# Patient Record
Sex: FEMALE | Race: BLACK OR AFRICAN AMERICAN | HISPANIC OR LATINO | Employment: OTHER | URBAN - METROPOLITAN AREA
[De-identification: names, ages, dates, MRNs, and addresses within clinical notes are randomized per-mention and may not be internally consistent; named-entity substitution may affect disease eponyms.]

---

## 2017-01-05 ENCOUNTER — HOSPITAL ENCOUNTER (EMERGENCY)
Facility: HOSPITAL | Age: 57
Discharge: HOME | End: 2017-01-05
Attending: EMERGENCY MEDICINE | Admitting: EMERGENCY MEDICINE
Payer: COMMERCIAL

## 2017-01-05 ENCOUNTER — APPOINTMENT (EMERGENCY)
Dept: RADIOLOGY | Facility: HOSPITAL | Age: 57
End: 2017-01-05
Payer: COMMERCIAL

## 2017-01-05 VITALS
TEMPERATURE: 99.5 F | HEART RATE: 78 BPM | RESPIRATION RATE: 16 BRPM | BODY MASS INDEX: 26.31 KG/M2 | WEIGHT: 163 LBS | SYSTOLIC BLOOD PRESSURE: 94 MMHG | DIASTOLIC BLOOD PRESSURE: 51 MMHG | OXYGEN SATURATION: 98 %

## 2017-01-05 DIAGNOSIS — B34.9 VIRAL SYNDROME: Primary | ICD-10-CM

## 2017-01-05 LAB
ALBUMIN SERPL BCP-MCNC: 3.9 G/DL (ref 3.5–5)
ALP SERPL-CCNC: 94 U/L (ref 46–116)
ALT SERPL W P-5'-P-CCNC: 23 U/L (ref 12–78)
ANION GAP SERPL CALCULATED.3IONS-SCNC: 7 MMOL/L (ref 4–13)
AST SERPL W P-5'-P-CCNC: 16 U/L (ref 5–45)
BACTERIA UR QL AUTO: ABNORMAL /HPF
BASOPHILS # BLD AUTO: 0.01 THOUSANDS/ΜL (ref 0–0.1)
BASOPHILS NFR BLD AUTO: 0 % (ref 0–1)
BILIRUB SERPL-MCNC: 0.87 MG/DL (ref 0.2–1)
BILIRUB UR QL STRIP: NEGATIVE
BUN SERPL-MCNC: 13 MG/DL (ref 5–25)
CALCIUM SERPL-MCNC: 9.1 MG/DL (ref 8.3–10.1)
CHLORIDE SERPL-SCNC: 100 MMOL/L (ref 100–108)
CLARITY UR: CLEAR
CO2 SERPL-SCNC: 33 MMOL/L (ref 21–32)
COLOR UR: YELLOW
CREAT SERPL-MCNC: 0.78 MG/DL (ref 0.6–1.3)
EOSINOPHIL # BLD AUTO: 0.02 THOUSAND/ΜL (ref 0–0.61)
EOSINOPHIL NFR BLD AUTO: 0 % (ref 0–6)
ERYTHROCYTE [DISTWIDTH] IN BLOOD BY AUTOMATED COUNT: 12.3 % (ref 11.6–15.1)
FLUAV AG SPEC QL IA: NEGATIVE
FLUAV AG SPEC QL: NORMAL
FLUBV AG SPEC QL IA: NEGATIVE
FLUBV AG SPEC QL: NORMAL
GFR SERPL CREATININE-BSD FRML MDRD: >60 ML/MIN/1.73SQ M
GLUCOSE SERPL-MCNC: 109 MG/DL (ref 65–140)
GLUCOSE UR STRIP-MCNC: NEGATIVE MG/DL
HCT VFR BLD AUTO: 39.8 % (ref 34.8–46.1)
HGB BLD-MCNC: 13.4 G/DL (ref 11.5–15.4)
HGB UR QL STRIP.AUTO: ABNORMAL
KETONES UR STRIP-MCNC: NEGATIVE MG/DL
LEUKOCYTE ESTERASE UR QL STRIP: NEGATIVE
LIPASE SERPL-CCNC: 63 U/L (ref 73–393)
LYMPHOCYTES # BLD AUTO: 1.15 THOUSANDS/ΜL (ref 0.6–4.47)
LYMPHOCYTES NFR BLD AUTO: 12 % (ref 14–44)
MCH RBC QN AUTO: 30.2 PG (ref 26.8–34.3)
MCHC RBC AUTO-ENTMCNC: 33.7 G/DL (ref 31.4–37.4)
MCV RBC AUTO: 90 FL (ref 82–98)
MONOCYTES # BLD AUTO: 0.32 THOUSAND/ΜL (ref 0.17–1.22)
MONOCYTES NFR BLD AUTO: 4 % (ref 4–12)
NEUTROPHILS # BLD AUTO: 7.77 THOUSANDS/ΜL (ref 1.85–7.62)
NEUTS SEG NFR BLD AUTO: 84 % (ref 43–75)
NITRITE UR QL STRIP: NEGATIVE
NON-SQ EPI CELLS URNS QL MICRO: ABNORMAL /HPF
NRBC BLD AUTO-RTO: 0 /100 WBCS
PH UR STRIP.AUTO: 7 [PH] (ref 4.5–8)
PLATELET # BLD AUTO: 180 THOUSANDS/UL (ref 149–390)
PMV BLD AUTO: 11.8 FL (ref 8.9–12.7)
POTASSIUM SERPL-SCNC: 3.8 MMOL/L (ref 3.5–5.3)
PROT SERPL-MCNC: 7.4 G/DL (ref 6.4–8.2)
PROT UR STRIP-MCNC: NEGATIVE MG/DL
RBC # BLD AUTO: 4.44 MILLION/UL (ref 3.81–5.12)
RBC #/AREA URNS AUTO: ABNORMAL /HPF
RSV B RNA SPEC QL NAA+PROBE: NORMAL
SODIUM SERPL-SCNC: 140 MMOL/L (ref 136–145)
SP GR UR STRIP.AUTO: 1.02 (ref 1–1.03)
UROBILINOGEN UR QL STRIP.AUTO: 1 E.U./DL
WBC # BLD AUTO: 9.27 THOUSAND/UL (ref 4.31–10.16)
WBC #/AREA URNS AUTO: ABNORMAL /HPF

## 2017-01-05 PROCEDURE — 36415 COLL VENOUS BLD VENIPUNCTURE: CPT | Performed by: EMERGENCY MEDICINE

## 2017-01-05 PROCEDURE — 87400 INFLUENZA A/B EACH AG IA: CPT | Performed by: EMERGENCY MEDICINE

## 2017-01-05 PROCEDURE — 96374 THER/PROPH/DIAG INJ IV PUSH: CPT

## 2017-01-05 PROCEDURE — 87798 DETECT AGENT NOS DNA AMP: CPT | Performed by: EMERGENCY MEDICINE

## 2017-01-05 PROCEDURE — 85025 COMPLETE CBC W/AUTO DIFF WBC: CPT | Performed by: EMERGENCY MEDICINE

## 2017-01-05 PROCEDURE — 71020 HB CHEST X-RAY 2VW FRONTAL&LATL: CPT

## 2017-01-05 PROCEDURE — 96361 HYDRATE IV INFUSION ADD-ON: CPT

## 2017-01-05 PROCEDURE — 87086 URINE CULTURE/COLONY COUNT: CPT

## 2017-01-05 PROCEDURE — 96375 TX/PRO/DX INJ NEW DRUG ADDON: CPT

## 2017-01-05 PROCEDURE — 81001 URINALYSIS AUTO W/SCOPE: CPT

## 2017-01-05 PROCEDURE — 83690 ASSAY OF LIPASE: CPT | Performed by: EMERGENCY MEDICINE

## 2017-01-05 PROCEDURE — 81002 URINALYSIS NONAUTO W/O SCOPE: CPT | Performed by: EMERGENCY MEDICINE

## 2017-01-05 PROCEDURE — 99284 EMERGENCY DEPT VISIT MOD MDM: CPT

## 2017-01-05 PROCEDURE — 80053 COMPREHEN METABOLIC PANEL: CPT | Performed by: EMERGENCY MEDICINE

## 2017-01-05 RX ORDER — DIPHENHYDRAMINE HYDROCHLORIDE 50 MG/ML
25 INJECTION INTRAMUSCULAR; INTRAVENOUS ONCE
Status: COMPLETED | OUTPATIENT
Start: 2017-01-05 | End: 2017-01-05

## 2017-01-05 RX ORDER — METOCLOPRAMIDE HYDROCHLORIDE 5 MG/ML
10 INJECTION INTRAMUSCULAR; INTRAVENOUS ONCE
Status: COMPLETED | OUTPATIENT
Start: 2017-01-05 | End: 2017-01-05

## 2017-01-05 RX ORDER — METOCLOPRAMIDE HYDROCHLORIDE 5 MG/ML
INJECTION INTRAMUSCULAR; INTRAVENOUS
Status: COMPLETED
Start: 2017-01-05 | End: 2017-01-05

## 2017-01-05 RX ORDER — METOCLOPRAMIDE 10 MG/1
10 TABLET ORAL EVERY 6 HOURS PRN
Qty: 12 TABLET | Refills: 0 | Status: SHIPPED | OUTPATIENT
Start: 2017-01-05 | End: 2019-02-25

## 2017-01-05 RX ORDER — KETOROLAC TROMETHAMINE 30 MG/ML
INJECTION, SOLUTION INTRAMUSCULAR; INTRAVENOUS
Status: COMPLETED
Start: 2017-01-05 | End: 2017-01-05

## 2017-01-05 RX ORDER — KETOROLAC TROMETHAMINE 30 MG/ML
15 INJECTION, SOLUTION INTRAMUSCULAR; INTRAVENOUS ONCE
Status: COMPLETED | OUTPATIENT
Start: 2017-01-05 | End: 2017-01-05

## 2017-01-05 RX ORDER — DIPHENHYDRAMINE HYDROCHLORIDE 50 MG/ML
INJECTION INTRAMUSCULAR; INTRAVENOUS
Status: COMPLETED
Start: 2017-01-05 | End: 2017-01-05

## 2017-01-05 RX ORDER — BUTALBITAL, ACETAMINOPHEN AND CAFFEINE 50; 325; 40 MG/1; MG/1; MG/1
1 TABLET ORAL EVERY 6 HOURS PRN
Qty: 12 TABLET | Refills: 0 | Status: ON HOLD | OUTPATIENT
Start: 2017-01-05 | End: 2019-03-22 | Stop reason: CLARIF

## 2017-01-05 RX ADMIN — DIPHENHYDRAMINE HYDROCHLORIDE 25 MG: 50 INJECTION INTRAMUSCULAR; INTRAVENOUS at 10:35

## 2017-01-05 RX ADMIN — SODIUM CHLORIDE 1000 ML: 0.9 INJECTION, SOLUTION INTRAVENOUS at 10:34

## 2017-01-05 RX ADMIN — KETOROLAC TROMETHAMINE 15 MG: 30 INJECTION, SOLUTION INTRAMUSCULAR; INTRAVENOUS at 10:35

## 2017-01-05 RX ADMIN — METOCLOPRAMIDE HYDROCHLORIDE 10 MG: 5 INJECTION INTRAMUSCULAR; INTRAVENOUS at 10:37

## 2017-01-05 RX ADMIN — DIPHENHYDRAMINE HYDROCHLORIDE 25 MG: 50 INJECTION, SOLUTION INTRAMUSCULAR; INTRAVENOUS at 10:35

## 2017-01-05 RX ADMIN — METOCLOPRAMIDE 10 MG: 5 INJECTION, SOLUTION INTRAMUSCULAR; INTRAVENOUS at 10:37

## 2017-01-05 RX ADMIN — KETOROLAC TROMETHAMINE 15 MG: 30 INJECTION, SOLUTION INTRAMUSCULAR at 10:35

## 2017-01-06 LAB — BACTERIA UR CULT: NORMAL

## 2017-01-09 ENCOUNTER — APPOINTMENT (EMERGENCY)
Dept: CT IMAGING | Facility: HOSPITAL | Age: 57
End: 2017-01-09
Payer: COMMERCIAL

## 2017-01-09 ENCOUNTER — HOSPITAL ENCOUNTER (EMERGENCY)
Facility: HOSPITAL | Age: 57
Discharge: HOME | End: 2017-01-09
Attending: EMERGENCY MEDICINE | Admitting: EMERGENCY MEDICINE
Payer: COMMERCIAL

## 2017-01-09 VITALS
HEART RATE: 70 BPM | RESPIRATION RATE: 16 BRPM | TEMPERATURE: 97.5 F | SYSTOLIC BLOOD PRESSURE: 107 MMHG | OXYGEN SATURATION: 97 % | DIASTOLIC BLOOD PRESSURE: 53 MMHG

## 2017-01-09 DIAGNOSIS — A08.4 VIRAL ENTERITIS: Primary | ICD-10-CM

## 2017-01-09 LAB
ALBUMIN SERPL BCP-MCNC: 3.4 G/DL (ref 3.5–5)
ALP SERPL-CCNC: 79 U/L (ref 46–116)
ALT SERPL W P-5'-P-CCNC: 20 U/L (ref 12–78)
ANION GAP SERPL CALCULATED.3IONS-SCNC: 6 MMOL/L (ref 4–13)
AST SERPL W P-5'-P-CCNC: 11 U/L (ref 5–45)
BACTERIA UR QL AUTO: ABNORMAL /HPF
BASOPHILS # BLD MANUAL: 0 THOUSAND/UL (ref 0–0.1)
BASOPHILS NFR MAR MANUAL: 0 % (ref 0–1)
BILIRUB SERPL-MCNC: 0.18 MG/DL (ref 0.2–1)
BILIRUB UR QL STRIP: ABNORMAL
BUN SERPL-MCNC: 11 MG/DL (ref 5–25)
CALCIUM SERPL-MCNC: 9.2 MG/DL (ref 8.3–10.1)
CAOX CRY URNS QL MICRO: ABNORMAL /HPF
CHLORIDE SERPL-SCNC: 105 MMOL/L (ref 100–108)
CLARITY UR: CLEAR
CO2 SERPL-SCNC: 31 MMOL/L (ref 21–32)
COLOR UR: YELLOW
CREAT SERPL-MCNC: 0.76 MG/DL (ref 0.6–1.3)
EOSINOPHIL # BLD MANUAL: 0.1 THOUSAND/UL (ref 0–0.4)
EOSINOPHIL NFR BLD MANUAL: 2 % (ref 0–6)
ERYTHROCYTE [DISTWIDTH] IN BLOOD BY AUTOMATED COUNT: 12.2 % (ref 11.6–15.1)
GFR SERPL CREATININE-BSD FRML MDRD: >60 ML/MIN/1.73SQ M
GLUCOSE SERPL-MCNC: 102 MG/DL (ref 65–140)
GLUCOSE UR STRIP-MCNC: NEGATIVE MG/DL
HCT VFR BLD AUTO: 37.2 % (ref 34.8–46.1)
HGB BLD-MCNC: 12.8 G/DL (ref 11.5–15.4)
HGB UR QL STRIP.AUTO: ABNORMAL
KETONES UR STRIP-MCNC: NEGATIVE MG/DL
LEUKOCYTE ESTERASE UR QL STRIP: NEGATIVE
LIPASE SERPL-CCNC: 55 U/L (ref 73–393)
LYMPHOCYTES # BLD AUTO: 1.75 THOUSAND/UL (ref 0.6–4.47)
LYMPHOCYTES # BLD AUTO: 35 % (ref 14–44)
MCH RBC QN AUTO: 30.3 PG (ref 26.8–34.3)
MCHC RBC AUTO-ENTMCNC: 34.4 G/DL (ref 31.4–37.4)
MCV RBC AUTO: 88 FL (ref 82–98)
MONOCYTES # BLD AUTO: 0.2 THOUSAND/UL (ref 0–1.22)
MONOCYTES NFR BLD: 4 % (ref 4–12)
MUCOUS THREADS UR QL AUTO: ABNORMAL
NEUTROPHILS # BLD MANUAL: 2.85 THOUSAND/UL (ref 1.85–7.62)
NEUTS BAND NFR BLD MANUAL: 7 % (ref 0–8)
NEUTS SEG NFR BLD AUTO: 50 % (ref 43–75)
NITRITE UR QL STRIP: NEGATIVE
NON-SQ EPI CELLS URNS QL MICRO: ABNORMAL /HPF
NRBC BLD AUTO-RTO: 0 /100 WBCS
PH UR STRIP.AUTO: 6 [PH] (ref 4.5–8)
PLATELET # BLD AUTO: 187 THOUSANDS/UL (ref 149–390)
PLATELET BLD QL SMEAR: ADEQUATE
PMV BLD AUTO: 11.6 FL (ref 8.9–12.7)
POTASSIUM SERPL-SCNC: 3.8 MMOL/L (ref 3.5–5.3)
PROT SERPL-MCNC: 7 G/DL (ref 6.4–8.2)
PROT UR STRIP-MCNC: NEGATIVE MG/DL
RBC # BLD AUTO: 4.22 MILLION/UL (ref 3.81–5.12)
RBC #/AREA URNS AUTO: ABNORMAL /HPF
RBC MORPH BLD: NORMAL
SODIUM SERPL-SCNC: 142 MMOL/L (ref 136–145)
SP GR UR STRIP.AUTO: >=1.03 (ref 1–1.03)
TOTAL CELLS COUNTED SPEC: 100
UROBILINOGEN UR QL STRIP.AUTO: 0.2 E.U./DL
VARIANT LYMPHS # BLD AUTO: 2 %
WBC # BLD AUTO: 5 THOUSAND/UL (ref 4.31–10.16)
WBC #/AREA URNS AUTO: ABNORMAL /HPF

## 2017-01-09 PROCEDURE — 81002 URINALYSIS NONAUTO W/O SCOPE: CPT | Performed by: EMERGENCY MEDICINE

## 2017-01-09 PROCEDURE — 96374 THER/PROPH/DIAG INJ IV PUSH: CPT

## 2017-01-09 PROCEDURE — 85007 BL SMEAR W/DIFF WBC COUNT: CPT | Performed by: EMERGENCY MEDICINE

## 2017-01-09 PROCEDURE — 80053 COMPREHEN METABOLIC PANEL: CPT | Performed by: EMERGENCY MEDICINE

## 2017-01-09 PROCEDURE — 74177 CT ABD & PELVIS W/CONTRAST: CPT

## 2017-01-09 PROCEDURE — 96361 HYDRATE IV INFUSION ADD-ON: CPT

## 2017-01-09 PROCEDURE — 81001 URINALYSIS AUTO W/SCOPE: CPT

## 2017-01-09 PROCEDURE — 99284 EMERGENCY DEPT VISIT MOD MDM: CPT

## 2017-01-09 PROCEDURE — 87086 URINE CULTURE/COLONY COUNT: CPT

## 2017-01-09 PROCEDURE — 85027 COMPLETE CBC AUTOMATED: CPT | Performed by: EMERGENCY MEDICINE

## 2017-01-09 PROCEDURE — 96375 TX/PRO/DX INJ NEW DRUG ADDON: CPT

## 2017-01-09 PROCEDURE — 36415 COLL VENOUS BLD VENIPUNCTURE: CPT | Performed by: EMERGENCY MEDICINE

## 2017-01-09 PROCEDURE — 83690 ASSAY OF LIPASE: CPT | Performed by: EMERGENCY MEDICINE

## 2017-01-09 RX ORDER — DIPHENHYDRAMINE HYDROCHLORIDE 50 MG/ML
12.5 INJECTION INTRAMUSCULAR; INTRAVENOUS ONCE
Status: COMPLETED | OUTPATIENT
Start: 2017-01-09 | End: 2017-01-09

## 2017-01-09 RX ORDER — DICYCLOMINE HCL 20 MG
20 TABLET ORAL EVERY 6 HOURS PRN
Qty: 15 TABLET | Refills: 0 | Status: SHIPPED | OUTPATIENT
Start: 2017-01-09 | End: 2019-02-25

## 2017-01-09 RX ORDER — KETOROLAC TROMETHAMINE 30 MG/ML
15 INJECTION, SOLUTION INTRAMUSCULAR; INTRAVENOUS ONCE
Status: COMPLETED | OUTPATIENT
Start: 2017-01-09 | End: 2017-01-09

## 2017-01-09 RX ORDER — DICYCLOMINE HCL 20 MG
20 TABLET ORAL ONCE
Status: COMPLETED | OUTPATIENT
Start: 2017-01-09 | End: 2017-01-09

## 2017-01-09 RX ORDER — METOCLOPRAMIDE HYDROCHLORIDE 5 MG/ML
10 INJECTION INTRAMUSCULAR; INTRAVENOUS ONCE
Status: COMPLETED | OUTPATIENT
Start: 2017-01-09 | End: 2017-01-09

## 2017-01-09 RX ADMIN — KETOROLAC TROMETHAMINE 15 MG: 30 INJECTION, SOLUTION INTRAMUSCULAR at 19:38

## 2017-01-09 RX ADMIN — DIPHENHYDRAMINE HYDROCHLORIDE 12.5 MG: 50 INJECTION, SOLUTION INTRAMUSCULAR; INTRAVENOUS at 19:39

## 2017-01-09 RX ADMIN — IOHEXOL 100 ML: 350 INJECTION, SOLUTION INTRAVENOUS at 20:24

## 2017-01-09 RX ADMIN — DICYCLOMINE HYDROCHLORIDE 20 MG: 20 TABLET ORAL at 22:36

## 2017-01-09 RX ADMIN — SODIUM CHLORIDE 1000 ML: 0.9 INJECTION, SOLUTION INTRAVENOUS at 19:38

## 2017-01-09 RX ADMIN — METOCLOPRAMIDE 10 MG: 5 INJECTION, SOLUTION INTRAMUSCULAR; INTRAVENOUS at 19:38

## 2017-01-10 LAB — BACTERIA UR CULT: NORMAL

## 2017-01-19 ENCOUNTER — ALLSCRIPTS OFFICE VISIT (OUTPATIENT)
Dept: OTHER | Facility: OTHER | Age: 57
End: 2017-01-19

## 2017-02-19 DIAGNOSIS — E55.9 VITAMIN D DEFICIENCY: ICD-10-CM

## 2017-02-19 DIAGNOSIS — N25.81 SECONDARY HYPERPARATHYROIDISM OF RENAL ORIGIN (HCC): ICD-10-CM

## 2017-02-19 DIAGNOSIS — K91.2 POSTSURGICAL MALABSORPTION, NOT ELSEWHERE CLASSIFIED: ICD-10-CM

## 2017-02-19 DIAGNOSIS — Z98.84 BARIATRIC SURGERY STATUS: ICD-10-CM

## 2017-02-19 DIAGNOSIS — Z00.00 ENCOUNTER FOR GENERAL ADULT MEDICAL EXAMINATION WITHOUT ABNORMAL FINDINGS: ICD-10-CM

## 2017-03-21 ENCOUNTER — GENERIC CONVERSION - ENCOUNTER (OUTPATIENT)
Dept: OTHER | Facility: OTHER | Age: 57
End: 2017-03-21

## 2017-07-17 ENCOUNTER — DOCTOR'S OFFICE (OUTPATIENT)
Dept: URBAN - METROPOLITAN AREA CLINIC 136 | Facility: CLINIC | Age: 57
Setting detail: OPHTHALMOLOGY
End: 2017-07-17
Payer: COMMERCIAL

## 2017-07-17 ENCOUNTER — OPTICAL OFFICE (OUTPATIENT)
Dept: URBAN - METROPOLITAN AREA CLINIC 143 | Facility: CLINIC | Age: 57
Setting detail: OPHTHALMOLOGY
End: 2017-07-17
Payer: COMMERCIAL

## 2017-07-17 DIAGNOSIS — H52.13: ICD-10-CM

## 2017-07-17 DIAGNOSIS — H04.123: ICD-10-CM

## 2017-07-17 DIAGNOSIS — H52.223: ICD-10-CM

## 2017-07-17 DIAGNOSIS — E11.9: ICD-10-CM

## 2017-07-17 DIAGNOSIS — H52.4: ICD-10-CM

## 2017-07-17 DIAGNOSIS — H25.13: ICD-10-CM

## 2017-07-17 PROCEDURE — 92004 COMPRE OPH EXAM NEW PT 1/>: CPT | Performed by: OPTOMETRIST

## 2017-07-17 PROCEDURE — V2020 VISION SVCS FRAMES PURCHASES: HCPCS | Performed by: OPTOMETRIST

## 2017-07-17 PROCEDURE — V2744 TINT PHOTOCHROMATIC LENS/ES: HCPCS | Performed by: OPTOMETRIST

## 2017-07-17 PROCEDURE — V2103 SPHEROCYLINDR 4.00D/12-2.00D: HCPCS | Performed by: OPTOMETRIST

## 2017-07-17 ASSESSMENT — KERATOMETRY
OS_K1POWER_DIOPTERS: 47.00
OS_AXISANGLE_DEGREES: 108
OD_K2POWER_DIOPTERS: 47.00
OD_K1POWER_DIOPTERS: 47.00
OS_K2POWER_DIOPTERS: 46.50
OD_AXISANGLE_DEGREES: 090

## 2017-07-17 ASSESSMENT — REFRACTION_CURRENTRX
OS_AXIS: 081
OD_OVR_VA: 20/
OS_OVR_VA: 20/
OD_CYLINDER: -1.00
OD_AXIS: 101
OS_SPHERE: -1.75
OD_OVR_VA: 20/
OS_OVR_VA: 20/
OD_VPRISM_DIRECTION: SV
OS_VPRISM_DIRECTION: SV
OD_SPHERE: -2.50
OS_CYLINDER: -0.50
OS_OVR_VA: 20/
OD_OVR_VA: 20/

## 2017-07-17 ASSESSMENT — REFRACTION_AUTOREFRACTION
OS_CYLINDER: +0.75
OD_CYLINDER: +1.50
OS_SPHERE: -3.50
OD_AXIS: 180
OD_SPHERE: -4.25
OS_AXIS: 171

## 2017-07-17 ASSESSMENT — REFRACTION_MANIFEST
OU_VA: 20/
OD_VA1: 20/
OS_VA2: 20/
OD_VA3: 20/
OS_VA1: 20/
OS_VA3: 20/
OS_VA1: 20/
OD_VA2: 20/
OD_VA2: 20/
OD_VA1: 20/
OU_VA: 20/
OS_VA3: 20/
OS_VA2: 20/
OD_VA3: 20/

## 2017-07-17 ASSESSMENT — REFRACTION_OUTSIDERX
OD_SPHERE: -2.50
OS_VA3: 20/
OS_AXIS: 080
OD_AXIS: 090
OD_CYLINDER: -1.00
OD_VA2: 20/20
OS_SPHERE: -2.25
OD_ADD: +2.25
OS_VA1: 20/20
OS_ADD: +2.25
OU_VA: 20/20
OD_VA1: 20/20
OS_CYLINDER: -0.50
OD_VA3: 20/
OS_VA2: 20/20

## 2017-07-17 ASSESSMENT — AXIALLENGTH_DERIVED
OS_AL: 23.6157
OD_AL: 23.6702

## 2017-07-17 ASSESSMENT — CONFRONTATIONAL VISUAL FIELD TEST (CVF)
OD_FINDINGS: FULL
OS_FINDINGS: FULL

## 2017-07-17 ASSESSMENT — VISUAL ACUITY
OD_BCVA: 20/80-1
OS_BCVA: 20/70-1

## 2017-07-17 ASSESSMENT — SPHEQUIV_DERIVED
OS_SPHEQUIV: -3.125
OD_SPHEQUIV: -3.5

## 2017-10-04 ENCOUNTER — OPTICAL OFFICE (OUTPATIENT)
Dept: URBAN - METROPOLITAN AREA CLINIC 143 | Facility: CLINIC | Age: 57
Setting detail: OPHTHALMOLOGY
End: 2017-10-04

## 2017-10-04 DIAGNOSIS — H52.13: ICD-10-CM

## 2017-10-04 PROCEDURE — V2020 VISION SVCS FRAMES PURCHASES: HCPCS | Performed by: OPTOMETRIST

## 2017-12-30 ENCOUNTER — HOSPITAL ENCOUNTER (EMERGENCY)
Facility: HOSPITAL | Age: 57
Discharge: HOME | End: 2017-12-30
Admitting: EMERGENCY MEDICINE
Payer: COMMERCIAL

## 2017-12-30 VITALS
HEART RATE: 70 BPM | RESPIRATION RATE: 18 BRPM | TEMPERATURE: 97.9 F | OXYGEN SATURATION: 100 % | DIASTOLIC BLOOD PRESSURE: 61 MMHG | SYSTOLIC BLOOD PRESSURE: 132 MMHG

## 2017-12-30 DIAGNOSIS — K08.89 DENTALGIA: Primary | ICD-10-CM

## 2017-12-30 PROCEDURE — 99282 EMERGENCY DEPT VISIT SF MDM: CPT

## 2017-12-30 RX ORDER — PENICILLIN V POTASSIUM 250 MG/1
500 TABLET ORAL ONCE
Status: COMPLETED | OUTPATIENT
Start: 2017-12-30 | End: 2017-12-30

## 2017-12-30 RX ORDER — CHLORHEXIDINE GLUCONATE 0.12 MG/ML
15 RINSE ORAL 2 TIMES DAILY PRN
Qty: 473 ML | Refills: 0 | Status: SHIPPED | OUTPATIENT
Start: 2017-12-30 | End: 2019-02-25

## 2017-12-30 RX ORDER — BUPIVACAINE HYDROCHLORIDE 5 MG/ML
10 INJECTION, SOLUTION EPIDURAL; INTRACAUDAL ONCE
Status: COMPLETED | OUTPATIENT
Start: 2017-12-30 | End: 2017-12-30

## 2017-12-30 RX ORDER — PENICILLIN V POTASSIUM 500 MG/1
500 TABLET ORAL 4 TIMES DAILY
Qty: 28 TABLET | Refills: 0 | Status: SHIPPED | OUTPATIENT
Start: 2017-12-30 | End: 2018-01-06

## 2017-12-30 RX ADMIN — BUPIVACAINE HYDROCHLORIDE 10 ML: 5 INJECTION, SOLUTION EPIDURAL; INTRACAUDAL at 18:27

## 2017-12-30 RX ADMIN — PENICILLIN V POTASSIUM 500 MG: 250 TABLET ORAL at 18:27

## 2017-12-30 NOTE — DISCHARGE INSTRUCTIONS
Return to the Emergency Department sooner if increased pain, fever, vomiting, difficulty breathing or swallowing, drooling  Toothache   WHAT YOU NEED TO KNOW:   A toothache is pain that is caused by irritation of the nerves in the center of your tooth  The irritation may be caused by several problems, such as a cavity, an infection, a cracked tooth, or gum disease  It is very important to follow up with your dentist so the cause of your toothache can be diagnosed and treated  This can help prevent more serious problems  DISCHARGE INSTRUCTIONS:   Medicines: You may  need any of the following:  · NSAIDs  decrease swelling and pain  This medicine can be bought with or without a doctor's order  This medicine can cause stomach bleeding or kidney problems in certain people  If you take blood thinner medicine, always ask your healthcare provider if NSAIDs are safe for you  Always read the medicine label and follow the directions on it before using this medicine  · Acetaminophen  decreases pain  It is available without a doctor's order  Ask how much to take and how often to take it  Follow directions  Acetaminophen can cause liver damage if not taken correctly  · Pain medicine  may be given as a pill or as medicine that you put directly on your tooth or gums  Do not wait until the pain is severe before you take this medicine  · Antibiotics  help fight or prevent an infection caused by bacteria  Take them as directed  · Take your medicine as directed  Contact your healthcare provider if you think your medicine is not helping or if you have side effects  Tell him of her if you are allergic to any medicine  Keep a list of the medicines, vitamins, and herbs you take  Include the amounts, and when and why you take them  Bring the list or the pill bottles to follow-up visits  Carry your medicine list with you in case of an emergency  Follow up with your dentist as directed:   You may be referred to a dental surgeon  Write down your questions so you remember to ask them during your visits  Self-care:   · Rinse your mouth with warm salt water 4 times a day or as directed  · You may need to eat soft foods to help relieve pain caused by chewing  Contact your dentist if:   · You have questions or concerns about your condition or care  Return to the emergency department if:   · You have trouble breathing  · You have swelling in your face or neck  · You have a fever and chills  · You have trouble speaking or swallowing  · You have trouble opening or closing your mouth  © 2017 [facility] Information is for End User's use only and may not be sold, redistributed or otherwise used for commercial purposes  All illustrations and images included in CareNotes® are the copyrighted property of A D A M , Inc  or Everette Markham  The above information is an  only  It is not intended as medical advice for individual conditions or treatments  Talk to your doctor, nurse or pharmacist before following any medical regimen to see if it is safe and effective for you

## 2017-12-30 NOTE — ED PROVIDER NOTES
History  Chief Complaint   Patient presents with    Dental Pain     Patient reporting L sided dental pain  Patient reports taking Tylenol for pain  last dose at 1545      16-year-old female with left-sided dental pain  Left upper 2nd 3rd molars  Began 3 days ago  Gradual onset  No precipitating factors that she can recall  She states he is teeth have given her issues in the past as well  She has had no dentist follow-up for this  States that the pain radiates across her entire face and into her submandibular region  No facial swelling with this  No fevers, but has had subjective chills; no nausea or vomiting  No chest pain or shortness of breath  No trauma  No headache lightheadedness or dizziness  States she took a single dose of Tylenol and has had no relief of her pain  Chewing on the affected side worsens or pain  Otherwise no relieving or exacerbating factors  No trismus  States she cannot take NSAIDs secondary to her gastric bypass        History provided by:  Patient   used: No    Dental Pain   Location:  Upper  Upper teeth location:  15/NOE 2nd molar  Quality:  Throbbing  Severity:  Severe  Onset quality:  Gradual  Duration:  3 days  Timing:  Constant  Progression:  Worsening  Chronicity:  New  Context: dental caries and poor dentition    Context: not abscess, cap still on, not crown fracture, not dental fracture, not enamel fracture, filling intact, not intrusion, not malocclusion, not recent dental surgery and not trauma    Previous work-up:  Dental exam  Relieved by:  Nothing  Worsened by:  Nothing  Ineffective treatments: One single dose of Tylenol    Associated symptoms: no congestion, no difficulty swallowing, no drooling, no facial pain, no facial swelling, no fever, no gum swelling, no headaches, no neck pain, no neck swelling, no oral bleeding, no oral lesions and no trismus    Risk factors: lack of dental care and smoking (Former)    Risk factors: no alcohol problem, no cancer, no chewing tobacco use, no diabetes, no immunosuppression and no periodontal disease        Prior to Admission Medications   Prescriptions Last Dose Informant Patient Reported? Taking? Multiple Vitamin (MULTIVITAMIN) tablet   Yes No   Sig: Take 1 tablet by mouth daily  TRAZODONE HCL PO   Yes No   Sig: Take by mouth daily  butalbital-acetaminophen-caffeine (FIORICET) -40 mg per tablet   No No   Sig: Take 1 tablet by mouth every 6 (six) hours as needed for headaches for up to 12 doses   dicyclomine (BENTYL) 20 mg tablet   No No   Sig: Take 1 tablet by mouth every 6 (six) hours as needed (pain)   metoclopramide (REGLAN) 10 mg tablet   No No   Sig: Take 1 tablet by mouth every 6 (six) hours as needed (vomiting) for up to 12 doses   naproxen (NAPROSYN) 500 mg tablet   No No   Sig: Take 1 tablet by mouth 2 (two) times a day with meals  Facility-Administered Medications: None       Past Medical History:   Diagnosis Date    Depression     Diabetes mellitus ([facility] [address] )     Hypotension        Past Surgical History:   Procedure Laterality Date    CHOLECYSTECTOMY      GASTRIC BYPASS      HYSTERECTOMY      TONSILLECTOMY      TUBAL LIGATION         History reviewed  No pertinent family history  I have reviewed and agree with the history as documented  Social History   Substance Use Topics    Smoking status: Former Smoker    Smokeless tobacco: Never Used    Alcohol use No        Review of Systems   Constitutional: Negative for activity change, appetite change, chills, diaphoresis, fatigue, fever and unexpected weight change  HENT: Positive for dental problem  Negative for congestion, drooling, facial swelling, mouth sores, rhinorrhea, sinus pressure, sore throat and trouble swallowing  Eyes: Negative for photophobia and visual disturbance  Respiratory: Negative for apnea, cough, choking, chest tightness, shortness of breath, wheezing and stridor      Cardiovascular: Negative for chest pain, palpitations and leg swelling  Gastrointestinal: Negative for abdominal distention, abdominal pain, blood in stool, constipation, diarrhea, nausea and vomiting  Genitourinary: Negative for decreased urine volume, difficulty urinating, dysuria, enuresis, flank pain, frequency, hematuria and urgency  Musculoskeletal: Negative for arthralgias, myalgias, neck pain and neck stiffness  Skin: Negative for color change, pallor, rash and wound  Allergic/Immunologic: Negative  Neurological: Negative for dizziness, tremors, syncope, weakness, light-headedness, numbness and headaches  Hematological: Negative  Psychiatric/Behavioral: Negative  All other systems reviewed and are negative  Physical Exam  ED Triage Vitals [12/30/17 1807]   Temperature Pulse Respirations Blood Pressure SpO2   97 9 °F (36 6 °C) 70 18 132/61 100 %      Temp Source Heart Rate Source Patient Position - Orthostatic VS BP Location FiO2 (%)   Temporal Monitor Lying Right arm --      Pain Score       Worst Possible Pain           Orthostatic Vital Signs  Vitals:    12/30/17 1807   BP: 132/61   Pulse: 70   Patient Position - Orthostatic VS: Lying       Physical Exam   Constitutional: She is oriented to person, place, and time  She appears well-developed and well-nourished  Non-toxic appearance  She does not have a sickly appearance  She does not appear ill  No distress  HENT:   Head: Normocephalic and atraumatic  Mouth/Throat: Uvula is midline, oropharynx is clear and moist and mucous membranes are normal  She does not have dentures  No oral lesions  No trismus in the jaw  Abnormal dentition  Dental caries present  No dental abscesses, uvula swelling or lacerations  Eyes: EOM and lids are normal  Pupils are equal, round, and reactive to light  Neck: Normal range of motion  Neck supple     Cardiovascular: Normal rate, regular rhythm, S1 normal, S2 normal, normal heart sounds, intact distal pulses and normal pulses  Exam reveals no gallop, no distant heart sounds, no friction rub and no decreased pulses  No murmur heard  Pulses:       Radial pulses are 2+ on the right side, and 2+ on the left side  Pulmonary/Chest: Effort normal and breath sounds normal  No accessory muscle usage  No apnea, no tachypnea and no bradypnea  No respiratory distress  She has no decreased breath sounds  She has no wheezes  She has no rhonchi  She has no rales  Abdominal: Soft  Normal appearance and bowel sounds are normal  She exhibits no distension and no mass  There is no tenderness  There is no rigidity, no rebound and no guarding  No hernia  Musculoskeletal: Normal range of motion  She exhibits no edema, tenderness or deformity  Neurological: She is alert and oriented to person, place, and time  No cranial nerve deficit  GCS eye subscore is 4  GCS verbal subscore is 5  GCS motor subscore is 6  GCS 15  AAOx3  Ambulating in department without difficulty  CN II-XII grossly intact  No focal neuro deficits  Skin: Skin is warm, dry and intact  No rash noted  She is not diaphoretic  No erythema  No pallor  Psychiatric: Her speech is normal    Nursing note and vitals reviewed  ED Medications  Medications   bupivacaine (PF) (MARCAINE) 0 5 % injection 10 mL (10 mL Injection Given by Other 12/30/17 1827)   penicillin V potassium (VEETID) tablet 500 mg (500 mg Oral Given 12/30/17 1827)       Diagnostic Studies  Results Reviewed     None                 No orders to display              Procedures  Nerve Block  Date/Time: 12/30/2017 6:26 PM  Performed by: Karime Franco by: Heidy Barreto     Patient location:  ED  Other Assisting Provider: Yes (comment) (ER nurse)    Consent:     Consent obtained:  Verbal    Consent given by:  Patient    Risks discussed:   Allergic reaction, bleeding, intravenous injection, infection, pain, nerve damage, swelling and unsuccessful block    Alternatives discussed: tramadol  Universal protocol:     Procedure explained and questions answered to patient or proxy's satisfaction: yes      Patient identity confirmed:  Arm band, verbally with patient and hospital-assigned identification number  Indications:     Indications:  Pain relief  Location:     Nerve block body site: dental     Laterality:  Left  Procedure details (see MAR for exact dosages): Block needle gauge:  22 G    Anesthetic injected:  Bupivacaine 0 5% w/o epi    Steroid injected:  None    Additive injected:  None    Injection procedure:  Anatomic landmarks identified, anatomic landmarks palpated, introduced needle, incremental injection and negative aspiration for blood  Post-procedure details:     Dressing:  None    Outcome:  Pain unchanged    Patient tolerance of procedure: Tolerated well, no immediate complications             Phone Contacts  ED Phone Contact    ED Course  ED Course                                MDM  Number of Diagnoses or Management Options  Christine Darylranchod: new and requires workup  Diagnosis management comments: DDx including but not limited to: dental von, dental infection, dental abscess, dry socket, gingivitis; doubt lizzette's angina  Plan:  Offered dental block versus tramadol  Patient would prefer dental block  Will start treatment for possible early dental abscess  Follow up with dentist     Risk of Complications, Morbidity, and/or Mortality  Presenting problems: low  Management options: low  General comments: 80-year-old female here with dental pain  Exam consistent with dental caries and likely early dental abscess  She is given 1st dose of antibiotics  She is given a dental block  Pain is overall unchanged  She needs to follow up with dentist   She is essentially having acute on chronic dental pain  She has had issues with the affected teeth in the past   Her exam is benign  No submandibular swelling or abscess    Will give the patient contact information for outpatient dentistry  Return parameters provided  Patient understands agrees the plan  Patient Progress  Patient progress: stable    CritCare Time    Disposition  Final diagnoses:   Titus Posadas     Time reflects when diagnosis was documented in both MDM as applicable and the Disposition within this note     Time User Action Codes Description Comment    12/30/2017  6:34 PM Doris Velez Add [K08 89] Titus Posadas       ED Disposition     ED Disposition Condition Comment    Discharge  Mark Aguero discharge to home/self care  Condition at discharge: Good        Follow-up Information     Follow up With Specialties Details Why Contact Info    Chung Pal PA-C Physician Assistant   [phone] [address]  Call As early as possible to schedule follow-up appointment  [address] [place] [address]  [phone]        Patient's Medications   Discharge Prescriptions    CHLORHEXIDINE (PERIDEX) 0 12 % SOLUTION    Apply 15 mL to the mouth or throat 2 (two) times a day as needed for wound care       Start Date: 12/30/2017End Date: --       Order Dose: 15 mL       Quantity: 473 mL    Refills: 0    PENICILLIN V POTASSIUM (VEETID) 500 MG TABLET    Take 1 tablet by mouth 4 (four) times a day for 7 days       Start Date: 12/30/2017End Date: 1/6/2018       Order Dose: 500 mg       Quantity: 28 tablet    Refills: 0     No discharge procedures on file      ED Provider  Electronically Signed by           Nancy Singletary PA-C  12/30/17 5260

## 2018-01-10 NOTE — PROGRESS NOTES
Message  Received referral from ANEESH  Scheduled f/u with LCSW and RD for 2/19/2016 at 11 am       Active Problems    1  Asthma (493 90) (J45 909)   2  Atypical chest pain (786 59) (R07 89)   3  Backache (724 5) (M54 9)   4  Constipation (564 00) (K59 00)   5  Depression with anxiety (300 4) (F41 8)   6  Edema (782 3) (R60 9)   7  Hemorrhoids (455 6) (K64 9)   8  Incisional irritation (998 89) (T81 89XA)   9  Limb pain (729 5) (M79 609)   10  Low vitamin B12 level (266 2) (E53 8)   11  Nausea (787 02) (R11 0)   12  Obesity (278 00) (E66 9)   13  Obesity surgery status (V45 86) (Z98 84)   14  Postgastrectomy malabsorption (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) (K91 2)   15  Pre-operative cardiovascular examination (V72 81) (Z01 810)   16  Secondary hyperparathyroidism (588 81) (N25 81)   17  Shortness of breath (786 05) (R06 02)   18  Skin rash (782 1) (R21)   19  Swelling of calf (729 81) (M79 89)   20  Vitamin D deficiency (268 9) (E55 9)   21  Wound drainage (879 8) (T14 8)    Current Meds   1  Albuterol Sulfate 0 63 MG/3ML Inhalation Nebulization Solution; Therapy: 59JYO3009 to Recorded   2  Calcium Citrate TABS; Therapy: (Recorded:30Jan2014) to Recorded   3  Cephalexin 250 MG/5ML Oral Suspension Reconstituted; Therapy: 25JIL2367 to (61 23 68) Recorded   4  DULoxetine HCl - 60 MG Oral Capsule Delayed Release Particles; Therapy: 61SRY3489 to (Evaluate:48Lth0190) Recorded   5  FreeStyle Lancets Miscellaneous; Therapy: 00VWP7314 to (Evaluate:87Boz6132) Recorded   6  FreeStyle Lite Device; Therapy: 55XTY0303 to (Evaluate:28Uej1208) Recorded   7  FreeStyle Lite Test In [employer]; Therapy: 67UVU5270 to (Evaluate:74Sps7023) Recorded   8  Hydrocodone-Acetaminophen 5-325 MG Oral Tablet; Therapy: 16FYW6558 to (Evaluate:24Jan2015) Recorded   9  Mirtazapine 45 MG Oral Tablet; Therapy: 73EUT3471 to (Evaluate:08Feb2015) Recorded   10  Multiple Vitamins TABS; Fusion QID; Therapy: (Recorded:30Jan2014) to Recorded   11  Oxycodone-Acetaminophen 5-325 MG Oral Tablet; take 1 tablet every 4 to 6 hours as    needed for pain; Therapy: 05UKD2257 to (Last Rx:16Jan2015) Ordered   12  Pantoprazole Sodium 40 MG Oral Tablet Delayed Release; Therapy: (Recorded:19Jan2016) to Recorded   13  Pataday 0 2 % Ophthalmic Solution; Therapy: 64PNU0039 to (Evaluate:31Jan2015) Recorded   14  Refresh Plus 0 5 % Ophthalmic Solution; Therapy: 16Oml8213 to (Evaluate:08Feb2015) Recorded   15  Terbinafine HCl - 1 % External Cream;    Therapy: 87Prn4516 to (Evaluate:16Jan2015) Recorded   16  TraMADol HCl - 50 MG Oral Tablet; TAKE 1 TABLET 3 TIMES DAILY; Therapy: (Recorded:07Fkm2972) to Recorded   17  TraZODone HCl - 50 MG Oral Tablet; Therapy: 50DTL7341 to (Evaluate:08Feb2015) Recorded   18  Vistaril 25 MG Oral Capsule (HydrOXYzine Pamoate); TAKE 1 CAPSULE AT BEDTIME; Therapy: (Recorded:74Gij5431) to Recorded    Allergies    1   No Known Drug Allergies    Signatures   Electronically signed by : MANUEL Garcia; Jan 20 2016  2:04PM EST                       (Author)

## 2018-01-14 VITALS
HEIGHT: 66 IN | DIASTOLIC BLOOD PRESSURE: 64 MMHG | HEART RATE: 72 BPM | BODY MASS INDEX: 25.96 KG/M2 | RESPIRATION RATE: 22 BRPM | TEMPERATURE: 98 F | WEIGHT: 161.5 LBS | SYSTOLIC BLOOD PRESSURE: 118 MMHG

## 2018-01-14 NOTE — MISCELLANEOUS
Provider Comments  Provider Comments:   Dear David Velasquez had a scheduled appointment at our office 03/21/2017 today that you called to cancel but did not reschedule for another day  It is very important that you follow up with us so that we can assess your physical and nutritional safety after your surgery  Please call our office at [phone] to reschedule your appointment       Sincerely,     Alberto Osorio [facility]          Signatures   Electronically signed by : Elsie Rodriguez, [facility]; Mar 21 2017 12:40PM EST                       (Author)

## 2018-01-17 NOTE — RESULT NOTES
Dear Raúl No,   Your test results have returned and are listed below:      Plan  Health Maintenance, Obesity surgery status, Postgastrectomy  malabsorption, Secondary hyperparathyroidism    · (1) CALCIUM; Status:Active; Requested WVV:[id number];    · (1) PTH N-TERMINAL (INTACT); Status:Active; Requested  DAMON:[id number];    · (1) VITAMIN D 25-HYDROXY; Status:Active; Requested  AKB:[id number];     Discussion/Summary  Your results show some abnormalities  2/2/16 labs reviewed  Your PTH level is high again-now at 91 8  This may indicate you are losing calcium from your bones  Your total calcium citrate with D should be 500 mg -three times per day  Calcium needs to be spaced out for better absorption  IF you are already taking this much, I would recommend taking this 4 times per day instead of three  If your level remains high with repeat labs, you may benefit from having this further evaluated by an Endocrinologist     Your vitamin D is also low at 25- Vitamin D and calcium are both important for bone health  Your TOTAL vitamin D3 for the day (with what is in your multivitamins, and what is in your calcium with D) should add up to between 5151-3931 IU per day  EXTRA vitamin D3 can be found over-the-counter to add up to this total     If you have not had a DEXA bone scan, you should review this with your PCP or GYN  It may not be covered by insurance but your bone health may need to be evaluated  You have other minor abnormalities with your labs but nothing else to be concerned about  I am enclosing a lab slip to get your levels rechecked in 3 months  If you have questions, please call me  Please keep your regularly scheduled follow-up appointment  If you do not have a follow-up scheduled, please call the office to schedule a follow-up visit  If you have any questions, please don't hesitate to call the office     Sincerely,      Signatures   Electronically signed by : Oh Sparks CLEO Olvera; Mar  2 2016 11:49AM EST                       (Author)    Electronically signed by : CHICO Nance ; Mar  3 2016 10:40AM EST                       (Co-author)

## 2018-01-22 ENCOUNTER — GENERIC CONVERSION - ENCOUNTER (OUTPATIENT)
Dept: OTHER | Facility: OTHER | Age: 58
End: 2018-01-22

## 2018-01-24 NOTE — MISCELLANEOUS
Provider Comments  Provider Comments:   Dear Rubi Alcaraz had a scheduled appointment at our office today but did not show  We attempted to call you back but were unable to reach you  It is very important that you follow up with us so that we can assess your physical and nutritional safety after your surgery  Please call our office at [phone] to reschedule your appointment       Sincerely,     [facility]to Osor[facility]          Signatures   Electronically signed by : Avelina Villa, ; Jan 22 2018 11:05AM EST                       (Author)

## 2018-05-29 ENCOUNTER — HOSPITAL ENCOUNTER (EMERGENCY)
Facility: HOSPITAL | Age: 58
Discharge: HOME | End: 2018-05-29
Attending: EMERGENCY MEDICINE | Admitting: EMERGENCY MEDICINE
Payer: COMMERCIAL

## 2018-05-29 ENCOUNTER — APPOINTMENT (EMERGENCY)
Dept: CT IMAGING | Facility: HOSPITAL | Age: 58
End: 2018-05-29
Payer: COMMERCIAL

## 2018-05-29 VITALS
BODY MASS INDEX: 27.86 KG/M2 | WEIGHT: 170 LBS | TEMPERATURE: 98.3 F | SYSTOLIC BLOOD PRESSURE: 107 MMHG | RESPIRATION RATE: 18 BRPM | OXYGEN SATURATION: 99 % | HEART RATE: 68 BPM | DIASTOLIC BLOOD PRESSURE: 53 MMHG

## 2018-05-29 DIAGNOSIS — R19.7 DIARRHEA: ICD-10-CM

## 2018-05-29 DIAGNOSIS — R11.0 NAUSEA: Primary | ICD-10-CM

## 2018-05-29 DIAGNOSIS — K52.9 ENTERITIS: ICD-10-CM

## 2018-05-29 DIAGNOSIS — B34.9 ACUTE VIRAL SYNDROME: ICD-10-CM

## 2018-05-29 LAB
ALBUMIN SERPL BCP-MCNC: 3.7 G/DL (ref 3.5–5)
ALP SERPL-CCNC: 98 U/L (ref 46–116)
ALT SERPL W P-5'-P-CCNC: 31 U/L (ref 12–78)
ANION GAP SERPL CALCULATED.3IONS-SCNC: 8 MMOL/L (ref 4–13)
AST SERPL W P-5'-P-CCNC: 31 U/L (ref 5–45)
BACTERIA UR QL AUTO: ABNORMAL /HPF
BASOPHILS # BLD AUTO: 0.02 THOUSANDS/ΜL (ref 0–0.1)
BASOPHILS NFR BLD AUTO: 0 % (ref 0–1)
BILIRUB SERPL-MCNC: 0.29 MG/DL (ref 0.2–1)
BILIRUB UR QL STRIP: NEGATIVE
BUN SERPL-MCNC: 23 MG/DL (ref 5–25)
CALCIUM SERPL-MCNC: 9.3 MG/DL (ref 8.3–10.1)
CHLORIDE SERPL-SCNC: 103 MMOL/L (ref 100–108)
CLARITY UR: CLEAR
CO2 SERPL-SCNC: 27 MMOL/L (ref 21–32)
COLOR UR: YELLOW
CREAT SERPL-MCNC: 0.8 MG/DL (ref 0.6–1.3)
EOSINOPHIL # BLD AUTO: 0.1 THOUSAND/ΜL (ref 0–0.61)
EOSINOPHIL NFR BLD AUTO: 1 % (ref 0–6)
ERYTHROCYTE [DISTWIDTH] IN BLOOD BY AUTOMATED COUNT: 12.8 % (ref 11.6–15.1)
GFR SERPL CREATININE-BSD FRML MDRD: 83 ML/MIN/1.73SQ M
GLUCOSE SERPL-MCNC: 116 MG/DL (ref 65–140)
GLUCOSE UR STRIP-MCNC: NEGATIVE MG/DL
HCT VFR BLD AUTO: 40 % (ref 34.8–46.1)
HGB BLD-MCNC: 13.3 G/DL (ref 11.5–15.4)
HGB UR QL STRIP.AUTO: ABNORMAL
KETONES UR STRIP-MCNC: NEGATIVE MG/DL
LEUKOCYTE ESTERASE UR QL STRIP: NEGATIVE
LIPASE SERPL-CCNC: 71 U/L (ref 73–393)
LYMPHOCYTES # BLD AUTO: 1.34 THOUSANDS/ΜL (ref 0.6–4.47)
LYMPHOCYTES NFR BLD AUTO: 13 % (ref 14–44)
MCH RBC QN AUTO: 30 PG (ref 26.8–34.3)
MCHC RBC AUTO-ENTMCNC: 33.3 G/DL (ref 31.4–37.4)
MCV RBC AUTO: 90 FL (ref 82–98)
MONOCYTES # BLD AUTO: 0.57 THOUSAND/ΜL (ref 0.17–1.22)
MONOCYTES NFR BLD AUTO: 5 % (ref 4–12)
NEUTROPHILS # BLD AUTO: 8.56 THOUSANDS/ΜL (ref 1.85–7.62)
NEUTS SEG NFR BLD AUTO: 81 % (ref 43–75)
NITRITE UR QL STRIP: NEGATIVE
NON-SQ EPI CELLS URNS QL MICRO: ABNORMAL /HPF
NRBC BLD AUTO-RTO: 0 /100 WBCS
PH UR STRIP.AUTO: 5 [PH] (ref 4.5–8)
PLATELET # BLD AUTO: 173 THOUSANDS/UL (ref 149–390)
PMV BLD AUTO: 12.4 FL (ref 8.9–12.7)
POTASSIUM SERPL-SCNC: 4.1 MMOL/L (ref 3.5–5.3)
PROT SERPL-MCNC: 7.3 G/DL (ref 6.4–8.2)
PROT UR STRIP-MCNC: NEGATIVE MG/DL
RBC # BLD AUTO: 4.44 MILLION/UL (ref 3.81–5.12)
RBC #/AREA URNS AUTO: ABNORMAL /HPF
SODIUM SERPL-SCNC: 138 MMOL/L (ref 136–145)
SP GR UR STRIP.AUTO: 1.02 (ref 1–1.03)
UROBILINOGEN UR QL STRIP.AUTO: 0.2 E.U./DL
WBC # BLD AUTO: 10.59 THOUSAND/UL (ref 4.31–10.16)
WBC #/AREA URNS AUTO: ABNORMAL /HPF

## 2018-05-29 PROCEDURE — 96361 HYDRATE IV INFUSION ADD-ON: CPT

## 2018-05-29 PROCEDURE — 96374 THER/PROPH/DIAG INJ IV PUSH: CPT

## 2018-05-29 PROCEDURE — 83690 ASSAY OF LIPASE: CPT | Performed by: EMERGENCY MEDICINE

## 2018-05-29 PROCEDURE — 96375 TX/PRO/DX INJ NEW DRUG ADDON: CPT

## 2018-05-29 PROCEDURE — 81001 URINALYSIS AUTO W/SCOPE: CPT

## 2018-05-29 PROCEDURE — 99284 EMERGENCY DEPT VISIT MOD MDM: CPT

## 2018-05-29 PROCEDURE — 36415 COLL VENOUS BLD VENIPUNCTURE: CPT | Performed by: EMERGENCY MEDICINE

## 2018-05-29 PROCEDURE — 80053 COMPREHEN METABOLIC PANEL: CPT | Performed by: EMERGENCY MEDICINE

## 2018-05-29 PROCEDURE — 85025 COMPLETE CBC W/AUTO DIFF WBC: CPT | Performed by: EMERGENCY MEDICINE

## 2018-05-29 PROCEDURE — 81002 URINALYSIS NONAUTO W/O SCOPE: CPT | Performed by: EMERGENCY MEDICINE

## 2018-05-29 PROCEDURE — 74177 CT ABD & PELVIS W/CONTRAST: CPT

## 2018-05-29 RX ORDER — DICYCLOMINE HCL 20 MG
20 TABLET ORAL ONCE
Status: COMPLETED | OUTPATIENT
Start: 2018-05-29 | End: 2018-05-29

## 2018-05-29 RX ORDER — ONDANSETRON 2 MG/ML
4 INJECTION INTRAMUSCULAR; INTRAVENOUS ONCE
Status: COMPLETED | OUTPATIENT
Start: 2018-05-29 | End: 2018-05-29

## 2018-05-29 RX ORDER — DICYCLOMINE HCL 20 MG
20 TABLET ORAL EVERY 6 HOURS PRN
Qty: 20 TABLET | Refills: 0 | Status: SHIPPED | OUTPATIENT
Start: 2018-05-29 | End: 2019-02-25

## 2018-05-29 RX ORDER — ONDANSETRON 4 MG/1
4 TABLET, ORALLY DISINTEGRATING ORAL EVERY 8 HOURS PRN
Qty: 20 TABLET | Refills: 0 | Status: SHIPPED | OUTPATIENT
Start: 2018-05-29 | End: 2019-02-25

## 2018-05-29 RX ORDER — KETOROLAC TROMETHAMINE 30 MG/ML
15 INJECTION, SOLUTION INTRAMUSCULAR; INTRAVENOUS ONCE
Status: COMPLETED | OUTPATIENT
Start: 2018-05-29 | End: 2018-05-29

## 2018-05-29 RX ADMIN — ONDANSETRON 4 MG: 2 INJECTION INTRAMUSCULAR; INTRAVENOUS at 01:15

## 2018-05-29 RX ADMIN — FAMOTIDINE 20 MG: 10 INJECTION, SOLUTION INTRAVENOUS at 01:16

## 2018-05-29 RX ADMIN — IOHEXOL 25 ML: 240 INJECTION, SOLUTION INTRATHECAL; INTRAVASCULAR; INTRAVENOUS; ORAL at 02:09

## 2018-05-29 RX ADMIN — IOHEXOL 100 ML: 350 INJECTION, SOLUTION INTRAVENOUS at 02:10

## 2018-05-29 RX ADMIN — DICYCLOMINE HYDROCHLORIDE 20 MG: 20 TABLET ORAL at 01:17

## 2018-05-29 RX ADMIN — KETOROLAC TROMETHAMINE 15 MG: 30 INJECTION, SOLUTION INTRAMUSCULAR at 01:15

## 2018-05-29 RX ADMIN — SODIUM CHLORIDE 1000 ML: 0.9 INJECTION, SOLUTION INTRAVENOUS at 01:17

## 2018-05-29 NOTE — DISCHARGE INSTRUCTIONS
Acute Nausea and Vomiting   WHAT YOU NEED TO KNOW:   Acute nausea and vomiting start suddenly, worsen quickly, and last a short time  DISCHARGE INSTRUCTIONS:   Seek care immediately if:   · You see blood in your vomit or your bowel movements  · You have sudden, severe pain in your chest and upper abdomen after hard vomiting or retching  · You have swelling in your neck and chest      · You are dizzy, cold, and thirsty and your eyes and mouth are dry  · You are urinating very little or not at all  · You have muscle weakness, leg cramps, and trouble breathing  · Your heart is beating much faster than normal      · You continue to vomit for more than 48 hours  Contact your healthcare provider if:   · You have frequent dry heaves (vomiting but nothing comes out)  · Your nausea and vomiting does not get better or go away after you use medicine  · You have questions or concerns about your condition or treatment  Medicines: You may need any of the following:  · Medicines  may be given to calm your stomach and stop your vomiting  You may also need medicines to help you feel more relaxed or to stop nausea and vomiting caused by motion sickness  · Gastrointestinal stimulants  are used to help empty your stomach and bowels  This may help decrease nausea and vomiting  · Take your medicine as directed  Contact your healthcare provider if you think your medicine is not helping or if you have side effects  Tell him or her if you are allergic to any medicine  Keep a list of the medicines, vitamins, and herbs you take  Include the amounts, and when and why you take them  Bring the list or the pill bottles to follow-up visits  Carry your medicine list with you in case of an emergency  Prevent or manage acute nausea and vomiting:   · Do not drink alcohol  Alcohol may upset or irritate your stomach  Too much alcohol can also cause acute nausea and vomiting  · Control stress    Headaches due to stress may cause nausea and vomiting  Find ways to relax and manage your stress  Get more rest and sleep  · Drink more liquids as directed  Vomiting can lead to dehydration  It is important to drink more liquids to help replace lost body fluids  Ask your healthcare provider how much liquid to drink each day and which liquids are best for you  Your provider may recommend that you drink an oral rehydration solution (ORS)  ORS contains water, salts, and sugar that are needed to replace the lost body fluids  Ask what kind of ORS to use, how much to drink, and where to get it  · Eat smaller meals, more often  Eat small amounts of food every 2 to 3 hours, even if you are not hungry  Food in your stomach may decrease your nausea  · Talk to your healthcare provider before you take over-the-counter (OTC) medicines  These medicines can cause serious problems if you use certain other medicines, or you have a medical condition  You may have problems if you use too much or use them for longer than the label says  Follow directions on the label carefully  Follow up with your healthcare provider as directed:  Write down your questions so you remember to ask them during your visits  © 2017 [facility] Information is for End User's use only and may not be sold, redistributed or otherwise used for commercial purposes  All illustrations and images included in CareNotes® are the copyrighted property of [employer]  or Eve[employer]ham  The above information is an  only  It is not intended as medical advice for individual conditions or treatments  Talk to your doctor, nurse or pharmacist before following any medical regimen to see if it is safe and effective for you  Enteritis   WHAT YOU NEED TO KNOW:   Enteritis is inflammation of the small intestine  It may be caused by eating foods or drinking liquids contaminated with a virus, bacteria, or parasites   It may also be caused by certain medicines, damage from radiation, and medical conditions such as Crohn disease  DISCHARGE INSTRUCTIONS:   Seek care immediately if:   · You cannot stop vomiting  · You have not urinated for 12 hours  Contact your healthcare provider if:   · You have a fever over 101 5  · You have blood or mucus in your bowel movements  · You continue to vomit or have diarrhea for more than 3 days, even after treatment  · You have a dry mouth and eyes, you are urinating less than usual, and you feel dizzy when you stand up  · Your mouth or eyes are dry  You are not urinating as much or as often  · You are losing weight without trying  · You have questions or concerns about your condition or care  Medicines:   · Medicines  may be given to fight an infection caused by bacteria or a parasite  You may also need medicines to slow or stop your diarrhea or vomiting  Do not take these medicines unless your healthcare provider say it is okay  Other medicines may be needed to treat medical conditions that are causing enteritis  · Take your medicine as directed  Contact your healthcare provider if you think your medicine is not helping or if you have side effects  Tell him of her if you are allergic to any medicine  Keep a list of the medicines, vitamins, and herbs you take  Include the amounts, and when and why you take them  Bring the list or the pill bottles to follow-up visits  Carry your medicine list with you in case of an emergency  Manage enteritis:   · Eat foods that help to decrease symptoms  Limit or avoid foods and liquids that are high in sugar, fat, and fiber to help relieve diarrhea  It may be helpful to avoid lactose  Lactose is a type of sugar that is found in milk products  You may be able to tolerate soups, broths, well-cooked vegetables, canned fruit, and baked or broiled meats  Ask your dietitian or healthcare provider if you should follow a special diet   You may need to avoid other foods if you have certain medical conditions such as celiac disease  · Drink liquids as directed  Ask how much liquid to drink each day and which liquids are best for you  It is important to prevent or treat dehydration  Even if you have been vomiting, suck on ice chips or take small sips of clear liquids often  Slowly increase the amount of clear liquids you drink  If you become dehydrated, you may need IV liquids  · Drink an oral rehydration solution (ORS) as directed  An ORS contains water, salts, and sugar that are needed to replace lost body fluids  Ask what kind of ORS to use, how much to drink, and where to get it  Prevent enteritis:  Enteritis that is caused by bacteria, parasites, or viruses can be prevented  The following may help to prevent this type of enteritis:  · Wash your hands often  Use soap and water  Wash your hands after you use the bathroom, change a child's diapers, or sneeze  Wash your hands before you prepare or eat food  · Clean surfaces and do laundry often  Wash your clothes and towels separately from the rest of the laundry  Clean surfaces in your home with antibacterial  or bleach  · Clean food thoroughly and cook safely  Wash raw vegetables before you cook  Cook meat, fish, and eggs fully  Do not use the same dishes for raw meat as you do for other foods  Refrigerate any leftover food immediately  · Be aware when you camp or travel  Drink only clean water  Do not drink from rivers or lakes unless you purify or boil the water first  When you travel, drink bottled water and do not add ice  Do not eat fruit that has not been peeled  Do not eat raw fish or meat that is not fully cooked  Follow up with your healthcare provider as directed:  Write down your questions so you remember to ask them during your visits     © 2017 [employer] Information is for End User's use only and may not be sold, redistributed or otherwise used for commercial purposes  All illustrations and images included in CareNotes® are the copyrighted property of [employer]  or Everette Markham  The above information is an  only  It is not intended as medical advice for individual conditions or treatments  Talk to your doctor, nurse or pharmacist before following any medical regimen to see if it is safe and effective for you

## 2018-05-29 NOTE — ED PROVIDER NOTES
History  Chief Complaint   Patient presents with    Abdominal Pain     RLQ pains started earlier today but increasing tonight  diarrhea reported  nausea reported without vomiting  57 yo female who began yesterday around 1600 with nausea, diarrhea, abd cramps and chills  Symptoms have persisted and she c/o generalized fatigue  History provided by:  Patient   used: No    Abdominal Pain   Pain location:  Generalized  Pain quality: aching and cramping    Pain radiates to:  RLQ  Pain severity:  Moderate  Onset quality:  Gradual  Duration:  9 hours  Timing:  Constant  Progression:  Waxing and waning  Chronicity:  New  Relieved by:  Nothing  Worsened by:  Nothing  Ineffective treatments:  None tried  Associated symptoms: anorexia, chills, diarrhea, fatigue and nausea    Associated symptoms: no chest pain, no dysuria, no fever, no hematuria, no shortness of breath, no sore throat, no vaginal bleeding, no vaginal discharge and no vomiting        Prior to Admission Medications   Prescriptions Last Dose Informant Patient Reported? Taking? Multiple Vitamin (MULTIVITAMIN) tablet   Yes No   Sig: Take 1 tablet by mouth daily  TRAZODONE HCL PO   Yes No   Sig: Take by mouth daily  butalbital-acetaminophen-caffeine (FIORICET) -40 mg per tablet   No No   Sig: Take 1 tablet by mouth every 6 (six) hours as needed for headaches for up to 12 doses   chlorhexidine (PERIDEX) 0 12 % solution   No No   Sig: Apply 15 mL to the mouth or throat 2 (two) times a day as needed for wound care   dicyclomine (BENTYL) 20 mg tablet   No No   Sig: Take 1 tablet by mouth every 6 (six) hours as needed (pain)   metoclopramide (REGLAN) 10 mg tablet   No No   Sig: Take 1 tablet by mouth every 6 (six) hours as needed (vomiting) for up to 12 doses   naproxen (NAPROSYN) 500 mg tablet   No No   Sig: Take 1 tablet by mouth 2 (two) times a day with meals        Facility-Administered Medications: None       Past Medical History:   Diagnosis Date    Depression     Diabetes mellitus ([facility] [address] )     Hypotension        Past Surgical History:   Procedure Laterality Date    CHOLECYSTECTOMY      GASTRIC BYPASS      HYSTERECTOMY      TONSILLECTOMY      TUBAL LIGATION         History reviewed  No pertinent family history  I have reviewed and agree with the history as documented  Social History   Substance Use Topics    Smoking status: Former Smoker    Smokeless tobacco: Never Used    Alcohol use No        Review of Systems   Constitutional: Positive for appetite change, chills and fatigue  Negative for fever  HENT: Negative for congestion and sore throat  Eyes: Negative for visual disturbance  Respiratory: Negative for shortness of breath  Cardiovascular: Negative for chest pain and palpitations  Gastrointestinal: Positive for abdominal pain, anorexia, diarrhea and nausea  Negative for vomiting  Genitourinary: Negative for dysuria, frequency, hematuria, vaginal bleeding and vaginal discharge  Musculoskeletal: Negative for back pain, neck pain and neck stiffness  Skin: Negative for pallor and rash  Allergic/Immunologic: Negative for immunocompromised state  Neurological: Negative for light-headedness and headaches  Psychiatric/Behavioral: Negative for confusion  All other systems reviewed and are negative  Physical Exam  Physical Exam   Constitutional: She is oriented to person, place, and time  She appears well-developed and well-nourished  No distress  HENT:   Head: Normocephalic and atraumatic  Mouth/Throat: Oropharynx is clear and moist    Eyes: EOM are normal  Pupils are equal, round, and reactive to light  Neck: Normal range of motion  Neck supple  Cardiovascular: Normal rate and regular rhythm  No murmur heard  Pulmonary/Chest: Effort normal and breath sounds normal  No respiratory distress  Abdominal: Soft  Bowel sounds are normal  She exhibits no distension and no mass   There is tenderness (mild diffuse lower abd discomfort - pt reports R>L)  There is no rebound and no guarding  No hernia  Musculoskeletal: Normal range of motion  Neurological: She is alert and oriented to person, place, and time  Skin: Skin is warm  Capillary refill takes less than 2 seconds  No rash noted  No pallor  Psychiatric: She has a normal mood and affect  Her behavior is normal    Nursing note and vitals reviewed        Vital Signs  ED Triage Vitals   Temperature Pulse Respirations Blood Pressure SpO2   05/29/18 0046 05/29/18 0046 05/29/18 0046 05/29/18 0046 05/29/18 0046   98 3 °F (36 8 °C) 71 20 119/64 100 %      Temp src Heart Rate Source Patient Position - Orthostatic VS BP Location FiO2 (%)   -- 05/29/18 0046 05/29/18 0247 05/29/18 0046 --    Monitor Lying Right arm       Pain Score       05/29/18 0046       Worst Possible Pain           Vitals:    05/29/18 0046 05/29/18 0247   BP: 119/64 107/53   Pulse: 71 68   Patient Position - Orthostatic VS:  Lying       Visual Acuity      ED Medications  Medications   sodium chloride 0 9 % bolus 1,000 mL (0 mL Intravenous Stopped 5/29/18 0248)   ondansetron (ZOFRAN) injection 4 mg (4 mg Intravenous Given 5/29/18 0115)   ketorolac (TORADOL) injection 15 mg (15 mg Intravenous Given 5/29/18 0115)   dicyclomine (BENTYL) tablet 20 mg (20 mg Oral Given 5/29/18 0117)   famotidine (PEPCID) injection 20 mg (20 mg Intravenous Given 5/29/18 0116)   iohexol (OMNIPAQUE) 350 MG/ML injection (SINGLE-DOSE) 100 mL (100 mL Intravenous Given 5/29/18 0210)   iohexol (OMNIPAQUE) 240 MG/ML solution 25 mL (25 mL Oral Given 5/29/18 0209)       Diagnostic Studies  Results Reviewed     Procedure Component Value Units Date/Time    Comprehensive metabolic panel [[id number]] Collected:  05/29/18 0115    Lab Status:  Final result Specimen:  Blood from Arm, Right Updated:  05/29/18 0137     Sodium 138 mmol/L      Potassium 4 1 mmol/L      Chloride 103 mmol/L      CO2 27 mmol/L      Anion Gap 8 mmol/L      BUN 23 mg/dL      Creatinine 0 80 mg/dL      Glucose 116 mg/dL      Calcium 9 3 mg/dL      AST 31 U/L      ALT 31 U/L      Alkaline Phosphatase 98 U/L      Total Protein 7 3 g/dL      Albumin 3 7 g/dL      Total Bilirubin 0 29 mg/dL      eGFR 83 ml/min/1 73sq m     Narrative:         National Kidney Disease Education Program recommendations are as follows:  GFR calculation is accurate only with a steady state creatinine  Chronic Kidney disease less than 60 ml/min/1 73 sq  meters  Kidney failure less than 15 ml/min/1 73 sq  meters      Lipase [[id number]]  (Abnormal) Collected:  05/29/18 0115    Lab Status:  Final result Specimen:  Blood from Arm, Right Updated:  05/29/18 0137     Lipase 71 (L) u/L     Urine Microscopic [[id number]]  (Abnormal) Collected:  05/29/18 0124    Lab Status:  Final result Specimen:  Urine from Urine, Clean Catch Updated:  05/29/18 0135     RBC, UA 0-1 (A) /hpf      WBC, UA 1-2 (A) /hpf      Epithelial Cells Occasional /hpf      Bacteria, UA Occasional /hpf     CBC and differential [[id number]]  (Abnormal) Collected:  05/29/18 0115    Lab Status:  Final result Specimen:  Blood from Arm, Right Updated:  05/29/18 0125     WBC 10 59 (H) Thousand/uL      RBC 4 44 Million/uL      Hemoglobin 13 3 g/dL      Hematocrit 40 0 %      MCV 90 fL      MCH 30 0 pg      MCHC 33 3 g/dL      RDW 12 8 %      MPV 12 4 fL      Platelets 916 Thousands/uL      nRBC 0 /100 WBCs      Neutrophils Relative 81 (H) %      Lymphocytes Relative 13 (L) %      Monocytes Relative 5 %      Eosinophils Relative 1 %      Basophils Relative 0 %      Neutrophils Absolute 8 56 (H) Thousands/µL      Lymphocytes Absolute 1 34 Thousands/µL      Monocytes Absolute 0 57 Thousand/µL      Eosinophils Absolute 0 10 Thousand/µL      Basophils Absolute 0 02 Thousands/µL     POCT urinalysis dipstick [[id number]]  (Abnormal) Resulted:  05/29/18 0122    Lab Status:  Final result Specimen:  Urine Updated:  05/29/18 0122    ED Urine Macroscopic [[id number]]  (Abnormal) Collected:  05/29/18 0124    Lab Status:  Final result Specimen:  Urine Updated:  05/29/18 0121     Color, UA Yellow     Clarity, UA Clear     pH, UA 5 0     Leukocytes, UA Negative     Nitrite, UA Negative     Protein, UA Negative mg/dl      Glucose, UA Negative mg/dl      Ketones, UA Negative mg/dl      Urobilinogen, UA 0 2 E U /dl      Bilirubin, UA Negative     Blood, UA Trace (A)     Specific [place], UA 1 025    Narrative:       CLINITEK RESULT                 CT abdomen pelvis with contrast   Final Result by Mani Scott MD (05/29 0232)      Mild enteritis  Postsurgical changes as above  Workstation performed: [id number]                    Procedures  Procedures       Phone Contacts  ED Phone Contact    ED Course  ED Course as of May 29 0358   Tue May 29, 2018   0056 Pt seen and examined  57 yo female who began yesterday around 1600 with nausea, diarrhea, abd cramps and chills  Symptoms have persisted and she c/o generalized fatigue  Will give IVF, zofran, pepcid, toradol and bentyl and check labs, CT a/p      0129 CBC shows WBC 10 59, urine trace blood only  0139 CMP/lipase WNL  5441 CT shows - Mild enteritis  Pt feels much better  Will d/c home on zofran and bentyl prn  MDM  CritCare Time    Disposition  Final diagnoses:   Nausea   Diarrhea   Enteritis   Acute viral syndrome     Time reflects when diagnosis was documented in both MDM as applicable and the Disposition within this note     Time User Action Codes Description Comment    5/29/2018  2:35 AM Fish Peon M Add [R11 0] Nausea     5/29/2018  2:35 AM Fish Peon M Add [R19 7] Diarrhea     5/29/2018  2:35 AM Fish Peon M Add [K52 9] Enteritis     5/29/2018  2:35 AM Fish Peon M Add [B34 9] Acute viral syndrome       ED Disposition     ED Disposition Condition Comment    Discharge  Krystian Poor discharge to home/self care      Condition at discharge: Good        Follow-up Information     Follow up With Specialties Details Why Contact Sarmad Monsalve PA-C Physician Assistant  As needed [address] [place]   [phone]            Discharge Medication List as of 5/29/2018  2:36 AM      START taking these medications    Details   !! dicyclomine (BENTYL) 20 mg tablet Take 1 tablet (20 mg total) by mouth every 6 (six) hours as needed (abd cramping) for up to 5 days, Starting Tue 5/29/2018, Until Sun 6/3/2018, Print      ondansetron (ZOFRAN-ODT) 4 mg disintegrating tablet Take 1 tablet (4 mg total) by mouth every 8 (eight) hours as needed for nausea or vomiting for up to 7 days, Starting Tue 5/29/2018, Until Tue 6/5/2018, Print       !! - Potential duplicate medications found  Please discuss with provider  CONTINUE these medications which have NOT CHANGED    Details   butalbital-acetaminophen-caffeine (FIORICET) -40 mg per tablet Take 1 tablet by mouth every 6 (six) hours as needed for headaches for up to 12 doses, Starting 1/5/2017, Until Discontinued, Print      chlorhexidine (PERIDEX) 0 12 % solution Apply 15 mL to the mouth or throat 2 (two) times a day as needed for wound care, Starting Sat 12/30/2017, Print      !! dicyclomine (BENTYL) 20 mg tablet Take 1 tablet by mouth every 6 (six) hours as needed (pain), Starting 1/9/2017, Until Discontinued, Print      metoclopramide (REGLAN) 10 mg tablet Take 1 tablet by mouth every 6 (six) hours as needed (vomiting) for up to 12 doses, Starting 1/5/2017, Until Discontinued, Print      Multiple Vitamin (MULTIVITAMIN) tablet Take 1 tablet by mouth daily  , Until Discontinued, Historical Med      naproxen (NAPROSYN) 500 mg tablet Take 1 tablet by mouth 2 (two) times a day with meals  , Starting 9/12/2016, Until Discontinued, Print      TRAZODONE HCL PO Take by mouth daily  , Until Discontinued, Historical Med       !! - Potential duplicate medications found   Please discuss with provider  No discharge procedures on file      ED Provider  Electronically Signed by           Bro Duncan,   05/29/18 5124

## 2018-07-17 ENCOUNTER — DOCTOR'S OFFICE (OUTPATIENT)
Dept: URBAN - METROPOLITAN AREA CLINIC 136 | Facility: CLINIC | Age: 58
Setting detail: OPHTHALMOLOGY
End: 2018-07-17
Payer: COMMERCIAL

## 2018-07-17 ENCOUNTER — OPTICAL OFFICE (OUTPATIENT)
Dept: URBAN - METROPOLITAN AREA CLINIC 143 | Facility: CLINIC | Age: 58
Setting detail: OPHTHALMOLOGY
End: 2018-07-17
Payer: COMMERCIAL

## 2018-07-17 DIAGNOSIS — E11.9: ICD-10-CM

## 2018-07-17 DIAGNOSIS — H52.223: ICD-10-CM

## 2018-07-17 DIAGNOSIS — H52.4: ICD-10-CM

## 2018-07-17 DIAGNOSIS — H52.13: ICD-10-CM

## 2018-07-17 DIAGNOSIS — H25.13: ICD-10-CM

## 2018-07-17 DIAGNOSIS — H04.123: ICD-10-CM

## 2018-07-17 PROCEDURE — 92310 CONTACT LENS FITTING OU: CPT | Performed by: OPTOMETRIST

## 2018-07-17 PROCEDURE — V2103 SPHEROCYLINDR 4.00D/12-2.00D: HCPCS | Performed by: OPTOMETRIST

## 2018-07-17 PROCEDURE — V2020 VISION SVCS FRAMES PURCHASES: HCPCS | Performed by: OPTOMETRIST

## 2018-07-17 PROCEDURE — V2744 TINT PHOTOCHROMATIC LENS/ES: HCPCS | Performed by: OPTOMETRIST

## 2018-07-17 PROCEDURE — 92014 COMPRE OPH EXAM EST PT 1/>: CPT | Performed by: OPTOMETRIST

## 2018-07-17 ASSESSMENT — REFRACTION_MANIFEST
OU_VA: 20/
OS_VA2: 20/
OD_VA1: 20/
OD_VA1: 20/
OS_VA1: 20/
OS_VA3: 20/
OD_VA3: 20/
OS_VA1: 20/
OD_VA2: 20/
OD_VA2: 20/
OS_VA2: 20/
OD_VA3: 20/
OU_VA: 20/
OS_VA3: 20/

## 2018-07-17 ASSESSMENT — REFRACTION_OUTSIDERX
OD_AXIS: 090
OD_VA1: 20/20
OS_ADD: +2.25
OS_SPHERE: -2.50
OS_AXIS: 080
OD_SPHERE: -2.75
OS_VA2: 20/20
OD_VA3: 20/
OU_VA: 20/20
OD_ADD: +2.25
OS_VA3: 20/
OS_CYLINDER: -0.50
OS_VA1: 20/20
OD_VA2: 20/20
OD_CYLINDER: -1.00

## 2018-07-17 ASSESSMENT — AXIALLENGTH_DERIVED
OD_AL: 23.5726
OS_AL: 23.567

## 2018-07-17 ASSESSMENT — REFRACTION_CURRENTRX
OS_OVR_VA: 20/
OS_SPHERE: -1.75
OD_AXIS: 101
OD_OVR_VA: 20/
OS_OVR_VA: 20/
OD_OVR_VA: 20/
OS_OVR_VA: 20/
OD_VPRISM_DIRECTION: SV
OS_CYLINDER: -0.50
OS_AXIS: 081
OD_CYLINDER: -1.00
OS_VPRISM_DIRECTION: SV
OD_SPHERE: -2.50
OD_OVR_VA: 20/

## 2018-07-17 ASSESSMENT — REFRACTION_AUTOREFRACTION
OS_AXIS: 073
OS_SPHERE: -2.75
OD_AXIS: 092
OD_CYLINDER: -1.00
OD_SPHERE: -2.75
OS_CYLINDER: -0.50

## 2018-07-17 ASSESSMENT — CONFRONTATIONAL VISUAL FIELD TEST (CVF)
OS_FINDINGS: FULL
OD_FINDINGS: FULL

## 2018-07-17 ASSESSMENT — KERATOMETRY
OD_K2POWER_DIOPTERS: 47.00
OS_K2POWER_DIOPTERS: 46.50
OD_AXISANGLE_DEGREES: 090
OD_K1POWER_DIOPTERS: 47.00
OS_AXISANGLE_DEGREES: 108
OS_K1POWER_DIOPTERS: 47.00

## 2018-07-17 ASSESSMENT — SPHEQUIV_DERIVED
OS_SPHEQUIV: -3
OD_SPHEQUIV: -3.25

## 2018-07-17 ASSESSMENT — VISUAL ACUITY
OD_BCVA: 20/150
OS_BCVA: 20/100

## 2018-12-26 ENCOUNTER — HOSPITAL ENCOUNTER (EMERGENCY)
Facility: HOSPITAL | Age: 58
Discharge: HOME | End: 2018-12-26
Attending: EMERGENCY MEDICINE | Admitting: EMERGENCY MEDICINE
Payer: COMMERCIAL

## 2018-12-26 VITALS
SYSTOLIC BLOOD PRESSURE: 129 MMHG | RESPIRATION RATE: 16 BRPM | OXYGEN SATURATION: 99 % | DIASTOLIC BLOOD PRESSURE: 58 MMHG | WEIGHT: 182.6 LBS | TEMPERATURE: 98.3 F | HEART RATE: 61 BPM | BODY MASS INDEX: 29.92 KG/M2

## 2018-12-26 DIAGNOSIS — M54.50 LOW BACK PAIN RADIATING TO RIGHT LEG: Primary | ICD-10-CM

## 2018-12-26 DIAGNOSIS — K08.89 PAIN, DENTAL: ICD-10-CM

## 2018-12-26 DIAGNOSIS — M79.604 LOW BACK PAIN RADIATING TO RIGHT LEG: Primary | ICD-10-CM

## 2018-12-26 PROCEDURE — 96372 THER/PROPH/DIAG INJ SC/IM: CPT

## 2018-12-26 PROCEDURE — 99282 EMERGENCY DEPT VISIT SF MDM: CPT

## 2018-12-26 RX ORDER — METHOCARBAMOL 500 MG/1
500 TABLET, FILM COATED ORAL 2 TIMES DAILY
Qty: 20 TABLET | Refills: 0 | Status: SHIPPED | OUTPATIENT
Start: 2018-12-26 | End: 2019-06-02 | Stop reason: SDUPTHER

## 2018-12-26 RX ORDER — ACETAMINOPHEN 325 MG/1
650 TABLET ORAL ONCE
Status: COMPLETED | OUTPATIENT
Start: 2018-12-26 | End: 2018-12-26

## 2018-12-26 RX ORDER — ACETAMINOPHEN 500 MG
500 TABLET ORAL EVERY 6 HOURS PRN
Qty: 30 TABLET | Refills: 0 | Status: SHIPPED | OUTPATIENT
Start: 2018-12-26 | End: 2022-01-21

## 2018-12-26 RX ORDER — KETOROLAC TROMETHAMINE 30 MG/ML
15 INJECTION, SOLUTION INTRAMUSCULAR; INTRAVENOUS ONCE
Status: COMPLETED | OUTPATIENT
Start: 2018-12-26 | End: 2018-12-26

## 2018-12-26 RX ORDER — METHOCARBAMOL 500 MG/1
500 TABLET, FILM COATED ORAL ONCE
Status: COMPLETED | OUTPATIENT
Start: 2018-12-26 | End: 2018-12-26

## 2018-12-26 RX ADMIN — KETOROLAC TROMETHAMINE 15 MG: 30 INJECTION, SOLUTION INTRAMUSCULAR at 20:32

## 2018-12-26 RX ADMIN — ACETAMINOPHEN 650 MG: 325 TABLET ORAL at 20:32

## 2018-12-26 RX ADMIN — METHOCARBAMOL 500 MG: 500 TABLET, FILM COATED ORAL at 19:34

## 2018-12-27 NOTE — DISCHARGE INSTRUCTIONS
Acute Low Back Pain, Ambulatory Care   GENERAL INFORMATION:   Acute low back pain  is discomfort in your lower back area that lasts for less than 12 weeks  The word acute is used to describe pain that starts suddenly, worsens quickly, and lasts for a short time  Common symptoms include the following:   · Back stiffness or spasms    · Pain down the back or side of one leg    · Holding yourself in an unusual position or posture to decrease your back pain    · Not being able to find a sitting position that is comfortable    · Slow increase in your pain for 24 to 48 hours after you stress your back    · Tenderness on your lower back or severe pain when you move your back  Seek immediate care for the following symptoms:   · Severe pain    · Sudden stiffness and heaviness in both buttocks down to both legs    · Numbness or weakness in one leg, or pain in both legs    · Numbness in your genital area or across your lower back    · Unable to control your urine or bowel movements  Treatment for acute low back pain  may include any of the following:  · Medicines:      ¨ NSAIDs  help decrease swelling and pain or fever  This medicine is available with or without a doctor's order  NSAIDs can cause stomach bleeding or kidney problems in certain people  If you take blood thinner medicine, always ask your healthcare provider if NSAIDs are safe for you  Always read the medicine label and follow directions  ¨ Muscle relaxers  help decrease muscle spasms pain  ¨ Prescription pain medicine  may be given  Ask how to take this medicine safely  · Surgery  may be needed if your pain is severe and other treatments do not work  Surgery may be needed for conditions of the lumbar spine, such as herniated disc or spinal stenosis  Manage your symptoms:   · Sleep on a firm mattress  If you do not have a firm mattress, have someone move your mattress to the floor for a few days   A piece of plywood under your mattress can also help make it firmer  · Apply ice  on your lower back for 15 to 20 minutes every hour or as directed  Use an ice pack, or put crushed ice in a plastic bag  Cover it with a towel  Ice helps prevent tissue damage and decreases swelling and pain  You can alternate ice and heat  · Apply heat  on your lower back for 20 to 30 minutes every 2 hours for as many days as directed  Heat helps decrease pain and muscle spasms  · Go to physical therapy  A physical therapist teaches you exercises to help improve movement and strength, and to decrease pain  Prevent acute low back pain:   · Use proper body mechanics  ¨ Bend at the hips and knees when you  objects  Do not bend from the waist  Use your leg muscles as you lift the load  Do not use your back  Keep the object close to your chest as you lift it  Try not to twist or lift anything above your waist     ¨ Change your position often when you stand for long periods of time  Rest one foot on a small box or footrest, and then switch to the other foot often  ¨ Try not to sit for long periods of time  When you do, sit in a straight-backed chair with your feet flat on the floor  Never reach, pull, or push while you are sitting  · Exercise regularly  Warm up before you exercise  Do exercises that strengthen your back muscles  Ask about the best exercise plan for you  · Maintain a healthy weight  Ask your healthcare provider how much you should weigh  Ask him to help you create a weight loss plan if you are overweight  Follow up with your healthcare provider as directed:  Return for a follow-up visit if you still have pain after 1 to 3 weeks of treatment  You may need to visit an orthopedist if your back pain lasts more than 6 to 12 weeks  Write down your questions so you remember to ask them during your visits  CARE AGREEMENT:   You have the right to help plan your care  Learn about your health condition and how it may be treated   Discuss treatment options with your caregivers to decide what care you want to receive  You always have the right to refuse treatment  The above information is an  only  It is not intended as medical advice for individual conditions or treatments  Talk to your doctor, nurse or pharmacist before following any medical regimen to see if it is safe and effective for you  © 2014 [address] is for End User's use only and may not be sold, redistributed or otherwise used for commercial purposes  All illustrations and images included in CareNotes® are the copyrighted property of A D A M [employer]  or Everette Markham

## 2018-12-27 NOTE — ED PROVIDER NOTES
History  Chief Complaint   Patient presents with    Sore Throat     pt c/o sore throat for the past x1 day  pt also c/o lower back pain; pt has a Hx of back pain  pt denies any n/v/d  Patient is a 19-year-old female who presents today with multiple chief complaints however was able to now her complaints to back pain and dental pain which radiates to the back of her throat  Patient reports her back pain began a long time ago however within the past few days her pain has increased and is radiating down her right leg  Patient reports she does have intermittent numbness and tingling which is associated with sitting however the numbness and tingling is relieved by standing up or walking  Patient reports her back pain is right of midline and is lower back pain described as a pulling patient denies any inciting events and states she has not had trauma to that area has not had any falls or recent motor vehicle accident  Patient denies any saddle anesthesia, bowel or bladder incontinence, fevers or chills, paralysis, weakness  Patient reports she has only tried Tylenol with no relief for pain  Patient also reports she has a cracked tooth due date dental caries which she has been unable to see a dentist about as she has switched providers  Patient reports she had multiple teeth pulled in the past however has been unable to attend to this tooth  Patient reports pain when chewing and pain with cold liquids patient reports no relief of pain with acetaminophen  History provided by:  Patient   used: No    Back Pain   Location:  Sacro-iliac joint  Quality:  Aching  Radiates to:  R posterior upper leg  Pain severity:  Moderate  Pain is:  Same all the time  Onset quality:  Gradual  Duration: Pain has been present for a long time however has been worsening over the past few days    Progression:  Waxing and waning  Chronicity:  Recurrent  Context: not emotional stress, not falling, not jumping from heights, not lifting heavy objects, not MCA, not MVA, not occupational injury, not pedestrian accident, not physical stress, not recent illness, not recent injury and not twisting    Relieved by:  Nothing  Worsened by: Movement, sitting, standing, twisting and ambulation  Ineffective treatments:  OTC medications  Associated symptoms: leg pain, numbness and paresthesias    Associated symptoms: no abdominal pain, no abdominal swelling, no bladder incontinence, no bowel incontinence, no chest pain, no dysuria, no fever, no headaches, no pelvic pain, no perianal numbness, no tingling, no weakness and no weight loss        Prior to Admission Medications   Prescriptions Last Dose Informant Patient Reported? Taking? Multiple Vitamin (MULTIVITAMIN) tablet   Yes No   Sig: Take 1 tablet by mouth daily  TRAZODONE HCL PO   Yes No   Sig: Take by mouth daily  butalbital-acetaminophen-caffeine (FIORICET) -40 mg per tablet   No No   Sig: Take 1 tablet by mouth every 6 (six) hours as needed for headaches for up to 12 doses   chlorhexidine (PERIDEX) 0 12 % solution   No No   Sig: Apply 15 mL to the mouth or throat 2 (two) times a day as needed for wound care   dicyclomine (BENTYL) 20 mg tablet   No No   Sig: Take 1 tablet by mouth every 6 (six) hours as needed (pain)   dicyclomine (BENTYL) 20 mg tablet   No No   Sig: Take 1 tablet (20 mg total) by mouth every 6 (six) hours as needed (abd cramping) for up to 5 days   metoclopramide (REGLAN) 10 mg tablet   No No   Sig: Take 1 tablet by mouth every 6 (six) hours as needed (vomiting) for up to 12 doses   naproxen (NAPROSYN) 500 mg tablet   No No   Sig: Take 1 tablet by mouth 2 (two) times a day with meals     ondansetron (ZOFRAN-ODT) 4 mg disintegrating tablet   No No   Sig: Take 1 tablet (4 mg total) by mouth every 8 (eight) hours as needed for nausea or vomiting for up to 7 days      Facility-Administered Medications: None       Past Medical History: Diagnosis Date    Anxiety     Asthma     Depression     Diabetes mellitus ([facility] [address] )     History of morbid obesity     History of sleep apnea     History of type 2 diabetes mellitus     Hypotension     Low vitamin B12 level     Postgastrectomy malabsorption     Vitamin D deficiency        Past Surgical History:   Procedure Laterality Date    CHOLECYSTECTOMY      GASTRIC BYPASS      HYSTERECTOMY      KNEE ARTHROSCOPY      TONSILLECTOMY      TUBAL LIGATION         Family History   Problem Relation Age of Onset    Hypertension Mother     Heart disease Father     Stroke Father      I have reviewed and agree with the history as documented  Social History   Substance Use Topics    Smoking status: Former Smoker    Smokeless tobacco: Never Used    Alcohol use No        Review of Systems   Constitutional: Negative for chills, fatigue, fever and weight loss  HENT: Positive for dental problem  Negative for congestion, ear pain, rhinorrhea and sore throat  Eyes: Negative for redness  Respiratory: Negative for chest tightness and shortness of breath  Cardiovascular: Negative for chest pain and palpitations  Gastrointestinal: Negative for abdominal pain, bowel incontinence, nausea and vomiting  Genitourinary: Negative for bladder incontinence, difficulty urinating, dysuria, frequency, hematuria and pelvic pain  Musculoskeletal: Positive for back pain  Skin: Negative for rash  Neurological: Positive for numbness and paresthesias  Negative for dizziness, tingling, syncope, weakness, light-headedness and headaches  Physical Exam  Physical Exam   Musculoskeletal:        Lumbar back: She exhibits tenderness  She exhibits no bony tenderness, no swelling, no edema, no deformity and no laceration          Back:        Vital Signs  ED Triage Vitals [12/26/18 1838]   Temperature Pulse Respirations Blood Pressure SpO2   98 3 °F (36 8 °C) 61 16 129/58 99 %      Temp Source Heart Rate Source Patient Position - Orthostatic VS BP Location FiO2 (%)   Oral Monitor Sitting Right arm --      Pain Score       9           Vitals:    12/26/18 1838   BP: 129/58   Pulse: 61   Patient Position - Orthostatic VS: Sitting       Visual Acuity      ED Medications  Medications   acetaminophen (TYLENOL) tablet 650 mg (not administered)   ketorolac (TORADOL) injection 15 mg (not administered)   methocarbamol (ROBAXIN) tablet 500 mg (500 mg Oral Given 12/26/18 1934)       Diagnostic Studies  Results Reviewed     None                 No orders to display              Procedures  Procedures       Phone Contacts  ED Phone Contact    ED Course                               MDM  CritCare Time    Disposition  Final diagnoses:   Low back pain radiating to right leg   Pain, dental     Time reflects when diagnosis was documented in both MDM as applicable and the Disposition within this note     Time User Action Codes Description Comment    12/26/2018  8:28 PM Luciano Cordova [M54 5] Low back pain radiating to right leg     12/26/2018  8:28 PM Luciano Cordova [K08 89] Pain, dental       ED Disposition     ED Disposition Condition Comment    Discharge  Madelin Brandon discharge to home/self care      Condition at discharge: Good        Follow-up Information     Follow up With Specialties Details Why [address], MD [facility]   [address] [place] [address]  49  [phone]            Patient's Medications   Discharge Prescriptions    ACETAMINOPHEN (TYLENOL) 500 MG TABLET    Take 1 tablet (500 mg total) by mouth every 6 (six) hours as needed for mild pain       Start Date: 12/26/2018End Date: --       Order Dose: 500 mg       Quantity: 30 tablet    Refills: 0    METHOCARBAMOL (ROBAXIN) 500 MG TABLET    Take 1 tablet (500 mg total) by mouth 2 (two) times a day       Start Date: 12/26/2018End Date: --       Order Dose: 500 mg       Quantity: 20 tablet    Refills: 0     No discharge procedures on file      ED Provider  Electronically Signed by           Teresa Deleon PA-C  12/26/18 2029

## 2019-02-25 ENCOUNTER — APPOINTMENT (EMERGENCY)
Dept: RADIOLOGY | Facility: HOSPITAL | Age: 59
End: 2019-02-25
Payer: COMMERCIAL

## 2019-02-25 ENCOUNTER — HOSPITAL ENCOUNTER (EMERGENCY)
Facility: HOSPITAL | Age: 59
Discharge: HOME | End: 2019-02-25
Attending: EMERGENCY MEDICINE | Admitting: EMERGENCY MEDICINE
Payer: COMMERCIAL

## 2019-02-25 VITALS
TEMPERATURE: 97.5 F | SYSTOLIC BLOOD PRESSURE: 127 MMHG | HEART RATE: 77 BPM | RESPIRATION RATE: 18 BRPM | BODY MASS INDEX: 31.07 KG/M2 | DIASTOLIC BLOOD PRESSURE: 66 MMHG | OXYGEN SATURATION: 100 % | WEIGHT: 189.6 LBS

## 2019-02-25 DIAGNOSIS — S60.221A CONTUSION OF RIGHT HAND, INITIAL ENCOUNTER: Primary | ICD-10-CM

## 2019-02-25 PROCEDURE — 73130 X-RAY EXAM OF HAND: CPT

## 2019-02-25 PROCEDURE — 99283 EMERGENCY DEPT VISIT LOW MDM: CPT

## 2019-02-25 RX ORDER — ACETAMINOPHEN AND CODEINE PHOSPHATE 300; 30 MG/1; MG/1
1 TABLET ORAL EVERY 6 HOURS PRN
Qty: 8 TABLET | Refills: 0 | Status: SHIPPED | OUTPATIENT
Start: 2019-02-25 | End: 2019-02-27

## 2019-02-25 NOTE — ED PROVIDER NOTES
History  Chief Complaint   Patient presents with    Hand Injury     R hand pain, states hit cement wall w/hand last evening  pain worse below 3rd digit     61-year-old female presents the ER with right hand pain that started last night after she slipped and hit her hand against a cement wall  Patient states that she started having pain and swelling to the middle finger at the MCP joint of the right hand  Patient states that symptoms worsened today  Patient has used ice on the area has not taken any over-the-counter medication for pain relief  Patient is right-handed  Prior to Admission Medications   Prescriptions Last Dose Informant Patient Reported? Taking? Multiple Vitamin (MULTIVITAMIN) tablet   Yes Yes   Sig: Take 1 tablet by mouth daily  TRAZODONE HCL PO   Yes Yes   Sig: Take by mouth daily     acetaminophen (TYLENOL) 500 mg tablet   No Yes   Sig: Take 1 tablet (500 mg total) by mouth every 6 (six) hours as needed for mild pain   butalbital-acetaminophen-caffeine (FIORICET) -40 mg per tablet   No Yes   Sig: Take 1 tablet by mouth every 6 (six) hours as needed for headaches for up to 12 doses   methocarbamol (ROBAXIN) 500 mg tablet   No Yes   Sig: Take 1 tablet (500 mg total) by mouth 2 (two) times a day      Facility-Administered Medications: None       Past Medical History:   Diagnosis Date    Anxiety     Asthma     Depression     Diabetes mellitus ([facility] [address] )     History of morbid obesity     History of sleep apnea     History of type 2 diabetes mellitus     Hypotension     Low vitamin B12 level     Postgastrectomy malabsorption     Vitamin D deficiency        Past Surgical History:   Procedure Laterality Date    CHOLECYSTECTOMY      GASTRIC BYPASS      HYSTERECTOMY      KNEE ARTHROSCOPY      TONSILLECTOMY      TUBAL LIGATION         Family History   Problem Relation Age of Onset    Hypertension Mother     Heart disease Father     Stroke Father      I have reviewed and agree with the history as documented  Social History     Tobacco Use    Smoking status: Former Smoker    Smokeless tobacco: Never Used   Substance Use Topics    Alcohol use: No    Drug use: No        Review of Systems   Constitutional: Negative for chills and fever  Respiratory: Negative for chest tightness, shortness of breath and wheezing  Cardiovascular: Negative for chest pain and palpitations  Gastrointestinal: Negative for abdominal pain, nausea and vomiting  Musculoskeletal: Positive for arthralgias, joint swelling and myalgias  Skin: Positive for color change  Negative for rash  Neurological: Negative for dizziness, weakness, light-headedness, numbness and headaches  All other systems reviewed and are negative  Physical Exam  Physical Exam   Constitutional: She appears well-developed and well-nourished  No distress  HENT:   Head: Normocephalic and atraumatic  Eyes: Conjunctivae are normal    Neck: Normal range of motion  Cardiovascular: Normal rate and intact distal pulses  Pulmonary/Chest: Effort normal    Musculoskeletal: Normal range of motion  Right hand: She exhibits tenderness and swelling  She exhibits normal range of motion, normal capillary refill and no laceration  Normal sensation noted  Normal strength noted  Hands:  Neurovascularly intact, cap refill < 2 seconds, no decreased sensation noted   Neurological: She is alert  Skin: Skin is warm and dry  Capillary refill takes less than 2 seconds  She is not diaphoretic  Nursing note and vitals reviewed        Vital Signs  ED Triage Vitals   Temperature Pulse Respirations Blood Pressure SpO2   02/25/19 1350 02/25/19 1350 02/25/19 1350 02/25/19 1351 02/25/19 1350   97 5 °F (36 4 °C) 77 18 127/66 100 %      Temp src Heart Rate Source Patient Position - Orthostatic VS BP Location FiO2 (%)   -- -- -- -- --             Pain Score       02/25/19 1350       Worst Possible Pain           Vitals:    02/25/19 1350 02/25/19 1351   BP:  127/66   Pulse: 77        Visual Acuity      ED Medications  Medications - No data to display    Diagnostic Studies  Results Reviewed     None                 XR hand 3+ views RIGHT   ED Interpretation by Terrence Jose PA-C (02/25 1449)   No osseous abnormality, no fractures or dislocations      Final Result by Kavya Thompson DO (02/25 1551)      No acute osseous abnormality  Workstation performed: [id number]                    Procedures  Procedures       Phone Contacts  ED Phone Contact    ED Course  ED Course as of Mar 04 1115 Mon Feb 25, 2019   1518 PDMP checked  Pt has 1 previous prescription for Tylenol 3 from beginning of January  None from 2018                                  MDM  Number of Diagnoses or Management Options  Contusion of right hand, initial encounter: new and requires workup     Amount and/or Complexity of Data Reviewed  Tests in the radiology section of CPT®: ordered and reviewed  Independent visualization of images, tracings, or specimens: yes    Patient Progress  Patient progress: stable      Disposition  Final diagnoses:   Contusion of right hand, initial encounter     Time reflects when diagnosis was documented in both MDM as applicable and the Disposition within this note     Time User Action Codes Description Comment    2/25/2019  3:08 PM Emmie Has Add [F94 543F] Contusion of right hand, initial encounter       ED Disposition     ED Disposition Condition Date/Time Comment    Discharge Stable Mon Feb 25, 2019  3:08 PM Zbigniew Wong discharge to home/self care              Follow-up Information     Follow up With Specialties Details Why Khushboo Bautista MD Family Medicine Call  For Recheck, If symptoms worsen [address]  [phone]            Discharge Medication List as of 2/25/2019  3:22 PM      START taking these medications    Details   acetaminophen-codeine (TYLENOL #3) 300-30 mg per tablet Take 1 tablet by mouth every 6 (six) hours as needed for moderate pain for up to 2 days, Starting Mon 2/25/2019, Until Wed 2/27/2019, Print         CONTINUE these medications which have NOT CHANGED    Details   acetaminophen (TYLENOL) 500 mg tablet Take 1 tablet (500 mg total) by mouth every 6 (six) hours as needed for mild pain, Starting Wed 12/26/2018, Print      butalbital-acetaminophen-caffeine (FIORICET) -40 mg per tablet Take 1 tablet by mouth every 6 (six) hours as needed for headaches for up to 12 doses, Starting 1/5/2017, Until Discontinued, Print      methocarbamol (ROBAXIN) 500 mg tablet Take 1 tablet (500 mg total) by mouth 2 (two) times a day, Starting Wed 12/26/2018, Print      Multiple Vitamin (MULTIVITAMIN) tablet Take 1 tablet by mouth daily  , Until Discontinued, Historical Med      TRAZODONE HCL PO Take by mouth daily  , Until Discontinued, Historical Med           No discharge procedures on file      ED Provider  Electronically Signed by           Lisa Tobias PA-C  03/04/19 2264

## 2019-02-25 NOTE — DISCHARGE INSTRUCTIONS
Mayela Gazella narcoticas son sedantes  No maneje o use machinaria cuando estas tomando medicina narcotica  Puedes keshawn Tylenol para dolor e inflammacion  Usa hielo para la inflamacion

## 2019-03-18 ENCOUNTER — HOSPITAL ENCOUNTER (INPATIENT)
Facility: HOSPITAL | Age: 59
LOS: 1 days | DRG: 885 | End: 2019-03-19
Attending: EMERGENCY MEDICINE | Admitting: PSYCHIATRY & NEUROLOGY
Payer: COMMERCIAL

## 2019-03-18 ENCOUNTER — APPOINTMENT (INPATIENT)
Dept: CT IMAGING | Facility: HOSPITAL | Age: 59
DRG: 885 | End: 2019-03-18
Payer: COMMERCIAL

## 2019-03-18 ENCOUNTER — APPOINTMENT (INPATIENT)
Dept: RADIOLOGY | Facility: HOSPITAL | Age: 59
DRG: 885 | End: 2019-03-18
Payer: COMMERCIAL

## 2019-03-18 DIAGNOSIS — T50.904A DRUG OVERDOSE, UNDETERMINED INTENT, INITIAL ENCOUNTER: Primary | ICD-10-CM

## 2019-03-18 DIAGNOSIS — T50.901A OVERDOSE: ICD-10-CM

## 2019-03-18 DIAGNOSIS — F41.1 GENERALIZED ANXIETY DISORDER: Chronic | ICD-10-CM

## 2019-03-18 DIAGNOSIS — F33.2 MAJOR DEPRESSIVE DISORDER, RECURRENT SEVERE WITHOUT PSYCHOTIC FEATURES (HCC): Chronic | ICD-10-CM

## 2019-03-18 PROBLEM — F32.A DEPRESSION: Status: ACTIVE | Noted: 2019-03-18

## 2019-03-18 PROBLEM — Z98.84 BARIATRIC SURGERY STATUS: Status: ACTIVE | Noted: 2019-03-18

## 2019-03-18 PROBLEM — J45.909 ASTHMA: Status: ACTIVE | Noted: 2019-03-18

## 2019-03-18 LAB
ALBUMIN SERPL BCP-MCNC: 3.5 G/DL (ref 3–5.2)
ALP SERPL-CCNC: 85 U/L (ref 43–122)
ALT SERPL W P-5'-P-CCNC: 18 U/L (ref 9–52)
AMMONIA PLAS-SCNC: 11 UMOL/L (ref 9–33)
AMPHETAMINES SERPL QL SCN: NEGATIVE
ANION GAP SERPL CALCULATED.3IONS-SCNC: 5 MMOL/L (ref 5–14)
APAP SERPL-MCNC: <10 UG/ML (ref 10–30)
APTT PPP: 23 SECONDS (ref 23–34)
AST SERPL W P-5'-P-CCNC: 15 U/L (ref 14–36)
BACTERIA UR QL AUTO: ABNORMAL /HPF
BARBITURATES UR QL: NEGATIVE
BASOPHILS # BLD AUTO: 0 THOUSANDS/ΜL (ref 0–0.1)
BASOPHILS NFR BLD AUTO: 1 % (ref 0–1)
BENZODIAZ UR QL: NEGATIVE
BILIRUB SERPL-MCNC: 0.5 MG/DL
BILIRUB UR QL STRIP: NEGATIVE
BUN SERPL-MCNC: 18 MG/DL (ref 5–25)
CALCIUM SERPL-MCNC: 9.2 MG/DL (ref 8.4–10.2)
CHLORIDE SERPL-SCNC: 106 MMOL/L (ref 97–108)
CLARITY UR: CLEAR
CO2 SERPL-SCNC: 29 MMOL/L (ref 22–30)
COCAINE UR QL: NEGATIVE
COLOR UR: ABNORMAL
CREAT SERPL-MCNC: 0.68 MG/DL (ref 0.6–1.2)
EOSINOPHIL # BLD AUTO: 0.1 THOUSAND/ΜL (ref 0–0.4)
EOSINOPHIL NFR BLD AUTO: 1 % (ref 0–6)
ERYTHROCYTE [DISTWIDTH] IN BLOOD BY AUTOMATED COUNT: 13.8 %
ETHANOL SERPL-MCNC: <10 MG/DL (ref 0–10)
EXT PREG TEST URINE: NORMAL
GFR SERPL CREATININE-BSD FRML MDRD: 98 ML/MIN/1.73SQ M
GLUCOSE SERPL-MCNC: 82 MG/DL (ref 65–140)
GLUCOSE SERPL-MCNC: 89 MG/DL (ref 70–99)
GLUCOSE UR STRIP-MCNC: NEGATIVE MG/DL
HCT VFR BLD AUTO: 40.4 % (ref 36–46)
HGB BLD-MCNC: 12.9 G/DL (ref 12–16)
HGB UR QL STRIP.AUTO: 25
INR PPP: 1.01 (ref 0.89–1.1)
KETONES UR STRIP-MCNC: NEGATIVE MG/DL
LEUKOCYTE ESTERASE UR QL STRIP: 25
LYMPHOCYTES # BLD AUTO: 1.6 THOUSANDS/ΜL (ref 0.5–4)
LYMPHOCYTES NFR BLD AUTO: 31 % (ref 25–45)
MAGNESIUM SERPL-MCNC: 2 MG/DL (ref 1.6–2.3)
MCH RBC QN AUTO: 28.9 PG (ref 26–34)
MCHC RBC AUTO-ENTMCNC: 32 G/DL (ref 31–36)
MCV RBC AUTO: 90 FL (ref 80–100)
METHADONE UR QL: NEGATIVE
MONOCYTES # BLD AUTO: 0.3 THOUSAND/ΜL (ref 0.2–0.9)
MONOCYTES NFR BLD AUTO: 6 % (ref 1–10)
NEUTROPHILS # BLD AUTO: 3.1 THOUSANDS/ΜL (ref 1.8–7.8)
NEUTS SEG NFR BLD AUTO: 61 % (ref 45–65)
NITRITE UR QL STRIP: NEGATIVE
NON-SQ EPI CELLS URNS QL MICRO: ABNORMAL /HPF
OPIATES UR QL SCN: NEGATIVE
PCP UR QL: NEGATIVE
PH UR STRIP.AUTO: 6 [PH]
PHOSPHATE SERPL-MCNC: 4.1 MG/DL (ref 2.5–4.8)
PLATELET # BLD AUTO: 174 THOUSANDS/UL (ref 150–450)
PMV BLD AUTO: 10.3 FL (ref 8.9–12.7)
POTASSIUM SERPL-SCNC: 4 MMOL/L (ref 3.6–5)
PROT SERPL-MCNC: 5.9 G/DL (ref 5.9–8.4)
PROT UR STRIP-MCNC: NEGATIVE MG/DL
PROTHROMBIN TIME: 10.7 SECONDS (ref 9.5–11.6)
RBC # BLD AUTO: 4.48 MILLION/UL (ref 4–5.2)
RBC #/AREA URNS AUTO: ABNORMAL /HPF
SALICYLATES SERPL-MCNC: <1 MG/DL (ref 10–30)
SODIUM SERPL-SCNC: 140 MMOL/L (ref 137–147)
SP GR UR STRIP.AUTO: 1.01 (ref 1–1.04)
THC UR QL: NEGATIVE
TROPONIN I SERPL-MCNC: <0.01 NG/ML (ref 0–0.03)
TSH SERPL DL<=0.05 MIU/L-ACNC: 0.97 UIU/ML (ref 0.47–4.68)
UROBILINOGEN UA: NEGATIVE MG/DL
WBC # BLD AUTO: 5.1 THOUSAND/UL (ref 4.5–11)
WBC #/AREA URNS AUTO: ABNORMAL /HPF

## 2019-03-18 PROCEDURE — 83735 ASSAY OF MAGNESIUM: CPT | Performed by: EMERGENCY MEDICINE

## 2019-03-18 PROCEDURE — 99222 1ST HOSP IP/OBS MODERATE 55: CPT | Performed by: PHYSICIAN ASSISTANT

## 2019-03-18 PROCEDURE — 82140 ASSAY OF AMMONIA: CPT | Performed by: EMERGENCY MEDICINE

## 2019-03-18 PROCEDURE — 93005 ELECTROCARDIOGRAM TRACING: CPT

## 2019-03-18 PROCEDURE — 96360 HYDRATION IV INFUSION INIT: CPT

## 2019-03-18 PROCEDURE — 84484 ASSAY OF TROPONIN QUANT: CPT | Performed by: EMERGENCY MEDICINE

## 2019-03-18 PROCEDURE — 81003 URINALYSIS AUTO W/O SCOPE: CPT | Performed by: EMERGENCY MEDICINE

## 2019-03-18 PROCEDURE — 71045 X-RAY EXAM CHEST 1 VIEW: CPT

## 2019-03-18 PROCEDURE — 70450 CT HEAD/BRAIN W/O DYE: CPT

## 2019-03-18 PROCEDURE — 84443 ASSAY THYROID STIM HORMONE: CPT | Performed by: EMERGENCY MEDICINE

## 2019-03-18 PROCEDURE — 82948 REAGENT STRIP/BLOOD GLUCOSE: CPT

## 2019-03-18 PROCEDURE — 81025 URINE PREGNANCY TEST: CPT | Performed by: EMERGENCY MEDICINE

## 2019-03-18 PROCEDURE — 80307 DRUG TEST PRSMV CHEM ANLYZR: CPT | Performed by: EMERGENCY MEDICINE

## 2019-03-18 PROCEDURE — 96361 HYDRATE IV INFUSION ADD-ON: CPT

## 2019-03-18 PROCEDURE — 84100 ASSAY OF PHOSPHORUS: CPT | Performed by: EMERGENCY MEDICINE

## 2019-03-18 PROCEDURE — 80329 ANALGESICS NON-OPIOID 1 OR 2: CPT | Performed by: EMERGENCY MEDICINE

## 2019-03-18 PROCEDURE — 36415 COLL VENOUS BLD VENIPUNCTURE: CPT | Performed by: EMERGENCY MEDICINE

## 2019-03-18 PROCEDURE — 81001 URINALYSIS AUTO W/SCOPE: CPT | Performed by: EMERGENCY MEDICINE

## 2019-03-18 PROCEDURE — 80320 DRUG SCREEN QUANTALCOHOLS: CPT | Performed by: EMERGENCY MEDICINE

## 2019-03-18 PROCEDURE — 99285 EMERGENCY DEPT VISIT HI MDM: CPT

## 2019-03-18 PROCEDURE — 85730 THROMBOPLASTIN TIME PARTIAL: CPT | Performed by: EMERGENCY MEDICINE

## 2019-03-18 PROCEDURE — 85025 COMPLETE CBC W/AUTO DIFF WBC: CPT | Performed by: EMERGENCY MEDICINE

## 2019-03-18 PROCEDURE — 85610 PROTHROMBIN TIME: CPT | Performed by: EMERGENCY MEDICINE

## 2019-03-18 PROCEDURE — 83036 HEMOGLOBIN GLYCOSYLATED A1C: CPT | Performed by: PHYSICIAN ASSISTANT

## 2019-03-18 PROCEDURE — 80053 COMPREHEN METABOLIC PANEL: CPT | Performed by: EMERGENCY MEDICINE

## 2019-03-18 RX ORDER — NALOXONE HYDROCHLORIDE 1 MG/ML
1 INJECTION PARENTERAL ONCE
Status: COMPLETED | OUTPATIENT
Start: 2019-03-18 | End: 2019-03-18

## 2019-03-18 RX ORDER — NALOXONE HYDROCHLORIDE 1 MG/ML
INJECTION INTRAMUSCULAR; INTRAVENOUS; SUBCUTANEOUS
Status: COMPLETED
Start: 2019-03-18 | End: 2019-03-18

## 2019-03-18 RX ADMIN — SODIUM CHLORIDE 1000 ML: 0.9 INJECTION, SOLUTION INTRAVENOUS at 16:43

## 2019-03-18 NOTE — ED PROVIDER NOTES
Pt Name: Jessica Abad  MRN: [id number]  [place] 1960  Age/Sex: 66 y o  female  Date of evaluation: 3/18/2019  PCP: Zee Jiménez MD    [address] [place] [address]    Chief Complaint   Patient presents with    Overdose - Intentional     per EMS pt took unknown pills as a suicide attempt  unable to get hx from pt at this time  HPI    Daisy Pichardo presents to the Emergency Department via EMS for altered mental status/ possible overdose  She was not herself yesterday and today was more and more sleepy  HPI      Past Medical and Surgical History    Past Medical History:   Diagnosis Date    Anxiety     Asthma     Depression     Diabetes mellitus ([address] )     History of morbid obesity     History of sleep apnea     History of type 2 diabetes mellitus     Hypotension     Low vitamin B12 level     Postgastrectomy malabsorption     Vitamin D deficiency        Past Surgical History:   Procedure Laterality Date    CHOLECYSTECTOMY      GASTRIC BYPASS      HYSTERECTOMY      KNEE ARTHROSCOPY      TONSILLECTOMY      TUBAL LIGATION         Family History   Problem Relation Age of Onset    Hypertension Mother     Heart disease Father     Stroke Father        Social History     Tobacco Use    Smoking status: Former Smoker    Smokeless tobacco: Never Used   Substance Use Topics    Alcohol use: No    Drug use: No              Allergies    Allergies   Allergen Reactions    Motrin [Ibuprofen]        Home Medications    Prior to Admission medications    Medication Sig Start Date End Date Taking?  Authorizing Provider   acetaminophen (TYLENOL) 500 mg tablet Take 1 tablet (500 mg total) by mouth every 6 (six) hours as needed for mild pain 12/26/18   Martha Gomez PA-C   butalbital-acetaminophen-caffeine (FIORICET) -40 mg per tablet Take 1 tablet by mouth every 6 (six) hours as needed for headaches for up to 12 doses 1/5/17   Lincoln Jerome MD   methocarbamol (ROBAXIN) 500 mg tablet Take 1 tablet (500 mg total) by mouth 2 (two) times a day 12/26/18   Khalida Mendoza PA-C   Multiple Vitamin (MULTIVITAMIN) tablet Take 1 tablet by mouth daily  Historical Provider, MD   TRAZODONE HCL PO Take by mouth daily  Historical Provider, MD           Review of Systems    Review of Systems   Unable to perform ROS: Mental status change           All other systems reviewed and negative  Physical Exam      ED Triage Vitals   Temperature Pulse Respirations Blood Pressure SpO2   03/18/19 1617 03/18/19 1617 03/18/19 1617 03/18/19 1617 03/18/19 1617   97 6 °F (36 4 °C) 63 14 117/59 98 %      Temp Source Heart Rate Source Patient Position - Orthostatic VS BP Location FiO2 (%)   03/18/19 1617 -- -- -- --   Tympanic          Pain Score       03/18/19 2100       No Pain               Physical Exam   Constitutional: She is oriented to person, place, and time  She appears well-developed and well-nourished  No distress  HENT:   Head: Normocephalic and atraumatic  Nose: Nose normal    Mouth/Throat: Oropharynx is clear and moist    Eyes: Pupils are equal, round, and reactive to light  Conjunctivae, EOM and lids are normal    Neck: Normal range of motion  Neck supple  Cardiovascular: Normal rate, regular rhythm and normal heart sounds  Exam reveals no gallop and no friction rub  No murmur heard  Pulmonary/Chest: Effort normal and breath sounds normal  No accessory muscle usage  No respiratory distress  She has no wheezes  She has no rales  Abdominal: Soft  She exhibits no distension  There is no tenderness  There is no rebound and no guarding  Neurological: She is alert and oriented to person, place, and time  No cranial nerve deficit or sensory deficit  Skin: Skin is warm and dry  No rash noted  She is not diaphoretic  No erythema  Psychiatric: She has a normal mood and affect   Her speech is normal and behavior is normal  Judgment and thought content normal    Nursing note and vitals reviewed  Assessment and Plan    Kuldeep Huff is a 66 y o  female who presents with overdose of unknown substance/ medication  Physical examination remarkable for lethargy/ sedation  Plan will be to perform diagnostic testing and treat symptomatically  MDM    Diagnostic Results    EKG:  normal EKG, normal sinus rhythm    EKG INTERPRETATION  EKG Interpretation    Rate: 57 BPM  Rhythm: sinus bradycardia  Axis: normal  Intervals: Normal, no blocks, QTc 412ms  T waves: normal  ST segments: normal    Impression: normal EKG  EKG for comparison: not immediately available  EKG interpreted by me  Interpretation by Leesa Pichardo DO  EKG reviewed and interpreted independently      Labs:    Results for orders placed or performed during the hospital encounter of 03/18/19   CBC and differential   Result Value Ref Range    WBC 5 10 4 50 - 11 00 Thousand/uL    RBC 4 48 4 00 - 5 20 Million/uL    Hemoglobin 12 9 12 0 - 16 0 g/dL    Hematocrit 40 4 36 0 - 46 0 %    MCV 90 80 - 100 fL    MCH 28 9 26 0 - 34 0 pg    MCHC 32 0 31 0 - 36 0 g/dL    RDW 13 8 <15 3 %    MPV 10 3 8 9 - 12 7 fL    Platelets 707 545 - 750 Thousands/uL    Neutrophils Relative 61 45 - 65 %    Lymphocytes Relative 31 25 - 45 %    Monocytes Relative 6 1 - 10 %    Eosinophils Relative 1 0 - 6 %    Basophils Relative 1 0 - 1 %    Neutrophils Absolute 3 10 1 80 - 7 80 Thousands/µL    Lymphocytes Absolute 1 60 0 50 - 4 00 Thousands/µL    Monocytes Absolute 0 30 0 20 - 0 90 Thousand/µL    Eosinophils Absolute 0 10 0 00 - 0 40 Thousand/µL    Basophils Absolute 0 00 0 00 - 0 10 Thousands/µL   Comprehensive metabolic panel   Result Value Ref Range    Sodium 140 137 - 147 mmol/L    Potassium 4 0 3 6 - 5 0 mmol/L    Chloride 106 97 - 108 mmol/L    CO2 29 22 - 30 mmol/L    ANION GAP 5 5 - 14 mmol/L    BUN 18 5 - 25 mg/dL    Creatinine 0 68 0 60 - 1 20 mg/dL    Glucose 89 70 - 99 mg/dL    Calcium 9 2 8 4 - 10 2 mg/dL    AST 15 14 - 36 U/L ALT 18 9 - 52 U/L    Alkaline Phosphatase 85 43 - 122 U/L    Total Protein 5 9 5 9 - 8 4 g/dL    Albumin 3 5 3 0 - 5 2 g/dL    Total Bilirubin 0 50 <1 30 mg/dL    eGFR 98 >60 ml/min/1 73sq m   Protime-INR   Result Value Ref Range    Protime 10 7 9 5 - 11 6 seconds    INR 1 01 0 89 - 1 10   APTT   Result Value Ref Range    PTT 23 23 - 34 seconds   TSH   Result Value Ref Range    TSH 3RD GENERATON 0 971 0 465 - 4 680 uIU/mL   Ammonia   Result Value Ref Range    Ammonia 11 9 - 33 umol/L   Phosphorus   Result Value Ref Range    Phosphorus 4 1 2 5 - 4 8 mg/dL   Magnesium   Result Value Ref Range    Magnesium 2 0 1 6 - 2 3 mg/dL   Troponin I   Result Value Ref Range    Troponin I <0 01 0 00 - 0 03 ng/mL   UA w Reflex to Microscopic w Reflex to Culture   Result Value Ref Range    Color, UA Straw Straw, Yellow, Pale Yellow    Clarity, UA Clear Clear, Other    Specific Gravity, UA 1 015 1 003 - 1 040    pH, UA 6 0 4 5, 5 0, 5 5, 6 0, 6 5, 7 0, 7 5, 8 0    Leukocytes, UA 25 0 (A) Negative    Nitrite, UA Negative Negative    Protein, UA Negative Negative mg/dl    Glucose, UA Negative Negative mg/dl    Ketones, UA Negative Negative mg/dl    Bilirubin, UA Negative Negative    Blood, UA 25 0 (A) Negative    UROBILINOGEN UA Negative 1 0, Negative mg/dL   Rapid drug screen, urine   Result Value Ref Range    Amph/Meth UR Negative Negative    Barbiturate Ur Negative Negative    Benzodiazepine Urine Negative Negative    Cocaine Urine Negative Negative    Methadone Urine Negative Negative    Opiate Urine Negative Negative    PCP Ur Negative Negative    THC Urine Negative Negative   Acetaminophen level   Result Value Ref Range    Acetaminophen Level <10 (L) 10 - 30 ug/mL   Salicylate level   Result Value Ref Range    Salicylate Lvl <0 9 (L) 10 - 30 mg/dL   Ethanol   Result Value Ref Range    Ethanol Lvl <10 0 - 10 mg/dL   Urine Microscopic   Result Value Ref Range    RBC, UA 0-1 (A) None Seen, 0-5 /hpf    WBC, UA 2-4 (A) None Seen, 0-5, 5-55, 5-65 /hpf    Epithelial Cells Moderate (A) None Seen, Occasional /hpf    Bacteria, UA Moderate (A) None Seen, Occasional /hpf   POCT pregnancy, urine   Result Value Ref Range    EXT PREG TEST UR (Ref: Negative) Negative (-)    Fingerstick Glucose (POCT)   Result Value Ref Range    POC Glucose 82 65 - 140 mg/dl       All labs reviewed and utilized in the medical decision making process    Radiology:    CT head without contrast   Final Result   No acute intracranial abnormality  Workstation performed: [id number]         XR chest portable    (Results Pending)       All radiology studies independently viewed by me and interpreted by the radiologist     Procedure    Procedures    [facility]      ED Course of Care and Re-Assessments    Patient was observed in ER for 6 hours and mental status did not improve  I spoke with her daughter who could not tell me what she took or had access to  She was not responding when the daughter called 911  She is on something for depression but name is unknown  Her mother is on tramadol but these did not seem to be missing  Patient's daughter does believe that she may have been trying to kill herself because she was holding a photo of a family member who has passed away  She did have one prior suicide attempt but it was many years ago       Medications   sodium chloride 0 9 % bolus 1,000 mL (0 mL Intravenous Stopped 3/18/19 2030)   naloxone (NARCAN) 2 MG/2ML injection 2 mg (0 each Does not apply Given to EMS 3/18/19 1630)           FINAL IMPRESSION    Final diagnoses:   Drug overdose, undetermined intent, initial encounter         DISPOSITION/PLAN      Time reflects when diagnosis was documented in both MDM as applicable and the Disposition within this note     Time User Action Codes Description Comment    3/18/2019 10:46 PM Suzanne Senior Add [T56 328A] Drug overdose, undetermined intent, initial encounter     3/18/2019 10:46 PM Suzanne Senior Modify [K29 191R] Drug overdose, undetermined intent, initial encounter       ED Disposition     ED Disposition Condition Date/Time Comment    Admit Stable Mon Mar 18, 2019 10:29 PM Case was discussed with RUFINO and the patient's admission status was agreed to be Admission Status: inpatient status to the service of Dr Isabel Sage   Follow-up Information    None           PATIENT REFERRED TO:    No follow-up provider specified  DISCHARGE MEDICATIONS:    Patient's Medications   Discharge Prescriptions    No medications on file       No discharge procedures on file           Hussain Solorio, [address] [place] [address],   03/18/19 8131

## 2019-03-19 ENCOUNTER — HOSPITAL ENCOUNTER (INPATIENT)
Facility: HOSPITAL | Age: 59
LOS: 3 days | Discharge: HOME | DRG: 885 | End: 2019-03-22
Attending: PSYCHIATRY & NEUROLOGY | Admitting: PSYCHIATRY & NEUROLOGY
Payer: COMMERCIAL

## 2019-03-19 PROBLEM — G93.41 ACUTE METABOLIC ENCEPHALOPATHY: Status: ACTIVE | Noted: 2019-03-19

## 2019-03-19 PROBLEM — T50.902A INTENTIONAL DRUG OVERDOSE (HCC): Status: ACTIVE | Noted: 2019-03-18

## 2019-03-19 LAB
ATRIAL RATE: 57 BPM
P AXIS: 57 DEGREES
PR INTERVAL: 150 MS
QRS AXIS: 43 DEGREES
QRSD INTERVAL: 86 MS
QT INTERVAL: 424 MS
QTC INTERVAL: 412 MS
T WAVE AXIS: 6 DEGREES
VENTRICULAR RATE: 57 BPM

## 2019-03-19 PROCEDURE — 93010 ELECTROCARDIOGRAM REPORT: CPT | Performed by: INTERNAL MEDICINE

## 2019-03-19 PROCEDURE — 99239 HOSP IP/OBS DSCHRG MGMT >30: CPT | Performed by: FAMILY MEDICINE

## 2019-03-19 RX ORDER — TRAZODONE HYDROCHLORIDE 50 MG/1
25 TABLET ORAL
Status: CANCELLED | OUTPATIENT
Start: 2019-03-19

## 2019-03-19 RX ORDER — ACETAMINOPHEN 325 MG/1
650 TABLET ORAL EVERY 4 HOURS PRN
Status: CANCELLED | OUTPATIENT
Start: 2019-03-19

## 2019-03-19 RX ORDER — OLANZAPINE 10 MG/1
5 INJECTION, POWDER, LYOPHILIZED, FOR SOLUTION INTRAMUSCULAR EVERY 8 HOURS PRN
Status: DISCONTINUED | OUTPATIENT
Start: 2019-03-19 | End: 2019-03-20

## 2019-03-19 RX ORDER — DOXEPIN HYDROCHLORIDE 25 MG/1
10 CAPSULE ORAL
Status: ON HOLD | COMMUNITY
End: 2019-03-22 | Stop reason: SDDI

## 2019-03-19 RX ORDER — OLANZAPINE 10 MG/1
2.5 INJECTION, POWDER, LYOPHILIZED, FOR SOLUTION INTRAMUSCULAR EVERY 8 HOURS PRN
Status: CANCELLED | OUTPATIENT
Start: 2019-03-19

## 2019-03-19 RX ORDER — MAGNESIUM HYDROXIDE/ALUMINUM HYDROXICE/SIMETHICONE 120; 1200; 1200 MG/30ML; MG/30ML; MG/30ML
30 SUSPENSION ORAL EVERY 4 HOURS PRN
Status: CANCELLED | OUTPATIENT
Start: 2019-03-19

## 2019-03-19 RX ORDER — HYDROXYZINE HYDROCHLORIDE 25 MG/1
25 TABLET, FILM COATED ORAL EVERY 4 HOURS PRN
Status: CANCELLED | OUTPATIENT
Start: 2019-03-19

## 2019-03-19 RX ORDER — LORAZEPAM 0.5 MG/1
0.5 TABLET ORAL EVERY 4 HOURS PRN
Status: CANCELLED | OUTPATIENT
Start: 2019-03-19

## 2019-03-19 RX ORDER — ONDANSETRON 2 MG/ML
4 INJECTION INTRAMUSCULAR; INTRAVENOUS EVERY 6 HOURS PRN
Status: CANCELLED | OUTPATIENT
Start: 2019-03-19

## 2019-03-19 RX ORDER — ALBUTEROL SULFATE 90 UG/1
2 AEROSOL, METERED RESPIRATORY (INHALATION) EVERY 4 HOURS PRN
Status: CANCELLED | OUTPATIENT
Start: 2019-03-19

## 2019-03-19 RX ORDER — HALOPERIDOL 5 MG
5 TABLET ORAL EVERY 8 HOURS PRN
Status: CANCELLED | OUTPATIENT
Start: 2019-03-19

## 2019-03-19 RX ORDER — DULOXETIN HYDROCHLORIDE 60 MG/1
60 CAPSULE, DELAYED RELEASE ORAL DAILY
Status: ON HOLD | COMMUNITY
End: 2019-03-22 | Stop reason: ALTCHOICE

## 2019-03-19 RX ORDER — ALBUTEROL SULFATE 90 UG/1
2 AEROSOL, METERED RESPIRATORY (INHALATION) EVERY 4 HOURS PRN
Status: DISCONTINUED | OUTPATIENT
Start: 2019-03-19 | End: 2019-03-22 | Stop reason: HOSPADM

## 2019-03-19 RX ORDER — ACETAMINOPHEN 325 MG/1
488 TABLET ORAL EVERY 6 HOURS PRN
Status: DISCONTINUED | OUTPATIENT
Start: 2019-03-19 | End: 2019-03-19

## 2019-03-19 RX ORDER — RISPERIDONE 0.5 MG/1
0.5 TABLET, ORALLY DISINTEGRATING ORAL EVERY 8 HOURS PRN
Status: CANCELLED | OUTPATIENT
Start: 2019-03-19

## 2019-03-19 RX ORDER — LORAZEPAM 2 MG/ML
1 INJECTION INTRAMUSCULAR EVERY 4 HOURS PRN
Status: DISCONTINUED | OUTPATIENT
Start: 2019-03-19 | End: 2019-03-20

## 2019-03-19 RX ORDER — HYDROXYZINE HYDROCHLORIDE 25 MG/1
25 TABLET, FILM COATED ORAL
Status: ON HOLD | COMMUNITY
End: 2019-03-22 | Stop reason: ALTCHOICE

## 2019-03-19 RX ORDER — BENZTROPINE MESYLATE 1 MG/ML
1 INJECTION INTRAMUSCULAR; INTRAVENOUS EVERY 8 HOURS PRN
Status: CANCELLED | OUTPATIENT
Start: 2019-03-19

## 2019-03-19 RX ORDER — CITALOPRAM 40 MG/1
40 TABLET ORAL DAILY
COMMUNITY
End: 2019-03-22 | Stop reason: HOSPADM

## 2019-03-19 RX ORDER — BENZTROPINE MESYLATE 0.5 MG/1
0.5 TABLET ORAL EVERY 6 HOURS PRN
Status: CANCELLED | OUTPATIENT
Start: 2019-03-19

## 2019-03-19 RX ORDER — CLONAZEPAM 0.5 MG/1
0.5 TABLET ORAL 2 TIMES DAILY PRN
COMMUNITY
End: 2019-03-22 | Stop reason: HOSPADM

## 2019-03-19 RX ORDER — ACETAMINOPHEN 325 MG/1
650 TABLET ORAL EVERY 4 HOURS PRN
Status: DISCONTINUED | OUTPATIENT
Start: 2019-03-19 | End: 2019-03-22 | Stop reason: HOSPADM

## 2019-03-19 RX ORDER — CITALOPRAM 20 MG/1
20 TABLET ORAL DAILY
Status: ON HOLD | COMMUNITY
End: 2019-03-22 | Stop reason: ALTCHOICE

## 2019-03-19 RX ORDER — HALOPERIDOL 5 MG
5 TABLET ORAL EVERY 8 HOURS PRN
Status: DISCONTINUED | OUTPATIENT
Start: 2019-03-19 | End: 2019-03-20

## 2019-03-19 RX ORDER — ACETAMINOPHEN 325 MG/1
650 TABLET ORAL EVERY 6 HOURS PRN
Status: DISCONTINUED | OUTPATIENT
Start: 2019-03-19 | End: 2019-03-22 | Stop reason: HOSPADM

## 2019-03-19 RX ORDER — LORAZEPAM 0.5 MG/1
0.5 TABLET ORAL EVERY 8 HOURS PRN
Status: DISCONTINUED | OUTPATIENT
Start: 2019-03-19 | End: 2019-03-19

## 2019-03-19 RX ORDER — MAGNESIUM HYDROXIDE/ALUMINUM HYDROXICE/SIMETHICONE 120; 1200; 1200 MG/30ML; MG/30ML; MG/30ML
30 SUSPENSION ORAL EVERY 6 HOURS PRN
Status: CANCELLED | OUTPATIENT
Start: 2019-03-19

## 2019-03-19 RX ORDER — TRAZODONE HYDROCHLORIDE 50 MG/1
50 TABLET ORAL
Status: DISCONTINUED | OUTPATIENT
Start: 2019-03-19 | End: 2019-03-20

## 2019-03-19 RX ORDER — ACETAMINOPHEN 325 MG/1
975 TABLET ORAL EVERY 6 HOURS PRN
Status: CANCELLED | OUTPATIENT
Start: 2019-03-19

## 2019-03-19 RX ORDER — ONDANSETRON 2 MG/ML
4 INJECTION INTRAMUSCULAR; INTRAVENOUS EVERY 6 HOURS PRN
Status: DISCONTINUED | OUTPATIENT
Start: 2019-03-19 | End: 2019-03-19

## 2019-03-19 RX ORDER — ACETAMINOPHEN 325 MG/1
975 TABLET ORAL EVERY 6 HOURS PRN
Status: DISCONTINUED | OUTPATIENT
Start: 2019-03-19 | End: 2019-03-22 | Stop reason: HOSPADM

## 2019-03-19 RX ORDER — CLONAZEPAM 0.5 MG/1
0.5 TABLET ORAL 2 TIMES DAILY PRN
Status: DISCONTINUED | OUTPATIENT
Start: 2019-03-19 | End: 2019-03-20

## 2019-03-19 RX ORDER — MAGNESIUM HYDROXIDE/ALUMINUM HYDROXICE/SIMETHICONE 120; 1200; 1200 MG/30ML; MG/30ML; MG/30ML
30 SUSPENSION ORAL EVERY 6 HOURS PRN
Status: DISCONTINUED | OUTPATIENT
Start: 2019-03-19 | End: 2019-03-22 | Stop reason: HOSPADM

## 2019-03-19 RX ADMIN — ENOXAPARIN SODIUM 40 MG: 40 INJECTION SUBCUTANEOUS at 09:02

## 2019-03-19 NOTE — UTILIZATION REVIEW
Initial Clinical Review    Admission: Date/Time/Statement: 3/18/19 @ 2207 INPATIENT  Orders Placed This Encounter   Procedures    Inpatient Admission (expected length of stay for this patient Order details is greater than two midnights)     Standing Status:   Standing     Number of Occurrences:   1     Order Specific Question:   Admitting Physician     Answer:   Newton Miguel [[id number]]     Order Specific Question:   Level of Care     Answer:   Med Surg [16]     Order Specific Question:   Estimated length of stay     Answer:   More than 2 Midnights     Order Specific Question:   Certification     Answer:   I certify that inpatient services are medically necessary for this patient for a duration of greater than two midnights  See H&P and MD Progress Notes for additional information about the patient's course of treatment  ED: Date/Time/Mode of Arrival:   ED Arrival Information     Expected Arrival Acuity Means of Arrival Escorted By Service Admission Type    - 3/18/2019 16:12 Emergent [facility] General Medicine Emergency    Arrival Complaint    overdose        Chief Complaint:   Chief Complaint   Patient presents with    Overdose - Intentional     per EMS pt took unknown pills as a suicide attempt  unable to get hx from pt at this time  Assessment/Plan: 67 y/o female with hx asthma, depression presents to ED from home by EMS with acute metabolic encephalopathy secondary to acute overdose  Pt was found by her daughter who was unsure what pt took or what she had access to, but reports she found an empty klonopin bottle at home  Exam remarkable for lethargy and sedation  Pt is also bradycardic  Pt was observed in ED for 6 hours without improvement in mental status, so decision was made to admit  Admitted as inpatient due to intentional overdose, acute metabolic encephalopathy  Tele  Psych consult  Pt is arousable to voice but remained extremely lethargic  Sinus alice on tele    Pt now admits she took medication because she wanted to die  Consult internal medicine for clearance to go to behavioral health  ED Vital Signs:   ED Triage Vitals   Temperature Pulse Respirations Blood Pressure SpO2   03/18/19 1617 03/18/19 1617 03/18/19 1617 03/18/19 1617 03/18/19 1617   97 6 °F (36 4 °C) 63 14 117/59 98 %      Temp Source Heart Rate Source Patient Position - Orthostatic VS BP Location FiO2 (%)   03/18/19 1617 03/19/19 1026 03/19/19 1026 03/19/19 1026 --   Tympanic Monitor Lying Left arm       Pain Score       03/18/19 2100       No Pain        Wt Readings from Last 1 Encounters:   02/25/19 86 kg (189 lb 9 5 oz)     Vital Signs (abnormal):   03/18/19 2300   52  14  103/63  98 %  None (Room air)   03/18/19 2100   57  16  127/68  99 %  None (Room air)   03/18/19 1819   54  16  113/64  100 %  None (Room air)   03/18/19 1729   54  15  91/54  97 %  None (Room air)       Pertinent Labs/Diagnostic Test Results:   Trop <0 01  Acetaminophen <55  Salicylate <5 4  ETOH <29  UA 25 0 blood, 25 0 leuks, 0-1 RBC, 2-4 WBC, moderate bacteria  UDS negative  EKG:  Sinus bradycardia  CXR:  No radiographic evidence of acute intrathoracic process  CT head:  No acute intracranial abnormality      ED Treatment:   Medication Administration from 03/18/2019 1612 to 03/19/2019 1248       Date/Time Order Dose Route Action     03/18/2019 1643 sodium chloride 0 9 % bolus 1,000 mL 1,000 mL Intravenous New Bag     03/19/2019 0902 enoxaparin (LOVENOX) subcutaneous injection 40 mg 40 mg Subcutaneous Given        Past Medical/Surgical History:   Past Medical History:   Diagnosis Date    Anxiety     Asthma     Depression     Diabetes mellitus ([address] )     History of morbid obesity     History of sleep apnea     History of type 2 diabetes mellitus     Hypotension     Low vitamin B12 level     Postgastrectomy malabsorption     Vitamin D deficiency      Admitting Diagnosis: Overdose [T50 901A]  Age/Sex: 66 y o  female     Admission Orders:  [address]  Psychiatry consult    Scheduled Meds:   Current Facility-Administered Medications:  acetaminophen 488 mg Oral Q6H PRN   albuterol 2 puff Inhalation Q4H PRN   aluminum-magnesium hydroxide-simethicone 30 mL Oral Q6H PRN   enoxaparin 40 mg Subcutaneous Q24H [facility]   ondansetron 4 mg Intravenous Q6H PRN     Network Utilization Review Department  Phone: [phone]; Fax [phone]  [email or web address]  ATTENTION: Please call with any questions or concerns to [phone]  and carefully listen to the prompts so that you are directed to the right person  Send all requests for admission clinical reviews, approved or denied determinations and any other requests to fax [phone]   All voicemails are confidential

## 2019-03-19 NOTE — ED NOTES
Pt arousable to verbal stimuli  Stating she is cold  Warm blankets provided to pt  Asked pt if she remembers what she did today or what pills she took  Pt states, "umm" and then went back to sleep  Still unable to obtain history from pt        Christopher Gaffney RN  03/18/19 3162

## 2019-03-19 NOTE — ASSESSMENT & PLAN NOTE
Likely secondary to acute overdose  Patient is arousable to voice, but remained extremely lethargic  Monitor on telemetry-sinus alice  EKG showing normal sinus rhythm  UDS negative

## 2019-03-19 NOTE — PLAN OF CARE
Problem: Depression  Goal: Treatment Goal: Demonstrate behavioral control of depressive symptoms, verbalize feelings of improved mood/affect, and adopt new coping skills prior to discharge  Outcome: Progressing  Goal: Verbalize thoughts and feelings  Description  Interventions:  - Assess and re-assess patient's level of risk   - Engage patient in 1:1 interactions, daily, for a minimum of 15 minutes   - Encourage patient to express feelings, fears, frustrations, hopes   Outcome: Progressing  Goal: Refrain from harming self  Description  Interventions:  - Monitor patient closely, per order   - Supervise medication ingestion, monitor effects and side effects   Outcome: Progressing  Goal: Refrain from isolation  Description  Interventions:  - Develop a trusting relationship   - Encourage socialization   Outcome: Progressing  Goal: Refrain from self-neglect  Outcome: Progressing  Goal: Attend and participate in unit activities, including therapeutic, recreational, and educational groups  Description  Interventions:  - Provide therapeutic and educational activities daily, encourage attendance and participation, and document same in the medical record   Outcome: Progressing  Goal: Complete daily ADLs, including personal hygiene independently, as able  Description  Interventions:  - Observe, teach, and assist patient with ADLS  -  Monitor and promote a balance of rest/activity, with adequate nutrition and elimination   Outcome: Progressing

## 2019-03-19 NOTE — ASSESSMENT & PLAN NOTE
Patient's daughter reports patient has history of depression   She believes she is taking one medication for her depression but is unsure what that medication is   Records from [facility] showing Celexa 20 mg daily- last script written 3/2016

## 2019-03-19 NOTE — ASSESSMENT & PLAN NOTE
Patient was reportedly found on the floor today with picture of loved one who recently passed away in her hand  Family reports they believe patient has been taking pills for the past two days but are unsure of what she took   Family member reports patient does not have access to many medications, but reports grandmother has tramadol in the house   Patient somnolent during exam but does wake up and answer questions appropriately  She states that she took a couple extra pills and does not exactly remember what she took but possibly took some Neurontin pills  She states she only took 4-5 extra pills  She admits that she took the medications because she wanted to die  She complains of being more depressed than usual   She did agree to inpatient behavioral health treatment and signed a 201  EKGs and labs reviewed  Urine drug screen is negative  EKG shows sinus bradycardia  Heart rates in the 50s while sleeping    Patient is medically cleared to go to [facility]

## 2019-03-19 NOTE — ED NOTES
Received med list from [facility]    Patient to be admitted to [address] [place] [address]

## 2019-03-19 NOTE — ASSESSMENT & PLAN NOTE
Likely secondary to acute polysubstance overdose    Continue to hold all psychiatric medications at this time until patient and is seen by Psychiatry  CT chest negative for any bleed  Chest x-ray within normal limits  Metabolic panel reviewed and within normal limits

## 2019-03-19 NOTE — ED NOTES
Patient awake at this time, using toilet in room     [facility], [facility]  03/19/19 9167

## 2019-03-19 NOTE — ED NOTES
Breakfast tray is offered to patient and she's tolerating it well  Patient offered morning kit to wash       Netta Godwin  03/19/19 2765

## 2019-03-19 NOTE — DISCHARGE SUMMARY
Discharge- France Chapa 1960, 66 y o  female MRN: [id number]    Unit/Bed#: ED 07 Encounter: [id number]    Primary Care Provider: Kenney Moreno MD   Date and time admitted to hospital: 3/18/2019  4:12 PM        * Intentional drug overdose [facility])  Assessment & Plan  Patient was reportedly found on the floor today with picture of loved one who recently passed away in her hand  Family reports they believe patient has been taking pills for the past two days but are unsure of what she took   Family member reports patient does not have access to many medications, but reports grandmother has tramadol in the house   Patient somnolent during exam but does wake up and answer questions appropriately  She states that she took a couple extra pills and does not exactly remember what she took but possibly took some Neurontin pills  She states she only took 4-5 extra pills  She admits that she took the medications because she wanted to die  She complains of being more depressed than usual   She did agree to inpatient behavioral health treatment and signed a 201  EKGs and labs reviewed  Urine drug screen is negative  EKG shows sinus bradycardia  Heart rates in the 50s while sleeping  Patient is medically cleared to go to Behavioral Health    Acute metabolic encephalopathy  Assessment & Plan  Likely secondary to acute polysubstance overdose    Continue to hold all psychiatric medications at this time until patient and is seen by Psychiatry  CT chest negative for any bleed  Chest x-ray within normal limits  Metabolic panel reviewed and within normal limits      Depression  Assessment & Plan  Patient's daughter reports patient has history of depression   She believes she is taking one medication for her depression but is unsure what that medication is   Records from [facility] showing Celexa 20 mg daily  Will need inpatient behavioral health treatment for depression    Bariatric surgery status  Assessment & Plan  Per review of [facility] records, history of bariatric surgery   Stable at this time      Asthma  Assessment & Plan  Records indicate history of asthma   Will place order for PRN albuterol for now  Currently no asthma exacerbation noted          Discharging Physician / Practitioner: Carol Rasmussen MD  PCP: Sb Garrison MD  Admission Date:   Admission Orders (From admission, onward)    Ordered        19  Inpatient Admission (expected length of stay for this patient Order details is greater than two midnights)  Once     Order ID Start Status   [id number] 19 Completed              Discharge Date: 19    Resolved Problems  Date Reviewed: 3/19/2019    None          Consultations During Hospital Stay:  · None    Procedures Performed:   · None    Significant Findings / Test Results:   · None    Incidental Findings:   · None     Test Results Pending at Discharge (will require follow up): · None     Outpatient Tests Requested:  · None    Complications:  None    Reason for Admission:  Drug overdose    Hospital Course:     Saadia Rowe is a 66 y o  female patient who originally presented to the hospital on 3/18/2019 due to intentional drug overdose  Patient states that she is very depressed and she wants to die  She was holding the picture of her brother who is   She is not very forthcoming on what she to but she states that she took some of her medications  Her urine drug screen is negative  She was somnolent when she arrived in the emergency room  She did agree to inpatient behavioral health treatment and signed a 201 form  The patient, initially admitted to the hospital as inpatient, was discharged earlier than expected given the following: She is medically improving and cleared for admission to behavioral health  Please see above list of diagnoses and related plan for additional information       Condition at Discharge: good     Discharge Day Visit / Exam:     Subjective:  Patient states I am depressed and I just want to die  I missed my brother who  recently  Denies any chest pain or shortness of breath or nausea  Vitals: Blood Pressure: 103/63 (19)  Pulse: (!) 52 (19)  Temperature: 97 6 °F (36 4 °C) (19)  Temp Source: Tympanic (19)  Respirations: 14 (19)  SpO2: 98 % (19)  Exam:   Physical Exam   Constitutional: She is oriented to person, place, and time  She appears well-developed and well-nourished  HENT:   Head: Normocephalic and atraumatic  Right Ear: External ear normal    Left Ear: External ear normal    Mouth/Throat: Oropharynx is clear and moist    Eyes: Pupils are equal, round, and reactive to light  Conjunctivae and EOM are normal    Neck: Normal range of motion  Neck supple  Cardiovascular: Normal rate, regular rhythm, normal heart sounds and intact distal pulses  Pulmonary/Chest: Effort normal and breath sounds normal    Abdominal: Soft  Bowel sounds are normal  She exhibits no mass  There is no tenderness  There is no rebound and no guarding  Genitourinary:   Genitourinary Comments: deferred   Musculoskeletal: Normal range of motion  Neurological: She is oriented to person, place, and time  She has normal reflexes  Somnolent but arousable and answers most questions appropriately   Skin: Skin is warm and dry  No rash noted  Psychiatric: She has a normal mood and affect  Nursing note and vitals reviewed  Discussion with Family:  None    Discharge instructions/Information to patient and family:   See after visit summary for information provided to patient and family  Provisions for Follow-Up Care:  See after visit summary for information related to follow-up care and any pertinent home health orders        Disposition:     Inpatient Psychiatry at [facility] [address] to [facility]:   · Not Applicable to this Patient - Not Applicable to this Patient    Planned Readmission:  None     Discharge Statement:  I spent 35 minutes discharging the patient  This time was spent on the day of discharge  I had direct contact with the patient on the day of discharge  Greater than 50% of the total time was spent examining patient, answering all patient questions, arranging and discussing plan of care with patient as well as directly providing post-discharge instructions  Additional time then spent on discharge activities  Discharge Medications:  See after visit summary for reconciled discharge medications provided to patient and family        ** Please Note: This note has been constructed using a voice recognition system **

## 2019-03-19 NOTE — ED NOTES
Pt's daughter called for update on pt  Daughter informed that while pt's VSS, she is still very drowsy and it is unknown as to what pt took pta  Pts daughter states that she did find an empty pill bottle at home, which states it is klonopin and 60 tablets were given on 2/22/19 for a 30 day supply  Daughter states otherwise she doesn't know what pt would have taken but states she knew she took something and was acting out of it yesterday  Family arrived and found pt laying on floor and assisted her up and called for EMS  Daughter states EMS and police were dispatched to home and pt began yelling and stating people were hitting her, which daughter states was not true as they were just assisting pt out of house for transport to hospital   Made daughter aware that pt woke up briefly when she first arrived and described this was well and then fell asleep and has been sleeping over since  Pt now more arousable to verbal stimuli but then immediately returns to sleep and unable to provide history  Daughter also not able to provide any history as to what medications pt might be taking on a regular basis  Aware that pt will be admitted to hospital and closely monitored medically before she can have a psychiatric evaluation        Barb Lund RN  03/18/19 4011

## 2019-03-19 NOTE — ED NOTES
Patient is a 66 yr old female brought to the Ed by EMS after found at home after an overdose  She was holding a picture of her  sister  Patient was interviewed today when she was more awake  She signed a 201 for psychiatric admission with Dr Tali Diaz  Patient said that she cares for her [de-identified] yr old mother, and this has gotten increasingly  more difficult  She believes that the rest of the family is not helpful, and are not respectful to her  She is very angry  and admits that she took an intentional overdose because she wanted to die  She is not sure who called the ambulance, and is not sure what the combination of pills were  Pt presents as depressed, flat affect, fair eye contact, but no apparent psychosis  She is irritable, mostly directly toward her extended family who "have no respect"  Pt signed a 201 with Dr Tali Diaz  Today, patient is more awake, able to hold a conversation, and is cooperative with care in the ED     Patient is followed by Makenna owens dr, and therapist is Thad Wade  She reports that she has never been hospitalized despite a hx of depression  She also had barietric surgery about 4 yrs ago       Flaquito YOU

## 2019-03-19 NOTE — ED NOTES
Pt arousable to verbal stimuli but will not answer basic questions for staff regarding what happened this evening  Pt otherwise has no complaints just feeling sleepy       Jw Andino RN  03/18/19 5777

## 2019-03-19 NOTE — ED NOTES
[facility] called and aware of pt discharged/cleared medically and will be only psych admission       Josefina Chery RN  03/19/19 0950

## 2019-03-19 NOTE — ED NOTES
Insurance Authorization:   [facility] assured  FAX number: [id number]  Fax appropriate forms  To be determined -- days approved  Level of care:inpatient mental health   Review on **  To be determined  Authorization # to be determined  Itzel Bell (Eligibility Verification System) called - [phone]    Automated system indicates:Kike is active secondary insurance    COB completed with Adan Antonio at [address] [place] [address]

## 2019-03-19 NOTE — ASSESSMENT & PLAN NOTE
Patient was reportedly found on the floor today with picture of loved one who recently passed away in her hand  Family reports they believe patient has been taking pills for the past two days but are unsure of what she took   Family member reports patient does not have access to many medications, but reports grandmother has tramadol in the house   Patient lethargic at time of exam but does awaken to voice- mumbles that she took "5 mg of gabapentin today", reports she gets this from her psychiatrist   UDS negative   Continue to monitor on telemetry  Psychiatry consult

## 2019-03-19 NOTE — H&P
[facility]  H&P- Max Ford 1960, 66 y o  female MRN: [id number]    Unit/Bed#: ED 07 Encounter: [id number]    Primary Care Provider: Bonita Carlos MD   Date and time admitted to hospital: 3/18/2019  4:12 PM        * Overdose  Assessment & Plan  Patient was reportedly found on the floor today with picture of loved one who recently passed away in her hand  Family reports they believe patient has been taking pills for the past two days but are unsure of what she took   Family member reports patient does not have access to many medications, but reports grandmother has tramadol in the house   Patient lethargic at time of exam but does awaken to voice- mumbles that she took "5 mg of gabapentin today", reports she gets this from her psychiatrist   UDS negative   Continue to monitor on telemetry  Psychiatry consult    Acute metabolic encephalopathy  Assessment & Plan  Likely secondary to acute overdose  Patient is arousable to voice, but remained extremely lethargic  Monitor on telemetry-sinus alice  EKG showing normal sinus rhythm  UDS negative    Depression  Assessment & Plan  Patient's daughter reports patient has history of depression   She believes she is taking one medication for her depression but is unsure what that medication is   Records from [facility] showing Celexa 20 mg daily- last script written 3/2016     Bariatric surgery status  Assessment & Plan  Per review of [facility] records, history of bariatric surgery       Asthma  Assessment & Plan  Records indicate history of asthma   Will place order for PRN albuterol for now            VTE Prophylaxis: Enoxaparin (Lovenox)  / sequential compression device   Code Status: full code  POLST: POLST form is not discussed and not completed at this time  Discussion with family: none    Anticipated Length of Stay:  Patient will be admitted on an Inpatient basis with an anticipated length of stay of  More than 2 midnights     Justification for Hospital Stay: overdose    Total Time for Visit, including Counseling / Coordination of Care: 45 minutes  Greater than 50% of this total time spent on direct patient counseling and coordination of care  Chief Complaint:   Overdose    History of Present Illness:    Alexia Billings is a 66 y o  female with past medical history of depression and asthma who presents with acute metabolic encephalopathy secondary to acute overdose  Patient reports she took 5 mg of gabapentin today  Patient unable to explain why she took that  Patient reports she gets this medication from her psychiatrist   Patient extremely sleepy on exam, arousable to voice, but falling asleep in between questions  Patient denies any shortness of breath or chest pain  Patient unable to explain any further medical complaints or conditions  Review of Systems:    Review of Systems   Unable to perform ROS: Mental status change       Past Medical and Surgical History:     Past Medical History:   Diagnosis Date    Anxiety     Asthma     Depression     Diabetes mellitus ([facility] [address] )     History of morbid obesity     History of sleep apnea     History of type 2 diabetes mellitus     Hypotension     Low vitamin B12 level     Postgastrectomy malabsorption     Vitamin D deficiency        Past Surgical History:   Procedure Laterality Date    CHOLECYSTECTOMY      GASTRIC BYPASS      HYSTERECTOMY      KNEE ARTHROSCOPY      TONSILLECTOMY      TUBAL LIGATION         Meds/Allergies:    Prior to Admission medications    Medication Sig Start Date End Date Taking?  Authorizing Provider   acetaminophen (TYLENOL) 500 mg tablet Take 1 tablet (500 mg total) by mouth every 6 (six) hours as needed for mild pain 12/26/18   Kb Ruiz PA-C   butalbital-acetaminophen-caffeine (FIORICET) -40 mg per tablet Take 1 tablet by mouth every 6 (six) hours as needed for headaches for up to 12 doses 1/5/17   Ariella Morales MD   methocarbamol (ROBAXIN) 500 mg tablet Take 1 tablet (500 mg total) by mouth 2 (two) times a day 12/26/18   Layne North PA-C   Multiple Vitamin (MULTIVITAMIN) tablet Take 1 tablet by mouth daily  Historical Provider, MD   TRAZODONE HCL PO Take by mouth daily  Historical Provider, MD     I have been unable to obtain / verify an up to date medication list despite all reasonable attempts  Allergies: Allergies   Allergen Reactions    Motrin [Ibuprofen]        Social History:     Marital Status: Single   Occupation: unknown  Patient Pre-hospital Living Situation: home  Patient Pre-hospital Level of Mobility: unknown  Patient Pre-hospital Diet Restrictions: unknown  Substance Use History:   Social History     Substance and Sexual Activity   Alcohol Use No     Social History     Tobacco Use   Smoking Status Former Smoker   Smokeless Tobacco Never Used     Social History     Substance and Sexual Activity   Drug Use No       Family History:    Family History   Problem Relation Age of Onset    Hypertension Mother     Heart disease Father     Stroke Father        Physical Exam:     Vitals:   Blood Pressure: 103/63 (03/18/19 2300)  Pulse: (!) 52 (03/18/19 2300)  Temperature: 97 6 °F (36 4 °C) (03/18/19 1617)  Temp Source: Tympanic (03/18/19 1617)  Respirations: 14 (03/18/19 2300)  SpO2: 98 % (03/18/19 2300)    Physical Exam   Constitutional: She appears well-nourished  No distress  Sleeping but easily arousable to voice   HENT:   Head: Normocephalic and atraumatic  Mouth/Throat: Oropharynx is clear and moist    Eyes: Conjunctivae are normal  No scleral icterus  Neck: Normal range of motion  Neck supple  No JVD present  Cardiovascular: Regular rhythm and normal heart sounds  Bradycardia present  No murmur heard  Pulmonary/Chest: Effort normal and breath sounds normal  No respiratory distress  She has no wheezes  She has no rales  Abdominal: Soft  Bowel sounds are normal  She exhibits no distension   There is no tenderness  There is no guarding  Musculoskeletal: She exhibits no edema, tenderness or deformity  Neurological: She is alert  Patient is oriented to person and place, also oriented to situation reporting she took too much medication and is now in the hospital    Skin: Skin is warm and dry  Capillary refill takes less than 2 seconds  No rash noted  No erythema  Psychiatric: She has a normal mood and affect  Thought content normal  She is slowed  Lethargic           Additional Data:     Lab Results: I have personally reviewed pertinent reports  Results from last 7 days   Lab Units 03/18/19  1641   WBC Thousand/uL 5 10   HEMOGLOBIN g/dL 12 9   HEMATOCRIT % 40 4   PLATELETS Thousands/uL 174   NEUTROS PCT % 61   LYMPHS PCT % 31   MONOS PCT % 6   EOS PCT % 1     Results from last 7 days   Lab Units 03/18/19  1641   SODIUM mmol/L 140   POTASSIUM mmol/L 4 0   CHLORIDE mmol/L 106   CO2 mmol/L 29   BUN mg/dL 18   CREATININE mg/dL 0 68   ANION GAP mmol/L 5   CALCIUM mg/dL 9 2   ALBUMIN g/dL 3 5   TOTAL BILIRUBIN mg/dL 0 50   ALK PHOS U/L 85   ALT U/L 18   AST U/L 15   GLUCOSE RANDOM mg/dL 89     Results from last 7 days   Lab Units 03/18/19  1641   INR  1 01     Results from last 7 days   Lab Units 03/18/19  1622   POC GLUCOSE mg/dl 82               Imaging: I have personally reviewed pertinent reports  CT head without contrast   Final Result by Shanelle Hsu DO (03/18 1725)   No acute intracranial abnormality  Workstation performed: [id number]         XR chest portable    (Results Pending)       EKG, Pathology, and Other Studies Reviewed on Admission:   · EKG: NSR    Allscripts / Epic Records Reviewed: Yes     ** Please Note: This note has been constructed using a voice recognition system   **

## 2019-03-19 NOTE — ED NOTES
Per Internal Medicine MARTHA Carrillo, no 1:1 observation required at this time     Iain Officer, RN  03/19/19 5512

## 2019-03-19 NOTE — ASSESSMENT & PLAN NOTE
Records indicate history of asthma   Will place order for PRN albuterol for now  Currently no asthma exacerbation noted

## 2019-03-19 NOTE — PROGRESS NOTES
Pt is a 201 admitted from [facility] ED  Pt was brought by EMS from home due to an overdose attempt  Daughter found an empty Klonopin bottle  Pt reports depression and is lethargic with a flat affect  Pt states she wanted to be with her siblings because "they are in a better place " Pt stated she is stressed about "everything " Pt currently denies SI and HI  Pt is independent  Pt currently requesting no outside contact from friends or family  Pt receives OP services at [facility]  SLIM notified of admission  Will continue to monitor

## 2019-03-19 NOTE — ED NOTES
Patient awake  When asked if patient took pills with the intent of harming herself, patient replied "Yes, I'm under a lot of stress " Patient denies remembering what she took   When asked if she currently feels this way, patient states "No, no I don't " Will continue to monitor     Ata Garcia RN  03/19/19 8493

## 2019-03-19 NOTE — ASSESSMENT & PLAN NOTE
Patient's daughter reports patient has history of depression   She believes she is taking one medication for her depression but is unsure what that medication is   Records from [facility] showing Celexa 20 mg daily  Will need inpatient behavioral health treatment for depression

## 2019-03-20 PROBLEM — E16.2 HYPOGLYCEMIA: Status: ACTIVE | Noted: 2019-03-20

## 2019-03-20 LAB
CHOLEST SERPL-MCNC: 202 MG/DL
EST. AVERAGE GLUCOSE BLD GHB EST-MCNC: 114 MG/DL
GLUCOSE SERPL-MCNC: 112 MG/DL (ref 65–140)
GLUCOSE SERPL-MCNC: 80 MG/DL (ref 65–140)
GLUCOSE SERPL-MCNC: 81 MG/DL (ref 65–140)
GLUCOSE SERPL-MCNC: 84 MG/DL (ref 65–140)
HBA1C MFR BLD: 5.6 % (ref 4.2–6.3)
HDLC SERPL-MCNC: 59 MG/DL (ref 40–59)
LDLC SERPL CALC-MCNC: 121 MG/DL
NONHDLC SERPL-MCNC: 143 MG/DL
TRIGL SERPL-MCNC: 111 MG/DL

## 2019-03-20 PROCEDURE — 99223 1ST HOSP IP/OBS HIGH 75: CPT | Performed by: PSYCHIATRY & NEUROLOGY

## 2019-03-20 PROCEDURE — 82948 REAGENT STRIP/BLOOD GLUCOSE: CPT

## 2019-03-20 PROCEDURE — 80061 LIPID PANEL: CPT | Performed by: PHYSICIAN ASSISTANT

## 2019-03-20 PROCEDURE — 93005 ELECTROCARDIOGRAM TRACING: CPT

## 2019-03-20 PROCEDURE — 99222 1ST HOSP IP/OBS MODERATE 55: CPT | Performed by: PSYCHIATRY & NEUROLOGY

## 2019-03-20 PROCEDURE — 99218 PR INITIAL OBSERVATION CARE/DAY 30 MINUTES: CPT | Performed by: PHYSICIAN ASSISTANT

## 2019-03-20 RX ORDER — TRAZODONE HYDROCHLORIDE 50 MG/1
25 TABLET ORAL
Status: DISCONTINUED | OUTPATIENT
Start: 2019-03-20 | End: 2019-03-22 | Stop reason: HOSPADM

## 2019-03-20 RX ORDER — OLANZAPINE 10 MG/1
5 INJECTION, POWDER, LYOPHILIZED, FOR SOLUTION INTRAMUSCULAR EVERY 8 HOURS PRN
Status: DISCONTINUED | OUTPATIENT
Start: 2019-03-20 | End: 2019-03-22 | Stop reason: HOSPADM

## 2019-03-20 RX ORDER — VENLAFAXINE 25 MG/1
25 TABLET ORAL 2 TIMES DAILY
Status: DISCONTINUED | OUTPATIENT
Start: 2019-03-21 | End: 2019-03-21

## 2019-03-20 RX ORDER — VENLAFAXINE HYDROCHLORIDE 37.5 MG/1
37.5 CAPSULE, EXTENDED RELEASE ORAL DAILY
Status: DISCONTINUED | OUTPATIENT
Start: 2019-03-20 | End: 2019-03-20

## 2019-03-20 RX ORDER — CLONAZEPAM 0.5 MG/1
0.5 TABLET ORAL 2 TIMES DAILY PRN
Status: DISCONTINUED | OUTPATIENT
Start: 2019-03-20 | End: 2019-03-21

## 2019-03-20 RX ADMIN — ACETAMINOPHEN 975 MG: 325 TABLET ORAL at 17:33

## 2019-03-20 RX ADMIN — VENLAFAXINE HYDROCHLORIDE 37.5 MG: 37.5 CAPSULE, EXTENDED RELEASE ORAL at 17:33

## 2019-03-20 RX ADMIN — TRAZODONE HYDROCHLORIDE 25 MG: 50 TABLET ORAL at 20:54

## 2019-03-20 RX ADMIN — ENOXAPARIN SODIUM 40 MG: 40 INJECTION SUBCUTANEOUS at 09:07

## 2019-03-20 NOTE — PROGRESS NOTES
Pt was calm and cooperative with care  Pt was medication compliant  Pt reported depression and anxiety, and passive SI, "I don't want to be here anymore"  Pt contracts for safety on the unit  Pt denied all other symptoms  Pt was very scant in conversation  Pt out for meals only, and minimally interactive with peers  Will continue to monitor

## 2019-03-20 NOTE — PLAN OF CARE
Problem: Depression  Goal: Verbalize thoughts and feelings  Description  Interventions:  - Assess and re-assess patient's level of risk   - Engage patient in 1:1 interactions, daily, for a minimum of 15 minutes   - Encourage patient to express feelings, fears, frustrations, hopes   Outcome: Progressing  Goal: Refrain from harming self  Description  Interventions:  - Monitor patient closely, per order   - Supervise medication ingestion, monitor effects and side effects   Outcome: Progressing  Goal: Refrain from isolation  Description  Interventions:  - Develop a trusting relationship   - Encourage socialization   Outcome: Progressing  Goal: Refrain from self-neglect  Outcome: Progressing  Goal: Complete daily ADLs, including personal hygiene independently, as able  Description  Interventions:  - Observe, teach, and assist patient with ADLS  -  Monitor and promote a balance of rest/activity, with adequate nutrition and elimination   Outcome: Progressing     Problem: Depression  Goal: Treatment Goal: Demonstrate behavioral control of depressive symptoms, verbalize feelings of improved mood/affect, and adopt new coping skills prior to discharge  Outcome: Not Progressing  Goal: Attend and participate in unit activities, including therapeutic, recreational, and educational groups  Description  Interventions:  - Provide therapeutic and educational activities daily, encourage attendance and participation, and document same in the medical record   Outcome: Not Progressing

## 2019-03-20 NOTE — ASSESSMENT & PLAN NOTE
Patient reports history of depression, reporting her depression is worsening recently  Patient unsure what medication she was prescribed  Inpatient psychiatric treatment

## 2019-03-20 NOTE — PROGRESS NOTES
Patient spent evening in bed; lethargic but easy to arouse  Minimal response to conversation but denies SI and states she is just tired  Asked for water but refused any other fluids or snacks  Bed alarm on for patient safety

## 2019-03-20 NOTE — CASE MANAGEMENT
Met with pt to complete 1:1 psychosocial assessment  Pt was lying in her bed upon approach  Depressed affect, calm, cooperative, soft speech  No ambulatory devices  Pt currently resides at home with her 1 daughter and 4 grandchildren  She has been the primary caretaker for her [de-identified]year old mother for the past 3-4 years  She cares for her M-F and often brings her mother to her home on the weekends  Her mother resides in a [facility] in the area  Pt has 4 children total (2 daughters and 2 sons)  She has 2 sisters, 1  brother and 1  sister  Pt's  sister had hx of depression/anxiety and committed suicide 4 years ago  Pt reports that she didn't go to the  or wake and never coped with this loss  No legal issues, D/A, [place], Yazidi affiliation, access to firearms, POA, guardian, rep-payee, living will  Pt completed 10th grade and then dropped out of school  Currently on permanently disability -- receives $766/month  Pt drives self and has a valid license and working vehicle  Pt is currently single --  1x and   Pt is a former smoker -- quit 3-4 years ago  No hx of D/A, abuse or HI  Pt reports that she has had one other inpatient psych admission years back  Hx of depression  Recent suicide attempt by OD on meds  Family: pt's younger sister had D/A issues as well as depression, anxiety, and committed suicide  No family hx of dementia, HI, abuse  Pt receives OP [facility] through [facility]  Medical: bariatric surgery 4 years ago, asthma  Pt reports that she is mad at her daughter, Talia Smart, right now because the family was ganging up on the pt for not caring for her mother well enough and Talia Smart did not stand up for her or defend her to the family  Pt reports that she is the only one who does anything for her mother and she doesn't deserve to be treated badly or accused of not helping/supporting her mother as best as possible   Pt reports that she does all the household tasks and some personal care tasks for her mother  She also supports her financially and makes sure her mother has everything she needs  Stressors: Caring for her elderly mother, no time for self  Strengths: "Doing everything for everyone else"  Coping skills: "I never have time to do anything for myself"    Tx plan reviewed and signed   ROIs signed for PCP, andrea MCMAHON

## 2019-03-20 NOTE — PROGRESS NOTES
Low blood pressure and low pulse reported to SLIM  Informed to complete an EKG and SLIM will round on the floor later in the morning  EKG machine unavailable at this time  Will follow up

## 2019-03-20 NOTE — DISCHARGE INSTR - OTHER ORDERS
You are being discharged to: [address]  [place], [address] [facility]    Triggers you have identified during your hospitalization that led to your suicidal ideation and distressed mood include: caretaker stress and argument with family members  If you are unable to deal with your distressed mood alone, please contact your outpatient provider at [facility]  If that is not effective and you continue to have suicidal ideation and/or a distressed mood, please contact 911 and go to the nearest hospital     [facility] Crisis Intervention: [address] Suicide Prevention Lifeline:  7-416.878.4934  *Peer Support Talk Line (Seven days a week, 1:00 PM  9:00 PM) Call: 369.735.8250 or Text: [address] on [address] [facility] ([facility]) HELPLINE: 549.176.7428/Website: www lashon org  *Substance Abuse and [facility]) [employer], which is a confidential, free, 24-hour-a-day, 365-day-a-year, information service for individuals and family members facing mental health and/or substance use disorders  This service provides referrals to local treatment facilities, support groups, and community-based organizations  Callers can also order free publications and other information  Call 9-724.528.9892/Website: www [facility] gov  *[facility] 2-1-1: This is a toll free, confidential, 24-hour-a-day service which connects you to a community  in your area who can help you find services and resources that are available to you locally and provide critical services that can improve and save lives    Call: 211  /Website: https://[email or web address]/

## 2019-03-20 NOTE — TREATMENT PLAN
TREATMENT PLAN REVIEW - [facility] 66 y o  1960 female MRN: [id number]    [address] Room / Bed: [facility]/-16 Encounter: [id number]          Admit Date/Time:  3/18/2019  4:12 PM    Treatment Team: Attending Provider: Lulu Tran MD; : Tracy Diaz; Patient Care Technician: Saloni Allen; Respiratory Therapist: Cameron Bonilla; Patient Care Technician: Tram Paredes; Registered Nurse: Saud Velasco RN; Patient Care Assistant: Sue Julian    Diagnosis: Principal Problem:    Intentional drug overdose [facility])  Active Problems:    Asthma    Bariatric surgery status    Depression    Acute metabolic encephalopathy    Hypoglycemia      Patient Strengths/Assets: ability for insight, capable of independent living, cooperative, communication skills    Patient Barriers/Limitations: marital/family conflict, noncompliant with medication    Short Term Goals: decrease in depressive symptoms, decrease in suicidal thoughts    Long Term Goals: resolution of depressive symptoms, free of suicidal thoughts    Progress Towards Goals: starting psychiatric medications as prescribed    Recommended Treatment: medication management, patient medication education, group therapy, milieu therapy, continued Behavioral Health psychiatric evaluation/assessment process    Treatment Frequency: daily medication monitoring, group and milieu therapy daily, monitoring through interdisciplinary rounds, monitoring through weekly patient care conferences    Expected Discharge Date:   In 7 days    Discharge Plan: discharge to home    Treatment Plan Created/Updated By: Lulu Tran MD

## 2019-03-20 NOTE — CMS CERTIFICATION NOTE
Recertification: Based upon physical, mental and social evaluations, I certify that inpatient psychiatric services continue to be medically necessary for this patient for a duration of 7 midnights for the treatment of  Intentional drug overdose [facility])   Available alternative community resources still do not meet the patient's mental health care needs  I further attest that an established written individualized plan of care has been updated and is outlined in the patient's medical records

## 2019-03-20 NOTE — CONSULTS
[facility] Internal Medicine  Consult- Erma Hamman 1960, 66 y o  female MRN: [id number]    Unit/Bed#: [facility] 850-52 Encounter: [id number]    Primary Care Provider: Glory Abarca MD   Date and time admitted to hospital: 3/18/2019  4:12 PM      Inpatient consult for Medical Clearance for [facility] patient  Consult performed by: Pascual Yoo PA-C  Consult ordered by: NAT Dye          * Intentional drug overdose [facility])  Assessment & Plan  Patient presented to ED yesterday following an intentional drug overdose  Patient reports intentionally taking extra drugs as she is under a lot of stress"  Patient reports she took approximately 4-5 pills but does not know exactly what she took  Patient reports it could of been gabapentin  She admits that she took the medications because she wanted to die  She complains of being more depressed than usual     Sign 2014 inpatient behavioral health treatment  EKGs and labs reviewed  Urine drug screen is negative  EKG shows sinus bradycardia  Heart rates in the 50s while sleeping  Plan per Psychiatry    Hypoglycemia  Assessment & Plan  Patient reports history of chronic hypoglycemia   T i d  Accu-Cheks and continue to monitor    Acute metabolic encephalopathy  Assessment & Plan  Resolved  Was likely secondary to acute polysubstance abuse  Continue to monitor    Depression  Assessment & Plan  Patient reports history of depression, reporting her depression is worsening recently  Patient unsure what medication she was prescribed  Inpatient psychiatric treatment      Bariatric surgery status  Assessment & Plan  Per review of [facility] records, history of bariatric surgery   Stable at this time      Asthma  Assessment & Plan  Records indicate history of asthma   Will place order for PRN albuterol for now  Currently no asthma exacerbation noted          VTE Prophylaxis: Enoxaparin (Lovenox)  / reason for no mechanical VTE prophylaxis  unit     Recommendations for Discharge:  · PCP follow up     Counseling / Coordination of Care Time: 30 minutes  Greater than 50% of total time spent on patient counseling and coordination of care  Collaboration of Care: Were Recommendations Directly Discussed with Primary Treatment Team? - No     History of Present Illness:    Karlos Smalls is a 66 y o  female who is originally admitted to the psychiatry service due to intentional drug overdose with suicidal ideation  We are consulted for medical clearance  Patient presented to the ED yesterday following acute drug overdose  Patient unsure what medication she took but reports she took approximately 5 pills  Patient reports she did this intentionally as she is under lot of stress  Patient reports a history of depression with increasing depression recently  Patient reports a medical history of asthma and bariatric surgery  Patient reports history of hypoglycemia, reports she takes her blood glucose levels approximately twice daily  Patient denies further cardiac, respiratory or neurologic conditions  Patient denies history of seizures or stroke    Review of Systems:    Review of Systems   Constitutional: Negative for activity change, fatigue and fever  HENT: Negative for ear pain, nosebleeds, sinus pressure, sneezing, sore throat and trouble swallowing  Eyes: Negative for photophobia, pain and visual disturbance  Respiratory: Negative for cough, chest tightness, shortness of breath and wheezing  Cardiovascular: Negative for chest pain, palpitations and leg swelling  Gastrointestinal: Negative for abdominal pain, diarrhea, nausea and vomiting  Endocrine: Negative  Genitourinary: Negative for difficulty urinating, dysuria, flank pain and hematuria  Musculoskeletal: Negative for back pain, gait problem and neck stiffness  Skin: Negative for rash  Allergic/Immunologic: Negative      Neurological: Negative for syncope, speech difficulty, weakness and light-headedness  Hematological: Negative  Psychiatric/Behavioral: Positive for dysphoric mood, self-injury and suicidal ideas  Negative for confusion and sleep disturbance  All other systems reviewed and are negative  Past Medical and Surgical History:     Past Medical History:   Diagnosis Date    Anxiety     Asthma     Depression     Diabetes mellitus ([facility] )     History of morbid obesity     History of sleep apnea     History of type 2 diabetes mellitus     Hypotension     Low vitamin B12 level     Postgastrectomy malabsorption     Suicide attempt ([facility] )     Vitamin D deficiency        Past Surgical History:   Procedure Laterality Date    CHOLECYSTECTOMY      GASTRIC BYPASS      HYSTERECTOMY      KNEE ARTHROSCOPY      TONSILLECTOMY      TUBAL LIGATION         Meds/Allergies:    PTA meds:   Prior to Admission Medications   Prescriptions Last Dose Informant Patient Reported? Taking? DULoxetine (CYMBALTA) 60 mg delayed release capsule   Yes Yes   Sig: Take 60 mg by mouth daily   Multiple Vitamin (MULTIVITAMIN) tablet   Yes No   Sig: Take 1 tablet by mouth daily  TRAZODONE HCL PO   Yes No   Sig: Take by mouth daily     acetaminophen (TYLENOL) 500 mg tablet   No No   Sig: Take 1 tablet (500 mg total) by mouth every 6 (six) hours as needed for mild pain   butalbital-acetaminophen-caffeine (FIORICET) -40 mg per tablet   No No   Sig: Take 1 tablet by mouth every 6 (six) hours as needed for headaches for up to 12 doses   citalopram (CeleXA) 20 mg tablet   Yes Yes   Sig: Take 20 mg by mouth daily   citalopram (CeleXA) 40 mg tablet   Yes Yes   Sig: Take 40 mg by mouth daily   clonazePAM (KlonoPIN) 0 5 mg tablet   Yes Yes   Sig: Take 0 5 mg by mouth 2 (two) times a day   doxepin (SINEquan) 25 mg capsule   Yes Yes   Sig: Take 10 mg by mouth daily at bedtime   hydrOXYzine HCL (ATARAX) 25 mg tablet   Yes Yes   Sig: Take 25 mg by mouth daily at bedtime   methocarbamol (ROBAXIN) 500 mg tablet No No   Sig: Take 1 tablet (500 mg total) by mouth 2 (two) times a day      Facility-Administered Medications: None       Allergies: Allergies   Allergen Reactions    Motrin [Ibuprofen]        Social History:     Marital Status: Single    Substance Use History:   Social History     Substance and Sexual Activity   Alcohol Use No    Frequency: Never    Binge frequency: Never     Social History     Tobacco Use   Smoking Status Former Smoker   Smokeless Tobacco Never Used     Social History     Substance and Sexual Activity   Drug Use No       Family History:    Family History   Problem Relation Age of Onset    Hypertension Mother     Heart disease Father     Stroke Father        Physical Exam:     Vitals:   Blood Pressure: 98/58 (03/19/19 2209)  Pulse: 60 (03/19/19 2209)  Temperature: 98 4 °F (36 9 °C) (03/19/19 2209)  Temp Source: Oral (03/19/19 2209)  Respirations: 16 (03/19/19 2209)  Height: 5' 6" (167 6 cm) (03/19/19 1502)  Weight - Scale: 82 6 kg (182 lb) (03/19/19 1502)  SpO2: 97 % (03/19/19 2209)    Physical Exam   Constitutional: She is oriented to person, place, and time  She appears well-nourished  No distress  HENT:   Head: Normocephalic and atraumatic  Mouth/Throat: Oropharynx is clear and moist    Eyes: Conjunctivae and EOM are normal  No scleral icterus  Neck: Normal range of motion  Cardiovascular: Normal rate, regular rhythm and normal heart sounds  No murmur heard  Pulmonary/Chest: Effort normal and breath sounds normal  No respiratory distress  She has no wheezes  She has no rales  Abdominal: Soft  She exhibits no distension  Musculoskeletal: Normal range of motion  She exhibits no edema  Neurological: She is alert and oriented to person, place, and time  No cranial nerve deficit (CN II-XII grossly intact, EOMI, equal strength bilaterally)  She exhibits normal muscle tone  Skin: Skin is warm and dry  Psychiatric: She is withdrawn  She exhibits a depressed mood   She expresses suicidal ideation  Vitals reviewed  Additional Data:     Lab Results: I have personally reviewed pertinent reports  Results from last 7 days   Lab Units 03/18/19  1641   WBC Thousand/uL 5 10   HEMOGLOBIN g/dL 12 9   HEMATOCRIT % 40 4   PLATELETS Thousands/uL 174   NEUTROS PCT % 61   LYMPHS PCT % 31   MONOS PCT % 6   EOS PCT % 1     Results from last 7 days   Lab Units 03/18/19  1641   SODIUM mmol/L 140   POTASSIUM mmol/L 4 0   CHLORIDE mmol/L 106   CO2 mmol/L 29   BUN mg/dL 18   CREATININE mg/dL 0 68   ANION GAP mmol/L 5   CALCIUM mg/dL 9 2   ALBUMIN g/dL 3 5   TOTAL BILIRUBIN mg/dL 0 50   ALK PHOS U/L 85   ALT U/L 18   AST U/L 15   GLUCOSE RANDOM mg/dL 89     Results from last 7 days   Lab Units 03/18/19  1641   INR  1 01     Results from last 7 days   Lab Units 03/18/19  1641   TROPONIN I ng/mL <0 01     Lab Results   Component Value Date/Time    HGBA1C 5 4 01/30/2015 01:03 PM    HGBA1C 6 0 (H) 01/21/2014 09:41 PM     Results from last 7 days   Lab Units 03/18/19  1622   POC GLUCOSE mg/dl 82           Imaging: I have personally reviewed pertinent reports  XR chest portable   Final Result by Katerin Berry MD (03/19 0700)      No radiographic evidence of acute intrathoracic process  Workstation performed: [id number]         CT head without contrast   Final Result by Uri Carlos DO (03/18 1725)   No acute intracranial abnormality  Workstation performed: [id number]             EKG, Pathology, and Other Studies Reviewed on Admission:   · EKG: sinus bradycardia    ** Please Note: This note has been constructed using a voice recognition system   **

## 2019-03-20 NOTE — CMS CERTIFICATION NOTE
Recertification: Based upon physical, mental and social evaluations, I certify that inpatient psychiatric services continue to be medically necessary for this patient for a duration of 7 midnights for the treatment of  Intentional drug overdose [facility])   Available alternative community resources still do not meet the patient's mental health care needs  I further attest that an established written individualized plan of care has been updated and is outlined in the patient's medical records

## 2019-03-20 NOTE — ASSESSMENT & PLAN NOTE
Patient presented to ED yesterday following an intentional drug overdose  Patient reports intentionally taking extra drugs as she is under a lot of stress"  Patient reports she took approximately 4-5 pills but does not know exactly what she took  Patient reports it could of been gabapentin  She admits that she took the medications because she wanted to die  She complains of being more depressed than usual     Sign 2014 inpatient behavioral health treatment  EKGs and labs reviewed  Urine drug screen is negative  EKG shows sinus bradycardia  Heart rates in the 50s while sleeping    Plan per Psychiatry

## 2019-03-21 LAB
ATRIAL RATE: 47 BPM
GLUCOSE SERPL-MCNC: 117 MG/DL (ref 65–140)
GLUCOSE SERPL-MCNC: 73 MG/DL (ref 65–140)
GLUCOSE SERPL-MCNC: 77 MG/DL (ref 65–140)
P AXIS: 39 DEGREES
PR INTERVAL: 152 MS
QRS AXIS: 36 DEGREES
QRSD INTERVAL: 84 MS
QT INTERVAL: 460 MS
QTC INTERVAL: 407 MS
T WAVE AXIS: 8 DEGREES
VENTRICULAR RATE: 47 BPM

## 2019-03-21 PROCEDURE — 99232 SBSQ HOSP IP/OBS MODERATE 35: CPT | Performed by: PSYCHIATRY & NEUROLOGY

## 2019-03-21 PROCEDURE — 93010 ELECTROCARDIOGRAM REPORT: CPT | Performed by: INTERNAL MEDICINE

## 2019-03-21 PROCEDURE — 82948 REAGENT STRIP/BLOOD GLUCOSE: CPT

## 2019-03-21 RX ORDER — CLONAZEPAM 0.5 MG/1
0.5 TABLET ORAL
Status: DISCONTINUED | OUTPATIENT
Start: 2019-03-21 | End: 2019-03-22 | Stop reason: HOSPADM

## 2019-03-21 RX ORDER — VENLAFAXINE 25 MG/1
25 TABLET ORAL 3 TIMES DAILY
Status: DISCONTINUED | OUTPATIENT
Start: 2019-03-21 | End: 2019-03-22 | Stop reason: HOSPADM

## 2019-03-21 RX ORDER — ACETAMINOPHEN 325 MG/1
TABLET ORAL
Status: COMPLETED
Start: 2019-03-21 | End: 2019-03-21

## 2019-03-21 RX ORDER — CLONAZEPAM 0.5 MG/1
0.5 TABLET ORAL DAILY PRN
Status: DISCONTINUED | OUTPATIENT
Start: 2019-03-21 | End: 2019-03-22 | Stop reason: HOSPADM

## 2019-03-21 RX ADMIN — VENLAFAXINE 25 MG: 25 TABLET ORAL at 20:25

## 2019-03-21 RX ADMIN — ACETAMINOPHEN 975 MG: 325 TABLET ORAL at 02:31

## 2019-03-21 RX ADMIN — VENLAFAXINE 25 MG: 25 TABLET ORAL at 08:26

## 2019-03-21 RX ADMIN — VENLAFAXINE 25 MG: 25 TABLET ORAL at 17:32

## 2019-03-21 RX ADMIN — CLONAZEPAM 0.5 MG: 0.5 TABLET ORAL at 21:43

## 2019-03-21 RX ADMIN — ACETAMINOPHEN 975 MG: 325 TABLET ORAL at 23:02

## 2019-03-21 NOTE — PLAN OF CARE
Problem: Depression  Goal: Verbalize thoughts and feelings  Description  Interventions:  - Assess and re-assess patient's level of risk   - Engage patient in 1:1 interactions, daily, for a minimum of 15 minutes   - Encourage patient to express feelings, fears, frustrations, hopes   Outcome: Progressing  Goal: Refrain from harming self  Description  Interventions:  - Monitor patient closely, per order   - Supervise medication ingestion, monitor effects and side effects   Outcome: Progressing  Goal: Refrain from self-neglect  Outcome: Progressing  Goal: Complete daily ADLs, including personal hygiene independently, as able  Description  Interventions:  - Observe, teach, and assist patient with ADLS  -  Monitor and promote a balance of rest/activity, with adequate nutrition and elimination   Outcome: Progressing     Problem: Depression  Goal: Treatment Goal: Demonstrate behavioral control of depressive symptoms, verbalize feelings of improved mood/affect, and adopt new coping skills prior to discharge  Outcome: Not Progressing  Goal: Refrain from isolation  Description  Interventions:  - Develop a trusting relationship   - Encourage socialization   Outcome: Not Progressing  Goal: Attend and participate in unit activities, including therapeutic, recreational, and educational groups  Description  Interventions:  - Provide therapeutic and educational activities daily, encourage attendance and participation, and document same in the medical record   Outcome: Not Progressing

## 2019-03-21 NOTE — PROGRESS NOTES
Pt calm, cooperative with care, and pleasant  Pt med compliant  Pt reported to be, "A little bit depressed," but denied all other symptoms / SI HI  Pt seclusive to room for duration of shift but did come to dayroom for dinner only  Pt not social with peers  Pt did not attend group  Pt contracts for safety on unit  Pt reports lethargy but is easily arousable  PRN tylenol given at 1734 for 8/10 headache - pt stated it was effective one hour later with a pain level of 0/10  Pt assisted to shower per request successfully  PRN trazodone given at 2056 per pt request and pt was noted to be social with nurse at this time and readily was verbalizing needs and feelings

## 2019-03-21 NOTE — CASE MANAGEMENT
Spoke with pt briefly about having a family meeting to address issues that contributed to pt's distress and suicide attempt  Pt stated that it won't help and it won't make a difference  She is not agreeable to having a family session  CM asked if her niece, Holland Swan, visited her yesterday and left her phone number  Pt stated that she came to visit last evening but pt forget to get her phone number  CM asked if pt would give permission for her daughter or another family member to be contacted to obtain info and help with d/c planning  Pt is not agreeable -- she stated that she's still mad at her daughter and needs her to suffer by not speaking with her  CM requested that she get Yandel's phone number next time she visits since she's already signed an RENATA for her  Pt also stated that she does not have shoes with her at the hospital so Holland Swan will need to bring those prior to d/c
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
## 2019-03-21 NOTE — PROGRESS NOTES
Pt was calm and cooperative with care  Pt was medication compliant  Pt denied all symptoms but continues to appear flat and depressed  Pt reported occasional dizziness this morning, which has since subsided  Pt remained isolative to her room, refusing groups  Will continue to monitor

## 2019-03-22 VITALS
OXYGEN SATURATION: 97 % | BODY MASS INDEX: 29.25 KG/M2 | HEART RATE: 52 BPM | WEIGHT: 182 LBS | DIASTOLIC BLOOD PRESSURE: 52 MMHG | RESPIRATION RATE: 16 BRPM | TEMPERATURE: 97.2 F | HEIGHT: 66 IN | SYSTOLIC BLOOD PRESSURE: 101 MMHG

## 2019-03-22 PROBLEM — F33.2 MAJOR DEPRESSIVE DISORDER, RECURRENT SEVERE WITHOUT PSYCHOTIC FEATURES (HCC): Chronic | Status: ACTIVE | Noted: 2019-03-18

## 2019-03-22 PROBLEM — F41.1 GENERALIZED ANXIETY DISORDER: Chronic | Status: ACTIVE | Noted: 2019-03-22

## 2019-03-22 PROBLEM — G89.29 CHRONIC BACK PAIN: Status: ACTIVE | Noted: 2019-03-22

## 2019-03-22 PROBLEM — M54.9 CHRONIC BACK PAIN: Status: ACTIVE | Noted: 2019-03-22

## 2019-03-22 LAB
GLUCOSE SERPL-MCNC: 80 MG/DL (ref 65–140)
GLUCOSE SERPL-MCNC: 82 MG/DL (ref 65–140)

## 2019-03-22 PROCEDURE — 82948 REAGENT STRIP/BLOOD GLUCOSE: CPT

## 2019-03-22 PROCEDURE — 99238 HOSP IP/OBS DSCHRG MGMT 30/<: CPT | Performed by: PSYCHIATRY & NEUROLOGY

## 2019-03-22 RX ORDER — CLONAZEPAM 0.5 MG/1
0.5 TABLET ORAL 2 TIMES DAILY PRN
Qty: 7 TABLET | Refills: 0 | Status: SHIPPED | OUTPATIENT
Start: 2019-03-22 | End: 2019-12-09 | Stop reason: ALTCHOICE

## 2019-03-22 RX ORDER — VENLAFAXINE 25 MG/1
25 TABLET ORAL 3 TIMES DAILY
Qty: 21 TABLET | Refills: 0 | Status: SHIPPED | OUTPATIENT
Start: 2019-03-22 | End: 2019-12-26 | Stop reason: ALTCHOICE

## 2019-03-22 RX ADMIN — VENLAFAXINE 25 MG: 25 TABLET ORAL at 09:12

## 2019-03-22 NOTE — CASE MANAGEMENT
Spoke with pt's niece, Karen Andujar [phone] about d/c plan for today  She stated that she can  pt at 12:15pm today as she works at [employer]  She will also stop by [facility] to  some clothes and shoes for pt

## 2019-03-22 NOTE — PLAN OF CARE
Problem: Depression  Goal: Treatment Goal: Demonstrate behavioral control of depressive symptoms, verbalize feelings of improved mood/affect, and adopt new coping skills prior to discharge  Outcome: Adequate for Discharge  Goal: Verbalize thoughts and feelings  Description  Interventions:  - Assess and re-assess patient's level of risk   - Engage patient in 1:1 interactions, daily, for a minimum of 15 minutes   - Encourage patient to express feelings, fears, frustrations, hopes   Outcome: Adequate for Discharge  Goal: Refrain from harming self  Description  Interventions:  - Monitor patient closely, per order   - Supervise medication ingestion, monitor effects and side effects   Outcome: Adequate for Discharge  Goal: Refrain from isolation  Description  Interventions:  - Develop a trusting relationship   - Encourage socialization   Outcome: Adequate for Discharge  Goal: Refrain from self-neglect  Outcome: Adequate for Discharge  Goal: Attend and participate in unit activities, including therapeutic, recreational, and educational groups  Description  Interventions:  - Provide therapeutic and educational activities daily, encourage attendance and participation, and document same in the medical record   Outcome: Adequate for Discharge  Goal: Complete daily ADLs, including personal hygiene independently, as able  Description  Interventions:  - Observe, teach, and assist patient with ADLS  -  Monitor and promote a balance of rest/activity, with adequate nutrition and elimination   Outcome: Adequate for Discharge     Problem: Ineffective Coping  Goal: Participates in unit activities  Description  Interventions:  - Provide therapeutic environment   - Provide required programming   - Redirect inappropriate behaviors   Outcome: Adequate for Discharge

## 2019-03-22 NOTE — PLAN OF CARE
Discharge planning discussed with pt  Aftercare appt scheduled with OP psych and therapist  IMM letter signed  CM left VM for pt's niece regarding d/c plan  Pt will have transportation home from family or can walk home since she lives close by

## 2019-03-22 NOTE — PROGRESS NOTES
Patient spent evening sitting in day room  Social and interactive with peers  Smiling and engaging in conversation   States she is feeling much better and has no SI

## 2019-03-22 NOTE — PLAN OF CARE
Problem: Depression  Goal: Treatment Goal: Demonstrate behavioral control of depressive symptoms, verbalize feelings of improved mood/affect, and adopt new coping skills prior to discharge  Outcome: Progressing  Goal: Verbalize thoughts and feelings  Description  Interventions:  - Assess and re-assess patient's level of risk   - Engage patient in 1:1 interactions, daily, for a minimum of 15 minutes   - Encourage patient to express feelings, fears, frustrations, hopes   Outcome: Progressing  Goal: Refrain from harming self  Description  Interventions:  - Monitor patient closely, per order   - Supervise medication ingestion, monitor effects and side effects   Outcome: Progressing  Goal: Refrain from isolation  Description  Interventions:  - Develop a trusting relationship   - Encourage socialization   Outcome: Progressing  Goal: Refrain from self-neglect  Outcome: Progressing  Goal: Attend and participate in unit activities, including therapeutic, recreational, and educational groups  Description  Interventions:  - Provide therapeutic and educational activities daily, encourage attendance and participation, and document same in the medical record   Outcome: Progressing  Goal: Complete daily ADLs, including personal hygiene independently, as able  Description  Interventions:  - Observe, teach, and assist patient with ADLS  -  Monitor and promote a balance of rest/activity, with adequate nutrition and elimination   Outcome: Progressing     Problem: DISCHARGE PLANNING  Goal: Discharge to home or other facility with appropriate resources  Description  INTERVENTIONS:  - Identify barriers to discharge w/patient and caregiver  - Arrange for needed discharge resources and transportation as appropriate  - Identify discharge learning needs (meds, wound care, etc )  - Arrange for interpretive services to assist at discharge as needed  - Refer to Case Management Department for coordinating discharge planning if the patient needs post-hospital services based on physician/advanced practitioner order or complex needs related to functional status, cognitive ability, or social support system  Outcome: Progressing     Problem: Ineffective Coping  Goal: Participates in unit activities  Description  Interventions:  - Provide therapeutic environment   - Provide required programming   - Redirect inappropriate behaviors   Outcome: Progressing

## 2019-03-22 NOTE — NURSING NOTE
Pt was discharged accompanied by her niece  AVS and scripts were reviewed with the pt  Belongings returned  Will continue to monitor

## 2019-03-22 NOTE — PROGRESS NOTES
Pt was calm and cooperative with care  Pt was medication compliant  Pt denied all symptoms  Pt appeared bright and social  Pt was out in the milieu, interactive with her peers  Pt is looking forward to discharge  Will continue to monitor
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
## 2019-04-24 ENCOUNTER — OFFICE VISIT (OUTPATIENT)
Dept: URGENT CARE | Facility: MEDICAL CENTER | Age: 59
End: 2019-04-24
Payer: COMMERCIAL

## 2019-04-24 VITALS
OXYGEN SATURATION: 98 % | RESPIRATION RATE: 16 BRPM | SYSTOLIC BLOOD PRESSURE: 124 MMHG | HEART RATE: 58 BPM | DIASTOLIC BLOOD PRESSURE: 58 MMHG | WEIGHT: 189 LBS | TEMPERATURE: 98 F | HEIGHT: 66 IN | BODY MASS INDEX: 30.37 KG/M2

## 2019-04-24 DIAGNOSIS — L30.4 INTERTRIGO: ICD-10-CM

## 2019-04-24 DIAGNOSIS — H10.32 ACUTE CONJUNCTIVITIS OF LEFT EYE, UNSPECIFIED ACUTE CONJUNCTIVITIS TYPE: Primary | ICD-10-CM

## 2019-04-24 PROCEDURE — 99203 OFFICE O/P NEW LOW 30 MIN: CPT | Performed by: PHYSICIAN ASSISTANT

## 2019-04-24 RX ORDER — TOBRAMYCIN 3 MG/ML
1 SOLUTION/ DROPS OPHTHALMIC
Qty: 5 ML | Refills: 0 | Status: SHIPPED | OUTPATIENT
Start: 2019-04-24 | End: 2019-05-31

## 2019-05-26 ENCOUNTER — HOSPITAL ENCOUNTER (EMERGENCY)
Facility: HOSPITAL | Age: 59
Discharge: HOME | End: 2019-05-26
Attending: EMERGENCY MEDICINE | Admitting: EMERGENCY MEDICINE
Payer: COMMERCIAL

## 2019-05-26 VITALS
WEIGHT: 195.99 LBS | DIASTOLIC BLOOD PRESSURE: 73 MMHG | HEART RATE: 77 BPM | RESPIRATION RATE: 20 BRPM | SYSTOLIC BLOOD PRESSURE: 121 MMHG | TEMPERATURE: 97.9 F | BODY MASS INDEX: 31.63 KG/M2 | OXYGEN SATURATION: 100 %

## 2019-05-26 DIAGNOSIS — M54.50 ACUTE LOW BACK PAIN: Primary | ICD-10-CM

## 2019-05-26 PROCEDURE — 99283 EMERGENCY DEPT VISIT LOW MDM: CPT

## 2019-05-26 PROCEDURE — 99283 EMERGENCY DEPT VISIT LOW MDM: CPT | Performed by: PHYSICIAN ASSISTANT

## 2019-05-26 RX ORDER — PREDNISONE 20 MG/1
20 TABLET ORAL 2 TIMES DAILY WITH MEALS
Qty: 10 TABLET | Refills: 0 | Status: SHIPPED | OUTPATIENT
Start: 2019-05-26 | End: 2019-05-31

## 2019-05-26 RX ORDER — DIAZEPAM 5 MG/1
5 TABLET ORAL EVERY 8 HOURS PRN
Qty: 15 TABLET | Refills: 0 | Status: SHIPPED | OUTPATIENT
Start: 2019-05-26 | End: 2019-05-31

## 2019-05-26 RX ORDER — PREDNISONE 20 MG/1
60 TABLET ORAL ONCE
Status: COMPLETED | OUTPATIENT
Start: 2019-05-26 | End: 2019-05-26

## 2019-05-26 RX ADMIN — PREDNISONE 60 MG: 20 TABLET ORAL at 15:28

## 2019-05-31 ENCOUNTER — OFFICE VISIT (OUTPATIENT)
Dept: FAMILY MEDICINE CLINIC | Facility: CLINIC | Age: 59
End: 2019-05-31

## 2019-05-31 VITALS
HEART RATE: 64 BPM | RESPIRATION RATE: 18 BRPM | TEMPERATURE: 97.7 F | SYSTOLIC BLOOD PRESSURE: 118 MMHG | OXYGEN SATURATION: 98 % | WEIGHT: 191 LBS | HEIGHT: 65 IN | DIASTOLIC BLOOD PRESSURE: 70 MMHG | BODY MASS INDEX: 31.82 KG/M2

## 2019-05-31 DIAGNOSIS — M25.561 CHRONIC PAIN OF BOTH KNEES: ICD-10-CM

## 2019-05-31 DIAGNOSIS — Z12.4 CERVICAL CANCER SCREENING: ICD-10-CM

## 2019-05-31 DIAGNOSIS — F33.2 MAJOR DEPRESSIVE DISORDER, RECURRENT SEVERE WITHOUT PSYCHOTIC FEATURES (HCC): Chronic | ICD-10-CM

## 2019-05-31 DIAGNOSIS — G89.29 CHRONIC NONINTRACTABLE HEADACHE, UNSPECIFIED HEADACHE TYPE: ICD-10-CM

## 2019-05-31 DIAGNOSIS — R51.9 CHRONIC NONINTRACTABLE HEADACHE, UNSPECIFIED HEADACHE TYPE: ICD-10-CM

## 2019-05-31 DIAGNOSIS — M54.50 LOW BACK PAIN RADIATING TO RIGHT LEG: ICD-10-CM

## 2019-05-31 DIAGNOSIS — K91.2 POSTGASTRECTOMY MALABSORPTION: Primary | ICD-10-CM

## 2019-05-31 DIAGNOSIS — M25.551 RIGHT HIP PAIN: ICD-10-CM

## 2019-05-31 DIAGNOSIS — M54.42 CHRONIC BILATERAL LOW BACK PAIN WITH BILATERAL SCIATICA: ICD-10-CM

## 2019-05-31 DIAGNOSIS — G56.03 BILATERAL CARPAL TUNNEL SYNDROME: ICD-10-CM

## 2019-05-31 DIAGNOSIS — M79.604 LOW BACK PAIN RADIATING TO RIGHT LEG: ICD-10-CM

## 2019-05-31 DIAGNOSIS — Z11.59 ENCOUNTER FOR HEPATITIS C SCREENING TEST FOR LOW RISK PATIENT: ICD-10-CM

## 2019-05-31 DIAGNOSIS — G89.29 CHRONIC PAIN OF BOTH KNEES: ICD-10-CM

## 2019-05-31 DIAGNOSIS — M25.562 CHRONIC PAIN OF BOTH KNEES: ICD-10-CM

## 2019-05-31 DIAGNOSIS — M54.41 CHRONIC BILATERAL LOW BACK PAIN WITH BILATERAL SCIATICA: ICD-10-CM

## 2019-05-31 DIAGNOSIS — Z12.11 SCREENING FOR COLON CANCER: ICD-10-CM

## 2019-05-31 DIAGNOSIS — G89.29 CHRONIC BILATERAL LOW BACK PAIN WITH BILATERAL SCIATICA: ICD-10-CM

## 2019-05-31 DIAGNOSIS — E65 PANNUS, ABDOMINAL: ICD-10-CM

## 2019-05-31 DIAGNOSIS — Z90.3 POSTGASTRECTOMY MALABSORPTION: Primary | ICD-10-CM

## 2019-05-31 PROCEDURE — 99204 OFFICE O/P NEW MOD 45 MIN: CPT | Performed by: PHYSICIAN ASSISTANT

## 2019-05-31 RX ORDER — TOPIRAMATE 25 MG/1
25 CAPSULE, COATED PELLETS ORAL 2 TIMES DAILY
Qty: 60 CAPSULE | Refills: 1 | Status: SHIPPED | OUTPATIENT
Start: 2019-05-31

## 2019-06-02 PROBLEM — R51.9 CHRONIC NONINTRACTABLE HEADACHE: Status: ACTIVE | Noted: 2019-06-02

## 2019-06-02 PROBLEM — M15.9 GENERALIZED OSTEOARTHRITIS: Status: ACTIVE | Noted: 2019-06-02

## 2019-06-02 PROBLEM — E65 PANNUS, ABDOMINAL: Status: ACTIVE | Noted: 2019-06-02

## 2019-06-02 PROBLEM — H10.32 ACUTE CONJUNCTIVITIS OF LEFT EYE: Status: RESOLVED | Noted: 2019-04-24 | Resolved: 2019-06-02

## 2019-06-02 PROBLEM — E11.9 DIABETES MELLITUS (HCC): Status: RESOLVED | Noted: 2019-06-02 | Resolved: 2019-06-02

## 2019-06-02 PROBLEM — K91.2 POSTGASTRECTOMY MALABSORPTION: Status: ACTIVE | Noted: 2019-06-02

## 2019-06-02 PROBLEM — G56.03 BILATERAL CARPAL TUNNEL SYNDROME: Status: ACTIVE | Noted: 2019-06-02

## 2019-06-02 PROBLEM — G89.29 CHRONIC NONINTRACTABLE HEADACHE: Status: ACTIVE | Noted: 2019-06-02

## 2019-06-02 PROBLEM — G93.41 ACUTE METABOLIC ENCEPHALOPATHY: Status: RESOLVED | Noted: 2019-03-19 | Resolved: 2019-06-02

## 2019-06-02 PROBLEM — Z90.3 POSTGASTRECTOMY MALABSORPTION: Status: ACTIVE | Noted: 2019-06-02

## 2019-06-02 RX ORDER — METHOCARBAMOL 500 MG/1
500 TABLET, FILM COATED ORAL 3 TIMES DAILY PRN
Qty: 60 TABLET | Refills: 0 | Status: SHIPPED | OUTPATIENT
Start: 2019-06-02 | End: 2019-07-01

## 2019-06-03 ENCOUNTER — LAB (OUTPATIENT)
Dept: LAB | Facility: HOSPITAL | Age: 59
End: 2019-06-03
Payer: COMMERCIAL

## 2019-06-03 ENCOUNTER — HOSPITAL ENCOUNTER (OUTPATIENT)
Dept: RADIOLOGY | Facility: HOSPITAL | Age: 59
Discharge: HOME | End: 2019-06-03
Payer: COMMERCIAL

## 2019-06-03 ENCOUNTER — TRANSCRIBE ORDERS (OUTPATIENT)
Dept: ADMINISTRATIVE | Facility: HOSPITAL | Age: 59
End: 2019-06-03

## 2019-06-03 DIAGNOSIS — G89.29 CHRONIC BILATERAL LOW BACK PAIN WITH BILATERAL SCIATICA: ICD-10-CM

## 2019-06-03 DIAGNOSIS — M25.561 CHRONIC PAIN OF BOTH KNEES: ICD-10-CM

## 2019-06-03 DIAGNOSIS — M54.41 CHRONIC BILATERAL LOW BACK PAIN WITH BILATERAL SCIATICA: ICD-10-CM

## 2019-06-03 DIAGNOSIS — M54.42 CHRONIC BILATERAL LOW BACK PAIN WITH BILATERAL SCIATICA: ICD-10-CM

## 2019-06-03 DIAGNOSIS — Z90.3 POSTGASTRECTOMY MALABSORPTION: ICD-10-CM

## 2019-06-03 DIAGNOSIS — Z11.59 ENCOUNTER FOR HEPATITIS C SCREENING TEST FOR LOW RISK PATIENT: ICD-10-CM

## 2019-06-03 DIAGNOSIS — M25.551 RIGHT HIP PAIN: ICD-10-CM

## 2019-06-03 DIAGNOSIS — M25.562 CHRONIC PAIN OF BOTH KNEES: ICD-10-CM

## 2019-06-03 DIAGNOSIS — G89.29 CHRONIC PAIN OF BOTH KNEES: ICD-10-CM

## 2019-06-03 DIAGNOSIS — K91.2 POSTGASTRECTOMY MALABSORPTION: ICD-10-CM

## 2019-06-03 LAB
25(OH)D3 SERPL-MCNC: 11.7 NG/ML (ref 30–100)
ALBUMIN SERPL BCP-MCNC: 4 G/DL (ref 3–5.2)
ALP SERPL-CCNC: 97 U/L (ref 43–122)
ALT SERPL W P-5'-P-CCNC: 10 U/L (ref 9–52)
ANION GAP SERPL CALCULATED.3IONS-SCNC: 6 MMOL/L (ref 5–14)
AST SERPL W P-5'-P-CCNC: 17 U/L (ref 14–36)
BASOPHILS # BLD AUTO: 0 THOUSANDS/ΜL (ref 0–0.1)
BASOPHILS NFR BLD AUTO: 1 % (ref 0–1)
BILIRUB SERPL-MCNC: 0.5 MG/DL
BUN SERPL-MCNC: 16 MG/DL (ref 5–25)
CALCIUM SERPL-MCNC: 9.4 MG/DL (ref 8.4–10.2)
CHLORIDE SERPL-SCNC: 102 MMOL/L (ref 97–108)
CHOLEST SERPL-MCNC: 191 MG/DL
CO2 SERPL-SCNC: 32 MMOL/L (ref 22–30)
CREAT SERPL-MCNC: 0.73 MG/DL (ref 0.6–1.2)
EOSINOPHIL # BLD AUTO: 0.1 THOUSAND/ΜL (ref 0–0.4)
EOSINOPHIL NFR BLD AUTO: 1 % (ref 0–6)
ERYTHROCYTE [DISTWIDTH] IN BLOOD BY AUTOMATED COUNT: 13 %
EST. AVERAGE GLUCOSE BLD GHB EST-MCNC: 108 MG/DL
FERRITIN SERPL-MCNC: 22 NG/ML (ref 8–388)
FOLATE SERPL-MCNC: >20 NG/ML (ref 3.1–17.5)
GFR SERPL CREATININE-BSD FRML MDRD: 92 ML/MIN/1.73SQ M
GLUCOSE P FAST SERPL-MCNC: 100 MG/DL (ref 70–99)
HBA1C MFR BLD: 5.4 % (ref 4.2–6.3)
HCT VFR BLD AUTO: 39.4 % (ref 36–46)
HDLC SERPL-MCNC: 69 MG/DL (ref 40–59)
HGB BLD-MCNC: 12.8 G/DL (ref 12–16)
IRON SATN MFR SERPL: 26 %
IRON SERPL-MCNC: 89 UG/DL (ref 50–170)
LDLC SERPL CALC-MCNC: 100 MG/DL
LYMPHOCYTES # BLD AUTO: 1.7 THOUSANDS/ΜL (ref 0.5–4)
LYMPHOCYTES NFR BLD AUTO: 30 % (ref 25–45)
MAGNESIUM SERPL-MCNC: 2.1 MG/DL (ref 1.6–2.3)
MCH RBC QN AUTO: 29.1 PG (ref 26–34)
MCHC RBC AUTO-ENTMCNC: 32.4 G/DL (ref 31–36)
MCV RBC AUTO: 90 FL (ref 80–100)
MONOCYTES # BLD AUTO: 0.4 THOUSAND/ΜL (ref 0.2–0.9)
MONOCYTES NFR BLD AUTO: 7 % (ref 1–10)
NEUTROPHILS # BLD AUTO: 3.5 THOUSANDS/ΜL (ref 1.8–7.8)
NEUTS SEG NFR BLD AUTO: 61 % (ref 45–65)
NONHDLC SERPL-MCNC: 122 MG/DL
PLATELET # BLD AUTO: 194 THOUSANDS/UL (ref 150–450)
PMV BLD AUTO: 10.1 FL (ref 8.9–12.7)
POTASSIUM SERPL-SCNC: 4.1 MMOL/L (ref 3.6–5)
PROT SERPL-MCNC: 7.1 G/DL (ref 5.9–8.4)
RBC # BLD AUTO: 4.39 MILLION/UL (ref 4–5.2)
SODIUM SERPL-SCNC: 140 MMOL/L (ref 137–147)
TIBC SERPL-MCNC: 344 UG/DL (ref 250–450)
TRIGL SERPL-MCNC: 111 MG/DL
VIT B12 SERPL-MCNC: 200 PG/ML (ref 100–900)
WBC # BLD AUTO: 5.7 THOUSAND/UL (ref 4.5–11)

## 2019-06-03 PROCEDURE — 83036 HEMOGLOBIN GLYCOSYLATED A1C: CPT

## 2019-06-03 PROCEDURE — 72110 X-RAY EXAM L-2 SPINE 4/>VWS: CPT

## 2019-06-03 PROCEDURE — 82746 ASSAY OF FOLIC ACID SERUM: CPT

## 2019-06-03 PROCEDURE — 82728 ASSAY OF FERRITIN: CPT

## 2019-06-03 PROCEDURE — 83550 IRON BINDING TEST: CPT

## 2019-06-03 PROCEDURE — 82306 VITAMIN D 25 HYDROXY: CPT

## 2019-06-03 PROCEDURE — 83540 ASSAY OF IRON: CPT

## 2019-06-03 PROCEDURE — 86803 HEPATITIS C AB TEST: CPT

## 2019-06-03 PROCEDURE — 82607 VITAMIN B-12: CPT

## 2019-06-03 PROCEDURE — 85025 COMPLETE CBC W/AUTO DIFF WBC: CPT

## 2019-06-03 PROCEDURE — 80053 COMPREHEN METABOLIC PANEL: CPT

## 2019-06-03 PROCEDURE — 83735 ASSAY OF MAGNESIUM: CPT

## 2019-06-03 PROCEDURE — 73502 X-RAY EXAM HIP UNI 2-3 VIEWS: CPT

## 2019-06-03 PROCEDURE — 73562 X-RAY EXAM OF KNEE 3: CPT

## 2019-06-03 PROCEDURE — 80061 LIPID PANEL: CPT

## 2019-06-03 PROCEDURE — 36415 COLL VENOUS BLD VENIPUNCTURE: CPT

## 2019-06-04 LAB — HCV AB SER QL: NORMAL

## 2019-06-06 DIAGNOSIS — K91.2 POSTGASTRECTOMY MALABSORPTION: Primary | ICD-10-CM

## 2019-06-06 DIAGNOSIS — Z90.3 POSTGASTRECTOMY MALABSORPTION: Primary | ICD-10-CM

## 2019-06-06 RX ORDER — CHOLECALCIFEROL (VITAMIN D3) 125 MCG
500 CAPSULE ORAL DAILY
Qty: 30 TABLET | Refills: 5 | Status: SHIPPED | OUTPATIENT
Start: 2019-06-06 | End: 2019-07-02

## 2019-06-06 RX ORDER — MELATONIN
1000 DAILY
Qty: 30 TABLET | Refills: 5 | Status: SHIPPED | OUTPATIENT
Start: 2019-06-06 | End: 2019-07-02

## 2019-06-10 ENCOUNTER — TELEPHONE (OUTPATIENT)
Dept: BARIATRICS | Facility: CLINIC | Age: 59
End: 2019-06-10

## 2019-06-10 DIAGNOSIS — G89.29 CHRONIC BILATERAL LOW BACK PAIN WITHOUT SCIATICA: Primary | ICD-10-CM

## 2019-06-10 DIAGNOSIS — M15.9 GENERALIZED OSTEOARTHRITIS: ICD-10-CM

## 2019-06-10 DIAGNOSIS — M54.50 CHRONIC BILATERAL LOW BACK PAIN WITHOUT SCIATICA: Primary | ICD-10-CM

## 2019-06-11 ENCOUNTER — OFFICE VISIT (OUTPATIENT)
Dept: OBGYN CLINIC | Facility: CLINIC | Age: 59
End: 2019-06-11

## 2019-06-11 VITALS
WEIGHT: 193 LBS | SYSTOLIC BLOOD PRESSURE: 110 MMHG | DIASTOLIC BLOOD PRESSURE: 80 MMHG | HEIGHT: 65 IN | BODY MASS INDEX: 32.15 KG/M2

## 2019-06-11 DIAGNOSIS — R10.2 PELVIC PAIN: ICD-10-CM

## 2019-06-11 DIAGNOSIS — Z12.4 SCREENING FOR CERVICAL CANCER: ICD-10-CM

## 2019-06-11 DIAGNOSIS — Z01.411 ENCOUNTER FOR GYNECOLOGICAL EXAMINATION WITH ABNORMAL FINDING: Primary | ICD-10-CM

## 2019-06-11 DIAGNOSIS — Z12.39 SCREENING FOR BREAST CANCER: ICD-10-CM

## 2019-06-11 PROCEDURE — 87624 HPV HI-RISK TYP POOLED RSLT: CPT | Performed by: NURSE PRACTITIONER

## 2019-06-11 PROCEDURE — G0101 CA SCREEN;PELVIC/BREAST EXAM: HCPCS | Performed by: NURSE PRACTITIONER

## 2019-06-11 PROCEDURE — G0145 SCR C/V CYTO,THINLAYER,RESCR: HCPCS | Performed by: NURSE PRACTITIONER

## 2019-06-11 RX ORDER — MULTIVITAMIN
1 CAPSULE ORAL DAILY
COMMUNITY

## 2019-06-11 RX ORDER — ALBUTEROL SULFATE 90 UG/1
2 AEROSOL, METERED RESPIRATORY (INHALATION) EVERY 6 HOURS PRN
COMMUNITY
End: 2022-01-21 | Stop reason: SDUPTHER

## 2019-06-13 LAB
HPV HR 12 DNA CVX QL NAA+PROBE: NEGATIVE
HPV16 DNA CVX QL NAA+PROBE: NEGATIVE
HPV18 DNA CVX QL NAA+PROBE: NEGATIVE

## 2019-06-18 LAB
LAB AP GYN PRIMARY INTERPRETATION: NORMAL
Lab: NORMAL

## 2019-06-20 ENCOUNTER — HOSPITAL ENCOUNTER (OUTPATIENT)
Dept: ULTRASOUND IMAGING | Facility: HOSPITAL | Age: 59
Discharge: HOME | End: 2019-06-20
Attending: NURSE PRACTITIONER
Payer: COMMERCIAL

## 2019-06-20 ENCOUNTER — HOSPITAL ENCOUNTER (OUTPATIENT)
Dept: MAMMOGRAPHY | Facility: CLINIC | Age: 59
Discharge: HOME | End: 2019-06-20
Attending: NURSE PRACTITIONER
Payer: COMMERCIAL

## 2019-06-20 ENCOUNTER — EVALUATION (OUTPATIENT)
Dept: PHYSICAL THERAPY | Facility: CLINIC | Age: 59
End: 2019-06-20
Payer: COMMERCIAL

## 2019-06-20 VITALS — WEIGHT: 193 LBS | BODY MASS INDEX: 32.15 KG/M2 | HEIGHT: 65 IN

## 2019-06-20 DIAGNOSIS — Z12.39 SCREENING FOR BREAST CANCER: ICD-10-CM

## 2019-06-20 DIAGNOSIS — M15.9 GENERALIZED OSTEOARTHRITIS: ICD-10-CM

## 2019-06-20 DIAGNOSIS — R10.2 PELVIC PAIN: ICD-10-CM

## 2019-06-20 DIAGNOSIS — G89.29 CHRONIC BILATERAL LOW BACK PAIN WITHOUT SCIATICA: Primary | ICD-10-CM

## 2019-06-20 DIAGNOSIS — M54.50 CHRONIC BILATERAL LOW BACK PAIN WITHOUT SCIATICA: Primary | ICD-10-CM

## 2019-06-20 PROCEDURE — 76830 TRANSVAGINAL US NON-OB: CPT

## 2019-06-20 PROCEDURE — 77067 SCR MAMMO BI INCL CAD: CPT

## 2019-06-20 PROCEDURE — 76856 US EXAM PELVIC COMPLETE: CPT

## 2019-06-20 PROCEDURE — 97163 PT EVAL HIGH COMPLEX 45 MIN: CPT | Performed by: PHYSICAL THERAPIST

## 2019-06-25 ENCOUNTER — OFFICE VISIT (OUTPATIENT)
Dept: PHYSICAL THERAPY | Facility: CLINIC | Age: 59
End: 2019-06-25
Payer: COMMERCIAL

## 2019-06-25 DIAGNOSIS — G89.29 CHRONIC BILATERAL LOW BACK PAIN WITHOUT SCIATICA: Primary | ICD-10-CM

## 2019-06-25 DIAGNOSIS — M15.9 GENERALIZED OSTEOARTHRITIS: ICD-10-CM

## 2019-06-25 DIAGNOSIS — M54.50 CHRONIC BILATERAL LOW BACK PAIN WITHOUT SCIATICA: Primary | ICD-10-CM

## 2019-06-25 PROCEDURE — 97110 THERAPEUTIC EXERCISES: CPT | Performed by: PHYSICAL THERAPIST

## 2019-06-25 PROCEDURE — 97112 NEUROMUSCULAR REEDUCATION: CPT | Performed by: PHYSICAL THERAPIST

## 2019-06-27 ENCOUNTER — OFFICE VISIT (OUTPATIENT)
Dept: PHYSICAL THERAPY | Facility: CLINIC | Age: 59
End: 2019-06-27
Payer: COMMERCIAL

## 2019-06-27 DIAGNOSIS — M15.9 GENERALIZED OSTEOARTHRITIS: ICD-10-CM

## 2019-06-27 DIAGNOSIS — M54.50 CHRONIC BILATERAL LOW BACK PAIN WITHOUT SCIATICA: Primary | ICD-10-CM

## 2019-06-27 DIAGNOSIS — G89.29 CHRONIC BILATERAL LOW BACK PAIN WITHOUT SCIATICA: Primary | ICD-10-CM

## 2019-06-27 PROCEDURE — 97112 NEUROMUSCULAR REEDUCATION: CPT | Performed by: PHYSICAL THERAPIST

## 2019-06-27 PROCEDURE — 97110 THERAPEUTIC EXERCISES: CPT | Performed by: PHYSICAL THERAPIST

## 2019-07-01 ENCOUNTER — TELEPHONE (OUTPATIENT)
Dept: FAMILY MEDICINE CLINIC | Facility: CLINIC | Age: 59
End: 2019-07-01

## 2019-07-01 DIAGNOSIS — G89.29 CHRONIC BILATERAL LOW BACK PAIN WITHOUT SCIATICA: Primary | ICD-10-CM

## 2019-07-01 DIAGNOSIS — M54.50 CHRONIC BILATERAL LOW BACK PAIN WITHOUT SCIATICA: Primary | ICD-10-CM

## 2019-07-01 RX ORDER — CYCLOBENZAPRINE HCL 10 MG
10 TABLET ORAL 3 TIMES DAILY PRN
Qty: 30 TABLET | Refills: 0 | Status: SHIPPED | OUTPATIENT
Start: 2019-07-01 | End: 2019-07-26

## 2019-07-01 NOTE — TELEPHONE ENCOUNTER
Nurse line message: pt states medication is not helping, she couldn't sleep last night, had to cancel her appt today  Called pt back to clarify which medication she's talking about which is robaxin   She rescheduled to 7/26/19

## 2019-07-02 ENCOUNTER — OFFICE VISIT (OUTPATIENT)
Dept: PHYSICAL THERAPY | Facility: CLINIC | Age: 59
End: 2019-07-02
Payer: COMMERCIAL

## 2019-07-02 ENCOUNTER — OFFICE VISIT (OUTPATIENT)
Dept: BARIATRICS | Facility: CLINIC | Age: 59
End: 2019-07-02
Payer: COMMERCIAL

## 2019-07-02 VITALS
TEMPERATURE: 97.6 F | WEIGHT: 198.5 LBS | HEART RATE: 65 BPM | SYSTOLIC BLOOD PRESSURE: 120 MMHG | HEIGHT: 66 IN | RESPIRATION RATE: 18 BRPM | BODY MASS INDEX: 31.9 KG/M2 | DIASTOLIC BLOOD PRESSURE: 78 MMHG

## 2019-07-02 DIAGNOSIS — M15.9 GENERALIZED OSTEOARTHRITIS: ICD-10-CM

## 2019-07-02 DIAGNOSIS — Z98.84 BARIATRIC SURGERY STATUS: Primary | ICD-10-CM

## 2019-07-02 DIAGNOSIS — K91.2 POSTSURGICAL MALABSORPTION: ICD-10-CM

## 2019-07-02 DIAGNOSIS — R63.0 POOR APPETITE: ICD-10-CM

## 2019-07-02 DIAGNOSIS — R63.8 DECREASED ORAL INTAKE: ICD-10-CM

## 2019-07-02 DIAGNOSIS — E55.9 VITAMIN D DEFICIENCY: ICD-10-CM

## 2019-07-02 DIAGNOSIS — R10.31 GROIN PAIN, RIGHT: ICD-10-CM

## 2019-07-02 DIAGNOSIS — Z90.3 POSTGASTRECTOMY MALABSORPTION: ICD-10-CM

## 2019-07-02 DIAGNOSIS — R10.13 EPIGASTRIC ABDOMINAL PAIN: Primary | ICD-10-CM

## 2019-07-02 DIAGNOSIS — E53.8 VITAMIN B12 DEFICIENCY: ICD-10-CM

## 2019-07-02 DIAGNOSIS — G89.29 CHRONIC BILATERAL LOW BACK PAIN WITHOUT SCIATICA: Primary | ICD-10-CM

## 2019-07-02 DIAGNOSIS — M54.50 CHRONIC BILATERAL LOW BACK PAIN WITHOUT SCIATICA: Primary | ICD-10-CM

## 2019-07-02 DIAGNOSIS — Z98.84 BARIATRIC SURGERY STATUS: ICD-10-CM

## 2019-07-02 DIAGNOSIS — K91.2 POSTGASTRECTOMY MALABSORPTION: ICD-10-CM

## 2019-07-02 PROCEDURE — 97110 THERAPEUTIC EXERCISES: CPT | Performed by: PHYSICAL THERAPIST

## 2019-07-02 PROCEDURE — 97112 NEUROMUSCULAR REEDUCATION: CPT | Performed by: PHYSICAL THERAPIST

## 2019-07-02 PROCEDURE — 99214 OFFICE O/P EST MOD 30 MIN: CPT | Performed by: PHYSICIAN ASSISTANT

## 2019-07-02 RX ORDER — OMEPRAZOLE 20 MG/1
CAPSULE, DELAYED RELEASE ORAL
Qty: 90 CAPSULE | Refills: 1 | Status: SHIPPED | OUTPATIENT
Start: 2019-07-02 | End: 2021-08-24

## 2019-07-02 RX ORDER — LANOLIN ALCOHOL/MO/W.PET/CERES
1000 CREAM (GRAM) TOPICAL DAILY
Qty: 30 TABLET | Refills: 5 | Status: SHIPPED | OUTPATIENT
Start: 2019-07-02 | End: 2019-11-01

## 2019-07-02 RX ORDER — MELATONIN
1000 2 TIMES DAILY
Qty: 60 TABLET | Refills: 5 | Status: SHIPPED | OUTPATIENT
Start: 2019-07-02 | End: 2019-12-26 | Stop reason: DRUGHIGH

## 2019-07-02 RX ORDER — ERGOCALCIFEROL 1.25 MG/1
50000 CAPSULE ORAL 3 TIMES WEEKLY
Qty: 8 CAPSULE | Refills: 4 | Status: SHIPPED | OUTPATIENT
Start: 2019-07-03 | End: 2019-11-01

## 2019-07-02 RX ORDER — OMEPRAZOLE 20 MG/1
20 CAPSULE, DELAYED RELEASE ORAL DAILY
Qty: 30 CAPSULE | Refills: 1 | Status: SHIPPED | OUTPATIENT
Start: 2019-07-02 | End: 2019-07-02 | Stop reason: SDUPTHER

## 2019-07-02 NOTE — PATIENT INSTRUCTIONS
Get upper endoscopy done and then follow-up with us a couple weeks after that  Schedule CAT scan of abdomen/pelvis by calling central scheduling at [phone]  Start with some protein at each meal-eggs, low fat cheese/chicken/turkey/fish and add vegetables/fruit to diet next and then some starch and a little fat to diet  Follow 30/60 minute rule with liquids    Discuss shortness of breath and heart palpitation with your primary care provider-if it lasts several minutes or you feel very short-of-breath call 911/get to ER for evaluation  Take extra twice weekly vitamin D prescription  For the vitamin B12 and vitamin D ordered by your primary care provider take 2 of each per day until prescription is done -then  my prescription for vitamin D and vitamin B12 and take as directed  Add 500 mg calcium citrate with D three per day (can take this when you take your vitamin D)    Take omeprazole before breakfast daily    Continue to follow-up with your primary care provider and psychiatrist and your counselor  Follow-up in one year  We kindly ask that you arrive 15 minutes before your scheduled appointment time with your provider to allow you to be roomed, have your vital signs checked and your chart updated by our staff  We thank you for your patience at your visit  Follow diet as discussed  Follow  vitamin and mineral recommendations as reviewed with you  Bariatric vitamins are highly recommended  Vitamins are important for a life-time  Low levels can lead to deficiency which can lead to other medical problems  Exercise as tolerated    If you have gotten a lab slip at this visit, please note that most labs are FASTING-but you need to drink water the night before and the morning before your labs are done  It is HIGHLY RECOMMENDED that you check with your insurance to make sure all the labs ordered are covered by your individual insurance policy    This is especially important if you also get labs done by other providers outside of [address] [place] [address]  You want to avoid having duplicate labs done  Note you will be given a lab slip AFTER  your annual visit next year to check your vitamin and mineral levels  You will not need to make a second appointment after the labs are received/reviewed  You will receive a letter/and or phone call with your results  Most labs do NOT honor a lab slip dated one year in advance now  Call our office if he have any problems with abdominal pain especially if associated with fever, chills, nausea, vomiting or any other concerns  All  Post-bariatric surgery patients should be aware that very small quantities of any alcohol  can cause impairment and it is very possible not to feel the effect  The effect can be in the system for several hours  It is also a stomach irritant  It is advised to AVOID alcohol, Nonsteroidal antiinflammatory drugs (NSAIDS) and nicotine of all forms   Any of these can cause stomach irritation/pain

## 2019-07-02 NOTE — ASSESSMENT & PLAN NOTE
Her pcp ordered most of her labs  Ordered a 1000 IU vitamin d3 supplement for patient     I took the liberty to order patient high potency vitamin D2 50,000 IU twice weekly per our protocol to bring the level up more quickly-I am adding maintenance dose of 2000 IU vitamin D3 daily    Would like to recheck this level in 5 months

## 2019-07-02 NOTE — ASSESSMENT & PLAN NOTE
She is also complaining of intermittent right groin pain for last couple of months-"feels like a ball"-she feels it is sometimes associated with her back pain and pain "going down my leg"-which in part sounds like pain associated with her back-she is getting PT for this  I was unable to appreciate a hernia in her groin area    Will order CT of abdomen/pelvis for further evaluation-she is agreeable to same  Provided her with phone number to get done through Central scheduling

## 2019-07-02 NOTE — ASSESSMENT & PLAN NOTE
Ordered by her PCP-she is on 500 mcg vitamin B12 daily-I am increasing this to 1000 mcg daily for now      Would like to recheck this level in 5 months along with MMA

## 2019-07-02 NOTE — H&P (VIEW-ONLY)
Assessment/Plan:    Bariatric surgery status  -s/p Edgardo-En-Y Gastric Bypass with Dr Alice Wilson on 2014  She has been lost to follow-up since 2017  Overall doing Poor with weight loss  She is up a net 37 lb from her 2017 visit with us  She is here with multiple complaints-she is having intermittent epigastric pain, has chronic back pain and notes some pain radiates down her legs she also complains of right groin pain "feels like a ball" there  With ROS she notes some [place] and is aware at times of some palpitations-I encouraged her to review this further with PCP  Advised if this becomes severe she should seek immediate medical attention  I will also relay this to her PCP    Initial:265 5 lb   Current:198 5 lb  EWL:61%  Curly:154 lb- lb-2 years post-op  Current BMI is Body mass index is 32 53 kg/m²  Tolerating a regular diet-not consistent-reports lack of appetite but "picking" and complains of intermittent nausea and intermittent abdominal pain  No vomiting/no fever or chills  Notes intermittent constipation -she has referral from her pcp to see gi for routine colonoscopy    Eating at least 60 grams of protein per day-variable  Following 30/60 minute rule with liquids-yes  Drinking at least 64 ounces of fluid per day-yes  Drinking carbonated beverages-no  Sufficient exercise-limited because of back and knee pain  Using NSAIDs regularly-no  Using nicotine-no  Using alcohol-no    She reports her PCP has referred her to GI for colonoscopy and encouraged her to get this done as well    Epigastric abdominal pain  She is complaining of intermittent nausea and a burning or sharp pain intermittently to mid-epigastric region sometimes associated with nausea but no vomiting , no fever or chills  She does not smoke and does not have second-hand smoke exposure, no nsaids an no alcohol intakes  She has had a chronic depressed mood since her sister  around 4 years ago-of unknown causes per patient  Bharat mack notes she worries because her sister also had gastric surgery but done elsewhere and her death was not around the time of her surgery and not sure if related at all  Bharat mack notes her appetite has been down but she appears to be snacking frequently and actually up a net 37 lb since she was last seen inour office in January 2017  On exam she had some tenderness to palpation to mid-epigastric region and right inguinal region -no hernias appreciated  Groin pain, right  She is also complaining of intermittent right groin pain for last couple of months-"feels like a ball"-she feels it is sometimes associated with her back pain and pain "going down my leg"-which in part sounds like pain associated with her back-she is getting PT for this  I was unable to appreciate a hernia in her groin area    Will order CT of abdomen/pelvis for further evaluation-she is agreeable to same  Provided her with phone number to get done through [facility]  Postgastrectomy malabsorption  Malabsorption- patient is at risk for malabsorption of vitamins/minerals secondary to malabsorption from her procedure and restriction of intakes  Reviewed current supplements and advised on same    She has only consistently been taking a senior mvi  Her PCP ordered most of her routine labs-I will order the remainder  I took the liberty to adjust some of her supplements based on protocol for gastric surgery  Poor appetite  She is complaining of poor appetite with "picking at food" weight is actually up a net 37 lb from her last visit with us in January 2017-and up a net 7 1/2 lb from late May visit with her PCP  A diet recall reveals poor food choices and I did advise her on her diet -see under epigastric pain as well  I will check a prealbumin level as a nutrition marker when she gets her labs done  Vitamin D deficiency  Her pcp ordered most of her labs  Ordered a 1000 IU vitamin d3 supplement for patient     I took the liberty to order patient high potency vitamin D2 50,000 IU twice weekly per our protocol to bring the level up more quickly-I am adding maintenance dose of 2000 IU vitamin D3 daily    Would like to recheck this level in 5 months    Vitamin B12 deficiency  Ordered by her PCP-she is on 500 mcg vitamin B12 daily-I am increasing this to 1000 mcg daily for now  Would like to recheck this level in 5 months along with MMA       Diagnoses and all orders for this visit:    Epigastric abdominal pain    Bariatric surgery status  -     Copper Level; Future  -     PTH, intact; Future  -     Vitamin A; Future  -     Vitamin B1, whole blood; Future  -     Zinc; Future  -     omeprazole (PriLOSEC) 20 mg delayed release capsule; Take 1 capsule (20 mg total) by mouth daily  -     Prealbumin; Future  -     CT abdomen pelvis w contrast; Future    Vitamin D deficiency  -     ergocalciferol (VITAMIN D2) 50,000 units; Take 1 capsule (50,000 Units total) by mouth 3 (three) times a week  -     cholecalciferol (VITAMIN D3) 1,000 units tablet; Take 1 tablet (1,000 Units total) by mouth 2 (two) times a day    Vitamin B12 deficiency  -     cyanocobalamin (VITAMIN B-12) 1,000 mcg tablet; Take 1 tablet (1,000 mcg total) by mouth daily    Postsurgical malabsorption  -     Copper Level; Future  -     PTH, intact; Future  -     Vitamin A; Future  -     Vitamin B1, whole blood; Future  -     Zinc; Future    Groin pain, right  -     CT abdomen pelvis w contrast; Future    Decreased oral intake  -     Prealbumin; Future    Postgastrectomy malabsorption    Poor appetite          Subjective:      Patient ID: Noble Snider is a 66 y o  female  She has been lost to follow-up in our office since January 2017  She is here with multiple complaints  She notes for a few months she has had intermittent mid-epigastric burning/sharp pain with meals with intermittent nausea  No vomiting, no fever or chills   She complains of lack of appetite and poor intakes but has actually had weight gain since we saw her last   She had chronic back pain and some radiation of pain into her legs  She also complains of intermittent right groin pain that "feels like a ball" at times  Nothing makes this better or worse -at least at times related to her back pain She is eating a regular diet  She is not exercising because of chronic back pain and knee pain  She notes her PCP is referring her to plastic surgeon for possible excess abdominal skin removal    She relays that she continues to be depressed over sister's death almost 5 years ago  She had a hospitalization for overdose in March but indicates this was unintentional  She indicates she is depressed/anxious but denies suicidal ideation -notes "I couldn't do that because of my mom and other responsibilities  Indicates she follows regularly with a counselor and psychiatrist       The following portions of the patient's history were reviewed and updated as appropriate: allergies, current medications, past family history, past medical history, past social history, past surgical history and problem list     Review of Systems   Constitutional: Negative for chills and fever  Unexpected weight change: planned weight loss  Respiratory: Positive for shortness of breath and wheezing  Notes intermittent shortness of breath/some [place]   Cardiovascular: Positive for palpitations  Negative for chest pain  Notes intermittent palpitations-   Gastrointestinal: Positive for abdominal pain and nausea  Negative for constipation, diarrhea and vomiting         + mid-epigastric and right groin pains   Intermittent nausea   Psychiatric/Behavioral: Negative for suicidal ideas (+ anxious and depressed/seeing counselor and psychiatrist)           Objective:      /78 (BP Location: Right arm, Patient Position: Sitting, Cuff Size: Standard)   Pulse 65   Temp 97 6 °F (36 4 °C)   Resp 18   Ht 5' 5 5" (1 664 m)   Wt 90 kg (198 lb 8 oz) LMP  (LMP Unknown)   BMI 32 53 kg/m²          Physical Exam   Constitutional: She is oriented to person, place, and time  She appears well-developed and well-nourished  No distress  HENT:   Mouth/Throat: Oropharynx is clear and moist    Eyes: Conjunctivae are normal  No scleral icterus  Cardiovascular: Normal rate, regular rhythm and normal heart sounds  Pulmonary/Chest: Effort normal and breath sounds normal  No respiratory distress  Abdominal: Soft  Bowel sounds are normal  She exhibits no distension and no mass  There is tenderness  There is no rebound and no guarding  No hernia  No incisional hernias appreciated  + tenderness to palpation to mid-epigastric area and right groin-no hernia appreciated   Musculoskeletal:   Normal gait   Neurological: She is alert and oriented to person, place, and time  Skin: She is not diaphoretic  Psychiatric: She has a normal mood and affect  Her behavior is normal  Judgment and thought content normal    Mood: anxious and depressed; Affect: congruent   Nursing note and vitals reviewed  GOALS: Continued weight loss with good nutrition intakes    Normal vitamin and mineral levels  Exercise as tolerated  Resolve abdominal pain/groin pain     BARRIERS: none identified

## 2019-07-02 NOTE — ASSESSMENT & PLAN NOTE
Malabsorption- patient is at risk for malabsorption of vitamins/minerals secondary to malabsorption from her procedure and restriction of intakes  Reviewed current supplements and advised on same    She has only consistently been taking a senior mvi  Her PCP ordered most of her routine labs-I will order the remainder  I took the liberty to adjust some of her supplements based on protocol for gastric surgery

## 2019-07-02 NOTE — PROGRESS NOTES
Daily Note     Today's date: 2019  Patient name: Michelle Coon  : 1960  MRN: [id number]  Referring provider: Shavonne Wilson PA-C  Dx:   No diagnosis found  Subjective: Pt comes to therapy reporting continued pain in low back, noting symptoms are relatively unchanged since last session  Notes she had significant difficulty sleeping last night due to back pain  Objective: See treatment diary below    Assessment: Tolerated treatment fair  Pt tolerated supine/hooklying exercises with greater ease tonight with wedge support  Patient would benefit from continued PT  Pain reported with all exercises, however, cued to perform within tolerance  Improved tolerance for DB heel slides  Plan: Progress treatment as tolerated        Precautions: depression, DMII    Date          FOTO IE             Re-eval IE              Daily Treatment Diary     Manual           STM LS                              Exercise Diary           bike  5 min 5 min 5 min                      LTR  10"x5 ea 15"x5 ea 15"x5 ea         SKTC  10"x5 ea 15"x5 ea 15"x5 ea         Diaphragmatic breathing (DB)  2 min 2 min D/C         DB march  2 min 2 min 2 min         DB BKFO  2 min 2 min np         DB heel slides  nv 1 min 2 min         DB wand S' flex  2 min 2 min 2 min                      Seated H/S stretch  15"x3 ea 15"x3 ea 15"x5 ea         LS roll outs  nv 15"x5 15"x5                                                    Modalities

## 2019-07-02 NOTE — PROGRESS NOTES
Assessment/Plan:    Bariatric surgery status  -s/p Edgardo-En-Y Gastric Bypass with Dr Carlos Dwyer on 2014  She has been lost to follow-up since 2017  Overall doing Poor with weight loss  She is up a net 37 lb from her 2017 visit with us  She is here with multiple complaints-she is having intermittent epigastric pain, has chronic back pain and notes some pain radiates down her legs she also complains of right groin pain "feels like a ball" there  With ROS she notes some [place] and is aware at times of some palpitations-I encouraged her to review this further with PCP  Advised if this becomes severe she should seek immediate medical attention  I will also relay this to her PCP    Initial:265 5 lb   Current:198 5 lb  EWL:61%  Curly:154 lb- lb-2 years post-op  Current BMI is Body mass index is 32 53 kg/m²  Tolerating a regular diet-not consistent-reports lack of appetite but "picking" and complains of intermittent nausea and intermittent abdominal pain  No vomiting/no fever or chills  Notes intermittent constipation -she has referral from her pcp to see gi for routine colonoscopy    Eating at least 60 grams of protein per day-variable  Following 30/60 minute rule with liquids-yes  Drinking at least 64 ounces of fluid per day-yes  Drinking carbonated beverages-no  Sufficient exercise-limited because of back and knee pain  Using NSAIDs regularly-no  Using nicotine-no  Using alcohol-no    She reports her PCP has referred her to GI for colonoscopy and encouraged her to get this done as well    Epigastric abdominal pain  She is complaining of intermittent nausea and a burning or sharp pain intermittently to mid-epigastric region sometimes associated with nausea but no vomiting , no fever or chills  She does not smoke and does not have second-hand smoke exposure, no nsaids an no alcohol intakes  She has had a chronic depressed mood since her sister  around 4 years ago-of unknown causes per patient  Aquiles Griffin notes she worries because her sister also had gastric surgery but done elsewhere and her death was not around the time of her surgery and not sure if related at all  Aquiles Griffin notes her appetite has been down but she appears to be snacking frequently and actually up a net 37 lb since she was last seen inour office in January 2017  On exam she had some tenderness to palpation to mid-epigastric region and right inguinal region -no hernias appreciated  Groin pain, right  She is also complaining of intermittent right groin pain for last couple of months-"feels like a ball"-she feels it is sometimes associated with her back pain and pain "going down my leg"-which in part sounds like pain associated with her back-she is getting PT for this  I was unable to appreciate a hernia in her groin area    Will order CT of abdomen/pelvis for further evaluation-she is agreeable to same  Provided her with phone number to get done through [facility]  Postgastrectomy malabsorption  Malabsorption- patient is at risk for malabsorption of vitamins/minerals secondary to malabsorption from her procedure and restriction of intakes  Reviewed current supplements and advised on same    She has only consistently been taking a senior mvi  Her PCP ordered most of her routine labs-I will order the remainder  I took the liberty to adjust some of her supplements based on protocol for gastric surgery  Poor appetite  She is complaining of poor appetite with "picking at food" weight is actually up a net 37 lb from her last visit with us in January 2017-and up a net 7 1/2 lb from late May visit with her PCP  A diet recall reveals poor food choices and I did advise her on her diet -see under epigastric pain as well  I will check a prealbumin level as a nutrition marker when she gets her labs done  Vitamin D deficiency  Her pcp ordered most of her labs  Ordered a 1000 IU vitamin d3 supplement for patient     I took the liberty to order patient high potency vitamin D2 50,000 IU twice weekly per our protocol to bring the level up more quickly-I am adding maintenance dose of 2000 IU vitamin D3 daily    Would like to recheck this level in 5 months    Vitamin B12 deficiency  Ordered by her PCP-she is on 500 mcg vitamin B12 daily-I am increasing this to 1000 mcg daily for now  Would like to recheck this level in 5 months along with MMA       Diagnoses and all orders for this visit:    Epigastric abdominal pain    Bariatric surgery status  -     Copper Level; Future  -     PTH, intact; Future  -     Vitamin A; Future  -     Vitamin B1, whole blood; Future  -     Zinc; Future  -     omeprazole (PriLOSEC) 20 mg delayed release capsule; Take 1 capsule (20 mg total) by mouth daily  -     Prealbumin; Future  -     CT abdomen pelvis w contrast; Future    Vitamin D deficiency  -     ergocalciferol (VITAMIN D2) 50,000 units; Take 1 capsule (50,000 Units total) by mouth 3 (three) times a week  -     cholecalciferol (VITAMIN D3) 1,000 units tablet; Take 1 tablet (1,000 Units total) by mouth 2 (two) times a day    Vitamin B12 deficiency  -     cyanocobalamin (VITAMIN B-12) 1,000 mcg tablet; Take 1 tablet (1,000 mcg total) by mouth daily    Postsurgical malabsorption  -     Copper Level; Future  -     PTH, intact; Future  -     Vitamin A; Future  -     Vitamin B1, whole blood; Future  -     Zinc; Future    Groin pain, right  -     CT abdomen pelvis w contrast; Future    Decreased oral intake  -     Prealbumin; Future    Postgastrectomy malabsorption    Poor appetite          Subjective:      Patient ID: Alex Parsons is a 66 y o  female  She has been lost to follow-up in our office since January 2017  She is here with multiple complaints  She notes for a few months she has had intermittent mid-epigastric burning/sharp pain with meals with intermittent nausea  No vomiting, no fever or chills   She complains of lack of appetite and poor intakes but has actually had weight gain since we saw her last   She had chronic back pain and some radiation of pain into her legs  She also complains of intermittent right groin pain that "feels like a ball" at times  Nothing makes this better or worse -at least at times related to her back pain She is eating a regular diet  She is not exercising because of chronic back pain and knee pain  She notes her PCP is referring her to plastic surgeon for possible excess abdominal skin removal    She relays that she continues to be depressed over sister's death almost 5 years ago  She had a hospitalization for overdose in March but indicates this was unintentional  She indicates she is depressed/anxious but denies suicidal ideation -notes "I couldn't do that because of my mom and other responsibilities  Indicates she follows regularly with a counselor and psychiatrist       The following portions of the patient's history were reviewed and updated as appropriate: allergies, current medications, past family history, past medical history, past social history, past surgical history and problem list     Review of Systems   Constitutional: Negative for chills and fever  Unexpected weight change: planned weight loss  Respiratory: Positive for shortness of breath and wheezing  Notes intermittent shortness of breath/some [place]   Cardiovascular: Positive for palpitations  Negative for chest pain  Notes intermittent palpitations-   Gastrointestinal: Positive for abdominal pain and nausea  Negative for constipation, diarrhea and vomiting         + mid-epigastric and right groin pains   Intermittent nausea   Psychiatric/Behavioral: Negative for suicidal ideas (+ anxious and depressed/seeing counselor and psychiatrist)           Objective:      /78 (BP Location: Right arm, Patient Position: Sitting, Cuff Size: Standard)   Pulse 65   Temp 97 6 °F (36 4 °C)   Resp 18   Ht 5' 5 5" (1 664 m)   Wt 90 kg (198 lb 8 oz) LMP  (LMP Unknown)   BMI 32 53 kg/m²          Physical Exam   Constitutional: She is oriented to person, place, and time  She appears well-developed and well-nourished  No distress  HENT:   Mouth/Throat: Oropharynx is clear and moist    Eyes: Conjunctivae are normal  No scleral icterus  Cardiovascular: Normal rate, regular rhythm and normal heart sounds  Pulmonary/Chest: Effort normal and breath sounds normal  No respiratory distress  Abdominal: Soft  Bowel sounds are normal  She exhibits no distension and no mass  There is tenderness  There is no rebound and no guarding  No hernia  No incisional hernias appreciated  + tenderness to palpation to mid-epigastric area and right groin-no hernia appreciated   Musculoskeletal:   Normal gait   Neurological: She is alert and oriented to person, place, and time  Skin: She is not diaphoretic  Psychiatric: She has a normal mood and affect  Her behavior is normal  Judgment and thought content normal    Mood: anxious and depressed; Affect: congruent   Nursing note and vitals reviewed  GOALS: Continued weight loss with good nutrition intakes    Normal vitamin and mineral levels  Exercise as tolerated  Resolve abdominal pain/groin pain     BARRIERS: none identified

## 2019-07-02 NOTE — ASSESSMENT & PLAN NOTE
-s/p Edgardo-En-Y Gastric Bypass with Dr Linnea Hawkins on 1/21/2014  She has been lost to follow-up since January 2017  Overall doing Poor with weight loss  She is up a net 37 lb from her January 2017 visit with us  She is here with multiple complaints-she is having intermittent epigastric pain, has chronic back pain and notes some pain radiates down her legs she also complains of right groin pain "feels like a ball" there  With ROS she notes some [place] and is aware at times of some palpitations-I encouraged her to review this further with PCP  Advised if this becomes severe she should seek immediate medical attention  I will also relay this to her PCP    Initial:265 5 lb   Current:198 5 lb  EWL:61%  Curly:154 lb- lb-2 years post-op  Current BMI is Body mass index is 32 53 kg/m²  Tolerating a regular diet-not consistent-reports lack of appetite but "picking" and complains of intermittent nausea and intermittent abdominal pain  No vomiting/no fever or chills   Notes intermittent constipation -she has referral from her pcp to see gi for routine colonoscopy    Eating at least 60 grams of protein per day-variable  Following 30/60 minute rule with liquids-yes  Drinking at least 64 ounces of fluid per day-yes  Drinking carbonated beverages-no  Sufficient exercise-limited because of back and knee pain  Using NSAIDs regularly-no  Using nicotine-no  Using alcohol-no    She reports her PCP has referred her to GI for colonoscopy and encouraged her to get this done as well

## 2019-07-02 NOTE — ASSESSMENT & PLAN NOTE
She is complaining of intermittent nausea and a burning or sharp pain intermittently to mid-epigastric region sometimes associated with nausea but no vomiting , no fever or chills  She does not smoke and does not have second-hand smoke exposure, no nsaids an no alcohol intakes  She has had a chronic depressed mood since her sister  around 4 years ago-of unknown causes per patient  Thien Lee notes she worries because her sister also had gastric surgery but done elsewhere and her death was not around the time of her surgery and not sure if related at all  Thien Lee notes her appetite has been down but she appears to be snacking frequently and actually up a net 37 lb since she was last seen inour office in 2017  On exam she had some tenderness to palpation to mid-epigastric region and right inguinal region -no hernias appreciated

## 2019-07-02 NOTE — ASSESSMENT & PLAN NOTE
She is complaining of poor appetite with "picking at food" weight is actually up a net 37 lb from her last visit with us in January 2017-and up a net 7 1/2 lb from late May visit with her PCP  A diet recall reveals poor food choices and I did advise her on her diet -see under epigastric pain as well  I will check a prealbumin level as a nutrition marker when she gets her labs done

## 2019-07-03 ENCOUNTER — OFFICE VISIT (OUTPATIENT)
Dept: OBGYN CLINIC | Facility: CLINIC | Age: 59
End: 2019-07-03

## 2019-07-03 VITALS
DIASTOLIC BLOOD PRESSURE: 80 MMHG | WEIGHT: 198 LBS | BODY MASS INDEX: 31.82 KG/M2 | SYSTOLIC BLOOD PRESSURE: 120 MMHG | HEIGHT: 66 IN

## 2019-07-03 DIAGNOSIS — Z71.2 ENCOUNTER TO DISCUSS TEST RESULTS: Primary | ICD-10-CM

## 2019-07-03 PROCEDURE — 99212 OFFICE O/P EST SF 10 MIN: CPT | Performed by: NURSE PRACTITIONER

## 2019-07-03 NOTE — PROGRESS NOTES
Ok Esquivel presents today for results of pelvic ultrasound done 6/20/19  Pelvic ultrasound essentially normal   Advised her to follow up with PCP and/or GI for workup of pain  She states she has appointment already  Advised her pap smear and mammogram were WNL    Return in 1 year for annual GYN exam

## 2019-07-09 ENCOUNTER — APPOINTMENT (OUTPATIENT)
Dept: PHYSICAL THERAPY | Facility: CLINIC | Age: 59
End: 2019-07-09
Payer: COMMERCIAL

## 2019-07-11 ENCOUNTER — APPOINTMENT (OUTPATIENT)
Dept: PHYSICAL THERAPY | Facility: CLINIC | Age: 59
End: 2019-07-11
Payer: COMMERCIAL

## 2019-07-16 ENCOUNTER — APPOINTMENT (OUTPATIENT)
Dept: LAB | Facility: HOSPITAL | Age: 59
End: 2019-07-16
Payer: COMMERCIAL

## 2019-07-16 ENCOUNTER — OFFICE VISIT (OUTPATIENT)
Dept: PHYSICAL THERAPY | Facility: CLINIC | Age: 59
End: 2019-07-16
Payer: COMMERCIAL

## 2019-07-16 DIAGNOSIS — R63.8 DECREASED ORAL INTAKE: ICD-10-CM

## 2019-07-16 DIAGNOSIS — M15.9 GENERALIZED OSTEOARTHRITIS: ICD-10-CM

## 2019-07-16 DIAGNOSIS — K91.2 POSTSURGICAL MALABSORPTION: ICD-10-CM

## 2019-07-16 DIAGNOSIS — G89.29 CHRONIC BILATERAL LOW BACK PAIN WITHOUT SCIATICA: Primary | ICD-10-CM

## 2019-07-16 DIAGNOSIS — M54.50 CHRONIC BILATERAL LOW BACK PAIN WITHOUT SCIATICA: Primary | ICD-10-CM

## 2019-07-16 DIAGNOSIS — Z98.84 BARIATRIC SURGERY STATUS: ICD-10-CM

## 2019-07-16 LAB
25(OH)D3 SERPL-MCNC: 17.9 NG/ML (ref 30–100)
ALBUMIN SERPL BCP-MCNC: 3.3 G/DL (ref 3.5–5)
ALP SERPL-CCNC: 114 U/L (ref 46–116)
ALT SERPL W P-5'-P-CCNC: 16 U/L (ref 12–78)
ANION GAP SERPL CALCULATED.3IONS-SCNC: 7 MMOL/L (ref 4–13)
AST SERPL W P-5'-P-CCNC: 16 U/L (ref 5–45)
BILIRUB SERPL-MCNC: 0.41 MG/DL (ref 0.2–1)
BUN SERPL-MCNC: 16 MG/DL (ref 5–25)
CALCIUM SERPL-MCNC: 9.4 MG/DL (ref 8.3–10.1)
CHLORIDE SERPL-SCNC: 106 MMOL/L (ref 100–108)
CHOLEST SERPL-MCNC: 190 MG/DL (ref 50–200)
CO2 SERPL-SCNC: 28 MMOL/L (ref 21–32)
CREAT SERPL-MCNC: 0.75 MG/DL (ref 0.6–1.3)
ERYTHROCYTE [DISTWIDTH] IN BLOOD BY AUTOMATED COUNT: 12.4 % (ref 11.6–15.1)
FERRITIN SERPL-MCNC: 14 NG/ML (ref 8–388)
FOLATE SERPL-MCNC: >20 NG/ML (ref 3.1–17.5)
GFR SERPL CREATININE-BSD FRML MDRD: 89 ML/MIN/1.73SQ M
GLUCOSE P FAST SERPL-MCNC: 93 MG/DL (ref 65–99)
HCT VFR BLD AUTO: 39.4 % (ref 34.8–46.1)
HDLC SERPL-MCNC: 78 MG/DL (ref 40–60)
HGB BLD-MCNC: 12.2 G/DL (ref 11.5–15.4)
LDLC SERPL CALC-MCNC: 104 MG/DL (ref 0–100)
MCH RBC QN AUTO: 28.8 PG (ref 26.8–34.3)
MCHC RBC AUTO-ENTMCNC: 31 G/DL (ref 31.4–37.4)
MCV RBC AUTO: 93 FL (ref 82–98)
PLATELET # BLD AUTO: 191 THOUSANDS/UL (ref 149–390)
PMV BLD AUTO: 11.8 FL (ref 8.9–12.7)
POTASSIUM SERPL-SCNC: 4.1 MMOL/L (ref 3.5–5.3)
PREALB SERPL-MCNC: 22.9 MG/DL (ref 18–40)
PROT SERPL-MCNC: 6.7 G/DL (ref 6.4–8.2)
PTH-INTACT SERPL-MCNC: 155.5 PG/ML (ref 18.4–80.1)
RBC # BLD AUTO: 4.24 MILLION/UL (ref 3.81–5.12)
SODIUM SERPL-SCNC: 141 MMOL/L (ref 136–145)
TRIGL SERPL-MCNC: 42 MG/DL
VIT B12 SERPL-MCNC: 211 PG/ML (ref 100–900)
WBC # BLD AUTO: 6.6 THOUSAND/UL (ref 4.31–10.16)

## 2019-07-16 PROCEDURE — 84630 ASSAY OF ZINC: CPT

## 2019-07-16 PROCEDURE — 84425 ASSAY OF VITAMIN B-1: CPT

## 2019-07-16 PROCEDURE — 83970 ASSAY OF PARATHORMONE: CPT

## 2019-07-16 PROCEDURE — 84590 ASSAY OF VITAMIN A: CPT

## 2019-07-16 PROCEDURE — 85027 COMPLETE CBC AUTOMATED: CPT

## 2019-07-16 PROCEDURE — 82306 VITAMIN D 25 HYDROXY: CPT

## 2019-07-16 PROCEDURE — 36415 COLL VENOUS BLD VENIPUNCTURE: CPT

## 2019-07-16 PROCEDURE — 82746 ASSAY OF FOLIC ACID SERUM: CPT

## 2019-07-16 PROCEDURE — 82607 VITAMIN B-12: CPT

## 2019-07-16 PROCEDURE — 82525 ASSAY OF COPPER: CPT

## 2019-07-16 PROCEDURE — 97110 THERAPEUTIC EXERCISES: CPT | Performed by: PHYSICAL THERAPIST

## 2019-07-16 PROCEDURE — 80053 COMPREHEN METABOLIC PANEL: CPT

## 2019-07-16 PROCEDURE — 82728 ASSAY OF FERRITIN: CPT

## 2019-07-16 PROCEDURE — 84134 ASSAY OF PREALBUMIN: CPT

## 2019-07-16 PROCEDURE — 97112 NEUROMUSCULAR REEDUCATION: CPT | Performed by: PHYSICAL THERAPIST

## 2019-07-16 PROCEDURE — 80061 LIPID PANEL: CPT

## 2019-07-16 NOTE — PROGRESS NOTES
Daily Note     Today's date: 2019  Patient name: Emeterio Gaucher  : 1960  MRN: [id number]  Referring provider: Nica Francisco PA-C  Dx:   Encounter Diagnosis     ICD-10-CM    1  Chronic bilateral low back pain without sciatica M54 5     G89 29    2  Generalized osteoarthritis M15 9                   Subjective: Pt comes to therapy reporting continued pain in low back, reporting she feels weak and     Objective: See treatment diary below    Assessment: Tolerated treatment fair  Pt demonstrated facial grimacing and reports of intermittent pain throughout session with movements transfering on table, to/from supine  Encouraged patient through activities  Patient would benefit from continued PT  Pain reported with all exercises, however, cued to perform within tolerance  Plan: Progress treatment as tolerated        Precautions: depression, DMII    Date         FOTO IE             Re-eval IE              Daily Treatment Diary     Manual          STM LS                              Exercise Diary          bike  5 min 5 min 5 min 5 min                     LTR  10"x5 ea 15"x5 ea 15"x5 ea 15"x5 ea        SKTC  10"x5 ea 15"x5 ea 15"x5 ea 15"x5 ea        90/90 H/S stretch bilat     15"x5 ea        Piriformis stretch bilat     15"x5 ea        Diaphragmatic breathing (DB)  2 min 2 min D/C --        DB march  2 min 2 min 2 min 2 min        DB BKFO  2 min 2 min np         DB heel slides  nv 1 min 2 min 2 min        DB wand S' flex  2 min 2 min 2 min np                     Seated H/S stretch  15"x3 ea 15"x3 ea 15"x5 ea 15"x5 ea        LS roll outs  nv 15"x5 15"x5 15"x5 ea   3 way                                                   Modalities

## 2019-07-17 ENCOUNTER — HOSPITAL ENCOUNTER (OUTPATIENT)
Dept: GASTROENTEROLOGY | Facility: HOSPITAL | Age: 59
Setting detail: OUTPATIENT SURGERY
Discharge: HOME | End: 2019-07-17
Attending: SURGERY | Admitting: SURGERY
Payer: COMMERCIAL

## 2019-07-17 ENCOUNTER — ANESTHESIA EVENT (OUTPATIENT)
Dept: GASTROENTEROLOGY | Facility: HOSPITAL | Age: 59
End: 2019-07-17

## 2019-07-17 ENCOUNTER — ANESTHESIA (OUTPATIENT)
Dept: GASTROENTEROLOGY | Facility: HOSPITAL | Age: 59
End: 2019-07-17

## 2019-07-17 VITALS
RESPIRATION RATE: 18 BRPM | HEART RATE: 52 BPM | BODY MASS INDEX: 31.82 KG/M2 | HEIGHT: 66 IN | SYSTOLIC BLOOD PRESSURE: 113 MMHG | WEIGHT: 197.97 LBS | TEMPERATURE: 97.1 F | OXYGEN SATURATION: 99 % | DIASTOLIC BLOOD PRESSURE: 59 MMHG

## 2019-07-17 DIAGNOSIS — Z98.84 BARIATRIC SURGERY STATUS: ICD-10-CM

## 2019-07-17 PROCEDURE — 43239 EGD BIOPSY SINGLE/MULTIPLE: CPT | Performed by: SURGERY

## 2019-07-17 PROCEDURE — 88305 TISSUE EXAM BY PATHOLOGIST: CPT | Performed by: PATHOLOGY

## 2019-07-17 RX ORDER — PROPOFOL 10 MG/ML
INJECTION, EMULSION INTRAVENOUS AS NEEDED
Status: DISCONTINUED | OUTPATIENT
Start: 2019-07-17 | End: 2019-07-17 | Stop reason: SURG

## 2019-07-17 RX ORDER — SODIUM CHLORIDE 9 MG/ML
125 INJECTION, SOLUTION INTRAVENOUS CONTINUOUS
Status: DISCONTINUED | OUTPATIENT
Start: 2019-07-17 | End: 2019-07-21 | Stop reason: HOSPADM

## 2019-07-17 RX ADMIN — PROPOFOL 150 MG: 10 INJECTION, EMULSION INTRAVENOUS at 08:15

## 2019-07-17 RX ADMIN — PROPOFOL 10 MG: 10 INJECTION, EMULSION INTRAVENOUS at 08:16

## 2019-07-17 RX ADMIN — SODIUM CHLORIDE: 0.9 INJECTION, SOLUTION INTRAVENOUS at 08:07

## 2019-07-17 RX ADMIN — PROPOFOL 20 MG: 10 INJECTION, EMULSION INTRAVENOUS at 08:17

## 2019-07-17 NOTE — DISCHARGE INSTRUCTIONS
Upper Endoscopy   WHAT YOU NEED TO KNOW:   An upper endoscopy is also called an upper gastrointestinal (GI) endoscopy, or an esophagogastroduodenoscopy (EGD)  You may feel bloated, gassy, or have some abdominal discomfort after your procedure  Your throat may be sore for 24 to 36 hours  You may burp or pass gas from air that is still inside your body  DISCHARGE INSTRUCTIONS:   Call 911 if:   · You have sudden chest pain or trouble breathing  Seek care immediately if:   · You feel dizzy or faint  · You have trouble swallowing  · You have severe throat pain  · Your bowel movements are very dark or black  · Your abdomen is hard and firm and you have severe pain  · You vomit blood  Contact your healthcare provider if:   · You feel full or bloated and cannot burp or pass gas  · You have not had a bowel movement for 3 days after your procedure  · You have neck pain  · You have a fever or chills  · You have nausea or are vomiting  · You have a rash or hives  · You have questions or concerns about your endoscopy  Relieve a sore throat:  Suck on throat lozenges or crushed ice  Gargle with a small amount of warm salt water  Mix 1 teaspoon of salt and 1 cup of warm water to make salt water  Relieve gas and discomfort from bloating:  Lie on your right side with a heating pad on your abdomen  Take short walks to help pass gas  Eat small meals until bloating is relieved  Rest after your procedure:  Do not drive or make important decisions until the day after your procedure  Return to your normal activity as directed  You can usually return to work the day after your procedure  Follow up with your healthcare provider as directed:  Write down your questions so you remember to ask them during your visits  © 2017 [employer]  Information is for End User's use only and may not be sold, redistributed or otherwise used for commercial purposes   All illustrations and images included in [facility] are the copyrighted property of [employer] , [employer]  or Everette Markham  The above information is an  only  It is not intended as medical advice for individual conditions or treatments  Talk to your doctor, nurse or pharmacist before following any medical regimen to see if it is safe and effective for you

## 2019-07-18 LAB
COPPER SERPL-MCNC: 121 UG/DL (ref 72–166)
ZINC SERPL-MCNC: 77 UG/DL (ref 56–134)

## 2019-07-19 ENCOUNTER — HOSPITAL ENCOUNTER (OUTPATIENT)
Dept: CT IMAGING | Facility: HOSPITAL | Age: 59
Discharge: HOME | End: 2019-07-19
Payer: COMMERCIAL

## 2019-07-19 DIAGNOSIS — R10.31 GROIN PAIN, RIGHT: ICD-10-CM

## 2019-07-19 DIAGNOSIS — Z98.84 BARIATRIC SURGERY STATUS: ICD-10-CM

## 2019-07-19 LAB
VIT A SERPL-MCNC: 31.3 UG/DL (ref 20.1–62)
VIT B1 BLD-SCNC: 121.7 NMOL/L (ref 66.5–200)

## 2019-07-19 PROCEDURE — 74177 CT ABD & PELVIS W/CONTRAST: CPT

## 2019-07-19 RX ADMIN — IOHEXOL 100 ML: 350 INJECTION, SOLUTION INTRAVENOUS at 18:09

## 2019-07-23 ENCOUNTER — EVALUATION (OUTPATIENT)
Dept: PHYSICAL THERAPY | Facility: CLINIC | Age: 59
End: 2019-07-23
Payer: COMMERCIAL

## 2019-07-23 DIAGNOSIS — G89.29 CHRONIC BILATERAL LOW BACK PAIN WITHOUT SCIATICA: Primary | ICD-10-CM

## 2019-07-23 DIAGNOSIS — M15.9 GENERALIZED OSTEOARTHRITIS: ICD-10-CM

## 2019-07-23 DIAGNOSIS — M54.50 CHRONIC BILATERAL LOW BACK PAIN WITHOUT SCIATICA: Primary | ICD-10-CM

## 2019-07-23 PROCEDURE — 97110 THERAPEUTIC EXERCISES: CPT | Performed by: PHYSICAL THERAPIST

## 2019-07-23 NOTE — PROGRESS NOTES
PT Re-Evaluation     Today's date: 2019  Patient name: Omaira Walters  : 1960  MRN: [id number]  Referring provider: Marti Louise PA-C  Dx:   Encounter Diagnosis     ICD-10-CM    1  Chronic bilateral low back pain without sciatica M54 5     G89 29    2  Generalized osteoarthritis M15 9                   Assessment  Assessment details: Pt is a pleasant 66 y o  female presenting to outpatient physical therapy with Chronic bilateral low back pain without sciatica, Generalized osteoarthritis   Pt presents with pain, decreased range of motion, decreased strength, and decreased tolerance to activity  Due to chronic nature of symptoms, high levels of pain, and signs/symptoms inconsistent with chronic nature of symptoms (soft tissue sensitivity), patient displays signs/symptoms consistent with fear avoidant behavior  Also displays apparent high levels of stress, which are likely adding to patients symptoms  Patient has failed to demonstrate improvements in range of motion, strength, or tolerance to activities  Patient advised to contact pain management physician for consultation  Will be placed on hold at present time from skilled physical therapy  Please advise  Impairments: abnormal coordination, abnormal or restricted ROM, activity intolerance, impaired physical strength and pain with function  Understanding of Dx/Px/POC: fair   Prognosis: fair    Goals  ST  Patient will report 25% decrease in pain in 4 weeks  - NOT MET  2  Patient will demonstrate 25% improvement in ROM in 4 weeks - NOT MET  3  Patient will demonstrate 1/2 grade improvement in strength in 4 weeks - NOT MET    LT  Patient will be able to perform IADLS without restriction or pain by discharge  2  Patient will be independent in HEP by discharge  3  Patient will be able to return to recreational/work duties without restriction or pain by discharge  Plan  Plan details: Placing on hold until further notice     Patient would benefit from: PT eval and skilled PT  Planned modality interventions: cryotherapy and thermotherapy: hydrocollator packs  Planned therapy interventions: IADL retraining, body mechanics training, flexibility, functional ROM exercises, home exercise program, neuromuscular re-education, manual therapy, postural training, strengthening, stretching, therapeutic activities, therapeutic exercise and joint mobilization  Treatment plan discussed with: patient        Subjective Evaluation    History of Present Illness  Mechanism of injury: 6/20: Pt reports worsening low back pain over the several years, 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  Pt notes she has tried physical therapy in the past, which she states has made back pain worse  Reports she has not had any relief with medications or past medications (most recent performed 5 years)  Notes she was recommended to have surgery, however, states she does not wish to pursue this route  Notes she currently takes Robaxin for back pain, however, denies relief  Had radiographs performed earlier this month, which was negative for pathologic findings  Reports she is currently out of work  Lives with daughter and grandchildren  States she feels depressed because she cannot perform activities she used to do and cannot perform work duties  States symptom AGGs are: household chores, sitting, standing, or lying down for long periods of time, walking, bending, or any activity  Pt states pain is located in lower aspect of low back, R > L, and raidates down right LE > left LE  Describes pain as numbness, stabbing, and strong  States symptom EASES are: hot showers  7/23: PT reports no changes and improvements since starting therapy  Reports symptoms occur in any position and any movement, short or long distances, quick or slow movements  Reports she feels she cannot perform household chores due to pain   States she has been under high levels of stress lately, which she feels has made her depressed  Reports no changes with medications (Flexeril)  States only mild relief she feels is when her grand daughter massages her back  Reports she has been waiting on a call from pain management to schedule a consultation  Pain  Current pain rating: 10 ("100/10")  At best pain ratin  At worst pain rating: 10 ("100/10")    Social Support  Stairs in house: yes   Lives in: multiple-level home  Lives with: adult children and young children      Diagnostic Tests  X-ray: normal  Patient Goals  Patient goals for therapy: decreased pain, increased motion, independence with ADLs/IADLs and return to sport/leisure activities          Objective     Palpation   Left   Tenderness of the erector spinae, lumbar paraspinals and quadratus lumborum  Right   Tenderness of the erector spinae, lumbar paraspinals and quadratus lumborum       Neurological Testing     Sensation     Lumbar   Left   Intact: light touch    Right   Diminished: light touch    Reflexes   Left   Patellar (L4): absent (0)  Achilles (S1): absent (0)  Clonus sign: negative    Right   Patellar (L4): absent (0)  Achilles (S1): absent (0)  Clonus sign: negative    Additional Neurological Details  : LOWER EXTREMITY DERMATOMES: WNL, symmetrical, and intact L2-S2  LOWER EXTREMITY MYOTOMES: WNL, symmetrical, and intact L1-S2       Active Range of Motion     Lumbar   Flexion: 28 degrees  with pain  Extension: 10 degrees  with pain  Left lateral flexion: 8 degrees    with pain  Right lateral flexion: 8 degrees  with pain    Additional Active Range of Motion Details  LUMBAR AROM -  Flexion, extension, bilat lateral flexion measured with bubble inclinometer at L1; Rotation measured with goniometer and patient seated      Joint Play     Pain: T11, T12, L1, L2, L3, L4 and L5     Strength/Myotome Testing     Left Hip   Planes of Motion   Flexion: 3+    Right Hip   Planes of Motion   Flexion: 3+    Left Knee   Flexion: 3+  Extension: 4-    Right Knee   Flexion: 3+  Extension: 4-    Left Ankle/Foot   Dorsiflexion: 3+  Plantar flexion: 4  Great toe extension: 3+    Right Ankle/Foot   Dorsiflexion: 3+  Plantar flexion: 4  Great toe extension: 3+    Tests     Lumbar     Left   Positive passive SLR  Right   Positive passive SLR       Additional Tests Details  6/20: Unable to assess joint play/accessory mobility secondary to pain    7/23: Unable to assess joint play/accessory mobility secondary to pain    General Comments:      Hip Comments   6/20:  Pain reported in low back with passive R & L hip flex (to 45*), IR, ER, SLR    7/23: Remains             Precautions: depression, DMII    Date 6/20 6/25 6/27 7/2 7/16 7/23       FOTO IE             Re-eval IE              Daily Treatment Diary     Manual  6/20 6/25 6/27 7/2 7/16 7/23       STM LS                              Exercise Diary  6/20 6/25 6/27 7/2 7/16 7/23       bike  5 min 5 min 5 min 5 min                     LTR  10"x5 ea 15"x5 ea 15"x5 ea 15"x5 ea        SKTC  10"x5 ea 15"x5 ea 15"x5 ea 15"x5 ea        90/90 H/S stretch bilat     15"x5 ea        Piriformis stretch bilat     15"x5 ea        Diaphragmatic breathing (DB)  2 min 2 min D/C --        DB march  2 min 2 min 2 min 2 min        DB BKFO  2 min 2 min np         DB heel slides  nv 1 min 2 min 2 min        DB wand S' flex  2 min 2 min 2 min np                     Seated H/S stretch  15"x3 ea 15"x3 ea 15"x5 ea 15"x5 ea        LS roll outs  nv 15"x5 15"x5 15"x5 ea   3 way                                                   Modalities

## 2019-07-25 ENCOUNTER — APPOINTMENT (OUTPATIENT)
Dept: PHYSICAL THERAPY | Facility: CLINIC | Age: 59
End: 2019-07-25
Payer: COMMERCIAL

## 2019-07-25 ENCOUNTER — TELEPHONE (OUTPATIENT)
Dept: BARIATRICS | Facility: CLINIC | Age: 59
End: 2019-07-25

## 2019-07-26 ENCOUNTER — OFFICE VISIT (OUTPATIENT)
Dept: FAMILY MEDICINE CLINIC | Facility: CLINIC | Age: 59
End: 2019-07-26

## 2019-07-26 VITALS
HEIGHT: 66 IN | RESPIRATION RATE: 18 BRPM | OXYGEN SATURATION: 99 % | HEART RATE: 85 BPM | TEMPERATURE: 96.4 F | DIASTOLIC BLOOD PRESSURE: 70 MMHG | WEIGHT: 198 LBS | SYSTOLIC BLOOD PRESSURE: 126 MMHG | BODY MASS INDEX: 31.82 KG/M2

## 2019-07-26 DIAGNOSIS — G89.29 CHRONIC BILATERAL LOW BACK PAIN WITH BILATERAL SCIATICA: ICD-10-CM

## 2019-07-26 DIAGNOSIS — M54.41 CHRONIC BILATERAL LOW BACK PAIN WITH BILATERAL SCIATICA: ICD-10-CM

## 2019-07-26 DIAGNOSIS — R51.9 CHRONIC NONINTRACTABLE HEADACHE, UNSPECIFIED HEADACHE TYPE: ICD-10-CM

## 2019-07-26 DIAGNOSIS — G89.29 CHRONIC NONINTRACTABLE HEADACHE, UNSPECIFIED HEADACHE TYPE: ICD-10-CM

## 2019-07-26 DIAGNOSIS — Z12.11 SCREENING FOR COLON CANCER: Primary | ICD-10-CM

## 2019-07-26 DIAGNOSIS — M54.42 CHRONIC BILATERAL LOW BACK PAIN WITH BILATERAL SCIATICA: ICD-10-CM

## 2019-07-26 PROCEDURE — 99214 OFFICE O/P EST MOD 30 MIN: CPT | Performed by: PHYSICIAN ASSISTANT

## 2019-07-26 RX ORDER — BACLOFEN 10 MG/1
10 TABLET ORAL 3 TIMES DAILY
Qty: 60 TABLET | Refills: 0 | Status: SHIPPED | OUTPATIENT
Start: 2019-07-26 | End: 2019-12-10

## 2019-07-26 RX ORDER — TRAMADOL HYDROCHLORIDE 50 MG/1
50 TABLET ORAL EVERY 6 HOURS PRN
Qty: 10 TABLET | Refills: 0 | Status: SHIPPED | OUTPATIENT
Start: 2019-07-26 | End: 2019-10-22 | Stop reason: SDUPTHER

## 2019-07-26 NOTE — ASSESSMENT & PLAN NOTE
Headaches are improving continue with Topamax    Also the discussed making sure she is taking her vitamins regularly,  eating and drinking regularly throughout the day

## 2019-07-26 NOTE — PROGRESS NOTES
Assessment/Plan:    Chronic nonintractable headache  Headaches are improving continue with Topamax  Also the discussed making sure she is taking her vitamins regularly,  eating and drinking regularly throughout the day    Chronic back pain  Physical therapy has improved her pain  Recommend she start taking baclofen instead of the Flexeril  Also recommend getting an MRI of lumbar spine and pain management consult  Ten pills of tramadol given take 1 having severe pain  She used to be on this in the past however stated that would not be giving the medication chronically  Problem List Items Addressed This Visit        Other    Chronic back pain     Physical therapy has improved her pain  Recommend she start taking baclofen instead of the Flexeril  Also recommend getting an MRI of lumbar spine and pain management consult  Ten pills of tramadol given take 1 having severe pain  She used to be on this in the past however stated that would not be giving the medication chronically  Relevant Medications    baclofen 10 mg tablet    traMADol (ULTRAM) 50 mg tablet    Other Relevant Orders    MRI lumbar spine wo contrast    Chronic nonintractable headache     Headaches are improving continue with Topamax  Also the discussed making sure she is taking her vitamins regularly,  eating and drinking regularly throughout the day           Other Visit Diagnoses     Screening for colon cancer    -  Primary            Subjective:      Patient ID: Karlos Smalls is a 66 y o  female  HPI  29-year-old female here for follow-up from last office visit on May 31st   for chronic back pain and headaches  She has been doing physical therapy from June 20th to July 23rd without any change in her pain  She has bene doing stretches and exercises at home  Pain is in lower back and is shooting down both sides  She is getting numbness into mid legs still and get tingling at night in her feet    She has been taking the flexeril and it has not helped  She cannot take NSAIDs due to bariatric surgery  She was also started on Topamax well here for headaches  She was also started on vitamind by bariatrics  Since then the headaches have improved  She does notice that if she does not eat she will develop a headache  She also did have done an EGD and a CT scan  She will be seeing GI follow-up of her abdominal pain  Since taking omeprazole it has improved  The following portions of the patient's history were reviewed and updated as appropriate:   She  has a past medical history of Asthma, Bariatric surgery status, Depression (2009), Diabetes mellitus ([facility] ) (2005), Generalized osteoarthritis, Low vitamin B12 level, Migraine, Postgastrectomy malabsorption, Suicide attempt ([facility] ), and Vitamin D deficiency  She   Patient Active Problem List    Diagnosis Date Noted    Epigastric abdominal pain 07/02/2019    Groin pain, right 07/02/2019    Vitamin D deficiency 07/02/2019    Vitamin B12 deficiency 07/02/2019    Poor appetite 07/02/2019    Postgastrectomy malabsorption 06/02/2019    Bilateral carpal tunnel syndrome 06/02/2019    Pannus, abdominal 06/02/2019    Generalized osteoarthritis 06/02/2019    Chronic nonintractable headache 06/02/2019    Intertrigo 04/24/2019    Chronic back pain 03/22/2019    Generalized anxiety disorder 03/22/2019    Hypoglycemia 03/20/2019    Asthma 03/18/2019    Bariatric surgery status 03/18/2019    Int[facility] drug overdose ([facility] ) 03/18/2019    Major depressive disorder, recurrent severe without psychotic features ([facility] ) 03/18/2019     She  has a past surgical history that includes Gastric bypass (2015); Tubal ligation (1986); Knee arthroscopy; Tonsillectomy and adenoidectomy; Oophorectomy (Left, 2005); Cholecystectomy (2005); Laparoscopic supracervical hysterectomy (2005); and Hysterectomy    Her family history includes Breast cancer in her cousin and mother; Diabetes in her mother; HIV in her brother; Heart disease in her father; Hypertension in her mother and sister; Kidney cancer in her mother; Leukemia in her grandchild; No Known Problems in her daughter, daughter, maternal grandfather, paternal grandfather, paternal grandmother, sister, and sister; Prostate cancer in her maternal uncle; Stomach cancer in her maternal grandmother and maternal uncle; Stroke in her father  She  reports that she quit smoking about 6 years ago  Her smoking use included cigarettes  She quit after 32 00 years of use  She quit smokeless tobacco use about 6 years ago  She reports that she does not drink alcohol or use drugs    Current Outpatient Medications   Medication Sig Dispense Refill    acetaminophen (TYLENOL) 500 mg tablet Take 1 tablet (500 mg total) by mouth every 6 (six) hours as needed for mild pain 30 tablet 0    albuterol (PROVENTIL HFA,VENTOLIN HFA) 90 mcg/act inhaler Inhale 2 puffs every 6 (six) hours as needed for wheezing      cholecalciferol (VITAMIN D3) 1,000 units tablet Take 1 tablet (1,000 Units total) by mouth 2 (two) times a day 60 tablet 5    cyanocobalamin (VITAMIN B-12) 1,000 mcg tablet Take 1 tablet (1,000 mcg total) by mouth daily 30 tablet 5    ergocalciferol (VITAMIN D2) 50,000 units Take 1 capsule (50,000 Units total) by mouth 3 (three) times a week 8 capsule 4    hydrocortisone 2 5 % cream Apply topically 4 (four) times a day as needed for irritation 30 g 0    Multiple Vitamin (MULTIVITAMIN) capsule Take 1 capsule by mouth daily      omeprazole (PriLOSEC) 20 mg delayed release capsule TAKE ONE CAPSULE BY MOUTH DAILY 90 capsule 1    topiramate (TOPAMAX) 25 mg sprinkle capsule Take 1 capsule (25 mg total) by mouth 2 (two) times a day 60 capsule 1    baclofen 10 mg tablet Take 1 tablet (10 mg total) by mouth 3 (three) times a day 60 tablet 0    clonazePAM (KlonoPIN) 0 5 mg tablet Take 1 tablet (0 5 mg total) by mouth 2 (two) times a day as needed for anxiety for up to 10 days 7 tablet 0    traMADol (ULTRAM) 50 mg tablet Take 1 tablet (50 mg total) by mouth every 6 (six) hours as needed for moderate pain 10 tablet 0    venlafaxine (EFFEXOR) 25 mg tablet Take 1 tablet (25 mg total) by mouth 3 (three) times a day for 7 days 21 tablet 0     No current facility-administered medications for this visit  Current Outpatient Medications on File Prior to Visit   Medication Sig    acetaminophen (TYLENOL) 500 mg tablet Take 1 tablet (500 mg total) by mouth every 6 (six) hours as needed for mild pain    albuterol (PROVENTIL HFA,VENTOLIN HFA) 90 mcg/act inhaler Inhale 2 puffs every 6 (six) hours as needed for wheezing    cholecalciferol (VITAMIN D3) 1,000 units tablet Take 1 tablet (1,000 Units total) by mouth 2 (two) times a day    cyanocobalamin (VITAMIN B-12) 1,000 mcg tablet Take 1 tablet (1,000 mcg total) by mouth daily    ergocalciferol (VITAMIN D2) 50,000 units Take 1 capsule (50,000 Units total) by mouth 3 (three) times a week    hydrocortisone 2 5 % cream Apply topically 4 (four) times a day as needed for irritation    Multiple Vitamin (MULTIVITAMIN) capsule Take 1 capsule by mouth daily    omeprazole (PriLOSEC) 20 mg delayed release capsule TAKE ONE CAPSULE BY MOUTH DAILY    topiramate (TOPAMAX) 25 mg sprinkle capsule Take 1 capsule (25 mg total) by mouth 2 (two) times a day    [DISCONTINUED] cyclobenzaprine (FLEXERIL) 10 mg tablet Take 1 tablet (10 mg total) by mouth 3 (three) times a day as needed for muscle spasms    clonazePAM (KlonoPIN) 0 5 mg tablet Take 1 tablet (0 5 mg total) by mouth 2 (two) times a day as needed for anxiety for up to 10 days    venlafaxine (EFFEXOR) 25 mg tablet Take 1 tablet (25 mg total) by mouth 3 (three) times a day for 7 days     No current facility-administered medications on file prior to visit  She is allergic to motrin [ibuprofen]       Review of Systems   Constitutional: Negative for activity change, appetite change, chills, fatigue and unexpected weight change  HENT: Negative for ear pain, hearing loss and sore throat  Eyes: Negative for visual disturbance  Respiratory: Negative for cough and wheezing  Cardiovascular: Negative for chest pain  Gastrointestinal: Positive for abdominal pain  Negative for constipation, diarrhea and vomiting  Genitourinary: Negative for difficulty urinating and dysuria  Musculoskeletal: Positive for back pain  Negative for arthralgias and myalgias  Skin: Negative for rash  Neurological: Positive for weakness, numbness and headaches  Negative for dizziness  Psychiatric/Behavioral: Negative for behavioral problems  Objective:      /70 (BP Location: Left arm, Patient Position: Sitting, Cuff Size: Large)   Pulse 85   Temp (!) 96 4 °F (35 8 °C) (Temporal)   Resp 18   Ht 5' 5 5" (1 664 m)   Wt 89 8 kg (198 lb)   LMP  (LMP Unknown)   SpO2 99%   Breastfeeding? No   BMI 32 45 kg/m²          Physical Exam   Constitutional: She is oriented to person, place, and time  She appears well-developed and well-nourished  No distress  HENT:   Head: Normocephalic and atraumatic  Right Ear: External ear normal    Left Ear: External ear normal    Eyes: Conjunctivae are normal    Neck: Normal range of motion  Neck supple  No thyromegaly present  Cardiovascular: Normal rate, regular rhythm and normal heart sounds  No murmur heard  Pulmonary/Chest: Effort normal and breath sounds normal  No respiratory distress  She has no wheezes  Musculoskeletal: She exhibits tenderness  She exhibits no edema or deformity  Decreased rom lumbar spine, tender over Left L2-3,  +SLR on the left  Motor 4/5 LLE   Lymphadenopathy:     She has no cervical adenopathy  Neurological: She is alert and oriented to person, place, and time  She displays abnormal reflex (0patella, achilles bilaterally)  Psychiatric: She has a normal mood and affect   Her behavior is normal    Nursing note and vitals reviewed

## 2019-07-26 NOTE — ASSESSMENT & PLAN NOTE
Physical therapy has improved her pain  Recommend she start taking baclofen instead of the Flexeril  Also recommend getting an MRI of lumbar spine and pain management consult  Ten pills of tramadol given take 1 having severe pain  She used to be on this in the past however stated that would not be giving the medication chronically

## 2019-07-30 ENCOUNTER — TELEPHONE (OUTPATIENT)
Dept: FAMILY MEDICINE CLINIC | Facility: CLINIC | Age: 59
End: 2019-07-30

## 2019-07-30 ENCOUNTER — APPOINTMENT (OUTPATIENT)
Dept: PHYSICAL THERAPY | Facility: CLINIC | Age: 59
End: 2019-07-30
Payer: COMMERCIAL

## 2019-07-30 NOTE — TELEPHONE ENCOUNTER
Pt given all info     Plastic Surg [facility]([phone]) is on 9/12/2019 at 1:30 pm at [address] [facility] [place], [facility]

## 2019-07-31 ENCOUNTER — TELEPHONE (OUTPATIENT)
Dept: FAMILY MEDICINE CLINIC | Facility: CLINIC | Age: 59
End: 2019-07-31

## 2019-08-01 ENCOUNTER — OFFICE VISIT (OUTPATIENT)
Dept: BARIATRICS | Facility: CLINIC | Age: 59
End: 2019-08-01
Payer: COMMERCIAL

## 2019-08-01 VITALS
HEART RATE: 99 BPM | RESPIRATION RATE: 18 BRPM | SYSTOLIC BLOOD PRESSURE: 116 MMHG | BODY MASS INDEX: 32.22 KG/M2 | WEIGHT: 200.5 LBS | HEIGHT: 66 IN | TEMPERATURE: 97.8 F | DIASTOLIC BLOOD PRESSURE: 74 MMHG

## 2019-08-01 DIAGNOSIS — R10.31 GROIN PAIN, RIGHT: ICD-10-CM

## 2019-08-01 DIAGNOSIS — E55.9 VITAMIN D DEFICIENCY: ICD-10-CM

## 2019-08-01 DIAGNOSIS — E53.8 VITAMIN B12 DEFICIENCY: ICD-10-CM

## 2019-08-01 DIAGNOSIS — R10.13 EPIGASTRIC ABDOMINAL PAIN: ICD-10-CM

## 2019-08-01 DIAGNOSIS — Z90.3 POSTGASTRECTOMY MALABSORPTION: ICD-10-CM

## 2019-08-01 DIAGNOSIS — K91.2 POSTGASTRECTOMY MALABSORPTION: ICD-10-CM

## 2019-08-01 DIAGNOSIS — Z98.84 BARIATRIC SURGERY STATUS: Primary | ICD-10-CM

## 2019-08-01 DIAGNOSIS — R63.0 POOR APPETITE: ICD-10-CM

## 2019-08-01 PROCEDURE — 99214 OFFICE O/P EST MOD 30 MIN: CPT | Performed by: PHYSICIAN ASSISTANT

## 2019-08-01 NOTE — ASSESSMENT & PLAN NOTE
She has complained of ongoing right groin pain  CT of abdomen/pelvis with no findings in the area to correlate with this pain  She does report chronic back pain and notes radiation into her leg-so ? ? If groin pain is related to her back pain-encouraged her to follow-up with PCP for this

## 2019-08-01 NOTE — ASSESSMENT & PLAN NOTE
Discussed case with Dr Cristopher Zamarripa    She is here in follow-up to review her upper endoscopy done July 17, 2019  by Dr Gordon Grey and review her recent CT of abdomen/pelvis along with pathology from her biopsy  She is taking ppi daily now  She has had mid-epigastric abdominal pain intemitently -worse with meals  She notes she still experiences intermittent mid-epigastric stabbing pain and "feels like a ball'/tightness  when swallowing  No vomiting but feels as though she needs to do so  No fever or chills  She is prone to constipation-but having very small bowel movements regularly    On EGD the following results from report by Dr Gordon Grey:    FINDINGS:  · The stomach appeared normal  Small gastric pouch with no evidence of marginal ulceration, gastrogastric fistula or anastomotic stricture  Otherwise normal gastric bypass anatomy  · Small sliding hiatal hernia (type I hiatal hernia) without Johnella Spoon lesions present      pathology copied from chart:    Final Diagnosis   A  Stomach, pouch, endoscopic biopsy:  - Gastric antral mucosa with no significant pathologic alteration   - Negative for intestinal metaplasia, dysplasia or carcinoma  - No curvilinear Helicobacter pylori is identified on H&E stained slide  Electronically signed by Nivia Bell MD on 7/22/2019 at 10:01 AM     Reviewed results of all testing with her    She has been taking ppi daily now-notes no marked improvement but maybe slight  I did advise her to temporarily try increasing her ppi to twice daily      She had CT of abdomen/pelvis done which did not reveal acute findings as read by the radiologist- to account for her pain    I did review her case with Dr Cristopher Zamarripa who advised follow-up with Dr Gordon Grey as he feels she may benefit from an exploratory laparoscopy with consideration for ruling out  an internal hernia    PLAN: follow-up with Dr Gordon Grey to review recent CT of abdomen/pelvis and EGD and for further assessment /recommendations with possible consideration of exploratory laparoscopy    Patient is agreeable to the plan    She also notes she is prone to constipation- she is scheduled for consultation with GI for colonoscopy and encouraged to also get this done      Advised to trial either colace or miralax as needed until seen by them and then to follow their advice if different

## 2019-08-01 NOTE — PATIENT INSTRUCTIONS
Follow-up with Dr Juan Harris  Temporarily increase your omeprazole to twice a day  After you have work-up for your abdominal pain, then we can consider follow-up with our staff to help you with your weight loss  Take vitamin D as ordered-IMPORTANT-once you complete the refills take an EXTRA 2000 IU vitamin D3 daily    Review CAT scan report with family doctor at a routine visit for "incidental findings" -as we discussed there is nothing new on your CAT scan that would account for your pain, but Dr Juan Harris will likely also review this at your visit  Discuss back and right sided groin pain with your primary care provider  Call if you develop abdominal pain with fever, chills, nausea/vomiting  Continue with all vitamins as advised  For bowels -take either daily stool softener like colace OR miralax daily   Get colonoscopy with gastroenterologist and make sure to discuss a bowel regimen with them-follow their advice    Take prescription vitamin D as ordered-IMPORTANT-after you complete this then take an over-the-counter 2000 IU vitamin D3 daily    Take prescription vitamin b12 as ordered (also sent to your pharmacy)    Get repeat labs as ordered in [facility]

## 2019-08-01 NOTE — ASSESSMENT & PLAN NOTE
rx for vitamin D-50,000 Iu twice weekly had been sent to pharmacy-she did not pick this up yet-advised to do so and when complete she should add 2000 IU vitamin D3 daily    Will repeat labs in a few months

## 2019-08-01 NOTE — ASSESSMENT & PLAN NOTE
She had routine labs done-advised on results and vitamin recommendations-she had been advised of vitamin D prescription but did not start this yet  Advised to do so

## 2019-08-01 NOTE — ASSESSMENT & PLAN NOTE
She is status post laparoscopic elvira-en-y gastric bypass surgery by Dr Alice Wilson 1/21/2014    She is here in follow-up to review upper endoscopy results and pathology along with results of CT of abdomen pelvis in regards to her mid-epigastric and right grown pains    She is taking ppi daily   Notes no marked improvement in her pain

## 2019-08-01 NOTE — PROGRESS NOTES
Assessment/Plan:    Epigastric abdominal pain  Discussed case with Dr Ling Ibrahim    She is here in follow-up to review her upper endoscopy done July 17, 2019  by Dr Marnie Briggs and review her recent CT of abdomen/pelvis along with pathology from her biopsy  She is taking ppi daily now  She has had mid-epigastric abdominal pain intemitently -worse with meals  She notes she still experiences intermittent mid-epigastric stabbing pain and "feels like a ball'/tightness  when swallowing  No vomiting but feels as though she needs to do so  No fever or chills  She is prone to constipation-but having very small bowel movements regularly    On EGD the following results from report by Dr Marnie Briggs:    FINDINGS:  · The stomach appeared normal  Small gastric pouch with no evidence of marginal ulceration, gastrogastric fistula or anastomotic stricture  Otherwise normal gastric bypass anatomy  · Small sliding hiatal hernia (type I hiatal hernia) without Rene Tracy lesions present      pathology copied from chart:    Final Diagnosis   A  Stomach, pouch, endoscopic biopsy:  - Gastric antral mucosa with no significant pathologic alteration   - Negative for intestinal metaplasia, dysplasia or carcinoma  - No curvilinear Helicobacter pylori is identified on H&E stained slide  Electronically signed by Lorenzo Olmos MD on 7/22/2019 at 10:01 AM     Reviewed results of all testing with her    She has been taking ppi daily now-notes no marked improvement but maybe slight  I did advise her to temporarily try increasing her ppi to twice daily      She had CT of abdomen/pelvis done which did not reveal acute findings as read by the radiologist- to account for her pain    I did review her case with Dr Ling Ibrahim who advised follow-up with Dr Marnie Briggs as he feels she may benefit from an exploratory laparoscopy with consideration for ruling out  an internal hernia    PLAN: follow-up with Dr Marnie Briggs to review recent CT of abdomen/pelvis and EGD and for further assessment /recommendations with possible consideration of exploratory laparoscopy    Patient is agreeable to the plan    She also notes she is prone to constipation- she is scheduled for consultation with GI for colonoscopy and encouraged to also get this done  Advised to trial either colace or miralax as needed until seen by them and then to follow their advice if different    Bariatric surgery status      She is status post laparoscopic elvira-en-y gastric bypass surgery by Dr Floyd Lehman 1/21/2014    She is here in follow-up to review upper endoscopy results and pathology along with results of CT of abdomen pelvis in regards to her mid-epigastric and right grown pains    She is taking ppi daily  Notes no marked improvement in her pain    Poor appetite  She does complain of poor appetite with her abdominal pain  Prealbumin was done which indicates good protein status  She is actually gaining weight-trying to eat smaller, more often with the pain  Advised after further evaluation of her abdominal pain we can then refer her to RD/ and medical weight management to help with weight loss  -She is agreeable to this    Groin pain, right  She has complained of ongoing right groin pain  CT of abdomen/pelvis with no findings in the area to correlate with this pain  She does report chronic back pain and notes radiation into her leg-so ? ? If groin pain is related to her back pain-encouraged her to follow-up with PCP for this  Postgastrectomy malabsorption  She had routine labs done-advised on results and vitamin recommendations-she had been advised of vitamin D prescription but did not start this yet  Advised to do so      Vitamin B12 deficiency  Advised on adding 1000 mcg vitamin B12 daily-sent rx to her pharmacy as well    Will repeat labs in a few months-with MMA    Vitamin D deficiency  rx for vitamin D-50,000 Iu twice weekly had been sent to pharmacy-she did not pick this up yet-advised to do so and when complete she should add 2000 IU vitamin D3 daily    Will repeat labs in a few months       Diagnoses and all orders for this visit:    Bariatric surgery status    Epigastric abdominal pain    Poor appetite    Groin pain, right    Postgastrectomy malabsorption    Vitamin D deficiency    Vitamin B12 deficiency          Subjective:      Patient ID: Saadia Rowe is a 66 y o  female  She is here in follow-up to assess her abdominal and groin pain and to review results from EGD/pathology and CT of abdomen/pelvis  She reports she continues to experience intermittent mid-epigastric pain and right groin pain  She has chronic back pain as well  She has intermittent nausea/no vomiting  No fever or chills  The following portions of the patient's history were reviewed and updated as appropriate: allergies, current medications, past family history, past medical history, past social history, past surgical history and problem list     Review of Systems   Constitutional: Negative for chills and fever  Unexpected weight change: planned weight loss  Respiratory: Negative for shortness of breath and wheezing  Cardiovascular: Negative for chest pain and palpitations  Gastrointestinal: Positive for abdominal pain, constipation and nausea  Negative for diarrhea and vomiting  Musculoskeletal: Positive for back pain  Psychiatric/Behavioral: Suicidal ideas: no complait of anxiety or depression  Objective:      /74   Pulse 99   Temp 97 8 °F (36 6 °C)   Resp 18   Ht 5' 5 5" (1 664 m)   Wt 90 9 kg (200 lb 8 oz)   LMP  (LMP Unknown)   BMI 32 86 kg/m²          Physical Exam   Constitutional: She is oriented to person, place, and time  She appears well-developed and well-nourished  HENT:   Mouth/Throat: Oropharynx is clear and moist    Eyes: Conjunctivae are normal  No scleral icterus  Cardiovascular: Normal rate, regular rhythm and normal heart sounds     Pulmonary/Chest: Effort normal and breath sounds normal    Abdominal: Soft  Bowel sounds are normal  She exhibits no distension  There is tenderness  There is no rebound and no guarding  No incisional hernias appreciated  + mid-epigastric pain  + right groin pain-no hernia appreciated   Musculoskeletal:   Normal gait   Neurological: She is alert and oriented to person, place, and time  Skin: Skin is warm and dry  Psychiatric: She has a normal mood and affect  Her behavior is normal  Judgment and thought content normal    Nursing note and vitals reviewed  GOALS: Continued weight loss with good nutrition intakes    Normal vitamin and mineral levels  Exercise as tolerated    BARRIERS: none identified

## 2019-08-01 NOTE — ASSESSMENT & PLAN NOTE
She does complain of poor appetite with her abdominal pain  Prealbumin was done which indicates good protein status  She is actually gaining weight-trying to eat smaller, more often with the pain  Advised after further evaluation of her abdominal pain we can then refer her to RD/ and medical weight management to help with weight loss  -She is agreeable to this

## 2019-08-01 NOTE — ASSESSMENT & PLAN NOTE
Advised on adding 1000 mcg vitamin B12 daily-sent rx to her pharmacy as well    Will repeat labs in a few months-with MMA

## 2019-08-16 ENCOUNTER — HOSPITAL ENCOUNTER (OUTPATIENT)
Dept: MRI IMAGING | Facility: HOSPITAL | Age: 59
Discharge: HOME | End: 2019-08-16
Payer: COMMERCIAL

## 2019-08-16 ENCOUNTER — HOSPITAL ENCOUNTER (OUTPATIENT)
Dept: NEUROLOGY | Facility: HOSPITAL | Age: 59
Discharge: HOME | End: 2019-08-16
Payer: COMMERCIAL

## 2019-08-16 DIAGNOSIS — M54.41 CHRONIC BILATERAL LOW BACK PAIN WITH BILATERAL SCIATICA: ICD-10-CM

## 2019-08-16 DIAGNOSIS — G56.03 BILATERAL CARPAL TUNNEL SYNDROME: ICD-10-CM

## 2019-08-16 DIAGNOSIS — G89.29 CHRONIC BILATERAL LOW BACK PAIN WITH BILATERAL SCIATICA: ICD-10-CM

## 2019-08-16 DIAGNOSIS — M54.42 CHRONIC BILATERAL LOW BACK PAIN WITH BILATERAL SCIATICA: ICD-10-CM

## 2019-08-16 PROCEDURE — 72148 MRI LUMBAR SPINE W/O DYE: CPT

## 2019-08-22 DIAGNOSIS — M47.816 LUMBAR FACET ARTHROPATHY: Primary | ICD-10-CM

## 2019-08-22 NOTE — RESULT ENCOUNTER NOTE
She has some facet hypertrophy in the spine which is an enlargement of the joints in the spine and is usually associated with arthritis  She also has a schmorls nodule which is a small protrusion that is associated with excess wear and tear on the spine   I recommend consult to pain management and I have put in the referral

## 2019-08-23 ENCOUNTER — APPOINTMENT (EMERGENCY)
Dept: CT IMAGING | Facility: HOSPITAL | Age: 59
End: 2019-08-23
Payer: COMMERCIAL

## 2019-08-23 ENCOUNTER — APPOINTMENT (EMERGENCY)
Dept: RADIOLOGY | Facility: HOSPITAL | Age: 59
End: 2019-08-23
Payer: COMMERCIAL

## 2019-08-23 ENCOUNTER — HOSPITAL ENCOUNTER (EMERGENCY)
Facility: HOSPITAL | Age: 59
Discharge: HOME | End: 2019-08-23
Attending: EMERGENCY MEDICINE | Admitting: EMERGENCY MEDICINE
Payer: COMMERCIAL

## 2019-08-23 VITALS
SYSTOLIC BLOOD PRESSURE: 123 MMHG | TEMPERATURE: 97.1 F | OXYGEN SATURATION: 97 % | DIASTOLIC BLOOD PRESSURE: 56 MMHG | HEART RATE: 61 BPM | RESPIRATION RATE: 16 BRPM

## 2019-08-23 DIAGNOSIS — R55 NEAR SYNCOPE: ICD-10-CM

## 2019-08-23 DIAGNOSIS — R42 VERTIGO: Primary | ICD-10-CM

## 2019-08-23 DIAGNOSIS — R51.9 HEADACHE: ICD-10-CM

## 2019-08-23 DIAGNOSIS — R07.9 CHEST PAIN: ICD-10-CM

## 2019-08-23 LAB
ANION GAP SERPL CALCULATED.3IONS-SCNC: 6 MMOL/L (ref 4–13)
BASOPHILS # BLD AUTO: 0.02 THOUSANDS/ΜL (ref 0–0.1)
BASOPHILS NFR BLD AUTO: 0 % (ref 0–1)
BUN SERPL-MCNC: 18 MG/DL (ref 5–25)
CALCIUM SERPL-MCNC: 9.4 MG/DL (ref 8.3–10.1)
CHLORIDE SERPL-SCNC: 105 MMOL/L (ref 100–108)
CO2 SERPL-SCNC: 31 MMOL/L (ref 21–32)
CREAT SERPL-MCNC: 0.76 MG/DL (ref 0.6–1.3)
EOSINOPHIL # BLD AUTO: 0.1 THOUSAND/ΜL (ref 0–0.61)
EOSINOPHIL NFR BLD AUTO: 1 % (ref 0–6)
ERYTHROCYTE [DISTWIDTH] IN BLOOD BY AUTOMATED COUNT: 12.5 % (ref 11.6–15.1)
GFR SERPL CREATININE-BSD FRML MDRD: 88 ML/MIN/1.73SQ M
GLUCOSE SERPL-MCNC: 98 MG/DL (ref 65–140)
HCT VFR BLD AUTO: 40.1 % (ref 34.8–46.1)
HGB BLD-MCNC: 12.3 G/DL (ref 11.5–15.4)
IMM GRANULOCYTES # BLD AUTO: 0.03 THOUSAND/UL (ref 0–0.2)
IMM GRANULOCYTES NFR BLD AUTO: 0 % (ref 0–2)
LYMPHOCYTES # BLD AUTO: 2.05 THOUSANDS/ΜL (ref 0.6–4.47)
LYMPHOCYTES NFR BLD AUTO: 25 % (ref 14–44)
MCH RBC QN AUTO: 28.4 PG (ref 26.8–34.3)
MCHC RBC AUTO-ENTMCNC: 30.7 G/DL (ref 31.4–37.4)
MCV RBC AUTO: 93 FL (ref 82–98)
MONOCYTES # BLD AUTO: 0.55 THOUSAND/ΜL (ref 0.17–1.22)
MONOCYTES NFR BLD AUTO: 7 % (ref 4–12)
NEUTROPHILS # BLD AUTO: 5.51 THOUSANDS/ΜL (ref 1.85–7.62)
NEUTS SEG NFR BLD AUTO: 67 % (ref 43–75)
NRBC BLD AUTO-RTO: 0 /100 WBCS
PLATELET # BLD AUTO: 225 THOUSANDS/UL (ref 149–390)
PMV BLD AUTO: 11.9 FL (ref 8.9–12.7)
POTASSIUM SERPL-SCNC: 4.5 MMOL/L (ref 3.5–5.3)
RBC # BLD AUTO: 4.33 MILLION/UL (ref 3.81–5.12)
SODIUM SERPL-SCNC: 142 MMOL/L (ref 136–145)
TROPONIN I SERPL-MCNC: <0.02 NG/ML
WBC # BLD AUTO: 8.26 THOUSAND/UL (ref 4.31–10.16)

## 2019-08-23 PROCEDURE — 99284 EMERGENCY DEPT VISIT MOD MDM: CPT | Performed by: EMERGENCY MEDICINE

## 2019-08-23 PROCEDURE — 85025 COMPLETE CBC W/AUTO DIFF WBC: CPT | Performed by: EMERGENCY MEDICINE

## 2019-08-23 PROCEDURE — 36415 COLL VENOUS BLD VENIPUNCTURE: CPT | Performed by: EMERGENCY MEDICINE

## 2019-08-23 PROCEDURE — 99285 EMERGENCY DEPT VISIT HI MDM: CPT

## 2019-08-23 PROCEDURE — 71045 X-RAY EXAM CHEST 1 VIEW: CPT

## 2019-08-23 PROCEDURE — 70450 CT HEAD/BRAIN W/O DYE: CPT

## 2019-08-23 PROCEDURE — 80048 BASIC METABOLIC PNL TOTAL CA: CPT | Performed by: EMERGENCY MEDICINE

## 2019-08-23 PROCEDURE — 96361 HYDRATE IV INFUSION ADD-ON: CPT

## 2019-08-23 PROCEDURE — 84484 ASSAY OF TROPONIN QUANT: CPT | Performed by: EMERGENCY MEDICINE

## 2019-08-23 PROCEDURE — 96365 THER/PROPH/DIAG IV INF INIT: CPT

## 2019-08-23 PROCEDURE — 96375 TX/PRO/DX INJ NEW DRUG ADDON: CPT

## 2019-08-23 RX ORDER — MAGNESIUM SULFATE HEPTAHYDRATE 40 MG/ML
2 INJECTION, SOLUTION INTRAVENOUS ONCE
Status: COMPLETED | OUTPATIENT
Start: 2019-08-23 | End: 2019-08-23

## 2019-08-23 RX ORDER — BUTALBITAL, ACETAMINOPHEN AND CAFFEINE 50; 325; 40 MG/1; MG/1; MG/1
1 TABLET ORAL ONCE
Status: COMPLETED | OUTPATIENT
Start: 2019-08-23 | End: 2019-08-23

## 2019-08-23 RX ORDER — ACETAMINOPHEN 325 MG/1
650 TABLET ORAL ONCE
Status: COMPLETED | OUTPATIENT
Start: 2019-08-23 | End: 2019-08-23

## 2019-08-23 RX ORDER — BUTALBITAL, ACETAMINOPHEN AND CAFFEINE 50; 325; 40 MG/1; MG/1; MG/1
1 TABLET ORAL EVERY 4 HOURS PRN
Qty: 30 TABLET | Refills: 0 | Status: SHIPPED | OUTPATIENT
Start: 2019-08-23

## 2019-08-23 RX ORDER — MECLIZINE HCL 12.5 MG/1
25 TABLET ORAL 3 TIMES DAILY PRN
Qty: 30 TABLET | Refills: 0 | Status: SHIPPED | OUTPATIENT
Start: 2019-08-23

## 2019-08-23 RX ORDER — METOCLOPRAMIDE HYDROCHLORIDE 5 MG/ML
10 INJECTION INTRAMUSCULAR; INTRAVENOUS ONCE
Status: COMPLETED | OUTPATIENT
Start: 2019-08-23 | End: 2019-08-23

## 2019-08-23 RX ORDER — MECLIZINE HCL 12.5 MG/1
25 TABLET ORAL ONCE
Status: COMPLETED | OUTPATIENT
Start: 2019-08-23 | End: 2019-08-23

## 2019-08-23 RX ORDER — ONDANSETRON 4 MG/1
4 TABLET, FILM COATED ORAL EVERY 6 HOURS
Qty: 12 TABLET | Refills: 0 | Status: SHIPPED | OUTPATIENT
Start: 2019-08-23

## 2019-08-23 RX ADMIN — MECLIZINE 25 MG: 12.5 TABLET ORAL at 20:52

## 2019-08-23 RX ADMIN — METOCLOPRAMIDE 10 MG: 5 INJECTION, SOLUTION INTRAMUSCULAR; INTRAVENOUS at 20:53

## 2019-08-23 RX ADMIN — SODIUM CHLORIDE 1000 ML: 0.9 INJECTION, SOLUTION INTRAVENOUS at 20:52

## 2019-08-23 RX ADMIN — MAGNESIUM SULFATE HEPTAHYDRATE 2 G: 40 INJECTION, SOLUTION INTRAVENOUS at 21:16

## 2019-08-23 RX ADMIN — BUTALBITAL, ACETAMINOPHEN AND CAFFEINE 1 TABLET: 50; 325; 40 TABLET ORAL at 20:52

## 2019-08-23 RX ADMIN — ACETAMINOPHEN 650 MG: 325 TABLET ORAL at 20:52

## 2019-08-24 NOTE — ED PROVIDER NOTES
History  Chief Complaint   Patient presents with    Dizziness     headache and dizziness "the room is spinning" and chest pressure x3 days  Denies vomiting  Denies numbness or weakness  Pt is a 66year old female with a PMH of DM, gastric bypass, migraines presenting with multiple complaints  Pt states 3 days ago she began having sensation of dizziness "like the room is spinning"  States "I feel like I am drunk"  Denies changes in vision, facial droop, syncope, changes in speech, weakness  She is also complaining of global headache for 3 days as well that has been refractory to Tylenol  Right sided neck pain that begins in her occiput and radiates to her right posterior shoulder  Bilateral hand numbness  States she has been having intermittent left sided "chest squeezing"  States "my heart squeezes and stops, and then swooshes after"  Denies SOB, hemoptysis, leg swelling, history of blood clots  Pt is not on blood thinners, denies head injury, recent illness or history of vertigo  Father had CVA but no personal or family history of MI  Prior to Admission Medications   Prescriptions Last Dose Informant Patient Reported? Taking?    Multiple Vitamin (MULTIVITAMIN) capsule   Yes No   Sig: Take 1 capsule by mouth daily   acetaminophen (TYLENOL) 500 mg tablet   No No   Sig: Take 1 tablet (500 mg total) by mouth every 6 (six) hours as needed for mild pain   albuterol (PROVENTIL HFA,VENTOLIN HFA) 90 mcg/act inhaler   Yes No   Sig: Inhale 2 puffs every 6 (six) hours as needed for wheezing   baclofen 10 mg tablet   No No   Sig: Take 1 tablet (10 mg total) by mouth 3 (three) times a day   cholecalciferol (VITAMIN D3) 1,000 units tablet   No No   Sig: Take 1 tablet (1,000 Units total) by mouth 2 (two) times a day   clonazePAM (KlonoPIN) 0 5 mg tablet   No No   Sig: Take 1 tablet (0 5 mg total) by mouth 2 (two) times a day as needed for anxiety for up to 10 days   cyanocobalamin (VITAMIN B-12) 1,000 mcg tablet No No   Sig: Take 1 tablet (1,000 mcg total) by mouth daily   ergocalciferol (VITAMIN D2) 50,000 units   No No   Sig: Take 1 capsule (50,000 Units total) by mouth 3 (three) times a week   hydrocortisone 2 5 % cream   No No   Sig: Apply topically 4 (four) times a day as needed for irritation   omeprazole (PriLOSEC) 20 mg delayed release capsule   No No   Sig: TAKE ONE CAPSULE BY MOUTH DAILY   topiramate (TOPAMAX) 25 mg sprinkle capsule   No No   Sig: Take 1 capsule (25 mg total) by mouth 2 (two) times a day   traMADol (ULTRAM) 50 mg tablet   No No   Sig: Take 1 tablet (50 mg total) by mouth every 6 (six) hours as needed for moderate pain   venlafaxine (EFFEXOR) 25 mg tablet   No No   Sig: Take 1 tablet (25 mg total) by mouth 3 (three) times a day for 7 days      Facility-Administered Medications: None       Past Medical History:   Diagnosis Date    Asthma     Albuterol prn    Bariatric surgery status     Depression 2009    managed with Effexor     Diabetes mellitus ([facility] [address] ) 2005    resolved with gastric bypass 2015    Generalized osteoarthritis     Low vitamin B12 level     Migraine     Postgastrectomy malabsorption     Suicide attempt ([facility] [address] )     Vitamin D deficiency        Past Surgical History:   Procedure Laterality Date    CHOLECYSTECTOMY  2005    GASTRIC BYPASS  2015    elvira - en -y     HYSTERECTOMY      KNEE ARTHROSCOPY      with Lysis of adhesions    LAPAROSCOPIC SUPRACERVICAL HYSTERECTOMY  2005    due to endometriosis/AUB    OOPHORECTOMY Left 2005    TONSILLECTOMY AND ADENOIDECTOMY      TUBAL LIGATION  1986       Family History   Problem Relation Age of Onset    Hypertension Mother     Kidney cancer Mother     Diabetes Mother     Breast cancer Mother     Heart disease Father     Stroke Father     Hypertension Sister     HIV Brother     Breast cancer Cousin     No Known Problems Daughter     Stomach cancer Maternal Grandmother     No Known Problems Maternal Grandfather     No Known Problems Paternal Grandmother     No Known Problems Paternal Grandfather     No Known Problems Sister     No Known Problems Sister     No Known Problems Daughter     Prostate cancer Maternal Uncle     Stomach cancer Maternal Uncle     Leukemia Grandchild      I have reviewed and agree with the history as documented  Social History     Tobacco Use    Smoking status: Former Smoker     Years: 32 00     Types: Cigarettes     Last attempt to quit: 2013     Years since quittin 3    Smokeless tobacco: Former User     Quit date: 2013   Substance Use Topics    Alcohol use: Never     Frequency: Never     Binge frequency: Never    Drug use: Never        Review of Systems   Constitutional: Negative for chills, diaphoresis and fever  Eyes: Negative for photophobia and visual disturbance  Respiratory: Negative for chest tightness and shortness of breath  Cardiovascular: Positive for chest pain  Gastrointestinal: Negative for diarrhea, nausea and vomiting  Genitourinary: Negative  Musculoskeletal: Positive for neck pain  Negative for back pain and neck stiffness  Neurological: Positive for dizziness, numbness and headaches  Negative for syncope, facial asymmetry, speech difficulty, weakness and light-headedness  Physical Exam  Physical Exam   Constitutional: She is oriented to person, place, and time  She appears well-developed and well-nourished  No distress  HENT:   Head: Normocephalic and atraumatic  Right Ear: External ear normal    Left Ear: External ear normal    Nose: Nose normal    Mouth/Throat: Oropharynx is clear and moist  No oropharyngeal exudate  Eyes: Pupils are equal, round, and reactive to light  Conjunctivae and EOM are normal    Neck: Normal range of motion and full passive range of motion without pain  Neck supple  Cardiovascular: Normal rate, regular rhythm, normal heart sounds and intact distal pulses     Pulses:       Radial pulses are 2+ on the right side, and 2+ on the left side  Dorsalis pedis pulses are 2+ on the right side, and 2+ on the left side  Pulmonary/Chest: Effort normal and breath sounds normal    Abdominal: Soft  Bowel sounds are normal  She exhibits no distension  There is no tenderness  Musculoskeletal: Normal range of motion  Cervical back: She exhibits tenderness and pain  She exhibits normal range of motion, no bony tenderness, no swelling, no edema, no deformity, no laceration, no spasm and normal pulse  Back:    Neurological: She is alert and oriented to person, place, and time  She has normal strength  No cranial nerve deficit or sensory deficit  She exhibits normal muscle tone  Coordination and gait normal  GCS eye subscore is 4  GCS verbal subscore is 5  GCS motor subscore is 6  Non-focal neuro exam  No pronator drift or ataxia noted  Skin: Skin is warm and dry  Capillary refill takes less than 2 seconds  She is not diaphoretic         Vital Signs  ED Triage Vitals   Temperature Pulse Respirations Blood Pressure SpO2   08/23/19 1812 08/23/19 1812 08/23/19 1812 08/23/19 1813 08/23/19 1812   (!) 97 1 °F (36 2 °C) 74 18 138/67 99 %      Temp Source Heart Rate Source Patient Position - Orthostatic VS BP Location FiO2 (%)   08/23/19 1812 08/23/19 1812 -- 08/23/19 2000 --   Temporal Monitor  Left arm       Pain Score       08/23/19 1812       7           Vitals:    08/23/19 1812 08/23/19 1813 08/23/19 2000 08/23/19 2217   BP:  138/67 124/59 123/56   Pulse: 74  61 61         Visual Acuity  Visual Acuity      Most Recent Value   L Pupil Size (mm)  4   R Pupil Size (mm)  4          ED Medications  Medications   sodium chloride 0 9 % bolus 1,000 mL (0 mL Intravenous Stopped 8/23/19 2258)   meclizine (ANTIVERT) tablet 25 mg (25 mg Oral Given 8/23/19 2052)   metoclopramide (REGLAN) injection 10 mg (10 mg Intravenous Given 8/23/19 2053)   acetaminophen (TYLENOL) tablet 650 mg (650 mg Oral Given 8/23/19 2052) butalbital-acetaminophen-caffeine (FIORICET,ESGIC) -40 mg per tablet 1 tablet (1 tablet Oral Given 8/23/19 2052)   magnesium sulfate 2 g/50 mL IVPB (premix) 2 g (0 g Intravenous Stopped 8/23/19 2217)       Diagnostic Studies  Results Reviewed     Procedure Component Value Units Date/Time    Troponin I [[id number]]  (Normal) Collected:  08/23/19 2049    Lab Status:  Final result Specimen:  Blood from Arm, Right Updated:  08/23/19 2122     Troponin I <0 02 ng/mL     Basic metabolic panel [[id number]] Collected:  08/23/19 2049    Lab Status:  Final result Specimen:  Blood from Arm, Right Updated:  08/23/19 2114     Sodium 142 mmol/L      Potassium 4 5 mmol/L      Chloride 105 mmol/L      CO2 31 mmol/L      ANION GAP 6 mmol/L      BUN 18 mg/dL      Creatinine 0 76 mg/dL      Glucose 98 mg/dL      Calcium 9 4 mg/dL      eGFR 88 ml/min/1 73sq m     Narrative:       [facility] guidelines for Chronic Kidney Disease (CKD):     Stage 1 with normal or high GFR (GFR > 90 mL/min/1 73 square meters)    Stage 2 Mild CKD (GFR = 60-89 mL/min/1 73 square meters)    Stage 3A Moderate CKD (GFR = 45-59 mL/min/1 73 square meters)    Stage 3B Moderate CKD (GFR = 30-44 mL/min/1 73 square meters)    Stage 4 Severe CKD (GFR = 15-29 mL/min/1 73 square meters)    Stage 5 End Stage CKD (GFR <15 mL/min/1 73 square meters)  Note: GFR calculation is accurate only with a steady state creatinine    CBC and differential [[id number]]  (Abnormal) Collected:  08/23/19 2049    Lab Status:  Final result Specimen:  Blood from Arm, Right Updated:  08/23/19 2100     WBC 8 26 Thousand/uL      RBC 4 33 Million/uL      Hemoglobin 12 3 g/dL      Hematocrit 40 1 %      MCV 93 fL      MCH 28 4 pg      MCHC 30 7 g/dL      RDW 12 5 %      MPV 11 9 fL      Platelets 882 Thousands/uL      nRBC 0 /100 WBCs      Neutrophils Relative 67 %      Immat GRANS % 0 %      Lymphocytes Relative 25 %      Monocytes Relative 7 %      Eosinophils Relative 1 %      Basophils Relative 0 %      Neutrophils Absolute 5 51 Thousands/µL      Immature Grans Absolute 0 03 Thousand/uL      Lymphocytes Absolute 2 05 Thousands/µL      Monocytes Absolute 0 55 Thousand/µL      Eosinophils Absolute 0 10 Thousand/µL      Basophils Absolute 0 02 Thousands/µL                  CT head without contrast   Final Result by Elena Aaron DO (08/23 2122)      No acute intracranial abnormality  Workstation performed: [id number]         XR chest 1 view portable    (Results Pending)              Procedures  ECG 12 Lead Documentation Only  Date/Time: 8/23/2019 9:26 PM  Performed by: Jared Emerson PA-C  Authorized by: Jared Emerson PA-C     ECG reviewed by me, the ED Provider: yes    Patient location:  ED  Previous ECG:     Previous ECG:  Unavailable    Comparison to cardiac monitor: No    Interpretation:     Interpretation: normal    Rate:     ECG rate:  65    ECG rate assessment: normal    Rhythm:     Rhythm: sinus rhythm    Ectopy:     Ectopy: none    QRS:     QRS axis:  Normal    QRS intervals:  Normal  Conduction:     Conduction: normal    ST segments:     ST segments:  Normal  T waves:     T waves: normal             ED Course                               MDM  Number of Diagnoses or Management Options  Chest pain:   Headache:   Near syncope:   Vertigo:   Diagnosis management comments: CT of the head negative for acute findings  She is feeling much better after medications and fluids  Her cardiac workup is negative as well  She has low heart score of 3  I did offer her admission for her symptoms but she did not want to stay and would like to be discharged  Follow up with PCP  Educated on return precautions  Stable and ready for discharge        Disposition  Final diagnoses:   Vertigo   Chest pain   Near syncope   Headache     Time reflects when diagnosis was documented in both MDM as applicable and the Disposition within this note     Time User Action Codes Description Comment    8/23/2019 10:42 PM Childers Wilkin Add [R42] Vertigo     8/23/2019 10:42 PM Rosakian VANG Add [R07 9] Chest pain     8/23/2019 10:42 PM Alvarado Wilkin Add [R55] Near syncope     8/23/2019 10:43 PM Najma Sanaz Shree Palafox Headache       ED Disposition     ED Disposition Condition Date/Time Comment    Discharge Good Fri Aug 23, 2019 10:42 PM Carmell Common discharge to home/self care              Follow-up Information     Follow up With Specialties Details Why Contact Info    Shavonne Wilson PA-C Family Medicine, Physician Assistant Schedule an appointment as soon as possible for a visit today  [address] [place] [address]  [address] [facility] [place] [address]  [phone]            Discharge Medication List as of 8/23/2019 10:44 PM      START taking these medications    Details   butalbital-acetaminophen-caffeine (FIORICET,ESGIC) -40 mg per tablet Take 1 tablet by mouth every 4 (four) hours as needed for headaches, Starting Fri 8/23/2019, Print      meclizine (ANTIVERT) 12 5 MG tablet Take 2 tablets (25 mg total) by mouth 3 (three) times a day as needed for dizziness, Starting Fri 8/23/2019, Print      ondansetron (ZOFRAN) 4 mg tablet Take 1 tablet (4 mg total) by mouth every 6 (six) hours, Starting Fri 8/23/2019, Print         CONTINUE these medications which have NOT CHANGED    Details   acetaminophen (TYLENOL) 500 mg tablet Take 1 tablet (500 mg total) by mouth every 6 (six) hours as needed for mild pain, Starting Wed 12/26/2018, Print      albuterol (PROVENTIL HFA,VENTOLIN HFA) 90 mcg/act inhaler Inhale 2 puffs every 6 (six) hours as needed for wheezing, Historical Med      baclofen 10 mg tablet Take 1 tablet (10 mg total) by mouth 3 (three) times a day, Starting Fri 7/26/2019, Normal      cholecalciferol (VITAMIN D3) 1,000 units tablet Take 1 tablet (1,000 Units total) by mouth 2 (two) times a day, Starting Tue 7/2/2019, Normal      clonazePAM (KlonoPIN) 0 5 mg tablet Take 1 tablet (0 5 mg total) by mouth 2 (two) times a day as needed for anxiety for up to 10 days, Starting Fri 3/22/2019, Until Fri 5/31/2019, Print      cyanocobalamin (VITAMIN B-12) 1,000 mcg tablet Take 1 tablet (1,000 mcg total) by mouth daily, Starting Tue 7/2/2019, Normal      ergocalciferol (VITAMIN D2) 50,000 units Take 1 capsule (50,000 Units total) by mouth 3 (three) times a week, Starting Wed 7/3/2019, Normal      hydrocortisone 2 5 % cream Apply topically 4 (four) times a day as needed for irritation, Starting Wed 4/24/2019, Normal      Multiple Vitamin (MULTIVITAMIN) capsule Take 1 capsule by mouth daily, Historical Med      omeprazole (PriLOSEC) 20 mg delayed release capsule TAKE ONE CAPSULE BY MOUTH DAILY, Normal      topiramate (TOPAMAX) 25 mg sprinkle capsule Take 1 capsule (25 mg total) by mouth 2 (two) times a day, Starting Fri 5/31/2019, Normal      traMADol (ULTRAM) 50 mg tablet Take 1 tablet (50 mg total) by mouth every 6 (six) hours as needed for moderate pain, Starting Fri 7/26/2019, Normal      venlafaxine (EFFEXOR) 25 mg tablet Take 1 tablet (25 mg total) by mouth 3 (three) times a day for 7 days, Starting Fri 3/22/2019, Until Tue 7/2/2019, Normal           No discharge procedures on file      ED Provider  Electronically Signed by           Susana George PA-C  08/24/19 [id number]

## 2019-08-26 ENCOUNTER — TELEPHONE (OUTPATIENT)
Dept: FAMILY MEDICINE CLINIC | Facility: CLINIC | Age: 59
End: 2019-08-26

## 2019-08-26 DIAGNOSIS — Z20.1 EXPOSURE TO TB: Primary | ICD-10-CM

## 2019-08-26 NOTE — TELEPHONE ENCOUNTER
Pt was in room with her mother while at [facility] and pt who shared room with her was found to have active TB  Per protocol, Qgold ordered  Pt and family are already aware of exposure and testing recommended

## 2019-09-05 ENCOUNTER — CONSULT (OUTPATIENT)
Dept: PAIN MEDICINE | Facility: MEDICAL CENTER | Age: 59
End: 2019-09-05
Payer: COMMERCIAL

## 2019-09-05 VITALS
SYSTOLIC BLOOD PRESSURE: 127 MMHG | HEART RATE: 68 BPM | DIASTOLIC BLOOD PRESSURE: 77 MMHG | BODY MASS INDEX: 32.14 KG/M2 | WEIGHT: 200 LBS | HEIGHT: 66 IN

## 2019-09-05 DIAGNOSIS — M47.816 LUMBAR FACET ARTHROPATHY: ICD-10-CM

## 2019-09-05 DIAGNOSIS — M47.816 LUMBAR SPONDYLOSIS: Primary | ICD-10-CM

## 2019-09-05 PROCEDURE — 99204 OFFICE O/P NEW MOD 45 MIN: CPT | Performed by: PHYSICAL MEDICINE & REHABILITATION

## 2019-09-05 RX ORDER — METHYLPREDNISOLONE 4 MG/1
TABLET ORAL
Qty: 1 EACH | Refills: 0 | Status: SHIPPED | OUTPATIENT
Start: 2019-09-05 | End: 2019-12-10

## 2019-09-05 NOTE — PROGRESS NOTES
Assessment  1  Lumbar spondylosis    2  Lumbar facet arthropathy        Plan      Ms Driss Mishra is a pleasant 42-year-old female who presents with several weeks duration of acute onset bilateral low back pain worse with extension and demonstrated both clinical and diagnostic evidence of lumbar spondylosis without myelopathy  She has attempted conservative measures including several weeks of physical therapy as well as Lidoderm patches and home exercise regimen  At this time I will  1  We will schedule the patient for right left-sided L3, L4, L5 medial branch nerve blocks with intention of moving forward towards radiofrequency ablation if there is an appropriate diagnostic response  The initial blocks will be performed with 2% lidocaine and if an appropriate response is obtained upon review of the patient's pain diary, a confirmatory block will be scheduled with 0 5% bupivacaine  In the office today, we reviewed the nature of facet joint pathology in depth using a spine model  We discussed the approach we would use for the injections and provided literature for home review  The patient understands the risks associated with the procedure including bleeding, infection, tissue injury, and allergic reaction and provided verbal informed consent in the office today  2  Due to her exquisite pain that she is in I have also provided her with a Medrol Dosepak      My impressions and treatment recommendations were discussed in detail with the patient who verbalized understanding and had no further questions  Discharge instructions were provided  I personally saw and examined the patient and I agree with the above discussed plan of care        Orders Placed This Encounter   Procedures    FL spine and pain procedure     Standing Status:   Future     Standing Expiration Date:   9/5/2023     Order Specific Question:   Reason for Exam:     Answer:   lumbar spondylosis - bilateral L3, L4, L5 MBB Trial #1     Order Specific Question:   Is the patient pregnant? Answer:   No     Order Specific Question:   Anticoagulant hold needed? Answer:   No     New Medications Ordered This Visit   Medications    methylPREDNISolone 4 MG tablet therapy pack     Sig: Use as directed on package     Dispense:  1 each     Refill:  0       History of Present Illness    Zhen Rosenthal is a 66 y o  female who presents [place] [id number]  with complaints of acute onset low back pain that started after a fall several weeks ago  Today she reports the pain as severe rated 10/10 and interfering with her daily activities  She states the pain is constant 100% of the time worse throughout the day morning afternoon and night  She describes the pain as burning, cramping, shooting, numbness, sharp, pressure-like  She also complains of intermittent weakness in the bilateral lower extremities however she does not use a assist device  She has previously tried physical therapy as well as heat and ice and acupuncture none of which have provided her relief  She also has Lidoderm patches that did not provide significant relief her pain is unchanged with position as all positions provide pain  Of note she does have an MRI that was done on August 16, 2019 with results demonstrating multilevel scattered spondylotic changes most pronounced at L4-5  As well as a grade 1 anterolisthesis at L3 on L4  Presents now for evaluation of persistent low back pain unrelieved with conservative measures  I have personally reviewed and/or updated the patient's past medical history, past surgical history, family history, social history, current medications, allergies, and vital signs today  Review of Systems   Constitutional: Negative for activity change  HENT: Negative for congestion  Eyes: Negative for redness  Respiratory: Negative for chest tightness  Cardiovascular: Positive for leg swelling  Negative for chest pain  Gastrointestinal: Positive for abdominal pain  Negative for diarrhea  Endocrine: Negative for heat intolerance  Genitourinary: Negative for decreased urine volume and flank pain  Musculoskeletal: Positive for arthralgias, back pain (lower lumbar radiating down to gluteal area and legs), gait problem, joint swelling and myalgias  Skin: Negative for color change  Allergic/Immunologic: Negative for immunocompromised state  Neurological: Positive for numbness and headaches  Hematological: Negative for adenopathy  Psychiatric/Behavioral: Positive for dysphoric mood  The patient is nervous/anxious  The patient is not hyperactive          Patient Active Problem List   Diagnosis    Asthma    Bariatric surgery status    [facility]    Major depressive disorder, recurrent severe without psychotic features ([facility] )    Hypoglycemia    Chronic back pain    Generalized anxiety disorder    Intertrigo    Postgastrectomy malabsorption    Bilateral carpal tunnel syndrome    Pannus, abdominal    Generalized osteoarthritis    Chronic nonintractable headache    Epigastric abdominal pain    Groin pain, right    Vitamin D deficiency    Vitamin B12 deficiency    Poor appetite       Past Medical History:   Diagnosis Date    Asthma     Albuterol prn    Bariatric surgery status     Depression 2009    managed with Effexor     [facility] 2005    resolved with gastric bypass 2015    Generalized osteoarthritis     Low vitamin B12 level     Migraine     Postgastrectomy malabsorption     [facility]     Vitamin D deficiency        Past Surgical History:   Procedure Laterality Date    CHOLECYSTECTOMY  2005    GASTRIC BYPASS  2015    elvira - en -y     HYSTERECTOMY      KNEE ARTHROSCOPY      with Lysis of adhesions    LAPAROSCOPIC SUPRACERVICAL HYSTERECTOMY  2005    due to endometriosis/AUB    OOPHORECTOMY Left 2005    TONSILLECTOMY AND ADENOIDECTOMY      TUBAL LIGATION         Family History   Problem Relation Age of Onset    Hypertension Mother     Kidney cancer Mother     Diabetes Mother    Guillermo Calle Breast cancer Mother     Heart disease Father    Guillermo Calle Stroke Father     Hypertension Sister     HIV Brother     Breast cancer Cousin     No Known Problems Daughter     Stomach cancer Maternal Grandmother     No Known Problems Maternal Grandfather     No Known Problems Paternal Grandmother     No Known Problems Paternal Grandfather     No Known Problems Sister     No Known Problems Sister     No Known Problems Daughter     Prostate cancer Maternal Uncle     Stomach cancer Maternal Uncle     Leukemia Grandchild        Social History     Occupational History    Not on file   Tobacco Use    Smoking status: Former Smoker     Years: 32      Types: Cigarettes     Last attempt to quit: 2013     Years since quittin 3    Smokeless tobacco: Former User     Quit date: 2013   Substance and Sexual Activity    Alcohol use: Never     Frequency: Never     Binge frequency: Never    Drug use: Never    Sexual activity: Not Currently     Partners: Male     Birth control/protection: Female Sterilization, Post-menopausal       Current Outpatient Medications on File Prior to Visit   Medication Sig    acetaminophen (TYLENOL) 500 mg tablet Take 1 tablet (500 mg total) by mouth every 6 (six) hours as needed for mild pain    albuterol (PROVENTIL HFA,VENTOLIN HFA) 90 mcg/act inhaler Inhale 2 puffs every 6 (six) hours as needed for wheezing    baclofen 10 mg tablet Take 1 tablet (10 mg total) by mouth 3 (three) times a day    butalbital-acetaminophen-caffeine (FIORICET,ESGIC) -40 mg per tablet Take 1 tablet by mouth every 4 (four) hours as needed for headaches    cholecalciferol (VITAMIN D3) 1,000 units tablet Take 1 tablet (1,000 Units total) by mouth 2 (two) times a day    cyanocobalamin (VITAMIN B-12) 1,000 mcg tablet Take 1 tablet (1,000 mcg total) by mouth daily    ergocalciferol (VITAMIN D2) 50,000 units Take 1 capsule (50,000 Units total) by mouth 3 (three) times a week    hydrocortisone 2 5 % cream Apply topically 4 (four) times a day as needed for irritation    meclizine (ANTIVERT) 12 5 MG tablet Take 2 tablets (25 mg total) by mouth 3 (three) times a day as needed for dizziness    Multiple Vitamin (MULTIVITAMIN) capsule Take 1 capsule by mouth daily    omeprazole (PriLOSEC) 20 mg delayed release capsule TAKE ONE CAPSULE BY MOUTH DAILY    ondansetron (ZOFRAN) 4 mg tablet Take 1 tablet (4 mg total) by mouth every 6 (six) hours    topiramate (TOPAMAX) 25 mg sprinkle capsule Take 1 capsule (25 mg total) by mouth 2 (two) times a day    traMADol (ULTRAM) 50 mg tablet Take 1 tablet (50 mg total) by mouth every 6 (six) hours as needed for moderate pain    clonazePAM (KlonoPIN) 0 5 mg tablet Take 1 tablet (0 5 mg total) by mouth 2 (two) times a day as needed for anxiety for up to 10 days    venlafaxine (EFFEXOR) 25 mg tablet Take 1 tablet (25 mg total) by mouth 3 (three) times a day for 7 days     No current facility-administered medications on file prior to visit  Allergies   Allergen Reactions    Motrin [Ibuprofen]      Hx gastric bypass       Physical Exam    /77 (BP Location: Left arm, Patient Position: Sitting, Cuff Size: Adult)   Pulse 68   Ht 5' 5 5" (1 664 m)   Wt 90 7 kg (200 lb)   LMP  (LMP Unknown)   BMI 32 78 kg/m²     General: Well-developed, well-nourished individual in no acute distress  Mental: Appropriate mood and affect  Grossly oriented with coherent speech and thought processing  Neuro:  Cranial nerves: Cranial nerve function is grossly intact bilaterally  Strength: Bilateral lower extremity strength is limited secondary to significant pain in the low back  No atrophy or tone abnormalities noted  Reflexes: Bilateral lower extremity muscle stretch reflexes are physiologic and symmetric  No ankle clonus is noted  Sensation: No loss of sensation is noted  SLR/Foraminal Compression Maneuvers: Straight leg raising unable to perform secondary to pain  However negative slump test in the bilateral lower extremities  POSITIVE pain with axial loading of lumbar facets with rotational component to the right and the left  Gait:  Gait/gross motor: Gait is guarded  Station is forward flexed  Toe walking, heel walking  are unable to perform secondary to pain  Musculoskeletal:  Palpation: Inspection and palpation of the spine and extremities are exquisite tenderness to palpation of the bilateral lumbar paraspinals  Spine:  Significantly reduced active range of motion of the lumbar spine worse with extension over flexion  No gross axial skeletal deformities  Skin: Skin inspection grossly negative for erythema, breakdown, or concerning lesions in affected area  Lymph: No lymphadenopathy is appreciated in the involved extremity  Vessels: No lower extremity edema  Lungs: Breathing is comfortable and regular  No dyspnea noted during examination  Eyes: Visual field grossly intact to confrontation  No redness appreciated  ENT: No craniofacial deformities or asymmetry  No neck masses appreciated  Imaging    MRI LUMBAR SPINE WITHOUT CONTRAST     INDICATION: M54 42: Lumbago with sciatica, left side  M54 41: Lumbago with sciatica, right side  G89 29: Other chronic pain  Low back pain radiating down both legs, worse on the right      COMPARISON:  6/3/2019     TECHNIQUE:  Sagittal T1, sagittal T2, sagittal inversion recovery, axial T1 and axial T2, coronal T2     IMAGE QUALITY:  Diagnostic     FINDINGS:     VERTEBRAL BODIES:  Grade 1 anterolisthesis of L3 on L4 noted  This is similar to the previous radiograph  There is an acute Schmorl's node in the inferior endplate of L3 with surrounding endplate edema  No significant vertebral body height collapse  No bony retropulsion    Scattered mild chronic degenerative endplate changes elsewhere including a small chronic appearing Schmorl's noted in the inferior endplate of L4  Hemangioma in the L4 vertebra also noted incidentally      SACRUM:  Normal signal within the sacrum  No evidence of insufficiency or stress fracture      DISTAL CORD AND CONUS:  Normal size and signal within the distal cord and conus        PARASPINAL SOFT TISSUES:  Paraspinal soft tissues are unremarkable      LOWER THORACIC DISC SPACES:  Normal disc height and signal   No disc herniation, canal stenosis or foraminal narrowing      LUMBAR DISC SPACES:     L1-L2:  Normal      L2-L3:  Normal      L3-L4:  Loss of disc height and signal   There is bilateral facet hypertrophy  There is a left neural foraminal disc herniation, protrusion type  There is mild to moderate left neural foraminal narrowing  Central canal and right neural foramen patent      L4-L5:  There is bilateral facet hypertrophy  There is a left neural foraminal disc herniation, protrusion type  There is moderate to severe left neural foraminal narrowing  Central canal patent  Right neural foramen patent      L5-S1:  Small left neural foraminal disc herniation, protrusion type  Mild left neural foraminal narrowing  Central canal and right neural foramen patent      IMPRESSION:        1  Acute Schmorl's node in the inferior endplate of L3   2   Grade 1 anterolisthesis of L3 on L4   3   Scattered spondylotic changes, most pronounced on the left at L4-5        Workstation performed: [id number]      RIGHT HIP     INDICATION:   M54 42: Lumbago with sciatica, left side  M54 41: Lumbago with sciatica, right side  G89 29:  Other chronic pain      COMPARISON:  Pelvis radiographs 7/1/2016     VIEWS:  XR HIP/PELV 2-3 VWS RIGHT W PELVIS IF PERFORMED         FINDINGS:     There is no acute fracture or dislocation      No significant hip degenerative changes      No lytic or blastic osseous lesions      Soft tissues are unremarkable      The visualized lumbar spine is unremarkable      IMPRESSION:     No acute osseous abnormality            Workstation performed: [id number]      LUMBAR SPINE     INDICATION:   M54 42: Lumbago with sciatica, left side  M54 41: Lumbago with sciatica, right side  G89 29:  Other chronic pain  M25 551: Pain in right hip      COMPARISON:  Lumbar spine radiographs 9/12/2016     VIEWS:  XR SPINE LUMBAR MINIMUM 4 VIEWS NON INJURY        FINDINGS:     There is no evidence of acute fracture or destructive osseous lesion      Mild lumbar levocurvature is unchanged      Mild to moderate disc height loss at L3-4 is similar to prior      The pedicles appear intact      Soft tissues are unremarkable      IMPRESSION:  No acute findings      Workstation performed: [id number]

## 2019-09-05 NOTE — PATIENT INSTRUCTIONS
Arthritis   WHAT YOU NEED TO KNOW:   Arthritis is a disease that causes inflammation in one or more joints  There are many types of arthritis, such as osteoarthritis, rheumatoid arthritis, and septic arthritis  Some types cause inflammation in the joints  Other types wear away the cartilage between joints  This makes the bones of the joint rub together when you move the joint  Your symptoms may be constant, or symptoms may come and go  Arthritis often gets worse over time and can cause permanent joint damage  DISCHARGE INSTRUCTIONS:   Return to the emergency department if:   · You have a fever and severe joint pain or swelling  · You cannot move the affected joint  · You have severe joint pain you cannot tolerate  Contact your healthcare provider if:   · Your pain or swelling does not get better with treatment  · You have questions or concerns about your condition or care  Medicines:   · Acetaminophen  decreases pain and fever  It is available without a doctor's order  Ask how much to take and how often to take it  Follow directions  Acetaminophen can cause liver damage if not taken correctly  · NSAIDs , such as ibuprofen, help decrease swelling, pain, and fever  This medicine is available with or without a doctor's order  NSAIDs can cause stomach bleeding or kidney problems in certain people  If you take blood thinner medicine, always ask your healthcare provider if NSAIDs are safe for you  Always read the medicine label and follow directions  · Steroids  reduce swelling and pain  · Prescription pain medicine  may be given  Do not wait until the pain is severe before you take your medicine  Ask your healthcare provider how to take this medicine safely  · Take your medicine as directed  Contact your healthcare provider if you think your medicine is not helping or if you have side effects  Tell him of her if you are allergic to any medicine   Keep a list of the medicines, vitamins, and herbs you take  Include the amounts, and when and why you take them  Bring the list or the pill bottles to follow-up visits  Carry your medicine list with you in case of an emergency  Follow up with your healthcare provider or rheumatologist as directed:  Write down your questions so you remember to ask them during your visits  Manage arthritis:   · Rest your painful joint so it can heal   Your healthcare provider may recommend crutches or a walker if the affected joint is in a leg  · Apply ice or heat to the joint  Both can help decrease swelling and pain  Ice may also help prevent tissue damage  Use an ice pack, or put crushed ice in a plastic bag  Cover it with a towel and place it on your joint for 15 to 20 minutes every hour or as directed  You can apply heat for 20 minutes every 2 hours  Heat treatment includes hot packs or heat lamps  · Elevate your joint  Elevation helps reduce swelling and pain  Raise your joint above the level of your heart as often as you can  Prop your painful joint on pillows to keep it above your heart comfortably  · Go to therapy as directed  A physical therapist can teach you exercises to improve flexibility and range of motion  You may also be shown non-weight-bearing exercises that are safe for your joints, such as swimming  Exercise can help keep your joints flexible and reduce pain  An occupational therapist can help you learn to do your daily activities when your joints are stiff or sore  · Maintain a healthy weight  Extra weight puts increased pressure on your joints  Ask your healthcare provider what you should weigh  If you need to lose weight, he can help you create a weight loss program  Weight loss can help reduce pain and increase your ability to do your activities  The amount of exercise you do may vary each day, depending on your symptoms  · Wear flat or low-heeled shoes    This will help decrease pain and reduce pressure on your ankle, knee, and hip joints  · Use support devices  You may be given splints to wear on your hands to help your joints rest and to decrease inflammation  While you sleep, use a pillow that is firm enough to support your neck and head  Support equipment:  The following may help you move and prevent falls:  · Orthotic shoes or insoles  help support your feet when you walk  · Crutches, a cane, or a walker  may help decrease your risk for falling  They also decrease stress on affected joints  · Devices to prevent falls  include raised toilet seats and bathtub bars to help you get up from sitting  Handrails can be placed in areas where you need balance and support  © 2017 [facility] Information is for End User's use only and may not be sold, redistributed or otherwise used for commercial purposes  All illustrations and images included in CareNotes® are the copyrighted property of A D A M [employer] Inc  or [employer]  The above information is an  only  It is not intended as medical advice for individual conditions or treatments  Talk to your doctor, nurse or pharmacist before following any medical regimen to see if it is safe and effective for you

## 2019-09-06 ENCOUNTER — TELEPHONE (OUTPATIENT)
Dept: FAMILY MEDICINE CLINIC | Facility: CLINIC | Age: 59
End: 2019-09-06

## 2019-09-18 ENCOUNTER — TELEPHONE (OUTPATIENT)
Dept: PAIN MEDICINE | Facility: MEDICAL CENTER | Age: 59
End: 2019-09-18

## 2019-09-18 NOTE — TELEPHONE ENCOUNTER
--SHYLA--    S/W pt  Advised pt of the same  Pt verbalized understanding  Pt stated she can't go to the ER b/c she is staying in the hospital with her mom and does not want to leave her alone  Advised pt to try ice/heat  She stated she did and it did not work  Pt stated they are trying to get her mom in rehab and then she will C/B to schedule an appt

## 2019-09-18 NOTE — TELEPHONE ENCOUNTER
Thank you Edith,     I agree  Patient will need to go to ED if pain is uncontrollable  The plan was the steroid pack and intervention  If she cannot take NSAIDS I will need to reevaluate her in office ASAP or she may go to the ED   Cannot send pain meds electronically without further evaluation and considerations

## 2019-09-18 NOTE — TELEPHONE ENCOUNTER
Patient had a procedure appt schedule for 9/19/19, when calling to confirm appt pt said she will not be able to make due to her mom being in the hospital for a stroke  Pt would like pain medication until she is able to come in for a procedure  Pt will call back to reschedule

## 2019-09-18 NOTE — TELEPHONE ENCOUNTER
S/W pt  Pt has pain in b/l lower back  Pain is 10/10 sharp and "grabs "  Pt stated she cannot take NSAIDS and tylenol don't help  Pt stated she took the steroid pack and that did not help  She takes baclofen as needed and she does not have any tramadol left  Pharmacy on file  Pt is asking for pain medications  Advised pt to seek treatment at the ER if the pain is too bad and/or excruciating and will get this message to [facility]  Pt verbalized understanding  Please advise

## 2019-10-22 ENCOUNTER — OFFICE VISIT (OUTPATIENT)
Dept: FAMILY MEDICINE CLINIC | Facility: CLINIC | Age: 59
End: 2019-10-22

## 2019-10-22 VITALS
DIASTOLIC BLOOD PRESSURE: 80 MMHG | OXYGEN SATURATION: 98 % | RESPIRATION RATE: 18 BRPM | BODY MASS INDEX: 31.66 KG/M2 | TEMPERATURE: 96.9 F | HEIGHT: 66 IN | SYSTOLIC BLOOD PRESSURE: 122 MMHG | WEIGHT: 197 LBS | HEART RATE: 78 BPM

## 2019-10-22 DIAGNOSIS — M54.42 CHRONIC BILATERAL LOW BACK PAIN WITH BILATERAL SCIATICA: ICD-10-CM

## 2019-10-22 DIAGNOSIS — G89.29 CHRONIC BILATERAL LOW BACK PAIN WITH BILATERAL SCIATICA: ICD-10-CM

## 2019-10-22 DIAGNOSIS — Z00.00 MEDICARE ANNUAL WELLNESS VISIT, SUBSEQUENT: ICD-10-CM

## 2019-10-22 DIAGNOSIS — Z23 ENCOUNTER FOR IMMUNIZATION: Primary | ICD-10-CM

## 2019-10-22 DIAGNOSIS — M54.41 CHRONIC BILATERAL LOW BACK PAIN WITH BILATERAL SCIATICA: ICD-10-CM

## 2019-10-22 PROCEDURE — 1036F TOBACCO NON-USER: CPT | Performed by: PHYSICIAN ASSISTANT

## 2019-10-22 PROCEDURE — G0439 PPPS, SUBSEQ VISIT: HCPCS | Performed by: PHYSICIAN ASSISTANT

## 2019-10-22 PROCEDURE — 3725F SCREEN DEPRESSION PERFORMED: CPT | Performed by: PHYSICIAN ASSISTANT

## 2019-10-22 PROCEDURE — 90682 RIV4 VACC RECOMBINANT DNA IM: CPT | Performed by: PHYSICIAN ASSISTANT

## 2019-10-22 PROCEDURE — G0009 ADMIN PNEUMOCOCCAL VACCINE: HCPCS | Performed by: PHYSICIAN ASSISTANT

## 2019-10-22 PROCEDURE — 3008F BODY MASS INDEX DOCD: CPT | Performed by: PHYSICIAN ASSISTANT

## 2019-10-22 RX ORDER — TRAMADOL HYDROCHLORIDE 50 MG/1
50 TABLET ORAL EVERY 6 HOURS PRN
Qty: 15 TABLET | Refills: 0 | Status: SHIPPED | OUTPATIENT
Start: 2019-10-22 | End: 2019-12-10 | Stop reason: SDUPTHER

## 2019-10-22 NOTE — PROGRESS NOTES
Assessment and Plan:     Problem List Items Addressed This Visit        Other    Chronic back pain    Relevant Medications    traMADol (ULTRAM) 50 mg tablet      Other Visit Diagnoses     Encounter for immunization    -  Primary    Relevant Orders    influenza vaccine, [id number], quadrivalent, recombinant, PF, 0 5 mL, for patients 18 yr+ (FLUBLOK) (Completed)    Medicare annual wellness visit, subsequent            BMI Counseling: Body mass index is 32 28 kg/m²  The BMI is above normal  Nutrition recommendations include encouraging healthy choices of fruits and vegetables, moderation in carbohydrate intake and increasing intake of lean protein  Exercise recommendations include moderate physical activity 150 minutes/week  Preventive health issues were discussed with patient, and age appropriate screening tests were ordered as noted in patient's After Visit Summary  Personalized health advice and appropriate referrals for health education or preventive services given if needed, as noted in patient's After Visit Summary  History of Present Illness:     Patient presents for Welcome to Medicare visit  Patient Care Team:  [address] [place] [address] PAADRIANNE as PCP - General (Family Medicine)  MD Franc Valentino MD     Review of Systems:     Review of Systems   Constitutional: Negative for activity change, appetite change, chills, fatigue and unexpected weight change  HENT: Negative for dental problem, ear pain, hearing loss and sore throat  Eyes: Negative for visual disturbance  Respiratory: Negative for cough and wheezing  Cardiovascular: Negative for chest pain  Gastrointestinal: Negative for abdominal pain, constipation, diarrhea and vomiting  Genitourinary: Negative for difficulty urinating and dysuria  Musculoskeletal: Positive for back pain  Negative for arthralgias and myalgias  Skin: Negative for rash  Neurological: Negative for dizziness and headaches  Psychiatric/Behavioral: Negative for behavioral problems        Problem List:     Patient Active Problem List   Diagnosis    Asthma    Bariatric surgery status    [facility]    Major depressive disorder, recurrent severe without psychotic features ([facility]    Hypoglycemia    Chronic back pain    Generalized anxiety disorder    Intertrigo    Postgastrectomy malabsorption    Bilateral carpal tunnel syndrome    Pannus, abdominal    Generalized osteoarthritis    Chronic nonintractable headache    Epigastric abdominal pain    Groin pain, right    Vitamin D deficiency    Vitamin B12 deficiency    Poor appetite      Past Medical and Surgical History:     Past Medical History:   Diagnosis Date    Asthma     Albuterol prn    Bariatric surgery status     Depression 2009    managed with Effexor     Diabetes mellitus ([facility] ) 2005    resolved with gastric bypass 2015    Generalized osteoarthritis     Low vitamin B12 level     Migraine     Postgastrectomy malabsorption     [facility]icide [facility] ([facility] )     Vitamin D deficiency      Past Surgical History:   Procedure Laterality Date    CHOLECYSTECTOMY  2005    GASTRIC BYPASS  2015    elvira - en -y    [facility] ARTHROSCOPY      with Lysis of adhesions    LAPAROSCOPIC SUPRACERVICAL HYSTERECTOMY  2005    due to endometriosis/AUB    OOPHORECTOMY Left 2005    TONSILLECTOMY AND ADENOIDECTOMY      TUBAL LIGATION  1986      Family History:     Family History   Problem Relation Age of Onset    Hypertension Mother     Kidney cancer Mother     Diabetes Mother     Breast cancer Mother     Heart disease Father     Stroke Father     Hypertension Sister     HIV Brother     Breast cancer Cousin     No Known Problems Daughter     Stomach cancer Maternal Grandmother     No Known Problems Maternal Grandfather     No Known Problems Paternal Grandmother     No Known Problems Paternal Grandfather     No Known Problems Sister    Lemons No Known Problems Sister     No Known Problems Daughter     Prostate cancer Maternal Uncle     Stomach cancer Maternal Uncle     Leukemia Grandchild       Social History:     Social History     Socioeconomic History    Marital status: Single     Spouse name: None    Number of children: None    Years of education: None    Highest education level: None   Occupational History    None   Social Needs    Financial resource strain: None    Food insecurity:     Worry: None     Inability: None    Transportation needs:     Medical: None     Non-medical: None   Tobacco Use    Smoking status: Former Smoker     Years: 32 00     Types: Cigarettes     Last attempt to quit: 2013     Years since quittin 4    Smokeless tobacco: Former User     Quit date: 2013   Substance and Sexual Activity    Alcohol use: Never     Frequency: Never     Binge frequency: Never    Drug use: Never    Sexual activity: Not Currently     Partners: Male     Birth control/protection: Female Sterilization, Post-menopausal   Lifestyle    Physical activity:     Days per week: None     Minutes per session: None    Stress: None   Relationships    Social connections:     Talks on phone: None     Gets together: None     Attends Zoroastrianism service: None     Active member of club or organization: None     Attends meetings of clubs or organizations: None     Relationship status: None    Intimate partner violence:     Fear of current or ex partner: None     Emotionally abused: None     Physically abused: None     Forced sexual activity: None   Other Topics Concern    None   Social History Narrative    None      Medications and Allergies:     Current Outpatient Medications   Medication Sig Dispense Refill    acetaminophen (TYLENOL) 500 mg tablet Take 1 tablet (500 mg total) by mouth every 6 (six) hours as needed for mild pain 30 tablet 0    albuterol (PROVENTIL HFA,VENTOLIN HFA) 90 mcg/act inhaler Inhale 2 puffs every 6 (six) hours as needed for wheezing      baclofen 10 mg tablet Take 1 tablet (10 mg total) by mouth 3 (three) times a day 60 tablet 0    butalbital-acetaminophen-caffeine (FIORICET,ESGIC) -40 mg per tablet Take 1 tablet by mouth every 4 (four) hours as needed for headaches 30 tablet 0    cholecalciferol (VITAMIN D3) 1,000 units tablet Take 1 tablet (1,000 Units total) by mouth 2 (two) times a day 60 tablet 5    cyanocobalamin (VITAMIN B-12) 1,000 mcg tablet Take 1 tablet (1,000 mcg total) by mouth daily 30 tablet 5    ergocalciferol (VITAMIN D2) 50,000 units Take 1 capsule (50,000 Units total) by mouth 3 (three) times a week 8 capsule 4    hydrocortisone 2 5 % cream Apply topically 4 (four) times a day as needed for irritation 30 g 0    meclizine (ANTIVERT) 12 5 MG tablet Take 2 tablets (25 mg total) by mouth 3 (three) times a day as needed for dizziness 30 tablet 0    methylPREDNISolone 4 MG tablet therapy pack Use as directed on package 1 each 0    Multiple Vitamin (MULTIVITAMIN) capsule Take 1 capsule by mouth daily      omeprazole (PriLOSEC) 20 mg delayed release capsule TAKE ONE CAPSULE BY MOUTH DAILY 90 capsule 1    ondansetron (ZOFRAN) 4 mg tablet Take 1 tablet (4 mg total) by mouth every 6 (six) hours 12 tablet 0    topiramate (TOPAMAX) 25 mg sprinkle capsule Take 1 capsule (25 mg total) by mouth 2 (two) times a day 60 capsule 1    traMADol (ULTRAM) 50 mg tablet Take 1 tablet (50 mg total) by mouth every 6 (six) hours as needed for moderate pain 15 tablet 0    clonazePAM (KlonoPIN) 0 5 mg tablet Take 1 tablet (0 5 mg total) by mouth 2 (two) times a day as needed for anxiety for up to 10 days 7 tablet 0    venlafaxine (EFFEXOR) 25 mg tablet Take 1 tablet (25 mg total) by mouth 3 (three) times a day for 7 days 21 tablet 0     No current facility-administered medications for this visit        Allergies   Allergen Reactions    Motrin [Ibuprofen]      Hx gastric bypass      Immunizations:     Immunization History   Administered Date(s) Administered    INFLUENZA 12/09/2013, 12/18/2014, 02/08/2017    Influenza, recombinant, quadrivalent,injectable, preservative free 10/22/2019    Pneumococcal Polysaccharide PPV23 03/21/2012    Tdap 03/21/2012      Health Maintenance:         Topic Date Due    CRC Screening: Colonoscopy  1960    Cervical Cancer Screening  06/11/2022    Hepatitis C Screening  Completed     There are no preventive care reminders to display for this patient  Medicare Screening Tests and Risk Assessments:     Harmeet Kelley is here for her Subsequent Wellness visit  Health Risk Assessment:   Patient rates overall health as fair  Patient feels that their physical health rating is slightly worse  Eyesight was rated as slightly worse  Hearing was rated as slightly worse  Patient feels that their emotional and mental health rating is same  Pain experienced in the last 7 days has been a lot  Patient's pain rating has been 8/10  Patient states that she has experienced weight loss or gain in last 6 months  Gained weight without trying     Depression Screening:   PHQ-2 Score: 3  PHQ-9 Score: 10      Fall Risk Screening: In the past year, patient has experienced: no history of falling in past year      Urinary Incontinence Screening:   Patient has not leaked urine accidently in the last six months  Home Safety:  Patient does not have trouble with stairs inside or outside of their home  Patient has working smoke alarms and has working carbon monoxide detector  Home safety hazards include: none  Nutrition:   Current diet is Regular and Limited junk food  States she hardly eating anything she picks at food     Medications:   Patient is not currently taking any over-the-counter supplements  Patient is able to manage medications       Activities of Daily Living (ADLs)/Instrumental Activities of Daily Living (IADLs):   Walk and transfer into and out of bed and chair?: Yes  Dress and groom yourself?: Yes    Bathe or shower yourself?: Yes    Feed yourself? Yes  Do your laundry/housekeeping?: Yes  Manage your money, pay your bills and track your expenses?: Yes  Make your own meals?: Yes    Do your own shopping?: Yes    Previous Hospitalizations:   Any hospitalizations or ED visits within the last 12 months?: No      Advance Care Planning:   Living will: No    Durable POA for healthcare: No    Advanced directive: No    Advanced directive counseling given: Yes    Five wishes given: Yes    Patient declined ACP directive: No      PREVENTIVE SCREENINGS      Cardiovascular Screening:    General: Screening Current      Diabetes Screening:     General: Screening Current      Colorectal Cancer Screening:     General: Risks and Benefits Discussed    Due for: Colonoscopy - Low Risk      Breast Cancer Screening:     General: Screening Current      Cervical Cancer Screening:    General: Screening Current      Osteoporosis Screening:    General: Screening Not Indicated      Abdominal Aortic Aneurysm (AAA) Screening:        General: Screening Not Indicated      Lung Cancer Screening:     General: Screening Not Indicated      Hepatitis C Screening:    General: Screening Current      Preventive Screening Comments: She is scheduled for colonoscopy next month    No exam data present     Physical Exam:     /80 (BP Location: Left arm, Patient Position: Sitting, Cuff Size: Large)   Pulse 78   Temp (!) 96 9 °F (36 1 °C) (Temporal)   Resp 18   Ht 5' 5 5" (1 664 m)   Wt 89 4 kg (197 lb)   LMP  (LMP Unknown)   SpO2 98%   Breastfeeding? No   BMI 32 28 kg/m²     Physical Exam   Constitutional: She is oriented to person, place, and time  She appears well-developed and well-nourished  HENT:   Head: Normocephalic and atraumatic  Right Ear: External ear normal    Left Ear: External ear normal    Nose: Nose normal    Mouth/Throat: Oropharynx is clear and moist    Eyes: Conjunctivae are normal    Neck: Normal range of motion  Neck supple  No thyromegaly present  Cardiovascular: Normal rate, regular rhythm and normal heart sounds  No murmur heard  Pulmonary/Chest: Effort normal and breath sounds normal  No respiratory distress  Abdominal: Soft  Bowel sounds are normal  She exhibits no mass  There is no tenderness  There is no guarding  Musculoskeletal: Normal range of motion  Lymphadenopathy:     She has no cervical adenopathy  Neurological: She is alert and oriented to person, place, and time  Skin: Skin is warm  Psychiatric: She has a normal mood and affect  Her behavior is normal    Nursing note and vitals reviewed        Shavonne Wilson PA-C

## 2019-10-22 NOTE — PATIENT INSTRUCTIONS
Medicare Preventive Visit Patient Instructions  Thank you for completing your Welcome to Medicare Visit or Medicare Annual Wellness Visit today  Your next wellness visit will be due in one year (10/22/2020)  The screening/preventive services that you may require over the next 5-10 years are detailed below  Some tests may not apply to you based off risk factors and/or age  Screening tests ordered at today's visit but not completed yet may show as past due  Also, please note that scanned in results may not display below  Preventive Screenings:  Service Recommendations Previous Testing/Comments   Colorectal Cancer Screening  * Colonoscopy    * Fecal Occult Blood Test (FOBT)/Fecal Immunochemical Test (FIT)  * Fecal DNA/Cologuard Test  * Flexible Sigmoidoscopy Age: 54-65 years old   Colonoscopy: every 10 years (may be performed more frequently if at higher risk)  OR  FOBT/FIT: every 1 year  OR  Cologuard: every 3 years  OR  Sigmoidoscopy: every 5 years  Screening may be recommended earlier than age 48 if at higher risk for colorectal cancer  Also, an individualized decision between you and your healthcare provider will decide whether screening between the ages of 74-80 would be appropriate  Colonoscopy: Not on file  FOBT/FIT: Not on file  Cologuard: Not on file  Sigmoidoscopy: Not on file         Breast Cancer Screening Age: 36 years old  Frequency: every 1-2 years  Not required if history of left and right mastectomy Mammogram: 06/20/2019    Screening Current   Cervical Cancer Screening Between the ages of 21-29, pap smear recommended once every 3 years  Between the ages of 33-67, can perform pap smear with HPV co-testing every 5 years     Recommendations may differ for women with a history of total hysterectomy, cervical cancer, or abnormal pap smears in past  Pap Smear: 06/11/2019    Screening Current   Hepatitis C Screening Once for adults born between 1945 and 1965  More frequently in patients at high risk for Hepatitis C Hep C Antibody: 06/03/2019    Screening Current   Diabetes Screening 1-2 times per year if you're at risk for diabetes or have pre-diabetes Fasting glucose: 93 mg/dL   A1C: 5 4 %    Screening Current   Cholesterol Screening Once every 5 years if you don't have a lipid disorder  May order more often based on risk factors  Lipid panel: 07/16/2019    Screening Current     Other Preventive Screenings Covered by Medicare:  1  Abdominal Aortic Aneurysm (AAA) Screening: covered once if your at risk  You're considered to be at risk if you have a family history of AAA  2  Lung Cancer Screening: covers low dose CT scan once per year if you meet all of the following conditions: (1) Age 50-69; (2) No signs or symptoms of lung cancer; (3) Current smoker or have quit smoking within the last 15 years; (4) You have a tobacco smoking history of at least 30 pack years (packs per day multiplied by number of years you smoked); (5) You get a written order from a healthcare provider  3  Glaucoma Screening: covered annually if you're considered high risk: (1) You have diabetes OR (2) Family history of glaucoma OR (3)  aged 48 and older OR (3)  American aged 72 and older  3  Osteoporosis Screening: covered every 2 years if you meet one of the following conditions: (1) You're estrogen deficient and at risk for osteoporosis based off medical history and other findings; (2) Have a vertebral abnormality; (3) On glucocorticoid therapy for more than 3 months; (4) Have primary hyperparathyroidism; (5) On osteoporosis medications and need to assess response to drug therapy  · Last bone density test (DXA Scan): Not on file  5  HIV Screening: covered annually if you're between the age of 12-76  Also covered annually if you are younger than 13 and older than 72 with risk factors for HIV infection  For pregnant patients, it is covered up to 3 times per pregnancy      Immunizations:  Immunization Recommendations Influenza Vaccine Annual influenza vaccination during flu season is recommended for all persons aged >= 6 months who do not have contraindications   Pneumococcal Vaccine (Prevnar and Pneumovax)  * Prevnar = PCV13  * Pneumovax = PPSV23   Adults 25-60 years old: 1-3 doses may be recommended based on certain risk factors  Adults 72 years old: Prevnar (PCV13) vaccine recommended followed by Pneumovax (PPSV23) vaccine  If already received PPSV23 since turning 65, then PCV13 recommended at least one year after PPSV23 dose  Hepatitis B Vaccine 3 dose series if at intermediate or high risk (ex: diabetes, end stage renal disease, liver disease)   Tetanus (Td) Vaccine - COST NOT COVERED BY MEDICARE PART B Following completion of primary series, a booster dose should be given every 10 years to maintain immunity against tetanus  Td may also be given as tetanus wound prophylaxis  Tdap Vaccine - COST NOT COVERED BY MEDICARE PART B Recommended at least once for all adults  For pregnant patients, recommended with each pregnancy  Shingles Vaccine (Shingrix) - COST NOT COVERED BY MEDICARE PART B  2 shot series recommended in those aged 48 and above     Health Maintenance Due:      Topic Date Due    CRC Screening: Colonoscopy  1960    Cervical Cancer Screening  06/11/2022    Hepatitis C Screening  Completed     Immunizations Due:      Topic Date Due    INFLUENZA VACCINE  07/01/2019     Advance Directives   What are advance directives? Advance directives are legal documents that state your wishes and plans for medical care  These plans are made ahead of time in case you lose your ability to make decisions for yourself  Advance directives can apply to any medical decision, such as the treatments you want, and if you want to donate organs  What are the types of advance directives? There are many types of advance directives, and each state has rules about how to use them   You may choose a combination of any of the following:  · Living will: This is a written record of the treatment you want  You can also choose which treatments you do not want, which to limit, and which to stop at a certain time  This includes surgery, medicine, IV fluid, and tube feedings  · Durable power of  for healthcare [facility]): This is a written record that states who you want to make healthcare choices for you when you are unable to make them for yourself  This person, called a proxy, is usually a family member or a friend  You may choose more than 1 proxy  · Do not resuscitate (DNR) order:  A DNR order is used in case your heart stops beating or you stop breathing  It is a request not to have certain forms of treatment, such as CPR  A DNR order may be included in other types of advance directives  · Medical directive: This covers the care that you want if you are in a coma, near death, or unable to make decisions for yourself  You can list the treatments you want for each condition  Treatment may include pain medicine, surgery, blood transfusions, dialysis, IV or tube feedings, and a ventilator (breathing machine)  · Values history: This document has questions about your views, beliefs, and how you feel and think about life  This information can help others choose the care that you would choose  Why are advance directives important? An advance directive helps you control your care  Although spoken wishes may be used, it is better to have your wishes written down  Spoken wishes can be misunderstood, or not followed  Treatments may be given even if you do not want them  An advance directive may make it easier for your family to make difficult choices about your care  Weight Management   Why it is important to manage your weight:  Being overweight increases your risk of health conditions such as heart disease, high blood pressure, type 2 diabetes, and certain types of cancer   It can also increase your risk for osteoarthritis, sleep apnea, and other respiratory problems  Aim for a slow, steady weight loss  Even a small amount of weight loss can lower your risk of health problems  How to lose weight safely:  A safe and healthy way to lose weight is to eat fewer calories and get regular exercise  You can lose up about 1 pound a week by decreasing the number of calories you eat by 500 calories each day  Healthy meal plan for weight management:  A healthy meal plan includes a variety of foods, contains fewer calories, and helps you stay healthy  A healthy meal plan includes the following:  · Eat whole-grain foods more often  A healthy meal plan should contain fiber  Fiber is the part of grains, fruits, and vegetables that is not broken down by your body  Whole-grain foods are healthy and provide extra fiber in your diet  Some examples of whole-grain foods are whole-wheat breads and pastas, oatmeal, brown rice, and bulgur  · Eat a variety of vegetables every day  Include dark, leafy greens such as spinach, kale, duane greens, and mustard greens  Eat yellow and orange vegetables such as carrots, sweet potatoes, and winter squash  · Eat a variety of fruits every day  Choose fresh or canned fruit (canned in its own juice or light syrup) instead of juice  Fruit juice has very little or no fiber  · Eat low-fat dairy foods  Drink fat-free (skim) milk or 1% milk  Eat fat-free yogurt and low-fat cottage cheese  Try low-fat cheeses such as mozzarella and other reduced-fat cheeses  · Choose meat and other protein foods that are low in fat  Choose beans or other legumes such as split peas or lentils  Choose fish, skinless poultry (chicken or turkey), or lean cuts of red meat (beef or pork)  Before you cook meat or poultry, cut off any visible fat  · Use less fat and oil  Try baking foods instead of frying them  Add less fat, such as margarine, sour cream, regular salad dressing and mayonnaise to foods  Eat fewer high-fat foods   Some examples of high-fat foods include french fries, doughnuts, ice cream, and cakes  · Eat fewer sweets  Limit foods and drinks that are high in sugar  This includes candy, cookies, regular soda, and sweetened drinks  Exercise:  Exercise at least 30 minutes per day on most days of the week  Some examples of exercise include walking, biking, dancing, and swimming  You can also fit in more physical activity by taking the stairs instead of the elevator or parking farther away from stores  Ask your healthcare provider about the best exercise plan for you  © Copyright [employer] 2018 Information is for End User's use only and may not be sold, redistributed or otherwise used for commercial purposes   All illustrations and images included in CareNotes® are the copyrighted property of [employer]  or [address] [place], [place] [address]ruthie stacia

## 2019-10-24 ENCOUNTER — APPOINTMENT (OUTPATIENT)
Dept: LAB | Facility: HOSPITAL | Age: 59
End: 2019-10-24
Payer: COMMERCIAL

## 2019-10-24 DIAGNOSIS — K91.2 POSTSURGICAL MALABSORPTION: ICD-10-CM

## 2019-10-24 DIAGNOSIS — Z20.1 EXPOSURE TO TB: ICD-10-CM

## 2019-10-24 DIAGNOSIS — E53.8 VITAMIN B12 DEFICIENCY: ICD-10-CM

## 2019-10-24 DIAGNOSIS — Z98.84 BARIATRIC SURGERY STATUS: ICD-10-CM

## 2019-10-24 DIAGNOSIS — E55.9 VITAMIN D DEFICIENCY: ICD-10-CM

## 2019-10-24 LAB — VIT B12 SERPL-MCNC: 341 PG/ML (ref 100–900)

## 2019-10-24 PROCEDURE — 83918 ORGANIC ACIDS TOTAL QUANT: CPT

## 2019-10-24 PROCEDURE — 82306 VITAMIN D 25 HYDROXY: CPT

## 2019-10-24 PROCEDURE — 82607 VITAMIN B-12: CPT

## 2019-10-24 PROCEDURE — 36415 COLL VENOUS BLD VENIPUNCTURE: CPT

## 2019-10-24 PROCEDURE — 86480 TB TEST CELL IMMUN MEASURE: CPT

## 2019-10-25 LAB
25(OH)D3 SERPL-MCNC: 15.6 NG/ML (ref 30–100)
GAMMA INTERFERON BACKGROUND BLD IA-ACNC: 0.06 IU/ML
M TB IFN-G BLD-IMP: NEGATIVE
M TB IFN-G CD4+ BCKGRND COR BLD-ACNC: 0 IU/ML
M TB IFN-G CD4+ BCKGRND COR BLD-ACNC: 0 IU/ML
MITOGEN IGNF BCKGRD COR BLD-ACNC: >10 IU/ML

## 2019-10-26 LAB
METHYLMALONATE SERPL-SCNC: 157 NMOL/L (ref 0–378)
SL AMB DISCLAIMER: NORMAL

## 2019-11-01 DIAGNOSIS — E53.8 VITAMIN B12 DEFICIENCY: ICD-10-CM

## 2019-11-01 DIAGNOSIS — E55.9 VITAMIN D DEFICIENCY: Primary | ICD-10-CM

## 2019-11-01 RX ORDER — ERGOCALCIFEROL 1.25 MG/1
50000 CAPSULE ORAL 2 TIMES WEEKLY
Qty: 8 CAPSULE | Refills: 2 | Status: SHIPPED | OUTPATIENT
Start: 2019-11-04 | End: 2021-09-06

## 2019-11-01 RX ORDER — CYANOCOBALAMIN (VITAMIN B-12) 2000 MCG
2000 TABLET ORAL DAILY
Qty: 30 TABLET | Refills: 5 | Status: SHIPPED | OUTPATIENT
Start: 2019-11-01

## 2019-12-06 ENCOUNTER — TELEPHONE (OUTPATIENT)
Dept: PAIN MEDICINE | Facility: MEDICAL CENTER | Age: 59
End: 2019-12-06

## 2019-12-06 NOTE — TELEPHONE ENCOUNTER
Patient would like to reschedule the procedure she cancelled in September   Please call her on Monday [phone]

## 2019-12-09 ENCOUNTER — CONSULT (OUTPATIENT)
Dept: PLASTIC SURGERY | Facility: CLINIC | Age: 59
End: 2019-12-09
Payer: COMMERCIAL

## 2019-12-09 VITALS — WEIGHT: 199 LBS | BODY MASS INDEX: 31.98 KG/M2 | HEIGHT: 66 IN

## 2019-12-09 DIAGNOSIS — E65 PANNUS, ABDOMINAL: Primary | ICD-10-CM

## 2019-12-09 PROCEDURE — 99202 OFFICE O/P NEW SF 15 MIN: CPT | Performed by: PLASTIC SURGERY

## 2019-12-09 RX ORDER — CHOLECALCIFEROL (VITAMIN D3) 125 MCG
CAPSULE ORAL
Refills: 5 | COMMUNITY
Start: 2019-10-23 | End: 2019-12-09 | Stop reason: ALTCHOICE

## 2019-12-09 RX ORDER — CLONAZEPAM 0.5 MG/1
TABLET ORAL
Refills: 2 | COMMUNITY
Start: 2019-10-24

## 2019-12-09 RX ORDER — VENLAFAXINE 75 MG/1
TABLET ORAL
Refills: 2 | COMMUNITY
Start: 2019-10-23 | End: 2020-03-13

## 2019-12-09 NOTE — PROGRESS NOTES
Assessment/Plan   Diagnoses and all orders for this visit:    Pannus, abdominal    Other orders  -     clonazePAM (KlonoPIN) 0 5 mg tablet; TK 1 T PO 1-2 TIMES DAILY PRF ANXIETY/SLEEP  -     Discontinue: vitamin B-12 (VITAMIN B-12) 500 mcg tablet  -     venlafaxine (EFFEXOR) 75 mg tablet; TK 1 T PO BID WITH FOOD    Post bariatric excess of skin in the following regions: Trunk    The excess skin of the abdomen interferes with daily activities and causes this patient problems that are outlined in the above note  I believe this patient could benefit from excision of the abdominal pannus  I explained the procedure for panniculectomy to this patient  I explained that the purpose of this procedure is to reduce the overhanging skin on the lower abdomen  Further reduction of abdominal skin or excess fat would require a more extensive procedure such as an abdominoplasty  I also explained that this procedure does not correct  abdominal wall fascial laxity, which would also require an abdominoplasty procedure for correction  We reviewed the major and minor complications associated with panniculectomy, including possible loss of the umbilicus, wound dehiscence, skin necrosis, wound infection, excessive widening or scarring of the wounds, seroma, hematoma, need for further surgery, and unacceptable cosmetic result  Photos were taken today  We will plan to see this patient back for a preoperative visit once surgery is scheduled  Keysha Bundy MD   [address]   Office: [phone]        Subjective   Patient ID: Kate Marino is a 64 y o  female  Vitals:       Ms Kylee Becerra is a 64 y o  female who presents for evaluation of body contouring after massive weight loss  The patient underwent laparoscopic gastric bypass in 2015 by Dr Ileana Danielle  and has lost approximately 100 pounds   she has been at a stable weight for 24 months  The patient now has complaints of excess skin in the following areas: trunk  Pertaining to the excess skin:  The patient does not have an associated ventral hernia  The patient does have difficulty getting in and out of bed  The patient does have difficulty standing and walking straight   The patient does have difficulty tying her shoes  The patient does have difficulty crossing her legs  The patient does have difficulty finding clothes that fit appropriately  The patient does have recurrent skin rashes  The patient does have skin infections  The patient does have foul odors  The patient does not have non-healing skin ulcers   The patient does have back pain  The patient has difficulty maintaining proper hygiene due to moist area around umbilicus and lower abdomen  The following portions of the patient's history were reviewed and updated as appropriate: allergies, current medications, past family history, past medical history, past social history, past surgical history and problem list     Review of Systems   Constitutional: Negative  HENT: Negative  Eyes: Negative  Respiratory: Negative  Cardiovascular: Negative  Gastrointestinal: Negative  Endocrine: Negative  Genitourinary: Negative  Musculoskeletal: Positive for back pain and neck pain  Skin: Positive for rash  Allergic/Immunologic: Negative  Neurological: Negative  Hematological: Negative  Psychiatric/Behavioral: Negative  Objective   Physical Exam   Constitutional  She appears well-developed and well-nourished  Cardiovascular: Normal rate  Pulmonary/Chest  Effort normal      Psychiatric  She has a normal mood and affect  Her behavior is normal    Very emotional during encounter     Abdomen and Hips  Soft  Hernia confirmed negative in the ventral area  She has abdominal separation of less than 3 cm  Abdominal shape is protuberant and panniculus  A surgical scar is present   She has vertical, abdominal skin redundancy  Patient has excess abdominal fat   Excess fat located in the: upper abdomen, lower abdomen and esa-umbilical    overhanding pannus 5 cm over cephalic symphysis pubis and groin crease

## 2019-12-09 NOTE — TELEPHONE ENCOUNTER
Patient of Dr Valeria Walton, not Dr Beth Gastelum as was indicated in initial task       Bilateral L3-5 MBB #1

## 2019-12-09 NOTE — TELEPHONE ENCOUNTER
Called patient and rescheduled with Dr Prateek Blackman:     bilaterall L3-5 MBB #1 on 12/26    Reviewed instructions: , NPO 1 hour prior, loose-fitting/comfortable clothes, if ill/fever/infx/abx to call and reschedule  Also pain level at leat 5/10 and refrain from PRN, as-needed pain meds 6h prior  Patient stated verbal understanding

## 2019-12-10 ENCOUNTER — OFFICE VISIT (OUTPATIENT)
Dept: FAMILY MEDICINE CLINIC | Facility: CLINIC | Age: 59
End: 2019-12-10

## 2019-12-10 VITALS
RESPIRATION RATE: 16 BRPM | HEART RATE: 84 BPM | TEMPERATURE: 97.5 F | OXYGEN SATURATION: 99 % | HEIGHT: 65 IN | WEIGHT: 199.2 LBS | SYSTOLIC BLOOD PRESSURE: 124 MMHG | BODY MASS INDEX: 33.19 KG/M2 | DIASTOLIC BLOOD PRESSURE: 76 MMHG

## 2019-12-10 DIAGNOSIS — M54.41 CHRONIC BILATERAL LOW BACK PAIN WITH BILATERAL SCIATICA: ICD-10-CM

## 2019-12-10 DIAGNOSIS — E53.8 VITAMIN B12 DEFICIENCY: Primary | ICD-10-CM

## 2019-12-10 DIAGNOSIS — M54.42 CHRONIC BILATERAL LOW BACK PAIN WITH BILATERAL SCIATICA: ICD-10-CM

## 2019-12-10 DIAGNOSIS — M47.816 LUMBAR FACET ARTHROPATHY: ICD-10-CM

## 2019-12-10 DIAGNOSIS — F33.2 MAJOR DEPRESSIVE DISORDER, RECURRENT SEVERE WITHOUT PSYCHOTIC FEATURES (HCC): Chronic | ICD-10-CM

## 2019-12-10 DIAGNOSIS — Z12.11 COLON CANCER SCREENING: ICD-10-CM

## 2019-12-10 DIAGNOSIS — N25.81 SECONDARY HYPERPARATHYROIDISM OF RENAL ORIGIN (HCC): ICD-10-CM

## 2019-12-10 DIAGNOSIS — G89.29 CHRONIC BILATERAL LOW BACK PAIN WITH BILATERAL SCIATICA: ICD-10-CM

## 2019-12-10 PROCEDURE — 3008F BODY MASS INDEX DOCD: CPT | Performed by: PHYSICIAN ASSISTANT

## 2019-12-10 PROCEDURE — 99213 OFFICE O/P EST LOW 20 MIN: CPT | Performed by: PHYSICIAN ASSISTANT

## 2019-12-10 PROCEDURE — 1036F TOBACCO NON-USER: CPT | Performed by: PHYSICIAN ASSISTANT

## 2019-12-10 PROCEDURE — 96372 THER/PROPH/DIAG INJ SC/IM: CPT | Performed by: PHYSICIAN ASSISTANT

## 2019-12-10 PROCEDURE — 3725F SCREEN DEPRESSION PERFORMED: CPT | Performed by: PHYSICIAN ASSISTANT

## 2019-12-10 RX ORDER — CYANOCOBALAMIN 1000 UG/ML
1000 INJECTION INTRAMUSCULAR; SUBCUTANEOUS ONCE
Status: COMPLETED | OUTPATIENT
Start: 2019-12-10 | End: 2019-12-10

## 2019-12-10 RX ORDER — TIZANIDINE 2 MG/1
2 TABLET ORAL EVERY 8 HOURS PRN
Qty: 20 TABLET | Refills: 0 | Status: SHIPPED | OUTPATIENT
Start: 2019-12-10 | End: 2020-01-02 | Stop reason: SDUPTHER

## 2019-12-10 RX ORDER — TRAMADOL HYDROCHLORIDE 50 MG/1
50 TABLET ORAL EVERY 6 HOURS PRN
Qty: 10 TABLET | Refills: 0 | Status: SHIPPED | OUTPATIENT
Start: 2019-12-10 | End: 2020-07-08

## 2019-12-10 RX ADMIN — CYANOCOBALAMIN 1000 MCG: 1000 INJECTION INTRAMUSCULAR; SUBCUTANEOUS at 10:11

## 2019-12-10 NOTE — ASSESSMENT & PLAN NOTE
Depression Screening Follow-up Plan: Patient's depression screening was positive with a PHQ-2 score of 6  Their PHQ-9 score was 22  Continue regular follow-up with their psychologist/therapist/psychiatrist who is managing their mental health condition(s)  she states sometimes she thinks that people that are off without her however to she does not any active suicidal plan and she states that she loves her grandchildren and she would not hurt herself because of them    She has follow-up with psych regularly and sees a therapist as well

## 2019-12-10 NOTE — ASSESSMENT & PLAN NOTE
Patient is following with bariatrics who have been monitoring PTH, calcium and vitamin D    Last PTH was elevated and they did make recommendations to supplementation

## 2019-12-10 NOTE — PROGRESS NOTES
Assessment and Plan:     Problem List Items Addressed This Visit     None      Visit Diagnoses     Colon cancer screening    -  Primary           Preventive health issues were discussed with patient, and age appropriate screening tests were ordered as noted in patient's After Visit Summary  Personalized health advice and appropriate referrals for health education or preventive services given if needed, as noted in patient's After Visit Summary  History of Present Illness:     Patient presents for Welcome to Medicare visit       Patient Care Team:  Alyssa Kenney PA-C as PCP - General (Family Medicine)  MD Amy Levine MD     Review of Systems:     Review of Systems   Problem List:     Patient Active Problem List   Diagnosis    Asthma    Bariatric surgery status    Intentional drug overdose ([facility] )    Major depressive disorder, recurrent severe without psychotic features ([facility] )    Hypoglycemia    Chronic back pain    Generalized anxiety disorder    Intertrigo    Postgastrectomy malabsorption    Bilateral carpal tunnel syndrome    Pannus, abdominal    Generalized osteoarthritis    Chronic nonintractable headache    Epigastric abdominal pain    Groin pain, right    Vitamin D deficiency    Vitamin B12 deficiency    Poor appetite      Past Medical and Surgical History:     Past Medical History:   Diagnosis Date    Asthma     Albuterol prn    Bariatric surgery status     Depression 2009    managed with Effexor     Diabetes mellitus ([facility] ) 2005    resolved with gastric bypass 2015    Generalized osteoarthritis     Low vitamin B12 level     Migraine     Postgastrectomy malabsorption     Suicide attempt ([facility] )     Vitamin D deficiency      Past Surgical History:   Procedure Laterality Date    CHOLECYSTECTOMY  2005    GASTRIC BYPASS  2015    elvira - en -y     HYSTERECTOMY      KNEE ARTHROSCOPY      with Lysis of adhesions    LAPAROSCOPIC SUPRACERVICAL HYSTERECTOMY  2005    due to endometriosis/AUB    OOPHORECTOMY Left     TONSILLECTOMY AND ADENOIDECTOMY      TUBAL LIGATION  1986      Family History:     Family History   Problem Relation Age of Onset    Hypertension Mother     Kidney cancer Mother     Diabetes Mother     Breast cancer Mother     Heart disease Father     Stroke Father     Hypertension Sister     HIV Brother     Breast cancer Cousin     No Known Problems Daughter     Stomach cancer Maternal Grandmother     No Known Problems Maternal Grandfather     No Known Problems Paternal Grandmother     No Known Problems Paternal Grandfather     No Known Problems Sister     No Known Problems Sister     No Known Problems Daughter     Prostate cancer Maternal Uncle     Stomach cancer Maternal Uncle     Leukemia Grandchild       Social History:     Social History     Socioeconomic History    Marital status: Single     Spouse name: None    Number of children: None    Years of education: None    Highest education level: None   Occupational History    None   Social Needs    Financial resource strain: None    Food insecurity:     Worry: None     Inability: None    Transportation needs:     Medical: None     Non-medical: None   Tobacco Use    Smoking status: Former Smoker     Years: 32      Types: Cigarettes     Last attempt to quit: 2013     Years since quittin 6    Smokeless tobacco: Never Used   Substance and Sexual Activity    Alcohol use: Not Currently    Drug use: Never    Sexual activity: Not Currently     Partners: Male     Birth control/protection: Female Sterilization, Post-menopausal   Lifestyle    Physical activity:     Days per week: None     Minutes per session: None    Stress: None   Relationships    Social connections:     Talks on phone: None     Gets together: None     Attends Caodaism service: None     Active member of club or organization: None     Attends meetings of clubs or organizations: None     Relationship status: None    Intimate partner violence:     Fear of current or ex partner: None     Emotionally abused: None     Physically abused: None     Forced sexual activity: None   Other Topics Concern    None   Social History Narrative    None      Medications and Allergies:     Current Outpatient Medications   Medication Sig Dispense Refill    acetaminophen (TYLENOL) 500 mg tablet Take 1 tablet (500 mg total) by mouth every 6 (six) hours as needed for mild pain 30 tablet 0    albuterol (PROVENTIL HFA,VENTOLIN HFA) 90 mcg/act inhaler Inhale 2 puffs every 6 (six) hours as needed for wheezing      butalbital-acetaminophen-caffeine (FIORICET,ESGIC) -40 mg per tablet Take 1 tablet by mouth every 4 (four) hours as needed for headaches 30 tablet 0    clonazePAM (KlonoPIN) 0 5 mg tablet TK 1 T PO 1-2 TIMES DAILY PRF ANXIETY/SLEEP  2    ergocalciferol (VITAMIN D2) 50,000 units Take 1 capsule (50,000 Units total) by mouth 2 (two) times a week for 24 doses 8 capsule 2    hydrocortisone 2 5 % cream Apply topically 4 (four) times a day as needed for irritation 30 g 0    meclizine (ANTIVERT) 12 5 MG tablet Take 2 tablets (25 mg total) by mouth 3 (three) times a day as needed for dizziness 30 tablet 0    Multiple Vitamin (MULTIVITAMIN) capsule Take 1 capsule by mouth daily      omeprazole (PriLOSEC) 20 mg delayed release capsule TAKE ONE CAPSULE BY MOUTH DAILY 90 capsule 1    ondansetron (ZOFRAN) 4 mg tablet Take 1 tablet (4 mg total) by mouth every 6 (six) hours 12 tablet 0    traMADol (ULTRAM) 50 mg tablet Take 1 tablet (50 mg total) by mouth every 6 (six) hours as needed for moderate pain 15 tablet 0    venlafaxine (EFFEXOR) 25 mg tablet Take 1 tablet (25 mg total) by mouth 3 (three) times a day for 7 days 21 tablet 0    vitamin B-12 (CYANOCOBALAMIN) 2000 MCG TABS Take 1 tablet (2,000 mcg total) by mouth daily 30 tablet 5    baclofen 10 mg tablet Take 1 tablet (10 mg total) by mouth 3 (three) times a day (Patient not taking: Reported on 12/10/2019) 60 tablet 0    cholecalciferol (VITAMIN D3) 1,000 units tablet Take 1 tablet (1,000 Units total) by mouth 2 (two) times a day (Patient not taking: Reported on 12/10/2019) 60 tablet 5    methylPREDNISolone 4 MG tablet therapy pack Use as directed on package (Patient not taking: Reported on 12/10/2019) 1 each 0    topiramate (TOPAMAX) 25 mg sprinkle capsule Take 1 capsule (25 mg total) by mouth 2 (two) times a day (Patient not taking: Reported on 12/10/2019) 60 capsule 1    venlafaxine (EFFEXOR) 75 mg tablet TK 1 T PO BID WITH FOOD  2     No current facility-administered medications for this visit  Allergies   Allergen Reactions    Motrin [Ibuprofen]      Hx gastric bypass      Immunizations:     Immunization History   Administered Date(s) Administered    INFLUENZA 12/09/2013, 12/18/2014, 02/08/2017    Influenza, recombinant, quadrivalent,injectable, preservative free 10/22/2019    Pneumococcal Polysaccharide PPV23 03/21/2012    Tdap 03/21/2012      Health Maintenance:         Topic Date Due    CRC Screening: Colonoscopy  1960    Cervical Cancer Screening  06/11/2022    Hepatitis C Screening  Completed     There are no preventive care reminders to display for this patient  Medicare Screening Tests and Risk Assessments:     Rivka England is here for her Subsequent Wellness visit  Health Risk Assessment:   Patient rates overall health as fair  Patient feels that their physical health rating is much worse  Eyesight was rated as much worse  Hearing was rated as same  Patient feels that their emotional and mental health rating is same  Pain experienced in the last 7 days has been a lot  Patient's pain rating has been 10/10  Patient states that she has experienced weight loss or gain in last 6 months  Depression Screening:   PHQ-2 Score: 6  PHQ-9 Score: 22      Fall Risk Screening:    In the past year, patient has experienced: no history of falling in past year Urinary Incontinence Screening:   Patient has not leaked urine accidently in the last six months  Home Safety:  Patient has trouble with stairs inside or outside of their home  Patient has working smoke alarms and has working carbon monoxide detector  Home safety hazards include: none  Nutrition:   Current diet is Regular  Medications:   Patient is not currently taking any over-the-counter supplements  Patient is able to manage medications  Activities of Daily Living (ADLs)/Instrumental Activities of Daily Living (IADLs):   Walk and transfer into and out of bed and chair?: Yes  Dress and groom yourself?: Yes    Bathe or shower yourself?: Yes    Feed yourself? Yes  Do your laundry/housekeeping?: Yes  Manage your money, pay your bills and track your expenses?: Yes  Make your own meals?: Yes    Do your own shopping?: No    ADL comments: Pt has difficulty with ADL  Pt has daughter who helps her     Previous Hospitalizations:   Any hospitalizations or ED visits within the last 12 months?: Yes      Advance Care Planning:   Living will: No    Durable POA for healthcare: No    Advanced directive: No      PREVENTIVE SCREENINGS      Cardiovascular Screening:    General: Screening Current      Diabetes Screening:     General: Screening Current      Breast Cancer Screening:     General: Screening Current      Cervical Cancer Screening:    General: Screening Current      Hepatitis C Screening:    General: Screening Current    No exam data present     Physical Exam:     /76 (BP Location: Right arm, Patient Position: Sitting, Cuff Size: Large)   Pulse 84   Temp 97 5 °F (36 4 °C) (Tympanic)   Resp 16   Ht 5' 5" (1 651 m)   Wt 90 4 kg (199 lb 3 2 oz)   LMP  (LMP Unknown)   SpO2 99%   Breastfeeding?  No   BMI 33 15 kg/m²     Physical Exam    Shavonne Wilson PA-C

## 2019-12-10 NOTE — ASSESSMENT & PLAN NOTE
She was started on tizanidine for pain  Due to  loss surgery she cannot take NSAIDs  She was also given a prescription for 10 pills of tramadol until she can have the injection performed for her back    She said that when she returns back to see pain management they are going to discuss with her medications if the injections do not help

## 2019-12-10 NOTE — PROGRESS NOTES
Assessment/Plan:    Secondary hyperparathyroidism of renal origin [facility])  Patient is following with bariatrics who have been monitoring PTH, calcium and vitamin D  Last PTH was elevated and they did make recommendations to supplementation     Major depressive disorder, recurrent severe without psychotic features ([facility] )  Depression Screening Follow-up Plan: Patient's depression screening was positive with a PHQ-2 score of 6  Their PHQ-9 score was 22  Continue regular follow-up with their psychologist/therapist/psychiatrist who is managing their mental health condition(s)  she states sometimes she thinks that people that are off without her however to she does not any active suicidal plan and she states that she loves her grandchildren and she would not hurt herself because of them  She has follow-up with psych regularly and sees a therapist as well    Chronic back pain   She was started on tizanidine for pain  Due to  loss surgery she cannot take NSAIDs  She was also given a prescription for 10 pills of tramadol until she can have the injection performed for her back  She said that when she returns back to see pain management they are going to discuss with her medications if the injections do not help         Problem List Items Addressed This Visit        Endocrine    Secondary hyperparathyroidism of renal origin [facility])     Patient is following with bariatrics who have been monitoring PTH, calcium and vitamin D  Last PTH was elevated and they did make recommendations to supplementation             Other    Major depressive disorder, recurrent severe without psychotic features ([facility] [address] ) (Chronic)     Depression Screening Follow-up Plan: Patient's depression screening was positive with a PHQ-2 score of 6  Their PHQ-9 score was 22  Continue regular follow-up with their psychologist/therapist/psychiatrist who is managing their mental health condition(s)     she states sometimes she thinks that people that are off without her however to she does not any active suicidal plan and she states that she loves her grandchildren and she would not hurt herself because of them  She has follow-up with psych regularly and sees a therapist as well         Chronic back pain      She was started on tizanidine for pain  Due to  loss surgery she cannot take NSAIDs  She was also given a prescription for 10 pills of tramadol until she can have the injection performed for her back  She said that when she returns back to see pain management they are going to discuss with her medications if the injections do not help         Relevant Medications    traMADol (ULTRAM) 50 mg tablet    Vitamin B12 deficiency - Primary    Relevant Medications    cyanocobalamin injection 1,000 mcg (Completed)      Other Visit Diagnoses     Colon cancer screening        Lumbar facet arthropathy        Relevant Medications    tiZANidine (ZANAFLEX) 2 mg tablet            Subjective:      Patient ID: Wynonia Councilman is a 64 y o  female  HPI   81-year-old female here for follow up of She does have chronic back pain and is scheduled for injection on 12/26  Pain is mostly in lower back  She rates her pain currently is a 10 of 10  Pain mostly in lower back is not typically radiating  She is not having any abdominal pain  She has been continuing to have depression  She feels like it is worse right now because her mom has been sick and she has been carrying for her   She is   Seeing  both psychiatrist therapist     PHQ-9 Depression Screening    PHQ-9:    Frequency of the following problems over the past two weeks:       Little interest or pleasure in doing things:  3 - nearly every day  Feeling down, depressed, or hopeless:  3 - nearly every day  Trouble falling or staying asleep, or sleeping too much:  3 - nearly every day  Feeling tired or having little energy:  3 - nearly every day  Poor appetite or overeating:  3 - nearly every day  Feeling bad about yourself - or that you are a failure or have let yourself or your family down:  3 - nearly every day  Trouble concentrating on things, such as reading the newspaper or watching television:  2 - more than half the days  Moving or speaking so slowly that other people could have noticed  Or the opposite - being so fidgety or restless that you have been moving around a lot more than usual:  1 - several days  Thoughts that you would be better off dead, or of hurting yourself in some way:  1 - several days  PHQ-2 Score:  6  PHQ-9 Score:  22       The following portions of the patient's history were reviewed and updated as appropriate:   She  has a past medical history of Asthma, Bariatric surgery status, Depression (2009), Diabetes mellitus ([facility] [address] ) (2005), Generalized osteoarthritis, Low vitamin B12 level, Migraine, Postgastrectomy malabsorption, Suicide attempt ([facility] ), and Vitamin D deficiency  She   Patient Active Problem List    Diagnosis Date Noted    [facility] 12/10/2019    Epigastric abdominal pain 07/02/2019    Groin pain, right 07/02/2019    Vitamin D deficiency 07/02/2019    Vitamin B12 deficiency 07/02/2019    Poor appetite 07/02/2019    Postgastrectomy malabsorption 06/02/2019    Bilateral carpal tunnel syndrome 06/02/2019    Pannus, abdominal 06/02/2019    Generalized osteoarthritis 06/02/2019    Chronic nonintractable headache 06/02/2019    Intertrigo 04/24/2019    Chronic back pain 03/22/2019    Generalized anxiety disorder 03/22/2019    Hypoglycemia 03/20/2019    Asthma 03/18/2019    Bariatric surgery status 03/18/2019    Intentional drug overdose ([facility] ) 03/18/2019    Major depressive disorder, recurrent severe without psychotic features ([facility] ) 03/18/2019     She  has a past surgical history that includes Gastric bypass (2015); Tubal ligation (1986); Knee arthroscopy; Tonsillectomy and adenoidectomy; Oophorectomy (Left, 2005); Cholecystectomy (2005);  Laparoscopic supracervical hysterectomy (2005); and Hysterectomy  Her family history includes Breast cancer in her cousin and mother; Diabetes in her mother; HIV in her brother; Heart disease in her father; Hypertension in her mother and sister; Kidney cancer in her mother; Leukemia in her grandchild; No Known Problems in her daughter, daughter, maternal grandfather, paternal grandfather, paternal grandmother, sister, and sister; Prostate cancer in her maternal uncle; Stomach cancer in her maternal grandmother and maternal uncle; Stroke in her father  She  reports that she quit smoking about 6 years ago  Her smoking use included cigarettes  She quit after 32 00 years of use  She has never used smokeless tobacco  She reports that she drank alcohol  She reports that she does not use drugs    Current Outpatient Medications   Medication Sig Dispense Refill    acetaminophen (TYLENOL) 500 mg tablet Take 1 tablet (500 mg total) by mouth every 6 (six) hours as needed for mild pain 30 tablet 0    albuterol (PROVENTIL HFA,VENTOLIN HFA) 90 mcg/act inhaler Inhale 2 puffs every 6 (six) hours as needed for wheezing      butalbital-acetaminophen-caffeine (FIORICET,ESGIC) -40 mg per tablet Take 1 tablet by mouth every 4 (four) hours as needed for headaches 30 tablet 0    clonazePAM (KlonoPIN) 0 5 mg tablet TK 1 T PO 1-2 TIMES DAILY PRF ANXIETY/SLEEP  2    ergocalciferol (VITAMIN D2) 50,000 units Take 1 capsule (50,000 Units total) by mouth 2 (two) times a week for 24 doses 8 capsule 2    hydrocortisone 2 5 % cream Apply topically 4 (four) times a day as needed for irritation 30 g 0    meclizine (ANTIVERT) 12 5 MG tablet Take 2 tablets (25 mg total) by mouth 3 (three) times a day as needed for dizziness 30 tablet 0    Multiple Vitamin (MULTIVITAMIN) capsule Take 1 capsule by mouth daily      omeprazole (PriLOSEC) 20 mg delayed release capsule TAKE ONE CAPSULE BY MOUTH DAILY 90 capsule 1    ondansetron (ZOFRAN) 4 mg tablet Take 1 tablet (4 mg total) by mouth every 6 (six) hours 12 tablet 0    traMADol (ULTRAM) 50 mg tablet Take 1 tablet (50 mg total) by mouth every 6 (six) hours as needed for moderate pain 10 tablet 0    venlafaxine (EFFEXOR) 25 mg tablet Take 1 tablet (25 mg total) by mouth 3 (three) times a day for 7 days 21 tablet 0    vitamin B-12 (CYANOCOBALAMIN) 2000 MCG TABS Take 1 tablet (2,000 mcg total) by mouth daily 30 tablet 5    cholecalciferol (VITAMIN D3) 1,000 units tablet Take 1 tablet (1,000 Units total) by mouth 2 (two) times a day (Patient not taking: Reported on 12/10/2019) 60 tablet 5    tiZANidine (ZANAFLEX) 2 mg tablet Take 1 tablet (2 mg total) by mouth every 8 (eight) hours as needed for muscle spasms 20 tablet 0    topiramate (TOPAMAX) 25 mg sprinkle capsule Take 1 capsule (25 mg total) by mouth 2 (two) times a day (Patient not taking: Reported on 12/10/2019) 60 capsule 1    venlafaxine (EFFEXOR) 75 mg tablet TK 1 T PO BID WITH FOOD  2     No current facility-administered medications for this visit        Current Outpatient Medications on File Prior to Visit   Medication Sig    acetaminophen (TYLENOL) 500 mg tablet Take 1 tablet (500 mg total) by mouth every 6 (six) hours as needed for mild pain    albuterol (PROVENTIL HFA,VENTOLIN HFA) 90 mcg/act inhaler Inhale 2 puffs every 6 (six) hours as needed for wheezing    butalbital-acetaminophen-caffeine (FIORICET,ESGIC) -40 mg per tablet Take 1 tablet by mouth every 4 (four) hours as needed for headaches    clonazePAM (KlonoPIN) 0 5 mg tablet TK 1 T PO 1-2 TIMES DAILY PRF ANXIETY/SLEEP    ergocalciferol (VITAMIN D2) 50,000 units Take 1 capsule (50,000 Units total) by mouth 2 (two) times a week for 24 doses    hydrocortisone 2 5 % cream Apply topically 4 (four) times a day as needed for irritation    meclizine (ANTIVERT) 12 5 MG tablet Take 2 tablets (25 mg total) by mouth 3 (three) times a day as needed for dizziness    Multiple Vitamin (MULTIVITAMIN) capsule Take 1 capsule by mouth daily    omeprazole (PriLOSEC) 20 mg delayed release capsule TAKE ONE CAPSULE BY MOUTH DAILY    ondansetron (ZOFRAN) 4 mg tablet Take 1 tablet (4 mg total) by mouth every 6 (six) hours    venlafaxine (EFFEXOR) 25 mg tablet Take 1 tablet (25 mg total) by mouth 3 (three) times a day for 7 days    vitamin B-12 (CYANOCOBALAMIN) 2000 MCG TABS Take 1 tablet (2,000 mcg total) by mouth daily    [DISCONTINUED] traMADol (ULTRAM) 50 mg tablet Take 1 tablet (50 mg total) by mouth every 6 (six) hours as needed for moderate pain    cholecalciferol (VITAMIN D3) 1,000 units tablet Take 1 tablet (1,000 Units total) by mouth 2 (two) times a day (Patient not taking: Reported on 12/10/2019)    topiramate (TOPAMAX) 25 mg sprinkle capsule Take 1 capsule (25 mg total) by mouth 2 (two) times a day (Patient not taking: Reported on 12/10/2019)    venlafaxine (EFFEXOR) 75 mg tablet TK 1 T PO BID WITH FOOD    [DISCONTINUED] baclofen 10 mg tablet Take 1 tablet (10 mg total) by mouth 3 (three) times a day (Patient not taking: Reported on 12/10/2019)    [DISCONTINUED] methylPREDNISolone 4 MG tablet therapy pack Use as directed on package (Patient not taking: Reported on 12/10/2019)     No current facility-administered medications on file prior to visit  She is allergic to motrin [ibuprofen]       Review of Systems   Constitutional: Positive for fatigue  Respiratory: Negative for cough and wheezing  Cardiovascular: Negative for chest pain  Genitourinary: Negative for difficulty urinating  Musculoskeletal: Positive for back pain  Skin: Negative for wound  Neurological: Negative for numbness  Psychiatric/Behavioral: Positive for decreased concentration, dysphoric mood, self-injury and sleep disturbance  Negative for suicidal ideas           Objective:      /76 (BP Location: Right arm, Patient Position: Sitting, Cuff Size: Large)   Pulse 84   Temp 97 5 °F (36 4 °C) (Tympanic)   Resp 16   Ht 5' 5" (1 651 m)   Wt 90 4 kg (199 lb 3 2 oz)   LMP  (LMP Unknown)   SpO2 99%   Breastfeeding? No   BMI 33 15 kg/m²          Physical Exam   Constitutional: She is oriented to person, place, and time  She appears well-developed and well-nourished  No distress  HENT:   Head: Normocephalic and atraumatic  Right Ear: External ear normal    Left Ear: External ear normal    Eyes: Conjunctivae are normal    Neck: Normal range of motion  Neck supple  No thyromegaly present  Cardiovascular: Normal rate, regular rhythm and normal heart sounds  No murmur heard  Pulmonary/Chest: Effort normal and breath sounds normal  No respiratory distress  She has no wheezes  Musculoskeletal: She exhibits tenderness (l34, l4-5)  She exhibits no edema or deformity    -slr bilaterally     Lymphadenopathy:     She has no cervical adenopathy  Neurological: She is alert and oriented to person, place, and time  Psychiatric: She has a normal mood and affect  Her behavior is normal    Nursing note and vitals reviewed

## 2019-12-26 ENCOUNTER — HOSPITAL ENCOUNTER (OUTPATIENT)
Dept: RADIOLOGY | Facility: MEDICAL CENTER | Age: 59
Discharge: HOME | End: 2019-12-26
Attending: PHYSICAL MEDICINE & REHABILITATION | Admitting: PHYSICAL MEDICINE & REHABILITATION
Payer: COMMERCIAL

## 2019-12-26 VITALS
DIASTOLIC BLOOD PRESSURE: 79 MMHG | OXYGEN SATURATION: 99 % | RESPIRATION RATE: 18 BRPM | HEART RATE: 53 BPM | SYSTOLIC BLOOD PRESSURE: 131 MMHG | TEMPERATURE: 97.4 F

## 2019-12-26 DIAGNOSIS — M47.816 LUMBAR SPONDYLOSIS: ICD-10-CM

## 2019-12-26 PROCEDURE — 64494 INJ PARAVERT F JNT L/S 2 LEV: CPT | Performed by: PHYSICAL MEDICINE & REHABILITATION

## 2019-12-26 PROCEDURE — 64493 INJ PARAVERT F JNT L/S 1 LEV: CPT | Performed by: PHYSICAL MEDICINE & REHABILITATION

## 2019-12-26 RX ORDER — BUPIVACAINE HCL/PF 2.5 MG/ML
5 VIAL (ML) INJECTION ONCE
Status: COMPLETED | OUTPATIENT
Start: 2019-12-26 | End: 2019-12-26

## 2019-12-26 RX ADMIN — Medication 3 ML: at 10:46

## 2019-12-26 NOTE — H&P
History of Present Illness:  The patient is a 64 y o  female who presents with complaints of bilateral low back pain    Patient Active Problem List   Diagnosis    Asthma    Bariatric surgery status    Int[facility] drug overdose ([facility] [address] )    [facility], [facility]    Hypoglycemia    Chronic back pain    Generalized anxiety disorder    Intertrigo    Postgastrectomy malabsorption    Bilateral carpal tunnel syndrome    Pannus, abdominal    Generalized osteoarthritis    Chronic nonintractable headache    Epigastric abdominal pain    Groin pain, right    Vitamin D deficiency    Vitamin B12 deficiency    Poor appetite    Secondary hyperparathyroidism of renal origin ([facility] [address] )       Past Medical History:   Diagnosis Date    Asthma     Albuterol prn    Bariatric surgery status     Depression 2009    managed with Effexor     Diabetes mellitus ([facility] [address] ) 2005    resolved with gastric bypass 2015    Generalized osteoarthritis     Low vitamin B12 level     Migraine     Postgastrectomy malabsorption     [facility]     Vitamin D deficiency        Past Surgical History:   Procedure Laterality Date    CHOLECYSTECTOMY  2005    GASTRIC BYPASS  2015    elvira - en -y     HYSTERECTOMY      KNEE ARTHROSCOPY      with Lysis of adhesions    LAPAROSCOPIC SUPRACERVICAL HYSTERECTOMY  2005    due to endometriosis/AUB    OOPHORECTOMY Left 2005   [facility]      TUBAL LIGATION  1986         Current Outpatient Medications:     acetaminophen (TYLENOL) 500 mg tablet, Take 1 tablet (500 mg total) by mouth every 6 (six) hours as needed for mild pain, Disp: 30 tablet, Rfl: 0    albuterol (PROVENTIL HFA,VENTOLIN HFA) 90 mcg/act inhaler, Inhale 2 puffs every 6 (six) hours as needed for wheezing, Disp: , Rfl:     butalbital-acetaminophen-caffeine (FIORICET,ESGIC) -40 mg per tablet, Take 1 tablet by mouth every 4 (four) hours as needed for headaches, Disp: 30 tablet, Rfl: 0    clonazePAM (KlonoPIN) 0 5 mg tablet, TK 1 T PO 1-2 TIMES DAILY PRF ANXIETY/SLEEP, Disp: , Rfl: 2    ergocalciferol (VITAMIN D2) 50,000 units, Take 1 capsule (50,000 Units total) by mouth 2 (two) times a week for 24 doses, Disp: 8 capsule, Rfl: 2    hydrocortisone 2 5 % cream, Apply topically 4 (four) times a day as needed for irritation, Disp: 30 g, Rfl: 0    meclizine (ANTIVERT) 12 5 MG tablet, Take 2 tablets (25 mg total) by mouth 3 (three) times a day as needed for dizziness, Disp: 30 tablet, Rfl: 0    Multiple Vitamin (MULTIVITAMIN) capsule, Take 1 capsule by mouth daily, Disp: , Rfl:     omeprazole (PriLOSEC) 20 mg delayed release capsule, TAKE ONE CAPSULE BY MOUTH DAILY, Disp: 90 capsule, Rfl: 1    ondansetron (ZOFRAN) 4 mg tablet, Take 1 tablet (4 mg total) by mouth every 6 (six) hours (Patient taking differently: Take 4 mg by mouth every 6 (six) hours as needed ), Disp: 12 tablet, Rfl: 0    tiZANidine (ZANAFLEX) 2 mg tablet, Take 1 tablet (2 mg total) by mouth every 8 (eight) hours as needed for muscle spasms, Disp: 20 tablet, Rfl: 0    topiramate (TOPAMAX) 25 mg sprinkle capsule, Take 1 capsule (25 mg total) by mouth 2 (two) times a day, Disp: 60 capsule, Rfl: 1    traMADol (ULTRAM) 50 mg tablet, Take 1 tablet (50 mg total) by mouth every 6 (six) hours as needed for moderate pain, Disp: 10 tablet, Rfl: 0    venlafaxine (EFFEXOR) 75 mg tablet, TK 1 T PO BID WITH FOOD, Disp: , Rfl: 2    vitamin B-12 (CYANOCOBALAMIN) 2000 MCG TABS, Take 1 tablet (2,000 mcg total) by mouth daily, Disp: 30 tablet, Rfl: 5    Current Facility-Administered Medications:     bupivacaine (PF) (MARCAINE) 0 25 % injection 5 mL, 5 mL, Perineural, Once, Lata Halsted, DO    Allergies   Allergen Reactions    Motrin [Ibuprofen]      Hx gastric bypass       Physical Exam:   Vitals:    12/26/19 1024   BP: 137/83   Pulse: 55   Resp: 18   Temp: (!) 97 4 °F (36 3 °C)   SpO2: 100%     General: Awake, Alert, Oriented x 3, Mood and affect appropriate  Respiratory: Respirations even and unlabored  Cardiovascular: Peripheral pulses intact; no edema  Musculoskeletal Exam:  Tenderness to palpation bilateral lower lumbar paraspinals    ASA Score: 2    Patient/Chart Verification  Patient ID Verified: Verbal  Consents Confirmed: Procedural, To be obtained in the Pre-Procedure area  H&P( within 30 days) Verified: To be obtained in the Pre-Procedure area  Allergies Reviewed: Yes  Anticoag/NSAID held?: NA  Currently on antibiotics?: No  Pregnancy denied?: Yes    Assessment:   1   Lumbar spondylosis        Plan: lumbar spondylosis - bilateral L3, L4, L5 MBB Trial #1

## 2019-12-26 NOTE — DISCHARGE INSTRUCTIONS

## 2019-12-29 ENCOUNTER — HOSPITAL ENCOUNTER (EMERGENCY)
Facility: HOSPITAL | Age: 59
Discharge: HOME | End: 2019-12-29
Attending: EMERGENCY MEDICINE
Payer: COMMERCIAL

## 2019-12-29 VITALS
BODY MASS INDEX: 32.76 KG/M2 | DIASTOLIC BLOOD PRESSURE: 61 MMHG | SYSTOLIC BLOOD PRESSURE: 132 MMHG | TEMPERATURE: 98.3 F | RESPIRATION RATE: 18 BRPM | WEIGHT: 196.87 LBS | OXYGEN SATURATION: 99 % | HEART RATE: 62 BPM

## 2019-12-29 DIAGNOSIS — M54.12 CERVICAL RADICULOPATHY: Primary | ICD-10-CM

## 2019-12-29 PROCEDURE — 99283 EMERGENCY DEPT VISIT LOW MDM: CPT | Performed by: PHYSICIAN ASSISTANT

## 2019-12-29 PROCEDURE — 99283 EMERGENCY DEPT VISIT LOW MDM: CPT

## 2019-12-29 RX ORDER — PREDNISONE 20 MG/1
20 TABLET ORAL 3 TIMES DAILY
Qty: 15 TABLET | Refills: 0 | Status: SHIPPED | OUTPATIENT
Start: 2019-12-29 | End: 2020-01-03

## 2019-12-30 NOTE — ED PROVIDER NOTES
History  Chief Complaint   Patient presents with    Arm Pain     right arm pain, reports "unable to lift it" for a couple of days since Thursday night  points to pain at right shoulder that radiates to right side of neck/trapezius area  The patient is a 60yo with left arm pain  She notes some resent straining of the arm  Does see pain management for lower back pain  Denies falls, trauma, weakness, numbness, f/c, headache, n/v/d  Pain is from the right lateral neck and radiates into the right arm  Prior to Admission Medications   Prescriptions Last Dose Informant Patient Reported? Taking?    Multiple Vitamin (MULTIVITAMIN) capsule  Self Yes No   Sig: Take 1 capsule by mouth daily   acetaminophen (TYLENOL) 500 mg tablet  Self No No   Sig: Take 1 tablet (500 mg total) by mouth every 6 (six) hours as needed for mild pain   albuterol (PROVENTIL HFA,VENTOLIN HFA) 90 mcg/act inhaler  Self Yes No   Sig: Inhale 2 puffs every 6 (six) hours as needed for wheezing   butalbital-acetaminophen-caffeine (FIORICET,ESGIC) -40 mg per tablet  Self No No   Sig: Take 1 tablet by mouth every 4 (four) hours as needed for headaches   clonazePAM (KlonoPIN) 0 5 mg tablet  Self Yes No   Sig: TK 1 T PO 1-2 TIMES DAILY PRF ANXIETY/SLEEP   ergocalciferol (VITAMIN D2) 50,000 units  Self No No   Sig: Take 1 capsule (50,000 Units total) by mouth 2 (two) times a week for 24 doses   hydrocortisone 2 5 % cream  Self No No   Sig: Apply topically 4 (four) times a day as needed for irritation   meclizine (ANTIVERT) 12 5 MG tablet  Self No No   Sig: Take 2 tablets (25 mg total) by mouth 3 (three) times a day as needed for dizziness   omeprazole (PriLOSEC) 20 mg delayed release capsule  Self No No   Sig: TAKE ONE CAPSULE BY MOUTH DAILY   ondansetron (ZOFRAN) 4 mg tablet  Self No No   Sig: Take 1 tablet (4 mg total) by mouth every 6 (six) hours   Patient taking differently: Take 4 mg by mouth every 6 (six) hours as needed    tiZANidine (ZANAFLEX) 2 mg tablet   No No   Sig: Take 1 tablet (2 mg total) by mouth every 8 (eight) hours as needed for muscle spasms   topiramate (TOPAMAX) 25 mg sprinkle capsule  Self No No   Sig: Take 1 capsule (25 mg total) by mouth 2 (two) times a day   traMADol (ULTRAM) 50 mg tablet   No No   Sig: Take 1 tablet (50 mg total) by mouth every 6 (six) hours as needed for moderate pain   venlafaxine (EFFEXOR) 75 mg tablet  Self Yes No   Sig: TK 1 T PO BID WITH FOOD   vitamin B-12 (CYANOCOBALAMIN) 2000 MCG TABS  Self No No   Sig: Take 1 tablet (2,000 mcg total) by mouth daily      Facility-Administered Medications: None       Past Medical History:   Diagnosis Date    Asthma     Albuterol prn    Bariatric surgery status     Depression 2009    managed with Effexor     Diabetes mellitus ([facility] [address] ) 2005    resolved with gastric bypass 2015    Generalized osteoarthritis     Low vitamin B12 level     Migraine     Postgastrectomy malabsorption     [facility]     Vitamin D deficiency        Past Surgical History:   Procedure Laterality Date    CHOLECYSTECTOMY  2005    GASTRIC BYPASS  2015    elvira - en -y     HYSTERECTOMY      KNEE ARTHROSCOPY      with Lysis of adhesions    LAPAROSCOPIC SUPRACERVICAL HYSTERECTOMY  2005    due to endometriosis/AUB    OOPHORECTOMY Left 2005    TONSILLECTOMY AND ADENOIDECTOMY      TUBAL LIGATION  1986       Family History   Problem Relation Age of Onset    Hypertension Mother     Kidney cancer Mother     Diabetes Mother     Breast cancer Mother     Heart disease Father     Stroke Father     Hypertension Sister     HIV Brother     Breast cancer Cousin     No Known Problems Daughter     Stomach cancer Maternal Grandmother     No Known Problems Maternal Grandfather     No Known Problems Paternal Grandmother     No Known Problems Paternal Grandfather     No Known Problems Sister     No Known Problems Sister     No Known Problems Daughter     Prostate cancer Maternal Uncle     Stomach cancer Maternal Uncle     Leukemia Grandchild      I have reviewed and agree with the history as documented  Social History     Tobacco Use    Smoking status: Former Smoker     Years: 32 00     Types: Cigarettes     Last attempt to quit: 2013     Years since quittin 6    Smokeless tobacco: Never Used   Substance Use Topics    Alcohol use: Not Currently    Drug use: Never        Review of Systems   Constitutional: Negative for activity change, appetite change and fatigue  HENT: Negative for nosebleeds, sneezing, sore throat, trouble swallowing and voice change  Eyes: Negative for photophobia, pain and visual disturbance  Respiratory: Negative for apnea, choking and stridor  Cardiovascular: Negative for palpitations and leg swelling  Gastrointestinal: Negative for anal bleeding and constipation  Endocrine: Negative for cold intolerance, heat intolerance, polydipsia and polyphagia  Genitourinary: Negative for decreased urine volume, enuresis, frequency, genital sores and urgency  Musculoskeletal: Negative for joint swelling and myalgias  Allergic/Immunologic: Negative for environmental allergies and food allergies  Neurological: Negative for tremors, seizures, speech difficulty and weakness  Hematological: Negative for adenopathy  Psychiatric/Behavioral: Negative for behavioral problems, decreased concentration, dysphoric mood and hallucinations  All other systems reviewed and are negative  Physical Exam  Physical Exam   Constitutional: She is oriented to person, place, and time  She appears well-developed and well-nourished  No distress  HENT:   Head: Normocephalic and atraumatic  Right Ear: External ear normal    Left Ear: External ear normal    Nose: Nose normal    Mouth/Throat: Oropharynx is clear and moist    Eyes: Pupils are equal, round, and reactive to light  Conjunctivae and EOM are normal    Neck: Normal range of motion  Neck supple  Cardiovascular: Normal rate, regular rhythm and normal heart sounds  Exam reveals no gallop and no friction rub  No murmur heard  Pulmonary/Chest: Effort normal and breath sounds normal  No respiratory distress  She has no wheezes  Abdominal: Soft  Bowel sounds are normal    Musculoskeletal: She exhibits tenderness  Right lateral neck pain  Distal NV exam is grossly intact  No weakness or numbness  Neurological: She is alert and oriented to person, place, and time  Skin: Skin is warm and dry  She is not diaphoretic  Psychiatric: She has a normal mood and affect  Her behavior is normal    Vitals reviewed  Vital Signs  ED Triage Vitals   Temperature Pulse Respirations Blood Pressure SpO2   12/29/19 1955 12/29/19 1954 12/29/19 1954 12/29/19 1955 12/29/19 1954   98 3 °F (36 8 °C) 62 18 132/61 99 %      Temp Source Heart Rate Source Patient Position - Orthostatic VS BP Location FiO2 (%)   12/29/19 1955 12/29/19 1954 12/29/19 1954 12/29/19 1954 --   Temporal Monitor Sitting Right arm       Pain Score       12/29/19 1955       Worst Possible Pain           Vitals:    12/29/19 1954 12/29/19 1955   BP:  132/61   Pulse: 62    Patient Position - Orthostatic VS: Sitting          Visual Acuity      ED Medications  Medications - No data to display    Diagnostic Studies  Results Reviewed     None                 No orders to display              Procedures  Procedures         ED Course                               MDM      Disposition  Final diagnoses:   Cervical radiculopathy     Time reflects when diagnosis was documented in both MDM as applicable and the Disposition within this note     Time User Action Codes Description Comment    12/29/2019  8:22 PM Laurie Stroud Add [M54 12] Cervical radiculopathy       ED Disposition     ED Disposition Condition Date/Time Comment    Discharge Stable Sun Dec 29, 2019  8:22 PM Bre Heredia discharge to home/self care              Follow-up Information     Follow up With Specialties Details Why Contact Info    Shavonne Wilson PA-C Family Medicine, Physician Assistant Schedule an appointment as soon as possible for a visit   [address]  [address] [facility]  P[place]   [phone]  [phone]  [phone]            Patient's Medications   Discharge Prescriptions    PREDNISONE 20 MG TABLET    Take 1 tablet (20 mg total) by mouth 3 (three) times a day for 5 days       Start Date: 12/29/2019End Date: 1/3/2020       Order Dose: 20 mg       Quantity: 15 tablet    Refills: 0     No discharge procedures on file      ED Provider  Electronically Signed by           Abiodun Coulter PA-C  12/29/19 2023

## 2020-01-02 DIAGNOSIS — G89.29 CHRONIC BILATERAL LOW BACK PAIN WITH BILATERAL SCIATICA: ICD-10-CM

## 2020-01-02 DIAGNOSIS — M47.816 LUMBAR FACET ARTHROPATHY: ICD-10-CM

## 2020-01-02 DIAGNOSIS — M54.41 CHRONIC BILATERAL LOW BACK PAIN WITH BILATERAL SCIATICA: ICD-10-CM

## 2020-01-02 DIAGNOSIS — L30.4 INTERTRIGO: ICD-10-CM

## 2020-01-02 DIAGNOSIS — M54.42 CHRONIC BILATERAL LOW BACK PAIN WITH BILATERAL SCIATICA: ICD-10-CM

## 2020-01-02 DIAGNOSIS — E55.9 VITAMIN D DEFICIENCY: ICD-10-CM

## 2020-01-02 RX ORDER — ERGOCALCIFEROL 1.25 MG/1
50000 CAPSULE ORAL 2 TIMES WEEKLY
Qty: 8 CAPSULE | Refills: 0 | OUTPATIENT
Start: 2020-01-02 | End: 2020-03-24

## 2020-01-02 NOTE — TELEPHONE ENCOUNTER
If patient completed vitamin D rx-she should now add an EXTRA 2000 IU vitamin D3 daily in addition to what she is currently taking  She should have repeat vitamin D level done within 3-5 months-if she has an upcoming visit between that time frame she will get a lab slip for this-otherwise will mail one if she has not missed an appt here  CR

## 2020-01-06 RX ORDER — TIZANIDINE 2 MG/1
2 TABLET ORAL EVERY 8 HOURS PRN
Qty: 90 TABLET | Refills: 0 | Status: SHIPPED | OUTPATIENT
Start: 2020-01-06 | End: 2020-02-18 | Stop reason: SDUPTHER

## 2020-01-06 RX ORDER — TRAMADOL HYDROCHLORIDE 50 MG/1
50 TABLET ORAL EVERY 6 HOURS PRN
Qty: 10 TABLET | Refills: 0 | OUTPATIENT
Start: 2020-01-06

## 2020-01-06 NOTE — TELEPHONE ENCOUNTER
I refilled the tizanidine but I would not refill tramadol   She should continue f/u with pain specialist

## 2020-02-04 ENCOUNTER — OPTICAL OFFICE (OUTPATIENT)
Dept: URBAN - METROPOLITAN AREA CLINIC 143 | Facility: CLINIC | Age: 60
Setting detail: OPHTHALMOLOGY
End: 2020-02-04
Payer: COMMERCIAL

## 2020-02-04 ENCOUNTER — DOCTOR'S OFFICE (OUTPATIENT)
Dept: URBAN - METROPOLITAN AREA CLINIC 136 | Facility: CLINIC | Age: 60
Setting detail: OPHTHALMOLOGY
End: 2020-02-04
Payer: COMMERCIAL

## 2020-02-04 VITALS — HEIGHT: 55 IN

## 2020-02-04 DIAGNOSIS — H52.13: ICD-10-CM

## 2020-02-04 DIAGNOSIS — H52.223: ICD-10-CM

## 2020-02-04 DIAGNOSIS — H52.4: ICD-10-CM

## 2020-02-04 PROBLEM — E11.9 TYPE 2 DM WITHOUT RETINOPATHY ; BOTH EYES: Status: ACTIVE | Noted: 2017-07-17

## 2020-02-04 PROBLEM — H04.123 DRY EYE; BOTH EYES: Status: ACTIVE | Noted: 2017-07-17

## 2020-02-04 PROBLEM — H25.13 NUCLEAR CATARACT OU ; BOTH EYES: Status: ACTIVE | Noted: 2017-07-17

## 2020-02-04 PROCEDURE — V2750 ANTI-REFLECTIVE COATING: HCPCS | Performed by: OPTOMETRIST

## 2020-02-04 PROCEDURE — V2784 LENS POLYCARB OR EQUAL: HCPCS | Performed by: OPTOMETRIST

## 2020-02-04 PROCEDURE — V2020 VISION SVCS FRAMES PURCHASES: HCPCS | Performed by: OPTOMETRIST

## 2020-02-04 PROCEDURE — 92015 DETERMINE REFRACTIVE STATE: CPT | Performed by: OPTOMETRIST

## 2020-02-04 PROCEDURE — V2744 TINT PHOTOCHROMATIC LENS/ES: HCPCS | Performed by: OPTOMETRIST

## 2020-02-04 PROCEDURE — V2103 SPHEROCYLINDR 4.00D/12-2.00D: HCPCS | Performed by: OPTOMETRIST

## 2020-02-04 PROCEDURE — 92014 COMPRE OPH EXAM EST PT 1/>: CPT | Performed by: OPTOMETRIST

## 2020-02-04 ASSESSMENT — REFRACTION_MANIFEST
OS_VA2: 20/20
OS_VA1: 20/20
OD_VA2: 20/20
OD_VA3: 20/
OD_VA1: 20/20
OU_VA: 20/20
OS_CYLINDER: -0.50
OS_SPHERE: -2.50
OD_ADD: +2.25
OS_ADD: +2.25
OD_AXIS: 090
OS_VA3: 20/
OS_AXIS: 080
OD_SPHERE: -2.75
OD_CYLINDER: -1.00

## 2020-02-04 ASSESSMENT — KERATOMETRY
OD_K1POWER_DIOPTERS: 47.00
OS_K1POWER_DIOPTERS: 47.00
OS_AXISANGLE_DEGREES: 108
OD_AXISANGLE_DEGREES: 090
OS_K2POWER_DIOPTERS: 46.50
OD_K2POWER_DIOPTERS: 47.00

## 2020-02-04 ASSESSMENT — REFRACTION_CURRENTRX
OD_VPRISM_DIRECTION: SV
OD_OVR_VA: 20/
OD_CYLINDER: -1.00
OD_AXIS: 093
OS_SPHERE: -2.50
OS_AXIS: 078
OD_SPHERE: -2.75
OS_CYLINDER: -0.50
OS_OVR_VA: 20/
OS_VPRISM_DIRECTION: SV

## 2020-02-04 ASSESSMENT — AXIALLENGTH_DERIVED
OS_AL: 23.4702
OD_AL: 23.5726
OD_AL: 23.3319
OS_AL: 23.3742

## 2020-02-04 ASSESSMENT — REFRACTION_AUTOREFRACTION
OD_CYLINDER: -0.75
OD_AXIS: 093
OD_SPHERE: -2.25
OS_CYLINDER: -0.50
OS_SPHERE: -2.25
OS_AXIS: 016

## 2020-02-04 ASSESSMENT — SPHEQUIV_DERIVED
OD_SPHEQUIV: -3.25
OD_SPHEQUIV: -2.625
OS_SPHEQUIV: -2.75
OS_SPHEQUIV: -2.5

## 2020-02-04 ASSESSMENT — CONFRONTATIONAL VISUAL FIELD TEST (CVF)
OS_FINDINGS: FULL
OD_FINDINGS: FULL

## 2020-02-04 ASSESSMENT — VISUAL ACUITY
OS_BCVA: 20/20
OD_BCVA: 20/20

## 2020-02-10 PROBLEM — E65 LOCALIZED ADIPOSITY: Status: ACTIVE | Noted: 2020-02-10

## 2020-02-16 DIAGNOSIS — M54.42 CHRONIC BILATERAL LOW BACK PAIN WITH BILATERAL SCIATICA: ICD-10-CM

## 2020-02-16 DIAGNOSIS — M47.816 LUMBAR FACET ARTHROPATHY: ICD-10-CM

## 2020-02-16 DIAGNOSIS — G89.29 CHRONIC BILATERAL LOW BACK PAIN WITH BILATERAL SCIATICA: ICD-10-CM

## 2020-02-16 DIAGNOSIS — M54.41 CHRONIC BILATERAL LOW BACK PAIN WITH BILATERAL SCIATICA: ICD-10-CM

## 2020-02-17 DIAGNOSIS — M54.41 CHRONIC BILATERAL LOW BACK PAIN WITH BILATERAL SCIATICA: ICD-10-CM

## 2020-02-17 DIAGNOSIS — E53.8 VITAMIN B12 DEFICIENCY: ICD-10-CM

## 2020-02-17 DIAGNOSIS — G89.29 CHRONIC BILATERAL LOW BACK PAIN WITH BILATERAL SCIATICA: ICD-10-CM

## 2020-02-17 DIAGNOSIS — M54.42 CHRONIC BILATERAL LOW BACK PAIN WITH BILATERAL SCIATICA: ICD-10-CM

## 2020-02-17 DIAGNOSIS — M47.816 LUMBAR FACET ARTHROPATHY: ICD-10-CM

## 2020-02-17 DIAGNOSIS — E55.9 VITAMIN D DEFICIENCY: ICD-10-CM

## 2020-02-17 DIAGNOSIS — L30.4 INTERTRIGO: ICD-10-CM

## 2020-02-18 RX ORDER — TIZANIDINE 2 MG/1
2 TABLET ORAL EVERY 8 HOURS PRN
Qty: 90 TABLET | Refills: 0 | Status: SHIPPED | OUTPATIENT
Start: 2020-02-18 | End: 2020-07-08 | Stop reason: SDUPTHER

## 2020-02-18 RX ORDER — ERGOCALCIFEROL 1.25 MG/1
50000 CAPSULE ORAL 2 TIMES WEEKLY
Qty: 8 CAPSULE | Refills: 0 | OUTPATIENT
Start: 2020-02-20 | End: 2020-05-12

## 2020-02-18 RX ORDER — CYANOCOBALAMIN (VITAMIN B-12) 2000 MCG
2000 TABLET ORAL DAILY
Qty: 30 TABLET | Refills: 0 | OUTPATIENT
Start: 2020-02-18

## 2020-02-18 NOTE — TELEPHONE ENCOUNTER
She should be taking 2000 IU vitamin D3 daily now-she should be getting repeat vitamin D and vitamin B12 levels now-lab slips were provided

## 2020-02-19 RX ORDER — TIZANIDINE 2 MG/1
2 TABLET ORAL EVERY 8 HOURS PRN
Qty: 90 TABLET | Refills: 0 | OUTPATIENT
Start: 2020-02-19

## 2020-02-19 RX ORDER — TRAMADOL HYDROCHLORIDE 50 MG/1
50 TABLET ORAL EVERY 6 HOURS PRN
Qty: 10 TABLET | Refills: 0 | OUTPATIENT
Start: 2020-02-19

## 2020-02-24 ENCOUNTER — LAB (OUTPATIENT)
Dept: LAB | Facility: HOSPITAL | Age: 60
End: 2020-02-24
Attending: PLASTIC SURGERY
Payer: COMMERCIAL

## 2020-02-24 ENCOUNTER — HOSPITAL ENCOUNTER (OUTPATIENT)
Dept: NON INVASIVE DIAGNOSTICS | Facility: HOSPITAL | Age: 60
Discharge: HOME | End: 2020-02-24
Attending: PLASTIC SURGERY
Payer: COMMERCIAL

## 2020-02-24 DIAGNOSIS — E65 LOCALIZED ADIPOSITY: ICD-10-CM

## 2020-02-24 LAB
ANION GAP SERPL CALCULATED.3IONS-SCNC: 9 MMOL/L (ref 4–13)
ATRIAL RATE: 53 BPM
BASOPHILS # BLD AUTO: 0.02 THOUSANDS/ΜL (ref 0–0.1)
BASOPHILS NFR BLD AUTO: 0 % (ref 0–1)
BUN SERPL-MCNC: 15 MG/DL (ref 5–25)
CALCIUM SERPL-MCNC: 9 MG/DL (ref 8.3–10.1)
CHLORIDE SERPL-SCNC: 104 MMOL/L (ref 100–108)
CO2 SERPL-SCNC: 27 MMOL/L (ref 21–32)
CREAT SERPL-MCNC: 0.72 MG/DL (ref 0.6–1.3)
EOSINOPHIL # BLD AUTO: 0.07 THOUSAND/ΜL (ref 0–0.61)
EOSINOPHIL NFR BLD AUTO: 1 % (ref 0–6)
ERYTHROCYTE [DISTWIDTH] IN BLOOD BY AUTOMATED COUNT: 12.6 % (ref 11.6–15.1)
GFR SERPL CREATININE-BSD FRML MDRD: 93 ML/MIN/1.73SQ M
GLUCOSE P FAST SERPL-MCNC: 104 MG/DL (ref 65–99)
HCT VFR BLD AUTO: 40.8 % (ref 34.8–46.1)
HGB BLD-MCNC: 12.6 G/DL (ref 11.5–15.4)
IMM GRANULOCYTES # BLD AUTO: 0.01 THOUSAND/UL (ref 0–0.2)
IMM GRANULOCYTES NFR BLD AUTO: 0 % (ref 0–2)
LYMPHOCYTES # BLD AUTO: 1.85 THOUSANDS/ΜL (ref 0.6–4.47)
LYMPHOCYTES NFR BLD AUTO: 32 % (ref 14–44)
MCH RBC QN AUTO: 28.2 PG (ref 26.8–34.3)
MCHC RBC AUTO-ENTMCNC: 30.9 G/DL (ref 31.4–37.4)
MCV RBC AUTO: 91 FL (ref 82–98)
MONOCYTES # BLD AUTO: 0.37 THOUSAND/ΜL (ref 0.17–1.22)
MONOCYTES NFR BLD AUTO: 7 % (ref 4–12)
NEUTROPHILS # BLD AUTO: 3.41 THOUSANDS/ΜL (ref 1.85–7.62)
NEUTS SEG NFR BLD AUTO: 60 % (ref 43–75)
NRBC BLD AUTO-RTO: 0 /100 WBCS
P AXIS: 53 DEGREES
PLATELET # BLD AUTO: 203 THOUSANDS/UL (ref 149–390)
PMV BLD AUTO: 12.1 FL (ref 8.9–12.7)
POTASSIUM SERPL-SCNC: 3.9 MMOL/L (ref 3.5–5.3)
PR INTERVAL: 132 MS
QRS AXIS: 35 DEGREES
QRSD INTERVAL: 84 MS
QT INTERVAL: 446 MS
QTC INTERVAL: 418 MS
RBC # BLD AUTO: 4.47 MILLION/UL (ref 3.81–5.12)
SODIUM SERPL-SCNC: 140 MMOL/L (ref 136–145)
T WAVE AXIS: 9 DEGREES
VENTRICULAR RATE: 53 BPM
WBC # BLD AUTO: 5.73 THOUSAND/UL (ref 4.31–10.16)

## 2020-02-24 PROCEDURE — 36415 COLL VENOUS BLD VENIPUNCTURE: CPT

## 2020-02-24 PROCEDURE — 80048 BASIC METABOLIC PNL TOTAL CA: CPT

## 2020-02-24 PROCEDURE — 93005 ELECTROCARDIOGRAM TRACING: CPT

## 2020-02-24 PROCEDURE — 85025 COMPLETE CBC W/AUTO DIFF WBC: CPT

## 2020-02-24 PROCEDURE — 93010 ELECTROCARDIOGRAM REPORT: CPT | Performed by: INTERNAL MEDICINE

## 2020-03-04 ENCOUNTER — OFFICE VISIT (OUTPATIENT)
Dept: PLASTIC SURGERY | Facility: CLINIC | Age: 60
End: 2020-03-04

## 2020-03-04 VITALS
BODY MASS INDEX: 32.3 KG/M2 | WEIGHT: 201 LBS | TEMPERATURE: 98.1 F | HEART RATE: 74 BPM | SYSTOLIC BLOOD PRESSURE: 138 MMHG | DIASTOLIC BLOOD PRESSURE: 80 MMHG | HEIGHT: 66 IN

## 2020-03-04 DIAGNOSIS — E65 ABDOMINAL PANNUS: Primary | ICD-10-CM

## 2020-03-04 PROCEDURE — 3066F NEPHROPATHY DOC TX: CPT | Performed by: PLASTIC SURGERY

## 2020-03-04 PROCEDURE — PREOP: Performed by: PLASTIC SURGERY

## 2020-03-04 PROCEDURE — 3008F BODY MASS INDEX DOCD: CPT | Performed by: PLASTIC SURGERY

## 2020-03-04 RX ORDER — GABAPENTIN 300 MG/1
300 CAPSULE ORAL 3 TIMES DAILY PRN
Qty: 30 CAPSULE | Refills: 0 | Status: SHIPPED | OUTPATIENT
Start: 2020-03-04 | End: 2020-06-01

## 2020-03-04 RX ORDER — ACETAMINOPHEN 500 MG
1000 TABLET ORAL 3 TIMES DAILY PRN
Qty: 50 TABLET | Refills: 0 | Status: SHIPPED | OUTPATIENT
Start: 2020-03-04 | End: 2020-03-13

## 2020-03-04 RX ORDER — OXYCODONE HYDROCHLORIDE 5 MG/1
5 TABLET ORAL EVERY 6 HOURS PRN
Qty: 30 TABLET | Refills: 0 | Status: SHIPPED | OUTPATIENT
Start: 2020-03-04 | End: 2020-06-08 | Stop reason: SDUPTHER

## 2020-03-04 NOTE — PROGRESS NOTES
Assessment/Plan   Diagnoses and all orders for this visit:    Abdominal pannus      During today's  30 minute visit, all of which was dedicated to counseling, I reviewed the anticipated preoperative, intraoperative, and postoperative courses  I informed her that the nurses will contact the patient the day prior to surgery in order to discuss orientation and logistical information  I will then see her the day of surgery where I will answer any last minute questions and perform my preoperative markings  We will then proceed to the operating room for an anticipated 2 5 hour procedure under general anesthesia, specifically a panniculectomy with transposition of umbilicus  For each procedure, I reviewed the incisions/scars, duration, recovery time, postoperative restrictions, and follow-up  When applicable, I discussed hospitalization, dressing changes, drains, and donor site morbidity  All risks, benefits, and options, including but not limited to, pain, scarring, bleeding, infection, asymmetry, contour deformity, damage to adjacent nerves, vessels, and organs, hematoma, seroma, paresthesias, anesthesia (temporary versus permanent), skin necrosis, fat necrosis, flap necrosis, wound dehiscence with need for healing by secondary intention and postoperative dressing changes, hypertrophic and/or keloid scar formation, deep venous thrombosis, pulmonary embolism, recurrence, and need for revision and/or reoperation were fully explained to the patient  All questions were answered  Once full understanding of the anticipated procedure was verified, informed consent was obtained  The patient will have an expected six-week postoperative recovery period during which time she will have activity restrictions   Specifically, this includes, but is not limited to, avoidance of heavy lifting (nothing heavier than a gallon of milk), avoidance of strenuous activity, avoidance of any activity which makes her hurt or perspire, avoidance of anything that places tension across the incisions, avoidance of submerging the incisions or operative area in water (including bathing in a bathtub, swimming, etc ), and avoidance of dirty, carina, or contaminated environments  An abdominal binder would be recommended to be worn 24/7 for 6 weeks, except when bathing or when washing  It was emphasized to the patient that postoperatively, it is mandated to employ a high-protein, low salt diet, take deep breaths in order to avoid atelectasis, and ambulate at least 5 times a day in order to avoid deep venous thrombosis and pulmonary embolism  The patient was provided prescriptions for acetaminophen,  gabapentin, oxycodone, and Hibiclens  The purposes of each were explained and the patient will have these filled prior to the day of the operation  At the conclusion of our conversation, the patient wished to proceed with surgery  [address]   [place]    Office: [phone]      Subjective   Patient ID: Ashia Guerrero is a 64 y o  female  Ashia Guerrero is being seen today for preoperative evaluation  Since last visit, there has not been any changes in the patient's overall health status and/or surgical plan to report  Patient has a history of gastric bypass        They present preoperatively for planned: panniculectomy     Scheduled for: 3/18/2020    In conjunction with: n/a     The following history of N/V post operative: none    Nicotine status: none     Vitals:    03/04/20 1514   BP: 138/80   Pulse: 74   Temp: 98 1 °F (36 7 °C)     HPI    The following portions of the patient's history were reviewed and updated as appropriate: allergies, current medications, past family history, past medical history, past social history, past surgical history and problem list     Review of Systems    Objective   Physical Exam    Body mass index is 32 44 kg/m²

## 2020-03-11 DIAGNOSIS — Z98.84 BARIATRIC SURGERY STATUS: ICD-10-CM

## 2020-03-11 DIAGNOSIS — L30.4 INTERTRIGO: ICD-10-CM

## 2020-03-11 RX ORDER — OMEPRAZOLE 20 MG/1
20 CAPSULE, DELAYED RELEASE ORAL DAILY
Qty: 90 CAPSULE | Refills: 0 | Status: CANCELLED | OUTPATIENT
Start: 2020-03-11

## 2020-03-15 PROCEDURE — NC001 PR NO CHARGE: Performed by: PHYSICIAN ASSISTANT

## 2020-05-06 ENCOUNTER — APPOINTMENT (OUTPATIENT)
Dept: LAB | Facility: HOSPITAL | Age: 60
End: 2020-05-06
Attending: PLASTIC SURGERY
Payer: COMMERCIAL

## 2020-05-06 DIAGNOSIS — E65 ABDOMINAL PANNUS: ICD-10-CM

## 2020-05-06 LAB
ANION GAP SERPL CALCULATED.3IONS-SCNC: 4 MMOL/L (ref 4–13)
BASOPHILS # BLD AUTO: 0.02 THOUSANDS/ΜL (ref 0–0.1)
BASOPHILS NFR BLD AUTO: 0 % (ref 0–1)
BUN SERPL-MCNC: 23 MG/DL (ref 5–25)
CALCIUM SERPL-MCNC: 9.3 MG/DL (ref 8.3–10.1)
CHLORIDE SERPL-SCNC: 103 MMOL/L (ref 100–108)
CO2 SERPL-SCNC: 31 MMOL/L (ref 21–32)
CREAT SERPL-MCNC: 0.78 MG/DL (ref 0.6–1.3)
EOSINOPHIL # BLD AUTO: 0.08 THOUSAND/ΜL (ref 0–0.61)
EOSINOPHIL NFR BLD AUTO: 1 % (ref 0–6)
ERYTHROCYTE [DISTWIDTH] IN BLOOD BY AUTOMATED COUNT: 12.9 % (ref 11.6–15.1)
GFR SERPL CREATININE-BSD FRML MDRD: 85 ML/MIN/1.73SQ M
GLUCOSE P FAST SERPL-MCNC: 103 MG/DL (ref 65–99)
HCT VFR BLD AUTO: 39.1 % (ref 34.8–46.1)
HGB BLD-MCNC: 12.1 G/DL (ref 11.5–15.4)
IMM GRANULOCYTES # BLD AUTO: 0.01 THOUSAND/UL (ref 0–0.2)
IMM GRANULOCYTES NFR BLD AUTO: 0 % (ref 0–2)
LYMPHOCYTES # BLD AUTO: 2.23 THOUSANDS/ΜL (ref 0.6–4.47)
LYMPHOCYTES NFR BLD AUTO: 35 % (ref 14–44)
MCH RBC QN AUTO: 28.4 PG (ref 26.8–34.3)
MCHC RBC AUTO-ENTMCNC: 30.9 G/DL (ref 31.4–37.4)
MCV RBC AUTO: 92 FL (ref 82–98)
MONOCYTES # BLD AUTO: 0.49 THOUSAND/ΜL (ref 0.17–1.22)
MONOCYTES NFR BLD AUTO: 8 % (ref 4–12)
NEUTROPHILS # BLD AUTO: 3.62 THOUSANDS/ΜL (ref 1.85–7.62)
NEUTS SEG NFR BLD AUTO: 56 % (ref 43–75)
NRBC BLD AUTO-RTO: 0 /100 WBCS
PLATELET # BLD AUTO: 215 THOUSANDS/UL (ref 149–390)
PMV BLD AUTO: 11.7 FL (ref 8.9–12.7)
POTASSIUM SERPL-SCNC: 4.3 MMOL/L (ref 3.5–5.3)
RBC # BLD AUTO: 4.26 MILLION/UL (ref 3.81–5.12)
SODIUM SERPL-SCNC: 138 MMOL/L (ref 136–145)
WBC # BLD AUTO: 6.45 THOUSAND/UL (ref 4.31–10.16)

## 2020-05-06 PROCEDURE — 80048 BASIC METABOLIC PNL TOTAL CA: CPT

## 2020-05-06 PROCEDURE — 36415 COLL VENOUS BLD VENIPUNCTURE: CPT

## 2020-05-06 PROCEDURE — 85025 COMPLETE CBC W/AUTO DIFF WBC: CPT

## 2020-05-21 DIAGNOSIS — E65 ABDOMINAL PANNUS: ICD-10-CM

## 2020-05-21 PROCEDURE — U0003 INFECTIOUS AGENT DETECTION BY NUCLEIC ACID (DNA OR RNA); SEVERE ACUTE RESPIRATORY SYNDROME CORONAVIRUS 2 (SARS-COV-2) (CORONAVIRUS DISEASE [COVID-19]), AMPLIFIED PROBE TECHNIQUE, MAKING USE OF HIGH THROUGHPUT TECHNOLOGIES AS DESCRIBED BY CMS-2020-01-R: HCPCS

## 2020-05-22 DIAGNOSIS — Z98.84 BARIATRIC SURGERY STATUS: ICD-10-CM

## 2020-05-22 DIAGNOSIS — E65 ABDOMINAL PANNUS: ICD-10-CM

## 2020-05-22 RX ORDER — OXYCODONE HYDROCHLORIDE 5 MG/1
5 TABLET ORAL EVERY 6 HOURS PRN
Qty: 30 TABLET | Refills: 0 | Status: CANCELLED | OUTPATIENT
Start: 2020-05-22

## 2020-05-22 RX ORDER — OMEPRAZOLE 20 MG/1
20 CAPSULE, DELAYED RELEASE ORAL DAILY
Qty: 90 CAPSULE | Refills: 0 | OUTPATIENT
Start: 2020-05-22

## 2020-05-23 LAB — SARS-COV-2 RNA SPEC QL NAA+PROBE: NOT DETECTED

## 2020-05-25 ENCOUNTER — OFFICE VISIT (OUTPATIENT)
Dept: URGENT CARE | Age: 60
End: 2020-05-25
Payer: COMMERCIAL

## 2020-05-25 VITALS
TEMPERATURE: 97.6 F | WEIGHT: 204.4 LBS | BODY MASS INDEX: 32.85 KG/M2 | RESPIRATION RATE: 20 BRPM | DIASTOLIC BLOOD PRESSURE: 77 MMHG | HEART RATE: 62 BPM | SYSTOLIC BLOOD PRESSURE: 127 MMHG | OXYGEN SATURATION: 100 % | HEIGHT: 66 IN

## 2020-05-25 DIAGNOSIS — L25.9 CONTACT DERMATITIS, UNSPECIFIED CONTACT DERMATITIS TYPE, UNSPECIFIED TRIGGER: Primary | ICD-10-CM

## 2020-05-25 PROCEDURE — 99213 OFFICE O/P EST LOW 20 MIN: CPT | Performed by: PHYSICIAN ASSISTANT

## 2020-05-26 ENCOUNTER — TELEPHONE (OUTPATIENT)
Dept: PLASTIC SURGERY | Facility: CLINIC | Age: 60
End: 2020-05-26

## 2020-05-26 ENCOUNTER — ANESTHESIA EVENT (OUTPATIENT)
Dept: PERIOP | Facility: HOSPITAL | Age: 60
End: 2020-05-26
Payer: COMMERCIAL

## 2020-05-27 ENCOUNTER — ANESTHESIA (OUTPATIENT)
Dept: PERIOP | Facility: HOSPITAL | Age: 60
End: 2020-05-27
Payer: COMMERCIAL

## 2020-05-27 ENCOUNTER — HOSPITAL ENCOUNTER (OUTPATIENT)
Facility: HOSPITAL | Age: 60
Setting detail: OUTPATIENT SURGERY
Discharge: HOME | End: 2020-05-27
Attending: PLASTIC SURGERY | Admitting: PLASTIC SURGERY
Payer: COMMERCIAL

## 2020-05-27 VITALS
HEART RATE: 56 BPM | TEMPERATURE: 98 F | BODY MASS INDEX: 32.78 KG/M2 | SYSTOLIC BLOOD PRESSURE: 100 MMHG | OXYGEN SATURATION: 98 % | WEIGHT: 204 LBS | RESPIRATION RATE: 19 BRPM | HEIGHT: 66 IN | DIASTOLIC BLOOD PRESSURE: 50 MMHG

## 2020-05-27 LAB
GLUCOSE SERPL-MCNC: 114 MG/DL (ref 65–140)
GLUCOSE SERPL-MCNC: 99 MG/DL (ref 65–140)

## 2020-05-27 PROCEDURE — 15830 EXC EXCESSIVE SKIN ABDOMEN: CPT | Performed by: PLASTIC SURGERY

## 2020-05-27 PROCEDURE — 99024 POSTOP FOLLOW-UP VISIT: CPT | Performed by: PLASTIC SURGERY

## 2020-05-27 PROCEDURE — 82948 REAGENT STRIP/BLOOD GLUCOSE: CPT

## 2020-05-27 RX ORDER — OXYCODONE HYDROCHLORIDE 5 MG/1
5 TABLET ORAL EVERY 6 HOURS PRN
Qty: 30 TABLET | Refills: 0 | Status: CANCELLED | OUTPATIENT
Start: 2020-05-27

## 2020-05-27 RX ORDER — ALBUMIN, HUMAN INJ 5% 5 %
SOLUTION INTRAVENOUS CONTINUOUS PRN
Status: DISCONTINUED | OUTPATIENT
Start: 2020-05-27 | End: 2020-05-27 | Stop reason: SURG

## 2020-05-27 RX ORDER — SODIUM CHLORIDE 9 MG/ML
INJECTION, SOLUTION INTRAVENOUS CONTINUOUS PRN
Status: DISCONTINUED | OUTPATIENT
Start: 2020-05-27 | End: 2020-05-27 | Stop reason: SURG

## 2020-05-27 RX ORDER — ONDANSETRON 2 MG/ML
4 INJECTION INTRAMUSCULAR; INTRAVENOUS ONCE AS NEEDED
Status: DISCONTINUED | OUTPATIENT
Start: 2020-05-27 | End: 2020-05-27 | Stop reason: HOSPADM

## 2020-05-27 RX ORDER — SODIUM CHLORIDE, SODIUM LACTATE, POTASSIUM CHLORIDE, CALCIUM CHLORIDE 600; 310; 30; 20 MG/100ML; MG/100ML; MG/100ML; MG/100ML
INJECTION, SOLUTION INTRAVENOUS CONTINUOUS PRN
Status: DISCONTINUED | OUTPATIENT
Start: 2020-05-27 | End: 2020-05-27 | Stop reason: SURG

## 2020-05-27 RX ORDER — LIDOCAINE HYDROCHLORIDE 10 MG/ML
INJECTION, SOLUTION EPIDURAL; INFILTRATION; INTRACAUDAL; PERINEURAL AS NEEDED
Status: DISCONTINUED | OUTPATIENT
Start: 2020-05-27 | End: 2020-05-27 | Stop reason: SURG

## 2020-05-27 RX ORDER — LIDOCAINE HYDROCHLORIDE 10 MG/ML
0.5 INJECTION, SOLUTION EPIDURAL; INFILTRATION; INTRACAUDAL; PERINEURAL ONCE AS NEEDED
Status: COMPLETED | OUTPATIENT
Start: 2020-05-27 | End: 2020-05-27

## 2020-05-27 RX ORDER — PROPOFOL 10 MG/ML
INJECTION, EMULSION INTRAVENOUS AS NEEDED
Status: DISCONTINUED | OUTPATIENT
Start: 2020-05-27 | End: 2020-05-27 | Stop reason: SURG

## 2020-05-27 RX ORDER — MIDAZOLAM HYDROCHLORIDE 2 MG/2ML
INJECTION, SOLUTION INTRAMUSCULAR; INTRAVENOUS AS NEEDED
Status: DISCONTINUED | OUTPATIENT
Start: 2020-05-27 | End: 2020-05-27 | Stop reason: SURG

## 2020-05-27 RX ORDER — OXYCODONE HYDROCHLORIDE 5 MG/1
5 TABLET ORAL EVERY 4 HOURS PRN
Status: DISCONTINUED | OUTPATIENT
Start: 2020-05-27 | End: 2020-05-27 | Stop reason: HOSPADM

## 2020-05-27 RX ORDER — FENTANYL CITRATE 50 UG/ML
INJECTION, SOLUTION INTRAMUSCULAR; INTRAVENOUS AS NEEDED
Status: DISCONTINUED | OUTPATIENT
Start: 2020-05-27 | End: 2020-05-27 | Stop reason: SURG

## 2020-05-27 RX ORDER — HYDROMORPHONE HCL/PF 1 MG/ML
0.4 SYRINGE (ML) INJECTION
Status: DISCONTINUED | OUTPATIENT
Start: 2020-05-27 | End: 2020-05-27 | Stop reason: HOSPADM

## 2020-05-27 RX ORDER — SODIUM CHLORIDE, SODIUM LACTATE, POTASSIUM CHLORIDE, CALCIUM CHLORIDE 600; 310; 30; 20 MG/100ML; MG/100ML; MG/100ML; MG/100ML
50 INJECTION, SOLUTION INTRAVENOUS CONTINUOUS
Status: DISCONTINUED | OUTPATIENT
Start: 2020-05-27 | End: 2020-05-27

## 2020-05-27 RX ORDER — SODIUM CHLORIDE, SODIUM LACTATE, POTASSIUM CHLORIDE, CALCIUM CHLORIDE 600; 310; 30; 20 MG/100ML; MG/100ML; MG/100ML; MG/100ML
50 INJECTION, SOLUTION INTRAVENOUS CONTINUOUS
Status: DISCONTINUED | OUTPATIENT
Start: 2020-05-27 | End: 2020-05-27 | Stop reason: HOSPADM

## 2020-05-27 RX ORDER — GLYCOPYRROLATE 0.2 MG/ML
INJECTION INTRAMUSCULAR; INTRAVENOUS AS NEEDED
Status: DISCONTINUED | OUTPATIENT
Start: 2020-05-27 | End: 2020-05-27 | Stop reason: SURG

## 2020-05-27 RX ORDER — GABAPENTIN 300 MG/1
300 CAPSULE ORAL ONCE
Status: COMPLETED | OUTPATIENT
Start: 2020-05-27 | End: 2020-05-27

## 2020-05-27 RX ORDER — ACETAMINOPHEN 325 MG/1
975 TABLET ORAL ONCE
Status: COMPLETED | OUTPATIENT
Start: 2020-05-27 | End: 2020-05-27

## 2020-05-27 RX ORDER — SODIUM CHLORIDE, SODIUM LACTATE, POTASSIUM CHLORIDE, CALCIUM CHLORIDE 600; 310; 30; 20 MG/100ML; MG/100ML; MG/100ML; MG/100ML
50 INJECTION, SOLUTION INTRAVENOUS CONTINUOUS
Status: DISCONTINUED | OUTPATIENT
Start: 2020-05-27 | End: 2020-05-27 | Stop reason: SDUPTHER

## 2020-05-27 RX ORDER — ONDANSETRON 2 MG/ML
4 INJECTION INTRAMUSCULAR; INTRAVENOUS EVERY 8 HOURS PRN
Status: DISCONTINUED | OUTPATIENT
Start: 2020-05-27 | End: 2020-05-27 | Stop reason: HOSPADM

## 2020-05-27 RX ORDER — ONDANSETRON 2 MG/ML
INJECTION INTRAMUSCULAR; INTRAVENOUS AS NEEDED
Status: DISCONTINUED | OUTPATIENT
Start: 2020-05-27 | End: 2020-05-27 | Stop reason: SURG

## 2020-05-27 RX ORDER — ROCURONIUM BROMIDE 10 MG/ML
INJECTION, SOLUTION INTRAVENOUS AS NEEDED
Status: DISCONTINUED | OUTPATIENT
Start: 2020-05-27 | End: 2020-05-27 | Stop reason: SURG

## 2020-05-27 RX ORDER — CEFAZOLIN SODIUM 2 G/50ML
2000 SOLUTION INTRAVENOUS ONCE
Status: COMPLETED | OUTPATIENT
Start: 2020-05-27 | End: 2020-05-27

## 2020-05-27 RX ORDER — KETAMINE HCL IN NACL, ISO-OSM 100MG/10ML
SYRINGE (ML) INJECTION AS NEEDED
Status: DISCONTINUED | OUTPATIENT
Start: 2020-05-27 | End: 2020-05-27 | Stop reason: SURG

## 2020-05-27 RX ADMIN — ACETAMINOPHEN 975 MG: 325 TABLET ORAL at 09:31

## 2020-05-27 RX ADMIN — DEXMEDETOMIDINE 0.4 MCG/KG/HR: 100 INJECTION, SOLUTION, CONCENTRATE INTRAVENOUS at 12:24

## 2020-05-27 RX ADMIN — HYDROMORPHONE HYDROCHLORIDE 0.4 MG: 1 INJECTION, SOLUTION INTRAMUSCULAR; INTRAVENOUS; SUBCUTANEOUS at 15:57

## 2020-05-27 RX ADMIN — ALBUMIN (HUMAN): 12.5 SOLUTION INTRAVENOUS at 13:50

## 2020-05-27 RX ADMIN — ROCURONIUM BROMIDE 20 MG: 10 INJECTION, SOLUTION INTRAVENOUS at 13:53

## 2020-05-27 RX ADMIN — HYDROMORPHONE HYDROCHLORIDE 0.4 MG: 1 INJECTION, SOLUTION INTRAMUSCULAR; INTRAVENOUS; SUBCUTANEOUS at 01:00

## 2020-05-27 RX ADMIN — GLYCOPYRROLATE 0.1 MG: 0.2 INJECTION, SOLUTION INTRAMUSCULAR; INTRAVENOUS at 12:08

## 2020-05-27 RX ADMIN — Medication 50 MG: at 12:21

## 2020-05-27 RX ADMIN — ONDANSETRON 4 MG: 2 INJECTION INTRAMUSCULAR; INTRAVENOUS at 12:08

## 2020-05-27 RX ADMIN — ROCURONIUM BROMIDE 50 MG: 10 INJECTION, SOLUTION INTRAVENOUS at 12:21

## 2020-05-27 RX ADMIN — SUGAMMADEX 200 MG: 100 INJECTION, SOLUTION INTRAVENOUS at 14:50

## 2020-05-27 RX ADMIN — PROPOFOL 150 MG: 10 INJECTION, EMULSION INTRAVENOUS at 12:21

## 2020-05-27 RX ADMIN — SODIUM CHLORIDE, SODIUM LACTATE, POTASSIUM CHLORIDE, AND CALCIUM CHLORIDE 50 ML/HR: .6; .31; .03; .02 INJECTION, SOLUTION INTRAVENOUS at 09:25

## 2020-05-27 RX ADMIN — LIDOCAINE HYDROCHLORIDE 50 MG: 10 INJECTION, SOLUTION EPIDURAL; INFILTRATION; INTRACAUDAL; PERINEURAL at 12:21

## 2020-05-27 RX ADMIN — SODIUM CHLORIDE: 0.9 INJECTION, SOLUTION INTRAVENOUS at 12:25

## 2020-05-27 RX ADMIN — HYDROMORPHONE HYDROCHLORIDE 0.4 MG: 1 INJECTION, SOLUTION INTRAMUSCULAR; INTRAVENOUS; SUBCUTANEOUS at 15:45

## 2020-05-27 RX ADMIN — OXYCODONE HYDROCHLORIDE 5 MG: 5 TABLET ORAL at 16:34

## 2020-05-27 RX ADMIN — SODIUM CHLORIDE 5 MCG/MIN: 0.9 INJECTION, SOLUTION INTRAVENOUS at 12:24

## 2020-05-27 RX ADMIN — ALBUMIN (HUMAN): 12.5 SOLUTION INTRAVENOUS at 13:03

## 2020-05-27 RX ADMIN — CEFAZOLIN SODIUM 2000 MG: 2 SOLUTION INTRAVENOUS at 12:22

## 2020-05-27 RX ADMIN — FENTANYL CITRATE 100 MCG: 50 INJECTION, SOLUTION INTRAMUSCULAR; INTRAVENOUS at 12:21

## 2020-05-27 RX ADMIN — LIDOCAINE HYDROCHLORIDE 0.2 ML: 10 INJECTION, SOLUTION EPIDURAL; INFILTRATION; INTRACAUDAL; PERINEURAL at 09:25

## 2020-05-27 RX ADMIN — ONDANSETRON 4 MG: 2 INJECTION INTRAMUSCULAR; INTRAVENOUS at 14:50

## 2020-05-27 RX ADMIN — GABAPENTIN 300 MG: 300 CAPSULE ORAL at 09:32

## 2020-05-27 RX ADMIN — SODIUM CHLORIDE, SODIUM LACTATE, POTASSIUM CHLORIDE, AND CALCIUM CHLORIDE: .6; .31; .03; .02 INJECTION, SOLUTION INTRAVENOUS at 14:32

## 2020-05-27 RX ADMIN — MIDAZOLAM 3 MG: 1 INJECTION INTRAMUSCULAR; INTRAVENOUS at 12:07

## 2020-05-27 RX ADMIN — ROCURONIUM BROMIDE 10 MG: 10 INJECTION, SOLUTION INTRAVENOUS at 13:37

## 2020-05-27 RX ADMIN — MIDAZOLAM 1 MG: 1 INJECTION INTRAMUSCULAR; INTRAVENOUS at 12:14

## 2020-05-27 RX ADMIN — SODIUM CHLORIDE, SODIUM LACTATE, POTASSIUM CHLORIDE, AND CALCIUM CHLORIDE: .6; .31; .03; .02 INJECTION, SOLUTION INTRAVENOUS at 11:44

## 2020-05-28 ENCOUNTER — TELEPHONE (OUTPATIENT)
Dept: OTHER | Facility: OTHER | Age: 60
End: 2020-05-28

## 2020-05-28 ENCOUNTER — OFFICE VISIT (OUTPATIENT)
Dept: PLASTIC SURGERY | Facility: CLINIC | Age: 60
End: 2020-05-28

## 2020-05-28 VITALS — TEMPERATURE: 99.4 F

## 2020-05-28 DIAGNOSIS — Z98.890 STATUS POST PANNICULECTOMY: ICD-10-CM

## 2020-05-28 DIAGNOSIS — E65 PANNUS, ABDOMINAL: Primary | ICD-10-CM

## 2020-05-28 PROCEDURE — 99024 POSTOP FOLLOW-UP VISIT: CPT | Performed by: PHYSICIAN ASSISTANT

## 2020-05-28 PROCEDURE — 3066F NEPHROPATHY DOC TX: CPT | Performed by: PHYSICIAN ASSISTANT

## 2020-05-29 PROBLEM — Z98.890 STATUS POST PANNICULECTOMY: Status: ACTIVE | Noted: 2020-05-29

## 2020-05-31 DIAGNOSIS — E65 ABDOMINAL PANNUS: ICD-10-CM

## 2020-05-31 DIAGNOSIS — E55.9 VITAMIN D DEFICIENCY: ICD-10-CM

## 2020-05-31 RX ORDER — OXYCODONE HYDROCHLORIDE 5 MG/1
5 TABLET ORAL EVERY 6 HOURS PRN
Qty: 30 TABLET | Refills: 0 | Status: CANCELLED | OUTPATIENT
Start: 2020-05-31

## 2020-06-01 ENCOUNTER — APPOINTMENT (EMERGENCY)
Dept: CT IMAGING | Facility: HOSPITAL | Age: 60
End: 2020-06-01
Payer: COMMERCIAL

## 2020-06-01 ENCOUNTER — HOSPITAL ENCOUNTER (EMERGENCY)
Facility: HOSPITAL | Age: 60
Discharge: HOME | End: 2020-06-01
Attending: EMERGENCY MEDICINE | Admitting: EMERGENCY MEDICINE
Payer: COMMERCIAL

## 2020-06-01 VITALS
BODY MASS INDEX: 32.77 KG/M2 | SYSTOLIC BLOOD PRESSURE: 116 MMHG | TEMPERATURE: 97.7 F | HEART RATE: 71 BPM | WEIGHT: 203.04 LBS | DIASTOLIC BLOOD PRESSURE: 58 MMHG | RESPIRATION RATE: 16 BRPM | OXYGEN SATURATION: 99 %

## 2020-06-01 DIAGNOSIS — T78.40XA ALLERGIC REACTION, INITIAL ENCOUNTER: Primary | ICD-10-CM

## 2020-06-01 DIAGNOSIS — G89.18 POST-OP PAIN: ICD-10-CM

## 2020-06-01 LAB
ALBUMIN SERPL BCP-MCNC: 3 G/DL (ref 3.5–5)
ALP SERPL-CCNC: 75 U/L (ref 46–116)
ALT SERPL W P-5'-P-CCNC: 16 U/L (ref 12–78)
ANION GAP SERPL CALCULATED.3IONS-SCNC: 5 MMOL/L (ref 4–13)
AST SERPL W P-5'-P-CCNC: 22 U/L (ref 5–45)
BACTERIA UR QL AUTO: ABNORMAL /HPF
BASOPHILS # BLD AUTO: 0.02 THOUSANDS/ΜL (ref 0–0.1)
BASOPHILS NFR BLD AUTO: 0 % (ref 0–1)
BILIRUB SERPL-MCNC: 0.53 MG/DL (ref 0.2–1)
BILIRUB UR QL STRIP: NEGATIVE
BUN SERPL-MCNC: 12 MG/DL (ref 5–25)
CALCIUM SERPL-MCNC: 9.1 MG/DL (ref 8.3–10.1)
CHLORIDE SERPL-SCNC: 102 MMOL/L (ref 100–108)
CLARITY UR: ABNORMAL
CO2 SERPL-SCNC: 31 MMOL/L (ref 21–32)
COLOR UR: YELLOW
CREAT SERPL-MCNC: 0.76 MG/DL (ref 0.6–1.3)
EOSINOPHIL # BLD AUTO: 0.37 THOUSAND/ΜL (ref 0–0.61)
EOSINOPHIL NFR BLD AUTO: 5 % (ref 0–6)
ERYTHROCYTE [DISTWIDTH] IN BLOOD BY AUTOMATED COUNT: 12.7 % (ref 11.6–15.1)
EXT PREG TEST URINE: NEGATIVE
EXT. CONTROL ED NAV: NORMAL
GFR SERPL CREATININE-BSD FRML MDRD: 87 ML/MIN/1.73SQ M
GLUCOSE SERPL-MCNC: 106 MG/DL (ref 65–140)
GLUCOSE UR STRIP-MCNC: NEGATIVE MG/DL
HCT VFR BLD AUTO: 34.3 % (ref 34.8–46.1)
HGB BLD-MCNC: 10.8 G/DL (ref 11.5–15.4)
HGB UR QL STRIP.AUTO: NEGATIVE
IMM GRANULOCYTES # BLD AUTO: 0.03 THOUSAND/UL (ref 0–0.2)
IMM GRANULOCYTES NFR BLD AUTO: 0 % (ref 0–2)
KETONES UR STRIP-MCNC: NEGATIVE MG/DL
LEUKOCYTE ESTERASE UR QL STRIP: ABNORMAL
LIPASE SERPL-CCNC: 45 U/L (ref 73–393)
LYMPHOCYTES # BLD AUTO: 1.24 THOUSANDS/ΜL (ref 0.6–4.47)
LYMPHOCYTES NFR BLD AUTO: 17 % (ref 14–44)
MCH RBC QN AUTO: 29 PG (ref 26.8–34.3)
MCHC RBC AUTO-ENTMCNC: 31.5 G/DL (ref 31.4–37.4)
MCV RBC AUTO: 92 FL (ref 82–98)
MONOCYTES # BLD AUTO: 0.51 THOUSAND/ΜL (ref 0.17–1.22)
MONOCYTES NFR BLD AUTO: 7 % (ref 4–12)
NEUTROPHILS # BLD AUTO: 5.21 THOUSANDS/ΜL (ref 1.85–7.62)
NEUTS SEG NFR BLD AUTO: 71 % (ref 43–75)
NITRITE UR QL STRIP: NEGATIVE
NON-SQ EPI CELLS URNS QL MICRO: ABNORMAL /HPF
NRBC BLD AUTO-RTO: 0 /100 WBCS
PH UR STRIP.AUTO: 7 [PH] (ref 4.5–8)
PLATELET # BLD AUTO: 229 THOUSANDS/UL (ref 149–390)
PMV BLD AUTO: 11.2 FL (ref 8.9–12.7)
POTASSIUM SERPL-SCNC: 4.6 MMOL/L (ref 3.5–5.3)
PROT SERPL-MCNC: 6.6 G/DL (ref 6.4–8.2)
PROT UR STRIP-MCNC: NEGATIVE MG/DL
RBC # BLD AUTO: 3.73 MILLION/UL (ref 3.81–5.12)
RBC #/AREA URNS AUTO: ABNORMAL /HPF
SODIUM SERPL-SCNC: 138 MMOL/L (ref 136–145)
SP GR UR STRIP.AUTO: 1.01 (ref 1–1.03)
UROBILINOGEN UR QL STRIP.AUTO: 1 E.U./DL
WBC # BLD AUTO: 7.38 THOUSAND/UL (ref 4.31–10.16)
WBC #/AREA URNS AUTO: ABNORMAL /HPF

## 2020-06-01 PROCEDURE — 83690 ASSAY OF LIPASE: CPT | Performed by: PHYSICIAN ASSISTANT

## 2020-06-01 PROCEDURE — 99284 EMERGENCY DEPT VISIT MOD MDM: CPT

## 2020-06-01 PROCEDURE — 81025 URINE PREGNANCY TEST: CPT | Performed by: PHYSICIAN ASSISTANT

## 2020-06-01 PROCEDURE — 74177 CT ABD & PELVIS W/CONTRAST: CPT

## 2020-06-01 PROCEDURE — 36415 COLL VENOUS BLD VENIPUNCTURE: CPT | Performed by: PHYSICIAN ASSISTANT

## 2020-06-01 PROCEDURE — 85025 COMPLETE CBC W/AUTO DIFF WBC: CPT | Performed by: PHYSICIAN ASSISTANT

## 2020-06-01 PROCEDURE — 81001 URINALYSIS AUTO W/SCOPE: CPT

## 2020-06-01 PROCEDURE — 99285 EMERGENCY DEPT VISIT HI MDM: CPT | Performed by: PHYSICIAN ASSISTANT

## 2020-06-01 PROCEDURE — 80053 COMPREHEN METABOLIC PANEL: CPT | Performed by: PHYSICIAN ASSISTANT

## 2020-06-01 RX ORDER — OXYCODONE HYDROCHLORIDE 5 MG/1
5 TABLET ORAL EVERY 4 HOURS PRN
Qty: 10 TABLET | Refills: 0 | Status: SHIPPED | OUTPATIENT
Start: 2020-06-01 | End: 2020-07-08 | Stop reason: SDUPTHER

## 2020-06-01 RX ORDER — OXYCODONE HYDROCHLORIDE AND ACETAMINOPHEN 5; 325 MG/1; MG/1
1 TABLET ORAL ONCE
Status: COMPLETED | OUTPATIENT
Start: 2020-06-01 | End: 2020-06-01

## 2020-06-01 RX ORDER — HYDROXYZINE HYDROCHLORIDE 25 MG/1
25 TABLET, FILM COATED ORAL ONCE
Status: COMPLETED | OUTPATIENT
Start: 2020-06-01 | End: 2020-06-01

## 2020-06-01 RX ORDER — DIPHENHYDRAMINE HCL 25 MG
25 TABLET ORAL EVERY 6 HOURS
Qty: 20 TABLET | Refills: 0 | Status: SHIPPED | OUTPATIENT
Start: 2020-06-01

## 2020-06-01 RX ADMIN — IOHEXOL 50 ML: 240 INJECTION, SOLUTION INTRATHECAL; INTRAVASCULAR; INTRAVENOUS; ORAL at 12:50

## 2020-06-01 RX ADMIN — HYDROXYZINE HYDROCHLORIDE 25 MG: 25 TABLET ORAL at 13:30

## 2020-06-01 RX ADMIN — OXYCODONE HYDROCHLORIDE AND ACETAMINOPHEN 1 TABLET: 5; 325 TABLET ORAL at 12:30

## 2020-06-01 RX ADMIN — IOHEXOL 100 ML: 350 INJECTION, SOLUTION INTRAVENOUS at 13:01

## 2020-06-03 ENCOUNTER — OFFICE VISIT (OUTPATIENT)
Dept: PLASTIC SURGERY | Facility: CLINIC | Age: 60
End: 2020-06-03

## 2020-06-03 DIAGNOSIS — R21 RASH: Primary | ICD-10-CM

## 2020-06-03 PROCEDURE — 99024 POSTOP FOLLOW-UP VISIT: CPT | Performed by: PHYSICIAN ASSISTANT

## 2020-06-03 PROCEDURE — 3066F NEPHROPATHY DOC TX: CPT | Performed by: PHYSICIAN ASSISTANT

## 2020-06-03 RX ORDER — TRIAMCINOLONE ACETONIDE 5 MG/G
CREAM TOPICAL 2 TIMES DAILY
Qty: 15 G | Refills: 1 | Status: SHIPPED | OUTPATIENT
Start: 2020-06-03

## 2020-06-03 RX ORDER — OXYCODONE HYDROCHLORIDE 5 MG/1
5 TABLET ORAL EVERY 6 HOURS PRN
Qty: 30 TABLET | Refills: 0 | OUTPATIENT
Start: 2020-06-03

## 2020-06-03 RX ORDER — ERGOCALCIFEROL 1.25 MG/1
50000 CAPSULE ORAL 2 TIMES WEEKLY
Qty: 8 CAPSULE | Refills: 0 | OUTPATIENT
Start: 2020-06-04 | End: 2020-08-25

## 2020-06-03 NOTE — TELEPHONE ENCOUNTER
She should be taking an extra 4000 IU d3 daily now and get repeat vitamin D level in a few months-if she is due to be seen during that time frame she will get labslip then

## 2020-06-04 ENCOUNTER — TELEPHONE (OUTPATIENT)
Dept: OTHER | Facility: OTHER | Age: 60
End: 2020-06-04

## 2020-06-05 DIAGNOSIS — E65 PANNUS, ABDOMINAL: Primary | ICD-10-CM

## 2020-06-05 RX ORDER — OXYCODONE HYDROCHLORIDE 5 MG/1
5 TABLET ORAL EVERY 6 HOURS PRN
Qty: 30 TABLET | Refills: 0 | Status: CANCELLED | OUTPATIENT
Start: 2020-06-05 | End: 2020-06-15

## 2020-06-07 DIAGNOSIS — E65 ABDOMINAL PANNUS: ICD-10-CM

## 2020-06-07 RX ORDER — OXYCODONE HYDROCHLORIDE 5 MG/1
5 TABLET ORAL EVERY 6 HOURS PRN
Qty: 30 TABLET | Refills: 0 | Status: CANCELLED | OUTPATIENT
Start: 2020-06-07

## 2020-06-08 DIAGNOSIS — E65 ABDOMINAL PANNUS: ICD-10-CM

## 2020-06-08 RX ORDER — OXYCODONE HYDROCHLORIDE 5 MG/1
5 TABLET ORAL EVERY 6 HOURS PRN
Qty: 30 TABLET | Refills: 0 | Status: SHIPPED | OUTPATIENT
Start: 2020-06-08 | End: 2020-07-08

## 2020-06-11 ENCOUNTER — OFFICE VISIT (OUTPATIENT)
Dept: PLASTIC SURGERY | Facility: CLINIC | Age: 60
End: 2020-06-11

## 2020-06-11 VITALS — TEMPERATURE: 99.8 F

## 2020-06-11 DIAGNOSIS — E65 ABDOMINAL PANNUS: Primary | ICD-10-CM

## 2020-06-11 PROCEDURE — 99024 POSTOP FOLLOW-UP VISIT: CPT | Performed by: PHYSICIAN ASSISTANT

## 2020-06-11 NOTE — LETTER
June 11, 2020     Patient: Heaven Thompson   YOB: 1960   Date of Visit: 6/11/2020       To Whom it May Concern:    Heaven Thompson is under my professional care  She was seen in my office on 6/11/2020  She may return to work on 06/27/2020  she has been under our care since 5/27/2020  If you have any questions or concerns, please don't hesitate to call           Sincerely,          Minal Tinajero PA-C        CC: No Recipients

## 2020-06-11 NOTE — LETTER
"Patient slept throughout shift, slightly combative with cares. Spit out oral medication this morning. Incontinent of urine. Just yells out \"I want to sleep, leave me alone. Did not want to eat. Attempted mouth cares, patient keeps lips clenched. Continue to monitor.  Patient had a blood glucose monitor sensor on her left upper arm. As a result of her continuously thrashing around in bed, this sensor has fallen off.     " June 11, 2020     Patient: Tessa Riddle   YOB: 1960   Date of Visit: 6/11/2020       To Whom it May Concern:    Tessa Riddle is under my professional care  She was seen in my office on 6/11/2020  She {Return to school/sport/work:[id number]}  If you have any questions or concerns, please don't hesitate to call           Sincerely,          Awilda Clark PA-C        CC: No Recipients

## 2020-06-30 ENCOUNTER — OFFICE VISIT (OUTPATIENT)
Dept: PLASTIC SURGERY | Facility: CLINIC | Age: 60
End: 2020-06-30

## 2020-06-30 DIAGNOSIS — E65 PANNUS, ABDOMINAL: ICD-10-CM

## 2020-06-30 DIAGNOSIS — Z98.890 POST-OPERATIVE STATE: Primary | ICD-10-CM

## 2020-06-30 PROCEDURE — 3066F NEPHROPATHY DOC TX: CPT | Performed by: PLASTIC SURGERY

## 2020-06-30 PROCEDURE — 99024 POSTOP FOLLOW-UP VISIT: CPT | Performed by: PLASTIC SURGERY

## 2020-07-01 DIAGNOSIS — Z98.890 STATUS POST PANNICULECTOMY: Primary | ICD-10-CM

## 2020-07-06 NOTE — PROGRESS NOTES
FOLLOW-UP PROGRESS NOTE  Reba Rivero [id number] 1960 7/6/2020    Subjective   Patient is a 64 y o  female who presents for routine post-operative follow up  She denies any present complaints  Activity level has been appropriate  Pain is well-controlled with current therapy     Past Medical History:   Diagnosis Date    Asthma     Albuterol prn    Bariatric surgery status     Depression 2009    managed with Effexor     Diabetes mellitus ([facility] [address] ) 2005    resolved with gastric bypass 2015    Generalized osteoarthritis     Low vitamin B12 level     Migraine     Postgastrectomy malabsorption     Suicide attempt ([facility] [address] )     Vitamin D deficiency      Past Surgical History:   Procedure Laterality Date    CHOLECYSTECTOMY  2005    COLONOSCOPY      GASTRIC BYPASS  2015    elvira - en -y    [address] [facility] ARTHROSCOPY      with Lysis of adhesions    LAPAROSCOPIC SUPRACERVICAL HYSTERECTOMY  2005    due to endometriosis/AUB    OOPHORECTOMY Left 2005    LA EXCISE EXCESS SKIN TISSUE,ABDOMEN N/A 5/27/2020    Procedure: PANNICULECTOMY;  Surgeon: Frantz Molina MD;  Location: [facility];  Service: Alisha Gan [facility] N/A 1/45/8024    Procedure: APPLICATION VAC DRESSING;  Surgeon: Frantz Molina MD;  Location: [facility];  Service: Plastics        Current Outpatient Medications:     acetaminophen (TYLENOL) 500 mg tablet, Take 1 tablet (500 mg total) by mouth every 6 (six) hours as needed for mild pain, Disp: 30 tablet, Rfl: 0    albuterol (PROVENTIL HFA,VENTOLIN HFA) 90 mcg/act inhaler, Inhale 2 puffs every 6 (six) hours as needed for wheezing, Disp: , Rfl:     butalbital-acetaminophen-caffeine (FIORICET,ESGIC) -40 mg per tablet, Take 1 tablet by mouth every 4 (four) hours as needed for headaches, Disp: 30 tablet, Rfl: 0    clonazePAM (KlonoPIN) 0 5 mg tablet, TK 1 T PO 1-2 TIMES DAILY PRF ANXIETY/SLEEP, Disp: , Rfl: 2    diphenhydrAMINE (BENADRYL) 25 mg tablet, Take 1 tablet (25 mg total) by mouth every 6 (six) hours, Disp: 20 tablet, Rfl: 0    diphenhydrAMINE (BENADRYL) 50 MG tablet, Take 50 mg by mouth every 6 (six) hours as needed for itching, Disp: , Rfl:     ergocalciferol (VITAMIN D2) 50,000 units, Take 1 capsule (50,000 Units total) by mouth 2 (two) times a week for 24 doses, Disp: 8 capsule, Rfl: 2    hydrocortisone 2 5 % cream, Apply topically 4 (four) times a day as needed for irritation, Disp: 30 g, Rfl: 0    hydrocortisone 2 5 % cream, Apply topically 2 (two) times a day for 10 days, Disp: 30 g, Rfl: 0    meclizine (ANTIVERT) 12 5 MG tablet, Take 2 tablets (25 mg total) by mouth 3 (three) times a day as needed for dizziness, Disp: 30 tablet, Rfl: 0    Multiple Vitamin (MULTIVITAMIN) capsule, Take 1 capsule by mouth daily, Disp: , Rfl:     omeprazole (PriLOSEC) 20 mg delayed release capsule, TAKE ONE CAPSULE BY MOUTH DAILY, Disp: 90 capsule, Rfl: 1    ondansetron (ZOFRAN) 4 mg tablet, Take 1 tablet (4 mg total) by mouth every 6 (six) hours (Patient taking differently: Take 4 mg by mouth every 6 (six) hours as needed ), Disp: 12 tablet, Rfl: 0    oxyCODONE (ROXICODONE) 5 mg immediate release tablet, Take 1 tablet (5 mg total) by mouth every 4 (four) hours as needed for moderate painMax Daily Amount: 30 mg, Disp: 10 tablet, Rfl: 0    oxyCODONE (ROXICODONE) 5 mg immediate release tablet, Take 1 tablet (5 mg total) by mouth every 6 (six) hours as needed for moderate pain for up to 12 dosesMax Daily Amount: 20 mg, Disp: 30 tablet, Rfl: 0    tiZANidine (ZANAFLEX) 2 mg tablet, Take 1 tablet (2 mg total) by mouth every 8 (eight) hours as needed for muscle spasms, Disp: 90 tablet, Rfl: 0    topiramate (TOPAMAX) 25 mg sprinkle capsule, Take 1 capsule (25 mg total) by mouth 2 (two) times a day (Patient not taking: Reported on 5/25/2020), Disp: 60 capsule, Rfl: 1    traMADol (ULTRAM) 50 mg tablet, Take 1 tablet (50 mg total) by mouth every 6 (six) hours as needed for moderate pain (Patient not taking: Reported on 5/25/2020), Disp: 10 tablet, Rfl: 0    triamcinolone (KENALOG) 0 5 % cream, Apply topically 2 (two) times a day, Disp: 15 g, Rfl: 1    vitamin B-12 (CYANOCOBALAMIN) 2000 MCG TABS, Take 1 tablet (2,000 mcg total) by mouth daily (Patient not taking: Reported on 5/25/2020), Disp: 30 tablet, Rfl: 5    Objective   EXAM:    Temp 99 8 °F (37 7 °C)   LMP  (LMP Unknown)   Incision: well-approximated, well healing, without erythema, induration, or drainage  Neurological exam reveals:   Mental Status: Alert, oriented, thought content appropriate  Language:  normal  Fund of Knowledge:  normal  Cranial Nerves:  normal  Sensation:  normal  Deep Tendon Reflexes in upper/lower extremities with no pathological reflexes  Coordination:  normal  Gait and Station:  normal  Musculoskeletal exam reveals:   Back is straight and non-tender, full ROM of upper and lower extremities  Assessment/Plan   There are no diagnoses linked to this encounter        Carl Duran PA-C

## 2020-07-07 ENCOUNTER — HOSPITAL ENCOUNTER (OUTPATIENT)
Dept: ULTRASOUND IMAGING | Facility: HOSPITAL | Age: 60
Discharge: HOME | End: 2020-07-07
Attending: PLASTIC SURGERY
Payer: COMMERCIAL

## 2020-07-07 DIAGNOSIS — Z98.890 STATUS POST PANNICULECTOMY: ICD-10-CM

## 2020-07-07 PROCEDURE — 76705 ECHO EXAM OF ABDOMEN: CPT

## 2020-07-08 DIAGNOSIS — M47.816 LUMBAR FACET ARTHROPATHY: ICD-10-CM

## 2020-07-08 DIAGNOSIS — G89.18 POST-OP PAIN: ICD-10-CM

## 2020-07-08 RX ORDER — TIZANIDINE 2 MG/1
2 TABLET ORAL EVERY 8 HOURS PRN
Qty: 90 TABLET | Refills: 0 | Status: CANCELLED | OUTPATIENT
Start: 2020-07-08

## 2020-07-08 RX ORDER — TIZANIDINE 2 MG/1
2 TABLET ORAL EVERY 8 HOURS PRN
Qty: 90 TABLET | Refills: 0 | Status: SHIPPED | OUTPATIENT
Start: 2020-07-08 | End: 2020-08-11

## 2020-07-08 RX ORDER — OXYCODONE HYDROCHLORIDE 5 MG/1
5 TABLET ORAL EVERY 6 HOURS PRN
Qty: 10 TABLET | Refills: 0 | Status: SHIPPED | OUTPATIENT
Start: 2020-07-08 | End: 2020-07-27 | Stop reason: SDUPTHER

## 2020-07-14 ENCOUNTER — HOSPITAL ENCOUNTER (OUTPATIENT)
Dept: INTERVENTIONAL RADIOLOGY/VASCULAR | Facility: HOSPITAL | Age: 60
Discharge: HOME | End: 2020-07-14
Admitting: RADIOLOGY
Payer: COMMERCIAL

## 2020-07-14 VITALS
TEMPERATURE: 97.5 F | WEIGHT: 190 LBS | HEIGHT: 66 IN | RESPIRATION RATE: 16 BRPM | DIASTOLIC BLOOD PRESSURE: 63 MMHG | SYSTOLIC BLOOD PRESSURE: 125 MMHG | OXYGEN SATURATION: 97 % | BODY MASS INDEX: 30.53 KG/M2 | HEART RATE: 56 BPM

## 2020-07-14 DIAGNOSIS — T81.49XA POST-OPERATIVE WOUND ABSCESS: Primary | ICD-10-CM

## 2020-07-14 DIAGNOSIS — E65 PANNUS, ABDOMINAL: ICD-10-CM

## 2020-07-14 PROCEDURE — 87186 SC STD MICRODIL/AGAR DIL: CPT | Performed by: PLASTIC SURGERY

## 2020-07-14 PROCEDURE — 87205 SMEAR GRAM STAIN: CPT | Performed by: PLASTIC SURGERY

## 2020-07-14 PROCEDURE — 99152 MOD SED SAME PHYS/QHP 5/>YRS: CPT | Performed by: RADIOLOGY

## 2020-07-14 PROCEDURE — 99152 MOD SED SAME PHYS/QHP 5/>YRS: CPT

## 2020-07-14 PROCEDURE — 49406 IMAGE CATH FLUID PERI/RETRO: CPT

## 2020-07-14 PROCEDURE — C1729 CATH, DRAINAGE: HCPCS

## 2020-07-14 PROCEDURE — 99153 MOD SED SAME PHYS/QHP EA: CPT

## 2020-07-14 PROCEDURE — 87070 CULTURE OTHR SPECIMN AEROBIC: CPT | Performed by: PLASTIC SURGERY

## 2020-07-14 PROCEDURE — C1769 GUIDE WIRE: HCPCS

## 2020-07-14 PROCEDURE — 87147 CULTURE TYPE IMMUNOLOGIC: CPT | Performed by: PLASTIC SURGERY

## 2020-07-14 PROCEDURE — 49406 IMAGE CATH FLUID PERI/RETRO: CPT | Performed by: RADIOLOGY

## 2020-07-14 RX ORDER — MIDAZOLAM HYDROCHLORIDE 2 MG/2ML
INJECTION, SOLUTION INTRAMUSCULAR; INTRAVENOUS CODE/TRAUMA/SEDATION MEDICATION
Status: COMPLETED | OUTPATIENT
Start: 2020-07-14 | End: 2020-07-14

## 2020-07-14 RX ORDER — OXYCODONE HYDROCHLORIDE 5 MG/1
5 TABLET ORAL EVERY 6 HOURS PRN
Status: COMPLETED | OUTPATIENT
Start: 2020-07-14 | End: 2020-07-14

## 2020-07-14 RX ORDER — CEFAZOLIN SODIUM 1 G/3ML
INJECTION, POWDER, FOR SOLUTION INTRAMUSCULAR; INTRAVENOUS CODE/TRAUMA/SEDATION MEDICATION
Status: COMPLETED | OUTPATIENT
Start: 2020-07-14 | End: 2020-07-14

## 2020-07-14 RX ORDER — CEPHALEXIN 250 MG/1
250 CAPSULE ORAL 4 TIMES DAILY
Qty: 28 CAPSULE | Refills: 0 | Status: SHIPPED | OUTPATIENT
Start: 2020-07-14 | End: 2020-07-21

## 2020-07-14 RX ORDER — FENTANYL CITRATE 50 UG/ML
INJECTION, SOLUTION INTRAMUSCULAR; INTRAVENOUS CODE/TRAUMA/SEDATION MEDICATION
Status: COMPLETED | OUTPATIENT
Start: 2020-07-14 | End: 2020-07-14

## 2020-07-14 RX ADMIN — MIDAZOLAM HYDROCHLORIDE 1 MG: 1 INJECTION, SOLUTION INTRAMUSCULAR; INTRAVENOUS at 16:41

## 2020-07-14 RX ADMIN — CEFAZOLIN SODIUM 1000 MG: 1 POWDER, FOR SOLUTION INTRAMUSCULAR; INTRAVENOUS at 16:44

## 2020-07-14 RX ADMIN — OXYCODONE HYDROCHLORIDE 5 MG: 5 TABLET ORAL at 14:56

## 2020-07-14 RX ADMIN — FENTANYL CITRATE 50 MCG: 50 INJECTION INTRAMUSCULAR; INTRAVENOUS at 16:41

## 2020-07-14 RX ADMIN — FENTANYL CITRATE 50 MCG: 50 INJECTION INTRAMUSCULAR; INTRAVENOUS at 16:44

## 2020-07-14 NOTE — H&P
Interventional Radiology  History and Physical 7/14/2020     Bailee Pennington   1960   [id number]    Assessment/Plan:  Postoperative small fluid collection in the periumbilical region with overlying cellulitis and leakage of fluid from her umbilicus  Plan ultrasound-guided drainage catheter placement  Problem List Items Addressed This Visit        Other    Pannus, abdominal    Relevant Orders    IR image guided aspiration / drainage w tube             Subjective:     Patient ID: Bailee Pennington is a 64 y o  female  History of Present Illness  Patient is status post panniculectomy on 05/27/2020, had her last LINNEA drain removed recently and now has developed Yusra umbilical pain with fluid leaking from her emboli kiss  Ultrasound demonstrates a small periumbilical fluid collection  Patient also complains of fullness and soreness in other regions adjacent to her panniculectomy incision, but no other fluid collections are demonstrated  Review of Systems   Constitutional: Positive for chills  Respiratory: Negative  Cardiovascular: Negative  Gastrointestinal: Positive for abdominal pain  Patient complains of fullness and pain around her panniculectomy scar  Genitourinary: Negative  Skin:        Leakage of fluid from the umbilicus  Psychiatric/Behavioral: Negative            Past Medical History:   Diagnosis Date    Asthma     Albuterol prn    Bariatric surgery status     Depression 2009    managed with Effexor     Diabetes mellitus ([facility] [address] ) 2005    resolved with gastric bypass 2015    Generalized osteoarthritis     Low vitamin B12 level     Migraine     Postgastrectomy malabsorption     Suicide attempt ([address] )     Vitamin D deficiency         Past Surgical History:   Procedure Laterality Date    CHOLECYSTECTOMY  2005    COLONOSCOPY      COSMETIC SURGERY  05/27/2020    Abdominoplasty    GASTRIC BYPASS  2015    elvira - en -y     HYSTERECTOMY      KNEE ARTHROSCOPY      with Lysis of adhesions    LAPAROSCOPIC SUPRACERVICAL HYSTERECTOMY  2005    due to endometriosis/AUB    OOPHORECTOMY Left     IN EXCISE EXCESS SKIN TISSUE,ABDOMEN N/A 2020    Procedure: PANNICULECTOMY;  Surgeon: Pippa Davalos MD;  [facility];  Service: Plastics    TONSILLECTOMY AND ADENOIDECTOMY      TUBAL LIGATION      VAC DRESSING APPLICATION N/A     Procedure: APPLICATION VAC DRESSING;  Surgeon: Pippa Davalos MD;  [facility];  Service: Plastics        Social History     Tobacco Use   Smoking Status Former Smoker    Years: 32 00    Types: Cigarettes    Last attempt to quit: 2013    Years since quittin 2   Smokeless Tobacco Never Used        Social History     Substance and Sexual Activity   Alcohol Use Not Currently        Social History     Substance and Sexual Activity   Drug Use Never        Allergies   Allergen Reactions    Motrin [Ibuprofen]      Hx gastric bypass    Cherry Rash    Gabapentin Rash       Current Outpatient Medications   Medication Sig Dispense Refill    acetaminophen (TYLENOL) 500 mg tablet Take 1 tablet (500 mg total) by mouth every 6 (six) hours as needed for mild pain 30 tablet 0    albuterol (PROVENTIL HFA,VENTOLIN HFA) 90 mcg/act inhaler Inhale 2 puffs every 6 (six) hours as needed for wheezing      butalbital-acetaminophen-caffeine (FIORICET,ESGIC) -40 mg per tablet Take 1 tablet by mouth every 4 (four) hours as needed for headaches 30 tablet 0    clonazePAM (KlonoPIN) 0 5 mg tablet TK 1 T PO 1-2 TIMES DAILY PRF ANXIETY/SLEEP  2    diphenhydrAMINE (BENADRYL) 25 mg tablet Take 1 tablet (25 mg total) by mouth every 6 (six) hours 20 tablet 0    ergocalciferol (VITAMIN D2) 50,000 units Take 1 capsule (50,000 Units total) by mouth 2 (two) times a week for 24 doses 8 capsule 2    hydrocortisone 2 5 % cream Apply topically 4 (four) times a day as needed for irritation 30 g 0    omeprazole (PriLOSEC) 20 mg delayed release capsule TAKE ONE CAPSULE BY MOUTH DAILY 90 capsule 1    oxyCODONE (ROXICODONE) 5 mg immediate release tablet Take 1 tablet (5 mg total) by mouth every 6 (six) hours as needed for moderate pain for up to 15 dosesMax Daily Amount: 20 mg 10 tablet 0    tiZANidine (ZANAFLEX) 2 mg tablet Take 1 tablet (2 mg total) by mouth every 8 (eight) hours as needed for muscle spasms 90 tablet 0    diphenhydrAMINE (BENADRYL) 50 MG tablet Take 50 mg by mouth every 6 (six) hours as needed for itching      hydrocortisone 2 5 % cream Apply topically 2 (two) times a day for 10 days 30 g 0    meclizine (ANTIVERT) 12 5 MG tablet Take 2 tablets (25 mg total) by mouth 3 (three) times a day as needed for dizziness 30 tablet 0    Multiple Vitamin (MULTIVITAMIN) capsule Take 1 capsule by mouth daily      ondansetron (ZOFRAN) 4 mg tablet Take 1 tablet (4 mg total) by mouth every 6 (six) hours (Patient taking differently: Take 4 mg by mouth every 6 (six) hours as needed ) 12 tablet 0    topiramate (TOPAMAX) 25 mg sprinkle capsule Take 1 capsule (25 mg total) by mouth 2 (two) times a day (Patient not taking: Reported on 5/25/2020) 60 capsule 1    triamcinolone (KENALOG) 0 5 % cream Apply topically 2 (two) times a day 15 g 1    vitamin B-12 (CYANOCOBALAMIN) 2000 MCG TABS Take 1 tablet (2,000 mcg total) by mouth daily (Patient not taking: Reported on 5/25/2020) 30 tablet 5     No current facility-administered medications for this encounter  Objective:    Vitals:    07/14/20 1233 07/14/20 1242   BP: 139/80    Pulse: 72    Resp: 20    Temp: (!) 97 °F (36 1 °C)    SpO2: 94%    Weight:  86 2 kg (190 lb)   Height:  5' 6" (1 676 m)        Physical Exam   Constitutional: She is oriented to person, place, and time  She appears well-developed and well-nourished  HENT:   Head: Normocephalic and atraumatic  Cardiovascular: Normal rate, regular rhythm and normal heart sounds     Pulmonary/Chest: Effort normal and breath sounds normal    Abdominal: Bowel sounds are normal  There is tenderness  Patient has panniculectomy scar which appears to be healing well  Small area of cellulitis is noted inferior and slightly to the right of the umbilicus  Patient has leakage of fluid from the umbilicus  Neurological: She is alert and oriented to person, place, and time  Skin: Skin is warm and dry  Rash noted  Psychiatric: She has a normal mood and affect  Her behavior is normal  Judgment and thought content normal          No results found for: BNP   Lab Results   Component Value Date    WBC 7 38 06/01/2020    HGB 10 8 (L) 06/01/2020    HCT 34 3 (L) 06/01/2020    MCV 92 06/01/2020     06/01/2020     Lab Results   Component Value Date    INR 1 01 03/18/2019    PROTIME 10 7 03/18/2019     Lab Results   Component Value Date    PTT 23 03/18/2019         I have personally reviewed pertinent imaging and laboratory results  This procedure has been fully reviewed with the patient and written informed consent has been obtained  Code Status: Prior  Advance Directive and Living Will:      Power of :    POLST:      This text is generated with voice recognition software  There may be translation, syntax,  or grammatical errors  If you have any questions, please contact the dictating provider

## 2020-07-14 NOTE — BRIEF OP NOTE (RAD/CATH)
INTERVENTIONAL RADIOLOGY PROCEDURE NOTE    Date: 7/14/2020    Procedure: IR IMAGE GUIDED ASPIRATION / DRAINAGE W TUBE     Preoperative diagnosis:   1  Post-operative wound abscess    2  Pannus, abdominal         Postoperative diagnosis: Same  Surgeon: Estee Harrington MD     Assistant: None  No qualified resident was available  Blood loss:  Trace    Specimens:  Sent for culture    Findings:  14 mL pus aspirated from periumbilical collection  [address] [place] drain left in place  Complications: None immediate      Anesthesia: concious sedation

## 2020-07-14 NOTE — DISCHARGE INSTRUCTIONS
TUBE CARE INSTRUCTIONS    Care after your procedure:    Resume your normal diet  Small sips of flat soda will help with nausea  1   The drainage bag/bulb may be emptied as necessary  Keep a record of the amount of fluid you drain from your tube  This should be done with clean technique as well  2  A fresh dressing should be applied daily over the tube insertion site  3  As the tube is secured to the skin with only a suture,try not to pull on your tube  Tub baths are not permitted  Showers are permitted if the patient's skin entry site is prevented from getting wet  Similarly, washcloth "baths" are acceptable  Contact Interventional Radiology at [phone] Demetrius PATIENTS: Contact Interventional Radiology at [phone]) Georgie Leavitt PATIENTS: Contact Interventional Radiology at [phone]) if:    1  Leakage or large amounts of liquid around the catheter  2  Fever of 101 degrees lasting several hours without other obvious cause (such as sore throat, flu, etc)  3  Persistent nausea or vomiting  4  Diminished drainage, which may be associated with pressure or pain  Or when the     drainage from your tube is less than 10mls for 48 hours  5  Catheter pulled back or falls out  The following pharmacies carry the flush syringes  [facility]                     [facility]  [address]  Phone [phone]            Phone [phone] [address]             [address] [facility]                                [phone]  [address] [facility] Irma THOMAS                      [address] [facility]  Phone [phone]            Phone [phone] Jose R Morales Anton                                                                                                          [address] [facility],Fourth [address]  [facility]                                                                               [facility]  Phone [phone] Sedation   WHAT YOU NEED TO KNOW:   Procedural sedation is medicine used during procedures to help you feel relaxed and calm  You will remember little to none of the procedure  After sedation you may feel tired, weak, or unsteady on your feet  You may also have trouble concentrating or short-term memory loss  These symptoms should go away in 24 hours or less  DISCHARGE INSTRUCTIONS:     Call 911 or have someone else call for any of the following:   · You have sudden trouble breathing      · You cannot be woken  Contact Interventional Radiology at [phone]   Demetrius PATIENTS: Contact Interventional Radiology at [phone]) Remedios Montague PATIENTS: Contact Interventional Radiology at [phone]) if any of the following occur:     · You have a severe headache or dizziness      · Your heart is beating faster than usual     · You have a fever or chills      · Your skin is itchy, swollen, or you have a rash      · You have nausea or are vomiting for more than 8 hours after the procedure       · You have questions or concerns about your condition or care  Self-care:   · Have someone stay with you for 24 hours  This person can drive you to errands and help you do things around the house  This person can also watch for problems       · Rest and do quiet activities for 24 hours  Do not exercise, ride a bike, or play sports  Stand up slowly to prevent dizziness and falls  Take short walks around the house with another person   Slowly return to your usual activities the next day       · Do not drive or use dangerous machines or tools for 24 hours  You may injure yourself or others  Examples include a lawnmower, saw, or drill  Do not return to work for 24 hours if you use dangerous machines or tools for work       · Do not make important decisions for 24 hours  For example, do not sign important papers or invest money       · Drink liquids as directed  Liquids help flush the sedation medicine out of your body  Ask how much liquid to drink each day and which liquids are best for you       · Eat small, frequent meals to prevent nausea and vomiting  Start with clear liquids such as juice or broth  If you do not vomit after clear liquids, you can eat your usual foods       · Do not drink alcohol or take medicines that make you drowsy  This includes medicines that help you sleep and anxiety medicines  Ask your healthcare provider if it is safe for you to take pain medicine  Follow up with your healthcare provider as directed: Write down your questions so you remember to ask them during your visits

## 2020-07-17 LAB
BACTERIA SPEC BFLD CULT: ABNORMAL
GRAM STN SPEC: ABNORMAL
GRAM STN SPEC: ABNORMAL

## 2020-07-22 ENCOUNTER — HOSPITAL ENCOUNTER (OUTPATIENT)
Dept: INTERVENTIONAL RADIOLOGY/VASCULAR | Facility: HOSPITAL | Age: 60
Discharge: HOME | End: 2020-07-22
Admitting: PLASTIC SURGERY
Payer: COMMERCIAL

## 2020-07-22 DIAGNOSIS — L02.91 ABSCESS: ICD-10-CM

## 2020-07-22 PROCEDURE — 75984 XRAY CONTROL CATHETER CHANGE: CPT

## 2020-07-22 PROCEDURE — 49423 EXCHANGE DRAINAGE CATHETER: CPT

## 2020-07-22 PROCEDURE — C1729 CATH, DRAINAGE: HCPCS

## 2020-07-22 PROCEDURE — 75984 XRAY CONTROL CATHETER CHANGE: CPT | Performed by: RADIOLOGY

## 2020-07-22 PROCEDURE — 49423 EXCHANGE DRAINAGE CATHETER: CPT | Performed by: RADIOLOGY

## 2020-07-22 PROCEDURE — C1769 GUIDE WIRE: HCPCS

## 2020-07-22 RX ADMIN — IOHEXOL 15 ML: 350 INJECTION, SOLUTION INTRAVENOUS at 09:22

## 2020-07-22 NOTE — DISCHARGE INSTRUCTIONS
TUBE CARE INSTRUCTIONS    Care after your procedure:    Resume your normal diet  Small sips of flat soda will help with nausea  1  The properly functioning catheter should be forward flushed once (1x) daily with 10ml of normal saline using clean technique  You will be given a prescription for flushes  To flush the tube, clean both connections with alcohol swab  Twist off the drainage bag/ bulb  tubing and twist the saline syringe into the drainage tube and flush  Remove the syringe and twist the drainage bag / bulb tubing tubing back on     2  The drainage bag/bulb may be emptied as necessary  Keep a record of the amount of fluid you drain from your tube  This should be done with clean technique as well  3  A fresh dressing should be applied daily over the tube insertion site  4  As the tube is secured to the skin with only a suture,try not to pull on your tube  Tub baths are not permitted  Showers are permitted if the patient's skin entry site is prevented from getting wet  Similarly, washcloth "baths" are acceptable  Contact Interventional Radiology at [phone] Demetrius PATIENTS: Contact Interventional Radiology at [phone]) Vj Kirk PATIENTS: Contact Interventional Radiology at [phone]) if:    1  Leakage or large amounts of liquid around the catheter  2  Fever of 101 degrees lasting several hours without other obvious cause (such as sore throat, flu, etc)  3  Persistent nausea or vomiting  4  Diminished drainage, which may be associated with pressure or pain  Or when the     drainage from your tube is less than 10mls for 48 hours  5  Catheter pulled back or falls out  The following pharmacies carry the flush syringes         [facility]                     [facility] [place]  [address] [facility]                         [address] [facility]  Phone [phone]            Phone [phone]   [place] [address]                                [phone]  [address] [facility] Irma THOMAS                      [address] [facility]  Phone [phone]            Phone [phone]                      Martha Winkler                                                                                                          [phone]  [facility]  [facility] [address]    [address]  Phone [phone]

## 2020-07-27 DIAGNOSIS — G89.18 POST-OP PAIN: ICD-10-CM

## 2020-07-28 ENCOUNTER — TELEPHONE (OUTPATIENT)
Dept: PLASTIC SURGERY | Facility: CLINIC | Age: 60
End: 2020-07-28

## 2020-07-28 RX ORDER — NALOXONE HYDROCHLORIDE 4 MG/.1ML
SPRAY NASAL
Qty: 1 EACH | Refills: 1 | Status: SHIPPED | OUTPATIENT
Start: 2020-07-28

## 2020-07-28 RX ORDER — OXYCODONE HYDROCHLORIDE 5 MG/1
5 TABLET ORAL EVERY 6 HOURS PRN
Qty: 10 TABLET | Refills: 0 | Status: SHIPPED | OUTPATIENT
Start: 2020-07-28 | End: 2021-03-17

## 2020-07-29 ENCOUNTER — HOSPITAL ENCOUNTER (OUTPATIENT)
Dept: INTERVENTIONAL RADIOLOGY/VASCULAR | Facility: HOSPITAL | Age: 60
Discharge: HOME | End: 2020-07-29
Payer: COMMERCIAL

## 2020-07-29 DIAGNOSIS — L02.91 ABSCESS: ICD-10-CM

## 2020-07-29 PROCEDURE — 76080 X-RAY EXAM OF FISTULA: CPT

## 2020-07-29 PROCEDURE — 76080 X-RAY EXAM OF FISTULA: CPT | Performed by: STUDENT IN AN ORGANIZED HEALTH CARE EDUCATION/TRAINING PROGRAM

## 2020-07-29 PROCEDURE — 49424 ASSESS CYST CONTRAST INJECT: CPT | Performed by: STUDENT IN AN ORGANIZED HEALTH CARE EDUCATION/TRAINING PROGRAM

## 2020-07-29 PROCEDURE — 49424 ASSESS CYST CONTRAST INJECT: CPT

## 2020-07-29 RX ADMIN — IOHEXOL 5 ML: 350 INJECTION, SOLUTION INTRAVENOUS at 14:42

## 2020-07-29 NOTE — SEDATION DOCUMENTATION
Patient reports approximately 10ml/day of serous drainage  She also reports some drainage from the Navel

## 2020-07-29 NOTE — PROGRESS NOTES
Interventional Radiology Preprocedure Note    History/Indication for procedure:   June Rank is a 64 y o  female with a PMH of panniculectomy c/b fluid collection s/p drain insertion  Ouptut has been < 5 ml/day  No f/c/pericatheter leakage  LMP  (LMP Unknown)     Relevant past medical history:    Past Medical History:   Diagnosis Date    Asthma     Albuterol prn    Bariatric surgery status     Depression 2009    managed with Effexor     Diabetes mellitus ([facility] [address] ) 2005    resolved with gastric bypass 2015    Generalized osteoarthritis     Low vitamin B12 level     Migraine     Postgastrectomy malabsorption     Suicide attempt ([facility] [address] )     Vitamin D deficiency      Patient Active Problem List   Diagnosis    Asthma    Bariatric surgery status    [facility]    Major depressive disorder, recurrent severe without psychotic features ([facility] [address] )    Hypoglycemia    Chronic back pain    Generalized anxiety disorder    Intertrigo    Postgastrectomy malabsorption    Bilateral carpal tunnel syndrome    Pannus, abdominal    Generalized osteoarthritis    Chronic nonintractable headache    Epigastric abdominal pain    Groin pain, right    Vitamin D deficiency    Vitamin B12 deficiency    Poor appetite    Secondary hyperparathyroidism of renal origin ([facility] [address] )    Lumbar spondylosis    Localized adiposity    Status post panniculectomy       Medications:    Inpatient Medications:     Scheduled Medications:      Infusions:    No current facility-administered medications for this encounter       PRN:      Outpatient Medications:  Current Outpatient Medications on File Prior to Encounter   Medication Sig Dispense Refill    acetaminophen (TYLENOL) 500 mg tablet Take 1 tablet (500 mg total) by mouth every 6 (six) hours as needed for mild pain 30 tablet 0    albuterol (PROVENTIL HFA,VENTOLIN HFA) 90 mcg/act inhaler Inhale 2 puffs every 6 (six) hours as needed for wheezing      butalbital-acetaminophen-caffeine (FIORICET,ESGIC) -40 mg per tablet Take 1 tablet by mouth every 4 (four) hours as needed for headaches 30 tablet 0    clonazePAM (KlonoPIN) 0 5 mg tablet TK 1 T PO 1-2 TIMES DAILY PRF ANXIETY/SLEEP  2    diphenhydrAMINE (BENADRYL) 25 mg tablet Take 1 tablet (25 mg total) by mouth every 6 (six) hours 20 tablet 0    diphenhydrAMINE (BENADRYL) 50 MG tablet Take 50 mg by mouth every 6 (six) hours as needed for itching      ergocalciferol (VITAMIN D2) 50,000 units Take 1 capsule (50,000 Units total) by mouth 2 (two) times a week for 24 doses 8 capsule 2    hydrocortisone 2 5 % cream Apply topically 4 (four) times a day as needed for irritation 30 g 0    hydrocortisone 2 5 % cream Apply topically 2 (two) times a day for 10 days 30 g 0    meclizine (ANTIVERT) 12 5 MG tablet Take 2 tablets (25 mg total) by mouth 3 (three) times a day as needed for dizziness 30 tablet 0    Multiple Vitamin (MULTIVITAMIN) capsule Take 1 capsule by mouth daily      naloxone (NARCAN) 4 mg/0 1 mL nasal spray Administer 1 spray into a nostril  If breathing does not return to normal or if breathing difficulty resumes after 2-3 minutes, give another dose in the other nostril using a new spray   1 each 1    omeprazole (PriLOSEC) 20 mg delayed release capsule TAKE ONE CAPSULE BY MOUTH DAILY 90 capsule 1    ondansetron (ZOFRAN) 4 mg tablet Take 1 tablet (4 mg total) by mouth every 6 (six) hours (Patient taking differently: Take 4 mg by mouth every 6 (six) hours as needed ) 12 tablet 0    oxyCODONE (ROXICODONE) 5 mg immediate release tablet Take 1 tablet (5 mg total) by mouth every 6 (six) hours as needed for moderate pain for up to 15 dosesMax Daily Amount: 20 mg 10 tablet 0    tiZANidine (ZANAFLEX) 2 mg tablet Take 1 tablet (2 mg total) by mouth every 8 (eight) hours as needed for muscle spasms 90 tablet 0    topiramate (TOPAMAX) 25 mg sprinkle capsule Take 1 capsule (25 mg total) by mouth 2 (two) times a day (Patient not taking: Reported on 5/25/2020) 60 capsule 1    triamcinolone (KENALOG) 0 5 % cream Apply topically 2 (two) times a day 15 g 1    vitamin B-12 (CYANOCOBALAMIN) 2000 MCG TABS Take 1 tablet (2,000 mcg total) by mouth daily (Patient not taking: Reported on 5/25/2020) 30 tablet 5     No current facility-administered medications on file prior to encounter  Allergies   Allergen Reactions    Motrin [Ibuprofen]      Hx gastric bypass    Cherry Rash    Gabapentin Rash       Anticoagulants: none    Labs:   CBC with diff: Lab Results   Component Value Date    WBC 7 38 06/01/2020    HGB 10 8 (L) 06/01/2020    HCT 34 3 (L) 06/01/2020    MCV 92 06/01/2020     06/01/2020    MCH 29 0 06/01/2020    MCHC 31 5 06/01/2020    RDW 12 7 06/01/2020    MPV 11 2 06/01/2020    NRBC 0 06/01/2020     BMP/CMP:  Lab Results   Component Value Date     05/24/2015    K 4 6 06/01/2020    K 4 4 05/24/2015     06/01/2020     05/24/2015    CO2 31 06/01/2020    CO2 35 (H) 05/24/2015    ANIONGAP 2 (L) 05/24/2015    BUN 12 06/01/2020    BUN 21 05/24/2015    CREATININE 0 76 06/01/2020    CREATININE 0 71 05/24/2015    GLUCOSE 96 05/24/2015    CALCIUM 9 1 06/01/2020    CALCIUM 9 6 05/24/2015    AST 22 06/01/2020    AST 17 05/24/2015    ALT 16 06/01/2020    ALT 35 05/24/2015    ALKPHOS 75 06/01/2020    ALKPHOS 90 05/24/2015    PROT 7 4 05/24/2015    BILITOT 0 3 05/24/2015    EGFR 87 06/01/2020   ,     Coags:   Lab Results   Component Value Date    PTT 23 03/18/2019    INR 1 01 03/18/2019   ,          Relevant imaging studies:   Reviewed  Directed physical examination:  I agree with the physical exam performed on 7/14/20 and there are no additional changes  Assessment/Plan: For tube check and/or change  Sedation/Anesthesia plan:  Local sedation will be used as needed for procedure      Consent with alternatives to the procedure, risks and benefits have been explained and discussed with the patient/patient's family: Continuation of care

## 2020-07-29 NOTE — BRIEF OP NOTE (RAD/CATH)
IR TUBE CHECK Procedure Note    PATIENT NAME: Brian Avery  : 1960  MRN: [id number]    Pre-op Diagnosis:   1  Abscess      Post-op Diagnosis:   1  Abscess        Surgeon:   Jacques Landeros MD  Assistants:     No qualified resident was available, Resident is only observing    Estimated Blood Loss: 0 ml  Findings:   Abscessogram showed small residual cavity  Catheter removed  Specimens: None  Complications:  None      Anesthesia: Local    Jacques Landeros MD     Date: 2020  Time: 2:36 PM

## 2020-08-10 DIAGNOSIS — M47.816 LUMBAR FACET ARTHROPATHY: ICD-10-CM

## 2020-08-11 RX ORDER — TIZANIDINE 2 MG/1
TABLET ORAL
Qty: 90 TABLET | Refills: 0 | Status: SHIPPED | OUTPATIENT
Start: 2020-08-11 | End: 2020-09-11

## 2020-09-10 DIAGNOSIS — M47.816 LUMBAR FACET ARTHROPATHY: ICD-10-CM

## 2020-09-11 RX ORDER — TIZANIDINE 2 MG/1
TABLET ORAL
Qty: 90 TABLET | Refills: 0 | Status: SHIPPED | OUTPATIENT
Start: 2020-09-11 | End: 2020-09-18 | Stop reason: SDUPTHER

## 2020-09-18 DIAGNOSIS — M47.816 LUMBAR FACET ARTHROPATHY: ICD-10-CM

## 2020-09-21 RX ORDER — TIZANIDINE 2 MG/1
2 TABLET ORAL EVERY 8 HOURS PRN
Qty: 90 TABLET | Refills: 0 | Status: SHIPPED | OUTPATIENT
Start: 2020-09-21 | End: 2020-10-12

## 2020-10-12 DIAGNOSIS — M47.816 LUMBAR FACET ARTHROPATHY: ICD-10-CM

## 2020-10-12 RX ORDER — TIZANIDINE 2 MG/1
TABLET ORAL
Qty: 90 TABLET | Refills: 0 | Status: SHIPPED | OUTPATIENT
Start: 2020-10-12 | End: 2020-12-16 | Stop reason: SDUPTHER

## 2020-12-16 DIAGNOSIS — M47.816 LUMBAR FACET ARTHROPATHY: ICD-10-CM

## 2020-12-16 RX ORDER — TIZANIDINE 2 MG/1
2 TABLET ORAL EVERY 8 HOURS PRN
Qty: 90 TABLET | Refills: 0 | Status: SHIPPED | OUTPATIENT
Start: 2020-12-16 | End: 2021-03-14 | Stop reason: SDUPTHER

## 2021-03-14 DIAGNOSIS — M47.816 LUMBAR FACET ARTHROPATHY: ICD-10-CM

## 2021-03-15 RX ORDER — TIZANIDINE 2 MG/1
2 TABLET ORAL EVERY 8 HOURS PRN
Qty: 90 TABLET | Refills: 0 | Status: SHIPPED | OUTPATIENT
Start: 2021-03-15 | End: 2021-05-18 | Stop reason: SDUPTHER

## 2021-03-17 ENCOUNTER — OFFICE VISIT (OUTPATIENT)
Dept: FAMILY MEDICINE CLINIC | Facility: CLINIC | Age: 61
End: 2021-03-17

## 2021-03-17 VITALS
OXYGEN SATURATION: 97 % | SYSTOLIC BLOOD PRESSURE: 116 MMHG | DIASTOLIC BLOOD PRESSURE: 78 MMHG | HEIGHT: 66 IN | HEART RATE: 73 BPM | RESPIRATION RATE: 16 BRPM | WEIGHT: 199 LBS | BODY MASS INDEX: 31.98 KG/M2 | TEMPERATURE: 96.9 F

## 2021-03-17 DIAGNOSIS — G89.29 CHRONIC BILATERAL LOW BACK PAIN WITHOUT SCIATICA: ICD-10-CM

## 2021-03-17 DIAGNOSIS — Z11.3 ROUTINE SCREENING FOR STI (SEXUALLY TRANSMITTED INFECTION): ICD-10-CM

## 2021-03-17 DIAGNOSIS — I45.9 HEART BLOCK: ICD-10-CM

## 2021-03-17 DIAGNOSIS — F33.2 MAJOR DEPRESSIVE DISORDER, RECURRENT SEVERE WITHOUT PSYCHOTIC FEATURES (HCC): ICD-10-CM

## 2021-03-17 DIAGNOSIS — Z12.11 ENCOUNTER FOR SCREENING COLONOSCOPY: ICD-10-CM

## 2021-03-17 DIAGNOSIS — Z90.3 POSTGASTRECTOMY MALABSORPTION: Primary | ICD-10-CM

## 2021-03-17 DIAGNOSIS — Z78.0 POSTMENOPAUSAL: ICD-10-CM

## 2021-03-17 DIAGNOSIS — Z12.31 BREAST CANCER SCREENING BY MAMMOGRAM: ICD-10-CM

## 2021-03-17 DIAGNOSIS — K91.2 POSTGASTRECTOMY MALABSORPTION: Primary | ICD-10-CM

## 2021-03-17 DIAGNOSIS — Z23 ENCOUNTER FOR ADMINISTRATION OF VACCINE: ICD-10-CM

## 2021-03-17 DIAGNOSIS — M54.50 CHRONIC BILATERAL LOW BACK PAIN WITHOUT SCIATICA: ICD-10-CM

## 2021-03-17 DIAGNOSIS — Z12.11 COLON CANCER SCREENING: ICD-10-CM

## 2021-03-17 DIAGNOSIS — Z00.00 ENCOUNTER FOR ANNUAL WELLNESS EXAM IN MEDICARE PATIENT: ICD-10-CM

## 2021-03-17 DIAGNOSIS — M47.816 LUMBAR SPONDYLOSIS: ICD-10-CM

## 2021-03-17 DIAGNOSIS — N25.81 SECONDARY HYPERPARATHYROIDISM OF RENAL ORIGIN (HCC): ICD-10-CM

## 2021-03-17 DIAGNOSIS — R00.2 PALPITATIONS: ICD-10-CM

## 2021-03-17 PROCEDURE — G0439 PPPS, SUBSEQ VISIT: HCPCS | Performed by: PHYSICIAN ASSISTANT

## 2021-03-17 PROCEDURE — 3008F BODY MASS INDEX DOCD: CPT | Performed by: PHYSICIAN ASSISTANT

## 2021-03-17 PROCEDURE — 93000 ELECTROCARDIOGRAM COMPLETE: CPT | Performed by: PHYSICIAN ASSISTANT

## 2021-03-17 PROCEDURE — 99214 OFFICE O/P EST MOD 30 MIN: CPT | Performed by: PHYSICIAN ASSISTANT

## 2021-03-17 PROCEDURE — 3725F SCREEN DEPRESSION PERFORMED: CPT | Performed by: PHYSICIAN ASSISTANT

## 2021-03-17 PROCEDURE — 1036F TOBACCO NON-USER: CPT | Performed by: PHYSICIAN ASSISTANT

## 2021-03-17 PROCEDURE — 90682 RIV4 VACC RECOMBINANT DNA IM: CPT | Performed by: PHYSICIAN ASSISTANT

## 2021-03-17 PROCEDURE — G0008 ADMIN INFLUENZA VIRUS VAC: HCPCS | Performed by: PHYSICIAN ASSISTANT

## 2021-03-17 RX ORDER — KETOROLAC TROMETHAMINE 30 MG/ML
60 INJECTION, SOLUTION INTRAMUSCULAR; INTRAVENOUS ONCE
Status: COMPLETED | OUTPATIENT
Start: 2021-03-17 | End: 2021-03-17

## 2021-03-17 RX ORDER — VENLAFAXINE 75 MG/1
75 TABLET ORAL 2 TIMES DAILY WITH MEALS
COMMUNITY
Start: 2020-12-30

## 2021-03-17 RX ORDER — AMITRIPTYLINE HYDROCHLORIDE 25 MG/1
25 TABLET, FILM COATED ORAL
Qty: 30 TABLET | Refills: 2 | Status: SHIPPED | OUTPATIENT
Start: 2021-03-17

## 2021-03-17 RX ADMIN — KETOROLAC TROMETHAMINE 60 MG: 30 INJECTION, SOLUTION INTRAMUSCULAR; INTRAVENOUS at 12:15

## 2021-03-17 NOTE — PROGRESS NOTES
Assessment and Plan:     Problem List Items Addressed This Visit        Digestive    Postgastrectomy malabsorption - Primary       Continue vitamin supplementation and labs ordered today  Relevant Orders    CBC and differential    Comprehensive metabolic panel    Folate    Iron Panel (Includes Ferritin, Iron Sat%, Iron, and TIBC)    Lipid panel    PTH, intact    Vitamin A    Vitamin B1, whole blood    Vitamin B12    Vitamin D 25 hydroxy    DXA bone density spine hip and pelvis       Endocrine    Secondary hyperparathyroidism of renal origin (HCC)     PTH and vitamin D ordered today              Musculoskeletal and Integument    Lumbar spondylosis     Continue tizanidine and start amitriptyline at night            Other    Major depressive disorder, recurrent severe without psychotic features (HCC) (Chronic)     Depression Screening Follow-up Plan: Patient's depression screening was positive with a PHQ-2 score of 6  Their PHQ-9 score was 20  Continue regular follow-up with their psychologist/therapist/psychiatrist who is managing their mental health condition(s)             Relevant Medications    venlafaxine (EFFEXOR) 75 mg tablet    amitriptyline (ELAVIL) 25 mg tablet    Chronic back pain    Relevant Medications    amitriptyline (ELAVIL) 25 mg tablet    ketorolac (TORADOL) 60 mg/2 mL IM injection 60 mg (Completed)      Other Visit Diagnoses     Breast cancer screening by mammogram        Relevant Orders    Mammo screening bilateral w cad    Postmenopausal        Relevant Orders    DXA bone density spine hip and pelvis    Routine screening for STI (sexually transmitted infection)        Relevant Orders    Human Immunodeficiency Virus 1/2 Antigen / Antibody ( Fourth Generation) with Reflex Testing    Hepatitis C antibody    Colon cancer screening        Relevant Orders    Ambulatory referral to Colorectal Surgery    Heart block        Relevant Orders    POCT ECG (Completed)    Encounter for administration of vaccine        Relevant Orders    influenza vaccine, quadrivalent, recombinant, PF, 0 5 mL, for patients 18 yr+ (FLUBLOK) (Completed)    Palpitations        Relevant Orders    Holter monitor - 24 hour    Encounter for annual wellness exam in Medicare patient               Preventive health issues were discussed with patient, and age appropriate screening tests were ordered as noted in patient's After Visit Summary  Personalized health advice and appropriate referrals for health education or preventive services given if needed, as noted in patient's After Visit Summary       History of Present Illness:     Patient presents for Medicare Annual Wellness visit    Patient Care Team:  Jareth Bermudez PA-C as PCP - General (Family Medicine)  MD Perez Tadeo MD     Problem List:     Patient Active Problem List   Diagnosis    Asthma    Bariatric surgery status    [facility] [address] [facility]    Major depressive disorder, recurrent severe without psychotic features ([facility] [address] [facility]    Hypoglycemia    Chronic back pain    Generalized anxiety disorder    Intertrigo    Postgastrectomy malabsorption    Bilateral carpal tunnel syndrome    Pannus, abdominal    Generalized osteoarthritis    Chronic nonintractable headache    Epigastric abdominal pain    Groin pain, right    Vitamin D deficiency    Vitamin B12 deficiency    Poor appetite    Secondary hyperparathyroidism of renal origin ([facility] [address] [facility]    Lumbar spondylosis    Localized adiposity    Status post panniculectomy      Past Medical and Surgical History:     Past Medical History:   Diagnosis Date    Asthma     Albuterol prn    Bariatric surgery status     Depression 2009    managed with Effexor     Diabetes mellitus ([facility] [address] ) 2005    resolved with gastric bypass 2015    Generalized osteoarthritis     Low vitamin B12 level     Migraine     Postgastrectomy malabsorption     Suicide attempt ([facility] [address] [facility]     Vitamin D deficiency      Past Surgical History: Procedure Laterality Date    CHOLECYSTECTOMY  2005    COLONOSCOPY      COSMETIC SURGERY  05/27/2020    Abdominoplasty    GASTRIC BYPASS  2015    elvira - en -y     HYSTERECTOMY      IR IMAGE GUIDED ASPIRATION / DRAINAGE W TUBE  7/14/2020    KNEE ARTHROSCOPY      with Lysis of adhesions    LAPAROSCOPIC SUPRACERVICAL HYSTERECTOMY  2005    due to endometriosis/AUB    OOPHORECTOMY Left 2005    MO EXCISE EXCESS SKIN TISSUE,ABDOMEN N/A 5/27/2020    Procedure: PANNICULECTOMY;  Surgeon: Og Mendoza MD;  Location: [facility];  Service: Plastics    TONSILLECTOMY AND ADENOIDECTOMY      TUBAL LIGATION  1986    VAC DRESSING APPLICATION N/A 3/98/4659    Procedure: APPLICATION VAC DRESSING;  Surgeon: Og Mendoza MD;  Location: [facility];  Service: Plastics      Family History:     Family History   Problem Relation Age of Onset    Hypertension Mother     Kidney cancer Mother     Diabetes Mother     Breast cancer Mother     Heart disease Father     Stroke Father     Hypertension Sister     HIV Brother     Breast cancer Cousin     No Known Problems Daughter     Stomach cancer Maternal Grandmother     No Known Problems Maternal Grandfather     No Known Problems Paternal Grandmother     No Known Problems Paternal Grandfather     No Known Problems Sister     No Known Problems Sister     No Known Problems Daughter     Prostate cancer Maternal Uncle     Stomach cancer Maternal Uncle     Leukemia Grandchild       Social History:     E-Cigarette/Vaping    E-Cigarette Use Never User      E-Cigarette/Vaping Substances    Nicotine No     THC No     CBD No     Flavoring No     Other No     Unknown No      Social History     Socioeconomic History    Marital status: Single     Spouse name: None    Number of children: None    Years of education: None    Highest education level: None   Occupational History    None   Social Needs    Financial resource strain: None   [place] insecurity     Worry: None     Inability: None    Transportation needs     Medical: None     Non-medical: None   Tobacco Use    Smoking status: Former Smoker     Years: 32 00     Types: Cigarettes     Quit date: 2013     Years since quittin 8    Smokeless tobacco: Never Used   Substance and Sexual Activity    Alcohol use: Not Currently    Drug use: Never    Sexual activity: Not Currently     Partners: Male     Birth control/protection: Female Sterilization, Post-menopausal   Lifestyle    Physical activity     Days per week: None     Minutes per session: None    Stress: None   Relationships    Social connections     Talks on phone: None     Gets together: None     Attends Jewish service: None     Active member of club or organization: None     Attends meetings of clubs or organizations: None     Relationship status: None    Intimate partner violence     Fear of current or ex partner: None     Emotionally abused: None     Physically abused: None     Forced sexual activity: None   Other Topics Concern    None   Social History Narrative    None      Medications and Allergies:     Current Outpatient Medications   Medication Sig Dispense Refill    acetaminophen (TYLENOL) 500 mg tablet Take 1 tablet (500 mg total) by mouth every 6 (six) hours as needed for mild pain 30 tablet 0    albuterol (PROVENTIL HFA,VENTOLIN HFA) 90 mcg/act inhaler Inhale 2 puffs every 6 (six) hours as needed for wheezing      butalbital-acetaminophen-caffeine (FIORICET,ESGIC) -40 mg per tablet Take 1 tablet by mouth every 4 (four) hours as needed for headaches 30 tablet 0    clonazePAM (KlonoPIN) 0 5 mg tablet TK 1 T PO 1-2 TIMES DAILY PRF ANXIETY/SLEEP  2    diphenhydrAMINE (BENADRYL) 25 mg tablet Take 1 tablet (25 mg total) by mouth every 6 (six) hours 20 tablet 0    hydrocortisone 2 5 % cream Apply topically 4 (four) times a day as needed for irritation 30 g 0    meclizine (ANTIVERT) 12 5 MG tablet Take 2 tablets (25 mg total) by mouth 3 (three) times a day as needed for dizziness 30 tablet 0    Multiple Vitamin (MULTIVITAMIN) capsule Take 1 capsule by mouth daily      naloxone (NARCAN) 4 mg/0 1 mL nasal spray Administer 1 spray into a nostril  If breathing does not return to normal or if breathing difficulty resumes after 2-3 minutes, give another dose in the other nostril using a new spray  1 each 1    omeprazole (PriLOSEC) 20 mg delayed release capsule TAKE ONE CAPSULE BY MOUTH DAILY 90 capsule 1    ondansetron (ZOFRAN) 4 mg tablet Take 1 tablet (4 mg total) by mouth every 6 (six) hours (Patient taking differently: Take 4 mg by mouth every 6 (six) hours as needed ) 12 tablet 0    tiZANidine (ZANAFLEX) 2 mg tablet Take 1 tablet (2 mg total) by mouth every 8 (eight) hours as needed for muscle spasms 90 tablet 0    triamcinolone (KENALOG) 0 5 % cream Apply topically 2 (two) times a day 15 g 1    venlafaxine (EFFEXOR) 75 mg tablet Take 75 mg by mouth 2 (two) times a day with meals      amitriptyline (ELAVIL) 25 mg tablet Take 1 tablet (25 mg total) by mouth daily at bedtime 30 tablet 2    diphenhydrAMINE (BENADRYL) 50 MG tablet Take 50 mg by mouth every 6 (six) hours as needed for itching      ergocalciferol (VITAMIN D2) 50,000 units Take 1 capsule (50,000 Units total) by mouth 2 (two) times a week for 24 doses 8 capsule 2    hydrocortisone 2 5 % cream Apply topically 2 (two) times a day for 10 days 30 g 0    nystatin (MYCOSTATIN) ointment Apply topically 2 (two) times a day for 21 days Apply to umbilicus 30 g 0    topiramate (TOPAMAX) 25 mg sprinkle capsule Take 1 capsule (25 mg total) by mouth 2 (two) times a day (Patient not taking: Reported on 5/25/2020) 60 capsule 1    vitamin B-12 (CYANOCOBALAMIN) 2000 MCG TABS Take 1 tablet (2,000 mcg total) by mouth daily (Patient not taking: Reported on 5/25/2020) 30 tablet 5     No current facility-administered medications for this visit        Allergies Allergen Reactions    Motrin [Ibuprofen]      Hx gastric bypass    Cherry Rash    Gabapentin Rash      Immunizations:     Immunization History   Administered Date(s) Administered    INFLUENZA 12/09/2013, 12/18/2014, 02/08/2017    Influenza, recombinant, quadrivalent,injectable, preservative free 10/22/2019, 03/17/2021    Pneumococcal Polysaccharide PPV23 03/21/2012    Tdap 03/21/2012      Health Maintenance:         Topic Date Due    HIV Screening  Never done    Colorectal Cancer Screening  Never done    Cervical Cancer Screening  06/11/2022    Hepatitis C Screening  Completed     There are no preventive care reminders to display for this patient  Medicare Health Risk Assessment:     /78 (BP Location: Left arm, Patient Position: Sitting, Cuff Size: Large)   Pulse 73   Temp (!) 96 9 °F (36 1 °C) (Temporal)   Resp 16   Ht 5' 6" (1 676 m)   Wt 90 3 kg (199 lb)   LMP  (LMP Unknown)   SpO2 97%   BMI 32 12 kg/m²      [place] is here for her Subsequent Wellness visit  Health Risk Assessment:   Patient rates overall health as poor  Patient feels that their physical health rating is same  Patient is dissatisfied with their life  Eyesight was rated as much worse  Hearing was rated as slightly worse  Patient feels that their emotional and mental health rating is slightly worse  Patients states they are often angry  Patient states they are always unusually tired/fatigued  Pain experienced in the last 7 days has been a lot  Patient's pain rating has been 10/10  Patient states that she has experienced weight loss or gain in last 6 months  Depression Screening:   PHQ-2 Score: 6  PHQ-9 Score: 20      Fall Risk Screening: In the past year, patient has experienced: no history of falling in past year      Urinary Incontinence Screening:   Patient has not leaked urine accidently in the last six months  Home Safety:  Patient has trouble with stairs inside or outside of their home   Patient has working smoke alarms and has working carbon monoxide detector  Home safety hazards include: none  Nutrition:   Current diet is Regular  Medications:   Patient is not currently taking any over-the-counter supplements  Patient is able to manage medications  Activities of Daily Living (ADLs)/Instrumental Activities of Daily Living (IADLs):   Walk and transfer into and out of bed and chair?: Yes  Dress and groom yourself?: Yes    Bathe or shower yourself?: Yes    Feed yourself? Yes  Do your laundry/housekeeping?: Yes  Manage your money, pay your bills and track your expenses?: Yes  Make your own meals?: Yes    Do your own shopping?: Yes    Previous Hospitalizations:   Any hospitalizations or ED visits within the last 12 months?: No      Advance Care Planning:   Living will: No    Durable POA for healthcare: No    Advanced directive: No    Advanced directive counseling given: No    Five wishes given: Yes    Patient declined ACP directive: No      Cognitive Screening:   Provider or family/friend/caregiver concerned regarding cognition?: No    PREVENTIVE SCREENINGS      Cardiovascular Screening:    General: Screening Current    Due for: Lipid Panel      Diabetes Screening:     General: Screening Current    Due for: Blood Glucose      Colorectal Cancer Screening:     General: Screening Current    Due for: Colonoscopy - Low Risk      Breast Cancer Screening:     General: Screening Current      Cervical Cancer Screening:    General: Screening Current      Osteoporosis Screening:    General: Risks and Benefits Discussed    Due for: DXA Appendicular      Lung Cancer Screening:     General: Screening Not Indicated      Hepatitis C Screening:    General: Screening Current    Screening, Brief Intervention, and Referral to Treatment (SBIRT)    Screening  Typical number of drinks in a day: 0  Typical number of drinks in a week: 0  Interpretation: Low risk drinking behavior      Single Item Drug Screening:  How often have you used an illegal drug (including marijuana) or a prescription medication for non-medical reasons in the past year? never    Single Item Drug Screen Score: 0  Interpretation: Negative screen for possible drug use disorder      Shavonne Wilson PA-C

## 2021-03-17 NOTE — PATIENT INSTRUCTIONS
Medicare Preventive Visit Patient Instructions  Thank you for completing your Welcome to Medicare Visit or Medicare Annual Wellness Visit today  Your next wellness visit will be due in one year (3/18/2022)  The screening/preventive services that you may require over the next 5-10 years are detailed below  Some tests may not apply to you based off risk factors and/or age  Screening tests ordered at today's visit but not completed yet may show as past due  Also, please note that scanned in results may not display below  Preventive Screenings:  Service Recommendations Previous Testing/Comments   Colorectal Cancer Screening  * Colonoscopy    * Fecal Occult Blood Test (FOBT)/Fecal Immunochemical Test (FIT)  * Fecal DNA/Cologuard Test  * Flexible Sigmoidoscopy Age: 54-65 years old   Colonoscopy: every 10 years (may be performed more frequently if at higher risk)  OR  FOBT/FIT: every 1 year  OR  Cologuard: every 3 years  OR  Sigmoidoscopy: every 5 years  Screening may be recommended earlier than age 48 if at higher risk for colorectal cancer  Also, an individualized decision between you and your healthcare provider will decide whether screening between the ages of 74-80 would be appropriate  Colonoscopy: Not on file  FOBT/FIT: Not on file  Cologuard: Not on file  Sigmoidoscopy: Not on file          Breast Cancer Screening Age: 36 years old  Frequency: every 1-2 years  Not required if history of left and right mastectomy Mammogram: 06/20/2019    Screening Current   Cervical Cancer Screening Between the ages of 21-29, pap smear recommended once every 3 years  Between the ages of 33-67, can perform pap smear with HPV co-testing every 5 years     Recommendations may differ for women with a history of total hysterectomy, cervical cancer, or abnormal pap smears in past  Pap Smear: 06/11/2019    Screening Current   Hepatitis C Screening Once for adults born between 1945 and 1965  More frequently in patients at high risk for Hepatitis C Hep C Antibody: 06/03/2019    Screening Current   Diabetes Screening 1-2 times per year if you're at risk for diabetes or have pre-diabetes Fasting glucose: 103 mg/dL   A1C: 5 4 %    Screening Current   Cholesterol Screening Once every 5 years if you don't have a lipid disorder  May order more often based on risk factors  Lipid panel: 07/16/2019    Screening Current     Other Preventive Screenings Covered by Medicare:  1  Abdominal Aortic Aneurysm (AAA) Screening: covered once if your at risk  You're considered to be at risk if you have a family history of AAA  2  Lung Cancer Screening: covers low dose CT scan once per year if you meet all of the following conditions: (1) Age 50-69; (2) No signs or symptoms of lung cancer; (3) Current smoker or have quit smoking within the last 15 years; (4) You have a tobacco smoking history of at least 30 pack years (packs per day multiplied by number of years you smoked); (5) You get a written order from a healthcare provider  3  Glaucoma Screening: covered annually if you're considered high risk: (1) You have diabetes OR (2) Family history of glaucoma OR (3)  aged 48 and older OR (3)  American aged 72 and older  3  Osteoporosis Screening: covered every 2 years if you meet one of the following conditions: (1) You're estrogen deficient and at risk for osteoporosis based off medical history and other findings; (2) Have a vertebral abnormality; (3) On glucocorticoid therapy for more than 3 months; (4) Have primary hyperparathyroidism; (5) On osteoporosis medications and need to assess response to drug therapy  · Last bone density test (DXA Scan): Not on file  5  HIV Screening: covered annually if you're between the age of 12-76  Also covered annually if you are younger than 13 and older than 72 with risk factors for HIV infection  For pregnant patients, it is covered up to 3 times per pregnancy      Immunizations:  Immunization Recommendations Influenza Vaccine Annual influenza vaccination during flu season is recommended for all persons aged >= 6 months who do not have contraindications   Pneumococcal Vaccine (Prevnar and Pneumovax)  * Prevnar = PCV13  * Pneumovax = PPSV23   Adults 25-60 years old: 1-3 doses may be recommended based on certain risk factors  Adults 72 years old: Prevnar (PCV13) vaccine recommended followed by Pneumovax (PPSV23) vaccine  If already received PPSV23 since turning 65, then PCV13 recommended at least one year after PPSV23 dose  Hepatitis B Vaccine 3 dose series if at intermediate or high risk (ex: diabetes, end stage renal disease, liver disease)   Tetanus (Td) Vaccine - COST NOT COVERED BY MEDICARE PART B Following completion of primary series, a booster dose should be given every 10 years to maintain immunity against tetanus  Td may also be given as tetanus wound prophylaxis  Tdap Vaccine - COST NOT COVERED BY MEDICARE PART B Recommended at least once for all adults  For pregnant patients, recommended with each pregnancy  Shingles Vaccine (Shingrix) - COST NOT COVERED BY MEDICARE PART B  2 shot series recommended in those aged 48 and above     Health Maintenance Due:      Topic Date Due    HIV Screening  Never done    Colorectal Cancer Screening  Never done    Cervical Cancer Screening  06/11/2022    Hepatitis C Screening  Completed     Immunizations Due:      Topic Date Due    Influenza Vaccine (1) 09/01/2020     Advance Directives   What are advance directives? Advance directives are legal documents that state your wishes and plans for medical care  These plans are made ahead of time in case you lose your ability to make decisions for yourself  Advance directives can apply to any medical decision, such as the treatments you want, and if you want to donate organs  What are the types of advance directives? There are many types of advance directives, and each state has rules about how to use them   You may choose a combination of any of the following:  · Living will: This is a written record of the treatment you want  You can also choose which treatments you do not want, which to limit, and which to stop at a certain time  This includes surgery, medicine, IV fluid, and tube feedings  · Durable power of  for healthcare [facility]): This is a written record that states who you want to make healthcare choices for you when you are unable to make them for yourself  This person, called a proxy, is usually a family member or a friend  You may choose more than 1 proxy  · Do not resuscitate (DNR) order:  A DNR order is used in case your heart stops beating or you stop breathing  It is a request not to have certain forms of treatment, such as CPR  A DNR order may be included in other types of advance directives  · Medical directive: This covers the care that you want if you are in a coma, near death, or unable to make decisions for yourself  You can list the treatments you want for each condition  Treatment may include pain medicine, surgery, blood transfusions, dialysis, IV or tube feedings, and a ventilator (breathing machine)  · Values history: This document has questions about your views, beliefs, and how you feel and think about life  This information can help others choose the care that you would choose  Why are advance directives important? An advance directive helps you control your care  Although spoken wishes may be used, it is better to have your wishes written down  Spoken wishes can be misunderstood, or not followed  Treatments may be given even if you do not want them  An advance directive may make it easier for your family to make difficult choices about your care  Weight Management   Why it is important to manage your weight:  Being overweight increases your risk of health conditions such as heart disease, high blood pressure, type 2 diabetes, and certain types of cancer   It can also increase your risk for osteoarthritis, sleep apnea, and other respiratory problems  Aim for a slow, steady weight loss  Even a small amount of weight loss can lower your risk of health problems  How to lose weight safely:  A safe and healthy way to lose weight is to eat fewer calories and get regular exercise  You can lose up about 1 pound a week by decreasing the number of calories you eat by 500 calories each day  Healthy meal plan for weight management:  A healthy meal plan includes a variety of foods, contains fewer calories, and helps you stay healthy  A healthy meal plan includes the following:  · Eat whole-grain foods more often  A healthy meal plan should contain fiber  Fiber is the part of grains, fruits, and vegetables that is not broken down by your body  Whole-grain foods are healthy and provide extra fiber in your diet  Some examples of whole-grain foods are whole-wheat breads and pastas, oatmeal, brown rice, and bulgur  · Eat a variety of vegetables every day  Include dark, leafy greens such as spinach, kale, duane greens, and mustard greens  Eat yellow and orange vegetables such as carrots, sweet potatoes, and winter squash  · Eat a variety of fruits every day  Choose fresh or canned fruit (canned in its own juice or light syrup) instead of juice  Fruit juice has very little or no fiber  · Eat low-fat dairy foods  Drink fat-free (skim) milk or 1% milk  Eat fat-free yogurt and low-fat cottage cheese  Try low-fat cheeses such as mozzarella and other reduced-fat cheeses  · Choose meat and other protein foods that are low in fat  Choose beans or other legumes such as split peas or lentils  Choose fish, skinless poultry (chicken or turkey), or lean cuts of red meat (beef or pork)  Before you cook meat or poultry, cut off any visible fat  · Use less fat and oil  Try baking foods instead of frying them  Add less fat, such as margarine, sour cream, regular salad dressing and mayonnaise to foods   Eat fewer high-fat foods  Some examples of high-fat foods include french fries, doughnuts, ice cream, and cakes  · Eat fewer sweets  Limit foods and drinks that are high in sugar  This includes candy, cookies, regular soda, and sweetened drinks  Exercise:  Exercise at least 30 minutes per day on most days of the week  Some examples of exercise include walking, biking, dancing, and swimming  You can also fit in more physical activity by taking the stairs instead of the elevator or parking farther away from stores  Ask your healthcare provider about the best exercise plan for you  © Copyright [employer] 2018 Information is for End User's use only and may not be sold, redistributed or otherwise used for commercial purposes   All illustrations and images included in CareNotes® are the copyrighted property of [employer]  or [address] [place] [address]

## 2021-03-17 NOTE — PROGRESS NOTES
Assessment/Plan:    Postgastrectomy malabsorption    Continue vitamin supplementation and labs ordered today  Secondary hyperparathyroidism of renal origin ([facility] [address] )  PTH and vitamin D ordered today      Major depressive disorder, recurrent severe without psychotic features ([facility] )  Depression Screening Follow-up Plan: Patient's depression screening was positive with a PHQ-2 score of 6  Their PHQ-9 score was 20  Continue regular follow-up with their psychologist/therapist/psychiatrist who is managing their mental health condition(s)  Lumbar spondylosis  Continue tizanidine and start amitriptyline at night    BMI 32 0-32 9,adult  BMI Counseling: Body mass index is 32 12 kg/m²  The BMI is above normal  Nutrition recommendations include 3-5 servings of fruits/vegetables daily and increasing intake of lean protein  recommend varied diet , try to stay with bariatric diet           Problem List Items Addressed This Visit        Digestive    Postgastrectomy malabsorption - Primary       Continue vitamin supplementation and labs ordered today  Relevant Orders    CBC and differential    Comprehensive metabolic panel    Folate    Iron Panel (Includes Ferritin, Iron Sat%, Iron, and TIBC)    Lipid panel    PTH, intact    Vitamin A    Vitamin B1, whole blood    Vitamin B12    Vitamin D 25 hydroxy    DXA bone density spine hip and pelvis       Endocrine    Secondary hyperparathyroidism of renal origin (HCC)     PTH and vitamin D ordered today              Musculoskeletal and Integument    Lumbar spondylosis     Continue tizanidine and start amitriptyline at night            Other    Major depressive disorder, recurrent severe without psychotic features (HCC) (Chronic)     Depression Screening Follow-up Plan: Patient's depression screening was positive with a PHQ-2 score of 6  Their PHQ-9 score was 20   Continue regular follow-up with their psychologist/therapist/psychiatrist who is managing their mental health condition(s)  Relevant Medications    venlafaxine (EFFEXOR) 75 mg tablet    amitriptyline (ELAVIL) 25 mg tablet    Chronic back pain    Relevant Medications    amitriptyline (ELAVIL) 25 mg tablet    BMI 32 0-32 9,adult     BMI Counseling: Body mass index is 32 12 kg/m²  The BMI is above normal  Nutrition recommendations include 3-5 servings of fruits/vegetables daily and increasing intake of lean protein  recommend varied diet , try to stay with bariatric diet             Other Visit Diagnoses     Breast cancer screening by mammogram        Relevant Orders    Mammo screening bilateral w cad    Postmenopausal        Relevant Orders    DXA bone density spine hip and pelvis    Routine screening for STI (sexually transmitted infection)        Relevant Orders    Human Immunodeficiency Virus 1/2 Antigen / Antibody ( Fourth Generation) with Reflex Testing    Hepatitis C antibody    Colon cancer screening        Relevant Orders    Ambulatory referral to Colorectal Surgery    Heart block        Relevant Orders    POCT ECG (Completed)    Encounter for administration of vaccine        Relevant Orders    influenza vaccine, quadrivalent, recombinant, PF, 0 5 mL, for patients 18 yr+ (FLUBLOK) (Completed)    Palpitations        Relevant Orders    Holter monitor - 24 hour    Encounter for annual wellness exam in Medicare patient                Subjective:      Patient ID: Perla Orozco is a 64 y o  female  HPI  64 year of F here for AWV and back pain  She has a hstory of chronic back pain and had panniculectomy on 5/27/20  She did not get any relief from the surgery  She thought would help with her back pain but did not  She has been having worse  Pain today because she walked here to the visit  Pain is typically across her lower back but then sometimes she gets pain in the middle of her thighs  No numbness or tingling  No problems with urination or bowel movements  She did get injections before    She did PT and the PT made it worse  She cant sit or stand too long  She does get pain into upper thigh  She had rash with gabapentin before  She did take cymbalta but now is on effexor  She takes tizandiine but makes her fall asleep  She is seeing psych  She goes every 2-3 months  She feels like she has been under more stress  Sometimes it feels like her heart pauses  She does not know if it is with her mood  She started to get pain with it a few days ago but it resolved  No radiation of the pain  No sob with it  Her father had heart problems and so did her uncle  The following portions of the patient's history were reviewed and updated as appropriate: She  has a past medical history of Asthma, Bariatric surgery status, Depression (2009), Diabetes mellitus ([facility] [address] ) (2005), Generalized osteoarthritis, Low vitamin B12 level, Migraine, Postgastrectomy malabsorption, Suicide attempt ([facility] [address] ), and Vitamin D deficiency    She   Patient Active Problem List    Diagnosis Date Noted    BMI 32 0-32 9,adult 03/24/2021    Status post panniculectomy 05/29/2020    Localized adiposity 02/10/2020    Lumbar spondylosis     Secondary hyperparathyroidism of renal origin ([address] ) 12/10/2019    Epigastric abdominal pain 07/02/2019    Groin pain, right 07/02/2019    Vitamin D deficiency 07/02/2019    Vitamin B12 deficiency 07/02/2019    Poor appetite 07/02/2019    Postgastrectomy malabsorption 06/02/2019    Bilateral carpal tunnel syndrome 06/02/2019    Pannus, abdominal 06/02/2019    Generalized osteoarthritis 06/02/2019    Chronic nonintractable headache 06/02/2019    Intertrigo 04/24/2019    Chronic back pain 03/22/2019    Generalized anxiety disorder 03/22/2019    Hypoglycemia 03/20/2019    Asthma 03/18/2019    Bariatric surgery status 03/18/2019    Intentional drug overdose ([facility] [address] ) 03/18/2019    Major depressive disorder, recurrent severe without psychotic features ([facility] [address] ) 03/18/2019     She  has a past surgical history that includes Gastric bypass (2015); Tubal ligation (1986); Knee arthroscopy; Tonsillectomy and adenoidectomy; Oophorectomy (Left, 2005); Cholecystectomy (2005); Laparoscopic supracervical hysterectomy (2005); Hysterectomy; Colonoscopy; pr excise excess skin tissue,abdomen (N/A, 5/27/2020); VAC DRESSING APPLICATION (N/A, 8/60/3696); Cosmetic surgery (05/27/2020); and IR image guided aspiration / drainage w tube (7/14/2020)  Her family history includes Breast cancer in her cousin and mother; Diabetes in her mother; HIV in her brother; Heart disease in her father; Hypertension in her mother and sister; Kidney cancer in her mother; Leukemia in her grandchild; No Known Problems in her daughter, daughter, maternal grandfather, paternal grandfather, paternal grandmother, sister, and sister; Prostate cancer in her maternal uncle; Stomach cancer in her maternal grandmother and maternal uncle; Stroke in her father  She  reports that she quit smoking about 7 years ago  Her smoking use included cigarettes  She quit after 32 00 years of use  She has never used smokeless tobacco  She reports previous alcohol use  She reports that she does not use drugs    Current Outpatient Medications   Medication Sig Dispense Refill    acetaminophen (TYLENOL) 500 mg tablet Take 1 tablet (500 mg total) by mouth every 6 (six) hours as needed for mild pain 30 tablet 0    albuterol (PROVENTIL HFA,VENTOLIN HFA) 90 mcg/act inhaler Inhale 2 puffs every 6 (six) hours as needed for wheezing      butalbital-acetaminophen-caffeine (FIORICET,ESGIC) -40 mg per tablet Take 1 tablet by mouth every 4 (four) hours as needed for headaches 30 tablet 0    clonazePAM (KlonoPIN) 0 5 mg tablet TK 1 T PO 1-2 TIMES DAILY PRF ANXIETY/SLEEP  2    diphenhydrAMINE (BENADRYL) 25 mg tablet Take 1 tablet (25 mg total) by mouth every 6 (six) hours 20 tablet 0    hydrocortisone 2 5 % cream Apply topically 4 (four) times a day as needed for irritation 30 g 0    meclizine (ANTIVERT) 12 5 MG tablet Take 2 tablets (25 mg total) by mouth 3 (three) times a day as needed for dizziness 30 tablet 0    Multiple Vitamin (MULTIVITAMIN) capsule Take 1 capsule by mouth daily      naloxone (NARCAN) 4 mg/0 1 mL nasal spray Administer 1 spray into a nostril  If breathing does not return to normal or if breathing difficulty resumes after 2-3 minutes, give another dose in the other nostril using a new spray   1 each 1    omeprazole (PriLOSEC) 20 mg delayed release capsule TAKE ONE CAPSULE BY MOUTH DAILY 90 capsule 1    ondansetron (ZOFRAN) 4 mg tablet Take 1 tablet (4 mg total) by mouth every 6 (six) hours (Patient taking differently: Take 4 mg by mouth every 6 (six) hours as needed ) 12 tablet 0    tiZANidine (ZANAFLEX) 2 mg tablet Take 1 tablet (2 mg total) by mouth every 8 (eight) hours as needed for muscle spasms 90 tablet 0    triamcinolone (KENALOG) 0 5 % cream Apply topically 2 (two) times a day 15 g 1    venlafaxine (EFFEXOR) 75 mg tablet Take 75 mg by mouth 2 (two) times a day with meals      amitriptyline (ELAVIL) 25 mg tablet Take 1 tablet (25 mg total) by mouth daily at bedtime 30 tablet 2    diphenhydrAMINE (BENADRYL) 50 MG tablet Take 50 mg by mouth every 6 (six) hours as needed for itching      ergocalciferol (VITAMIN D2) 50,000 units Take 1 capsule (50,000 Units total) by mouth 2 (two) times a week for 24 doses 8 capsule 2    hydrocortisone 2 5 % cream Apply topically 2 (two) times a day for 10 days 30 g 0    nystatin (MYCOSTATIN) ointment Apply topically 2 (two) times a day for 21 days Apply to umbilicus 30 g 0    topiramate (TOPAMAX) 25 mg sprinkle capsule Take 1 capsule (25 mg total) by mouth 2 (two) times a day (Patient not taking: Reported on 5/25/2020) 60 capsule 1    vitamin B-12 (CYANOCOBALAMIN) 2000 MCG TABS Take 1 tablet (2,000 mcg total) by mouth daily (Patient not taking: Reported on 5/25/2020) 30 tablet 5     No current facility-administered medications for this visit  Current Outpatient Medications on File Prior to Visit   Medication Sig    acetaminophen (TYLENOL) 500 mg tablet Take 1 tablet (500 mg total) by mouth every 6 (six) hours as needed for mild pain    albuterol (PROVENTIL HFA,VENTOLIN HFA) 90 mcg/act inhaler Inhale 2 puffs every 6 (six) hours as needed for wheezing    butalbital-acetaminophen-caffeine (FIORICET,ESGIC) -40 mg per tablet Take 1 tablet by mouth every 4 (four) hours as needed for headaches    clonazePAM (KlonoPIN) 0 5 mg tablet TK 1 T PO 1-2 TIMES DAILY PRF ANXIETY/SLEEP    diphenhydrAMINE (BENADRYL) 25 mg tablet Take 1 tablet (25 mg total) by mouth every 6 (six) hours    hydrocortisone 2 5 % cream Apply topically 4 (four) times a day as needed for irritation    meclizine (ANTIVERT) 12 5 MG tablet Take 2 tablets (25 mg total) by mouth 3 (three) times a day as needed for dizziness    Multiple Vitamin (MULTIVITAMIN) capsule Take 1 capsule by mouth daily    naloxone (NARCAN) 4 mg/0 1 mL nasal spray Administer 1 spray into a nostril  If breathing does not return to normal or if breathing difficulty resumes after 2-3 minutes, give another dose in the other nostril using a new spray      omeprazole (PriLOSEC) 20 mg delayed release capsule TAKE ONE CAPSULE BY MOUTH DAILY    ondansetron (ZOFRAN) 4 mg tablet Take 1 tablet (4 mg total) by mouth every 6 (six) hours (Patient taking differently: Take 4 mg by mouth every 6 (six) hours as needed )    tiZANidine (ZANAFLEX) 2 mg tablet Take 1 tablet (2 mg total) by mouth every 8 (eight) hours as needed for muscle spasms    triamcinolone (KENALOG) 0 5 % cream Apply topically 2 (two) times a day    venlafaxine (EFFEXOR) 75 mg tablet Take 75 mg by mouth 2 (two) times a day with meals    diphenhydrAMINE (BENADRYL) 50 MG tablet Take 50 mg by mouth every 6 (six) hours as needed for itching    ergocalciferol (VITAMIN D2) 50,000 units Take 1 capsule (50,000 Units total) by mouth 2 (two) times a week for 24 doses    hydrocortisone 2 5 % cream Apply topically 2 (two) times a day for 10 days    nystatin (MYCOSTATIN) ointment Apply topically 2 (two) times a day for 21 days Apply to umbilicus    topiramate (TOPAMAX) 25 mg sprinkle capsule Take 1 capsule (25 mg total) by mouth 2 (two) times a day (Patient not taking: Reported on 5/25/2020)    vitamin B-12 (CYANOCOBALAMIN) 2000 MCG TABS Take 1 tablet (2,000 mcg total) by mouth daily (Patient not taking: Reported on 5/25/2020)     No current facility-administered medications on file prior to visit  She is allergic to motrin [ibuprofen]; cherry; and gabapentin       Review of Systems   Constitutional: Negative for activity change, appetite change, chills, fatigue and unexpected weight change  HENT: Negative for ear pain, hearing loss and sore throat  Eyes: Negative for visual disturbance  Respiratory: Negative for cough and wheezing  Cardiovascular: Positive for palpitations  Negative for chest pain  Gastrointestinal: Negative for abdominal pain, constipation, diarrhea and vomiting  Genitourinary: Negative for difficulty urinating and dysuria  Musculoskeletal: Positive for arthralgias and back pain  Negative for myalgias  Skin: Negative for rash  Neurological: Negative for dizziness and headaches  Psychiatric/Behavioral: Positive for sleep disturbance  Negative for behavioral problems  The patient is nervous/anxious  Objective:      /78 (BP Location: Left arm, Patient Position: Sitting, Cuff Size: Large)   Pulse 73   Temp (!) 96 9 °F (36 1 °C) (Temporal)   Resp 16   Ht 5' 6" (1 676 m)   Wt 90 3 kg (199 lb)   LMP  (LMP Unknown)   SpO2 97%   BMI 32 12 kg/m²          Physical Exam  Vitals signs and nursing note reviewed  Constitutional:       General: She is not in acute distress  Appearance: She is well-developed  HENT:      Head: Normocephalic and atraumatic  Right Ear: External ear normal       Left Ear: External ear normal    Eyes:      Conjunctiva/sclera: Conjunctivae normal    Neck:      Musculoskeletal: Normal range of motion and neck supple  Thyroid: No thyromegaly  Cardiovascular:      Rate and Rhythm: Normal rate and regular rhythm  Heart sounds: Normal heart sounds  No murmur  Pulmonary:      Effort: Pulmonary effort is normal  No respiratory distress  Breath sounds: Normal breath sounds  No wheezing  Musculoskeletal:         General: Tenderness (lumbar spine) present  Lymphadenopathy:      Cervical: No cervical adenopathy  Neurological:      Mental Status: She is alert and oriented to person, place, and time     Psychiatric:         Mood and Affect: Mood normal          Behavior: Behavior normal        EKG: bradycardia

## 2021-03-18 NOTE — ASSESSMENT & PLAN NOTE
Depression Screening Follow-up Plan: Patient's depression screening was positive with a PHQ-2 score of 6  Their PHQ-9 score was 20  Continue regular follow-up with their psychologist/therapist/psychiatrist who is managing their mental health condition(s)

## 2021-03-24 NOTE — ASSESSMENT & PLAN NOTE
BMI Counseling: Body mass index is 32 12 kg/m²  The BMI is above normal  Nutrition recommendations include 3-5 servings of fruits/vegetables daily and increasing intake of lean protein  recommend varied diet , try to stay with bariatric diet

## 2021-04-21 ENCOUNTER — TELEPHONE (OUTPATIENT)
Dept: FAMILY MEDICINE CLINIC | Facility: CLINIC | Age: 61
End: 2021-04-21

## 2021-04-21 NOTE — TELEPHONE ENCOUNTER
SIGNATURES NEEDED FOR [employer] (back brace, mobility knee, suspension sleeve)   FORM RECEIVED VIA FAX  WILL BE PLACED IN YOUR BIN AT ASSIGNED DELIVERY TIMES

## 2021-04-23 NOTE — TELEPHONE ENCOUNTER
Lawrence Yost from [facility] [employer] called wanting to know if form was completed yet?

## 2021-04-23 NOTE — TELEPHONE ENCOUNTER
I dont use these companies  Unless she comes in and tells me she want it   So she should cancel the order unless we hear from the patient

## 2021-05-18 DIAGNOSIS — M47.816 LUMBAR FACET ARTHROPATHY: ICD-10-CM

## 2021-05-18 RX ORDER — TIZANIDINE 2 MG/1
TABLET ORAL
Qty: 90 TABLET | Refills: 0 | Status: SHIPPED | OUTPATIENT
Start: 2021-05-18 | End: 2021-06-15

## 2021-06-15 DIAGNOSIS — M47.816 LUMBAR FACET ARTHROPATHY: ICD-10-CM

## 2021-06-15 RX ORDER — TIZANIDINE 2 MG/1
TABLET ORAL
Qty: 90 TABLET | Refills: 0 | Status: SHIPPED | OUTPATIENT
Start: 2021-06-15 | End: 2021-07-12

## 2021-07-12 DIAGNOSIS — M47.816 LUMBAR FACET ARTHROPATHY: ICD-10-CM

## 2021-07-12 RX ORDER — TIZANIDINE 2 MG/1
TABLET ORAL
Qty: 90 TABLET | Refills: 0 | Status: SHIPPED | OUTPATIENT
Start: 2021-07-12 | End: 2021-08-09

## 2021-08-24 ENCOUNTER — OFFICE VISIT (OUTPATIENT)
Dept: BARIATRICS | Facility: CLINIC | Age: 61
End: 2021-08-24
Payer: COMMERCIAL

## 2021-08-24 VITALS
HEART RATE: 84 BPM | SYSTOLIC BLOOD PRESSURE: 120 MMHG | BODY MASS INDEX: 31.82 KG/M2 | HEIGHT: 66 IN | DIASTOLIC BLOOD PRESSURE: 78 MMHG | TEMPERATURE: 97.1 F | WEIGHT: 198 LBS

## 2021-08-24 DIAGNOSIS — E66.9 OBESITY, CLASS I, BMI 30-34.9: ICD-10-CM

## 2021-08-24 DIAGNOSIS — Z98.84 BARIATRIC SURGERY STATUS: ICD-10-CM

## 2021-08-24 DIAGNOSIS — Z98.84 BARIATRIC SURGERY STATUS: Primary | ICD-10-CM

## 2021-08-24 DIAGNOSIS — R10.13 EPIGASTRIC PAIN: ICD-10-CM

## 2021-08-24 DIAGNOSIS — Z12.11 ENCOUNTER FOR SCREENING COLONOSCOPY: ICD-10-CM

## 2021-08-24 PROCEDURE — 3008F BODY MASS INDEX DOCD: CPT | Performed by: PHYSICIAN ASSISTANT

## 2021-08-24 PROCEDURE — 99214 OFFICE O/P EST MOD 30 MIN: CPT | Performed by: PHYSICIAN ASSISTANT

## 2021-08-24 PROCEDURE — 1036F TOBACCO NON-USER: CPT | Performed by: PHYSICIAN ASSISTANT

## 2021-08-24 RX ORDER — OMEPRAZOLE 20 MG/1
20 CAPSULE, DELAYED RELEASE ORAL DAILY
Qty: 30 CAPSULE | Refills: 2 | Status: SHIPPED | OUTPATIENT
Start: 2021-08-24 | End: 2021-08-24

## 2021-08-24 RX ORDER — OMEPRAZOLE 20 MG/1
20 CAPSULE, DELAYED RELEASE ORAL DAILY
Qty: 90 CAPSULE | Refills: 0 | Status: SHIPPED | OUTPATIENT
Start: 2021-08-24 | End: 2021-11-02

## 2021-08-24 RX ORDER — SUCRALFATE 1 G/1
TABLET ORAL
Qty: 360 TABLET | Refills: 0 | Status: SHIPPED | OUTPATIENT
Start: 2021-08-24

## 2021-08-24 RX ORDER — SUCRALFATE 1 G/1
1 TABLET ORAL 4 TIMES DAILY
Qty: 120 TABLET | Refills: 1 | Status: SHIPPED | OUTPATIENT
Start: 2021-08-24 | End: 2021-08-24

## 2021-08-24 NOTE — PROGRESS NOTES
Assessment/Plan:     Patient ID: Agapito Duran is a 64 y o  female  Bariatric Surgery Status  She is status post laparoscopic elvira-en-y gastric bypass surgery by Dr Jack Ruano 1/21/2014  Here for OD annual with c/o abd pain x5 months  Pain located in epigastrium and feels like a "tightening sensation", makes it difficult to tolerate food and drinks  No vomiting and she is moving bowels  She does not smoke, drink alcohol or take NSAIDs regularly  Of note, she was last here in 2019 with similar abdominal pain  Underwent EGD and CT abd/pelvis which were largely unremarkable  Was advised to see Dr Jack Ruano to discuss explorative lap but patient dud not follow up  Will start on PPI/Carafate daily and schedule for EGD/follow up with Dr Jack Ruano  ER precautions given to patient and she verbalized understanding  · Continued/Maintain healthy weight loss with good nutrition intakes  · Adequate hydration with at least 64oz  fluid intake  · Follow diet as discussed  · Follow vitamin and mineral recommendations as reviewed with you  · Exercise as tolerated  · Colonoscopy referral made: was supposed to go but did not follow up, wants new  Referral     · Follow-up after EGD  We kindly ask that your arrive 15 minutes before your scheduled appointment time with your provider to allow our staff to room you, get your vital signs and update your chart  · Get lab work done  Please call the office if you need a script  It is recommended to check with your insurance BEFORE getting labs done to make sure they are covered by your policy  · Call our office if you have any problems with abdominal pain especially associated with fever, chills, nausea, vomiting or any other concerns  · All  Post-bariatric surgery patients should be aware that very small quantities of any alcohol can cause impairment and it is very possible not to feel the effect  The effect can be in the system for several hours    It is also a stomach irritant  · It is advised to AVOID alcohol, Nonsteroidal antiinflammatory drugs (NSAIDS) and nicotine of all forms   Any of these can cause stomach irritation/pain  · Discussed the effects of alcohol on a bariatric patient and the increased impairment risk  · Keep up the good work! Postsurgical Malabsorption   -At risk for malabsorption of vitamins/minerals secondary to malabsorption and restriction of intake from bariatric surgery  -Currently taking adequate postop bariatric surgery vitamin supplementation  - she has full panel of blood work ordered by her PCP , encouraged to get done   -Patient received education about the importance of adhering to a lifelong supplementation regimen to avoid vitamin/mineral deficiencies      Diagnoses and all orders for this visit:    Bariatric surgery status  -     omeprazole (PriLOSEC) 20 mg delayed release capsule; Take 1 capsule (20 mg total) by mouth daily  -     sucralfate (CARAFATE) 1 g tablet; Take 1 tablet (1 g total) by mouth 4 (four) times a day Crush and mix with water to take as suspension  -     Ambulatory referral to Gastroenterology; Future    Epigastric pain  -     omeprazole (PriLOSEC) 20 mg delayed release capsule; Take 1 capsule (20 mg total) by mouth daily  -     sucralfate (CARAFATE) 1 g tablet; Take 1 tablet (1 g total) by mouth 4 (four) times a day Crush and mix with water to take as suspension    Encounter for screening colonoscopy  -     Ambulatory referral to Gastroenterology; Future    Obesity, Class I, BMI 30-34 9  -     Ambulatory referral to Gastroenterology; Future         Subjective:      Patient ID: France Chapa is a 64 y o  female  She is status post laparoscopic elvira-en-y gastric bypass surgery by Dr Carlos Dwyer 1/21/2014  Here for OD annual with c/o abd pain  Doing poor       Initial: 265 5  Current: 198  EWL: (Weight loss is ahead of schedule at this post surgical period )  Curly: 154  Current BMI is Body mass index is 32 45 kg/m²  · Tolerating a regular diet-not currently   · Eating at least 60 grams of protein per day-tries to  · Following 30/60 minute rule with liquids-yes  · Drinking at least 64 ounces of fluid per day-yes  · Drinking carbonated beverages-no  · Sufficient exercise-no  · Using NSAIDs regularly-no  · Using nicotine-no  · Using alcohol-no  · Supplements: guy fusion mvi     · EWL is 61%, which places the patient ahead of schedule for expected post surgical weight loss at this time  The following portions of the patient's history were reviewed and updated as appropriate: allergies, current medications, past family history, past medical history, past social history, past surgical history and problem list     Review of Systems   Constitutional: Negative  Respiratory: Negative  Cardiovascular: Negative  Gastrointestinal: Positive for abdominal distention and abdominal pain  Negative for constipation and vomiting  Neurological: Negative  Psychiatric/Behavioral: Negative  Objective:    /78 (BP Location: Left arm, Patient Position: Sitting)   Pulse 84   Temp (!) 97 1 °F (36 2 °C)   Ht 5' 5 5" (1 664 m)   Wt 89 8 kg (198 lb)   LMP  (LMP Unknown)   BMI 32 45 kg/m²      Physical Exam  Vitals and nursing note reviewed  Constitutional:       Appearance: Normal appearance  She is obese  HENT:      Head: Normocephalic and atraumatic  Eyes:      Pupils: Pupils are equal, round, and reactive to light  Cardiovascular:      Rate and Rhythm: Normal rate and regular rhythm  Pulmonary:      Effort: Pulmonary effort is normal       Breath sounds: Normal breath sounds  Abdominal:      General: Bowel sounds are normal       Tenderness: There is abdominal tenderness (epigastric)  Musculoskeletal:         General: Normal range of motion  Cervical back: Normal range of motion  Skin:     General: Skin is warm and dry  Neurological:      General: No focal deficit present  Mental Status: She is alert     Psychiatric:         Mood and Affect: Mood normal

## 2021-08-24 NOTE — H&P (VIEW-ONLY)
Assessment/Plan:     Patient ID: Omaira Walters is a 64 y o  female  Bariatric Surgery Status  She is status post laparoscopic elvira-en-y gastric bypass surgery by Dr Floyd Lehman 1/21/2014  Here for OD annual with c/o abd pain x5 months  Pain located in epigastrium and feels like a "tightening sensation", makes it difficult to tolerate food and drinks  No vomiting and she is moving bowels  She does not smoke, drink alcohol or take NSAIDs regularly  Of note, she was last here in 2019 with similar abdominal pain  Underwent EGD and CT abd/pelvis which were largely unremarkable  Was advised to see Dr Floyd Lehman to discuss explorative lap but patient dud not follow up  Will start on PPI/Carafate daily and schedule for EGD/follow up with Dr Floyd Lehman  ER precautions given to patient and she verbalized understanding  · Continued/Maintain healthy weight loss with good nutrition intakes  · Adequate hydration with at least 64oz  fluid intake  · Follow diet as discussed  · Follow vitamin and mineral recommendations as reviewed with you  · Exercise as tolerated  · Colonoscopy referral made: was supposed to go but did not follow up, wants new  Referral     · Follow-up after EGD  We kindly ask that your arrive 15 minutes before your scheduled appointment time with your provider to allow our staff to room you, get your vital signs and update your chart  · Get lab work done  Please call the office if you need a script  It is recommended to check with your insurance BEFORE getting labs done to make sure they are covered by your policy  · Call our office if you have any problems with abdominal pain especially associated with fever, chills, nausea, vomiting or any other concerns  · All  Post-bariatric surgery patients should be aware that very small quantities of any alcohol can cause impairment and it is very possible not to feel the effect  The effect can be in the system for several hours    It is also a stomach irritant  · It is advised to AVOID alcohol, Nonsteroidal antiinflammatory drugs (NSAIDS) and nicotine of all forms   Any of these can cause stomach irritation/pain  · Discussed the effects of alcohol on a bariatric patient and the increased impairment risk  · Keep up the good work! Postsurgical Malabsorption   -At risk for malabsorption of vitamins/minerals secondary to malabsorption and restriction of intake from bariatric surgery  -Currently taking adequate postop bariatric surgery vitamin supplementation  - she has full panel of blood work ordered by her PCP , encouraged to get done   -Patient received education about the importance of adhering to a lifelong supplementation regimen to avoid vitamin/mineral deficiencies      Diagnoses and all orders for this visit:    Bariatric surgery status  -     omeprazole (PriLOSEC) 20 mg delayed release capsule; Take 1 capsule (20 mg total) by mouth daily  -     sucralfate (CARAFATE) 1 g tablet; Take 1 tablet (1 g total) by mouth 4 (four) times a day Crush and mix with water to take as suspension  -     Ambulatory referral to Gastroenterology; Future    Epigastric pain  -     omeprazole (PriLOSEC) 20 mg delayed release capsule; Take 1 capsule (20 mg total) by mouth daily  -     sucralfate (CARAFATE) 1 g tablet; Take 1 tablet (1 g total) by mouth 4 (four) times a day Crush and mix with water to take as suspension    Encounter for screening colonoscopy  -     Ambulatory referral to Gastroenterology; Future    Obesity, Class I, BMI 30-34 9  -     Ambulatory referral to Gastroenterology; Future         Subjective:      Patient ID: Addie Malloy is a 64 y o  female  She is status post laparoscopic elvira-en-y gastric bypass surgery by Dr Florin Dinh 1/21/2014  Here for OD annual with c/o abd pain  Doing poor       Initial: 265 5  Current: 198  EWL: (Weight loss is ahead of schedule at this post surgical period )  Curly: 154  Current BMI is Body mass index is 32 45 kg/m²  · Tolerating a regular diet-not currently   · Eating at least 60 grams of protein per day-tries to  · Following 30/60 minute rule with liquids-yes  · Drinking at least 64 ounces of fluid per day-yes  · Drinking carbonated beverages-no  · Sufficient exercise-no  · Using NSAIDs regularly-no  · Using nicotine-no  · Using alcohol-no  · Supplements: guy fusion mvi     · EWL is 61%, which places the patient ahead of schedule for expected post surgical weight loss at this time  The following portions of the patient's history were reviewed and updated as appropriate: allergies, current medications, past family history, past medical history, past social history, past surgical history and problem list     Review of Systems   Constitutional: Negative  Respiratory: Negative  Cardiovascular: Negative  Gastrointestinal: Positive for abdominal distention and abdominal pain  Negative for constipation and vomiting  Neurological: Negative  Psychiatric/Behavioral: Negative  Objective:    /78 (BP Location: Left arm, Patient Position: Sitting)   Pulse 84   Temp (!) 97 1 °F (36 2 °C)   Ht 5' 5 5" (1 664 m)   Wt 89 8 kg (198 lb)   LMP  (LMP Unknown)   BMI 32 45 kg/m²      Physical Exam  Vitals and nursing note reviewed  Constitutional:       Appearance: Normal appearance  She is obese  HENT:      Head: Normocephalic and atraumatic  Eyes:      Pupils: Pupils are equal, round, and reactive to light  Cardiovascular:      Rate and Rhythm: Normal rate and regular rhythm  Pulmonary:      Effort: Pulmonary effort is normal       Breath sounds: Normal breath sounds  Abdominal:      General: Bowel sounds are normal       Tenderness: There is abdominal tenderness (epigastric)  Musculoskeletal:         General: Normal range of motion  Cervical back: Normal range of motion  Skin:     General: Skin is warm and dry  Neurological:      General: No focal deficit present  Mental Status: She is alert     Psychiatric:         Mood and Affect: Mood normal

## 2021-08-24 NOTE — PATIENT INSTRUCTIONS
· Follow-up after EGD  We kindly ask that your arrive 15 minutes before your scheduled appointment time with your provider to allow our staff to room you, get your vital signs and update your chart  · Get lab work done  Please call the office if you need a script  It is recommended to check with your insurance BEFORE getting labs done to make sure they are covered by your policy  · Call our office if you have any problems with abdominal pain especially associated with fever, chills, nausea, vomiting or any other concerns  · All  Post-bariatric surgery patients should be aware that very small quantities of any alcohol can cause impairment and it is very possible not to feel the effect  The effect can be in the system for several hours  It is also a stomach irritant  · It is advised to AVOID alcohol, Nonsteroidal antiinflammatory drugs (NSAIDS) and nicotine of all forms   Any of these can cause stomach irritation/pain  · Discussed the effects of alcohol on a bariatric patient and the increased impairment risk  · Keep up the good work!

## 2021-08-25 ENCOUNTER — PREP FOR PROCEDURE (OUTPATIENT)
Dept: BARIATRICS | Facility: CLINIC | Age: 61
End: 2021-08-25

## 2021-08-25 DIAGNOSIS — Z98.84 BARIATRIC SURGERY STATUS: Primary | ICD-10-CM

## 2021-09-03 ENCOUNTER — APPOINTMENT (OUTPATIENT)
Dept: LAB | Facility: HOSPITAL | Age: 61
End: 2021-09-03
Payer: COMMERCIAL

## 2021-09-03 DIAGNOSIS — K91.2 POSTGASTRECTOMY MALABSORPTION: Primary | ICD-10-CM

## 2021-09-03 DIAGNOSIS — Z90.3 POSTGASTRECTOMY MALABSORPTION: Primary | ICD-10-CM

## 2021-09-03 DIAGNOSIS — Z11.3 ROUTINE SCREENING FOR STI (SEXUALLY TRANSMITTED INFECTION): ICD-10-CM

## 2021-09-03 LAB
25(OH)D3 SERPL-MCNC: 20.8 NG/ML (ref 30–100)
ALBUMIN SERPL BCP-MCNC: 3.8 G/DL (ref 3.5–5)
ALP SERPL-CCNC: 114 U/L (ref 46–116)
ALT SERPL W P-5'-P-CCNC: 14 U/L (ref 12–78)
ANION GAP SERPL CALCULATED.3IONS-SCNC: 8 MMOL/L (ref 4–13)
AST SERPL W P-5'-P-CCNC: 15 U/L (ref 5–45)
BASOPHILS # BLD AUTO: 0.03 THOUSANDS/ΜL (ref 0–0.1)
BASOPHILS NFR BLD AUTO: 1 % (ref 0–1)
BILIRUB SERPL-MCNC: 0.45 MG/DL (ref 0.2–1)
BUN SERPL-MCNC: 17 MG/DL (ref 5–25)
CALCIUM SERPL-MCNC: 9.4 MG/DL (ref 8.3–10.1)
CHLORIDE SERPL-SCNC: 104 MMOL/L (ref 100–108)
CHOLEST SERPL-MCNC: 207 MG/DL (ref 50–200)
CO2 SERPL-SCNC: 29 MMOL/L (ref 21–32)
CREAT SERPL-MCNC: 0.7 MG/DL (ref 0.6–1.3)
EOSINOPHIL # BLD AUTO: 0.07 THOUSAND/ΜL (ref 0–0.61)
EOSINOPHIL NFR BLD AUTO: 1 % (ref 0–6)
ERYTHROCYTE [DISTWIDTH] IN BLOOD BY AUTOMATED COUNT: 13.4 % (ref 11.6–15.1)
FOLATE SERPL-MCNC: >20 NG/ML (ref 3.1–17.5)
GFR SERPL CREATININE-BSD FRML MDRD: 110 ML/MIN/1.73SQ M
GLUCOSE P FAST SERPL-MCNC: 101 MG/DL (ref 65–99)
HCT VFR BLD AUTO: 40.2 % (ref 34.8–46.1)
HCV AB SER QL: NORMAL
HDLC SERPL-MCNC: 75 MG/DL
HGB BLD-MCNC: 12.4 G/DL (ref 11.5–15.4)
IMM GRANULOCYTES # BLD AUTO: 0.01 THOUSAND/UL (ref 0–0.2)
IMM GRANULOCYTES NFR BLD AUTO: 0 % (ref 0–2)
LDLC SERPL CALC-MCNC: 117 MG/DL (ref 0–100)
LYMPHOCYTES # BLD AUTO: 1.73 THOUSANDS/ΜL (ref 0.6–4.47)
LYMPHOCYTES NFR BLD AUTO: 30 % (ref 14–44)
MCH RBC QN AUTO: 27.4 PG (ref 26.8–34.3)
MCHC RBC AUTO-ENTMCNC: 30.8 G/DL (ref 31.4–37.4)
MCV RBC AUTO: 89 FL (ref 82–98)
MONOCYTES # BLD AUTO: 0.37 THOUSAND/ΜL (ref 0.17–1.22)
MONOCYTES NFR BLD AUTO: 6 % (ref 4–12)
NEUTROPHILS # BLD AUTO: 3.53 THOUSANDS/ΜL (ref 1.85–7.62)
NEUTS SEG NFR BLD AUTO: 62 % (ref 43–75)
NONHDLC SERPL-MCNC: 132 MG/DL
NRBC BLD AUTO-RTO: 0 /100 WBCS
PLATELET # BLD AUTO: 189 THOUSANDS/UL (ref 149–390)
PMV BLD AUTO: 12.6 FL (ref 8.9–12.7)
POTASSIUM SERPL-SCNC: 4.1 MMOL/L (ref 3.5–5.3)
PROT SERPL-MCNC: 7.5 G/DL (ref 6.4–8.2)
PTH-INTACT SERPL-MCNC: 112.4 PG/ML (ref 18.4–80.1)
RBC # BLD AUTO: 4.53 MILLION/UL (ref 3.81–5.12)
SODIUM SERPL-SCNC: 141 MMOL/L (ref 136–145)
TRIGL SERPL-MCNC: 75 MG/DL
VIT B12 SERPL-MCNC: 752 PG/ML (ref 100–900)
WBC # BLD AUTO: 5.74 THOUSAND/UL (ref 4.31–10.16)

## 2021-09-03 PROCEDURE — 85025 COMPLETE CBC W/AUTO DIFF WBC: CPT

## 2021-09-03 PROCEDURE — 80053 COMPREHEN METABOLIC PANEL: CPT

## 2021-09-03 PROCEDURE — 36415 COLL VENOUS BLD VENIPUNCTURE: CPT

## 2021-09-03 PROCEDURE — 82746 ASSAY OF FOLIC ACID SERUM: CPT

## 2021-09-03 PROCEDURE — 80061 LIPID PANEL: CPT

## 2021-09-03 PROCEDURE — 83970 ASSAY OF PARATHORMONE: CPT

## 2021-09-03 PROCEDURE — 82306 VITAMIN D 25 HYDROXY: CPT

## 2021-09-03 PROCEDURE — 84590 ASSAY OF VITAMIN A: CPT

## 2021-09-03 PROCEDURE — 87389 HIV-1 AG W/HIV-1&-2 AB AG IA: CPT

## 2021-09-03 PROCEDURE — 86803 HEPATITIS C AB TEST: CPT

## 2021-09-03 PROCEDURE — 82607 VITAMIN B-12: CPT

## 2021-09-03 PROCEDURE — 84425 ASSAY OF VITAMIN B-1: CPT

## 2021-09-05 LAB — HIV 1+2 AB+HIV1 P24 AG SERPL QL IA: NORMAL

## 2021-09-06 ENCOUNTER — APPOINTMENT (EMERGENCY)
Dept: RADIOLOGY | Facility: HOSPITAL | Age: 61
End: 2021-09-06
Payer: COMMERCIAL

## 2021-09-06 ENCOUNTER — APPOINTMENT (EMERGENCY)
Dept: CT IMAGING | Facility: HOSPITAL | Age: 61
End: 2021-09-06
Payer: COMMERCIAL

## 2021-09-06 ENCOUNTER — HOSPITAL ENCOUNTER (EMERGENCY)
Facility: HOSPITAL | Age: 61
Discharge: HOME | End: 2021-09-06
Attending: EMERGENCY MEDICINE | Admitting: EMERGENCY MEDICINE
Payer: COMMERCIAL

## 2021-09-06 VITALS
OXYGEN SATURATION: 99 % | DIASTOLIC BLOOD PRESSURE: 64 MMHG | SYSTOLIC BLOOD PRESSURE: 131 MMHG | RESPIRATION RATE: 18 BRPM | WEIGHT: 195.55 LBS | HEART RATE: 58 BPM | BODY MASS INDEX: 32.05 KG/M2 | TEMPERATURE: 98.2 F

## 2021-09-06 DIAGNOSIS — R10.13 EPIGASTRIC PAIN: Primary | ICD-10-CM

## 2021-09-06 DIAGNOSIS — Z98.84 HISTORY OF ROUX-EN-Y GASTRIC BYPASS: ICD-10-CM

## 2021-09-06 DIAGNOSIS — R11.0 NAUSEA: ICD-10-CM

## 2021-09-06 LAB
ALBUMIN SERPL BCP-MCNC: 3.7 G/DL (ref 3.5–5)
ALP SERPL-CCNC: 120 U/L (ref 46–116)
ALT SERPL W P-5'-P-CCNC: 18 U/L (ref 12–78)
ANION GAP SERPL CALCULATED.3IONS-SCNC: 9 MMOL/L (ref 4–13)
AST SERPL W P-5'-P-CCNC: 14 U/L (ref 5–45)
BACTERIA UR QL AUTO: ABNORMAL /HPF
BASOPHILS # BLD AUTO: 0.02 THOUSANDS/ΜL (ref 0–0.1)
BASOPHILS NFR BLD AUTO: 0 % (ref 0–1)
BILIRUB DIRECT SERPL-MCNC: 0.11 MG/DL (ref 0–0.2)
BILIRUB SERPL-MCNC: 0.48 MG/DL (ref 0.2–1)
BILIRUB UR QL STRIP: NEGATIVE
BUN SERPL-MCNC: 22 MG/DL (ref 5–25)
CALCIUM SERPL-MCNC: 9.3 MG/DL (ref 8.3–10.1)
CAOX CRY URNS QL MICRO: ABNORMAL /HPF
CHLORIDE SERPL-SCNC: 104 MMOL/L (ref 100–108)
CLARITY UR: CLEAR
CO2 SERPL-SCNC: 29 MMOL/L (ref 21–32)
COLOR UR: YELLOW
CREAT SERPL-MCNC: 0.82 MG/DL (ref 0.6–1.3)
D DIMER PPP FEU-MCNC: <0.27 UG/ML FEU
EOSINOPHIL # BLD AUTO: 0.1 THOUSAND/ΜL (ref 0–0.61)
EOSINOPHIL NFR BLD AUTO: 1 % (ref 0–6)
ERYTHROCYTE [DISTWIDTH] IN BLOOD BY AUTOMATED COUNT: 13.4 % (ref 11.6–15.1)
GFR SERPL CREATININE-BSD FRML MDRD: 91 ML/MIN/1.73SQ M
GLUCOSE SERPL-MCNC: 109 MG/DL (ref 65–140)
GLUCOSE UR STRIP-MCNC: NEGATIVE MG/DL
HCT VFR BLD AUTO: 39.1 % (ref 34.8–46.1)
HGB BLD-MCNC: 12.2 G/DL (ref 11.5–15.4)
HGB UR QL STRIP.AUTO: ABNORMAL
IMM GRANULOCYTES # BLD AUTO: 0.03 THOUSAND/UL (ref 0–0.2)
IMM GRANULOCYTES NFR BLD AUTO: 0 % (ref 0–2)
KETONES UR STRIP-MCNC: NEGATIVE MG/DL
LEUKOCYTE ESTERASE UR QL STRIP: NEGATIVE
LIPASE SERPL-CCNC: 54 U/L (ref 73–393)
LYMPHOCYTES # BLD AUTO: 1.96 THOUSANDS/ΜL (ref 0.6–4.47)
LYMPHOCYTES NFR BLD AUTO: 27 % (ref 14–44)
MCH RBC QN AUTO: 27.8 PG (ref 26.8–34.3)
MCHC RBC AUTO-ENTMCNC: 31.2 G/DL (ref 31.4–37.4)
MCV RBC AUTO: 89 FL (ref 82–98)
MONOCYTES # BLD AUTO: 0.4 THOUSAND/ΜL (ref 0.17–1.22)
MONOCYTES NFR BLD AUTO: 5 % (ref 4–12)
MUCOUS THREADS UR QL AUTO: ABNORMAL
NEUTROPHILS # BLD AUTO: 4.85 THOUSANDS/ΜL (ref 1.85–7.62)
NEUTS SEG NFR BLD AUTO: 67 % (ref 43–75)
NITRITE UR QL STRIP: NEGATIVE
NON-SQ EPI CELLS URNS QL MICRO: ABNORMAL /HPF
NRBC BLD AUTO-RTO: 0 /100 WBCS
PH UR STRIP.AUTO: 6 [PH] (ref 4.5–8)
PLATELET # BLD AUTO: 217 THOUSANDS/UL (ref 149–390)
PMV BLD AUTO: 12 FL (ref 8.9–12.7)
POTASSIUM SERPL-SCNC: 4.7 MMOL/L (ref 3.5–5.3)
PROT SERPL-MCNC: 7.4 G/DL (ref 6.4–8.2)
PROT UR STRIP-MCNC: NEGATIVE MG/DL
RBC # BLD AUTO: 4.39 MILLION/UL (ref 3.81–5.12)
RBC #/AREA URNS AUTO: ABNORMAL /HPF
SODIUM SERPL-SCNC: 142 MMOL/L (ref 136–145)
SP GR UR STRIP.AUTO: >=1.03 (ref 1–1.03)
TROPONIN I SERPL-MCNC: <0.02 NG/ML
UROBILINOGEN UR QL STRIP.AUTO: 0.2 E.U./DL
WBC # BLD AUTO: 7.36 THOUSAND/UL (ref 4.31–10.16)
WBC #/AREA URNS AUTO: ABNORMAL /HPF

## 2021-09-06 PROCEDURE — 99284 EMERGENCY DEPT VISIT MOD MDM: CPT

## 2021-09-06 PROCEDURE — 74177 CT ABD & PELVIS W/CONTRAST: CPT

## 2021-09-06 PROCEDURE — 96375 TX/PRO/DX INJ NEW DRUG ADDON: CPT

## 2021-09-06 PROCEDURE — 96361 HYDRATE IV INFUSION ADD-ON: CPT

## 2021-09-06 PROCEDURE — 96374 THER/PROPH/DIAG INJ IV PUSH: CPT

## 2021-09-06 PROCEDURE — 84484 ASSAY OF TROPONIN QUANT: CPT | Performed by: EMERGENCY MEDICINE

## 2021-09-06 PROCEDURE — C9113 INJ PANTOPRAZOLE SODIUM, VIA: HCPCS | Performed by: EMERGENCY MEDICINE

## 2021-09-06 PROCEDURE — 80048 BASIC METABOLIC PNL TOTAL CA: CPT | Performed by: EMERGENCY MEDICINE

## 2021-09-06 PROCEDURE — 71046 X-RAY EXAM CHEST 2 VIEWS: CPT

## 2021-09-06 PROCEDURE — 80076 HEPATIC FUNCTION PANEL: CPT | Performed by: EMERGENCY MEDICINE

## 2021-09-06 PROCEDURE — 85379 FIBRIN DEGRADATION QUANT: CPT | Performed by: EMERGENCY MEDICINE

## 2021-09-06 PROCEDURE — 85025 COMPLETE CBC W/AUTO DIFF WBC: CPT | Performed by: EMERGENCY MEDICINE

## 2021-09-06 PROCEDURE — 93005 ELECTROCARDIOGRAM TRACING: CPT

## 2021-09-06 PROCEDURE — 99285 EMERGENCY DEPT VISIT HI MDM: CPT | Performed by: EMERGENCY MEDICINE

## 2021-09-06 PROCEDURE — 36415 COLL VENOUS BLD VENIPUNCTURE: CPT | Performed by: EMERGENCY MEDICINE

## 2021-09-06 PROCEDURE — 83690 ASSAY OF LIPASE: CPT | Performed by: EMERGENCY MEDICINE

## 2021-09-06 PROCEDURE — 81001 URINALYSIS AUTO W/SCOPE: CPT

## 2021-09-06 RX ORDER — LIDOCAINE HYDROCHLORIDE 20 MG/ML
15 SOLUTION OROPHARYNGEAL ONCE
Status: COMPLETED | OUTPATIENT
Start: 2021-09-06 | End: 2021-09-06

## 2021-09-06 RX ORDER — MAGNESIUM HYDROXIDE/ALUMINUM HYDROXICE/SIMETHICONE 120; 1200; 1200 MG/30ML; MG/30ML; MG/30ML
30 SUSPENSION ORAL ONCE
Status: COMPLETED | OUTPATIENT
Start: 2021-09-06 | End: 2021-09-06

## 2021-09-06 RX ORDER — OMEPRAZOLE 20 MG/1
20 CAPSULE, DELAYED RELEASE ORAL DAILY
Qty: 14 CAPSULE | Refills: 0 | Status: SHIPPED | OUTPATIENT
Start: 2021-09-06 | End: 2022-01-17

## 2021-09-06 RX ORDER — ONDANSETRON 4 MG/1
4 TABLET, ORALLY DISINTEGRATING ORAL EVERY 8 HOURS PRN
Qty: 10 TABLET | Refills: 0 | Status: SHIPPED | OUTPATIENT
Start: 2021-09-06

## 2021-09-06 RX ORDER — PANTOPRAZOLE SODIUM 40 MG/1
40 INJECTION, POWDER, FOR SOLUTION INTRAVENOUS ONCE
Status: COMPLETED | OUTPATIENT
Start: 2021-09-06 | End: 2021-09-06

## 2021-09-06 RX ORDER — ONDANSETRON 2 MG/ML
4 INJECTION INTRAMUSCULAR; INTRAVENOUS ONCE
Status: COMPLETED | OUTPATIENT
Start: 2021-09-06 | End: 2021-09-06

## 2021-09-06 RX ADMIN — IOHEXOL 100 ML: 350 INJECTION, SOLUTION INTRAVENOUS at 20:23

## 2021-09-06 RX ADMIN — ALUMINUM HYDROXIDE, MAGNESIUM HYDROXIDE, AND SIMETHICONE 30 ML: 200; 200; 20 SUSPENSION ORAL at 18:26

## 2021-09-06 RX ADMIN — ONDANSETRON 4 MG: 2 INJECTION INTRAMUSCULAR; INTRAVENOUS at 18:31

## 2021-09-06 RX ADMIN — SODIUM CHLORIDE 1000 ML: 0.9 INJECTION, SOLUTION INTRAVENOUS at 18:27

## 2021-09-06 RX ADMIN — PANTOPRAZOLE SODIUM 40 MG: 40 INJECTION, POWDER, FOR SOLUTION INTRAVENOUS at 18:33

## 2021-09-06 RX ADMIN — LIDOCAINE HYDROCHLORIDE 15 ML: 20 SOLUTION ORAL; TOPICAL at 18:26

## 2021-09-06 RX ADMIN — IOHEXOL 15 ML: 240 INJECTION, SOLUTION INTRATHECAL; INTRAVASCULAR; INTRAVENOUS; ORAL at 20:22

## 2021-09-06 NOTE — ED PROVIDER NOTES
History  Chief Complaint   Patient presents with    Abdominal Pain     Pt reports for abd pain and pt reports cant eat and reports burning pain in abd and pt reports to feel weak     65 yo F h/o bariatric surgery presenting for evaluation of epigastric pain  Pt had Edgardo-en-y gastric bypass in 2014  States for the past few months she has been experiencing epigastric pain  Pain is described as squeezing/tightening in epigastric region, slight radiation to RUQ and lower chest  Constant, worse with eating/drinking  States unable to tolerate much po  Started with nausea last night, no vomiting  Denies fevers, chills, back pain, changes in stool, urinary complaints  No known CAD  No history of or risk factors for VTE      65 yo F with epigastric pain- symptomatic tx, abdominal labs, cardiac wokrup, ddimer, CT A/P, dispo accordingly         Per records-  8/24 Bariatrics follow up visit  - complaining of epigastric pain for 5 months and difficulty tolerating po  - Underwent EGD and CT abd/pelvis which were largely unremarkable  Was advised to see Dr Nisa Gaston to discuss explorative lap but patient dud not follow up  - Started on Omeprazole/Carafate daily and scheduled for EGD    Prior to Admission Medications   Prescriptions Last Dose Informant Patient Reported? Taking?    Multiple Vitamin (MULTIVITAMIN) capsule  Self Yes Yes   Sig: Take 1 capsule by mouth daily   acetaminophen (TYLENOL) 500 mg tablet  Self No Yes   Sig: Take 1 tablet (500 mg total) by mouth every 6 (six) hours as needed for mild pain   albuterol (PROVENTIL HFA,VENTOLIN HFA) 90 mcg/act inhaler  Self Yes Yes   Sig: Inhale 2 puffs every 6 (six) hours as needed for wheezing   amitriptyline (ELAVIL) 25 mg tablet   No Yes   Sig: Take 1 tablet (25 mg total) by mouth daily at bedtime   butalbital-acetaminophen-caffeine (FIORICET,ESGIC) -40 mg per tablet  Self No Yes   Sig: Take 1 tablet by mouth every 4 (four) hours as needed for headaches   clonazePAM (KlonoPIN) 0 5 mg tablet  Self Yes Yes   Sig: TK 1 T PO 1-2 TIMES DAILY PRF ANXIETY/SLEEP   diphenhydrAMINE (BENADRYL) 25 mg tablet   No Yes   Sig: Take 1 tablet (25 mg total) by mouth every 6 (six) hours   diphenhydrAMINE (BENADRYL) 50 MG tablet   Yes Yes   Sig: Take 50 mg by mouth every 6 (six) hours as needed for itching   hydrocortisone 2 5 % cream  Self No Yes   Sig: Apply topically 4 (four) times a day as needed for irritation   meclizine (ANTIVERT) 12 5 MG tablet  Self No Yes   Sig: Take 2 tablets (25 mg total) by mouth 3 (three) times a day as needed for dizziness   naloxone (NARCAN) 4 mg/0 1 mL nasal spray   No Yes   Sig: Administer 1 spray into a nostril  If breathing does not return to normal or if breathing difficulty resumes after 2-3 minutes, give another dose in the other nostril using a new spray     nystatin (MYCOSTATIN) ointment   No Yes   Sig: Apply topically 2 (two) times a day for 21 days Apply to umbilicus   omeprazole (PriLOSEC) 20 mg delayed release capsule   No Yes   Sig: Take 1 capsule (20 mg total) by mouth daily   ondansetron (ZOFRAN) 4 mg tablet  Self No Yes   Sig: Take 1 tablet (4 mg total) by mouth every 6 (six) hours   sucralfate (CARAFATE) 1 g tablet   No Yes   Sig: TAKE 1 TABLET BY MOUTH FOUR TIMES DAILY CRUSH AND MIX WITH WATER TO TAKE AS SUSPENSION   tiZANidine (ZANAFLEX) 2 mg tablet   No Yes   Sig: TAKE 1 TABLET(2 MG) BY MOUTH EVERY 8 HOURS AS NEEDED FOR MUSCLE SPASMS   topiramate (TOPAMAX) 25 mg sprinkle capsule  Self No Yes   Sig: Take 1 capsule (25 mg total) by mouth 2 (two) times a day   triamcinolone (KENALOG) 0 5 % cream   No Yes   Sig: Apply topically 2 (two) times a day   venlafaxine (EFFEXOR) 75 mg tablet   Yes Yes   Sig: Take 75 mg by mouth 2 (two) times a day with meals   vitamin B-12 (CYANOCOBALAMIN) 2000 MCG TABS  Self No Yes   Sig: Take 1 tablet (2,000 mcg total) by mouth daily      Facility-Administered Medications: None       Past Medical History:   Diagnosis Date  Asthma     Albuterol prn    Bariatric surgery status     Depression 2009    managed with Effexor     Diabetes mellitus ([facility] [address] ) 2005    resolved with gastric bypass 2015    Generalized osteoarthritis     Low vitamin B12 level     Migraine     Postgastrectomy malabsorption     Suicide attempt ([facility] [address] )     Vitamin D deficiency        Past Surgical History:   Procedure Laterality Date    CHOLECYSTECTOMY  2005    COLONOSCOPY      COSMETIC SURGERY  05/27/2020    Abdominoplasty    GASTRIC BYPASS  2015    elvira - en -y     HYSTERECTOMY      IR IMAGE GUIDED ASPIRATION / DRAINAGE W TUBE  7/14/2020    KNEE ARTHROSCOPY      with Lysis of adhesions    LAPAROSCOPIC SUPRACERVICAL HYSTERECTOMY  2005    due to endometriosis/AUB    OOPHORECTOMY Left 2005    CT EXCISE EXCESS SKIN TISSUE,ABDOMEN N/A 5/27/2020    Procedure: PANNICULECTOMY;  Surgeon: Jackie Conklin MD;  [facility]: [facility];  Service: Plastics    TONSILLECTOMY AND ADENOIDECTOMY      TUBAL LIGATION  1986    VAC DRESSING APPLICATION N/A 2/21/7356    Procedure: APPLICATION VAC DRESSING;  Surgeon: Jackie Conklin MD;  [facility];  Service: Plastics       Family History   Problem Relation Age of Onset    Hypertension Mother     Kidney cancer Mother     Diabetes Mother     Breast cancer Mother     Heart disease Father     Stroke Father     Hypertension Sister     HIV Brother     Breast cancer Cousin     No Known Problems Daughter     Stomach cancer Maternal Grandmother     No Known Problems Maternal Grandfather     No Known Problems Paternal Grandmother     No Known Problems Paternal Grandfather     No Known Problems Sister     No Known Problems Sister     No Known Problems Daughter     Prostate cancer Maternal Uncle     Stomach cancer Maternal Uncle     Leukemia Grandchild      I have reviewed and agree with the history as documented      E-Cigarette/Vaping    E-Cigarette Use Never User E-Cigarette/Vaping Substances    Nicotine No     THC No     CBD No     Flavoring No     Other No     Unknown No      Social History     Tobacco Use    Smoking status: Former Smoker     Years: 32 00     Types: Cigarettes     Quit date: 2013     Years since quittin 3    Smokeless tobacco: Never Used   Vaping Use    Vaping Use: Never used   Substance Use Topics    Alcohol use: Not Currently    Drug use: Never       Review of Systems   Constitutional: Negative for chills, fever and unexpected weight change  HENT: Negative for ear pain, rhinorrhea and sore throat  Eyes: Negative for pain and visual disturbance  Respiratory: Negative for cough and shortness of breath  Cardiovascular: Negative for chest pain and leg swelling  Gastrointestinal: Positive for abdominal pain and nausea  Negative for constipation, diarrhea and vomiting  Endocrine: Negative for polydipsia, polyphagia and polyuria  Genitourinary: Negative for dysuria, frequency, hematuria and urgency  Musculoskeletal: Negative for back pain, myalgias and neck pain  Skin: Negative for color change and rash  Allergic/Immunologic: Negative for environmental allergies and immunocompromised state  Neurological: Negative for dizziness, weakness, light-headedness, numbness and headaches  Hematological: Negative for adenopathy  Does not bruise/bleed easily  Psychiatric/Behavioral: Negative for agitation and confusion  All other systems reviewed and are negative  Physical Exam  Physical Exam  Vitals and nursing note reviewed  Constitutional:       Appearance: Normal appearance  She is well-developed  HENT:      Head: Normocephalic and atraumatic  Nose: Nose normal    Eyes:      Conjunctiva/sclera: Conjunctivae normal    Cardiovascular:      Rate and Rhythm: Normal rate and regular rhythm  Heart sounds: Normal heart sounds  Pulmonary:      Effort: Pulmonary effort is normal  No respiratory distress  Breath sounds: Normal breath sounds  No stridor  No wheezing or rales  Chest:      Chest wall: No tenderness  Abdominal:      General: There is no distension  Palpations: Abdomen is soft  Tenderness: There is abdominal tenderness in the epigastric area  There is no guarding or rebound  Comments: Epigastric ttp, no rebound/guarding   Musculoskeletal:         General: No swelling, tenderness or deformity  Cervical back: Normal range of motion and neck supple  Skin:     General: Skin is warm and dry  Findings: No rash  Neurological:      General: No focal deficit present  Mental Status: She is alert and oriented to person, place, and time  Motor: No abnormal muscle tone  Coordination: Coordination normal    Psychiatric:         Thought Content:  Thought content normal          Judgment: Judgment normal          Vital Signs  ED Triage Vitals   Temperature Pulse Respirations Blood Pressure SpO2   09/06/21 1656 09/06/21 1656 09/06/21 1656 09/06/21 1656 09/06/21 1656   98 2 °F (36 8 °C) 79 18 127/70 97 %      Temp Source Heart Rate Source Patient Position - Orthostatic VS BP Location FiO2 (%)   09/06/21 1656 09/06/21 1656 09/06/21 1913 09/06/21 1656 --   Oral Monitor Lying Right arm       Pain Score       --                  Vitals:    09/06/21 1913 09/06/21 2024 09/06/21 2115 09/06/21 2137   BP: 123/56 149/84 121/66 131/64   Pulse: 61 66 56 58   Patient Position - Orthostatic VS: Lying Lying Lying Lying         Visual Acuity      ED Medications  Medications   pantoprazole (PROTONIX) injection 40 mg (40 mg Intravenous Given 9/6/21 1833)   ondansetron (ZOFRAN) injection 4 mg (4 mg Intravenous Given 9/6/21 1831)   sodium chloride 0 9 % bolus 1,000 mL (0 mL Intravenous Stopped 9/6/21 1927)   Lidocaine Viscous HCl (XYLOCAINE) 2 % mucosal solution 15 mL (15 mL Swish & Swallow Given 9/6/21 1826)   aluminum-magnesium hydroxide-simethicone (MYLANTA) oral suspension 30 mL (30 mL Oral Given 9/6/21 1826)   iohexol (OMNIPAQUE) 350 MG/ML injection (SINGLE-DOSE) 100 mL (100 mL Intravenous Given 9/6/21 2023)   iohexol (OMNIPAQUE) 240 MG/ML solution 15 mL (15 mL Oral Given 9/6/21 2022)       Diagnostic Studies  Results Reviewed     Procedure Component Value Units Date/Time    D-dimer, quantitative [[id number]]  (Normal) Collected: 09/06/21 1826    Lab Status: Final result Specimen: Blood from Arm, Right Updated: 09/06/21 1928     D-Dimer, Quant <0 27 ug/ml FEU     Hepatic function panel [[id number]]  (Abnormal) Collected: 09/06/21 1826    Lab Status: Final result Specimen: Blood from Arm, Right Updated: 09/06/21 1900     Total Bilirubin 0 48 mg/dL      Bilirubin, Direct 0 11 mg/dL      Alkaline Phosphatase 120 U/L      AST 14 U/L      ALT 18 U/L      Total Protein 7 4 g/dL      Albumin 3 7 g/dL     Urine Microscopic [[id number]]  (Abnormal) Collected: 09/06/21 1834    Lab Status: Final result Specimen: Urine Updated: 09/06/21 1852     RBC, UA None Seen /hpf      WBC, UA 1-2 /hpf      Epithelial Cells Occasional /hpf      Bacteria, UA Innumerable /hpf      Ca Oxalate Sanjana, UA Occasional /hpf      MUCUS THREADS Occasional    Troponin I [[id number]]  (Normal) Collected: 09/06/21 1826    Lab Status: Final result Specimen: Blood from Arm, Right Updated: 09/06/21 1850     Troponin I <0 02 ng/mL     Basic metabolic panel [[id number]] Collected: 09/06/21 1826    Lab Status: Final result Specimen: Blood from Arm, Right Updated: 09/06/21 1849     Sodium 142 mmol/L      Potassium 4 7 mmol/L      Chloride 104 mmol/L      CO2 29 mmol/L      ANION GAP 9 mmol/L      BUN 22 mg/dL      Creatinine 0 82 mg/dL      Glucose 109 mg/dL      Calcium 9 3 mg/dL      eGFR 91 ml/min/1 73sq m     Narrative:      Milo guidelines for Chronic Kidney Disease (CKD):     Stage 1 with normal or high GFR (GFR > 90 mL/min/1 73 square meters)    Stage 2 Mild CKD (GFR = 60-89 mL/min/1 73 square meters)   Stage 3A Moderate CKD (GFR = 45-59 mL/min/1 73 square meters)    Stage 3B Moderate CKD (GFR = 30-44 mL/min/1 73 square meters)    Stage 4 Severe CKD (GFR = 15-29 mL/min/1 73 square meters)    Stage 5 End Stage CKD (GFR <15 mL/min/1 73 square meters)  Note: GFR calculation is accurate only with a steady state creatinine    Lipase [[id number]]  (Abnormal) Collected: 09/06/21 1826    Lab Status: Final result Specimen: Blood from Arm, Right Updated: 09/06/21 1849     Lipase 54 u/L     Urine Macroscopic, POC [[id number]]  (Abnormal) Collected: 09/06/21 1834    Lab Status: Final result Specimen: Urine Updated: 09/06/21 1835     Color, UA Yellow     Clarity, UA Clear     pH, UA 6 0     Leukocytes, UA Negative     Nitrite, UA Negative     Protein, UA Negative mg/dl      Glucose, UA Negative mg/dl      Ketones, UA Negative mg/dl      Urobilinogen, UA 0 2 E U /dl      Bilirubin, UA Negative     Blood, UA Trace     Specific Gravity, UA >=1 030    Narrative:      CLINITEK RESULT    CBC and differential [[id number]]  (Abnormal) Collected: 09/06/21 1826    Lab Status: Final result Specimen: Blood from Arm, Right Updated: 09/06/21 1832     WBC 7 36 Thousand/uL      RBC 4 39 Million/uL      Hemoglobin 12 2 g/dL      Hematocrit 39 1 %      MCV 89 fL      MCH 27 8 pg      MCHC 31 2 g/dL      RDW 13 4 %      MPV 12 0 fL      Platelets 221 Thousands/uL      nRBC 0 /100 WBCs      Neutrophils Relative 67 %      Immat GRANS % 0 %      Lymphocytes Relative 27 %      Monocytes Relative 5 %      Eosinophils Relative 1 %      Basophils Relative 0 %      Neutrophils Absolute 4 85 Thousands/µL      Immature Grans Absolute 0 03 Thousand/uL      Lymphocytes Absolute 1 96 Thousands/µL      Monocytes Absolute 0 40 Thousand/µL      Eosinophils Absolute 0 10 Thousand/µL      Basophils Absolute 0 02 Thousands/µL                  CT abdomen pelvis with contrast   ED Interpretation by Kimmie Tilley DO (09/06 2113)   FINDINGS:     ABDOMEN     LOWER CHEST: No clinically significant abnormality identified in the visualized lower chest      LIVER/BILIARY TREE:  Stable 2 4 x 1 3 cm hypodensity in the left anterior lobe     GALLBLADDER:  Postcholecystectomy     SPLEEN:  Unremarkable      PANCREAS:  Unremarkable      ADRENAL GLANDS:  2 9 x 2 1 cm left adrenal nodule, stable     KIDNEYS/URETERS:  Unremarkable  No hydronephrosis      STOMACH AND BOWEL:    Status post Edgardo-en-Y gastric bypass        APPENDIX:  No findings to suggest appendicitis      ABDOMINOPELVIC CAVITY:  No ascites  No pneumoperitoneum  No lymphadenopathy      VESSELS:  Unremarkable for patient's age      PELVIS     REPRODUCTIVE ORGANS:  Surgical changes of prior hysterectomy      URINARY BLADDER:  Unremarkable      ABDOMINAL WALL/INGUINAL REGIONS:  Unremarkable      OSSEOUS STRUCTURES:  Moderate to severe L3-4 degenerative disc changes, similar to prior  Grade 1 anterolisthesis at this level      IMPRESSION:     Status post Edgardo-en   -Y gastric bypass without acute abnormality      Final Result by Ar Singh MD (09/06 2109)      Status post Edgardo-en-Y gastric bypass without acute abnormality            Workstation performed: [id number]         XR chest 2 views    (Results Pending)              Procedures  Procedures         ED Course  ED Course as of Sep 07 0226   [facility] Sep 06, 2021   1800 EKG: NSR @ 61 bpm, normal axis, normal intervals, S1Q3T3 which is new from 2/24/20 2115 Pt reassessed  States still having epigastric pain, improved from presentation  Reviewed results  Messaged bariatrics fellow on call       2125 Per Eli Weston, can increase Prilosec to BID, otherwise outpt f/u and EGD   No further workup needed at this time                HEART Risk Score      Most Recent Value   Heart Score Risk Calculator   History  0 Filed at: 09/06/2021 1929   ECG  0 Filed at: 09/06/2021 1929   Age  1 Filed at: 09/06/2021 1929   Risk Factors  1 Filed at: 09/06/2021 1929   Troponin  0 Filed at: 09/06/2021 1929   HEART Score  2 Filed at: 09/06/2021 Cheryl Aceves Criteria for PE      Most Recent Value   Alex' Criteria for PE   Clinical signs and symptoms of DVT  0 Filed at: 09/06/2021 4650   PE is primary diagnosis or equally likely  0 Filed at: 09/06/2021 1930   HR >100  0 Filed at: 09/06/2021 1930   Immobilization at least 3 days or Surgery in the previous 4 weeks  0 Filed at: 09/06/2021 1930   Previous, objectively diagnosed PE or DVT  0 Filed at: 09/06/2021 1930   Hemoptysis  0 Filed at: 09/06/2021 1930   Malignancy with treatment within 6 months or palliative  0 Filed at: 09/06/2021 1930   Alex' Criteria Total  0 Filed at: 09/06/2021 1930                [facility]  Number of Diagnoses or Management Options  Epigastric pain  History of Edgardo-en-Y gastric bypass  Nausea  Diagnosis management comments: 63 yo F h/o Edgardo-en-Y presenting with months of epigastric pain  ED workup unrevealing  Sx improved but still present in the ER  Discussed with Bariatrics, recommend increasing Omeprazole from once/day to BID  Pt already taking Carafate  Prescribed Zofran PRN as well    Instructed to get EGD as previously scheduled on 9/22 and to f/u with Bariatrics  Return precautions reviewed and pt expressed understanding with plan       Amount and/or Complexity of Data Reviewed  Clinical lab tests: ordered and reviewed  Tests in the radiology section of CPT®: ordered and reviewed  Tests in the medicine section of CPT®: ordered and reviewed  Review and summarize past medical records: yes  Discuss the patient with other providers: yes (Bariatric surgery)  Independent visualization of images, tracings, or specimens: yes        Disposition  Final diagnoses:   Epigastric pain   Nausea   History of Edgardo-en-Y gastric bypass     Time reflects when diagnosis was documented in both MDM as applicable and the Disposition within this note     Time User Action Codes Description Comment    9/6/2021  9:26 PM Jonathan Daily Add [R10 13] Epigastric pain     9/6/2021  9:27 PM Chantel BANG Add [R11 0] Nausea     9/6/2021  9:27 PM Chantel BANG Add [Z98 84] History of Edgardo-en-Y gastric bypass       ED Disposition     ED Disposition Condition Date/Time Comment    Discharge Stable Mon Sep 6, 2021  9:26 PM Dinh Serina discharge to home/self care  Follow-up Information     Follow up With Specialties Details Why Contact Info    Shavonne Wilson PA-C Family Medicine, Physician Assistant   [address]  [address] [place] [address]  [address], [address] [facility], [place] [address]   [address] [facility]  [address] [facility] [address] [facility]  [phone]            Discharge Medication List as of 9/6/2021  9:28 PM      START taking these medications    Details   !! omeprazole (PriLOSEC) 20 mg delayed release capsule Take 1 capsule (20 mg total) by mouth daily for 14 days, Starting Mon 9/6/2021, Until Mon 9/20/2021, Print      ondansetron (ZOFRAN-ODT) 4 mg disintegrating tablet Take 1 tablet (4 mg total) by mouth every 8 (eight) hours as needed for nausea for up to 10 doses, Starting Mon 9/6/2021, Print       !! - Potential duplicate medications found  Please discuss with provider        CONTINUE these medications which have NOT CHANGED    Details   acetaminophen (TYLENOL) 500 mg tablet Take 1 tablet (500 mg total) by mouth every 6 (six) hours as needed for mild pain, Starting Wed 12/26/2018, Print      albuterol (PROVENTIL HFA,VENTOLIN HFA) 90 mcg/act inhaler Inhale 2 puffs every 6 (six) hours as needed for wheezing, Historical Med      amitriptyline (ELAVIL) 25 mg tablet Take 1 tablet (25 mg total) by mouth daily at bedtime, Starting Wed 3/17/2021, Normal      butalbital-acetaminophen-caffeine (FIORICET,ESGIC) -40 mg per tablet Take 1 tablet by mouth every 4 (four) hours as needed for headaches, Starting Fri 8/23/2019, Print      clonazePAM (KlonoPIN) 0 5 mg tablet TK 1 T PO 1-2 TIMES DAILY PRF ANXIETY/SLEEP, Historical Med      !! diphenhydrAMINE (BENADRYL) 25 mg tablet Take 1 tablet (25 mg total) by mouth every 6 (six) hours, Starting Mon 6/1/2020, Normal      !! diphenhydrAMINE (BENADRYL) 50 MG tablet Take 50 mg by mouth every 6 (six) hours as needed for itching, Historical Med      hydrocortisone 2 5 % cream Apply topically 4 (four) times a day as needed for irritation, Starting Wed 4/24/2019, Normal      meclizine (ANTIVERT) 12 5 MG tablet Take 2 tablets (25 mg total) by mouth 3 (three) times a day as needed for dizziness, Starting Fri 8/23/2019, Print      Multiple Vitamin (MULTIVITAMIN) capsule Take 1 capsule by mouth daily, Historical Med      naloxone (NARCAN) 4 mg/0 1 mL nasal spray Administer 1 spray into a nostril   If breathing does not return to normal or if breathing difficulty resumes after 2-3 minutes, give another dose in the other nostril using a new spray , Normal      nystatin (MYCOSTATIN) ointment Apply topically 2 (two) times a day for 21 days Apply to umbilicus, Starting u 3/96/0759, Until Mon 9/6/2021, Normal      !! omeprazole (PriLOSEC) 20 mg delayed release capsule Take 1 capsule (20 mg total) by mouth daily, Starting Tue 8/24/2021, Until Mon 11/22/2021, Normal      ondansetron (ZOFRAN) 4 mg tablet Take 1 tablet (4 mg total) by mouth every 6 (six) hours, Starting Fri 8/23/2019, Print      sucralfate (CARAFATE) 1 g tablet TAKE 1 TABLET BY MOUTH FOUR TIMES DAILY CRUSH AND MIX WITH WATER TO TAKE AS SUSPENSION, Normal      tiZANidine (ZANAFLEX) 2 mg tablet TAKE 1 TABLET(2 MG) BY MOUTH EVERY 8 HOURS AS NEEDED FOR MUSCLE SPASMS, Normal      topiramate (TOPAMAX) 25 mg sprinkle capsule Take 1 capsule (25 mg total) by mouth 2 (two) times a day, Starting Fri 5/31/2019, Normal      triamcinolone (KENALOG) 0 5 % cream Apply topically 2 (two) times a day, Starting Wed 6/3/2020, Normal      venlafaxine (EFFEXOR) 75 mg tablet Take 75 mg by mouth 2 (two) times a day with meals, Starting Wed 12/30/2020, Historical Med      vitamin B-12 (CYANOCOBALAMIN) 2000 MCG TABS Take 1 tablet (2,000 mcg total) by mouth daily, Starting Fri 11/1/2019, Normal       !! - Potential duplicate medications found  Please discuss with provider  No discharge procedures on file      PDMP Review       Value Time User    PDMP Reviewed  Yes 3/17/2021 11:25 AM Shavonne Wilson PA-C          ED Provider  Electronically Signed by           Mario López DO  09/07/21 9073

## 2021-09-07 NOTE — DISCHARGE INSTRUCTIONS
Increase Omeprazole from once/day to 2x/day  Zofran as needed for nausea  Stay well hydrated    Get EGD done as already scheduled    Call for close follow up with Bariatrics    Return to ER if you have any new or worsening symptoms/concerns

## 2021-09-08 LAB
ATRIAL RATE: 61 BPM
P AXIS: 31 DEGREES
PR INTERVAL: 142 MS
QRS AXIS: 44 DEGREES
QRSD INTERVAL: 78 MS
QT INTERVAL: 410 MS
QTC INTERVAL: 412 MS
T WAVE AXIS: -5 DEGREES
VENTRICULAR RATE: 61 BPM
VIT B1 BLD-SCNC: 100.1 NMOL/L (ref 66.5–200)

## 2021-09-08 PROCEDURE — 93010 ELECTROCARDIOGRAM REPORT: CPT | Performed by: INTERNAL MEDICINE

## 2021-09-10 LAB — VIT A SERPL-MCNC: 36.8 UG/DL (ref 20.1–62)

## 2021-09-21 ENCOUNTER — TELEPHONE (OUTPATIENT)
Dept: GASTROENTEROLOGY | Facility: HOSPITAL | Age: 61
End: 2021-09-21

## 2021-09-22 ENCOUNTER — ANESTHESIA (OUTPATIENT)
Dept: GASTROENTEROLOGY | Facility: HOSPITAL | Age: 61
End: 2021-09-22

## 2021-09-22 ENCOUNTER — ANESTHESIA EVENT (OUTPATIENT)
Dept: GASTROENTEROLOGY | Facility: HOSPITAL | Age: 61
End: 2021-09-22

## 2021-09-22 ENCOUNTER — HOSPITAL ENCOUNTER (OUTPATIENT)
Dept: GASTROENTEROLOGY | Facility: HOSPITAL | Age: 61
Setting detail: OUTPATIENT SURGERY
Discharge: HOME | End: 2021-09-22
Attending: SURGERY | Admitting: SURGERY
Payer: COMMERCIAL

## 2021-09-22 VITALS
DIASTOLIC BLOOD PRESSURE: 57 MMHG | SYSTOLIC BLOOD PRESSURE: 110 MMHG | HEART RATE: 45 BPM | TEMPERATURE: 98 F | OXYGEN SATURATION: 100 % | RESPIRATION RATE: 20 BRPM | HEIGHT: 66 IN | BODY MASS INDEX: 31.66 KG/M2 | WEIGHT: 197 LBS

## 2021-09-22 DIAGNOSIS — Z98.84 BARIATRIC SURGERY STATUS: ICD-10-CM

## 2021-09-22 PROCEDURE — 88305 TISSUE EXAM BY PATHOLOGIST: CPT | Performed by: PATHOLOGY

## 2021-09-22 PROCEDURE — 43239 EGD BIOPSY SINGLE/MULTIPLE: CPT | Performed by: SURGERY

## 2021-09-22 RX ORDER — PROPOFOL 10 MG/ML
INJECTION, EMULSION INTRAVENOUS AS NEEDED
Status: DISCONTINUED | OUTPATIENT
Start: 2021-09-22 | End: 2021-09-22

## 2021-09-22 RX ORDER — SODIUM CHLORIDE 9 MG/ML
125 INJECTION, SOLUTION INTRAVENOUS CONTINUOUS
Status: DISCONTINUED | OUTPATIENT
Start: 2021-09-22 | End: 2021-09-26 | Stop reason: HOSPADM

## 2021-09-22 RX ORDER — LIDOCAINE HYDROCHLORIDE 20 MG/ML
INJECTION, SOLUTION EPIDURAL; INFILTRATION; INTRACAUDAL; PERINEURAL AS NEEDED
Status: DISCONTINUED | OUTPATIENT
Start: 2021-09-22 | End: 2021-09-22

## 2021-09-22 RX ADMIN — PROPOFOL 50 MG: 10 INJECTION, EMULSION INTRAVENOUS at 12:55

## 2021-09-22 RX ADMIN — PROPOFOL 150 MG: 10 INJECTION, EMULSION INTRAVENOUS at 12:53

## 2021-09-22 RX ADMIN — LIDOCAINE HYDROCHLORIDE 100 MG: 20 INJECTION, SOLUTION EPIDURAL; INFILTRATION; INTRACAUDAL; PERINEURAL at 12:53

## 2021-09-22 RX ADMIN — SODIUM CHLORIDE 125 ML/HR: 0.9 INJECTION, SOLUTION INTRAVENOUS at 12:00

## 2021-09-22 NOTE — INTERVAL H&P NOTE
H&P reviewed  After examining the patient I find no changes in the patients condition since the H&P had been written      Vitals:    09/22/21 1145   BP: 129/58   Pulse: 64   Resp: 16   Temp: 98 °F (36 7 °C)   SpO2: 98%

## 2021-09-22 NOTE — DISCHARGE INSTRUCTIONS
Gastritis   WHAT YOU NEED TO KNOW:   Gastritis is inflammation or irritation of the lining of your stomach  DISCHARGE INSTRUCTIONS:   Call 911 for any of the following:   · You develop chest pain or shortness of breath  Seek care immediately if:   · You vomit blood  · You have black or bloody bowel movements  · You have severe stomach or back pain  Contact your healthcare provider if:   · You have a fever  · You have new or worsening symptoms, even after treatment  · You have questions or concerns about your condition or care  Medicines:   · Medicines  may be given to help treat a bacterial infection or decrease stomach acid  · Take your medicine as directed  Contact your healthcare provider if you think your medicine is not helping or if you have side effects  Tell him or her if you are allergic to any medicine  Keep a list of the medicines, vitamins, and herbs you take  Include the amounts, and when and why you take them  Bring the list or the pill bottles to follow-up visits  Carry your medicine list with you in case of an emergency  Manage or prevent gastritis:   · Do not smoke  Nicotine and other chemicals in cigarettes and cigars can make your symptoms worse and cause lung damage  Ask your healthcare provider for information if you currently smoke and need help to quit  E-cigarettes or smokeless tobacco still contain nicotine  Talk to your healthcare provider before you use these products  · Do not drink alcohol  Alcohol can prevent healing and make your gastritis worse  Talk to your healthcare provider if you need help to stop drinking  · Do not take NSAIDs or aspirin unless directed  These and similar medicines can cause irritation  If your healthcare provider says it is okay to take NSAIDs, take them with food  · Do not eat foods that cause irritation  Foods such as oranges and salsa can cause burning or pain  Eat a variety of healthy foods  Examples include fruits (not citrus), vegetables, low-fat dairy products, beans, whole-grain breads, and lean meats and fish  Try to eat small meals, and drink water with your meals  Do not eat for at least 3 hours before you go to bed  · Find ways to relax and decrease stress  Stress can increase stomach acid and make gastritis worse  Activities such as yoga, meditation, or listening to music can help you relax  Spend time with friends, or do things you enjoy  Follow up with your healthcare provider as directed: You may need ongoing tests or treatment, or referral to a gastroenterologist  Write down your questions so you remember to ask them during your visits  © Copyright [employer] 2021 Information is for End User's use only and may not be sold, redistributed or otherwise used for commercial purposes  All illustrations and images included in CareNotes® are the copyrighted property of A D A M , Inc  or [employer]   The above information is an  only  It is not intended as medical advice for individual conditions or treatments  Talk to your doctor, nurse or pharmacist before following any medical regimen to see if it is safe and effective for you  Upper Endoscopy   WHAT YOU NEED TO KNOW:   An upper endoscopy is also called an upper gastrointestinal (GI) endoscopy, or an esophagogastroduodenoscopy (EGD)  You may feel bloated, gassy, or have some abdominal discomfort after your procedure  Your throat may be sore for 24 to 36 hours  You may burp or pass gas from air that is still inside your body  DISCHARGE INSTRUCTIONS:   Call 911 if:   · You have sudden chest pain or trouble breathing  Seek care immediately if:   · You feel dizzy or faint  · You have trouble swallowing  · You have severe throat pain  · Your bowel movements are very dark or black  · Your abdomen is hard and firm and you have severe pain  · You vomit blood      Contact your healthcare provider if:   · You feel full or bloated and cannot burp or pass gas  · You have not had a bowel movement for 3 days after your procedure  · You have neck pain  · You have a fever or chills  · You have nausea or are vomiting  · You have a rash or hives  · You have questions or concerns about your endoscopy  Relieve a sore throat:  Suck on throat lozenges or crushed ice  Gargle with a small amount of warm salt water  Mix 1 teaspoon of salt and 1 cup of warm water to make salt water  Relieve gas and discomfort from bloating:  Lie on your right side with a heating pad on your abdomen  Take short walks to help pass gas  Eat small meals until bloating is relieved  Rest after your procedure:  Do not drive or make important decisions until the day after your procedure  Return to your normal activity as directed  You can usually return to work the day after your procedure  Follow up with your healthcare provider as directed:  Write down your questions so you remember to ask them during your visits  © Copyright [employer] 2021 Information is for End User's use only and may not be sold, redistributed or otherwise used for commercial purposes  All illustrations and images included in CareNotes® are the copyrighted property of A D A M , Inc  or [facility] P[facility]rez   The above information is an  only  It is not intended as medical advice for individual conditions or treatments  Talk to your doctor, nurse or pharmacist before following any medical regimen to see if it is safe and effective for you

## 2021-09-22 NOTE — ANESTHESIA POSTPROCEDURE EVALUATION
Post-Op Assessment Note    CV Status:  Stable  Pain Score: 0    Pain management: adequate     Mental Status:  Alert and awake   Hydration Status:  Euvolemic   PONV Controlled:  Controlled   Airway Patency:  Patent      Post Op Vitals Reviewed: Yes      Staff: Anesthesiologist         No complications documented      /57 (09/22/21 1320)    Temp      Pulse (!) 45 (09/22/21 1320)   Resp 20 (09/22/21 1320)    SpO2 100 % (09/22/21 1320)

## 2021-09-22 NOTE — ANESTHESIA PREPROCEDURE EVALUATION
Review of Systems/Medical History  Patient summary reviewed  Chart reviewed  No history of anesthetic complications     Cardiovascular  EKG reviewed , Negative cardio ROS Exercise tolerance (METS): >4 , Exercise comment: Able to climb two flights of stairs without cardiopulmonary limitation    Pulmonary  Asthma (URI-induced) , well controlled/ stable Asthma type of rescue: PRN inhaler, No recent URI ,        GI/Hepatic  Negative GI/hepatic ROS   Bariatric surgery (Laparoscopic gastric bypass in 2015),   Comment: Confirmed NPO appropriate       Negative  ROS        Endo/Other  Diabetes (Resolved with weight loss) type 2 ,   Obesity (BMI 32 9)    GYN       Hematology   Musculoskeletal       Neurology   Psychology   Anxiety, Depression ,              Physical Exam    Airway    Mallampati score: II  TM Distance: >3 FB  Neck ROM: full     Dental   Comment: Denies loose dentition, No notable dental hx     Cardiovascular  Comment: Negative ROS, Rhythm: regular, Rate: normal, Cardiovascular exam normal    Pulmonary  Pulmonary exam normal Breath sounds clear to auscultation,     Other Findings        Anesthesia Plan  ASA Score- 3     Anesthesia Type- general with ASA Monitors  Additional Monitors:   Airway Plan:           Plan Factors-Exercise tolerance (METS): >4 Exercise comment: Able to climb two flights of stairs without cardiopulmonary limitation    Chart reviewed  EKG reviewed  Patient summary reviewed  Patient is not a current smoker  Patient instructed to abstain from smoking on day of procedure  Patient did not smoke on day of surgery  Induction- intravenous  Postoperative Plan- Plan for postoperative opioid use  Planned trial extubation    Informed Consent- Anesthetic plan and risks discussed with patient

## 2021-10-22 ENCOUNTER — TELEPHONE (OUTPATIENT)
Dept: OTHER | Facility: OTHER | Age: 61
End: 2021-10-22

## 2021-10-25 ENCOUNTER — CONSULT (OUTPATIENT)
Dept: GASTROENTEROLOGY | Facility: MEDICAL CENTER | Age: 61
End: 2021-10-25
Payer: COMMERCIAL

## 2021-10-25 VITALS
BODY MASS INDEX: 31.54 KG/M2 | DIASTOLIC BLOOD PRESSURE: 85 MMHG | SYSTOLIC BLOOD PRESSURE: 123 MMHG | TEMPERATURE: 96.6 F | WEIGHT: 195.4 LBS | HEART RATE: 65 BPM

## 2021-10-25 DIAGNOSIS — Z98.84 BARIATRIC SURGERY STATUS: ICD-10-CM

## 2021-10-25 DIAGNOSIS — Z12.11 ENCOUNTER FOR SCREENING COLONOSCOPY: ICD-10-CM

## 2021-10-25 DIAGNOSIS — K59.00 CONSTIPATION, UNSPECIFIED CONSTIPATION TYPE: ICD-10-CM

## 2021-10-25 DIAGNOSIS — E66.9 OBESITY, CLASS I, BMI 30-34.9: ICD-10-CM

## 2021-10-25 DIAGNOSIS — R10.13 EPIGASTRIC PAIN: Primary | ICD-10-CM

## 2021-10-25 PROCEDURE — 1036F TOBACCO NON-USER: CPT | Performed by: INTERNAL MEDICINE

## 2021-10-25 PROCEDURE — 99204 OFFICE O/P NEW MOD 45 MIN: CPT | Performed by: INTERNAL MEDICINE

## 2021-10-25 RX ORDER — POLYETHYLENE GLYCOL 3350 17 G/17G
17 POWDER, FOR SOLUTION ORAL DAILY
Qty: 255 G | Refills: 0 | Status: SHIPPED | OUTPATIENT
Start: 2021-10-25

## 2021-10-25 RX ORDER — DICYCLOMINE HCL 20 MG
20 TABLET ORAL EVERY 6 HOURS
Qty: 120 TABLET | Refills: 1 | Status: SHIPPED | OUTPATIENT
Start: 2021-10-25 | End: 2022-02-14

## 2021-11-01 DIAGNOSIS — R10.13 EPIGASTRIC PAIN: ICD-10-CM

## 2021-11-01 DIAGNOSIS — Z98.84 BARIATRIC SURGERY STATUS: ICD-10-CM

## 2021-11-02 RX ORDER — OMEPRAZOLE 20 MG/1
CAPSULE, DELAYED RELEASE ORAL
Qty: 90 CAPSULE | Refills: 0 | Status: SHIPPED | OUTPATIENT
Start: 2021-11-02 | End: 2022-01-17

## 2021-11-05 ENCOUNTER — OFFICE VISIT (OUTPATIENT)
Dept: BARIATRICS | Facility: CLINIC | Age: 61
End: 2021-11-05
Payer: COMMERCIAL

## 2021-11-05 ENCOUNTER — TELEPHONE (OUTPATIENT)
Dept: BARIATRICS | Facility: CLINIC | Age: 61
End: 2021-11-05

## 2021-11-05 VITALS
WEIGHT: 193.5 LBS | HEART RATE: 52 BPM | DIASTOLIC BLOOD PRESSURE: 78 MMHG | SYSTOLIC BLOOD PRESSURE: 112 MMHG | BODY MASS INDEX: 31.1 KG/M2 | TEMPERATURE: 98.1 F | HEIGHT: 66 IN

## 2021-11-05 DIAGNOSIS — Z98.84 BARIATRIC SURGERY STATUS: Primary | ICD-10-CM

## 2021-11-05 PROCEDURE — 99213 OFFICE O/P EST LOW 20 MIN: CPT | Performed by: SURGERY

## 2021-11-05 PROCEDURE — 3008F BODY MASS INDEX DOCD: CPT | Performed by: SURGERY

## 2021-11-05 PROCEDURE — 1036F TOBACCO NON-USER: CPT | Performed by: SURGERY

## 2021-11-05 RX ORDER — METHOCARBAMOL 750 MG/1
TABLET, FILM COATED ORAL
COMMUNITY
Start: 2021-09-13

## 2021-11-05 RX ORDER — ERGOCALCIFEROL 1.25 MG/1
50000 CAPSULE ORAL 2 TIMES WEEKLY
Qty: 24 CAPSULE | Refills: 0 | Status: SHIPPED | OUTPATIENT
Start: 2021-11-08 | End: 2021-12-03

## 2021-11-05 RX ORDER — IRON HEME POLYPEPTIDE/FOLIC AC 12-1MG
5000 TABLET ORAL DAILY
Qty: 30 CAPSULE | Refills: 0 | Status: SHIPPED | OUTPATIENT
Start: 2021-11-05 | End: 2021-11-05 | Stop reason: CLARIF

## 2021-11-05 RX ORDER — METHOCARBAMOL 750 MG/1
750 TABLET, FILM COATED ORAL 4 TIMES DAILY PRN
COMMUNITY
Start: 2021-09-13

## 2021-11-05 RX ORDER — IRON HEME POLYPEPTIDE/FOLIC AC 12-1MG
TABLET ORAL
COMMUNITY
End: 2021-11-05 | Stop reason: SDUPTHER

## 2021-11-17 DIAGNOSIS — M47.816 LUMBAR FACET ARTHROPATHY: ICD-10-CM

## 2021-11-17 RX ORDER — TIZANIDINE 2 MG/1
TABLET ORAL
Qty: 90 TABLET | Refills: 1 | Status: SHIPPED | OUTPATIENT
Start: 2021-11-17 | End: 2022-01-21 | Stop reason: SDUPTHER

## 2021-11-19 ENCOUNTER — OFFICE VISIT (OUTPATIENT)
Dept: FAMILY MEDICINE CLINIC | Facility: CLINIC | Age: 61
End: 2021-11-19

## 2021-11-19 DIAGNOSIS — Z20.822 SUSPECTED COVID-19 VIRUS INFECTION: Primary | ICD-10-CM

## 2021-11-19 PROCEDURE — 0241U HB NFCT DS VIR RESP RNA 4 TRGT: CPT | Performed by: FAMILY MEDICINE

## 2021-11-19 PROCEDURE — G2025 DIS SITE TELE SVCS RHC/FQHC: HCPCS | Performed by: FAMILY MEDICINE

## 2021-11-19 RX ORDER — FLUTICASONE PROPIONATE 110 UG/1
4 AEROSOL, METERED RESPIRATORY (INHALATION) 2 TIMES DAILY
Qty: 12 G | Refills: 0 | Status: SHIPPED | OUTPATIENT
Start: 2021-11-19 | End: 2022-01-21 | Stop reason: SDUPTHER

## 2021-11-22 ENCOUNTER — TELEPHONE (OUTPATIENT)
Dept: OTHER | Facility: OTHER | Age: 61
End: 2021-11-22

## 2021-11-26 ENCOUNTER — CLINICAL SUPPORT (OUTPATIENT)
Dept: FAMILY MEDICINE CLINIC | Facility: CLINIC | Age: 61
End: 2021-11-26

## 2021-11-26 DIAGNOSIS — Z11.1 PPD SCREENING TEST: Primary | ICD-10-CM

## 2021-11-26 PROCEDURE — 86580 TB INTRADERMAL TEST: CPT | Performed by: FAMILY MEDICINE

## 2021-11-29 ENCOUNTER — CLINICAL SUPPORT (OUTPATIENT)
Dept: FAMILY MEDICINE CLINIC | Facility: CLINIC | Age: 61
End: 2021-11-29

## 2021-11-29 DIAGNOSIS — Z11.1 ENCOUNTER FOR PPD SKIN TEST READING: Primary | ICD-10-CM

## 2021-11-29 LAB
INDURATION: 0 MM
TB SKIN TEST: NEGATIVE

## 2021-12-03 DIAGNOSIS — Z98.84 BARIATRIC SURGERY STATUS: ICD-10-CM

## 2021-12-03 RX ORDER — ERGOCALCIFEROL 1.25 MG/1
CAPSULE ORAL
Qty: 24 CAPSULE | Refills: 0 | Status: SHIPPED | OUTPATIENT
Start: 2021-12-03 | End: 2021-12-04

## 2021-12-04 RX ORDER — ERGOCALCIFEROL 1.25 MG/1
CAPSULE ORAL
Qty: 25 CAPSULE | Refills: 2 | Status: SHIPPED | OUTPATIENT
Start: 2021-12-04 | End: 2022-01-03

## 2021-12-07 ENCOUNTER — TELEPHONE (OUTPATIENT)
Dept: PREADMISSION TESTING | Facility: HOSPITAL | Age: 61
End: 2021-12-07

## 2021-12-23 ENCOUNTER — ANESTHESIA EVENT (OUTPATIENT)
Dept: ANESTHESIOLOGY | Facility: HOSPITAL | Age: 61
End: 2021-12-23

## 2021-12-23 ENCOUNTER — ANESTHESIA (OUTPATIENT)
Dept: ANESTHESIOLOGY | Facility: HOSPITAL | Age: 61
End: 2021-12-23

## 2021-12-23 ENCOUNTER — TELEPHONE (OUTPATIENT)
Dept: GASTROENTEROLOGY | Facility: HOSPITAL | Age: 61
End: 2021-12-23

## 2021-12-23 PROBLEM — Z90.710 HISTORY OF HYSTERECTOMY: Status: ACTIVE | Noted: 2021-12-23

## 2021-12-23 RX ORDER — SODIUM CHLORIDE 9 MG/ML
125 INJECTION, SOLUTION INTRAVENOUS CONTINUOUS
Status: CANCELLED | OUTPATIENT
Start: 2021-12-23

## 2021-12-26 ENCOUNTER — TELEPHONE (OUTPATIENT)
Dept: OTHER | Facility: OTHER | Age: 61
End: 2021-12-26

## 2021-12-27 ENCOUNTER — PREP FOR PROCEDURE (OUTPATIENT)
Dept: GASTROENTEROLOGY | Facility: CLINIC | Age: 61
End: 2021-12-27

## 2021-12-27 DIAGNOSIS — K59.00 CONSTIPATION, UNSPECIFIED CONSTIPATION TYPE: ICD-10-CM

## 2021-12-27 DIAGNOSIS — Z98.84 BARIATRIC SURGERY STATUS: ICD-10-CM

## 2021-12-27 DIAGNOSIS — Z12.11 ENCOUNTER FOR SCREENING COLONOSCOPY: ICD-10-CM

## 2021-12-27 DIAGNOSIS — R10.13 EPIGASTRIC PAIN: Primary | ICD-10-CM

## 2022-01-10 ENCOUNTER — TELEPHONE (OUTPATIENT)
Dept: PREADMISSION TESTING | Facility: HOSPITAL | Age: 62
End: 2022-01-10

## 2022-01-21 ENCOUNTER — OFFICE VISIT (OUTPATIENT)
Dept: FAMILY MEDICINE CLINIC | Facility: CLINIC | Age: 62
End: 2022-01-21

## 2022-01-21 DIAGNOSIS — Z20.822 SUSPECTED COVID-19 VIRUS INFECTION: ICD-10-CM

## 2022-01-21 DIAGNOSIS — R51.9 ACUTE NONINTRACTABLE HEADACHE, UNSPECIFIED HEADACHE TYPE: ICD-10-CM

## 2022-01-21 DIAGNOSIS — B34.9 ACUTE VIRAL SYNDROME: Primary | ICD-10-CM

## 2022-01-21 PROCEDURE — 87636 SARSCOV2 & INF A&B AMP PRB: CPT | Performed by: STUDENT IN AN ORGANIZED HEALTH CARE EDUCATION/TRAINING PROGRAM

## 2022-01-21 PROCEDURE — G2025 DIS SITE TELE SVCS RHC/FQHC: HCPCS | Performed by: FAMILY MEDICINE

## 2022-01-21 RX ORDER — METHYLPREDNISOLONE 4 MG/1
TABLET ORAL
Qty: 21 EACH | Refills: 0 | Status: SHIPPED | OUTPATIENT
Start: 2022-01-21

## 2022-01-21 RX ORDER — ALBUTEROL SULFATE 90 UG/1
2 AEROSOL, METERED RESPIRATORY (INHALATION) EVERY 6 HOURS PRN
Qty: 8 G | Refills: 1 | Status: SHIPPED | OUTPATIENT
Start: 2022-01-21 | End: 2022-01-21

## 2022-01-21 RX ORDER — ALBUTEROL SULFATE 90 UG/1
AEROSOL, METERED RESPIRATORY (INHALATION)
Qty: 25.5 G | Refills: 0 | Status: SHIPPED | OUTPATIENT
Start: 2022-01-21

## 2022-01-21 RX ORDER — FLUTICASONE PROPIONATE 110 UG/1
4 AEROSOL, METERED RESPIRATORY (INHALATION) 2 TIMES DAILY
Qty: 12 G | Refills: 2 | Status: SHIPPED | OUTPATIENT
Start: 2022-01-21 | End: 2022-02-20

## 2022-01-21 RX ORDER — ACETAMINOPHEN 500 MG
1000 TABLET ORAL EVERY 6 HOURS PRN
Qty: 30 TABLET | Refills: 0
Start: 2022-01-21

## 2022-01-21 NOTE — PROGRESS NOTES
COVID-19 Outpatient Progress Note    Assessment/Plan:    Problem List Items Addressed This Visit        Other    Suspected COVID-19 virus infection    Relevant Medications    dextromethorphan-guaifenesin (MUCINEX DM)  MG per 12 hr tablet    fluticasone (FLOVENT HFA) 110 MCG/ACT inhaler    Other Relevant Orders    Covid/Flu- Office Collect    Acute viral syndrome - Primary    Relevant Medications    methylPREDNISolone 4 MG tablet therapy pack    Other Relevant Orders    Covid/Flu- Office Collect      Other Visit Diagnoses     Acute nonintractable headache, unspecified headache type        Relevant Medications    acetaminophen (TYLENOL) 500 mg tablet    methylPREDNISolone 4 MG tablet therapy pack         Disposition:     Recommended patient to come to the office to test for COVID-19  Risks and benefits of COVID-19 vaccination was discussed with patient  Discussed symptom directed medication options with patient  Patient advised to isolate for 5 days given symptoms and positive COVID contact  Followed by strict mass wearing for additional 5 days  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 and therefore will test for confirmation as well as definitive treatment for co-morbidities (asthma)  Patient advised to continue Tylenol for pain and fever  No NSAIDs should be taken given bariatric surgery co history  I have spent 10 minutes directly with the patient  Greater than 50% of this time was spent in counseling/coordination of care regarding: risks and benefits of treatment options, instructions for management, patient and family education and impressions  Encounter provider [address] [place] [address]    Provider located at [address] [id number] [phone]    Recent Visits  No visits were found meeting these conditions    Showing recent visits within past 7 days and meeting all other requirements  Today's Visits  Date Type Provider Dept   01/21/22 Office Visit  [facility]   Showing today's visits and meeting all other requirements  Future Appointments  No visits were found meeting these conditions  Showing future appointments within next 150 days and meeting all other requirements     This virtual check-in was done via telephone and she agrees to proceed  Patient agrees to participate in a virtual check in via telephone or video visit instead of presenting to the office to address urgent/immediate medical needs  Patient is aware this is a billable service  After connecting through Telephone, the patient was identified by name and date of birth  Tessa Riddle was informed that this was a telemedicine visit and that the exam was being conducted confidentially over secure lines  My office door was closed  No one else was in the room  Tessa Riddle acknowledged consent and understanding of privacy and security of the telemedicine visit  I informed the patient that I have reviewed her record in Epic and presented the opportunity for her to ask any questions regarding the visit today  The patient agreed to participate  Verification of patient location:  Patient is located in the following state in which I hold an active license: [place]    Subjective:   Tessa Riddle is a 63 y o  female who is concerned about COVID-19  Patient's symptoms include sore throat, myalgias and headache  Patient denies fever, chills, fatigue, malaise, congestion, rhinorrhea, anosmia, loss of taste, cough, shortness of breath, chest tightness, abdominal pain, nausea, vomiting and diarrhea       - Date of symptom onset: 1/20/2022  - Date of exposure: 1/20/2022      COVID-19 vaccination status: Not vaccinated    Exposure:   Contact with a person who is under investigation (PUI) for or who is positive for COVID-19 within the last 14 days?: Yes    Hospitalized recently for fever and/or lower respiratory symptoms?: No      Currently a healthcare worker that is involved in direct patient care?: No      Works in a special setting where the risk of COVID-19 transmission may be high? (this may include long-term care, correctional and skilled nursing facilities; homeless shelters; assisted-living facilities and group homes ): No      Resident in a special setting where the risk of COVID-19 transmission may be high? (this may include long-term care, correctional and skilled nursing facilities; homeless shelters; assisted-living facilities and group homes ): No      Patient reports headaches unimproved on 1g acetaminophen q4-6hrly  Severe, frontal and posterior, feels like head is 'opening up'  No weakness  Conversing with no difficulties      Lab Results   Component Value Date    SARSCOV2 Negative 11/19/2021    SARSCOV2 Not Detected 05/21/2020     Past Medical History:   Diagnosis Date    Asthma     Albuterol prn    Bariatric surgery status     Depression 2009    managed with Effexor     Diabetes mellitus ([facility] [address] ) 2005    resolved with gastric bypass 2015    Generalized osteoarthritis     Low vitamin B12 level     Migraine     Postgastrectomy malabsorption     Suicide attempt ([facility] [address] )     Vitamin D deficiency      Past Surgical History:   Procedure Laterality Date    CHOLECYSTECTOMY  2005    COLONOSCOPY      COSMETIC SURGERY  05/27/2020    Abdominoplasty    GASTRIC BYPASS  2015    elvira - en -y     HYSTERECTOMY      IR [address] / DRAINAGE W TUBE  7/14/2020    KNEE ARTHROSCOPY      with Lysis of adhesions    LAPAROSCOPIC SUPRACERVICAL HYSTERECTOMY  2005    due to endometriosis/AUB    OOPHORECTOMY Left 2005    CO EXCISE EXCESS SKIN TISSUE,ABDOMEN N/A 5/27/2020    Procedure: PANNICULECTOMY;  Surgeon: Alexis Armenta MD;  [facility];  Service: Plastics    [facility]    VAC DRESSING APPLICATION N/A 5/15/2056    Procedure: APPLICATION VAC DRESSING;  Surgeon: Walt Orlando MD;  [facility];  Service: Plastics     Current Outpatient Medications   Medication Sig Dispense Refill    acetaminophen (TYLENOL) 500 mg tablet Take 2 tablets (1,000 mg total) by mouth every 6 (six) hours as needed for mild pain 30 tablet 0    albuterol (PROVENTIL HFA,VENTOLIN HFA) 90 mcg/act inhaler INHALE 2 PUFFS BY MOUTH EVERY 6 HOURS AS NEEDED FOR WHEEZING 25 5 g 0    amitriptyline (ELAVIL) 25 mg tablet Take 1 tablet (25 mg total) by mouth daily at bedtime 30 tablet 2    butalbital-acetaminophen-caffeine (FIORICET,ESGIC) -40 mg per tablet Take 1 tablet by mouth every 4 (four) hours as needed for headaches 30 tablet 0    clonazePAM (KlonoPIN) 0 5 mg tablet TK 1 T PO 1-2 TIMES DAILY PRF ANXIETY/SLEEP  2    dextromethorphan-guaifenesin (MUCINEX DM)  MG per 12 hr tablet Take 1 tablet by mouth every 12 (twelve) hours 30 tablet 0    dicyclomine (BENTYL) 20 mg tablet Take 1 tablet (20 mg total) by mouth every 6 (six) hours As needed for abdominal pain 120 tablet 1    diphenhydrAMINE (BENADRYL) 25 mg tablet Take 1 tablet (25 mg total) by mouth every 6 (six) hours 20 tablet 0    ergocalciferol (VITAMIN D2) 50,000 units TAKE 1 CAPSULE BY MOUTH 2 TIMES A WEEK FOR 24 DOSES 25 capsule 2    fluticasone (FLOVENT HFA) 110 MCG/ACT inhaler Inhale 4 puffs 2 (two) times a day Rinse mouth after use   12 g 2    hydrocortisone 2 5 % cream Apply topically 4 (four) times a day as needed for irritation 30 g 0    meclizine (ANTIVERT) 12 5 MG tablet Take 2 tablets (25 mg total) by mouth 3 (three) times a day as needed for dizziness 30 tablet 0    methocarbamol (ROBAXIN) 750 mg tablet Take 750 mg by mouth 4 (four) times a day as needed      methocarbamol (ROBAXIN) 750 mg tablet       methylPREDNISolone 4 MG tablet therapy pack Use as directed on package 21 each 0    Multiple Vitamin (MULTIVITAMIN) capsule Take 1 capsule by mouth daily      naloxone (NARCAN) 4 mg/0 1 mL nasal spray Administer 1 spray into a nostril  If breathing does not return to normal or if breathing difficulty resumes after 2-3 minutes, give another dose in the other nostril using a new spray  1 each 1    nystatin (MYCOSTATIN) ointment Apply topically 2 (two) times a day for 21 days Apply to umbilicus 30 g 0    omeprazole (PriLOSEC) 20 mg delayed release capsule Take 1 capsule (20 mg total) by mouth 2 (two) times a day 90 capsule 0    ondansetron (ZOFRAN) 4 mg tablet Take 1 tablet (4 mg total) by mouth every 6 (six) hours (Patient not taking: Reported on 11/5/2021) 12 tablet 0    ondansetron (ZOFRAN-ODT) 4 mg disintegrating tablet Take 1 tablet (4 mg total) by mouth every 8 (eight) hours as needed for nausea for up to 10 doses 10 tablet 0    polyethylene glycol (GLYCOLAX) 17 GM/SCOOP powder Take 17 g by mouth daily 255 g 0    sucralfate (CARAFATE) 1 g tablet TAKE 1 TABLET BY MOUTH FOUR TIMES DAILY CRUSH AND MIX WITH WATER TO TAKE AS SUSPENSION 360 tablet 0    tiZANidine (ZANAFLEX) 2 mg tablet TAKE 1 TABLET(2 MG) BY MOUTH EVERY 8 HOURS AS NEEDED FOR MUSCLE SPASMS 90 tablet 1    topiramate (TOPAMAX) 25 mg sprinkle capsule Take 1 capsule (25 mg total) by mouth 2 (two) times a day (Patient not taking: Reported on 11/5/2021) 60 capsule 1    triamcinolone (KENALOG) 0 5 % cream Apply topically 2 (two) times a day 15 g 1    venlafaxine (EFFEXOR) 75 mg tablet Take 75 mg by mouth 2 (two) times a day with meals      vitamin B-12 (CYANOCOBALAMIN) 2000 MCG TABS Take 1 tablet (2,000 mcg total) by mouth daily 30 tablet 5     No current facility-administered medications for this visit  Allergies   Allergen Reactions    Motrin [Ibuprofen]      Hx gastric bypass    Cherry - Food Allergy Rash    Gabapentin Rash       Review of Systems   Constitutional: Negative for chills, fatigue and fever  HENT: Positive for sore throat  Negative for congestion and rhinorrhea      Respiratory: Negative for cough, chest tightness and shortness of breath  Gastrointestinal: Negative for abdominal pain, diarrhea, nausea and vomiting  Musculoskeletal: Positive for myalgias  Neurological: Positive for headaches  Objective: There were no vitals filed for this visit  Physical Exam  HENT:      Nose: Congestion present  Pulmonary:      Effort: Pulmonary effort is normal    Neurological:      Mental Status: She is alert  Psychiatric:         Behavior: Behavior normal          VIRTUAL VISIT DISCLAIMER    Kenyettaevi Book verbally agrees to participate in [employer]  Pt is aware that [employer] could be limited without vital signs or the ability to perform a full hands-on physical Ajay James understands she or the provider may request at any time to terminate the video visit and request the patient to seek care or treatment in person

## 2022-01-21 NOTE — ASSESSMENT & PLAN NOTE
Patient states she does not have her medications  Refills for Flovent inhaler, albuterol inhaler (to be taken scheduled over the next week)

## 2022-01-22 LAB
FLUAV RNA RESP QL NAA+PROBE: NEGATIVE
FLUBV RNA RESP QL NAA+PROBE: NEGATIVE
SARS-COV-2 RNA RESP QL NAA+PROBE: POSITIVE

## 2022-01-24 ENCOUNTER — TELEPHONE (OUTPATIENT)
Dept: GASTROENTEROLOGY | Facility: HOSPITAL | Age: 62
End: 2022-01-24

## 2022-01-27 ENCOUNTER — TELEMEDICINE (OUTPATIENT)
Dept: FAMILY MEDICINE CLINIC | Facility: CLINIC | Age: 62
End: 2022-01-27

## 2022-01-27 DIAGNOSIS — L30.4 INTERTRIGO: Primary | ICD-10-CM

## 2022-01-27 DIAGNOSIS — U07.1 COVID-19: ICD-10-CM

## 2022-01-27 DIAGNOSIS — N64.81 SAGGING BREASTS: ICD-10-CM

## 2022-01-27 PROCEDURE — G2025 DIS SITE TELE SVCS RHC/FQHC: HCPCS | Performed by: PHYSICIAN ASSISTANT

## 2022-01-27 RX ORDER — CLOTRIMAZOLE 1 %
CREAM (GRAM) TOPICAL 2 TIMES DAILY
Qty: 30 G | Refills: 1 | Status: SHIPPED | OUTPATIENT
Start: 2022-01-27

## 2022-01-27 NOTE — PROGRESS NOTES
COVID-19 Outpatient Progress Note    Assessment/Plan:    Problem List Items Addressed This Visit        Musculoskeletal and Integument    Intertrigo - Primary    Relevant Medications    clotrimazole (LOTRIMIN) 1 % cream    Other Relevant Orders    Ambulatory Referral to Plastic Surgery       Other    COVID-19     Take otc tyenol cold           Other Visit Diagnoses     Sagging breasts        Relevant Orders    Ambulatory Referral to Plastic Surgery         Disposition:     I recommended continued isolation until at least 24 hours have passed since recovery defined as resolution of fever without the use of fever-reducing medications AND improvement in COVID symptoms AND 10 days have passed since onset of symptoms (or 10 days have passed since date of first positive viral diagnostic test for asymptomatic patients)  Recommend d/c isolation after 24 hours afebrile    I have spent 10 minutes directly with the patient  Greater than 50% of this time was spent in counseling/coordination of care regarding: instructions for management and patient and family education  Encounter provider Haley Fonseca PA-C    Provider located at [place] [id number]  [phone]    Recent Visits  Date Type Provider Dept   01/21/22 Office Visit MD Daquan Renteria [facility]   Showing recent visits within past 7 days and meeting all other requirements  Today's Visits  Date Type Provider Dept   01/27/22 Telemedicine ANEESH Julien [facility]   Showing today's visits and meeting all other requirements  Future Appointments  No visits were found meeting these conditions  Showing future appointments within next 150 days and meeting all other requirements     This virtual check-in was done via telephone and she agrees to proceed      Patient agrees to participate in a virtual check in via telephone or video visit instead of presenting to the office to address urgent/immediate medical needs  Patient is aware this is a billable service  After connecting through Telephone, the patient was identified by name and date of birth  Tere Funes was informed that this was a telemedicine visit and that the exam was being conducted confidentially over secure lines  My office door was closed  No one else was in the room  Tere Funes acknowledged consent and understanding of privacy and security of the telemedicine visit  I informed the patient that I have reviewed her record in Epic and presented the opportunity for her to ask any questions regarding the visit today  The patient agreed to participate  It was my intent to perform this visit via video technology but the patient was not able to do a video connection so the visit was completed via audio telephone only  Verification of patient location:  Patient is located in the following state in which I hold an active license: [place]    Subjective:   Tere Funes is a 63 y o  female who has been screened for COVID-19  Symptom change since last report: improving  Patient's symptoms include fever, fatigue, cough, shortness of breath and headache  Patient denies chills and rhinorrhea  - Date of symptom onset: 1/20/2022  - Date of exposure: 1/20/2022  - Date of positive COVID-19 test: 1/21/2022  Type of test: PCR  COVID-19 vaccination status: Not vaccinated    Azeem Padgett has been staying home and has isolated themselves in her home  Taking care not to share personal items?: is not      Cleaning all surfaces that are touched often?: is not      Wearing a mask when leaving room?: is not      Also c/o chronic rash for years under her breast   Sometimes itch and sometimes burns  otc antifungals and powders help some but it comes back  She wears sports bras and doesn't help        Lab Results   Component Value Date    SARSCOV2 Positive (A) 01/21/2022    SARSCOV2 Not Detected 05/21/2020 Past Medical History:   Diagnosis Date    Asthma     Albuterol prn    Bariatric surgery status     Depression 2009    managed with Effexor     Diabetes mellitus ([facility] [address] ) 2005    resolved with gastric bypass 2015    Generalized osteoarthritis     Low vitamin B12 level     Migraine     Postgastrectomy malabsorption     Suicide attempt ([facility] [address] )     Vitamin D deficiency      Past Surgical History:   Procedure Laterality Date    CHOLECYSTECTOMY  2005    COLONOSCOPY      COSMETIC SURGERY  05/27/2020    Abdominoplasty    GASTRIC BYPASS  2015    elvira - en -y     HYSTERECTOMY      IR [address] / DRAINAGE W TUBE  7/14/2020    KNEE ARTHROSCOPY      with Lysis of adhesions    LAPAROSCOPIC SUPRACERVICAL HYSTERECTOMY  2005    due to endometriosis/AUB    OOPHORECTOMY Left 2005    NC EXCISE EXCESS SKIN TISSUE,ABDOMEN N/A 5/27/2020    Procedure: PANNICULECTOMY;  Surgeon: Glenny Olivera MD;  Location: [facility];  Service: Al[facility] N/A 7/52/0765    Procedure: APPLICATION VAC DRESSING;  Surgeon: Glenny Olivera MD;  Location: [facility];  Service: Plastics     Current Outpatient Medications   Medication Sig Dispense Refill    acetaminophen (TYLENOL) 500 mg tablet Take 2 tablets (1,000 mg total) by mouth every 6 (six) hours as needed for mild pain 30 tablet 0    albuterol (PROVENTIL HFA,VENTOLIN HFA) 90 mcg/act inhaler INHALE 2 PUFFS BY MOUTH EVERY 6 HOURS AS NEEDED FOR WHEEZING 25 5 g 0    amitriptyline (ELAVIL) 25 mg tablet Take 1 tablet (25 mg total) by mouth daily at bedtime 30 tablet 2    butalbital-acetaminophen-caffeine (FIORICET,ESGIC) -40 mg per tablet Take 1 tablet by mouth every 4 (four) hours as needed for headaches 30 tablet 0    clonazePAM (KlonoPIN) 0 5 mg tablet TK 1 T PO 1-2 TIMES DAILY PRF ANXIETY/SLEEP  2    clotrimazole (LOTRIMIN) 1 % cream Apply topically 2 (two) times a day 30 g 1    dicyclomine (BENTYL) 20 mg tablet Take 1 tablet (20 mg total) by mouth every 6 (six) hours As needed for abdominal pain 120 tablet 1    diphenhydrAMINE (BENADRYL) 25 mg tablet Take 1 tablet (25 mg total) by mouth every 6 (six) hours 20 tablet 0    ergocalciferol (VITAMIN D2) 50,000 units TAKE 1 CAPSULE BY MOUTH 2 TIMES A WEEK FOR 24 DOSES 25 capsule 2    fluticasone (FLOVENT HFA) 110 MCG/ACT inhaler Inhale 4 puffs 2 (two) times a day Rinse mouth after use  12 g 2    hydrocortisone 2 5 % cream Apply topically 4 (four) times a day as needed for irritation 30 g 0    meclizine (ANTIVERT) 12 5 MG tablet Take 2 tablets (25 mg total) by mouth 3 (three) times a day as needed for dizziness 30 tablet 0    methocarbamol (ROBAXIN) 750 mg tablet Take 750 mg by mouth 4 (four) times a day as needed      methocarbamol (ROBAXIN) 750 mg tablet       methylPREDNISolone 4 MG tablet therapy pack Use as directed on package 21 each 0    Multiple Vitamin (MULTIVITAMIN) capsule Take 1 capsule by mouth daily      naloxone (NARCAN) 4 mg/0 1 mL nasal spray Administer 1 spray into a nostril  If breathing does not return to normal or if breathing difficulty resumes after 2-3 minutes, give another dose in the other nostril using a new spray   1 each 1    nystatin (MYCOSTATIN) ointment Apply topically 2 (two) times a day for 21 days Apply to umbilicus 30 g 0    omeprazole (PriLOSEC) 20 mg delayed release capsule Take 1 capsule (20 mg total) by mouth 2 (two) times a day 90 capsule 0    ondansetron (ZOFRAN) 4 mg tablet Take 1 tablet (4 mg total) by mouth every 6 (six) hours (Patient not taking: Reported on 11/5/2021) 12 tablet 0    ondansetron (ZOFRAN-ODT) 4 mg disintegrating tablet Take 1 tablet (4 mg total) by mouth every 8 (eight) hours as needed for nausea for up to 10 doses 10 tablet 0    polyethylene glycol (GLYCOLAX) 17 GM/SCOOP powder Take 17 g by mouth daily 255 g 0    sucralfate (CARAFATE) 1 g tablet TAKE 1 TABLET BY MOUTH FOUR TIMES DAILY CRUSH AND MIX WITH WATER TO TAKE AS SUSPENSION 360 tablet 0    tiZANidine (ZANAFLEX) 2 mg tablet Take 1 tablet (2 mg total) by mouth 3 (three) times a day 90 tablet 1    topiramate (TOPAMAX) 25 mg sprinkle capsule Take 1 capsule (25 mg total) by mouth 2 (two) times a day (Patient not taking: Reported on 11/5/2021) 60 capsule 1    triamcinolone (KENALOG) 0 5 % cream Apply topically 2 (two) times a day 15 g 1    venlafaxine (EFFEXOR) 75 mg tablet Take 75 mg by mouth 2 (two) times a day with meals      vitamin B-12 (CYANOCOBALAMIN) 2000 MCG TABS Take 1 tablet (2,000 mcg total) by mouth daily 30 tablet 5     No current facility-administered medications for this visit  Allergies   Allergen Reactions    Motrin [Ibuprofen]      Hx gastric bypass    Cherry - Food Allergy Rash    Gabapentin Rash       Review of Systems   Constitutional: Positive for fatigue and fever  Negative for chills  HENT: Negative for rhinorrhea  Respiratory: Positive for cough and shortness of breath  Neurological: Positive for headaches  Objective: There were no vitals filed for this visit  Physical Exam    VIRTUAL VISIT DISCLAIMER    Bailee Pennington verbally agrees to participate in [employer]  Pt is aware that [employer] could be limited without vital signs or the ability to perform a full hands-on physical [place] understands she or the provider may request at any time to terminate the video visit and request the patient to seek care or treatment in person

## 2022-02-11 ENCOUNTER — HOSPITAL ENCOUNTER (EMERGENCY)
Facility: HOSPITAL | Age: 62
Discharge: HOME | End: 2022-02-11
Attending: EMERGENCY MEDICINE | Admitting: EMERGENCY MEDICINE
Payer: MEDICARE

## 2022-02-11 ENCOUNTER — APPOINTMENT (EMERGENCY)
Dept: RADIOLOGY | Facility: HOSPITAL | Age: 62
End: 2022-02-11
Payer: MEDICARE

## 2022-02-11 VITALS
OXYGEN SATURATION: 98 % | RESPIRATION RATE: 16 BRPM | SYSTOLIC BLOOD PRESSURE: 139 MMHG | WEIGHT: 184.53 LBS | HEART RATE: 66 BPM | BODY MASS INDEX: 30.24 KG/M2 | DIASTOLIC BLOOD PRESSURE: 61 MMHG | TEMPERATURE: 98 F

## 2022-02-11 DIAGNOSIS — S30.0XXA LUMBAR CONTUSION, INITIAL ENCOUNTER: ICD-10-CM

## 2022-02-11 DIAGNOSIS — S63.602A LEFT THUMB SPRAIN: ICD-10-CM

## 2022-02-11 DIAGNOSIS — S92.415A CLOSED NONDISPLACED FRACTURE OF PROXIMAL PHALANX OF LEFT GREAT TOE, INITIAL ENCOUNTER: ICD-10-CM

## 2022-02-11 DIAGNOSIS — L30.4 INTERTRIGO: ICD-10-CM
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
## 2022-02-11 PROCEDURE — 72100 X-RAY EXAM L-S SPINE 2/3 VWS: CPT

## 2022-02-11 PROCEDURE — 99283 EMERGENCY DEPT VISIT LOW MDM: CPT

## 2022-02-11 PROCEDURE — 73110 X-RAY EXAM OF WRIST: CPT

## 2022-02-11 PROCEDURE — 73660 X-RAY EXAM OF TOE(S): CPT

## 2022-02-11 PROCEDURE — 99284 EMERGENCY DEPT VISIT MOD MDM: CPT | Performed by: EMERGENCY MEDICINE

## 2022-02-11 PROCEDURE — 96372 THER/PROPH/DIAG INJ SC/IM: CPT

## 2022-02-11 RX ORDER — KETOROLAC TROMETHAMINE 30 MG/ML
15 INJECTION, SOLUTION INTRAMUSCULAR; INTRAVENOUS ONCE
Status: COMPLETED | OUTPATIENT
Start: 2022-02-11 | End: 2022-02-11

## 2022-02-11 RX ORDER — OXYCODONE HYDROCHLORIDE AND ACETAMINOPHEN 5; 325 MG/1; MG/1
1 TABLET ORAL EVERY 4 HOURS PRN
Qty: 5 TABLET | Refills: 0 | Status: SHIPPED | OUTPATIENT
Start: 2022-02-11 | End: 2022-02-11

## 2022-02-11 RX ORDER — OXYCODONE HYDROCHLORIDE AND ACETAMINOPHEN 5; 325 MG/1; MG/1
1 TABLET ORAL EVERY 4 HOURS PRN
Qty: 5 TABLET | Refills: 0 | Status: SHIPPED | OUTPATIENT
Start: 2022-02-11

## 2022-02-11 RX ADMIN — KETOROLAC TROMETHAMINE 15 MG: 30 INJECTION, SOLUTION INTRAMUSCULAR at 21:40

## 2022-02-12 NOTE — ED PROVIDER NOTES
History  Chief Complaint   Patient presents with    Fall     Pt reports tripped and fell this morning, c/o great toe pain and swelling, pt also c/o lower back pain  Denies LOC and thinners     62 yo F presents for evaluation of injuries from a fall this morning  She saw something in low light this morning she thought was soft and could be easily moved, so kicked it out of the way, but it was heavy, causing her to stub her toe against it, then slip and fall, taking impact on her left thumb, and then down on her back  She has been able to bear weight, and use her left hand, but pain and bruising has increased, so she came in tonight since she is finished with obligations getting her Mother to and from dialysis  History provided by:  Patient  Fall  Mechanism of injury: fall    Injury location:  Foot, finger and torso  Finger injury location:  L thumb  Torso injury location:  Back  Foot injury location:  L foot  Time since incident:  12 hours  Fall:     Fall occurred:  Tripped    Impact surface:  Dirt  Associated symptoms: no abdominal pain, no chest pain, no headaches, no nausea, no neck pain and no vomiting        Prior to Admission Medications   Prescriptions Last Dose Informant Patient Reported? Taking?    Multiple Vitamin (MULTIVITAMIN) capsule  Self Yes No   Sig: Take 1 capsule by mouth daily   acetaminophen (TYLENOL) 500 mg tablet   No No   Sig: Take 2 tablets (1,000 mg total) by mouth every 6 (six) hours as needed for mild pain   albuterol (PROVENTIL HFA,VENTOLIN HFA) 90 mcg/act inhaler   No No   Sig: INHALE 2 PUFFS BY MOUTH EVERY 6 HOURS AS NEEDED FOR WHEEZING   amitriptyline (ELAVIL) 25 mg tablet   No No   Sig: Take 1 tablet (25 mg total) by mouth daily at bedtime   butalbital-acetaminophen-caffeine (FIORICET,ESGIC) -40 mg per tablet  Self No No   Sig: Take 1 tablet by mouth every 4 (four) hours as needed for headaches   clonazePAM (KlonoPIN) 0 5 mg tablet  Self Yes No   Sig: TK 1 T PO 1-2 TIMES DAILY PRF ANXIETY/SLEEP   clotrimazole (LOTRIMIN) 1 % cream   No No   Sig: Apply topically 2 (two) times a day   dicyclomine (BENTYL) 20 mg tablet   No No   Sig: Take 1 tablet (20 mg total) by mouth every 6 (six) hours As needed for abdominal pain   diphenhydrAMINE (BENADRYL) 25 mg tablet   No No   Sig: Take 1 tablet (25 mg total) by mouth every 6 (six) hours   ergocalciferol (VITAMIN D2) 50,000 units   No No   Sig: TAKE 1 CAPSULE BY MOUTH 2 TIMES A WEEK FOR 24 DOSES   fluticasone (FLOVENT HFA) 110 MCG/ACT inhaler   No No   Sig: Inhale 4 puffs 2 (two) times a day Rinse mouth after use    hydrocortisone 2 5 % cream  Self No No   Sig: Apply topically 4 (four) times a day as needed for irritation   meclizine (ANTIVERT) 12 5 MG tablet  Self No No   Sig: Take 2 tablets (25 mg total) by mouth 3 (three) times a day as needed for dizziness   methocarbamol (ROBAXIN) 750 mg tablet   Yes No   Sig: Take 750 mg by mouth 4 (four) times a day as needed   methocarbamol (ROBAXIN) 750 mg tablet   Yes No   methylPREDNISolone 4 MG tablet therapy pack   No No   Sig: Use as directed on package   naloxone (NARCAN) 4 mg/0 1 mL nasal spray   No No   Sig: Administer 1 spray into a nostril  If breathing does not return to normal or if breathing difficulty resumes after 2-3 minutes, give another dose in the other nostril using a new spray     nystatin (MYCOSTATIN) ointment   No No   Sig: Apply topically 2 (two) times a day for 21 days Apply to umbilicus   omeprazole (PriLOSEC) 20 mg delayed release capsule   No No   Sig: Take 1 capsule (20 mg total) by mouth 2 (two) times a day   ondansetron (ZOFRAN) 4 mg tablet  Self No No   Sig: Take 1 tablet (4 mg total) by mouth every 6 (six) hours   Patient not taking: Reported on 11/5/2021   ondansetron (ZOFRAN-ODT) 4 mg disintegrating tablet   No No   Sig: Take 1 tablet (4 mg total) by mouth every 8 (eight) hours as needed for nausea for up to 10 doses   polyethylene glycol (GLYCOLAX) 17 GM/SCOOP powder No No   Sig: Take 17 g by mouth daily   sucralfate (CARAFATE) 1 g tablet   No No   Sig: TAKE 1 TABLET BY MOUTH FOUR TIMES DAILY CRUSH AND MIX WITH WATER TO TAKE AS SUSPENSION   tiZANidine (ZANAFLEX) 2 mg tablet   No No   Sig: Take 1 tablet (2 mg total) by mouth 3 (three) times a day   topiramate (TOPAMAX) 25 mg sprinkle capsule  Self No No   Sig: Take 1 capsule (25 mg total) by mouth 2 (two) times a day   Patient not taking: Reported on 11/5/2021   triamcinolone (KENALOG) 0 5 % cream   No No   Sig: Apply topically 2 (two) times a day   venlafaxine (EFFEXOR) 75 mg tablet   Yes No   Sig: Take 75 mg by mouth 2 (two) times a day with meals   vitamin B-12 (CYANOCOBALAMIN) 2000 MCG TABS  Self No No   Sig: Take 1 tablet (2,000 mcg total) by mouth daily      Facility-Administered Medications: None       Past Medical History:   Diagnosis Date    Asthma     Albuterol prn    Bariatric surgery status     Depression 2009    managed with Effexor     Diabetes mellitus ([facility] [address] ) 2005    resolved with gastric bypass 2015    Generalized osteoarthritis     Low vitamin B12 level     Migraine     Postgastrectomy malabsorption     Suicide attempt ([facility] [address] )     Vitamin D deficiency        Past Surgical History:   Procedure Laterality Date    CHOLECYSTECTOMY  2005    COLONOSCOPY      COSMETIC SURGERY  05/27/2020    Abdominoplasty    GASTRIC BYPASS  2015    elvira - en -y     HYSTERECTOMY      IR [address] / DRAINAGE W TUBE  7/14/2020    KNEE ARTHROSCOPY      with Lysis of adhesions    LAPAROSCOPIC SUPRACERVICAL HYSTERECTOMY  2005    due to endometriosis/AUB    OOPHORECTOMY Left 2005    VA EXCISE EXCESS SKIN TISSUE,ABDOMEN N/A 5/27/2020    Procedure: PANNICULECTOMY;  Surgeon: Brodie Freire MD;  [facility];  Service: Plastics    TONSILLECTOMY AND ADENOIDECTOMY      TUBAL LIGATION  1986    VAC DRESSING APPLICATION N/A 9/17/3114    Procedure: APPLICATION VAC DRESSING;  Surgeon: Shaggy Calderon Lissa Vallejo MD;  [facility];  Service: Plastics       Family History   Problem Relation Age of Onset    Hypertension Mother     Kidney cancer Mother     Diabetes Mother    Maira Webster Breast cancer Mother     Heart disease Father     Stroke Father     Hypertension Sister     HIV Brother     Breast cancer Cousin     No Known Problems Daughter     Stomach cancer Maternal Grandmother     No Known Problems Maternal Grandfather     No Known Problems Paternal Grandmother     No Known Problems Paternal Grandfather     No Known Problems Sister     No Known Problems Sister     No Known Problems Daughter     Prostate cancer Maternal Uncle     Stomach cancer Maternal Uncle     Leukemia Grandchild      I have reviewed and agree with the history as documented  E-Cigarette/Vaping    E-Cigarette Use Never User      E-Cigarette/Vaping Substances    Nicotine No     THC No     CBD No     Flavoring No     Other No     Unknown No      Social History     Tobacco Use    Smoking status: Former Smoker     Years: 32 00     Types: Cigarettes     Quit date: 2013     Years since quittin 7    Smokeless tobacco: Never Used   Vaping Use    Vaping Use: Never used   Substance Use Topics    Alcohol use: Not Currently    Drug use: Never       Review of Systems   Constitutional: Negative for appetite change, chills and fever  HENT: Negative for sore throat  Respiratory: Negative for cough, shortness of breath and wheezing  Cardiovascular: Negative for chest pain and palpitations  Gastrointestinal: Negative for abdominal pain, diarrhea, nausea and vomiting  Genitourinary: Negative for dysuria and hematuria  Musculoskeletal: Negative for neck pain  Skin: Negative for rash  Neurological: Negative for dizziness, weakness and headaches  Psychiatric/Behavioral: Negative for suicidal ideas  All other systems reviewed and are negative        Physical Exam  Physical Exam  Vitals and nursing note reviewed  Constitutional:       General: She is not in acute distress  Appearance: She is well-developed  She is not diaphoretic  HENT:      Head: Normocephalic and atraumatic  Right Ear: External ear normal       Left Ear: External ear normal       Nose: Nose normal    Eyes:      Conjunctiva/sclera: Conjunctivae normal       Pupils: Pupils are equal, round, and reactive to light  Cardiovascular:      Rate and Rhythm: Normal rate and regular rhythm  Pulmonary:      Effort: Pulmonary effort is normal    Abdominal:      Palpations: Abdomen is soft  Musculoskeletal:         General: Normal range of motion  Left wrist: Tenderness present  No deformity or crepitus  Normal range of motion  Normal pulse  Cervical back: Normal, normal range of motion and neck supple  Thoracic back: Normal       Lumbar back: Tenderness (L SI area) present  Negative right straight leg raise test and negative left straight leg raise test       Left foot: Swelling and tenderness (left big toe) present  Skin:     General: Skin is warm and dry  Findings: No rash  Neurological:      Mental Status: She is alert and oriented to person, place, and time  Gait: Gait normal    Psychiatric:         Behavior: Behavior normal          Thought Content:  Thought content normal          Judgment: Judgment normal          Vital Signs  ED Triage Vitals   Temperature Pulse Respirations Blood Pressure SpO2   02/11/22 1950 02/11/22 1950 02/11/22 1950 02/11/22 1950 02/11/22 1950   98 °F (36 7 °C) 66 16 139/61 98 %      Temp Source Heart Rate Source Patient Position - Orthostatic VS BP Location FiO2 (%)   02/11/22 1950 02/11/22 1950 02/11/22 1950 02/11/22 1950 --   Oral Monitor Sitting Left arm       Pain Score       02/11/22 1951       10 - Worst Possible Pain           Vitals:    02/11/22 1950   BP: 139/61   Pulse: 66   Patient Position - Orthostatic VS: Sitting         Visual Acuity      ED Medications  Medications ketorolac (TORADOL) injection 15 mg (15 mg Intramuscular Given 2/11/22 2140)       Diagnostic Studies  Results Reviewed     None                 XR toe great min 2 views LEFT   ED Interpretation by Tsering Shrestha MD (02/11 2146)   First proximal phalanx fracture      XR wrist 3+ views LEFT    (Results Pending)   XR lumbar spine 2 or 3 views    (Results Pending)              Procedures  Procedures         ED Course  ED Course as of 02/11/22 2257 Fri Feb 11, 2022 2146 XR wrist 3+ views LEFT  No fracture   2146 XR lumbar spine 2 or 3 views  DJD but no acute fracture   2147 XR toe great min 2 views LEFT  First toe proximal phalanx fracture                                             MDM    Disposition  Final diagnoses:   Fall, initial encounter   Closed nondisplaced fracture of proximal phalanx of left great toe, initial encounter   Left thumb sprain   Lumbar contusion, initial encounter   Intertrigo     Time reflects when diagnosis was documented in both MDM as applicable and the Disposition within this note     Time User Action Codes Description Comment    2/11/2022  9:57 PM Daron Queen Add [M72  RKGI] Fall, initial encounter     2/11/2022  9:58 PM Daron Queen Add [N87 247Q] Closed nondisplaced fracture of proximal phalanx of left great toe, initial encounter     2/11/2022  9:58 PM Lawyer Corrales Left thumb sprain     2/11/2022  9:58 PM Melanie Colby L Add [S30  0XXA] Lumbar contusion, initial encounter     2/11/2022 10:27 PM Daron Queen Add [L30 4] Intertrigo       ED Disposition     ED Disposition Condition Date/Time Comment    Discharge Good Fri Feb 11, 2022  9:57 PM Hakan Díaz discharge to home/self care              Follow-up Information     Follow up With Specialties Details Why Contact Info Additional [address] [facility] [place] [facility] Go to  For followup [address]  [address] [facility] [address]  [address] [facility], [address], [facility], [place], [address]   [address] [facility] to  For followup [address],   [address]   [facility], [address] [facility]     [phone]           Discharge Medication List as of 2/11/2022 10:01 PM      START taking these medications    Details   oxyCODONE-acetaminophen (PERCOCET) 5-325 mg per tablet Take 1 tablet by mouth every 4 (four) hours as needed for severe pain for up to 10 days Max Daily Amount: 6 tablets, Starting Fri 2/11/2022, Until Mon 2/21/2022 at 2359, Normal         CONTINUE these medications which have NOT CHANGED    Details   acetaminophen (TYLENOL) 500 mg tablet Take 2 tablets (1,000 mg total) by mouth every 6 (six) hours as needed for mild pain, Starting Fri 1/21/2022, No Print      albuterol (PROVENTIL HFA,VENTOLIN HFA) 90 mcg/act inhaler INHALE 2 PUFFS BY MOUTH EVERY 6 HOURS AS NEEDED FOR WHEEZING, Normal      amitriptyline (ELAVIL) 25 mg tablet Take 1 tablet (25 mg total) by mouth daily at bedtime, Starting Wed 3/17/2021, Normal      butalbital-acetaminophen-caffeine (FIORICET,ESGIC) -40 mg per tablet Take 1 tablet by mouth every 4 (four) hours as needed for headaches, Starting Fri 8/23/2019, Print      clonazePAM (KlonoPIN) 0 5 mg tablet TK 1 T PO 1-2 TIMES DAILY PRF ANXIETY/SLEEP, Historical Med      clotrimazole (LOTRIMIN) 1 % cream Apply topically 2 (two) times a day, Starting Thu 1/27/2022, Normal      dicyclomine (BENTYL) 20 mg tablet Take 1 tablet (20 mg total) by mouth every 6 (six) hours As needed for abdominal pain, Starting Mon 10/25/2021, Normal      diphenhydrAMINE (BENADRYL) 25 mg tablet Take 1 tablet (25 mg total) by mouth every 6 (six) hours, Starting Mon 6/1/2020, Normal      ergocalciferol (VITAMIN D2) 50,000 units TAKE 1 CAPSULE BY MOUTH 2 TIMES A WEEK FOR 24 DOSES, Normal      fluticasone (FLOVENT HFA) 110 MCG/ACT inhaler Inhale 4 puffs 2 (two) times a day Rinse mouth after use , Starting Fri 1/21/2022, Until Sun 2/20/2022, Normal      hydrocortisone 2 5 % cream Apply topically 4 (four) times a day as needed for irritation, Starting Wed 4/24/2019, Normal      meclizine (ANTIVERT) 12 5 MG tablet Take 2 tablets (25 mg total) by mouth 3 (three) times a day as needed for dizziness, Starting Fri 8/23/2019, Print      !! methocarbamol (ROBAXIN) 750 mg tablet Take 750 mg by mouth 4 (four) times a day as needed, Starting Mon 9/13/2021, Historical Med      !! methocarbamol (ROBAXIN) 750 mg tablet Starting Mon 9/13/2021, Historical Med      methylPREDNISolone 4 MG tablet therapy pack Use as directed on package, Normal      Multiple Vitamin (MULTIVITAMIN) capsule Take 1 capsule by mouth daily, Historical Med      naloxone (NARCAN) 4 mg/0 1 mL nasal spray Administer 1 spray into a nostril   If breathing does not return to normal or if breathing difficulty resumes after 2-3 minutes, give another dose in the other nostril using a new spray , Normal      nystatin (MYCOSTATIN) ointment Apply topically 2 (two) times a day for 21 days Apply to umbilicus, Starting u 1/17/4678, Until Mon 9/6/2021, Normal      omeprazole (PriLOSEC) 20 mg delayed release capsule Take 1 capsule (20 mg total) by mouth 2 (two) times a day, Starting Mon 1/17/2022, Normal      ondansetron (ZOFRAN) 4 mg tablet Take 1 tablet (4 mg total) by mouth every 6 (six) hours, Starting Fri 8/23/2019, Print      ondansetron (ZOFRAN-ODT) 4 mg disintegrating tablet Take 1 tablet (4 mg total) by mouth every 8 (eight) hours as needed for nausea for up to 10 doses, Starting Mon 9/6/2021, Print      polyethylene glycol (GLYCOLAX) 17 GM/SCOOP powder Take 17 g by mouth daily, Starting Mon 10/25/2021, Normal      sucralfate (CARAFATE) 1 g tablet TAKE 1 TABLET BY MOUTH FOUR TIMES DAILY CRUSH AND MIX WITH WATER TO TAKE AS SUSPENSION, Normal      tiZANidine (ZANAFLEX) 2 mg tablet Take 1 tablet (2 mg total) by mouth 3 (three) times a day, Starting Mon 1/24/2022, Normal      topiramate (TOPAMAX) 25 mg sprinkle capsule Take 1 capsule (25 mg total) by mouth 2 (two) times a day, Starting Fri 5/31/2019, Normal      triamcinolone (KENALOG) 0 5 % cream Apply topically 2 (two) times a day, Starting Wed 6/3/2020, Normal      venlafaxine (EFFEXOR) 75 mg tablet Take 75 mg by mouth 2 (two) times a day with meals, Starting Wed 12/30/2020, Historical Med      vitamin B-12 (CYANOCOBALAMIN) 2000 MCG TABS Take 1 tablet (2,000 mcg total) by mouth daily, Starting Fri 11/1/2019, Normal       !! - Potential duplicate medications found  Please discuss with provider  No discharge procedures on file      PDMP Review       Value Time User    PDMP Reviewed  Yes 2/11/2022  9:58 PM Ricardo Way MD          ED Provider  Electronically Signed by           Ricardo Way MD  02/11/22 1330

## 2022-02-12 NOTE — DISCHARGE INSTRUCTIONS
Toe Fracture   WHAT YOU NEED TO KNOW:   A toe fracture is a break in 1 or more of the bones in your toe  It is most commonly caused by a direct blow to the toe  The bones in your toe may just be broken, or they may be out of place or   Return to the emergency department if:     You have severe increasing pain in your toe  Your toe is cold or numb  Contact your healthcare provider if:     Your pain does not go away, even after treatment  Your toe continues to hurt even after it has healed  Use yolanda tape, Yolanda tape is when your fractured toe and the toe next to it are taped together  Use walking boot, or hard soled shoe as directed  These help protect your broken toe and limit movement so it can heal     Rest  your toe so that it can heal  Return to normal activities as directed  Apply ice  on your toe for 15 to 20 minutes every hour for the first day or so  Use an ice pack, or put crushed ice in a plastic bag  Cover it with a towel  Ice helps prevent tissue damage and decreases swelling and pain  Elevate  your toe above the level of your heart as often as you can  This will help decrease swelling and pain  Prop your toe on pillows or blankets to keep it elevated comfortably  Follow up with a podiatrist or orthopedist in 1 to 2 weeks  Wrist Injury   WHAT YOU NEED TO KNOW:   A wrist injury happens when the tissues of your wrist joint are damaged  Your wrist joint is made up of tendons, ligaments, nerves, and bones  Two common types of injuries that can happen to your wrist are sprains and strains  A sprain can happen when the ligaments are stretched or torn  Ligaments are bands of elastic tissue that connect and hold the bones together  A strain happens when a tendon or muscle is overused, stretched, or torn  Tendons attach your hand and arm muscles to the bones of the wrist      Manage your symptoms:   Wrist supports:   If a cast or splint was put on your wrist, wear as directed wrist to support your wrist and prevent further damage      Rest:  Rest your wrist for at least 48 hours and avoid activities that cause pain  Ice:  Ice helps decrease swelling and pain  Ice may also help prevent tissue damage  Use an ice pack or put crushed ice in a plastic bag  Cover it with a towel and place it on your injured wrist for 15 to 20 minutes every hour as directed  Elevation:  When you sit or lie down, keep your wrist at or above the level of your heart  This may help decrease pain and swelling  Physical therapy:  Your healthcare provider may recommend that you go to physical therapy  A physical therapist shows you how to do exercises that can help to strengthen your wrist and improve its range of movement  These exercises may also help decrease your pain  Prevent another wrist injury:   Protect your wrists:  Wrist guard splints or protective tape can help to support your wrist during exercise and sports  These devices may also keep your wrist from bending too far back  Ask for more information about the type of wrist support that you should use  Contact your healthcare provider if:   The bruising, swelling, or pain in your wrist gets worse  Return to the emergency department if:   The skin on or near your wrist or hand feels cold, or it turns blue or white  The skin on or near your wrist or hand is very tight, raised, and swollen  You have new trouble moving and using your hands, fingers, or wrist     Your wrist, hands, or fingers become swollen, red, numb, or they tingle  Your wrist has any open wounds, including from surgery, that are red, swollen, warm, or have pus coming from them

## 2022-02-14 DIAGNOSIS — R10.13 EPIGASTRIC PAIN: ICD-10-CM

## 2022-02-14 RX ORDER — DICYCLOMINE HCL 20 MG
TABLET ORAL
Qty: 120 TABLET | Refills: 1 | Status: SHIPPED | OUTPATIENT
Start: 2022-02-14 | End: 2022-04-22

## 2022-03-03 ENCOUNTER — TELEPHONE (OUTPATIENT)
Dept: PREADMISSION TESTING | Facility: HOSPITAL | Age: 62
End: 2022-03-03

## 2022-04-15 ENCOUNTER — VBI (OUTPATIENT)
Dept: ADMINISTRATIVE | Facility: OTHER | Age: 62
End: 2022-04-15

## 2022-04-22 DIAGNOSIS — R10.13 EPIGASTRIC PAIN: ICD-10-CM

## 2022-04-22 RX ORDER — DICYCLOMINE HCL 20 MG
TABLET ORAL
Qty: 360 TABLET | Refills: 0 | Status: SHIPPED | OUTPATIENT
Start: 2022-04-22

## 2022-04-22 RX ORDER — DICYCLOMINE HCL 20 MG
TABLET ORAL
Qty: 120 TABLET | Refills: 1 | Status: SHIPPED | OUTPATIENT
Start: 2022-04-22 | End: 2022-04-22

## 2022-04-22 RX ORDER — DICYCLOMINE HCL 20 MG
TABLET ORAL
Qty: 120 TABLET | Refills: 1 | Status: SHIPPED | OUTPATIENT
Start: 2022-04-22 | End: 2022-04-22 | Stop reason: SDUPTHER

## 2022-05-26 ENCOUNTER — APPOINTMENT (OUTPATIENT)
Dept: LAB | Facility: HOSPITAL | Age: 62
End: 2022-05-26
Payer: MEDICARE

## 2022-05-26 DIAGNOSIS — F33.1 MAJOR DEPRESSIVE DISORDER, RECURRENT EPISODE, MODERATE (HCC): ICD-10-CM

## 2022-05-26 LAB
ALBUMIN SERPL BCP-MCNC: 4.3 G/DL (ref 3–5.2)
ALP SERPL-CCNC: 92 U/L (ref 43–122)
ALT SERPL W P-5'-P-CCNC: 15 U/L
ANION GAP SERPL CALCULATED.3IONS-SCNC: 8 MMOL/L (ref 5–14)
AST SERPL W P-5'-P-CCNC: 23 U/L (ref 14–36)
BASOPHILS # BLD AUTO: 0.03 THOUSANDS/ΜL (ref 0–0.1)
BASOPHILS NFR BLD AUTO: 1 % (ref 0–1)
BILIRUB SERPL-MCNC: 0.57 MG/DL
BUN SERPL-MCNC: 17 MG/DL (ref 5–25)
CALCIUM SERPL-MCNC: 9.4 MG/DL (ref 8.4–10.2)
CHLORIDE SERPL-SCNC: 105 MMOL/L (ref 97–108)
CHOLEST SERPL-MCNC: 216 MG/DL
CO2 SERPL-SCNC: 28 MMOL/L (ref 22–30)
CREAT SERPL-MCNC: 0.7 MG/DL (ref 0.6–1.2)
EOSINOPHIL # BLD AUTO: 0.14 THOUSAND/ΜL (ref 0–0.61)
EOSINOPHIL NFR BLD AUTO: 2 % (ref 0–6)
ERYTHROCYTE [DISTWIDTH] IN BLOOD BY AUTOMATED COUNT: 12.4 % (ref 11.6–15.1)
GFR SERPL CREATININE-BSD FRML MDRD: 95 ML/MIN/1.73SQ M
GLUCOSE P FAST SERPL-MCNC: 109 MG/DL (ref 70–99)
HCT VFR BLD AUTO: 39.9 % (ref 34.8–46.1)
HDLC SERPL-MCNC: 79 MG/DL
HGB BLD-MCNC: 12.5 G/DL (ref 11.5–15.4)
IMM GRANULOCYTES # BLD AUTO: 0.03 THOUSAND/UL (ref 0–0.2)
IMM GRANULOCYTES NFR BLD AUTO: 1 % (ref 0–2)
LDLC SERPL CALC-MCNC: 117 MG/DL
LYMPHOCYTES # BLD AUTO: 2.54 THOUSANDS/ΜL (ref 0.6–4.47)
LYMPHOCYTES NFR BLD AUTO: 44 % (ref 14–44)
MCH RBC QN AUTO: 28.1 PG (ref 26.8–34.3)
MCHC RBC AUTO-ENTMCNC: 31.3 G/DL (ref 31.4–37.4)
MCV RBC AUTO: 90 FL (ref 82–98)
MONOCYTES # BLD AUTO: 0.45 THOUSAND/ΜL (ref 0.17–1.22)
MONOCYTES NFR BLD AUTO: 8 % (ref 4–12)
NEUTROPHILS # BLD AUTO: 2.54 THOUSANDS/ΜL (ref 1.85–7.62)
NEUTS SEG NFR BLD AUTO: 44 % (ref 43–75)
NONHDLC SERPL-MCNC: 137 MG/DL
NRBC BLD AUTO-RTO: 0 /100 WBCS
PLATELET # BLD AUTO: 239 THOUSANDS/UL (ref 149–390)
PMV BLD AUTO: 11.8 FL (ref 8.9–12.7)
POTASSIUM SERPL-SCNC: 4.2 MMOL/L (ref 3.6–5)
PROT SERPL-MCNC: 7.3 G/DL (ref 5.9–8.4)
RBC # BLD AUTO: 4.45 MILLION/UL (ref 3.81–5.12)
SODIUM SERPL-SCNC: 141 MMOL/L (ref 137–147)
TRIGL SERPL-MCNC: 100 MG/DL
TSH SERPL DL<=0.05 MIU/L-ACNC: 1.07 UIU/ML (ref 0.45–4.5)
WBC # BLD AUTO: 5.73 THOUSAND/UL (ref 4.31–10.16)

## 2022-05-26 PROCEDURE — 80053 COMPREHEN METABOLIC PANEL: CPT

## 2022-05-26 PROCEDURE — 80061 LIPID PANEL: CPT

## 2022-05-26 PROCEDURE — 84443 ASSAY THYROID STIM HORMONE: CPT

## 2022-05-26 PROCEDURE — 85025 COMPLETE CBC W/AUTO DIFF WBC: CPT

## 2022-05-26 PROCEDURE — 36415 COLL VENOUS BLD VENIPUNCTURE: CPT

## 2022-06-26 ENCOUNTER — HOSPITAL ENCOUNTER (EMERGENCY)
Facility: HOSPITAL | Age: 62
Discharge: HOME | End: 2022-06-26
Attending: EMERGENCY MEDICINE
Payer: MEDICARE

## 2022-06-26 ENCOUNTER — APPOINTMENT (EMERGENCY)
Dept: RADIOLOGY | Facility: HOSPITAL | Age: 62
End: 2022-06-26
Payer: MEDICARE

## 2022-06-26 VITALS
OXYGEN SATURATION: 98 % | SYSTOLIC BLOOD PRESSURE: 126 MMHG | HEART RATE: 50 BPM | RESPIRATION RATE: 16 BRPM | TEMPERATURE: 98.1 F | BODY MASS INDEX: 30.66 KG/M2 | WEIGHT: 184 LBS | HEIGHT: 65 IN | DIASTOLIC BLOOD PRESSURE: 63 MMHG

## 2022-06-26 DIAGNOSIS — R07.9 CHEST PAIN: Primary | ICD-10-CM

## 2022-06-26 LAB
2HR DELTA HS TROPONIN: >1 NG/L
ANION GAP SERPL CALCULATED.3IONS-SCNC: 1 MMOL/L (ref 4–13)
ATRIAL RATE: 44 BPM
ATRIAL RATE: 50 BPM
BASOPHILS # BLD AUTO: 0.04 THOUSANDS/ΜL (ref 0–0.1)
BASOPHILS NFR BLD AUTO: 1 % (ref 0–1)
BUN SERPL-MCNC: 15 MG/DL (ref 5–25)
CALCIUM SERPL-MCNC: 9.2 MG/DL (ref 8.3–10.1)
CARDIAC TROPONIN I PNL SERPL HS: 3 NG/L
CARDIAC TROPONIN I PNL SERPL HS: <2 NG/L
CHLORIDE SERPL-SCNC: 112 MMOL/L (ref 100–108)
CO2 SERPL-SCNC: 29 MMOL/L (ref 21–32)
CREAT SERPL-MCNC: 0.69 MG/DL (ref 0.6–1.3)
EOSINOPHIL # BLD AUTO: 0.06 THOUSAND/ΜL (ref 0–0.61)
EOSINOPHIL NFR BLD AUTO: 1 % (ref 0–6)
ERYTHROCYTE [DISTWIDTH] IN BLOOD BY AUTOMATED COUNT: 12.8 % (ref 11.6–15.1)
GFR SERPL CREATININE-BSD FRML MDRD: 95 ML/MIN/1.73SQ M
GLUCOSE SERPL-MCNC: 92 MG/DL (ref 65–140)
HCT VFR BLD AUTO: 38.6 % (ref 34.8–46.1)
HGB BLD-MCNC: 12.3 G/DL (ref 11.5–15.4)
IMM GRANULOCYTES # BLD AUTO: 0.02 THOUSAND/UL (ref 0–0.2)
IMM GRANULOCYTES NFR BLD AUTO: 0 % (ref 0–2)
LYMPHOCYTES # BLD AUTO: 1.63 THOUSANDS/ΜL (ref 0.6–4.47)
LYMPHOCYTES NFR BLD AUTO: 29 % (ref 14–44)
MCH RBC QN AUTO: 28 PG (ref 26.8–34.3)
MCHC RBC AUTO-ENTMCNC: 31.9 G/DL (ref 31.4–37.4)
MCV RBC AUTO: 88 FL (ref 82–98)
MONOCYTES # BLD AUTO: 0.48 THOUSAND/ΜL (ref 0.17–1.22)
MONOCYTES NFR BLD AUTO: 8 % (ref 4–12)
NEUTROPHILS # BLD AUTO: 3.46 THOUSANDS/ΜL (ref 1.85–7.62)
NEUTS SEG NFR BLD AUTO: 61 % (ref 43–75)
NRBC BLD AUTO-RTO: 0 /100 WBCS
P AXIS: 50 DEGREES
P AXIS: 61 DEGREES
PLATELET # BLD AUTO: 221 THOUSANDS/UL (ref 149–390)
PMV BLD AUTO: 11.7 FL (ref 8.9–12.7)
POTASSIUM SERPL-SCNC: 4.2 MMOL/L (ref 3.5–5.3)
PR INTERVAL: 138 MS
PR INTERVAL: 152 MS
QRS AXIS: 63 DEGREES
QRS AXIS: 66 DEGREES
QRSD INTERVAL: 78 MS
QRSD INTERVAL: 90 MS
QT INTERVAL: 460 MS
QT INTERVAL: 492 MS
QTC INTERVAL: 419 MS
QTC INTERVAL: 420 MS
RBC # BLD AUTO: 4.39 MILLION/UL (ref 3.81–5.12)
SODIUM SERPL-SCNC: 142 MMOL/L (ref 136–145)
T WAVE AXIS: 19 DEGREES
T WAVE AXIS: 53 DEGREES
VENTRICULAR RATE: 44 BPM
VENTRICULAR RATE: 50 BPM
WBC # BLD AUTO: 5.69 THOUSAND/UL (ref 4.31–10.16)

## 2022-06-26 PROCEDURE — 84484 ASSAY OF TROPONIN QUANT: CPT | Performed by: STUDENT IN AN ORGANIZED HEALTH CARE EDUCATION/TRAINING PROGRAM

## 2022-06-26 PROCEDURE — 93005 ELECTROCARDIOGRAM TRACING: CPT

## 2022-06-26 PROCEDURE — 71045 X-RAY EXAM CHEST 1 VIEW: CPT

## 2022-06-26 PROCEDURE — 99285 EMERGENCY DEPT VISIT HI MDM: CPT

## 2022-06-26 PROCEDURE — 93010 ELECTROCARDIOGRAM REPORT: CPT | Performed by: INTERNAL MEDICINE

## 2022-06-26 PROCEDURE — 36415 COLL VENOUS BLD VENIPUNCTURE: CPT | Performed by: STUDENT IN AN ORGANIZED HEALTH CARE EDUCATION/TRAINING PROGRAM

## 2022-06-26 PROCEDURE — 96374 THER/PROPH/DIAG INJ IV PUSH: CPT

## 2022-06-26 PROCEDURE — 85025 COMPLETE CBC W/AUTO DIFF WBC: CPT | Performed by: STUDENT IN AN ORGANIZED HEALTH CARE EDUCATION/TRAINING PROGRAM

## 2022-06-26 PROCEDURE — 99285 EMERGENCY DEPT VISIT HI MDM: CPT | Performed by: EMERGENCY MEDICINE

## 2022-06-26 PROCEDURE — 80048 BASIC METABOLIC PNL TOTAL CA: CPT | Performed by: STUDENT IN AN ORGANIZED HEALTH CARE EDUCATION/TRAINING PROGRAM

## 2022-06-26 RX ORDER — SODIUM CHLORIDE 9 MG/ML
3 INJECTION INTRAVENOUS
Status: DISCONTINUED | OUTPATIENT
Start: 2022-06-26 | End: 2022-06-26 | Stop reason: HOSPADM

## 2022-06-26 RX ORDER — ASPIRIN 81 MG/1
324 TABLET, CHEWABLE ORAL ONCE
Status: COMPLETED | OUTPATIENT
Start: 2022-06-26 | End: 2022-06-26

## 2022-06-26 RX ORDER — ACETAMINOPHEN 325 MG/1
650 TABLET ORAL ONCE
Status: COMPLETED | OUTPATIENT
Start: 2022-06-26 | End: 2022-06-26

## 2022-06-26 RX ORDER — DROPERIDOL 2.5 MG/ML
0.62 INJECTION, SOLUTION INTRAMUSCULAR; INTRAVENOUS ONCE
Status: COMPLETED | OUTPATIENT
Start: 2022-06-26 | End: 2022-06-26

## 2022-06-26 RX ADMIN — ACETAMINOPHEN 650 MG: 325 TABLET, FILM COATED ORAL at 15:36

## 2022-06-26 RX ADMIN — DROPERIDOL 0.62 MG: 2.5 INJECTION, SOLUTION INTRAMUSCULAR; INTRAVENOUS at 16:21

## 2022-06-26 RX ADMIN — ASPIRIN 81 MG CHEWABLE TABLET 324 MG: 81 TABLET CHEWABLE at 15:36

## 2022-06-26 NOTE — ED ATTENDING ATTESTATION
Final Diagnosis:  1  Chest pain           Ciara BERGMAN MD, saw and evaluated the patient  All available labs and X-rays were ordered by me or the resident and have been reviewed by myself  I discussed the patient with the resident / non-physician and agree with the resident's / non-physician practitioner's findings and plan as documented in the resident's / non-physician practicitioner's note, except where noted  At this point, I agree with the current assessment done in the ED  I was present during key portions of all procedures performed unless otherwise stated  Chief Complaint   Patient presents with    Chest Pain     Started 2 days ago, intense felt today  Discomfort breathing  Lightheaded, RUE pain and numbness  This is a 63 y o  female presenting for evaluation of CP  For 2 days she's had retrosternal CP going to the RIGHT shoulder and RIGHT arm  Waxing/waning  Intermittent  No f/ch/n/v  No diaphoresis  No recent travel  No travel  There are times that she is completely pain free  She mentions that she was just recently taken off of her xanax and opioids  PMH:   has a past medical history of Asthma, Bariatric surgery status, Depression (2009), Diabetes mellitus ([address] ) (2005), Generalized osteoarthritis, Low vitamin B12 level, Migraine, Postgastrectomy malabsorption, Suicide attempt ([facility] [address] ), and Vitamin D deficiency  PSH:   has a past surgical history that includes Gastric bypass (2015); Tubal ligation (1986); Tonsillectomy and adenoidectomy; Oophorectomy (Left, 2005); Cholecystectomy (2005); Laparoscopic supracervical hysterectomy (2005); Hysterectomy; Colonoscopy; pr excise excess skin tissue,abdomen (N/A, 5/27/2020); VAC DRESSING APPLICATION (N/A, 2/22/6410); Cosmetic surgery (05/27/2020); IR image guided aspiration / drainage w tube (7/14/2020); and Knee arthroscopy      Social:  Social History     Substance and Sexual Activity   Alcohol Use Not Currently     Social History Tobacco Use   Smoking Status Former Smoker    Years: 32 00    Types: Cigarettes    Quit date: 2013    Years since quittin 1   Smokeless Tobacco Never Used     Social History     Substance and Sexual Activity   Drug Use Never     PE:  Vitals:    22 1615 22 1630 22 1700 22 1800   BP: 134/60 131/63 131/59 126/63   Pulse: (!) 48 (!) 50 (!) 48 (!) 50   Resp: 20 20  16   Temp:       TempSrc:       SpO2: 97% 98% 98% 98%   Weight:       Height:       General: VSS, NAD, awake, alert  Well-nourished, well-developed  Appears stated age  Head: Normocephalic, atraumatic, nontender  Eyes: PERRL, EOM-I  No diplopia  No hyphema  No subconjunctival hemorrhages  Symmetrical lids  ENTAtraumatic external nose and ears  MMM  No stridor  Normal phonation  No drooling  Base of mouth is soft  No mastoid tenderness  Neck: Symmetric, trachea midline  No JVD  CV: Peripheral pulses +2 throughout  +RIGHT sternal border chest wall tenderness  Lungs:   Unlabored   No retractions  No crepitus  No tachypnea  No paradoxical motion  Abd: +BS, soft, NT/ND    MSK:   2+ radial pulses bilaterally equal   FROM   Back:   No C/T/L-spine tenderness  No CVAT  Skin: Dry, intact  Neuro: AAOx3, GCS 15, CN II-XII grossly intact  Motor grossly intact  Sensory grossly intact for LE  For upper, she says it feels slightly different on the finger tips on the RIGHT compared to hte LEFT  No pronator drift   5/5  FHL EHL HF HE 5/5  2+ radials iblaterally equal   2+ PTs bilaterally equal   No saddle anesthesia  Psychiatric/Behavioral: Anxious appearing   Exam: deferred  A:  - CP  P:  - Cardiac workup  - Single troponin given duration of symptoms  - Doubt life threatening disease (like dissection) process given completely pain free between episodes  - disposition per workup  - 13 point ROS was performed and all are normal unless stated in the history above     - Nursing note reviewed  Vitals reviewed  - Orders placed by myself and/or advanced practitioner / resident     - Previous chart was reviewed  - No language barrier    - History obtained from patient  - There are no limitations to the history obtained  - Critical care time: Not applicable for this patient  Code Status: Prior  Advance Directive and Living Will:      Power of :    POLST:      Medications   acetaminophen (TYLENOL) tablet 650 mg (650 mg Oral Given 6/26/22 1536)   aspirin chewable tablet 324 mg (324 mg Oral Given 6/26/22 1536)   droperidol (INAPSINE) injection 0 625 mg (0 625 mg Intravenous Given 6/26/22 1621)     X-ray chest 1 view portable   ED Interpretation   No acute cardiopulmonary disease      Final Result      No acute cardiopulmonary disease  Findings are stable            Workstation performed: [id number]           Orders Placed This Encounter   Procedures    ED ECG Documentation Only    ED ECG Documentation Only    X-ray chest 1 view portable    CBC and differential    Basic metabolic panel    HS Troponin 0hr (reflex protocol)    HS Troponin I 2hr    Ambulatory Referral to Cardiology    Continuous cardiac monitoring    Continuous pulse oximetry    ECG 12 lead    ECG 12 lead repeat in 2hrs    ECG 12 lead repeat in 4hrs    ECG 12 lead    ECG 12 lead     Labs Reviewed   BASIC METABOLIC PANEL - Abnormal       Result Value Ref Range Status    Sodium 142  136 - 145 mmol/L Final    Potassium 4 2  3 5 - 5 3 mmol/L Final    Chloride 112 (*) 100 - 108 mmol/L Final    CO2 29  21 - 32 mmol/L Final    ANION GAP 1 (*) 4 - 13 mmol/L Final    BUN 15  5 - 25 mg/dL Final    Creatinine 0 69  0 60 - 1 30 mg/dL Final    Comment: Standardized to IDMS reference method    Glucose 92  65 - 140 mg/dL Final    Comment: If the patient is fasting, the ADA then defines impaired fasting glucose as > 100 mg/dL and diabetes as > or equal to 123 mg/dL    Specimen collection should occur prior to Sulfasalazine administration due to the potential for falsely depressed results  Specimen collection should occur prior to Sulfapyridine administration due to the potential for falsely elevated results  Calcium 9 2  8 3 - 10 1 mg/dL Final    eGFR 95  ml/min/1 73sq m Final    Narrative:     [place] guidelines for Chronic Kidney Disease (CKD):     Stage 1 with normal or high GFR (GFR > 90 mL/min/1 73 square meters)    Stage 2 Mild CKD (GFR = 60-89 mL/min/1 73 square meters)    Stage 3A Moderate CKD (GFR = 45-59 mL/min/1 73 square meters)    Stage 3B Moderate CKD (GFR = 30-44 mL/min/1 73 square meters)    Stage 4 Severe CKD (GFR = 15-29 mL/min/1 73 square meters)    Stage 5 End Stage CKD (GFR <15 mL/min/1 73 square meters)  Note: GFR calculation is accurate only with a steady state creatinine   HS TROPONIN I 0HR - Normal    hs TnI 0hr <2  "Refer to ACS Flowchart"- see link ng/L Final    Comment:                                              Initial (time 0) result  If >=50 ng/L, Myocardial injury suggested ;  Type of myocardial injury and treatment strategy  to be determined  If 5-49 ng/L, a delta result at 2 hours and or 4 hours will be needed to further evaluate  If <4 ng/L, and chest pain has been >3 hours since onset, patient may qualify for discharge based on the HEART score in the ED  If <5 ng/L and <3hours since onset of chest pain, a delta result at 2 hours will be needed to further evaluate  HS Troponin 99th Percentile URL of a Health Population=12 ng/L with a 95% Confidence Interval of 8-18 ng/L  Second Troponin (time 2 hours)  If calculated delta >= 20 ng/L,  Myocardial injury suggested ; Type of myocardial injury and treatment strategy to be determined  If 5-49 ng/L and the calculated delta is 5-19 ng/L, consult medical service for evaluation  Continue evaluation for ischemia on ecg and other possible etiology and repeat hs troponin at 4 hours    If delta is <5 ng/L at 2 hours, consider discharge based on risk stratification via the HEART score (if in ED), or PEÑA risk score in IP/Observation  HS Troponin 99th Percentile URL of a Health Population=12 ng/L with a 95% Confidence Interval of 8-18 ng/L    HS TROPONIN I 2HR - Normal    hs TnI 2hr 3  "Refer to ACS Flowchart"- see link ng/L Final    Comment:                                              Initial (time 0) result  If >=50 ng/L, Myocardial injury suggested ;  Type of myocardial injury and treatment strategy  to be determined  If 5-49 ng/L, a delta result at 2 hours and or 4 hours will be needed to further evaluate  If <4 ng/L, and chest pain has been >3 hours since onset, patient may qualify for discharge based on the HEART score in the ED  If <5 ng/L and <3hours since onset of chest pain, a delta result at 2 hours will be needed to further evaluate  HS Troponin 99th Percentile URL of a Health Population=12 ng/L with a 95% Confidence Interval of 8-18 ng/L  Second Troponin (time 2 hours)  If calculated delta >= 20 ng/L,  Myocardial injury suggested ; Type of myocardial injury and treatment strategy to be determined  If 5-49 ng/L and the calculated delta is 5-19 ng/L, consult medical service for evaluation  Continue evaluation for ischemia on ecg and other possible etiology and repeat hs troponin at 4 hours  If delta is <5 ng/L at 2 hours, consider discharge based on risk stratification via the HEART score (if in ED), or PEÑA risk score in IP/Observation  HS Troponin 99th Percentile URL of a Health Population=12 ng/L with a 95% Confidence Interval of 8-18 ng/L      Delta 2hr hsTnI >1  <20 ng/L Final   CBC AND DIFFERENTIAL    WBC 5 69  4 31 - 10 16 Thousand/uL Final    RBC 4 39  3 81 - 5 12 Million/uL Final    Hemoglobin 12 3  11 5 - 15 4 g/dL Final    Hematocrit 38 6  34 8 - 46 1 % Final    MCV 88  82 - 98 fL Final    MCH 28 0  26 8 - 34 3 pg Final    MCHC 31 9  31 4 - 37 4 g/dL Final    RDW 12 8  11 6 - 15 1 % Final MPV 11 7  8 9 - 12 7 fL Final    Platelets 517  322 - 390 Thousands/uL Final    nRBC 0  /100 WBCs Final    Neutrophils Relative 61  43 - 75 % Final    Immat GRANS % 0  0 - 2 % Final    Lymphocytes Relative 29  14 - 44 % Final    Monocytes Relative 8  4 - 12 % Final    Eosinophils Relative 1  0 - 6 % Final    Basophils Relative 1  0 - 1 % Final    Neutrophils Absolute 3 46  1 85 - 7 62 Thousands/µL Final    Immature Grans Absolute 0 02  0 00 - 0 20 Thousand/uL Final    Lymphocytes Absolute 1 63  0 60 - 4 47 Thousands/µL Final    Monocytes Absolute 0 48  0 17 - 1 22 Thousand/µL Final    Eosinophils Absolute 0 06  0 00 - 0 61 Thousand/µL Final    Basophils Absolute 0 04  0 00 - 0 10 Thousands/µL Final     Time reflects when diagnosis was documented in both MDM as applicable and the Disposition within this note       Time User Action Codes Description Comment    6/26/2022  4:03 PM Veryl Casandra Add [R07 9] Chest pain           ED Disposition       ED Disposition   Discharge    Condition   Stable    Date/Time   Sun Jun 26, 2022  5:35 PM    Comment   [place] discharge to home/self care                     Follow-up Information       Follow up With Specialties Details Why Contact Info Additional Information    Adelita Goldberg, MD [facility] Medicine   [address]  [address] [facility]  [address]        [address] [place] [address] 34 [facility] Emergency Department Emergency Medicine  As needed [address] [phone]  958 [facility] 64 [facility] Emergency Department, [address], [place], [address]    1282 [facility] Cardiology   [address] [place] [address] [address] [phone]  [address] [place] [address] Cardiology [address], [address]          Discharge Medication List as of 6/26/2022  5:58 PM        CONTINUE these medications which have NOT CHANGED    Details   acetaminophen (TYLENOL) 500 mg tablet Take 2 tablets (1,000 mg total) by mouth every 6 (six) hours as needed for mild pain, Starting Fri 1/21/2022, No Print      albuterol (PROVENTIL HFA,VENTOLIN HFA) 90 mcg/act inhaler INHALE 2 PUFFS BY MOUTH EVERY 6 HOURS AS NEEDED FOR WHEEZING, Normal      amitriptyline (ELAVIL) 25 mg tablet Take 1 tablet (25 mg total) by mouth daily at bedtime, Starting Wed 3/17/2021, Normal      butalbital-acetaminophen-caffeine (FIORICET,ESGIC) -40 mg per tablet Take 1 tablet by mouth every 4 (four) hours as needed for headaches, Starting Fri 8/23/2019, Print      clonazePAM (KlonoPIN) 0 5 mg tablet TK 1 T PO 1-2 TIMES DAILY PRF ANXIETY/SLEEP, Historical Med      clotrimazole (LOTRIMIN) 1 % cream Apply topically 2 (two) times a day, Starting u 1/27/2022, Normal      dicyclomine (BENTYL) 20 mg tablet TAKE 1 TABLET(20 MG) BY MOUTH EVERY 6 HOURS AS NEEDED FOR ABDOMINAL PAIN, Normal      diphenhydrAMINE (BENADRYL) 25 mg tablet Take 1 tablet (25 mg total) by mouth every 6 (six) hours, Starting Mon 6/1/2020, Normal      ergocalciferol (VITAMIN D2) 50,000 units TAKE 1 CAPSULE BY MOUTH 2 TIMES A WEEK FOR 24 DOSES, Normal      fluticasone (FLOVENT HFA) 110 MCG/ACT inhaler Inhale 4 puffs 2 (two) times a day Rinse mouth after use , Starting Fri 1/21/2022, Until Sun 2/20/2022, Normal      hydrocortisone 2 5 % cream Apply topically 4 (four) times a day as needed for irritation, Starting Wed 4/24/2019, Normal      meclizine (ANTIVERT) 12 5 MG tablet Take 2 tablets (25 mg total) by mouth 3 (three) times a day as needed for dizziness, Starting Fri 8/23/2019, Print      !! methocarbamol (ROBAXIN) 750 mg tablet Take 750 mg by mouth 4 (four) times a day as needed, Starting Mon 9/13/2021, Historical Med      !! methocarbamol (ROBAXIN) 750 mg tablet Starting Mon 9/13/2021, Historical Med      methylPREDNISolone 4 MG tablet therapy pack Use as directed on package, Normal Multiple Vitamin (MULTIVITAMIN) capsule Take 1 capsule by mouth daily, Historical Med      naloxone (NARCAN) 4 mg/0 1 mL nasal spray Administer 1 spray into a nostril   If breathing does not return to normal or if breathing difficulty resumes after 2-3 minutes, give another dose in the other nostril using a new spray , Normal      nystatin (MYCOSTATIN) ointment Apply topically 2 (two) times a day for 21 days Apply to umbilicus, Starting Thu 5/79/2528, Until Mon 9/6/2021, Normal      omeprazole (PriLOSEC) 20 mg delayed release capsule Take 1 capsule (20 mg total) by mouth 2 (two) times a day, Starting Mon 1/17/2022, Normal      ondansetron (ZOFRAN) 4 mg tablet Take 1 tablet (4 mg total) by mouth every 6 (six) hours, Starting Fri 8/23/2019, Print      ondansetron (ZOFRAN-ODT) 4 mg disintegrating tablet Take 1 tablet (4 mg total) by mouth every 8 (eight) hours as needed for nausea for up to 10 doses, Starting Mon 9/6/2021, Print      oxyCODONE-acetaminophen (PERCOCET) 5-325 mg per tablet Take 1 tablet by mouth every 4 (four) hours as needed for severe pain Max Daily Amount: 6 tablets, Starting Fri 2/11/2022, Normal      polyethylene glycol (GLYCOLAX) 17 GM/SCOOP powder Take 17 g by mouth daily, Starting Mon 10/25/2021, Normal      sucralfate (CARAFATE) 1 g tablet TAKE 1 TABLET BY MOUTH FOUR TIMES DAILY CRUSH AND MIX WITH WATER TO TAKE AS SUSPENSION, Normal      tiZANidine (ZANAFLEX) 2 mg tablet Take 1 tablet (2 mg total) by mouth 3 (three) times a day, Starting Mon 1/24/2022, Normal      topiramate (TOPAMAX) 25 mg sprinkle capsule Take 1 capsule (25 mg total) by mouth 2 (two) times a day, Starting Fri 5/31/2019, Normal      triamcinolone (KENALOG) 0 5 % cream Apply topically 2 (two) times a day, Starting Wed 6/3/2020, Normal      venlafaxine (EFFEXOR) 75 mg tablet Take 75 mg by mouth 2 (two) times a day with meals, Starting Wed 12/30/2020, Historical Med      vitamin B-12 (CYANOCOBALAMIN) 2000 MCG TABS Take 1 tablet (2,000 mcg total) by mouth daily, Starting Fri 11/1/2019, Normal       !! - Potential duplicate medications found  Please discuss with provider  Prior to Admission Medications   Prescriptions Last Dose Informant Patient Reported? Taking?    Multiple Vitamin (MULTIVITAMIN) capsule  Self Yes No   Sig: Take 1 capsule by mouth daily   acetaminophen (TYLENOL) 500 mg tablet   No No   Sig: Take 2 tablets (1,000 mg total) by mouth every 6 (six) hours as needed for mild pain   albuterol (PROVENTIL HFA,VENTOLIN HFA) 90 mcg/act inhaler   No No   Sig: INHALE 2 PUFFS BY MOUTH EVERY 6 HOURS AS NEEDED FOR WHEEZING   amitriptyline (ELAVIL) 25 mg tablet   No No   Sig: Take 1 tablet (25 mg total) by mouth daily at bedtime   butalbital-acetaminophen-caffeine (FIORICET,ESGIC) -40 mg per tablet  Self No No   Sig: Take 1 tablet by mouth every 4 (four) hours as needed for headaches   clonazePAM (KlonoPIN) 0 5 mg tablet  Self Yes No   Sig: TK 1 T PO 1-2 TIMES DAILY PRF ANXIETY/SLEEP   clotrimazole (LOTRIMIN) 1 % cream   No No   Sig: Apply topically 2 (two) times a day   dicyclomine (BENTYL) 20 mg tablet   No No   Sig: TAKE 1 TABLET(20 MG) BY MOUTH EVERY 6 HOURS AS NEEDED FOR ABDOMINAL PAIN   diphenhydrAMINE (BENADRYL) 25 mg tablet   No No   Sig: Take 1 tablet (25 mg total) by mouth every 6 (six) hours   ergocalciferol (VITAMIN D2) 50,000 units   No No   Sig: TAKE 1 CAPSULE BY MOUTH 2 TIMES A WEEK FOR 24 DOSES   fluticasone (FLOVENT HFA) 110 MCG/ACT inhaler   No No   Sig: Inhale 4 puffs 2 (two) times a day Rinse mouth after use    hydrocortisone 2 5 % cream  Self No No   Sig: Apply topically 4 (four) times a day as needed for irritation   meclizine (ANTIVERT) 12 5 MG tablet  Self No No   Sig: Take 2 tablets (25 mg total) by mouth 3 (three) times a day as needed for dizziness   methocarbamol (ROBAXIN) 750 mg tablet   Yes No   Sig: Take 750 mg by mouth 4 (four) times a day as needed   methocarbamol (ROBAXIN) 750 mg tablet   Yes No   methylPREDNISolone 4 MG tablet therapy pack   No No   Sig: Use as directed on package   naloxone (NARCAN) 4 mg/0 1 mL nasal spray   No No   Sig: Administer 1 spray into a nostril  If breathing does not return to normal or if breathing difficulty resumes after 2-3 minutes, give another dose in the other nostril using a new spray     nystatin (MYCOSTATIN) ointment   No No   Sig: Apply topically 2 (two) times a day for 21 days Apply to umbilicus   omeprazole (PriLOSEC) 20 mg delayed release capsule   No No   Sig: Take 1 capsule (20 mg total) by mouth 2 (two) times a day   ondansetron (ZOFRAN) 4 mg tablet  Self No No   Sig: Take 1 tablet (4 mg total) by mouth every 6 (six) hours   Patient not taking: Reported on 11/5/2021   ondansetron (ZOFRAN-ODT) 4 mg disintegrating tablet   No No   Sig: Take 1 tablet (4 mg total) by mouth every 8 (eight) hours as needed for nausea for up to 10 doses   oxyCODONE-acetaminophen (PERCOCET) 5-325 mg per tablet   No No   Sig: Take 1 tablet by mouth every 4 (four) hours as needed for severe pain Max Daily Amount: 6 tablets   polyethylene glycol (GLYCOLAX) 17 GM/SCOOP powder   No No   Sig: Take 17 g by mouth daily   sucralfate (CARAFATE) 1 g tablet   No No   Sig: TAKE 1 TABLET BY MOUTH FOUR TIMES DAILY CRUSH AND MIX WITH WATER TO TAKE AS SUSPENSION   tiZANidine (ZANAFLEX) 2 mg tablet   No No   Sig: Take 1 tablet (2 mg total) by mouth 3 (three) times a day   topiramate (TOPAMAX) 25 mg sprinkle capsule  Self No No   Sig: Take 1 capsule (25 mg total) by mouth 2 (two) times a day   Patient not taking: Reported on 11/5/2021   triamcinolone (KENALOG) 0 5 % cream   No No   Sig: Apply topically 2 (two) times a day   venlafaxine (EFFEXOR) 75 mg tablet   Yes No   Sig: Take 75 mg by mouth 2 (two) times a day with meals   vitamin B-12 (CYANOCOBALAMIN) 2000 MCG TABS  Self No No   Sig: Take 1 tablet (2,000 mcg total) by mouth daily      Facility-Administered Medications: None       Portions of the record may have been created with voice recognition software  Occasional wrong word or "sound a like" substitutions may have occurred due to the inherent limitations of voice recognition software  Read the chart carefully and recognize, using context, where substitutions have occurred      Electronically signed by:  Kayli Ann

## 2022-06-26 NOTE — ED PROVIDER NOTES
History  Chief Complaint   Patient presents with    Chest Pain     Started 2 days ago, intense felt today  Discomfort breathing  Lightheaded, RUE pain and numbness  Patient is a 61-year-old female, past medical history including depression, diabetes, and anxiety, who presents to the emergency department for chest pain  Patient states the chest pain started 2 days ago  It is mainly substernal   She describes it as if somebody is punching her in the chest   It radiates down her right arm to her fingers  It is causing her right fingers and hand to go numb  There are no modifying factors  Associated symptoms include shortness of breath  She denies any prior history of chest pain like this in the past   She denies any known cardiac history  She denies any history of hypertension, hyperlipidemia  She does not smoke, drink, or do drugs  She denies any nausea, vomiting, diarrhea, abdominal pain, back pain, or any other new or concerning symptoms  She states she has a family history of CHF but no history of coronary artery disease as far she knows  History provided by:  Patient   used: No    Chest Pain  Associated symptoms: shortness of breath    Associated symptoms: no abdominal pain, no cough, no dysphagia, no fever, no nausea and not vomiting        Prior to Admission Medications   Prescriptions Last Dose Informant Patient Reported? Taking?    Multiple Vitamin (MULTIVITAMIN) capsule  Self Yes No   Sig: Take 1 capsule by mouth daily   acetaminophen (TYLENOL) 500 mg tablet   No No   Sig: Take 2 tablets (1,000 mg total) by mouth every 6 (six) hours as needed for mild pain   albuterol (PROVENTIL HFA,VENTOLIN HFA) 90 mcg/act inhaler   No No   Sig: INHALE 2 PUFFS BY MOUTH EVERY 6 HOURS AS NEEDED FOR WHEEZING   amitriptyline (ELAVIL) 25 mg tablet   No No   Sig: Take 1 tablet (25 mg total) by mouth daily at bedtime   butalbital-acetaminophen-caffeine (FIORICET,ESGIC) -40 mg per tablet Self No No   Sig: Take 1 tablet by mouth every 4 (four) hours as needed for headaches   clonazePAM (KlonoPIN) 0 5 mg tablet  Self Yes No   Sig: TK 1 T PO 1-2 TIMES DAILY PRF ANXIETY/SLEEP   clotrimazole (LOTRIMIN) 1 % cream   No No   Sig: Apply topically 2 (two) times a day   dicyclomine (BENTYL) 20 mg tablet   No No   Sig: TAKE 1 TABLET(20 MG) BY MOUTH EVERY 6 HOURS AS NEEDED FOR ABDOMINAL PAIN   diphenhydrAMINE (BENADRYL) 25 mg tablet   No No   Sig: Take 1 tablet (25 mg total) by mouth every 6 (six) hours   ergocalciferol (VITAMIN D2) 50,000 units   No No   Sig: TAKE 1 CAPSULE BY MOUTH 2 TIMES A WEEK FOR 24 DOSES   fluticasone (FLOVENT HFA) 110 MCG/ACT inhaler   No No   Sig: Inhale 4 puffs 2 (two) times a day Rinse mouth after use    hydrocortisone 2 5 % cream  Self No No   Sig: Apply topically 4 (four) times a day as needed for irritation   meclizine (ANTIVERT) 12 5 MG tablet  Self No No   Sig: Take 2 tablets (25 mg total) by mouth 3 (three) times a day as needed for dizziness   methocarbamol (ROBAXIN) 750 mg tablet   Yes No   Sig: Take 750 mg by mouth 4 (four) times a day as needed   methocarbamol (ROBAXIN) 750 mg tablet   Yes No   methylPREDNISolone 4 MG tablet therapy pack   No No   Sig: Use as directed on package   naloxone (NARCAN) 4 mg/0 1 mL nasal spray   No No   Sig: Administer 1 spray into a nostril  If breathing does not return to normal or if breathing difficulty resumes after 2-3 minutes, give another dose in the other nostril using a new spray     nystatin (MYCOSTATIN) ointment   No No   Sig: Apply topically 2 (two) times a day for 21 days Apply to umbilicus   omeprazole (PriLOSEC) 20 mg delayed release capsule   No No   Sig: Take 1 capsule (20 mg total) by mouth 2 (two) times a day   ondansetron (ZOFRAN) 4 mg tablet  Self No No   Sig: Take 1 tablet (4 mg total) by mouth every 6 (six) hours   Patient not taking: Reported on 11/5/2021   ondansetron (ZOFRAN-ODT) 4 mg disintegrating tablet   No No   Sig: Take 1 tablet (4 mg total) by mouth every 8 (eight) hours as needed for nausea for up to 10 doses   oxyCODONE-acetaminophen (PERCOCET) 5-325 mg per tablet   No No   Sig: Take 1 tablet by mouth every 4 (four) hours as needed for severe pain Max Daily Amount: 6 tablets   polyethylene glycol (GLYCOLAX) 17 GM/SCOOP powder   No No   Sig: Take 17 g by mouth daily   sucralfate (CARAFATE) 1 g tablet   No No   Sig: TAKE 1 TABLET BY MOUTH FOUR TIMES DAILY CRUSH AND MIX WITH WATER TO TAKE AS SUSPENSION   tiZANidine (ZANAFLEX) 2 mg tablet   No No   Sig: Take 1 tablet (2 mg total) by mouth 3 (three) times a day   topiramate (TOPAMAX) 25 mg sprinkle capsule  Self No No   Sig: Take 1 capsule (25 mg total) by mouth 2 (two) times a day   Patient not taking: Reported on 11/5/2021   triamcinolone (KENALOG) 0 5 % cream   No No   Sig: Apply topically 2 (two) times a day   venlafaxine (EFFEXOR) 75 mg tablet   Yes No   Sig: Take 75 mg by mouth 2 (two) times a day with meals   vitamin B-12 (CYANOCOBALAMIN) 2000 MCG TABS  Self No No   Sig: Take 1 tablet (2,000 mcg total) by mouth daily      Facility-Administered Medications: None       Past Medical History:   Diagnosis Date    Asthma     Albuterol prn    Bariatric surgery status     Depression 2009    managed with Effexor     Diabetes mellitus ([facility] [address] ) 2005    resolved with gastric bypass 2015    Generalized osteoarthritis     Low vitamin B12 level     Migraine     Postgastrectomy malabsorption     [facility]     Vitamin D deficiency        Past Surgical History:   Procedure Laterality Date    CHOLECYSTECTOMY  2005    COLONOSCOPY      COSMETIC SURGERY  05/27/2020    Abdominoplasty    GASTRIC BYPASS  2015    elvira - en -y     HYSTERECTOMY      IR [address] / DRAINAGE W TUBE  7/14/2020    KNEE ARTHROSCOPY      with Lysis of adhesions    LAPAROSCOPIC SUPRACERVICAL HYSTERECTOMY  2005    due to endometriosis/AUB    OOPHORECTOMY Left 2005    CO EXCISE EXCESS SKIN TISSUE,ABDOMEN N/A 2020    Procedure: PANNICULECTOMY;  Surgeon: Fatemeh Nava MD;  [facility];  Service: Plastics    TONSILLECTOMY AND ADENOIDECTOMY      TUBAL LIGATION      VAC DRESSING APPLICATION N/A     Procedure: APPLICATION VAC DRESSING;  Surgeon: Fatemeh Nava MD;  [facility];  Service: Plastics       Family History   Problem Relation Age of Onset    Hypertension Mother     Kidney cancer Mother     Diabetes Mother    Stephanie He Breast cancer Mother     Heart disease Father     Stroke Father     Hypertension Sister     HIV Brother     Breast cancer Cousin     No Known Problems Daughter     Stomach cancer Maternal Grandmother     No Known Problems Maternal Grandfather     No Known Problems Paternal Grandmother     No Known Problems Paternal Grandfather     No Known Problems Sister     No Known Problems Sister     No Known Problems Daughter     Prostate cancer Maternal Uncle     Stomach cancer Maternal Uncle     Leukemia Grandchild      I have reviewed and agree with the history as documented  E-Cigarette/Vaping    E-Cigarette Use Never User      E-Cigarette/Vaping Substances    Nicotine No     THC No     CBD No     Flavoring No     Other No     Unknown No      Social History     Tobacco Use    Smoking status: Former Smoker     Years: 32 00     Types: Cigarettes     Quit date: 2013     Years since quittin 1    Smokeless tobacco: Never Used   Vaping Use    Vaping Use: Never used   Substance Use Topics    Alcohol use: Not Currently    Drug use: Never        Review of Systems   Constitutional: Negative for chills and fever  HENT: Negative for sore throat and trouble swallowing  Eyes: Negative for redness and itching  Respiratory: Positive for shortness of breath  Negative for cough  Cardiovascular: Positive for chest pain  Negative for leg swelling     Gastrointestinal: Negative for abdominal pain, diarrhea, nausea and vomiting  Skin: Negative for rash and wound  All other systems reviewed and are negative  Physical Exam  ED Triage Vitals   Temperature Pulse Respirations Blood Pressure SpO2   06/26/22 1529 06/26/22 1510 06/26/22 1510 06/26/22 1510 06/26/22 1510   98 1 °F (36 7 °C) (!) 54 18 134/58 96 %      Temp Source Heart Rate Source Patient Position - Orthostatic VS BP Location FiO2 (%)   06/26/22 1529 06/26/22 1510 06/26/22 1510 -- --   Oral Monitor Lying        Pain Score       06/26/22 1510       7             Orthostatic Vital Signs  Vitals:    06/26/22 1615 06/26/22 1630 06/26/22 1700 06/26/22 1800   BP: 134/60 131/63 131/59 126/63   Pulse: (!) 48 (!) 50 (!) 48 (!) 50   Patient Position - Orthostatic VS:           Physical Exam  Vitals and nursing note reviewed  Constitutional:       General: She is not in acute distress  Appearance: She is well-developed  She is not diaphoretic  HENT:      Head: Normocephalic and atraumatic  Right Ear: External ear normal       Left Ear: External ear normal       Nose: Nose normal    Eyes:      General: Lids are normal  No scleral icterus  Cardiovascular:      Rate and Rhythm: Normal rate and regular rhythm  Heart sounds: Normal heart sounds  No murmur heard  No friction rub  No gallop  Pulmonary:      Effort: Pulmonary effort is normal  No respiratory distress  Breath sounds: Normal breath sounds  No wheezing or rales  Abdominal:      Palpations: Abdomen is soft  Tenderness: There is no abdominal tenderness  There is no guarding or rebound  Musculoskeletal:         General: No deformity  Normal range of motion  Cervical back: Normal range of motion and neck supple  Skin:     General: Skin is warm and dry  Neurological:      General: No focal deficit present  Mental Status: She is alert     Psychiatric:         Mood and Affect: Mood normal          Behavior: Behavior normal          ED Medications  Medications   sodium chloride (PF) 0 9 % injection 3 mL (has no administration in time range)   acetaminophen (TYLENOL) tablet 650 mg (650 mg Oral Given 6/26/22 1536)   aspirin chewable tablet 324 mg (324 mg Oral Given 6/26/22 1536)   droperidol (INAPSINE) injection 0 625 mg (0 625 mg Intravenous Given 6/26/22 1621)       Diagnostic Studies  Results Reviewed     Procedure Component Value Units Date/Time    HS Troponin I 2hr [[id number]]  (Normal) Collected: 06/26/22 1722    Lab Status: Final result Specimen: Blood from Arm, Right Updated: 06/26/22 1755     hs TnI 2hr 3 ng/L      Delta 2hr hsTnI >1 ng/L     HS Troponin I 4hr [[id number]]     Lab Status: No result Specimen: Blood     HS Troponin 0hr (reflex protocol) [[id number]]  (Normal) Collected: 06/26/22 1524    Lab Status: Final result Specimen: Blood from Arm, Right Updated: 06/26/22 1600     hs TnI 0hr <2 ng/L     Basic metabolic panel [[id number]]  (Abnormal) Collected: 06/26/22 1524    Lab Status: Final result Specimen: Blood from Arm, Right Updated: 06/26/22 1550     Sodium 142 mmol/L      Potassium 4 2 mmol/L      Chloride 112 mmol/L      CO2 29 mmol/L      ANION GAP 1 mmol/L      BUN 15 mg/dL      Creatinine 0 69 mg/dL      Glucose 92 mg/dL      Calcium 9 2 mg/dL      eGFR 95 ml/min/1 73sq m     Narrative:      Milo guidelines for Chronic Kidney Disease (CKD):     Stage 1 with normal or high GFR (GFR > 90 mL/min/1 73 square meters)    Stage 2 Mild CKD (GFR = 60-89 mL/min/1 73 square meters)    Stage 3A Moderate CKD (GFR = 45-59 mL/min/1 73 square meters)    Stage 3B Moderate CKD (GFR = 30-44 mL/min/1 73 square meters)    Stage 4 Severe CKD (GFR = 15-29 mL/min/1 73 square meters)    Stage 5 End Stage CKD (GFR <15 mL/min/1 73 square meters)  Note: GFR calculation is accurate only with a steady state creatinine    CBC and differential [[id number]] Collected: 06/26/22 1524    Lab Status: Final result Specimen: Blood from Arm, Right Updated: 06/26/22 1543     WBC 5 69 Thousand/uL      RBC 4 39 Million/uL      Hemoglobin 12 3 g/dL      Hematocrit 38 6 %      MCV 88 fL      MCH 28 0 pg      MCHC 31 9 g/dL      RDW 12 8 %      MPV 11 7 fL      Platelets 257 Thousands/uL      nRBC 0 /100 WBCs      Neutrophils Relative 61 %      Immat GRANS % 0 %      Lymphocytes Relative 29 %      Monocytes Relative 8 %      Eosinophils Relative 1 %      Basophils Relative 1 %      Neutrophils Absolute 3 46 Thousands/µL      Immature Grans Absolute 0 02 Thousand/uL      Lymphocytes Absolute 1 63 Thousands/µL      Monocytes Absolute 0 48 Thousand/µL      Eosinophils Absolute 0 06 Thousand/µL      Basophils Absolute 0 04 Thousands/µL                  X-ray chest 1 view portable   ED Interpretation by Abdulkadir Mulligan DO (06/26 1742)   No acute cardiopulmonary disease            Procedures  ECG 12 Lead Documentation Only    Date/Time: 6/26/2022 3:30 PM  Performed by: Abdulkadir Mulligan DO  Authorized by: Abdulkadir Mulligan DO     ECG reviewed by me, the ED Provider: yes    Patient location:  ED  Interpretation:     Interpretation: normal    Rate:     ECG rate:  50    ECG rate assessment: normal    Rhythm:     Rhythm: sinus rhythm    Ectopy:     Ectopy: none    QRS:     QRS axis:  Normal  Conduction:     Conduction: normal    ST segments:     ST segments:  Normal  T waves:     T waves: normal    ECG 12 Lead Documentation Only    Date/Time: 6/26/2022 5:36 PM  Performed by: Abdulkadir Mulligan DO  Authorized by: Abdulkadir Mulligan DO     ECG reviewed by me, the ED Provider: yes    Patient location:  ED  Interpretation:     Interpretation: non-specific    Rate:     ECG rate:  44    ECG rate assessment: bradycardic    Rhythm:     Rhythm: sinus bradycardia    Ectopy:     Ectopy: none    QRS:     QRS axis:  Normal  Conduction:     Conduction: normal    ST segments:     ST segments:  Normal  T waves:     T waves: normal            ED Course  ED Course as of 06/26/22 1040 Sun Jun 26, 2022   1544 CBC and differential  WNL   1787 Basic metabolic panel(!)  Stable   1601 hs TnI 0hr: <2  Will delta   1612 Patient re-evaluated  Resting comfortably  Pain still present  Will give droperidol  [phone] Patient re-evaluated  Pain improved  Pending delta   1755 Delta 2hr hsTnI: >1   1800 Patient re-evaluated  Resting comfortably  No complaints at this time  Will discharge  Recommended cardiology follow-up  Referral sent  Recommended she call to schedule an appointment  Return precautions discussed  Patient verbalized understanding and agreed to plan of care  HEART Risk Score    Flowsheet Row Most Recent Value   Heart Score Risk Calculator    History 0 Filed at: 06/26/2022 1755   ECG 0 Filed at: 06/26/2022 1755   Age 1 Filed at: 06/26/2022 1755   Risk Factors 1 Filed at: 06/26/2022 1755   Troponin 0 Filed at: 06/26/2022 1755   HEART Score 2 Filed at: 06/26/2022 1755                                MDM  Number of Diagnoses or Management Options  Chest pain  Diagnosis management comments: Patient is a 63 y o  female who presented to the ED for chest pain and shortness of breaht  Patient's history and physical are suggestive of noncardiac/non-ischemic chest pain  Exam without evidence of volume overload  EKG without signs of active ischemia  Presentation not consistent with acute PE (Deri George low risk), pneumothorax, thoracic arotic dissection, cardiac effusion or tamponade  Plan: Labs, troponin, EKG, CXR, ASA, pain control, serial reassessment             Portions of the record may have been created with voice recognition software  Occasional wrong word or "sound a like" substitutions may have occurred due to the inherent limitations of voice recognition software  Read the chart carefully and recognize, using context, where substitutions have occurred           Amount and/or Complexity of Data Reviewed  Clinical lab tests: ordered  Tests in the radiology section of CPT®: ordered  Tests in the medicine section of CPT®: ordered  Independent visualization of images, tracings, or specimens: yes    Risk of Complications, Morbidity, and/or Mortality  Presenting problems: moderate  Diagnostic procedures: moderate  Management options: low    Patient Progress  Patient progress: stable      Disposition  Final diagnoses:   Chest pain     Time reflects when diagnosis was documented in both MDM as applicable and the Disposition within this note     Time User Action Codes Description Comment    6/26/2022  4:03 PM Mehdi Wagoner Add [R07 9] Chest pain       ED Disposition     ED Disposition   Discharge    Condition   Stable    Date/Time   Sun Jun 26, 2022  5:35 PM    Comment   Markshira Aguero discharge to home/self care                 Follow-up Information     Follow up With Specialties Details Why Contact Info Additional Information    Don Donis MD [facility]   [address]  [address] [facility] [address]  [facility] [address]        [address] [place] [address] 34 [facility] Emergency Department Emergency Medicine  As needed [address] [phone]  [address] [facility] [address] Emergency Department, [address], [place], [address], [address] [facility]    [address] [facility] Cardiology   [address] [facility] [place] [address] [facility] [place], [address], [place], [address] [facility], [place] [address]          Discharge Medication List as of 6/26/2022  5:58 PM      CONTINUE these medications which have NOT CHANGED    Details   acetaminophen (TYLENOL) 500 mg tablet Take 2 tablets (1,000 mg total) by mouth every 6 (six) hours as needed for mild pain, Starting Fri 1/21/2022, No Print      albuterol (PROVENTIL HFA,VENTOLIN HFA) 90 mcg/act inhaler INHALE 2 PUFFS BY MOUTH EVERY 6 HOURS AS NEEDED FOR WHEEZING, Normal      amitriptyline (ELAVIL) 25 mg tablet Take 1 tablet (25 mg total) by mouth daily at bedtime, Starting Wed 3/17/2021, Normal      butalbital-acetaminophen-caffeine (FIORICET,ESGIC) -40 mg per tablet Take 1 tablet by mouth every 4 (four) hours as needed for headaches, Starting Fri 8/23/2019, Print      clonazePAM (KlonoPIN) 0 5 mg tablet TK 1 T PO 1-2 TIMES DAILY PRF ANXIETY/SLEEP, Historical Med      clotrimazole (LOTRIMIN) 1 % cream Apply topically 2 (two) times a day, Starting u 1/27/2022, Normal      dicyclomine (BENTYL) 20 mg tablet TAKE 1 TABLET(20 MG) BY MOUTH EVERY 6 HOURS AS NEEDED FOR ABDOMINAL PAIN, Normal      diphenhydrAMINE (BENADRYL) 25 mg tablet Take 1 tablet (25 mg total) by mouth every 6 (six) hours, Starting Mon 6/1/2020, Normal      ergocalciferol (VITAMIN D2) 50,000 units TAKE 1 CAPSULE BY MOUTH 2 TIMES A WEEK FOR 24 DOSES, Normal      fluticasone (FLOVENT HFA) 110 MCG/ACT inhaler Inhale 4 puffs 2 (two) times a day Rinse mouth after use , Starting Fri 1/21/2022, Until Sun 2/20/2022, Normal      hydrocortisone 2 5 % cream Apply topically 4 (four) times a day as needed for irritation, Starting Wed 4/24/2019, Normal      meclizine (ANTIVERT) 12 5 MG tablet Take 2 tablets (25 mg total) by mouth 3 (three) times a day as needed for dizziness, Starting Fri 8/23/2019, Print      !! methocarbamol (ROBAXIN) 750 mg tablet Take 750 mg by mouth 4 (four) times a day as needed, Starting Mon 9/13/2021, Historical Med      !! methocarbamol (ROBAXIN) 750 mg tablet Starting Mon 9/13/2021, Historical Med      methylPREDNISolone 4 MG tablet therapy pack Use as directed on package, Normal      Multiple Vitamin (MULTIVITAMIN) capsule Take 1 capsule by mouth daily, Historical Med      naloxone (NARCAN) 4 mg/0 1 mL nasal spray Administer 1 spray into a nostril   If breathing does not return to normal or if breathing difficulty resumes after 2-3 minutes, give another dose in the other nostril using a new spray , Normal      nystatin (MYCOSTATIN) ointment Apply topically 2 (two) times a day for 21 days Apply to umbilicus, Starting u 5/03/1083, Until Mon 9/6/2021, Normal      omeprazole (PriLOSEC) 20 mg delayed release capsule Take 1 capsule (20 mg total) by mouth 2 (two) times a day, Starting Mon 1/17/2022, Normal      ondansetron (ZOFRAN) 4 mg tablet Take 1 tablet (4 mg total) by mouth every 6 (six) hours, Starting Fri 8/23/2019, Print      ondansetron (ZOFRAN-ODT) 4 mg disintegrating tablet Take 1 tablet (4 mg total) by mouth every 8 (eight) hours as needed for nausea for up to 10 doses, Starting Mon 9/6/2021, Print      oxyCODONE-acetaminophen (PERCOCET) 5-325 mg per tablet Take 1 tablet by mouth every 4 (four) hours as needed for severe pain Max Daily Amount: 6 tablets, Starting Fri 2/11/2022, Normal      polyethylene glycol (GLYCOLAX) 17 GM/SCOOP powder Take 17 g by mouth daily, Starting Mon 10/25/2021, Normal      sucralfate (CARAFATE) 1 g tablet TAKE 1 TABLET BY MOUTH FOUR TIMES DAILY CRUSH AND MIX WITH WATER TO TAKE AS SUSPENSION, Normal      tiZANidine (ZANAFLEX) 2 mg tablet Take 1 tablet (2 mg total) by mouth 3 (three) times a day, Starting Mon 1/24/2022, Normal      topiramate (TOPAMAX) 25 mg sprinkle capsule Take 1 capsule (25 mg total) by mouth 2 (two) times a day, Starting Fri 5/31/2019, Normal      triamcinolone (KENALOG) 0 5 % cream Apply topically 2 (two) times a day, Starting Wed 6/3/2020, Normal      venlafaxine (EFFEXOR) 75 mg tablet Take 75 mg by mouth 2 (two) times a day with meals, Starting Wed 12/30/2020, Historical Med      vitamin B-12 (CYANOCOBALAMIN) 2000 MCG TABS Take 1 tablet (2,000 mcg total) by mouth daily, Starting Fri 11/1/2019, Normal       !! - Potential duplicate medications found  Please discuss with provider  PDMP Review       Value Time User    PDMP Reviewed  Yes 2/11/2022  9:58 PM Richa Flood MD           ED Provider  Attending physically available and evaluated Heaven Thompson I managed the patient along with the ED Attending      Electronically Signed by         Cody Marquis,   06/26/22 3489

## 2022-06-30 ENCOUNTER — TELEPHONE (OUTPATIENT)
Dept: OTHER | Facility: OTHER | Age: 62
End: 2022-06-30

## 2022-06-30 NOTE — TELEPHONE ENCOUNTER
Pt called to cancel upcoming appointment   Pt also requested a call back from office to reschedule canceled appointment

## 2022-10-01 ENCOUNTER — HOSPITAL ENCOUNTER (EMERGENCY)
Facility: HOSPITAL | Age: 62
Discharge: HOME | End: 2022-10-01
Attending: EMERGENCY MEDICINE
Payer: MEDICARE

## 2022-10-01 ENCOUNTER — APPOINTMENT (OUTPATIENT)
Dept: RADIOLOGY | Facility: HOSPITAL | Age: 62
End: 2022-10-01
Payer: MEDICARE

## 2022-10-01 VITALS
HEART RATE: 59 BPM | RESPIRATION RATE: 18 BRPM | SYSTOLIC BLOOD PRESSURE: 139 MMHG | BODY MASS INDEX: 30.6 KG/M2 | OXYGEN SATURATION: 100 % | DIASTOLIC BLOOD PRESSURE: 66 MMHG | TEMPERATURE: 98.5 F | WEIGHT: 183.86 LBS

## 2022-10-01 DIAGNOSIS — S39.012A STRAIN OF LUMBAR REGION, INITIAL ENCOUNTER: ICD-10-CM

## 2022-10-01 DIAGNOSIS — S63.502A SPRAIN OF LEFT WRIST, INITIAL ENCOUNTER: Primary | ICD-10-CM

## 2022-10-01 PROCEDURE — 73130 X-RAY EXAM OF HAND: CPT

## 2022-10-01 PROCEDURE — 99284 EMERGENCY DEPT VISIT MOD MDM: CPT | Performed by: EMERGENCY MEDICINE

## 2022-10-01 PROCEDURE — 73090 X-RAY EXAM OF FOREARM: CPT

## 2022-10-01 PROCEDURE — 99284 EMERGENCY DEPT VISIT MOD MDM: CPT

## 2022-10-01 PROCEDURE — 72100 X-RAY EXAM L-S SPINE 2/3 VWS: CPT

## 2022-10-01 PROCEDURE — 73110 X-RAY EXAM OF WRIST: CPT

## 2022-10-01 RX ORDER — ONDANSETRON 4 MG/1
4 TABLET, ORALLY DISINTEGRATING ORAL ONCE
Status: COMPLETED | OUTPATIENT
Start: 2022-10-01 | End: 2022-10-01

## 2022-10-01 RX ORDER — ACETAMINOPHEN 325 MG/1
650 TABLET ORAL EVERY 6 HOURS PRN
Qty: 20 TABLET | Refills: 0 | Status: SHIPPED | OUTPATIENT
Start: 2022-10-01

## 2022-10-01 RX ORDER — MORPHINE SULFATE 4 MG/ML
1 INJECTION, SOLUTION INTRAMUSCULAR; INTRAVENOUS ONCE
Status: COMPLETED | OUTPATIENT
Start: 2022-10-01 | End: 2022-10-01

## 2022-10-01 RX ORDER — CYCLOBENZAPRINE HCL 10 MG
10 TABLET ORAL 2 TIMES DAILY PRN
Qty: 20 TABLET | Refills: 0 | Status: SHIPPED | OUTPATIENT
Start: 2022-10-01

## 2022-10-01 RX ADMIN — ONDANSETRON 4 MG: 4 TABLET, ORALLY DISINTEGRATING ORAL at 15:51

## 2022-10-01 RX ADMIN — ONDANSETRON 4 MG: 4 TABLET, ORALLY DISINTEGRATING ORAL at 15:31

## 2022-10-01 NOTE — ED PROVIDER NOTES
History  Chief Complaint   Patient presents with    Arm Injury     [place]  Attempted to catch self on b/l arms  C/o left lower arm pain  Denies hitting head  4mg morphine given by ems     12year-old female fell backwards on a slippery surface and landed on her left forearm  She complains of left forearm, wrist and hand pain  No headache, no LOC, no head trauma  She also complains of some lower back pain  She is ambulating okay  No blood thinners          Prior to Admission Medications   Prescriptions Last Dose Informant Patient Reported? Taking?    Multiple Vitamin (MULTIVITAMIN) capsule  Self Yes No   Sig: Take 1 capsule by mouth daily   acetaminophen (TYLENOL) 500 mg tablet   No No   Sig: Take 2 tablets (1,000 mg total) by mouth every 6 (six) hours as needed for mild pain   albuterol (PROVENTIL HFA,VENTOLIN HFA) 90 mcg/act inhaler   No No   Sig: INHALE 2 PUFFS BY MOUTH EVERY 6 HOURS AS NEEDED FOR WHEEZING   amitriptyline (ELAVIL) 25 mg tablet   No No   Sig: Take 1 tablet (25 mg total) by mouth daily at bedtime   butalbital-acetaminophen-caffeine (FIORICET,ESGIC) -40 mg per tablet  Self No No   Sig: Take 1 tablet by mouth every 4 (four) hours as needed for headaches   clonazePAM (KlonoPIN) 0 5 mg tablet  Self Yes No   Sig: TK 1 T PO 1-2 TIMES DAILY PRF ANXIETY/SLEEP   clotrimazole (LOTRIMIN) 1 % cream   No No   Sig: Apply topically 2 (two) times a day   dicyclomine (BENTYL) 20 mg tablet   No No   Sig: TAKE 1 TABLET(20 MG) BY MOUTH EVERY 6 HOURS AS NEEDED FOR ABDOMINAL PAIN   diphenhydrAMINE (BENADRYL) 25 mg tablet   No No   Sig: Take 1 tablet (25 mg total) by mouth every 6 (six) hours   ergocalciferol (VITAMIN D2) 50,000 units   No No   Sig: TAKE 1 CAPSULE BY MOUTH 2 TIMES A WEEK FOR 24 DOSES   fluticasone (FLOVENT HFA) 110 MCG/ACT inhaler   No No   Sig: Inhale 4 puffs 2 (two) times a day Rinse mouth after use    hydrocortisone 2 5 % cream  Self No No   Sig: Apply topically 4 (four) times a day as needed for irritation   meclizine (ANTIVERT) 12 5 MG tablet  Self No No   Sig: Take 2 tablets (25 mg total) by mouth 3 (three) times a day as needed for dizziness   methocarbamol (ROBAXIN) 750 mg tablet   Yes No   Sig: Take 750 mg by mouth 4 (four) times a day as needed   methocarbamol (ROBAXIN) 750 mg tablet   Yes No   methylPREDNISolone 4 MG tablet therapy pack   No No   Sig: Use as directed on package   naloxone (NARCAN) 4 mg/0 1 mL nasal spray   No No   Sig: Administer 1 spray into a nostril  If breathing does not return to normal or if breathing difficulty resumes after 2-3 minutes, give another dose in the other nostril using a new spray     nystatin (MYCOSTATIN) ointment   No No   Sig: Apply topically 2 (two) times a day for 21 days Apply to umbilicus   omeprazole (PriLOSEC) 20 mg delayed release capsule   No No   Sig: Take 1 capsule (20 mg total) by mouth 2 (two) times a day   ondansetron (ZOFRAN) 4 mg tablet  Self No No   Sig: Take 1 tablet (4 mg total) by mouth every 6 (six) hours   Patient not taking: Reported on 11/5/2021   ondansetron (ZOFRAN-ODT) 4 mg disintegrating tablet   No No   Sig: Take 1 tablet (4 mg total) by mouth every 8 (eight) hours as needed for nausea for up to 10 doses   oxyCODONE-acetaminophen (PERCOCET) 5-325 mg per tablet   No No   Sig: Take 1 tablet by mouth every 4 (four) hours as needed for severe pain Max Daily Amount: 6 tablets   polyethylene glycol (GLYCOLAX) 17 GM/SCOOP powder   No No   Sig: Take 17 g by mouth daily   sucralfate (CARAFATE) 1 g tablet   No No   Sig: TAKE 1 TABLET BY MOUTH FOUR TIMES DAILY CRUSH AND MIX WITH WATER TO TAKE AS SUSPENSION   tiZANidine (ZANAFLEX) 2 mg tablet   No No   Sig: Take 1 tablet (2 mg total) by mouth 3 (three) times a day   topiramate (TOPAMAX) 25 mg sprinkle capsule  Self No No   Sig: Take 1 capsule (25 mg total) by mouth 2 (two) times a day   Patient not taking: Reported on 11/5/2021   triamcinolone (KENALOG) 0 5 % cream   No No   Sig: Apply topically 2 (two) times a day   venlafaxine (EFFEXOR) 75 mg tablet   Yes No   Sig: Take 75 mg by mouth 2 (two) times a day with meals   vitamin B-12 (CYANOCOBALAMIN) 2000 MCG TABS  Self No No   Sig: Take 1 tablet (2,000 mcg total) by mouth daily      Facility-Administered Medications: None       Past Medical History:   Diagnosis Date    Asthma     Albuterol prn    Bariatric surgery status     Depression 2009    managed with Effexor     [facility] 2005    resolved with gastric bypass 2015    Generalized osteoarthritis     Low vitamin B12 level     Migraine     Postgastrectomy malabsorption     [facility]     Vitamin D deficiency        Past Surgical History:   Procedure Laterality Date    CHOLECYSTECTOMY  2005    COLONOSCOPY      COSMETIC SURGERY  05/27/2020    Abdominoplasty    GASTRIC BYPASS  2015    elvira - en -y    [facility] IR [address] / DRAINAGE W TUBE  7/14/2020    KNEE ARTHROSCOPY      with Lysis of adhesions    LAPAROSCOPIC SUPRACERVICAL HYSTERECTOMY  2005    due to endometriosis/AUB    OOPHORECTOMY Left 2005    MN EXCISE EXCESS SKIN TISSUE,ABDOMEN N/A 5/27/2020    Procedure: PANNICULECTOMY;  Surgeon: Fatemeh Nava MD;  [facility];  Service: Plastics    TONSILLECTOMY AND ADENOIDECTOMY     [address] [place]    VAC DRESSING APPLICATION N/A 6/17/6750    Procedure: APPLICATION VAC DRESSING;  Surgeon: Fatemeh Nava MD;  [facility];  Service: Plastics       Family History   Problem Relation Age of Onset    Hypertension Mother     Kidney cancer Mother     Diabetes Mother     Breast cancer Mother     Heart disease Father     Stroke Father     Hypertension Sister     HIV Brother     Breast cancer Cousin     No Known Problems Daughter     Stomach cancer Maternal Grandmother     No Known Problems Maternal Grandfather     No Known Problems Paternal Grandmother    [employer] No Known Problems Paternal Grandfather     No Known Problems Sister     No Known Problems Sister     No Known Problems Daughter     Prostate cancer Maternal Uncle     Stomach cancer Maternal Uncle     Leukemia Grandchild      I have reviewed and agree with the history as documented  E-Cigarette/Vaping    E-Cigarette Use Never User      E-Cigarette/Vaping Substances    Nicotine No     THC No     CBD No     Flavoring No     Other No     Unknown No      Social History     Tobacco Use    Smoking status: Former Smoker     Years: 32 00     Types: Cigarettes     Quit date: 2013     Years since quittin 4    Smokeless tobacco: Never Used   Vaping Use    Vaping Use: Never used   Substance Use Topics    Alcohol use: Not Currently    Drug use: Never       Review of Systems   Constitutional: Negative for appetite change, fatigue and fever  HENT: Negative for rhinorrhea and sore throat  Eyes: Negative for pain  Respiratory: Negative for cough, shortness of breath and wheezing  Cardiovascular: Negative for chest pain and leg swelling  Gastrointestinal: Negative for abdominal pain, diarrhea and vomiting  Genitourinary: Negative for dysuria and flank pain  Musculoskeletal: Positive for back pain  Negative for neck pain  Skin: Negative for rash  Neurological: Negative for syncope and headaches  Psychiatric/Behavioral:        Mood normal       Physical Exam  Physical Exam  Vitals and nursing note reviewed  Constitutional:       Appearance: She is well-developed  HENT:      Head: Normocephalic and atraumatic  Right Ear: External ear normal       Left Ear: External ear normal    Eyes:      General: No scleral icterus  Extraocular Movements: Extraocular movements intact  Cardiovascular:      Rate and Rhythm: Normal rate and regular rhythm  Pulmonary:      Effort: Pulmonary effort is normal  No respiratory distress  Breath sounds: Normal breath sounds     Abdominal: Palpations: Abdomen is soft  Tenderness: There is no abdominal tenderness  Musculoskeletal:         General: No deformity  Cervical back: Normal range of motion and neck supple  Comments: Left hand, wrist and forearm tenderness, decreased range of motion secondary to pain, no deformity, neurovascularly intact    No open wounds, all other extremities full range of motion nontender    Left lower lumbar tenderness, lower L-spine tenderness, no step-offs   Skin:     General: Skin is warm and dry  Coloration: Skin is not jaundiced or pale  Neurological:      General: No focal deficit present  Mental Status: She is alert and oriented to person, place, and time  Psychiatric:         Mood and Affect: Mood normal          Behavior: Behavior normal          Vital Signs  ED Triage Vitals [10/01/22 1528]   Temperature Pulse Respirations Blood Pressure SpO2   98 5 °F (36 9 °C) 59 18 (!) 197/89 100 %      Temp Source Heart Rate Source Patient Position - Orthostatic VS BP Location FiO2 (%)   Oral Monitor Lying Right arm --      Pain Score       9           Vitals:    10/01/22 1528 10/01/22 1731   BP: (!) 197/89 139/66   Pulse: 59 59   Patient Position - Orthostatic VS: Lying Lying         Visual Acuity      ED Medications  Medications   ondansetron (ZOFRAN-ODT) dispersible tablet 4 mg (4 mg Oral Given 10/1/22 1531)   morphine (PF) (FOR EMS ONLY) 4 mg/mL injection 4 mg (0 mg Does not apply Given to EMS 10/1/22 1532)   ondansetron (ZOFRAN-ODT) dispersible tablet 4 mg (4 mg Oral Given 10/1/22 1551)       Diagnostic Studies  Results Reviewed     None                 XR forearm 2 views LEFT   Final Result by Sahra Herrera MD (10/01 1708)      No acute osseous abnormality  Workstation performed: [id number]         XR wrist 3+ views LEFT   Final Result by Sahra Herrera MD (10/01 1707)      No acute osseous abnormality        Workstation performed: [id number]         XR hand 3+ views LEFT Final Result by Landy Bansal MD (10/01 1707)      No acute osseous abnormality  Workstation performed: [id number]         XR lumbar spine 2 or 3 views   Final Result by Landy Bansal MD (10/01 1707)      No acute osseous abnormality  Degenerative changes as described  Workstation performed: [id number]                    Procedures  Procedures         ED Course                               SBIRT 22yo+    Flowsheet Row Most Recent Value   SBIRT (23 yo +)    In order to provide better care to our patients, we are screening all of our patients for alcohol and drug use  Would it be okay to ask you these screening questions? No Filed at: 10/01/2022 1553                    [facility]  Number of Diagnoses or Management Options     Amount and/or Complexity of Data Reviewed  Tests in the radiology section of CPT®: ordered and reviewed    Risk of Complications, Morbidity, and/or Mortality  Presenting problems: moderate  General comments: Patient was given a dynamic velcro splint of her left wrist, neurovascularly intact, cap refill okay        Disposition  Final diagnoses:   Sprain of left wrist, initial encounter   Strain of lumbar region, initial encounter     Time reflects when diagnosis was documented in both MDM as applicable and the Disposition within this note     Time User Action Codes Description Comment    10/1/2022  5:27 PM Christy Martinez Add [S63 502A] Sprain of left wrist, initial encounter     10/1/2022  5:27 PM Carlos Higginbotham Add [S39 012A] Strain of lumbar region, initial encounter       ED Disposition     ED Disposition   Discharge    Condition   Stable    Date/Time   Sat Oct 1, 2022  5:26 PM    Comment   William Fast discharge to home/self care                 Follow-up Information     Follow up With Specialties Details Why Contact Info Additional Information    Adelita Goldberg, MD [facility] Medicine   [address]  [address] [facility] Orthopedic Surgery   [address] [address] [phone] [address], [address], [address] [place], [place], [id number] [phone]          Discharge Medication List as of 10/1/2022  5:28 PM      START taking these medications    Details   !! acetaminophen (TYLENOL) 325 mg tablet Take 2 tablets (650 mg total) by mouth every 6 (six) hours as needed for mild pain, Starting Sat 10/1/2022, Normal      cyclobenzaprine (FLEXERIL) 10 mg tablet Take 1 tablet (10 mg total) by mouth 2 (two) times a day as needed for muscle spasms, Starting Sat 10/1/2022, Normal       !! - Potential duplicate medications found  Please discuss with provider        CONTINUE these medications which have NOT CHANGED    Details   !! acetaminophen (TYLENOL) 500 mg tablet Take 2 tablets (1,000 mg total) by mouth every 6 (six) hours as needed for mild pain, Starting Fri 1/21/2022, No Print      albuterol (PROVENTIL HFA,VENTOLIN HFA) 90 mcg/act inhaler INHALE 2 PUFFS BY MOUTH EVERY 6 HOURS AS NEEDED FOR WHEEZING, Normal      amitriptyline (ELAVIL) 25 mg tablet Take 1 tablet (25 mg total) by mouth daily at bedtime, Starting Wed 3/17/2021, Normal      butalbital-acetaminophen-caffeine (FIORICET,ESGIC) -40 mg per tablet Take 1 tablet by mouth every 4 (four) hours as needed for headaches, Starting Fri 8/23/2019, Print      clonazePAM (KlonoPIN) 0 5 mg tablet TK 1 T PO 1-2 TIMES DAILY PRF ANXIETY/SLEEP, Historical Med      clotrimazole (LOTRIMIN) 1 % cream Apply topically 2 (two) times a day, Starting Thu 1/27/2022, Normal      dicyclomine (BENTYL) 20 mg tablet TAKE 1 TABLET(20 MG) BY MOUTH EVERY 6 HOURS AS NEEDED FOR ABDOMINAL PAIN, Normal      diphenhydrAMINE (BENADRYL) 25 mg tablet Take 1 tablet (25 mg total) by mouth every 6 (six) hours, Starting Mon 6/1/2020, Normal      ergocalciferol (VITAMIN D2) 50,000 units TAKE 1 CAPSULE BY MOUTH 2 TIMES A WEEK FOR 24 DOSES, Normal      fluticasone (FLOVENT HFA) 110 MCG/ACT inhaler Inhale 4 puffs 2 (two) times a day Rinse mouth after use , Starting Fri 1/21/2022, Until Sun 2/20/2022, Normal      hydrocortisone 2 5 % cream Apply topically 4 (four) times a day as needed for irritation, Starting Wed 4/24/2019, Normal      meclizine (ANTIVERT) 12 5 MG tablet Take 2 tablets (25 mg total) by mouth 3 (three) times a day as needed for dizziness, Starting Fri 8/23/2019, Print      !! methocarbamol (ROBAXIN) 750 mg tablet Take 750 mg by mouth 4 (four) times a day as needed, Starting Mon 9/13/2021, Historical Med      !! methocarbamol (ROBAXIN) 750 mg tablet Starting Mon 9/13/2021, Historical Med      methylPREDNISolone 4 MG tablet therapy pack Use as directed on package, Normal      Multiple Vitamin (MULTIVITAMIN) capsule Take 1 capsule by mouth daily, Historical Med      naloxone (NARCAN) 4 mg/0 1 mL nasal spray Administer 1 spray into a nostril   If breathing does not return to normal or if breathing difficulty resumes after 2-3 minutes, give another dose in the other nostril using a new spray , Normal      nystatin (MYCOSTATIN) ointment Apply topically 2 (two) times a day for 21 days Apply to umbilicus, Starting Thu 5/67/4847, Until Mon 9/6/2021, Normal      omeprazole (PriLOSEC) 20 mg delayed release capsule Take 1 capsule (20 mg total) by mouth 2 (two) times a day, Starting Mon 1/17/2022, Normal      ondansetron (ZOFRAN) 4 mg tablet Take 1 tablet (4 mg total) by mouth every 6 (six) hours, Starting Fri 8/23/2019, Print      ondansetron (ZOFRAN-ODT) 4 mg disintegrating tablet Take 1 tablet (4 mg total) by mouth every 8 (eight) hours as needed for nausea for up to 10 doses, Starting Mon 9/6/2021, Print      oxyCODONE-acetaminophen (PERCOCET) 5-325 mg per tablet Take 1 tablet by mouth every 4 (four) hours as needed for severe pain Max Daily Amount: 6 tablets, Starting Fri 2/11/2022, Normal      polyethylene glycol (GLYCOLAX) 17 GM/SCOOP powder Take 17 g by mouth daily, Starting Mon 10/25/2021, Normal      sucralfate (CARAFATE) 1 g tablet TAKE 1 TABLET BY MOUTH FOUR TIMES DAILY CRUSH AND MIX WITH WATER TO TAKE AS SUSPENSION, Normal      tiZANidine (ZANAFLEX) 2 mg tablet Take 1 tablet (2 mg total) by mouth 3 (three) times a day, Starting Mon 1/24/2022, Normal      topiramate (TOPAMAX) 25 mg sprinkle capsule Take 1 capsule (25 mg total) by mouth 2 (two) times a day, Starting Fri 5/31/2019, Normal      triamcinolone (KENALOG) 0 5 % cream Apply topically 2 (two) times a day, Starting Wed 6/3/2020, Normal      venlafaxine (EFFEXOR) 75 mg tablet Take 75 mg by mouth 2 (two) times a day with meals, Starting Wed 12/30/2020, Historical Med      vitamin B-12 (CYANOCOBALAMIN) 2000 MCG TABS Take 1 tablet (2,000 mcg total) by mouth daily, Starting Fri 11/1/2019, Normal       !! - Potential duplicate medications found  Please discuss with provider  No discharge procedures on file      PDMP Review       Value Time User    PDMP Reviewed  Yes 2/11/2022  9:58 PM Yonny Gooden MD          ED Provider  Electronically Signed by           Jogre Polanco MD  10/01/22 [place] [address] Allyson Guzman MD  10/01/22 1648

## 2022-10-11 ENCOUNTER — OFFICE VISIT (OUTPATIENT)
Dept: OBGYN CLINIC | Facility: MEDICAL CENTER | Age: 62
End: 2022-10-11
Payer: MEDICARE

## 2022-10-11 VITALS
BODY MASS INDEX: 30.49 KG/M2 | DIASTOLIC BLOOD PRESSURE: 80 MMHG | HEIGHT: 65 IN | SYSTOLIC BLOOD PRESSURE: 114 MMHG | WEIGHT: 183 LBS | HEART RATE: 70 BPM

## 2022-10-11 DIAGNOSIS — M18.12 ARTHRITIS OF CARPOMETACARPAL (CMC) JOINT OF LEFT THUMB: ICD-10-CM

## 2022-10-11 DIAGNOSIS — S63.502A WRIST SPRAIN, LEFT, INITIAL ENCOUNTER: ICD-10-CM

## 2022-10-11 DIAGNOSIS — M25.532 CHRONIC PAIN OF LEFT WRIST: Primary | ICD-10-CM

## 2022-10-11 DIAGNOSIS — G89.29 CHRONIC PAIN OF LEFT WRIST: Primary | ICD-10-CM

## 2022-10-11 PROCEDURE — 99203 OFFICE O/P NEW LOW 30 MIN: CPT | Performed by: STUDENT IN AN ORGANIZED HEALTH CARE EDUCATION/TRAINING PROGRAM

## 2022-10-11 RX ORDER — ALPRAZOLAM 0.5 MG/1
0.5 TABLET ORAL 2 TIMES DAILY
COMMUNITY
Start: 2022-09-29

## 2022-10-14 ENCOUNTER — VBI (OUTPATIENT)
Dept: ADMINISTRATIVE | Facility: OTHER | Age: 62
End: 2022-10-14

## 2022-10-14 NOTE — PROGRESS NOTES
1  Chronic pain of left wrist  Diclofenac Sodium (VOLTAREN) 1 %   2  Arthritis of carpometacarpal (CMC) joint of left thumb  Diclofenac Sodium (VOLTAREN) 1 %   3  Wrist sprain, left, initial encounter  Diclofenac Sodium (VOLTAREN) 1 %     No orders of the defined types were placed in this encounter  Imaging Studies (I personally reviewed images in PACS and report):    • X-ray left hand/wrist 10/01/2022: Moderate 1st CMC osteoarthritic changes  No acute osseous abnormalities  • X-ray left forearm 10/01/2022:  No acute osseous abnormalities  IMPRESSION:  • Acute left wrist pain status post [address] injury  • Radiographic imaging is unremarkable other than 1st CMC arthritis  • Will treat her clinically as a wrist sprain  Date of Injury: 10/1/2022    Other factors:  • History of gastric bypass and cannot take oral NSAIDs    PLAN:    • Clinical exam and radiographic imaging reviewed with patient today, with impression as per above  I have discussed with the patient the pathophysiology of this diagnosis and reviewed how the examination correlates with this diagnosis  • Reviewed prior imaging as noted above  • I recommended conservative treatment at this time with p r n  Use of her wrist brace  I discussed that she should work on transitioning out of the wrist brace as tolerated though in order to prevent further stiffness  • Regards to pain control I prescribed topical Voltaren gel which she can apply up to 4 times daily  I advised against oral NSAIDs given her history of bariatric surgery  I counseled she could also concurrently take acetaminophen  I also recommended heat/ice therapy for her wrist as well  • I did offer formal physical therapy, but patient prefers to start a home exercise program which is provided today  Return in about 2 weeks (around 10/25/2022)  Portions of the record may have been created with voice recognition software   Occasional wrong word or "sound a like" substitutions may have occurred due to the inherent limitations of voice recognition software  Read the chart carefully and recognize, using context, where substitutions have occurred  CHIEF COMPLAINT:  Chief Complaint   Patient presents with   • Left Hand - Pain   • Left Wrist - Pain         HPI:  Reba Rivero is a 63 y o  female  who presents for       Visit 10/11/2022 :  Initial evaluation of left wrist pain:  Precipitating injury on 10/01/2022 and fell on her bilateral outstretched hands  She states she originally had bilateral wrist and hand pain but pain is mainly in her left wrist/hand  She went to the ER on 10/01/2022 had imaging done as noted above  She was placed in a wrist brace  She reports that she continues to have diffuse wrist and hand pain, mainly over the radial aspect  She reports that she has been adherent to the wrist brace but does try to come out of the brace to work on range of motion although flexion extension/pronation/supination all agitate her pain  She states there is initial swelling but this has receded  Denies discoloration  Reports tingling sensation but denies numbness  She states that she has had left wrist and hand pain prior to her fall in the past but feels that was exacerbated by this fall  Minimal to no relief with acetaminophen, icing, elevation of the affected extremity  Cannot take oral NSAIDs due to h/o gastrectomy        Medical, Surgical, Family, and Social History    Past Medical History:   Diagnosis Date   • Asthma     Albuterol prn   • Bariatric surgery status    • Depression 2009    managed with Effexor    • Diabetes mellitus ([facility] [address] ) 2005    resolved with gastric bypass 2015   • Generalized osteoarthritis    • Low vitamin B12 level    • Migraine    • Postgastrectomy malabsorption    • Suicide attempt [facility])    • Vitamin D deficiency      Past Surgical History:   Procedure Laterality Date   • CHOLECYSTECTOMY  2005   • COLONOSCOPY     • COSMETIC SURGERY  05/27/2020 Abdominoplasty   • GASTRIC BYPASS      elvira - en -y    • HYSTERECTOMY     • IR IMAGE GUIDED ASPIRATION / DRAINAGE W TUBE  2020   • KNEE ARTHROSCOPY      with Lysis of adhesions   • LAPAROSCOPIC SUPRACERVICAL HYSTERECTOMY      due to endometriosis/AUB   • OOPHORECTOMY Left    • DE EXCISE EXCESS SKIN TISSUE,ABDOMEN N/A 2020    Procedure: PANNICULECTOMY;  Surgeon: Ramone Hernadez MD;  [facility];  Service: Plastics   • TONSILLECTOMY AND ADENOIDECTOMY     • TUBAL LIGATION     • VAC DRESSING APPLICATION N/A     Procedure: APPLICATION VAC DRESSING;  Surgeon: Ramone Hernadez MD;  Location[facility];  Service: Plastics     Social History   Social History     Substance and Sexual Activity   Alcohol Use Not Currently     Social History     Substance and Sexual Activity   Drug Use Never     Social History     Tobacco Use   Smoking Status Former Smoker   • Years: 32 00   • Types: Cigarettes   • Quit date: 2013   • Years since quittin 4   Smokeless Tobacco Never Used     Family History   Problem Relation Age of Onset   • Hypertension Mother    • Kidney cancer Mother    • Diabetes Mother    • Breast cancer Mother    • Heart disease Father    • Stroke Father    • Hypertension Sister    • HIV Brother    • Breast cancer Cousin    • No Known Problems Daughter    • Stomach cancer Maternal Grandmother    • No Known Problems Maternal Grandfather    • No Known Problems Paternal Grandmother    • No Known Problems Paternal Grandfather    • No Known Problems Sister    • No Known Problems Sister    • No Known Problems Daughter    • Prostate cancer Maternal Uncle    • Stomach cancer Maternal Uncle    • Leukemia Grandchild      Allergies   Allergen Reactions   • Motrin [Ibuprofen]      Hx gastric bypass   • Cherry - Food Allergy Rash   • Gabapentin Rash          Physical Exam  /80   Pulse 70   Ht 5' 5" (1 651 m)   Wt 83 kg (183 lb)   LMP  (LMP Unknown)   BMI 30 45 kg/m²     Constitutional:  see vital signs  Gen: well-developed, normocephalic/atraumatic, well-groomed  Pulmonary/Chest: Effort normal  No respiratory distress  Right Hand Exam     Tenderness   The patient is experiencing tenderness in the radial area (+1st CMC)      Range of Motion   Wrist   Extension: 15   Flexion: 20   Pronation: 90   Supination: 70     Muscle Strength   Wrist extension: 4/5   Wrist flexion: 4/5   : 3/5     Comments:  Full digit MCP/PIP/DIP motion without malrotation or digital scissoring  Thumb MCP/DIP limited but intact with opposition (limited due to radial wrist/CMC pain)  No swelling, bruising, erythema  Sensation intact in radial/median/ulnar distribution    Patient wearing universal wrist brace (removed for examination)              Procedures

## 2022-10-28 ENCOUNTER — OFFICE VISIT (OUTPATIENT)
Dept: FAMILY MEDICINE CLINIC | Facility: CLINIC | Age: 62
End: 2022-10-28

## 2022-10-28 VITALS
TEMPERATURE: 98.7 F | DIASTOLIC BLOOD PRESSURE: 80 MMHG | BODY MASS INDEX: 31.32 KG/M2 | SYSTOLIC BLOOD PRESSURE: 128 MMHG | OXYGEN SATURATION: 98 % | HEIGHT: 65 IN | WEIGHT: 188 LBS | HEART RATE: 76 BPM | RESPIRATION RATE: 16 BRPM

## 2022-10-28 DIAGNOSIS — J06.9 UPPER RESPIRATORY TRACT INFECTION, UNSPECIFIED TYPE: Primary | ICD-10-CM

## 2022-10-28 LAB
SARS-COV-2 AG UPPER RESP QL IA: NEGATIVE
VALID CONTROL: NORMAL

## 2022-10-28 NOTE — PROGRESS NOTES
COVID-19 Outpatient Progress Note    Assessment/Plan:    Problem List Items Addressed This Visit        Respiratory    Upper respiratory tract infection - Primary     Supportive care  Covid test negative in the office today  If sx continue may return to the office in 3 days         Relevant Orders    POCT Rapid Covid Ag (Completed)         Disposition:     Rapid antigen testing was performed and the result is negative for COVID-19  Risks and benefits of COVID-19 vaccination was discussed with patient  Supportive care     I have spent 15 minutes directly with the patient  Encounter provider: Neyda Gould MD     Provider located at: [address] [address] [phone]     Recent Visits  No visits were found meeting these conditions  Showing recent visits within past 7 days and meeting all other requirements  Today's Visits  Date Type Provider Dept   10/28/22 Office Visit Neyda Gould MD [facility]   Showing today's visits and meeting all other requirements  Future Appointments  No visits were found meeting these conditions  Showing future appointments within next 150 days and meeting all other requirements     Subjective:   Magy Bragg is a 63 y o  female who is concerned about COVID-19  Patient's symptoms include chills, cough, diarrhea, myalgias and headache   Patient denies fever, congestion, rhinorrhea, sore throat, shortness of breath, abdominal pain, nausea and vomiting      - Date of symptom onset: 10/26/2022      COVID-19 vaccination status: Not vaccinated    Exposure:   Contact with a person who is under investigation (PUI) for or who is positive for COVID-19 within the last 14 days?: Yes    Hospitalized recently for fever and/or lower respiratory symptoms?: No      Currently a healthcare worker that is involved in direct patient care?: Yes      Works in a special setting where the risk of COVID-19 transmission may be high? (this may include long-term care, correctional and care home facilities; homeless shelters; assisted-living facilities and group homes ): No      Resident in a special setting where the risk of COVID-19 transmission may be high? (this may include long-term care, correctional and care home facilities; homeless shelters; assisted-living facilities and group homes ): No      Pt reports that she was informed by the [facility] today her mother is Covid positive  Lab Results   Component Value Date    SARSCOV2 Positive (A) 01/21/2022    SARSCOV2 Not Detected 05/21/2020    SARSCOVAG Negative 10/28/2022       Review of Systems   Constitutional: Positive for chills  Negative for fever  HENT: Negative for congestion, rhinorrhea and sore throat  Respiratory: Positive for cough  Negative for shortness of breath  Cardiovascular: Negative for chest pain  Gastrointestinal: Positive for diarrhea  Negative for abdominal pain, nausea and vomiting  Genitourinary: Negative for dysuria  Musculoskeletal: Positive for myalgias  Neurological: Positive for headaches  Negative for dizziness       Current Outpatient Medications on File Prior to Visit   Medication Sig   • acetaminophen (TYLENOL) 325 mg tablet Take 2 tablets (650 mg total) by mouth every 6 (six) hours as needed for mild pain   • acetaminophen (TYLENOL) 500 mg tablet Take 2 tablets (1,000 mg total) by mouth every 6 (six) hours as needed for mild pain   • albuterol (PROVENTIL HFA,VENTOLIN HFA) 90 mcg/act inhaler INHALE 2 PUFFS BY MOUTH EVERY 6 HOURS AS NEEDED FOR WHEEZING   • ALPRAZolam (XANAX) 0 5 mg tablet Take 0 5 mg by mouth 2 (two) times a day   • amitriptyline (ELAVIL) 25 mg tablet Take 1 tablet (25 mg total) by mouth daily at bedtime   • butalbital-acetaminophen-caffeine (FIORICET,ESGIC) -40 mg per tablet Take 1 tablet by mouth every 4 (four) hours as needed for headaches   • clonazePAM (KlonoPIN) 0 5 mg tablet TK 1 T PO 1-2 TIMES DAILY PRF ANXIETY/SLEEP   • clotrimazole (LOTRIMIN) 1 % cream Apply topically 2 (two) times a day   • cyclobenzaprine (FLEXERIL) 10 mg tablet Take 1 tablet (10 mg total) by mouth 2 (two) times a day as needed for muscle spasms   • Diclofenac Sodium (VOLTAREN) 1 % Apply 2 g topically 4 (four) times a day   • dicyclomine (BENTYL) 20 mg tablet TAKE 1 TABLET(20 MG) BY MOUTH EVERY 6 HOURS AS NEEDED FOR ABDOMINAL PAIN   • diphenhydrAMINE (BENADRYL) 25 mg tablet Take 1 tablet (25 mg total) by mouth every 6 (six) hours   • ergocalciferol (VITAMIN D2) 50,000 units TAKE 1 CAPSULE BY MOUTH 2 TIMES A WEEK FOR 24 DOSES   • fluticasone (FLOVENT HFA) 110 MCG/ACT inhaler Inhale 4 puffs 2 (two) times a day Rinse mouth after use  • hydrocortisone 2 5 % cream Apply topically 4 (four) times a day as needed for irritation   • meclizine (ANTIVERT) 12 5 MG tablet Take 2 tablets (25 mg total) by mouth 3 (three) times a day as needed for dizziness   • methocarbamol (ROBAXIN) 750 mg tablet Take 750 mg by mouth 4 (four) times a day as needed   • methocarbamol (ROBAXIN) 750 mg tablet    • methylPREDNISolone 4 MG tablet therapy pack Use as directed on package   • Multiple Vitamin (MULTIVITAMIN) capsule Take 1 capsule by mouth daily   • naloxone (NARCAN) 4 mg/0 1 mL nasal spray Administer 1 spray into a nostril  If breathing does not return to normal or if breathing difficulty resumes after 2-3 minutes, give another dose in the other nostril using a new spray     • nystatin (MYCOSTATIN) ointment Apply topically 2 (two) times a day for 21 days Apply to umbilicus   • omeprazole (PriLOSEC) 20 mg delayed release capsule Take 1 capsule (20 mg total) by mouth 2 (two) times a day   • ondansetron (ZOFRAN) 4 mg tablet Take 1 tablet (4 mg total) by mouth every 6 (six) hours (Patient not taking: No sig reported)   • ondansetron (ZOFRAN-ODT) 4 mg disintegrating tablet Take 1 tablet (4 mg total) by mouth every 8 (eight) hours as needed for nausea for up to 10 doses   • oxyCODONE-acetaminophen (PERCOCET) 5-325 mg per tablet Take 1 tablet by mouth every 4 (four) hours as needed for severe pain Max Daily Amount: 6 tablets   • polyethylene glycol (GLYCOLAX) 17 GM/SCOOP powder Take 17 g by mouth daily   • sucralfate (CARAFATE) 1 g tablet TAKE 1 TABLET BY MOUTH FOUR TIMES DAILY CRUSH AND MIX WITH WATER TO TAKE AS SUSPENSION   • tiZANidine (ZANAFLEX) 2 mg tablet Take 1 tablet (2 mg total) by mouth 3 (three) times a day   • topiramate (TOPAMAX) 25 mg sprinkle capsule Take 1 capsule (25 mg total) by mouth 2 (two) times a day (Patient not taking: No sig reported)   • triamcinolone (KENALOG) 0 5 % cream Apply topically 2 (two) times a day   • venlafaxine (EFFEXOR) 75 mg tablet Take 75 mg by mouth 2 (two) times a day with meals   • vitamin B-12 (CYANOCOBALAMIN) 2000 MCG TABS Take 1 tablet (2,000 mcg total) by mouth daily       Objective:    /80 (BP Location: Right arm, Patient Position: Sitting, Cuff Size: Standard)   Pulse 76   Temp 98 7 °F (37 1 °C) (Temporal)   Resp 16   Ht 5' 5" (1 651 m)   Wt 85 3 kg (188 lb)   LMP  (LMP Unknown)   SpO2 98%   Breastfeeding No   BMI 31 28 kg/m²      Physical Exam  Vitals and nursing note reviewed  Constitutional:       General: She is not in acute distress  Appearance: She is well-developed  HENT:      Head: Normocephalic and atraumatic  Nose: Nose normal       Mouth/Throat:      Mouth: Mucous membranes are moist    Eyes:      Conjunctiva/sclera: Conjunctivae normal    Cardiovascular:      Rate and Rhythm: Normal rate and regular rhythm  Heart sounds: Normal heart sounds  No murmur heard  Pulmonary:      Effort: Pulmonary effort is normal  No respiratory distress  Breath sounds: Normal breath sounds  Abdominal:      Palpations: Abdomen is soft  Tenderness: There is no abdominal tenderness  Musculoskeletal:      Cervical back: Neck supple     Skin:     General: Skin is warm and dry    Neurological:      Mental Status: She is alert  Psychiatric:         Mood and Affect: Affect is tearful         May Jara MD

## 2022-10-28 NOTE — ASSESSMENT & PLAN NOTE
Supportive care  Covid test negative in the office today  If sx continue may return to the office in 3 days

## 2022-11-11 LAB
LEFT EYE DIABETIC RETINOPATHY: NORMAL
RIGHT EYE DIABETIC RETINOPATHY: NORMAL

## 2022-11-28 ENCOUNTER — VBI (OUTPATIENT)
Dept: ADMINISTRATIVE | Facility: OTHER | Age: 62
End: 2022-11-28

## 2023-01-26 ENCOUNTER — TELEPHONE (OUTPATIENT)
Dept: BARIATRICS | Facility: CLINIC | Age: 63
End: 2023-01-26

## 2023-02-13 ENCOUNTER — TELEPHONE (OUTPATIENT)
Dept: FAMILY MEDICINE CLINIC | Facility: CLINIC | Age: 63
End: 2023-02-13

## 2023-02-13 ENCOUNTER — OFFICE VISIT (OUTPATIENT)
Dept: FAMILY MEDICINE CLINIC | Facility: CLINIC | Age: 63
End: 2023-02-13

## 2023-02-13 VITALS
SYSTOLIC BLOOD PRESSURE: 130 MMHG | DIASTOLIC BLOOD PRESSURE: 80 MMHG | RESPIRATION RATE: 16 BRPM | TEMPERATURE: 98.7 F | HEIGHT: 65 IN | OXYGEN SATURATION: 98 % | WEIGHT: 189 LBS | BODY MASS INDEX: 31.49 KG/M2 | HEART RATE: 97 BPM

## 2023-02-13 DIAGNOSIS — M54.50 ACUTE EXACERBATION OF CHRONIC LOW BACK PAIN: Primary | ICD-10-CM

## 2023-02-13 DIAGNOSIS — G89.29 ACUTE EXACERBATION OF CHRONIC LOW BACK PAIN: Primary | ICD-10-CM

## 2023-02-13 RX ORDER — KETOROLAC TROMETHAMINE 30 MG/ML
60 INJECTION, SOLUTION INTRAMUSCULAR; INTRAVENOUS ONCE
Status: COMPLETED | OUTPATIENT
Start: 2023-02-13 | End: 2023-02-13

## 2023-02-13 RX ORDER — TRIAMCINOLONE ACETONIDE 40 MG/ML
40 INJECTION, SUSPENSION INTRA-ARTICULAR; INTRAMUSCULAR ONCE
Status: SHIPPED | OUTPATIENT
Start: 2023-02-13

## 2023-02-13 RX ADMIN — KETOROLAC TROMETHAMINE 60 MG: 30 INJECTION, SOLUTION INTRAMUSCULAR; INTRAVENOUS at 14:03

## 2023-02-13 NOTE — PROGRESS NOTES
Name: Lidia Aldana      : 1960      MRN: [id number]  Encounter Provider: Jb Vences MD  Encounter Date: 2023   Encounter department: [facility]     1  Acute exacerbation of chronic low back pain  Assessment & Plan:  No red flag sx today, but pt is in moderate-severe pain  May use Tylenol prn pain  Ice, heat, massage, stretches as tolerated  If no improvement in one week contact the office  Will consider Lumbar Xray,  Physical Therapy and/or Orthopedic referral    Orders:  -     ketorolac (TORADOL) 60 mg/2 mL IM injection 60 mg  -     triamcinolone acetonide (KENALOG-40) 40 mg/mL injection 40 mg         Subjective      Lidia Aldana is a 63 y o  female who presented to the office today for lower back pain for the past three days  She actually has a h/o of chronic lower back pain for many years  She usually uses heat or Tylenol if it gets worse  She recalls tripping over at step on the stairs in her home 5 days ago, and she thinks that may have exacerbated her back pain  Sh has tried Tylenol in the past 3 days with only mild relief  The pain is sharp, constant, radiates to her right buttock and thigh  She reports pain with standing up and has trouble standing up because her body feels heavier  She denies numbness or tingling in her legs  +cramps between her thighs  No saddle anesthesia or bowel/bladder incontinence  Review of Systems   Constitutional: Negative for chills and fever  HENT: Negative for congestion, rhinorrhea and sore throat  Respiratory: Negative for cough and shortness of breath  Cardiovascular: Negative for chest pain  Gastrointestinal: Negative for diarrhea, nausea and vomiting  Genitourinary: Negative for dysuria  Musculoskeletal: Positive for back pain and gait problem  Skin: Negative for rash  Neurological: Negative for dizziness and headaches         Current Outpatient Medications on File Prior to Visit   Medication Sig   • acetaminophen (TYLENOL) 325 mg tablet Take 2 tablets (650 mg total) by mouth every 6 (six) hours as needed for mild pain   • acetaminophen (TYLENOL) 500 mg tablet Take 2 tablets (1,000 mg total) by mouth every 6 (six) hours as needed for mild pain   • albuterol (PROVENTIL HFA,VENTOLIN HFA) 90 mcg/act inhaler INHALE 2 PUFFS BY MOUTH EVERY 6 HOURS AS NEEDED FOR WHEEZING   • ALPRAZolam (XANAX) 0 5 mg tablet Take 0 5 mg by mouth 2 (two) times a day   • amitriptyline (ELAVIL) 25 mg tablet Take 1 tablet (25 mg total) by mouth daily at bedtime   • butalbital-acetaminophen-caffeine (FIORICET,ESGIC) -40 mg per tablet Take 1 tablet by mouth every 4 (four) hours as needed for headaches   • clonazePAM (KlonoPIN) 0 5 mg tablet TK 1 T PO 1-2 TIMES DAILY PRF ANXIETY/SLEEP   • clotrimazole (LOTRIMIN) 1 % cream Apply topically 2 (two) times a day   • cyclobenzaprine (FLEXERIL) 10 mg tablet Take 1 tablet (10 mg total) by mouth 2 (two) times a day as needed for muscle spasms   • Diclofenac Sodium (VOLTAREN) 1 % Apply 2 g topically 4 (four) times a day   • dicyclomine (BENTYL) 20 mg tablet TAKE 1 TABLET(20 MG) BY MOUTH EVERY 6 HOURS AS NEEDED FOR ABDOMINAL PAIN   • diphenhydrAMINE (BENADRYL) 25 mg tablet Take 1 tablet (25 mg total) by mouth every 6 (six) hours   • ergocalciferol (VITAMIN D2) 50,000 units TAKE 1 CAPSULE BY MOUTH 2 TIMES A WEEK FOR 24 DOSES   • fluticasone (FLOVENT HFA) 110 MCG/ACT inhaler Inhale 4 puffs 2 (two) times a day Rinse mouth after use     • hydrocortisone 2 5 % cream Apply topically 4 (four) times a day as needed for irritation   • meclizine (ANTIVERT) 12 5 MG tablet Take 2 tablets (25 mg total) by mouth 3 (three) times a day as needed for dizziness   • methocarbamol (ROBAXIN) 750 mg tablet Take 750 mg by mouth 4 (four) times a day as needed   • methocarbamol (ROBAXIN) 750 mg tablet    • Multiple Vitamin (MULTIVITAMIN) capsule Take 1 capsule by mouth daily   • naloxone (NARCAN) 4 mg/0 1 mL nasal spray Administer 1 spray into a nostril  If breathing does not return to normal or if breathing difficulty resumes after 2-3 minutes, give another dose in the other nostril using a new spray     • nystatin (MYCOSTATIN) ointment Apply topically 2 (two) times a day for 21 days Apply to umbilicus   • omeprazole (PriLOSEC) 20 mg delayed release capsule Take 1 capsule (20 mg total) by mouth 2 (two) times a day   • ondansetron (ZOFRAN) 4 mg tablet Take 1 tablet (4 mg total) by mouth every 6 (six) hours (Patient not taking: No sig reported)   • ondansetron (ZOFRAN-ODT) 4 mg disintegrating tablet Take 1 tablet (4 mg total) by mouth every 8 (eight) hours as needed for nausea for up to 10 doses   • oxyCODONE-acetaminophen (PERCOCET) 5-325 mg per tablet Take 1 tablet by mouth every 4 (four) hours as needed for severe pain Max Daily Amount: 6 tablets   • polyethylene glycol (GLYCOLAX) 17 GM/SCOOP powder Take 17 g by mouth daily   • sucralfate (CARAFATE) 1 g tablet TAKE 1 TABLET BY MOUTH FOUR TIMES DAILY CRUSH AND MIX WITH WATER TO TAKE AS SUSPENSION   • tiZANidine (ZANAFLEX) 2 mg tablet Take 1 tablet (2 mg total) by mouth 3 (three) times a day   • topiramate (TOPAMAX) 25 mg sprinkle capsule Take 1 capsule (25 mg total) by mouth 2 (two) times a day (Patient not taking: No sig reported)   • triamcinolone (KENALOG) 0 5 % cream Apply topically 2 (two) times a day   • venlafaxine (EFFEXOR) 75 mg tablet Take 75 mg by mouth 2 (two) times a day with meals   • vitamin B-12 (CYANOCOBALAMIN) 2000 MCG TABS Take 1 tablet (2,000 mcg total) by mouth daily   • [DISCONTINUED] methylPREDNISolone 4 MG tablet therapy pack Use as directed on package       Objective     /80 (BP Location: Right arm, Patient Position: Sitting, Cuff Size: Large)   Pulse 97   Temp 98 7 °F (37 1 °C) (Temporal)   Resp 16   Ht 5' 5" (1 651 m)   Wt 85 7 kg (189 lb)   LMP  (LMP Unknown)   SpO2 98%   BMI 31 45 kg/m² Physical Exam  Vitals and nursing note reviewed  Constitutional:       General: She is in acute distress  Appearance: She is well-developed  HENT:      Head: Normocephalic and atraumatic  Cardiovascular:      Rate and Rhythm: Normal rate  Pulmonary:      Effort: Pulmonary effort is normal  No respiratory distress  Musculoskeletal:      Lumbar back: Spasms and tenderness present  Decreased range of motion  Back:       Comments: + tenderness right lumbar region  Limited range of motion   Neurological:      Mental Status: She is alert and oriented to person, place, and time     Psychiatric:         Mood and Affect: Mood normal        Leticia Lilly MD

## 2023-02-13 NOTE — TELEPHONE ENCOUNTER
02/13/23    Pt stated that she forgot to ask at her visit today 02/13/23 for the following med / Script:  Vitamin D2 50,000 IU    Pt is also stating that pharmacy is requesting for script to state that med is being proscribed for life so insurance will cover (that's what pt stated)      Any questions, please contact Pt

## 2023-02-13 NOTE — ASSESSMENT & PLAN NOTE
No red flag sx today, but pt is in moderate-severe pain  May use Tylenol prn pain  Ice, heat, massage, stretches as tolerated  If no improvement in one week contact the office  Will consider Lumbar Xray,  Physical Therapy and/or Orthopedic referral

## 2023-02-20 DIAGNOSIS — E55.9 VITAMIN D DEFICIENCY: Primary | ICD-10-CM

## 2023-02-20 RX ORDER — CHOLECALCIFEROL (VITAMIN D3) 50 MCG
2000 TABLET ORAL DAILY
Qty: 90 TABLET | Refills: 1 | Status: SHIPPED | OUTPATIENT
Start: 2023-02-20

## 2023-02-20 NOTE — TELEPHONE ENCOUNTER
I have sent a prescription for Vitamin D 2,000 IU daily  I will have to recheck her lab work to see if she does require the high dose she is requesting of 50,000 IU  Please inform pt will discuss with her at her next office visit, thanks

## 2023-05-25 ENCOUNTER — OFFICE VISIT (OUTPATIENT)
Dept: FAMILY MEDICINE CLINIC | Facility: CLINIC | Age: 63
End: 2023-05-25

## 2023-05-25 VITALS
HEART RATE: 77 BPM | HEIGHT: 65 IN | SYSTOLIC BLOOD PRESSURE: 122 MMHG | WEIGHT: 189 LBS | RESPIRATION RATE: 16 BRPM | TEMPERATURE: 97.3 F | BODY MASS INDEX: 31.49 KG/M2 | OXYGEN SATURATION: 99 % | DIASTOLIC BLOOD PRESSURE: 80 MMHG

## 2023-05-25 DIAGNOSIS — M25.50 ARTHRALGIA, UNSPECIFIED JOINT: Primary | ICD-10-CM

## 2023-05-25 DIAGNOSIS — Z12.11 SCREEN FOR COLON CANCER: ICD-10-CM

## 2023-05-25 DIAGNOSIS — Z98.84 HISTORY OF BARIATRIC SURGERY: ICD-10-CM

## 2023-05-25 NOTE — ASSESSMENT & PLAN NOTE
Severe, diffuse pain in joints of hands, wrists, elbows, shoulders, knees, and ankles  Patient has had xrays of hands, wrists, and arms with no degenerative changes shown  Knee xrays in the past significant for mild degenerative changes  Will order Auto-immune panel as per orders below  CMP for r/o other causes of joint pain (eg electrolyte abnormalities)  Have advised continued use of Tylneol  Recommended application of Voltaren gel  Patient with hx bariatric surgery and NSAIDs contraindicated  Discussed other supportive measures including joint injections  Patient decling PT at this time, reports she has tried it in the past and it has made it worse  Will discuss possible diagnosis of fibromyalgia at follow up visit

## 2023-05-25 NOTE — PROGRESS NOTES
Name: Jeanne Hill      : 1960      MRN: [id number]  Encounter Provider: Jah Cantrell MD  Encounter Date: 2023   Encounter department: [address] [place] [address]     1  Arthralgia, unspecified joint  Assessment & Plan:  Severe, diffuse pain in joints of hands, wrists, elbows, shoulders, knees, and ankles  Patient has had xrays of hands, wrists, and arms with no degenerative changes shown  Knee xrays in the past significant for mild degenerative changes  Will order Auto-immune panel as per orders below  CMP for r/o other causes of joint pain (eg electrolyte abnormalities)  Have advised continued use of Tylneol  Recommended application of Voltaren gel  Patient with hx bariatric surgery and NSAIDs contraindicated  Discussed other supportive measures including joint injections  Patient decling PT at this time, reports she has tried it in the past and it has made it worse  Will discuss possible diagnosis of fibromyalgia at follow up visit  Orders:  -     Comprehensive metabolic panel; Future  -     JENNY Screen w/ Reflex to Titer/Pattern; Future  -     Sedimentation rate, automated; Future  -     Anti-DNA antibody, double-stranded; Future  -     Cyclic citrul peptide antibody, IgG; Future  -     Diclofenac Sodium (VOLTAREN) 1 %; Apply 2 g topically 4 (four) times a day    2  History of bariatric surgery  Assessment & Plan:  S/p elvira-en-y gastric bypass in   Patient is taking multivitamin and vit D supplement  Not currently following with bariatrics  Will order lab work up as per orders below  Orders:  -     Vitamin D 25 hydroxy; Future  -     Vitamin B12; Future  -     Folate; Future  -     Iron Panel (Includes Ferritin, Iron Sat%, Iron, and TIBC); Future    3  Screen for colon cancer  -     Ambulatory referral for colonoscopy;  Future         Subjective       This is a very pleasant 63 y o  female with a PMH of gastric bypass (7 years ago), MDD, substance abuse, lumbar back pain, carpel tunnel, knee arthritis, and chronic MCP pain who presents with 2 month history of joint pain diffusely throughout her body (ankles, knees, elbows, shoulders)  Pain is rated 10/10 in severity, described as stiff in nature  She reports pain is exacerbated with movement  Reports limited improvement in symptoms with rest, hot showers, and Tylenol  Reports family hx of fibormyalgia in younger sister who passed from unknown causes  Patient reports poor appetite although she reports gaining ~ 5lbs in the last 2 months  Patient reports associated cramping in her legs and feet  Currently not following with bariatrics  Review of Systems   Constitutional: Negative for chills and fever  HENT: Negative for congestion, ear pain, rhinorrhea and sinus pain  Eyes: Negative for visual disturbance  Respiratory: Negative for chest tightness, shortness of breath and wheezing  Cardiovascular: Negative for chest pain and palpitations  Gastrointestinal: Negative for abdominal pain, constipation, diarrhea and vomiting  Endocrine: Negative for polyuria  Genitourinary: Negative for dysuria  Musculoskeletal: Positive for arthralgias (generalized, as per hpi)  Negative for myalgias  Neurological: Negative for dizziness, syncope and light-headedness  Psychiatric/Behavioral: Negative for hallucinations, self-injury and suicidal ideas         Past Medical History:   Diagnosis Date   • Asthma     Albuterol prn   • Bariatric surgery status    • Depression 2009    managed with Effexor    • Diabetes mellitus ([facility] [address] ) 2005    resolved with gastric bypass 2015   • Generalized osteoarthritis    • Low vitamin B12 level    • Migraine    • Postgastrectomy malabsorption    • Suicide attempt [facility])    • Vitamin D deficiency      Past Surgical History:   Procedure Laterality Date   • CHOLECYSTECTOMY  2005   • COLONOSCOPY     • COSMETIC SURGERY  05/27/2020    Abdominoplasty   • GASTRIC BYPASS  2015    elvira - en -y    • HYSTERECTOMY     • IR IMAGE GUIDED ASPIRATION / DRAINAGE W TUBE  7/14/2020   • KNEE ARTHROSCOPY      with Lysis of adhesions   • LAPAROSCOPIC SUPRACERVICAL HYSTERECTOMY  2005    due to endometriosis/AUB   • OOPHORECTOMY Left 2005   • NM EXCISION SKIN ABD INFRAUMBILICAL PANNICULECTOMY N/A 5/27/2020    Procedure: PANNICULECTOMY;  Surgeon: Justus Katz MD;  Location: [facility];  Service: Plastics   • TONSILLECTOMY AND ADENOIDECTOMY     • TUBAL LIGATION  1986   • VAC DRESSING APPLICATION N/A 8/64/6430    Procedure: [address] ;  Surgeon: Justus Katz MD;  Location: [facility];  Service: Plastics     Family History   Problem Relation Age of Onset   • Hypertension Mother    • Kidney cancer Mother    • Diabetes Mother    • Breast cancer Mother    • Heart disease Father    • Stroke Father    • Hypertension Sister    • HIV Brother    • Breast cancer Cousin    • No Known Problems Daughter    • Stomach cancer Maternal Grandmother    • No Known Problems Maternal Grandfather    • No Known Problems Paternal Grandmother    • No Known Problems Paternal Grandfather    • No Known Problems Sister    • No Known Problems Sister    • No Known Problems Daughter    • Prostate cancer Maternal Uncle    • Stomach cancer Maternal Uncle    • Leukemia Grandchild      Social History     Socioeconomic History   • Marital status: Single     Spouse name: None   • Number of children: None   • Years of education: None   • Highest education level: None   Occupational History   • None   Tobacco Use   • Smoking status: Former     Years: 32 00     Types: Cigarettes     Quit date: 05/2013     Years since quitting: 10 0   • Smokeless tobacco: Never   Vaping Use   • Vaping Use: Never used   Substance and Sexual Activity   • Alcohol use: Not Currently   • Drug use: Never   • Sexual activity: Not Currently     Partners: Male     Birth control/protection: Female Sterilization, Post-menopausal   Other Topics Concern   • None   Social History Narrative   • None     Social Determinants of Health     Financial Resource Strain: Low Risk  (2/13/2023)    Overall Financial Resource Strain (CARDIA)    • Difficulty of Paying Living Expenses: Not hard at all   Food Insecurity: No Food Insecurity (2/13/2023)    Hunger Vital Sign    • Worried About Running Out of Food in the Last Year: Never true    • Ran Out of Food in the Last Year: Never true   Transportation Needs: No Transportation Needs (2/13/2023)    PRAPARE - Transportation    • Lack of Transportation (Medical): No    • Lack of Transportation (Non-Medical):  No   Physical Activity: Not on file   Stress: Not on file   Social Connections: Not on file   Intimate Partner Violence: Not on file   Housing Stability: Not on file     Current Outpatient Medications on File Prior to Visit   Medication Sig   • acetaminophen (TYLENOL) 325 mg tablet Take 2 tablets (650 mg total) by mouth every 6 (six) hours as needed for mild pain   • acetaminophen (TYLENOL) 500 mg tablet Take 2 tablets (1,000 mg total) by mouth every 6 (six) hours as needed for mild pain   • albuterol (PROVENTIL HFA,VENTOLIN HFA) 90 mcg/act inhaler INHALE 2 PUFFS BY MOUTH EVERY 6 HOURS AS NEEDED FOR WHEEZING   • ALPRAZolam (XANAX) 0 5 mg tablet Take 0 5 mg by mouth 2 (two) times a day   • amitriptyline (ELAVIL) 25 mg tablet Take 1 tablet (25 mg total) by mouth daily at bedtime   • butalbital-acetaminophen-caffeine (FIORICET,ESGIC) -40 mg per tablet Take 1 tablet by mouth every 4 (four) hours as needed for headaches   • Cholecalciferol (Vitamin D) 50 MCG (2000 UT) tablet Take 1 tablet (2,000 Units total) by mouth daily   • clonazePAM (KlonoPIN) 0 5 mg tablet TK 1 T PO 1-2 TIMES DAILY PRF ANXIETY/SLEEP   • clotrimazole (LOTRIMIN) 1 % cream Apply topically 2 (two) times a day   • cyclobenzaprine (FLEXERIL) 10 mg tablet Take 1 tablet (10 mg total) by mouth 2 (two) times a day as needed for muscle spasms   • dicyclomine (BENTYL) 20 mg tablet TAKE 1 TABLET(20 MG) BY MOUTH EVERY 6 HOURS AS NEEDED FOR ABDOMINAL PAIN   • diphenhydrAMINE (BENADRYL) 25 mg tablet Take 1 tablet (25 mg total) by mouth every 6 (six) hours   • fluticasone (FLOVENT HFA) 110 MCG/ACT inhaler Inhale 4 puffs 2 (two) times a day Rinse mouth after use  • hydrocortisone 2 5 % cream Apply topically 4 (four) times a day as needed for irritation   • meclizine (ANTIVERT) 12 5 MG tablet Take 2 tablets (25 mg total) by mouth 3 (three) times a day as needed for dizziness   • methocarbamol (ROBAXIN) 750 mg tablet Take 750 mg by mouth 4 (four) times a day as needed   • methocarbamol (ROBAXIN) 750 mg tablet    • Multiple Vitamin (MULTIVITAMIN) capsule Take 1 capsule by mouth daily   • naloxone (NARCAN) 4 mg/0 1 mL nasal spray Administer 1 spray into a nostril  If breathing does not return to normal or if breathing difficulty resumes after 2-3 minutes, give another dose in the other nostril using a new spray     • nystatin (MYCOSTATIN) ointment Apply topically 2 (two) times a day for 21 days Apply to umbilicus   • omeprazole (PriLOSEC) 20 mg delayed release capsule Take 1 capsule (20 mg total) by mouth 2 (two) times a day   • ondansetron (ZOFRAN) 4 mg tablet Take 1 tablet (4 mg total) by mouth every 6 (six) hours (Patient not taking: No sig reported)   • ondansetron (ZOFRAN-ODT) 4 mg disintegrating tablet Take 1 tablet (4 mg total) by mouth every 8 (eight) hours as needed for nausea for up to 10 doses   • oxyCODONE-acetaminophen (PERCOCET) 5-325 mg per tablet Take 1 tablet by mouth every 4 (four) hours as needed for severe pain Max Daily Amount: 6 tablets   • polyethylene glycol (GLYCOLAX) 17 GM/SCOOP powder Take 17 g by mouth daily   • sucralfate (CARAFATE) 1 g tablet TAKE 1 TABLET BY MOUTH FOUR TIMES DAILY CRUSH AND MIX WITH WATER TO TAKE AS SUSPENSION   • tiZANidine (ZANAFLEX) 2 mg tablet Take 1 tablet (2 mg total) by mouth 3 (three) times "a day   • topiramate (TOPAMAX) 25 mg sprinkle capsule Take 1 capsule (25 mg total) by mouth 2 (two) times a day (Patient not taking: No sig reported)   • triamcinolone (KENALOG) 0 5 % cream Apply topically 2 (two) times a day   • venlafaxine (EFFEXOR) 75 mg tablet Take 75 mg by mouth 2 (two) times a day with meals   • vitamin B-12 (CYANOCOBALAMIN) 2000 MCG TABS Take 1 tablet (2,000 mcg total) by mouth daily     Allergies   Allergen Reactions   • Motrin [Ibuprofen]      Hx gastric bypass   • Cherry - Food Allergy Rash   • Gabapentin Rash     Immunization History   Administered Date(s) Administered   • INFLUENZA 12/09/2013, 12/18/2014, 02/08/2017   • Influenza, recombinant, quadrivalent,injectable, preservative free 10/22/2019, 03/17/2021   • Pneumococcal Polysaccharide PPV23 03/21/2012   • Tdap 03/21/2012   • Tuberculin Skin Test-PPD Intradermal 11/26/2021       Objective     /80 (BP Location: Right arm, Patient Position: Sitting, Cuff Size: Large)   Pulse 77   Temp (!) 97 3 °F (36 3 °C) (Temporal)   Resp 16   Ht 5' 5\" (1 651 m)   Wt 85 7 kg (189 lb)   LMP  (LMP Unknown)   SpO2 99%   BMI 31 45 kg/m²     Physical Exam  Constitutional:       Appearance: Normal appearance  HENT:      Head: Normocephalic and atraumatic  Nose: Nose normal    Eyes:      Conjunctiva/sclera: Conjunctivae normal    Cardiovascular:      Rate and Rhythm: Normal rate and regular rhythm  Pulmonary:      Effort: Pulmonary effort is normal    Musculoskeletal:      Right shoulder: Tenderness (on palpation of anterior biceps tendon groove, bl) present  Decreased range of motion (pain with elevation above horizontal)  Left shoulder: Tenderness present  Decreased range of motion (pain with elevation above horizontal)  Right elbow: Tenderness present in radial head  Left elbow: Tenderness present in radial head  Right wrist: Bony tenderness present  Left wrist: Bony tenderness present        Right hand: " Bony tenderness present  Left hand: Bony tenderness present  Cervical back: Normal range of motion  Right knee: Crepitus (with passive range of motion, b/l) present  Left knee: Crepitus present  Skin:     General: Skin is warm and dry  Neurological:      Mental Status: She is alert and oriented to person, place, and time     Psychiatric:         Behavior: Behavior normal        Scar Mejia MD

## 2023-05-26 ENCOUNTER — APPOINTMENT (OUTPATIENT)
Dept: LAB | Facility: HOSPITAL | Age: 63
End: 2023-05-26
Payer: MEDICARE

## 2023-05-26 DIAGNOSIS — M25.50 ARTHRALGIA, UNSPECIFIED JOINT: ICD-10-CM

## 2023-05-26 DIAGNOSIS — Z98.84 HISTORY OF BARIATRIC SURGERY: ICD-10-CM

## 2023-05-26 LAB
25(OH)D3 SERPL-MCNC: 34.4 NG/ML (ref 30–100)
ALBUMIN SERPL BCP-MCNC: 4.1 G/DL (ref 3.5–5)
ALP SERPL-CCNC: 79 U/L (ref 34–104)
ALT SERPL W P-5'-P-CCNC: 14 U/L (ref 7–52)
ANA SER QL IA: NEGATIVE
ANION GAP SERPL CALCULATED.3IONS-SCNC: 8 MMOL/L (ref 4–13)
AST SERPL W P-5'-P-CCNC: 14 U/L (ref 13–39)
BILIRUB SERPL-MCNC: 0.41 MG/DL (ref 0.2–1)
BUN SERPL-MCNC: 16 MG/DL (ref 5–25)
CALCIUM SERPL-MCNC: 9.4 MG/DL (ref 8.4–10.2)
CHLORIDE SERPL-SCNC: 105 MMOL/L (ref 96–108)
CO2 SERPL-SCNC: 31 MMOL/L (ref 21–32)
CREAT SERPL-MCNC: 0.73 MG/DL (ref 0.6–1.3)
ERYTHROCYTE [SEDIMENTATION RATE] IN BLOOD: 9 MM/HOUR (ref 0–29)
FERRITIN SERPL-MCNC: 8 NG/ML (ref 11–307)
FOLATE SERPL-MCNC: 13.6 NG/ML
GFR SERPL CREATININE-BSD FRML MDRD: 89 ML/MIN/1.73SQ M
GLUCOSE P FAST SERPL-MCNC: 92 MG/DL (ref 65–99)
IRON SATN MFR SERPL: 10 % (ref 15–50)
IRON SERPL-MCNC: 40 UG/DL (ref 50–170)
POTASSIUM SERPL-SCNC: 3.9 MMOL/L (ref 3.5–5.3)
PROT SERPL-MCNC: 6.5 G/DL (ref 6.4–8.4)
SODIUM SERPL-SCNC: 144 MMOL/L (ref 135–147)
TIBC SERPL-MCNC: 417 UG/DL (ref 250–450)
VIT B12 SERPL-MCNC: 588 PG/ML (ref 180–914)

## 2023-05-26 PROCEDURE — 83540 ASSAY OF IRON: CPT

## 2023-05-26 PROCEDURE — 83550 IRON BINDING TEST: CPT

## 2023-05-26 PROCEDURE — 82607 VITAMIN B-12: CPT

## 2023-05-26 PROCEDURE — 85652 RBC SED RATE AUTOMATED: CPT

## 2023-05-26 PROCEDURE — 82728 ASSAY OF FERRITIN: CPT

## 2023-05-26 PROCEDURE — 86038 ANTINUCLEAR ANTIBODIES: CPT

## 2023-05-26 PROCEDURE — 82746 ASSAY OF FOLIC ACID SERUM: CPT

## 2023-05-26 PROCEDURE — 86225 DNA ANTIBODY NATIVE: CPT

## 2023-05-26 PROCEDURE — 86200 CCP ANTIBODY: CPT

## 2023-05-26 PROCEDURE — 36415 COLL VENOUS BLD VENIPUNCTURE: CPT

## 2023-05-26 PROCEDURE — 80053 COMPREHEN METABOLIC PANEL: CPT

## 2023-05-26 PROCEDURE — 82306 VITAMIN D 25 HYDROXY: CPT

## 2023-05-26 NOTE — ASSESSMENT & PLAN NOTE
S/p elvira-en-y gastric bypass in 2014  Patient is taking multivitamin and vit D supplement  Not currently following with bariatrics  Will order lab work up as per orders below

## 2023-05-27 LAB — DSDNA AB SER-ACNC: <1 IU/ML (ref 0–9)

## 2023-05-30 DIAGNOSIS — E61.1 IRON DEFICIENCY: Primary | ICD-10-CM

## 2023-05-30 LAB — CCP AB SER IA-ACNC: 4.6

## 2023-05-30 RX ORDER — ASCORBIC ACID 500 MG
500 TABLET ORAL EVERY OTHER DAY
Qty: 90 TABLET | Refills: 0 | Status: SHIPPED | OUTPATIENT
Start: 2023-05-30

## 2023-05-30 RX ORDER — FERROUS SULFATE TAB EC 324 MG (65 MG FE EQUIVALENT) 324 (65 FE) MG
324 TABLET DELAYED RESPONSE ORAL EVERY OTHER DAY
Qty: 90 TABLET | Refills: 0 | Status: SHIPPED | OUTPATIENT
Start: 2023-05-30

## 2023-05-31 ENCOUNTER — HOSPITAL ENCOUNTER (EMERGENCY)
Facility: HOSPITAL | Age: 63
Discharge: HOME | End: 2023-05-31
Attending: EMERGENCY MEDICINE

## 2023-05-31 ENCOUNTER — APPOINTMENT (EMERGENCY)
Dept: RADIOLOGY | Facility: HOSPITAL | Age: 63
End: 2023-05-31

## 2023-05-31 VITALS
RESPIRATION RATE: 14 BRPM | DIASTOLIC BLOOD PRESSURE: 80 MMHG | HEART RATE: 68 BPM | OXYGEN SATURATION: 98 % | WEIGHT: 189.82 LBS | SYSTOLIC BLOOD PRESSURE: 122 MMHG | TEMPERATURE: 98.3 F | BODY MASS INDEX: 31.59 KG/M2

## 2023-05-31 DIAGNOSIS — M25.562 LEFT KNEE PAIN: Primary | ICD-10-CM

## 2023-05-31 RX ORDER — OXYCODONE HYDROCHLORIDE 5 MG/1
5 TABLET ORAL EVERY 4 HOURS PRN
Qty: 10 TABLET | Refills: 0 | Status: SHIPPED | OUTPATIENT
Start: 2023-05-31 | End: 2023-06-10

## 2023-05-31 NOTE — ED PROVIDER NOTES
History  Chief Complaint   Patient presents with   • Knee Pain     L knee pain  Denies injury  This is a 71-year-old female who presents today with left knee pain for a few days  Denies any known injury to it  Worsens with walking on it or any movement  Has only been using Tylenol at home as she cannot take ibuprofen due to history of gastric bypass  Denies any numbness or tingling in her lower leg  Admits to a history of knee pain and issues  Prior to Admission Medications   Prescriptions Last Dose Informant Patient Reported? Taking?    ALPRAZolam (XANAX) 0 5 mg tablet   Yes Yes   Sig: Take 0 5 mg by mouth 2 (two) times a day   Cholecalciferol (Vitamin D) 50 MCG (2000 UT) tablet   No Yes   Sig: Take 1 tablet (2,000 Units total) by mouth daily   Diclofenac Sodium (VOLTAREN) 1 %   No Yes   Sig: Apply 2 g topically 4 (four) times a day   Multiple Vitamin (MULTIVITAMIN) capsule  Self Yes Yes   Sig: Take 1 capsule by mouth daily   acetaminophen (TYLENOL) 325 mg tablet   No Yes   Sig: Take 2 tablets (650 mg total) by mouth every 6 (six) hours as needed for mild pain   acetaminophen (TYLENOL) 500 mg tablet   No Yes   Sig: Take 2 tablets (1,000 mg total) by mouth every 6 (six) hours as needed for mild pain   albuterol (PROVENTIL HFA,VENTOLIN HFA) 90 mcg/act inhaler Not Taking  No No   Sig: INHALE 2 PUFFS BY MOUTH EVERY 6 HOURS AS NEEDED FOR WHEEZING   Patient not taking: Reported on 5/31/2023   amitriptyline (ELAVIL) 25 mg tablet   No Yes   Sig: Take 1 tablet (25 mg total) by mouth daily at bedtime   ascorbic acid (VITAMIN C) 500 MG tablet   No Yes   Sig: Take 1 tablet (500 mg total) by mouth every other day Take with iron tablet   butalbital-acetaminophen-caffeine (FIORICET,ESGIC) -40 mg per tablet  Self No Yes   Sig: Take 1 tablet by mouth every 4 (four) hours as needed for headaches   clonazePAM (KlonoPIN) 0 5 mg tablet Not Taking Self Yes No   Sig: TK 1 T PO 1-2 TIMES DAILY PRF ANXIETY/SLEEP Patient not taking: Reported on 5/31/2023   clotrimazole (LOTRIMIN) 1 % cream   No Yes   Sig: Apply topically 2 (two) times a day   cyclobenzaprine (FLEXERIL) 10 mg tablet Not Taking  No No   Sig: Take 1 tablet (10 mg total) by mouth 2 (two) times a day as needed for muscle spasms   Patient not taking: Reported on 5/31/2023   dicyclomine (BENTYL) 20 mg tablet   No Yes   Sig: TAKE 1 TABLET(20 MG) BY MOUTH EVERY 6 HOURS AS NEEDED FOR ABDOMINAL PAIN   diphenhydrAMINE (BENADRYL) 25 mg tablet Not Taking  No No   Sig: Take 1 tablet (25 mg total) by mouth every 6 (six) hours   Patient not taking: Reported on 5/31/2023   ferrous sulfate 324 (65 Fe) mg Not Taking  No No   Sig: Take 1 tablet (324 mg total) by mouth every other day   Patient not taking: Reported on 5/31/2023   fluticasone (FLOVENT HFA) 110 MCG/ACT inhaler   No No   Sig: Inhale 4 puffs 2 (two) times a day Rinse mouth after use    hydrocortisone 2 5 % cream  Self No Yes   Sig: Apply topically 4 (four) times a day as needed for irritation   meclizine (ANTIVERT) 12 5 MG tablet  Self No Yes   Sig: Take 2 tablets (25 mg total) by mouth 3 (three) times a day as needed for dizziness   methocarbamol (ROBAXIN) 750 mg tablet Not Taking  Yes No   Sig: Take 750 mg by mouth 4 (four) times a day as needed   Patient not taking: Reported on 5/31/2023   methocarbamol (ROBAXIN) 750 mg tablet Not Taking  Yes No   Patient not taking: Reported on 5/31/2023   naloxone (NARCAN) 4 mg/0 1 mL nasal spray   No Yes   Sig: Administer 1 spray into a nostril  If breathing does not return to normal or if breathing difficulty resumes after 2-3 minutes, give another dose in the other nostril using a new spray     nystatin (MYCOSTATIN) ointment   No Yes   Sig: Apply topically 2 (two) times a day for 21 days Apply to umbilicus   omeprazole (PriLOSEC) 20 mg delayed release capsule   No Yes   Sig: Take 1 capsule (20 mg total) by mouth 2 (two) times a day   ondansetron (ZOFRAN) 4 mg tablet Not Taking Self No No   Sig: Take 1 tablet (4 mg total) by mouth every 6 (six) hours   Patient not taking: Reported on 11/5/2021   ondansetron (ZOFRAN-ODT) 4 mg disintegrating tablet Not Taking  No No   Sig: Take 1 tablet (4 mg total) by mouth every 8 (eight) hours as needed for nausea for up to 10 doses   Patient not taking: Reported on 5/31/2023   oxyCODONE-acetaminophen (PERCOCET) 5-325 mg per tablet Not Taking  No No   Sig: Take 1 tablet by mouth every 4 (four) hours as needed for severe pain Max Daily Amount: 6 tablets   Patient not taking: Reported on 5/31/2023   polyethylene glycol (GLYCOLAX) 17 GM/SCOOP powder Not Taking  No No   Sig: Take 17 g by mouth daily   Patient not taking: Reported on 5/31/2023   sucralfate (CARAFATE) 1 g tablet   No Yes   Sig: TAKE 1 TABLET BY MOUTH FOUR TIMES DAILY CRUSH AND MIX WITH WATER TO TAKE AS SUSPENSION   tiZANidine (ZANAFLEX) 2 mg tablet Not Taking  No No   Sig: Take 1 tablet (2 mg total) by mouth 3 (three) times a day   Patient not taking: Reported on 5/31/2023   topiramate (TOPAMAX) 25 mg sprinkle capsule Not Taking Self No No   Sig: Take 1 capsule (25 mg total) by mouth 2 (two) times a day   Patient not taking: Reported on 11/5/2021   triamcinolone (KENALOG) 0 5 % cream   No Yes   Sig: Apply topically 2 (two) times a day   venlafaxine (EFFEXOR) 75 mg tablet   Yes Yes   Sig: Take 75 mg by mouth 2 (two) times a day with meals   vitamin B-12 (CYANOCOBALAMIN) 2000 MCG TABS  Self No Yes   Sig: Take 1 tablet (2,000 mcg total) by mouth daily      Facility-Administered Medications Last Administration Doses Remaining   triamcinolone acetonide (KENALOG-40) 40 mg/mL injection 40 mg None recorded 1          Past Medical History:   Diagnosis Date   • Asthma     Albuterol prn   • Bariatric surgery status    • Depression 2009    managed with Effexor    • Diabetes mellitus ([facility] [address] ) 2005    resolved with gastric bypass 2015   • Generalized osteoarthritis    • Low vitamin B12 level    • Migraine    • Postgastrectomy malabsorption    • Suicide attempt [facility])    • Vitamin D deficiency        Past Surgical History:   Procedure Laterality Date   • CHOLECYSTECTOMY  2005   • COLONOSCOPY     • COSMETIC SURGERY  05/27/2020    Abdominoplasty   • GASTRIC BYPASS  2015    elvira - en -y    • HYSTERECTOMY     • IR IMAGE GUIDED ASPIRATION / DRAINAGE W TUBE  7/14/2020   • KNEE ARTHROSCOPY      with Lysis of adhesions   • LAPAROSCOPIC SUPRACERVICAL HYSTERECTOMY  2005    due to endometriosis/AUB   • OOPHORECTOMY Left 2005   • VA EXCISION SKIN ABD INFRAUMBILICAL PANNICULECTOMY N/A 5/27/2020    Procedure: PANNICULECTOMY;  Surgeon: Yoly Chaves MD;  Location: [facility];  Service: Plastics   • TONSILLECTOMY AND ADENOIDECTOMY     • TUBAL LIGATION  1986   • VAC DRESSING APPLICATION N/A 6/58/8091    Procedure: APPLICATION VAC DRESSING;  Surgeon: Yoly Chaves MD;  Location: [facility];  Service: Plastics       Family History   Problem Relation Age of Onset   • Hypertension Mother    • Kidney cancer Mother    • Diabetes Mother    • Breast cancer Mother    • Heart disease Father    • Stroke Father    • Hypertension Sister    • HIV Brother    • Breast cancer Cousin    • No Known Problems Daughter    • Stomach cancer Maternal Grandmother    • No Known Problems Maternal Grandfather    • No Known Problems Paternal Grandmother    • No Known Problems Paternal Grandfather    • No Known Problems Sister    • No Known Problems Sister    • No Known Problems Daughter    • Prostate cancer Maternal Uncle    • Stomach cancer Maternal Uncle    • Leukemia Grandchild      I have reviewed and agree with the history as documented      E-Cigarette/Vaping   • E-Cigarette Use Never User      E-Cigarette/Vaping Substances   • Nicotine No    • THC No    • CBD No    • Flavoring No    • Other No    • Unknown No      Social History     Tobacco Use   • Smoking status: Former     Years: 32 00     Types: Cigarettes     Quit date: 05/2013     Years since quitting: 10 0   • Smokeless tobacco: Never   Vaping Use   • Vaping Use: Never used   Substance Use Topics   • Alcohol use: Not Currently   • Drug use: Never       Review of Systems   Musculoskeletal: Positive for arthralgias  All other systems reviewed and are negative  Physical Exam  Physical Exam  Vitals and nursing note reviewed  Constitutional:       General: She is not in acute distress  Appearance: Normal appearance  She is well-developed  She is not ill-appearing  HENT:      Head: Normocephalic and atraumatic  Eyes:      Conjunctiva/sclera: Conjunctivae normal    Cardiovascular:      Rate and Rhythm: Normal rate  Pulmonary:      Effort: Pulmonary effort is normal    Musculoskeletal:         General: Swelling and tenderness present  Cervical back: Normal range of motion and neck supple  Comments: Some generalized swelling to the left knee  Decreased range of motion due to pain  No laxity  Sensation and pulses intact in lower extremity  Skin:     General: Skin is warm and dry  Capillary Refill: Capillary refill takes less than 2 seconds  Neurological:      Mental Status: She is alert     Psychiatric:         Mood and Affect: Mood normal          Behavior: Behavior normal          Vital Signs  ED Triage Vitals   Temperature Pulse Respirations Blood Pressure SpO2   05/31/23 1617 05/31/23 1617 05/31/23 1617 05/31/23 1618 05/31/23 1617   98 3 °F (36 8 °C) 68 14 122/80 98 %      Temp Source Heart Rate Source Patient Position - Orthostatic VS BP Location FiO2 (%)   05/31/23 1617 05/31/23 1617 05/31/23 1617 05/31/23 1617 --   Oral Monitor Sitting Left arm       Pain Score       05/31/23 1617       9           Vitals:    05/31/23 1617 05/31/23 1618   BP:  122/80   Pulse: 68    Patient Position - Orthostatic VS: Sitting          Visual Acuity      ED Medications  Medications - No data to display    Diagnostic Studies  Results Reviewed     None XR knee 4+ vw left injury    (Results Pending)              Procedures  Procedures         ED Course                               SBIRT 22yo+    Flowsheet Row Most Recent Value   Initial Alcohol Screen: US AUDIT-C     1  How often do you have a drink containing alcohol? 0 Filed at: 05/31/2023 1618   2  How many drinks containing alcohol do you have on a typical day you are drinking? 0 Filed at: 05/31/2023 1618   3a  Male UNDER 65: How often do you have five or more drinks on one occasion? 0 Filed at: 05/31/2023 1618   3b  FEMALE Any Age, or MALE 65+: How often do you have 4 or more drinks on one occassion? 0 Filed at: 05/31/2023 1618   Audit-C Score 0 Filed at: 05/31/2023 1618   BC: How many times in the past year have you    Used an illegal drug or used a prescription medication for non-medical reasons? Never Filed at: 05/31/2023 1618                    Medical Decision Making  46-year-old female presents with left knee pain for a few days  On physical exam patient has decreased range of motion due to the pain  No overlying skin changes however does have generalized swelling to the knee  No effusion that is palpable  X-ray of the left knee was reviewed by me and it showed no acute fractures, arthritic changes  We will place patient in an Ace wrap to help with swelling and support  Offered crutches however patient declines  I did also offer a cane however patient states that she has 1 at home  I recommended Tylenol and Voltaren gel as she cannot take ibuprofen due to gastric bypass  Referral placed to Ortho  I have discussed the plan to discharge pt from ED   The patient was discharged in stable condition   Patient ambulated off the department   Extensive return to emergency department precautions were discussed   Follow up with appropriate providers including primary care physician was discussed   Patient and/or their  primary decision maker expressed understanding  Eliceo Segundo remained stable "during entire emergency department stay  Portions of the record may have been created with voice recognition software  Occasional wrong word or \"sound a like\" substitutions may have occurred due to the inherent limitations of voice recognition software  Read the chart carefully and recognize, using context, where substitutions have occurred  Left knee pain: acute illness or injury  Amount and/or Complexity of Data Reviewed  Radiology: ordered and independent interpretation performed  Risk  Prescription drug management  Disposition  Final diagnoses:   Left knee pain     Time reflects when diagnosis was documented in both MDM as applicable and the Disposition within this note     Time User Action Codes Description Comment    5/31/2023  5:08 PM Salvatore Foster Add [F60 082] Left knee pain       ED Disposition     ED Disposition   Discharge    Condition   Stable    Date/Time   Wed May 31, 2023  5:08 PM    Comment   Sharlene Ramirez discharge to home/self care  Follow-up Information     Follow up With Specialties Details Why Contact Info Additional [address] [facility] Shahbaz Mckeon Orthopedic Surgery Schedule an appointment as soon as possible for a visit   [address] [facility] [address] [facility] [address] [phone] [address] [facility] Shahbaz Mckeon, [address] [facility], [address] [place] [address] Shahbaz Mckeon, [place], [address] [phone]          Discharge Medication List as of 5/31/2023  5:09 PM      START taking these medications    Details   !! Diclofenac Sodium (VOLTAREN) 1 % Apply 2 g topically 4 (four) times a day, Starting Wed 5/31/2023, Normal       !! - Potential duplicate medications found  Please discuss with provider        CONTINUE these medications which have NOT CHANGED    Details   !! acetaminophen (TYLENOL) 325 mg tablet Take 2 tablets (650 mg total) by mouth every 6 (six) hours as needed for mild pain, Starting Sat 10/1/2022, " Normal      !! acetaminophen (TYLENOL) 500 mg tablet Take 2 tablets (1,000 mg total) by mouth every 6 (six) hours as needed for mild pain, Starting Fri 1/21/2022, No Print      ALPRAZolam (XANAX) 0 5 mg tablet Take 0 5 mg by mouth 2 (two) times a day, Starting Thu 9/29/2022, Historical Med      amitriptyline (ELAVIL) 25 mg tablet Take 1 tablet (25 mg total) by mouth daily at bedtime, Starting Wed 3/17/2021, Normal      ascorbic acid (VITAMIN C) 500 MG tablet Take 1 tablet (500 mg total) by mouth every other day Take with iron tablet, Starting Tue 5/30/2023, Normal      butalbital-acetaminophen-caffeine (FIORICET,ESGIC) -40 mg per tablet Take 1 tablet by mouth every 4 (four) hours as needed for headaches, Starting Fri 8/23/2019, Print      Cholecalciferol (Vitamin D) 50 MCG (2000 UT) tablet Take 1 tablet (2,000 Units total) by mouth daily, Starting Mon 2/20/2023, Normal      clotrimazole (LOTRIMIN) 1 % cream Apply topically 2 (two) times a day, Starting Thu 1/27/2022, Normal      !! Diclofenac Sodium (VOLTAREN) 1 % Apply 2 g topically 4 (four) times a day, Starting Thu 5/25/2023, Normal      dicyclomine (BENTYL) 20 mg tablet TAKE 1 TABLET(20 MG) BY MOUTH EVERY 6 HOURS AS NEEDED FOR ABDOMINAL PAIN, Normal      hydrocortisone 2 5 % cream Apply topically 4 (four) times a day as needed for irritation, Starting Wed 4/24/2019, Normal      meclizine (ANTIVERT) 12 5 MG tablet Take 2 tablets (25 mg total) by mouth 3 (three) times a day as needed for dizziness, Starting Fri 8/23/2019, Print      Multiple Vitamin (MULTIVITAMIN) capsule Take 1 capsule by mouth daily, Historical Med      naloxone (NARCAN) 4 mg/0 1 mL nasal spray Administer 1 spray into a nostril   If breathing does not return to normal or if breathing difficulty resumes after 2-3 minutes, give another dose in the other nostril using a new spray , Normal      nystatin (MYCOSTATIN) ointment Apply topically 2 (two) times a day for 21 days Apply to umbilicus, Starting Thu 8/13/2020, Until Wed 5/31/2023, Normal      omeprazole (PriLOSEC) 20 mg delayed release capsule Take 1 capsule (20 mg total) by mouth 2 (two) times a day, Starting Mon 1/17/2022, Normal      sucralfate (CARAFATE) 1 g tablet TAKE 1 TABLET BY MOUTH FOUR TIMES DAILY CRUSH AND MIX WITH WATER TO TAKE AS SUSPENSION, Normal      triamcinolone (KENALOG) 0 5 % cream Apply topically 2 (two) times a day, Starting Wed 6/3/2020, Normal      venlafaxine (EFFEXOR) 75 mg tablet Take 75 mg by mouth 2 (two) times a day with meals, Starting Wed 12/30/2020, Historical Med      vitamin B-12 (CYANOCOBALAMIN) 2000 MCG TABS Take 1 tablet (2,000 mcg total) by mouth daily, Starting Fri 11/1/2019, Normal      albuterol (PROVENTIL HFA,VENTOLIN HFA) 90 mcg/act inhaler INHALE 2 PUFFS BY MOUTH EVERY 6 HOURS AS NEEDED FOR WHEEZING, Normal      clonazePAM (KlonoPIN) 0 5 mg tablet TK 1 T PO 1-2 TIMES DAILY PRF ANXIETY/SLEEP, Historical Med      cyclobenzaprine (FLEXERIL) 10 mg tablet Take 1 tablet (10 mg total) by mouth 2 (two) times a day as needed for muscle spasms, Starting Sat 10/1/2022, Normal      diphenhydrAMINE (BENADRYL) 25 mg tablet Take 1 tablet (25 mg total) by mouth every 6 (six) hours, Starting Mon 6/1/2020, Normal      ferrous sulfate 324 (65 Fe) mg Take 1 tablet (324 mg total) by mouth every other day, Starting Tue 5/30/2023, Normal      fluticasone (FLOVENT HFA) 110 MCG/ACT inhaler Inhale 4 puffs 2 (two) times a day Rinse mouth after use , Starting Fri 1/21/2022, Until Sun 2/20/2022, Normal      !! methocarbamol (ROBAXIN) 750 mg tablet Take 750 mg by mouth 4 (four) times a day as needed, Starting Mon 9/13/2021, Historical Med      !! methocarbamol (ROBAXIN) 750 mg tablet Starting Mon 9/13/2021, Historical Med      ondansetron (ZOFRAN) 4 mg tablet Take 1 tablet (4 mg total) by mouth every 6 (six) hours, Starting Fri 8/23/2019, Print      ondansetron (ZOFRAN-ODT) 4 mg disintegrating tablet Take 1 tablet (4 mg total) by mouth every 8 (eight) hours as needed for nausea for up to 10 doses, Starting Mon 9/6/2021, Print      oxyCODONE-acetaminophen (PERCOCET) 5-325 mg per tablet Take 1 tablet by mouth every 4 (four) hours as needed for severe pain Max Daily Amount: 6 tablets, Starting Fri 2/11/2022, Normal      polyethylene glycol (GLYCOLAX) 17 GM/SCOOP powder Take 17 g by mouth daily, Starting Mon 10/25/2021, Normal      tiZANidine (ZANAFLEX) 2 mg tablet Take 1 tablet (2 mg total) by mouth 3 (three) times a day, Starting Mon 1/24/2022, Normal      topiramate (TOPAMAX) 25 mg sprinkle capsule Take 1 capsule (25 mg total) by mouth 2 (two) times a day, Starting Fri 5/31/2019, Normal       !! - Potential duplicate medications found  Please discuss with provider                PDMP Review       Value Time User    PDMP Reviewed  Yes 2/11/2022  9:58 PM Eboni Alex MD          ED Provider  Electronically Signed by           Jo Ann Garcaí PA-C  05/31/23 2010

## 2023-06-01 ENCOUNTER — OFFICE VISIT (OUTPATIENT)
Dept: OBGYN CLINIC | Facility: MEDICAL CENTER | Age: 63
End: 2023-06-01

## 2023-06-01 VITALS
WEIGHT: 190 LBS | HEART RATE: 64 BPM | SYSTOLIC BLOOD PRESSURE: 126 MMHG | HEIGHT: 65 IN | DIASTOLIC BLOOD PRESSURE: 84 MMHG | BODY MASS INDEX: 31.65 KG/M2

## 2023-06-01 DIAGNOSIS — M17.12 PRIMARY OSTEOARTHRITIS OF LEFT KNEE: Primary | ICD-10-CM

## 2023-06-01 DIAGNOSIS — M25.562 LEFT KNEE PAIN: ICD-10-CM

## 2023-06-01 RX ADMIN — BUPIVACAINE HYDROCHLORIDE 4 ML: 2.5 INJECTION, SOLUTION INFILTRATION; PERINEURAL at 16:00

## 2023-06-01 RX ADMIN — TRIAMCINOLONE ACETONIDE 40 MG: 40 INJECTION, SUSPENSION INTRA-ARTICULAR; INTRAMUSCULAR at 16:00

## 2023-06-01 NOTE — PROGRESS NOTES
Knee New Office Note    Assessment:     1  Primary osteoarthritis of left knee    2  Left knee pain        Plan:     Problem List Items Addressed This Visit    None  Visit Diagnoses     Primary osteoarthritis of left knee    -  Primary    Relevant Orders    Large joint arthrocentesis: L knee    Left knee pain              Findings today are consistent with left knee osteoarthritis  Imaging and prognosis was reviewed with the patient today  Aspiration and cortisone steroid injection was administered to left knee today  Patient should avoid strenuous activities for the next 1-2 days  Patient should avoid vaccines for the next 2 weeks if possible  Can apply cold compress for soreness  If patient feels relief with the cortisone injections, procedure can be repeated every 3 months  She was encouraged to stay active and keep the knee moving  Follow up as needed  Subjective:     Patient ID: Mica Serrano is a 63 y o  female  Chief Complaint:  HPI:  63 y o  female presents to the office for evaluation of left knee pain ongoing for several years with worsening over the past 1 week without known injury  She is experiencing medial and posterior left knee pain made worse with activities  She does note feelings of instability and giving way  She has been wearing ACE bandage for compression and using topicals with minimal relief  She was recently seen in the ED and provided with oxycodone  She has history of left knee arthroscopic surgery      Allergy:  Allergies   Allergen Reactions   • Motrin [Ibuprofen]      Hx gastric bypass   • Cherry - Food Allergy Rash   • Gabapentin Rash     Medications:  all current active meds have been reviewed  Past Medical History:  Past Medical History:   Diagnosis Date   • Asthma     Albuterol prn   • Bariatric surgery status    • Depression 2009    managed with Effexor    • Diabetes mellitus ([facility] [address] ) 2005    resolved with gastric bypass 2015   • Generalized osteoarthritis    • Low vitamin B12 level    • Migraine    • Postgastrectomy malabsorption    • Suicide attempt [facility])    • Vitamin D deficiency      Past Surgical History:  Past Surgical History:   Procedure Laterality Date   • CHOLECYSTECTOMY  2005   • COLONOSCOPY     • COSMETIC SURGERY  05/27/2020    Abdominoplasty   • GASTRIC BYPASS  2015    elvira - en -y    • HYSTERECTOMY     • IR IMAGE GUIDED ASPIRATION / DRAINAGE W TUBE  7/14/2020   • KNEE ARTHROSCOPY      with Lysis of adhesions   • LAPAROSCOPIC SUPRACERVICAL HYSTERECTOMY  2005    due to endometriosis/AUB   • OOPHORECTOMY Left 2005   • AL EXCISION SKIN ABD INFRAUMBILICAL PANNICULECTOMY N/A 5/27/2020    Procedure: PANNICULECTOMY;  Surgeon: Chloe Luke MD;  Location: [facility];  Service: Plastics   • TONSILLECTOMY AND ADENOIDECTOMY     • TUBAL LIGATION  1986   • VAC DRESSING APPLICATION N/A 4/93/9303    Procedure: APPLICATION VAC DRESSING;  Surgeon: Chloe Luke MD;  Location: [facility];  Service: Plastics     Family History:  Family History   Problem Relation Age of Onset   • Hypertension Mother    • Kidney cancer Mother    • Diabetes Mother    • Breast cancer Mother    • Heart disease Father    • Stroke Father    • Hypertension Sister    • HIV Brother    • Breast cancer Cousin    • No Known Problems Daughter    • Stomach cancer Maternal Grandmother    • No Known Problems Maternal Grandfather    • No Known Problems Paternal Grandmother    • No Known Problems Paternal Grandfather    • No Known Problems Sister    • No Known Problems Sister    • No Known Problems Daughter    • Prostate cancer Maternal Uncle    • Stomach cancer Maternal Uncle    • Leukemia Grandchild      Social History:  Social History     Substance and Sexual Activity   Alcohol Use Not Currently     Social History     Substance and Sexual Activity   Drug Use Never     Social History     Tobacco Use   Smoking Status Former   • Years: 32 00   • Types: Cigarettes   • Quit date: 05/2013   • Years since "quitting: 10 0   Smokeless Tobacco Never           ROS:  General: Per HPI  Skin: Negative, except if noted below  HEENT: Negative  Respiratory: Negative  Cardiovascular: Negative  Gastrointestinal: Negative  Urinary: Negative  Vascular: Negative  Musculoskeletal: Positive per HPI   Neurologic: Positive per HPI  Endocrine: Negative    Objective:  BP Readings from Last 1 Encounters:   06/01/23 126/84      Wt Readings from Last 1 Encounters:   06/01/23 86 2 kg (190 lb)        Respiratory:   non-labored respirations    Lymphatics:  no palpable lymph nodes    Gait:   antalgic    Neurologic:   Alert and oriented times 3  Patient with normal sensation except as noted below  Deep tendon reflexes 2+ except as noted in MSK exam    Bilateral Lower Extremity:  Left Knee: Inspection:  Well healed portal incisions    Overall limb alignment neutral    Effusion: mild    ROM 0-95 with pain    Extensor Lag: none    Palpation: medial and posterior Joint line tenderness to palpation    AP Stability at 90 deg stable     M/L stability in full extension stable    M/L stability in midflexion stable    Motor: 5/5 IP/Q/HS/TA/GS    Pulses: 2+ DP / 2+ PT    SILT DP/SP/S/S/TN    Imaging:  My interpretation XR AP scanogram/AP bilateral knee/lateral/diego/sunrise left knee: moderate to severe joint space narrowing, subchondral sclerosis, subchondral cysts, osteophyte formation  No fracture or dislocation  BMI:   Estimated body mass index is 31 62 kg/m² as calculated from the following:    Height as of this encounter: 5' 5\" (1 651 m)  Weight as of this encounter: 86 2 kg (190 lb)  BSA:   Estimated body surface area is 1 94 meters squared as calculated from the following:    Height as of this encounter: 5' 5\" (1 651 m)  Weight as of this encounter: 86 2 kg (190 lb)  Large joint arthrocentesis: L knee  Universal Protocol:  Consent: Verbal consent obtained    Risks and benefits: risks, benefits and alternatives were " "discussed  Consent given by: patient  Time out: Immediately prior to procedure a \"time out\" was called to verify the correct patient, procedure, equipment, support staff and site/side marked as required  Timeout called at: 6/1/2023 3:58 PM   Patient understanding: patient states understanding of the procedure being performed  Site marked: the operative site was marked  Patient identity confirmed: verbally with patient    Supporting Documentation  Indications: pain   Procedure Details  Location: knee - L knee  Preparation: Patient was prepped and draped in the usual sterile fashion  Needle size: 18 G  Ultrasound guidance: no  Approach: superior  Medications administered: 4 mL bupivacaine 0 25 %; 40 mg triamcinolone acetonide 40 mg/mL    Aspirate amount: 13 mL  Aspirate: yellow, serous and clear    Patient tolerance: patient tolerated the procedure well with no immediate complications  Dressing:  Sterile dressing applied          Scribe Attestation    I,:  Mounika Powers am acting as a scribe while in the presence of the attending physician :       I,:  Candi Patel, DO personally performed the services described in this documentation    as scribed in my presence  :             "

## 2023-06-02 RX ORDER — BUPIVACAINE HYDROCHLORIDE 2.5 MG/ML
4 INJECTION, SOLUTION INFILTRATION; PERINEURAL
Status: COMPLETED | OUTPATIENT
Start: 2023-06-01 | End: 2023-06-01

## 2023-06-02 RX ORDER — TRIAMCINOLONE ACETONIDE 40 MG/ML
40 INJECTION, SUSPENSION INTRA-ARTICULAR; INTRAMUSCULAR
Status: COMPLETED | OUTPATIENT
Start: 2023-06-01 | End: 2023-06-01

## 2023-07-27 ENCOUNTER — HOSPITAL ENCOUNTER (EMERGENCY)
Facility: HOSPITAL | Age: 63
Discharge: HOME | End: 2023-07-28
Attending: EMERGENCY MEDICINE
Payer: MEDICARE

## 2023-07-27 VITALS
SYSTOLIC BLOOD PRESSURE: 155 MMHG | OXYGEN SATURATION: 96 % | WEIGHT: 191 LBS | BODY MASS INDEX: 31.78 KG/M2 | DIASTOLIC BLOOD PRESSURE: 86 MMHG | TEMPERATURE: 98.3 F | RESPIRATION RATE: 20 BRPM | HEART RATE: 84 BPM

## 2023-07-27 DIAGNOSIS — F41.0 PANIC ATTACK: ICD-10-CM

## 2023-07-27 DIAGNOSIS — F41.9 ANXIETY: Primary | ICD-10-CM

## 2023-07-27 PROCEDURE — 93005 ELECTROCARDIOGRAM TRACING: CPT

## 2023-07-27 RX ORDER — ALPRAZOLAM 0.5 MG/1
0.5 TABLET ORAL ONCE
Status: COMPLETED | OUTPATIENT
Start: 2023-07-27 | End: 2023-07-27

## 2023-07-27 RX ADMIN — ALPRAZOLAM 0.5 MG: 0.5 TABLET ORAL at 23:08
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
## 2023-07-31 ENCOUNTER — HOSPITAL ENCOUNTER (OUTPATIENT)
Dept: BONE DENSITY | Facility: MEDICAL CENTER | Age: 63
Discharge: HOME | End: 2023-07-31
Payer: MEDICARE

## 2023-07-31 DIAGNOSIS — Z90.3 POSTGASTRECTOMY MALABSORPTION: ICD-10-CM

## 2023-07-31 DIAGNOSIS — K91.2 POSTGASTRECTOMY MALABSORPTION: ICD-10-CM

## 2023-07-31 DIAGNOSIS — Z78.0 POSTMENOPAUSAL: ICD-10-CM

## 2023-07-31 PROCEDURE — 77080 DXA BONE DENSITY AXIAL: CPT

## 2023-08-01 LAB
ATRIAL RATE: 76 BPM
P AXIS: 67 DEGREES
PR INTERVAL: 152 MS
QRS AXIS: 42 DEGREES
QRSD INTERVAL: 86 MS
QT INTERVAL: 372 MS
QTC INTERVAL: 418 MS
T WAVE AXIS: 6 DEGREES
VENTRICULAR RATE: 76 BPM

## 2023-08-01 PROCEDURE — 93010 ELECTROCARDIOGRAM REPORT: CPT | Performed by: INTERNAL MEDICINE

## 2023-08-09 ENCOUNTER — OFFICE VISIT (OUTPATIENT)
Dept: GASTROENTEROLOGY | Facility: MEDICAL CENTER | Age: 63
End: 2023-08-09
Payer: MEDICARE

## 2023-08-09 VITALS
DIASTOLIC BLOOD PRESSURE: 78 MMHG | TEMPERATURE: 98.5 F | HEART RATE: 71 BPM | HEIGHT: 65 IN | WEIGHT: 190.2 LBS | SYSTOLIC BLOOD PRESSURE: 124 MMHG | BODY MASS INDEX: 31.69 KG/M2

## 2023-08-09 DIAGNOSIS — K59.00 CONSTIPATION, UNSPECIFIED CONSTIPATION TYPE: ICD-10-CM

## 2023-08-09 DIAGNOSIS — K21.00 GASTROESOPHAGEAL REFLUX DISEASE WITH ESOPHAGITIS WITHOUT HEMORRHAGE: ICD-10-CM

## 2023-08-09 DIAGNOSIS — R14.0 BLOATING: ICD-10-CM

## 2023-08-09 DIAGNOSIS — Z12.11 SCREEN FOR COLON CANCER: Primary | ICD-10-CM

## 2023-08-09 PROCEDURE — 99214 OFFICE O/P EST MOD 30 MIN: CPT | Performed by: INTERNAL MEDICINE

## 2023-08-09 RX ORDER — OMEPRAZOLE 40 MG/1
CAPSULE, DELAYED RELEASE ORAL
Qty: 90 CAPSULE | Refills: 0 | Status: SHIPPED | OUTPATIENT
Start: 2023-08-09

## 2023-08-09 RX ORDER — LINACLOTIDE 72 UG/1
72 CAPSULE, GELATIN COATED ORAL
Qty: 30 CAPSULE | Refills: 3 | Status: SHIPPED | OUTPATIENT
Start: 2023-08-09

## 2023-08-09 RX ORDER — OMEPRAZOLE 40 MG/1
40 CAPSULE, DELAYED RELEASE ORAL
Qty: 30 CAPSULE | Refills: 3 | Status: SHIPPED | OUTPATIENT
Start: 2023-08-09 | End: 2023-08-09

## 2023-08-09 NOTE — PROGRESS NOTES
[facility] - Outpatient Follow-up Note  Jocelyn Pompa 63 y.o. female MRN: [id number]  Encounter: [id number]          ASSESSMENT AND PLAN:   62-year-old female with history of Edgardo-en-Y gastric bypass (2015), diabetes, presents for follow-up evaluation. 1. Constipation, unspecified constipation type  She reports a sense of incomplete evacuation with bowel movements and infrequent bowel movements. She is currently taking a daily fiber supplement I recommend she increase this to twice daily. She previously trialed MiraLAX with no improvement of her symptoms. I provided her samples of 72 mcg daily dosing of Linzess. If she has no relief with the medication she will call our office and can be prescribed a higher dose. Continue constipation management with high-fiber diet, fiber supplementation and adequate hydration  - linaCLOtide (Linzess) 72 MCG CAPS; Take 72 mcg by mouth daily before breakfast  Dispense: 30 capsule; Refill: 3    2. Gastroesophageal reflux disease with esophagitis without hemorrhage  3. Bloating  She reports chronic symptoms of GERD, abdominal bloating and fullness. I have ordered omeprazole 40 mg daily to be taken in the morning. Discussed dietary/lifestyle antireflux measures with her today. Given her abdominal fullness EGD will be performed to rule GJ anastomosis abnormality given her history of gastric bypass. - EGD; Future        4. Screen for colon cancer  Her last colonoscopy was 7 years ago and was reportedly normal.  Procedure reports not available for review. She was seen in the office in the past and scheduled for repeat colonoscopy however this was not performed. I discussed indication, risk and benefit of colonoscopy with her today and she is agreeable to proceed. - Colonoscopy; Future  - polyethylene glycol (GOLYTELY) 4000 mL solution;  Take as instructed by GI office  Dispense: 4000 mL; Refill: 0        ______________________________________________________________________    SUBJECTIVE: 73-year-old female with history of Edgardo-en-Y gastric bypass (2015), diabetes, presents for follow-up evaluation. She was last seen in the GI office in 2021 for abdominal pain. She had undergone an EGD in 2019 by her bariatric surgery group small hiatal hernia otherwise normal gastric bypass anatomy. Gastric biopsies were normal.  2021 EGD showed erythematous mucosa in the pouch, mild esophagitis otherwise unremarkable. Gastric biopsies showed chronic inactive gastritis negative for H. pylori. At her last office visit she was due for colonoscopy or colon cancer screening however did not have the procedure performed. Interval history: She reports symptoms of abdominal bloating, sour taste in her mouth. She has nausea at times. She states that she has intermittent fullness sensation after eating small amounts. She denies dysphagia or odynophagia. She was previously prescribed omeprazole 20 mg twice daily but has discontinued this medication for some time as she ran out of it a few months ago. She reports bowel movements are usually every other day with a sense of incomplete evacuation. She uses once daily fiber supplement and previously used MiraLAX with no improvement. She denies rectal bleeding      She reports history of colon cancer in a maternal uncle diagnosed in his 62s   She reports colonoscopy was performed 7 years ago was normal and she was recommended to repeat in 5 years    REVIEW OF SYSTEMS IS OTHERWISE NEGATIVE.   10 point review of systems negative other than stated as per HPI    Historical Information   Past Medical History:   Diagnosis Date   • Asthma     Albuterol prn   • Bariatric surgery status    • Depression 2009    managed with Effexor    • Diabetes mellitus ([address]) 2005    resolved with gastric bypass 2015   • Generalized osteoarthritis    • Low vitamin B12 level    • Migraine    • Postgastrectomy malabsorption    • Suicide attempt [facility])    • Vitamin D deficiency      Past Surgical History:   Procedure Laterality Date   • CHOLECYSTECTOMY  2005   • COLONOSCOPY     • COSMETIC SURGERY  05/27/2020    Abdominoplasty   • GASTRIC BYPASS  2015    elvira - en -y    • HYSTERECTOMY     • IR IMAGE GUIDED ASPIRATION / DRAINAGE W TUBE  7/14/2020   • KNEE ARTHROSCOPY      with Lysis of adhesions   • LAPAROSCOPIC SUPRACERVICAL HYSTERECTOMY  2005    due to endometriosis/AUB   • OOPHORECTOMY Left 2005   • CT EXCISION SKIN ABD INFRAUMBILICAL PANNICULECTOMY N/A 5/27/2020    Procedure: PANNICULECTOMY;  Surgeon: Harry Lu MD;  Location: [facility];  Service: Plastics   • TONSILLECTOMY AND ADENOIDECTOMY     • TUBAL LIGATION  1986   • VAC DRESSING APPLICATION N/A 0/08/4275    Procedure: APPLICATION VAC DRESSING;  Surgeon: Harry Lu MD;  Location: [facility];  Service: Plastics     Social History   Social History     Substance and Sexual Activity   Alcohol Use Not Currently     Social History     Substance and Sexual Activity   Drug Use Never     Social History     Tobacco Use   Smoking Status Former   • Years: 32.00   • Types: Cigarettes   • Quit date: 05/2013   • Years since quitting: 10.2   Smokeless Tobacco Never     Family History   Problem Relation Age of Onset   • Hypertension Mother    • Kidney cancer Mother    • Diabetes Mother    • Breast cancer Mother    • Heart disease Father    • Stroke Father    • Hypertension Sister    • HIV Brother    • Breast cancer Cousin    • No Known Problems Daughter    • Stomach cancer Maternal Grandmother    • No Known Problems Maternal Grandfather    • No Known Problems Paternal Grandmother    • No Known Problems Paternal Grandfather    • No Known Problems Sister    • No Known Problems Sister    • No Known Problems Daughter    • Prostate cancer Maternal Uncle    • Stomach cancer Maternal Uncle    • Leukemia Grandchild        Meds/Allergies Current Outpatient Medications:   •  acetaminophen (TYLENOL) 325 mg tablet  •  acetaminophen (TYLENOL) 500 mg tablet  •  albuterol (PROVENTIL HFA,VENTOLIN HFA) 90 mcg/act inhaler  •  ALPRAZolam (XANAX) 0.5 mg tablet  •  amitriptyline (ELAVIL) 25 mg tablet  •  ascorbic acid (VITAMIN C) 500 MG tablet  •  butalbital-acetaminophen-caffeine (FIORICET,ESGIC) -40 mg per tablet  •  Cholecalciferol (Vitamin D) 50 MCG (2000 UT) tablet  •  clonazePAM (KlonoPIN) 0.5 mg tablet  •  clotrimazole (LOTRIMIN) 1 % cream  •  cyclobenzaprine (FLEXERIL) 10 mg tablet  •  Diclofenac Sodium (VOLTAREN) 1 %  •  Diclofenac Sodium (VOLTAREN) 1 %  •  dicyclomine (BENTYL) 20 mg tablet  •  diphenhydrAMINE (BENADRYL) 25 mg tablet  •  ferrous sulfate 324 (65 Fe) mg  •  fluticasone (FLOVENT HFA) 110 MCG/ACT inhaler  •  hydrocortisone 2.5 % cream  •  meclizine (ANTIVERT) 12.5 MG tablet  •  methocarbamol (ROBAXIN) 750 mg tablet  •  methocarbamol (ROBAXIN) 750 mg tablet  •  Multiple Vitamin (MULTIVITAMIN) capsule  •  naloxone (NARCAN) 4 mg/0.1 mL nasal spray  •  nystatin (MYCOSTATIN) ointment  •  omeprazole (PriLOSEC) 20 mg delayed release capsule  •  ondansetron (ZOFRAN) 4 mg tablet  •  ondansetron (ZOFRAN-ODT) 4 mg disintegrating tablet  •  oxyCODONE-acetaminophen (PERCOCET) 5-325 mg per tablet  •  polyethylene glycol (GLYCOLAX) 17 GM/SCOOP powder  •  sucralfate (CARAFATE) 1 g tablet  •  tiZANidine (ZANAFLEX) 2 mg tablet  •  topiramate (TOPAMAX) 25 mg sprinkle capsule  •  triamcinolone (KENALOG) 0.5 % cream  •  venlafaxine (EFFEXOR) 75 mg tablet  •  vitamin B-12 (CYANOCOBALAMIN) 2000 MCG TABS    Current Facility-Administered Medications:   •  triamcinolone acetonide (KENALOG-40) 40 mg/mL injection 40 mg, 40 mg, Intramuscular, Once    Allergies   Allergen Reactions   • Motrin [Ibuprofen]      Hx gastric bypass   • Cherry - Food Allergy Rash   • Gabapentin Rash           Objective     Blood pressure 124/78, pulse 71, temperature 98.5 °F (36.9 °C), temperature source Tympanic, height 5' 5" (1.651 m), weight 86.3 kg (190 lb 3.2 oz), not currently breastfeeding. There is no height or weight on file to calculate BMI. PHYSICAL EXAM:      General Appearance:   Alert, cooperative, no distress   HEENT:   Normocephalic, atraumatic, anicteric. Neck:  Supple, symmetrical, trachea midline   Lungs:   Clear to auscultation bilaterally; no rales, rhonchi or wheezing; respirations unlabored    Heart[de-identified]   Regular rate and rhythm; no murmur, rub, or gallop. Abdomen:   Soft, non-tender, non-distended; normal bowel sounds; no masses, no organomegaly    Genitalia:   Deferred    Rectal:   Deferred    Extremities:  No cyanosis, clubbing or edema    Pulses:  2+ and symmetric    Skin:  No jaundice, rashes, or lesions    Lymph nodes:  No palpable cervical lymphadenopathy        Lab Results:   No visits with results within 1 Day(s) from this visit. Latest known visit with results is:   Admission on 07/27/2023, Discharged on 07/28/2023   Component Date Value   • Ventricular Rate 07/27/2023 76    • Atrial Rate 07/27/2023 76    • CT Interval 07/27/2023 152    • QRSD Interval 07/27/2023 86    • QT Interval 07/27/2023 372    • QTC Interval 07/27/2023 418    • P Axis 07/27/2023 67    • QRS Axis 07/27/2023 42    • T Wave Axis 07/27/2023 6          Radiology Results:   DXA bone density spine hip and pelvis    Result Date: 8/4/2023  Narrative: CENTRAL  DXA SCAN CLINICAL HISTORY:   63ear old post-menopausal  female risk factors include osteoporosis screening. Additional medical history: Gastric bypass in the past. TECHNIQUE: Bone densitometry was performed using a [employer]'s W bone densitometer. Regions of interest appear properly placed. There are   other confounding variables which could limit the study. Degenerative or osteoarthritic changes appear to be present on the spine image eliminating L4 from evaluation.  COMPARISON: Baseline RESULTS: LUMBAR SPINE: L1-L3: BMD 0.801 gm/cm2 T-score -2.0 Z-score -0.5 LEFT TOTAL HIP: BMD 0.807 gm/cm2 T-score -1.2 Z-score -0.2 LEFT FEMORAL NECK: BMD 0.684 gm/cm2 T-score -1.6 Z-score -0.3     Impression: 1. Based on the [facility] classification, this study identifies a diagnosis of low bone mass, notable at the spine, total hip, and femoral neck areas and the patient is considered at low risk for fracture. 2. A daily intake of calcium of at least 1200 mg and vitamin D, 800-1000 IU, as well as weight bearing and muscle strengthening exercise, fall prevention and avoidance of tobacco and excessive alcohol intake as basic preventive measures are recommended. 3. Repeat DXA scan on the same equipment in 18-24 months as clinically indicated. The 10 year risk of hip fracture is 0.3%, with the 10 year risk of major osteoporotic fracture being 4.2%, as calculated by the [facility] fracture risk assessment tool (FRAX). The current NOF guidelines recommend treating patients with FRAX 10 year risk score  of >3% for hip fracture and >20% for major osteoporotic fracture. WHO CLASSIFICATION: Normal (a T-score of -1.0 or higher) Low bone mineral density (a T-score of less than -1.0 but higher than -2.5) Osteoporosis (a T-score of -2.5 or less) Severe osteoporosis (a T-score of -2.5 or less with a fragility fracture) Thank you for allowing us the opportunity to participate in your patient care. The expanded DEXA report will no longer be arriving in your mail.  If you desire to view the full report please contact Phil murguia or access the PACS system Workstation performed: [id number]

## 2023-08-15 ENCOUNTER — TELEPHONE (OUTPATIENT)
Dept: BARIATRICS | Facility: CLINIC | Age: 63
End: 2023-08-15

## 2023-08-15 NOTE — TELEPHONE ENCOUNTER
Received call from Pt re: appt. Pt explained she was exposed to [facility] and wanted to reschedule her appt. Appt rescheduled for 9/11. Pt was agreeable to plan.

## 2023-08-18 ENCOUNTER — HOSPITAL ENCOUNTER (EMERGENCY)
Facility: HOSPITAL | Age: 63
Discharge: HOME | End: 2023-08-18
Attending: EMERGENCY MEDICINE
Payer: MEDICARE

## 2023-08-18 VITALS
SYSTOLIC BLOOD PRESSURE: 150 MMHG | HEART RATE: 85 BPM | OXYGEN SATURATION: 98 % | BODY MASS INDEX: 32.39 KG/M2 | WEIGHT: 194.67 LBS | DIASTOLIC BLOOD PRESSURE: 75 MMHG | TEMPERATURE: 98.3 F | RESPIRATION RATE: 18 BRPM

## 2023-08-18 DIAGNOSIS — M25.562 LEFT KNEE PAIN: Primary | ICD-10-CM

## 2023-08-18 PROCEDURE — 99284 EMERGENCY DEPT VISIT MOD MDM: CPT | Performed by: PHYSICIAN ASSISTANT

## 2023-08-18 PROCEDURE — 99283 EMERGENCY DEPT VISIT LOW MDM: CPT

## 2023-08-18 RX ORDER — OXYCODONE HYDROCHLORIDE AND ACETAMINOPHEN 5; 325 MG/1; MG/1
1 TABLET ORAL EVERY 6 HOURS PRN
Qty: 12 TABLET | Refills: 0 | Status: SHIPPED | OUTPATIENT
Start: 2023-08-18

## 2023-08-18 NOTE — ED PROVIDER NOTES
History  Chief Complaint   Patient presents with   • Knee Pain     Left knee for past couple of days     Soy Nguyen is a 62 yo F, known history of L knee osteoarthritis, presenting with left knee pain and swelling for the past few days. Reports the pain is constant but worsens with weight bearing and certain movements. She has been using tylenol as needed without significant improvement in pain. She previous tried topical voltaren without much improvement. Does note relief with oxycodone previously. Saw orthopedics in June and had cortisone injection with only a few days of improvement noted. No recent injury/trauma. No redness to knee noted. No fevers/chills. History provided by:  Patient   used: No        Prior to Admission Medications   Prescriptions Last Dose Informant Patient Reported? Taking?    ALPRAZolam (XANAX) 0.5 mg tablet   Yes No   Sig: Take 0.5 mg by mouth 2 (two) times a day   Cholecalciferol (Vitamin D) 50 MCG (2000 UT) tablet   No No   Sig: Take 1 tablet (2,000 Units total) by mouth daily   Diclofenac Sodium (VOLTAREN) 1 %   No No   Sig: Apply 2 g topically 4 (four) times a day   Diclofenac Sodium (VOLTAREN) 1 %   No No   Sig: Apply 2 g topically 4 (four) times a day   Multiple Vitamin (MULTIVITAMIN) capsule  Self Yes No   Sig: Take 1 capsule by mouth daily   acetaminophen (TYLENOL) 325 mg tablet   No No   Sig: Take 2 tablets (650 mg total) by mouth every 6 (six) hours as needed for mild pain   acetaminophen (TYLENOL) 500 mg tablet   No No   Sig: Take 2 tablets (1,000 mg total) by mouth every 6 (six) hours as needed for mild pain   Patient not taking: Reported on 8/9/2023   albuterol (PROVENTIL HFA,VENTOLIN HFA) 90 mcg/act inhaler   No No   Sig: INHALE 2 PUFFS BY MOUTH EVERY 6 HOURS AS NEEDED FOR WHEEZING   Patient not taking: Reported on 5/31/2023   amitriptyline (ELAVIL) 25 mg tablet   No No   Sig: Take 1 tablet (25 mg total) by mouth daily at bedtime   ascorbic acid (VITAMIN C) 500 MG tablet   No No   Sig: Take 1 tablet (500 mg total) by mouth every other day Take with iron tablet   butalbital-acetaminophen-caffeine (FIORICET,ESGIC) -40 mg per tablet  Self No No   Sig: Take 1 tablet by mouth every 4 (four) hours as needed for headaches   clonazePAM (KlonoPIN) 0.5 mg tablet  Self Yes No   Sig: TK 1 T PO 1-2 TIMES DAILY PRF ANXIETY/SLEEP   Patient not taking: Reported on 5/31/2023   clotrimazole (LOTRIMIN) 1 % cream   No No   Sig: Apply topically 2 (two) times a day   cyclobenzaprine (FLEXERIL) 10 mg tablet   No No   Sig: Take 1 tablet (10 mg total) by mouth 2 (two) times a day as needed for muscle spasms   Patient not taking: Reported on 5/31/2023   dicyclomine (BENTYL) 20 mg tablet   No No   Sig: TAKE 1 TABLET(20 MG) BY MOUTH EVERY 6 HOURS AS NEEDED FOR ABDOMINAL PAIN   diphenhydrAMINE (BENADRYL) 25 mg tablet   No No   Sig: Take 1 tablet (25 mg total) by mouth every 6 (six) hours   Patient not taking: Reported on 5/31/2023   ferrous sulfate 324 (65 Fe) mg   No No   Sig: Take 1 tablet (324 mg total) by mouth every other day   Patient not taking: Reported on 5/31/2023   fluticasone (FLOVENT HFA) 110 MCG/ACT inhaler   No No   Sig: Inhale 4 puffs 2 (two) times a day Rinse mouth after use.   hydrocortisone 2.5 % cream  Self No No   Sig: Apply topically 4 (four) times a day as needed for irritation   linaCLOtide (Linzess) 72 MCG CAPS   No No   Sig: Take 72 mcg by mouth daily before breakfast   meclizine (ANTIVERT) 12.5 MG tablet  Self No No   Sig: Take 2 tablets (25 mg total) by mouth 3 (three) times a day as needed for dizziness   methocarbamol (ROBAXIN) 750 mg tablet   Yes No   Sig: Take 750 mg by mouth 4 (four) times a day as needed   Patient not taking: Reported on 6/1/2023   methocarbamol (ROBAXIN) 750 mg tablet   Yes No   Patient not taking: Reported on 5/31/2023   naloxone (NARCAN) 4 mg/0.1 mL nasal spray   No No   Sig: Administer 1 spray into a nostril.  If breathing does not return to normal or if breathing difficulty resumes after 2-3 minutes, give another dose in the other nostril using a new spray.    nystatin (MYCOSTATIN) ointment   No No   Sig: Apply topically 2 (two) times a day for 21 days Apply to umbilicus   omeprazole (PriLOSEC) 40 MG capsule   No No   Sig: TAKE 1 CAPSULE(40 MG) BY MOUTH DAILY BEFORE BREAKFAST   ondansetron (ZOFRAN) 4 mg tablet  Self No No   Sig: Take 1 tablet (4 mg total) by mouth every 6 (six) hours   Patient not taking: Reported on 11/5/2021   ondansetron (ZOFRAN-ODT) 4 mg disintegrating tablet   No No   Sig: Take 1 tablet (4 mg total) by mouth every 8 (eight) hours as needed for nausea for up to 10 doses   Patient not taking: Reported on 5/31/2023   polyethylene glycol (GLYCOLAX) 17 GM/SCOOP powder   No No   Sig: Take 17 g by mouth daily   Patient not taking: Reported on 5/31/2023   polyethylene glycol (GOLYTELY) 4000 mL solution   No No   Sig: Take as instructed by GI office   sucralfate (CARAFATE) 1 g tablet   No No   Sig: TAKE 1 TABLET BY MOUTH FOUR TIMES DAILY CRUSH AND MIX WITH WATER TO TAKE AS SUSPENSION   tiZANidine (ZANAFLEX) 2 mg tablet   No No   Sig: Take 1 tablet (2 mg total) by mouth 3 (three) times a day   Patient not taking: Reported on 5/31/2023   topiramate (TOPAMAX) 25 mg sprinkle capsule  Self No No   Sig: Take 1 capsule (25 mg total) by mouth 2 (two) times a day   Patient not taking: Reported on 11/5/2021   triamcinolone (KENALOG) 0.5 % cream   No No   Sig: Apply topically 2 (two) times a day   venlafaxine (EFFEXOR) 75 mg tablet   Yes No   Sig: Take 75 mg by mouth 2 (two) times a day with meals   vitamin B-12 (CYANOCOBALAMIN) 2000 MCG TABS  Self No No   Sig: Take 1 tablet (2,000 mcg total) by mouth daily      Facility-Administered Medications Last Administration Doses Remaining   triamcinolone acetonide (KENALOG-40) 40 mg/mL injection 40 mg None recorded 1          Past Medical History:   Diagnosis Date   • Asthma     Albuterol prn   • Bariatric surgery status    • Depression 2009    managed with Effexor    • Diabetes mellitus ([address]) 2005    resolved with gastric bypass 2015   • Generalized osteoarthritis    • Low vitamin B12 level    • Migraine    • Postgastrectomy malabsorption    • Suicide attempt [facility])    • Vitamin D deficiency        Past Surgical History:   Procedure Laterality Date   • CHOLECYSTECTOMY  2005   • COLONOSCOPY     • COSMETIC SURGERY  05/27/2020    Abdominoplasty   • GASTRIC BYPASS  2015    elvira - en -y    • HYSTERECTOMY     • IR IMAGE [address] / DRAINAGE W TUBE  7/14/2020   • KNEE ARTHROSCOPY      with Lysis of adhesions   • LAPAROSCOPIC SUPRACERVICAL HYSTERECTOMY  2005    due to endometriosis/AUB   • OOPHORECTOMY Left 2005   • [place] EXCISION SKIN ABD INFRAUMBILICAL PANNICULECTOMY N/A 5/27/2020    Procedure: PANNICULECTOMY;  Surgeon: Sasha Denney MD;  Location: [facility];  Service: Plastics   • TONSILLECTOMY AND ADENOIDECTOMY     • TUBAL LIGATION  1986   • VAC DRESSING APPLICATION N/A 9/70/1198    Procedure: APPLICATION VAC DRESSING;  Surgeon: Sasha Denney MD;  Location: [facility];  Service: Plastics       Family History   Problem Relation Age of Onset   • Hypertension Mother    • Kidney cancer Mother    • Diabetes Mother    • Breast cancer Mother    • Heart disease Father    • Stroke Father    • Hypertension Sister    • HIV Brother    • Breast cancer Cousin    • No Known Problems Daughter    • Stomach cancer Maternal Grandmother    • No Known Problems Maternal Grandfather    • No Known Problems Paternal Grandmother    • No Known Problems Paternal Grandfather    • No Known Problems Sister    • No Known Problems Sister    • No Known Problems Daughter    • Prostate cancer Maternal Uncle    • Stomach cancer Maternal Uncle    • Leukemia Grandchild      I have reviewed and agree with the history as documented.     E-Cigarette/Vaping   • E-Cigarette Use Never User      E-Cigarette/Vaping Substances   • Nicotine No    • THC No    • CBD No    • Flavoring No    • Other No    • Unknown No      Social History     Tobacco Use   • Smoking status: Former     Years: 32.00     Types: Cigarettes     Quit date: 05/2013     Years since quitting: 10.3   • Smokeless tobacco: Never   Vaping Use   • Vaping Use: Never used   Substance Use Topics   • Alcohol use: Not Currently   • Drug use: Never       Review of Systems   Constitutional: Negative for chills and fever. HENT: Negative for congestion, rhinorrhea and sore throat. Eyes: Negative for pain and visual disturbance. Respiratory: Negative for cough, shortness of breath and wheezing. Cardiovascular: Negative for chest pain and palpitations. Gastrointestinal: Negative for abdominal pain, nausea and vomiting. Genitourinary: Negative for dysuria, frequency and urgency. Musculoskeletal: Positive for arthralgias and joint swelling. Negative for back pain, neck pain and neck stiffness. Skin: Negative for rash and wound. Neurological: Negative for dizziness, weakness, light-headedness and numbness. Physical Exam  Physical Exam  Constitutional:       General: She is not in acute distress. Appearance: She is well-developed. She is not diaphoretic. HENT:      Head: Normocephalic and atraumatic. Right Ear: External ear normal.      Left Ear: External ear normal.   Eyes:      Conjunctiva/sclera: Conjunctivae normal.      Pupils: Pupils are equal, round, and reactive to light. Cardiovascular:      Rate and Rhythm: Normal rate and regular rhythm. Heart sounds: Normal heart sounds. No murmur heard. No friction rub. No gallop. Pulmonary:      Effort: Pulmonary effort is normal. No respiratory distress. Breath sounds: Normal breath sounds. No wheezing. Abdominal:      General: There is no distension. Palpations: Abdomen is soft. Tenderness: There is no abdominal tenderness.    Musculoskeletal:         General: Tenderness present. Cervical back: Normal range of motion and neck supple. Comments: TTP to anteromedial and posterior L knee with appreciable joint effusion. Full ROM but notes pain with full flexion. No erythema/increased warmth to knee. 2+ PT and DP distally. Lymphadenopathy:      Cervical: No cervical adenopathy. Skin:     General: Skin is warm and dry. Capillary Refill: Capillary refill takes less than 2 seconds. Findings: No erythema or rash. Neurological:      Mental Status: She is alert and oriented to person, place, and time. Motor: No abnormal muscle tone. Coordination: Coordination normal.   Psychiatric:         Behavior: Behavior normal.         Thought Content: Thought content normal.         Judgment: Judgment normal.         Vital Signs  ED Triage Vitals   Temperature Pulse Respirations Blood Pressure SpO2   08/18/23 1615 08/18/23 1615 08/18/23 1615 08/18/23 1616 08/18/23 1615   98.3 °F (36.8 °C) 85 18 150/75 98 %      Temp Source Heart Rate Source Patient Position - Orthostatic VS BP Location FiO2 (%)   08/18/23 1615 08/18/23 1615 08/18/23 1615 08/18/23 1615 --   Oral Monitor Sitting Right arm       Pain Score       08/18/23 1615       10 - Worst Possible Pain           Vitals:    08/18/23 1615 08/18/23 1616   BP:  150/75   Pulse: 85    Patient Position - Orthostatic VS: Sitting          Visual Acuity      ED Medications  Medications - No data to display    Diagnostic Studies  Results Reviewed     None                 No orders to display              Procedures  Procedures         ED Course                                             Medical Decision Making  Pain, swelling to L knee over the past few days, known history of osteoarthritis in same. She has TTP to anteromedial/posterior knee with associated joint effusion. She has full ROM, pain noted with full flexion. No evidence of septic joint clinically.  Pt does have history of gastric bypass limiting NSAID therapy. Will provide short course of oxycodone PRN severe pain, continue tylenol and voltaren gel PRN mild-moderate pain. Advised to call orthopedics for follow up in office. Return to ED indications reviewed. Risk  Prescription drug management. Disposition  Final diagnoses:   Left knee pain     Time reflects when diagnosis was documented in both MDM as applicable and the Disposition within this note     Time User Action Codes Description Comment    8/18/2023  4:52 PM Howard Hilliard Add [X17.373] Left knee pain       ED Disposition     ED Disposition   Discharge    Condition   Stable    Date/Time   Fri Aug 18, 2023  4:52 PM    Comment   Sekou Stage discharge to home/self care. Follow-up Information     Follow up With Specialties Details Why Contact Info Additional [facility] Emergency Department Emergency Medicine  If symptoms worsen [address] [address]  1302 [facility] Emergency Department, [address] [place] [address], [address] [place] [address], DO Orthopedic Surgery Schedule an appointment as soon as possible for a visit   [address]  [phone]             Discharge Medication List as of 8/18/2023  4:55 PM      START taking these medications    Details   !! Diclofenac Sodium (VOLTAREN) 1 % Apply 2 g topically 4 (four) times a day as needed (knee pain), Starting Fri 8/18/2023, Normal      oxyCODONE-acetaminophen (Percocet) 5-325 mg per tablet Take 1 tablet by mouth every 6 (six) hours as needed for severe pain for up to 12 doses Max Daily Amount: 4 tablets, Starting Fri 8/18/2023, Normal       !! - Potential duplicate medications found. Please discuss with provider.       CONTINUE these medications which have NOT CHANGED    Details   !! acetaminophen (TYLENOL) 325 mg tablet Take 2 tablets (650 mg total) by mouth every 6 (six) hours as needed for mild pain, Starting Sat 10/1/2022, Normal      !! acetaminophen (TYLENOL) 500 mg tablet Take 2 tablets (1,000 mg total) by mouth every 6 (six) hours as needed for mild pain, Starting Fri 1/21/2022, No Print      albuterol (PROVENTIL HFA,VENTOLIN HFA) 90 mcg/act inhaler INHALE 2 PUFFS BY MOUTH EVERY 6 HOURS AS NEEDED FOR WHEEZING, Normal      ALPRAZolam (XANAX) 0.5 mg tablet Take 0.5 mg by mouth 2 (two) times a day, Starting Thu 9/29/2022, Historical Med      amitriptyline (ELAVIL) 25 mg tablet Take 1 tablet (25 mg total) by mouth daily at bedtime, Starting Wed 3/17/2021, Normal      ascorbic acid (VITAMIN C) 500 MG tablet Take 1 tablet (500 mg total) by mouth every other day Take with iron tablet, Starting Tue 5/30/2023, Normal      butalbital-acetaminophen-caffeine (FIORICET,ESGIC) -40 mg per tablet Take 1 tablet by mouth every 4 (four) hours as needed for headaches, Starting Fri 8/23/2019, Print      Cholecalciferol (Vitamin D) 50 MCG (2000 UT) tablet Take 1 tablet (2,000 Units total) by mouth daily, Starting Mon 2/20/2023, Normal      clonazePAM (KlonoPIN) 0.5 mg tablet TK 1 T PO 1-2 TIMES DAILY PRF ANXIETY/SLEEP, Historical Med      clotrimazole (LOTRIMIN) 1 % cream Apply topically 2 (two) times a day, Starting Thu 1/27/2022, Normal      cyclobenzaprine (FLEXERIL) 10 mg tablet Take 1 tablet (10 mg total) by mouth 2 (two) times a day as needed for muscle spasms, Starting Sat 10/1/2022, Normal      !! Diclofenac Sodium (VOLTAREN) 1 % Apply 2 g topically 4 (four) times a day, Starting Thu 5/25/2023, Normal      !!  Diclofenac Sodium (VOLTAREN) 1 % Apply 2 g topically 4 (four) times a day, Starting Wed 5/31/2023, Normal      dicyclomine (BENTYL) 20 mg tablet TAKE 1 TABLET(20 MG) BY MOUTH EVERY 6 HOURS AS NEEDED FOR ABDOMINAL PAIN, Normal      diphenhydrAMINE (BENADRYL) 25 mg tablet Take 1 tablet (25 mg total) by mouth every 6 (six) hours, Starting Mon 6/1/2020, Normal      ferrous sulfate 324 (65 Fe) mg Take 1 tablet (324 mg total) by mouth every other day, Starting Tue 5/30/2023, Normal      fluticasone (FLOVENT HFA) 110 MCG/ACT inhaler Inhale 4 puffs 2 (two) times a day Rinse mouth after use., Starting Fri 1/21/2022, Until Sun 2/20/2022, Normal      hydrocortisone 2.5 % cream Apply topically 4 (four) times a day as needed for irritation, Starting Wed 4/24/2019, Normal      linaCLOtide (Linzess) 72 MCG CAPS Take 72 mcg by mouth daily before breakfast, Starting Wed 8/9/2023, Normal      meclizine (ANTIVERT) 12.5 MG tablet Take 2 tablets (25 mg total) by mouth 3 (three) times a day as needed for dizziness, Starting Fri 8/23/2019, Print      !! methocarbamol (ROBAXIN) 750 mg tablet Take 750 mg by mouth 4 (four) times a day as needed, Starting Mon 9/13/2021, Historical Med      !! methocarbamol (ROBAXIN) 750 mg tablet Starting Mon 9/13/2021, Historical Med      Multiple Vitamin (MULTIVITAMIN) capsule Take 1 capsule by mouth daily, Historical Med      naloxone (NARCAN) 4 mg/0.1 mL nasal spray Administer 1 spray into a nostril.  If breathing does not return to normal or if breathing difficulty resumes after 2-3 minutes, give another dose in the other nostril using a new spray., Normal      nystatin (MYCOSTATIN) ointment Apply topically 2 (two) times a day for 21 days Apply to umbilicus, Starting Thu 9/34/7187, Until Wed 5/31/2023, Normal      omeprazole (PriLOSEC) 40 MG capsule TAKE 1 CAPSULE(40 MG) BY MOUTH DAILY BEFORE BREAKFAST, Normal      ondansetron (ZOFRAN) 4 mg tablet Take 1 tablet (4 mg total) by mouth every 6 (six) hours, Starting Fri 8/23/2019, Print      ondansetron (ZOFRAN-ODT) 4 mg disintegrating tablet Take 1 tablet (4 mg total) by mouth every 8 (eight) hours as needed for nausea for up to 10 doses, Starting Mon 9/6/2021, Print      polyethylene glycol (GLYCOLAX) 17 GM/SCOOP powder Take 17 g by mouth daily, Starting Mon 10/25/2021, Normal      polyethylene glycol (GOLYTELY) 4000 mL solution Take as instructed by GI office, Normal      sucralfate (CARAFATE) 1 g tablet TAKE 1 TABLET BY MOUTH FOUR TIMES DAILY CRUSH AND MIX WITH WATER TO TAKE AS SUSPENSION, Normal      tiZANidine (ZANAFLEX) 2 mg tablet Take 1 tablet (2 mg total) by mouth 3 (three) times a day, Starting Mon 1/24/2022, Normal      topiramate (TOPAMAX) 25 mg sprinkle capsule Take 1 capsule (25 mg total) by mouth 2 (two) times a day, Starting Fri 5/31/2019, Normal      triamcinolone (KENALOG) 0.5 % cream Apply topically 2 (two) times a day, Starting Wed 6/3/2020, Normal      venlafaxine (EFFEXOR) 75 mg tablet Take 75 mg by mouth 2 (two) times a day with meals, Starting Wed 12/30/2020, Historical Med      vitamin B-12 (CYANOCOBALAMIN) 2000 MCG TABS Take 1 tablet (2,000 mcg total) by mouth daily, Starting Fri 11/1/2019, Normal       !! - Potential duplicate medications found. Please discuss with provider. No discharge procedures on file.     PDMP Review       Value Time User    PDMP Reviewed  Yes 8/18/2023  4:53 PM Lito Linares PA-C          ED Provider  Electronically Signed by           Lito Linares PA-C  08/18/23 2709

## 2023-08-18 NOTE — DISCHARGE INSTRUCTIONS
Please refer to the attached information for strict return instructions. If symptoms worsen or new symptoms develop please return to the ER.l Please follow up with orthopedics for re-evaluation of knee pain.

## 2023-08-31 ENCOUNTER — OFFICE VISIT (OUTPATIENT)
Dept: OBGYN CLINIC | Facility: MEDICAL CENTER | Age: 63
End: 2023-08-31
Payer: MEDICARE

## 2023-08-31 VITALS
HEART RATE: 101 BPM | WEIGHT: 194 LBS | SYSTOLIC BLOOD PRESSURE: 135 MMHG | DIASTOLIC BLOOD PRESSURE: 96 MMHG | HEIGHT: 65 IN | BODY MASS INDEX: 32.32 KG/M2

## 2023-08-31 DIAGNOSIS — M25.562 CHRONIC PAIN OF LEFT KNEE: ICD-10-CM

## 2023-08-31 DIAGNOSIS — M25.462 EFFUSION OF LEFT KNEE: ICD-10-CM

## 2023-08-31 DIAGNOSIS — G89.29 CHRONIC PAIN OF LEFT KNEE: ICD-10-CM

## 2023-08-31 DIAGNOSIS — M17.12 PRIMARY OSTEOARTHRITIS OF LEFT KNEE: Primary | ICD-10-CM

## 2023-08-31 PROCEDURE — 99213 OFFICE O/P EST LOW 20 MIN: CPT | Performed by: STUDENT IN AN ORGANIZED HEALTH CARE EDUCATION/TRAINING PROGRAM

## 2023-08-31 PROCEDURE — 20610 DRAIN/INJ JOINT/BURSA W/O US: CPT | Performed by: STUDENT IN AN ORGANIZED HEALTH CARE EDUCATION/TRAINING PROGRAM

## 2023-08-31 RX ORDER — TRIAMCINOLONE ACETONIDE 40 MG/ML
40 INJECTION, SUSPENSION INTRA-ARTICULAR; INTRAMUSCULAR
Status: COMPLETED | OUTPATIENT
Start: 2023-08-31 | End: 2023-08-31

## 2023-08-31 RX ORDER — BUPIVACAINE HYDROCHLORIDE 2.5 MG/ML
4 INJECTION, SOLUTION INFILTRATION; PERINEURAL
Status: COMPLETED | OUTPATIENT
Start: 2023-08-31 | End: 2023-08-31

## 2023-08-31 RX ADMIN — TRIAMCINOLONE ACETONIDE 40 MG: 40 INJECTION, SUSPENSION INTRA-ARTICULAR; INTRAMUSCULAR at 16:45

## 2023-08-31 RX ADMIN — BUPIVACAINE HYDROCHLORIDE 4 ML: 2.5 INJECTION, SOLUTION INFILTRATION; PERINEURAL at 16:45

## 2023-08-31 NOTE — PROGRESS NOTES
Knee Follow up Office Note    Assessment:     1. Primary osteoarthritis of left knee    2. Chronic pain of left knee    3. Effusion of left knee        Plan:     Problem List Items Addressed This Visit    None  Visit Diagnoses     Primary osteoarthritis of left knee    -  Primary    Relevant Orders    Injection Procedure Prior Authorization    Chronic pain of left knee        Relevant Orders    Injection Procedure Prior Authorization    Effusion of left knee        Relevant Orders    Injection Procedure Prior Authorization          62 y/o female with left knee osteoarthritis. Xrays again reviewed today showing medial compartment arthritis. Aspiration and cortisone steroid injection was administered to left knee today. We will order visco of the left knee also today/  Patient should avoid strenuous activities for the next 1-2 days. Patient should avoid vaccines for the next 2 weeks if possible. Can apply cold compress for soreness. If patient feels relief with the cortisone injections, procedure can be repeated every 3 months. She was encouraged to stay active and keep the knee moving. Follow up to begin Visco injection. Subjective:     Patient ID: Eva Pierre is a 63 y.o. female. Chief Complaint:  HPI:  63 y.o. female presents to the office for follow up evaluation of left knee pain. She has known OA of the left knee which she has been treating for conservatively with intermittent injections. She had a cortisone injection about 3 months ago with minimal improvement in symptoms. She states that she twisted the knee, causing an increase in pain. She was seen in the ER due to her pain and increased swelling. She was giving narcotic pain medication. She is unable to take NSAIDs due to gastric bypass. Pain 8/10.      Allergy:  Allergies   Allergen Reactions   • Motrin [Ibuprofen]      Hx gastric bypass   • Cherry - Food Allergy Rash   • Gabapentin Rash     Medications:  all current active meds have been reviewed  Past Medical History:  Past Medical History:   Diagnosis Date   • Asthma     Albuterol prn   • Bariatric surgery status    • Depression 2009    managed with Effexor    • Diabetes mellitus ([address]) 2005    resolved with gastric bypass 2015   • Generalized osteoarthritis    • Low vitamin B12 level    • Migraine    • Postgastrectomy malabsorption    • Suicide attempt [facility])    • Vitamin D deficiency      Past Surgical History:  Past Surgical History:   Procedure Laterality Date   • CHOLECYSTECTOMY  2005   • COLONOSCOPY     • COSMETIC SURGERY  05/27/2020    Abdominoplasty   • GASTRIC BYPASS  2015    elvira - en -y    • HYSTERECTOMY     • IR IMAGE GUIDED ASPIRATION / DRAINAGE W TUBE  7/14/2020   • KNEE ARTHROSCOPY      with Lysis of adhesions   • LAPAROSCOPIC SUPRACERVICAL HYSTERECTOMY  2005    due to endometriosis/AUB   • OOPHORECTOMY Left 2005   • CO EXCISION SKIN ABD INFRAUMBILICAL PANNICULECTOMY N/A 5/27/2020    Procedure: PANNICULECTOMY;  Surgeon: Amada Rivera MD;  Location: [facility];  Service: Plastics   • TONSILLECTOMY AND ADENOIDECTOMY     • TUBAL LIGATION  1986   • VAC DRESSING APPLICATION N/A 4/00/5565    Procedure: Minal Real;  Surgeon: Amada Rivera MD;  Location: [facility];  Service: Plastics     Family History:  Family History   Problem Relation Age of Onset   • Hypertension Mother    • Kidney cancer Mother    • Diabetes Mother    • Breast cancer Mother    • Heart disease Father    • Stroke Father    • Hypertension Sister    • HIV Brother    • Breast cancer Cousin    • No Known Problems Daughter    • Stomach cancer Maternal Grandmother    • No Known Problems Maternal Grandfather    • No Known Problems Paternal Grandmother    • No Known Problems Paternal Grandfather    • No Known Problems Sister    • No Known Problems Sister    • No Known Problems Daughter    • Prostate cancer Maternal Uncle    • Stomach cancer Maternal Uncle    • Leukemia Grandchild      Social History:  Social History     Substance and Sexual Activity   Alcohol Use Not Currently     Social History     Substance and Sexual Activity   Drug Use Never     Social History     Tobacco Use   Smoking Status Former   • Years: 32.00   • Types: Cigarettes   • Quit date: 05/2013   • Years since quitting: 10.3   Smokeless Tobacco Never           ROS:  General: Per HPI  Skin: Negative, except if noted below  HEENT: Negative  Respiratory: Negative  Cardiovascular: Negative  Gastrointestinal: Negative  Urinary: Negative  Vascular: Negative  Musculoskeletal: Positive per HPI   Neurologic: Positive per HPI  Endocrine: Negative    Objective:  BP Readings from Last 1 Encounters:   08/31/23 135/96      Wt Readings from Last 1 Encounters:   08/31/23 88 kg (194 lb)        Respiratory:   non-labored respirations    Lymphatics:  no palpable lymph nodes    Gait:   antalgic    Neurologic:   Alert and oriented times 3  Patient with normal sensation except as noted below  Deep tendon reflexes 2+ except as noted in MSK exam    Bilateral Lower Extremity:  Left Knee: Inspection:  Well healed portal incisions    Overall limb alignment neutral    Effusion: moderate    ROM 0-95 with pain    Extensor Lag: none    Palpation: medial and posterior Joint line tenderness to palpation    AP Stability at 90 deg stable     M/L stability in full extension stable    M/L stability in midflexion stable    Motor: 5/5 IP/Q/HS/TA/GS    Pulses: 2+ DP / 2+ PT    SILT DP/SP/S/S/TN    Imaging:  No new imaging today    BMI:   Estimated body mass index is 32.28 kg/m² as calculated from the following:    Height as of this encounter: 5' 5" (1.651 m). Weight as of this encounter: 88 kg (194 lb). BSA:   Estimated body surface area is 1.95 meters squared as calculated from the following:    Height as of this encounter: 5' 5" (1.651 m). Weight as of this encounter: 88 kg (194 lb).          Large joint arthrocentesis: L knee  Universal Protocol:  Consent: Verbal consent obtained.   Risks and benefits: risks, benefits and alternatives were discussed  Consent given by: patient  Patient understanding: patient states understanding of the procedure being performed    Supporting Documentation  Indications: pain and joint swelling   Procedure Details  Location: knee - L knee  Preparation: Patient was prepped and draped in the usual sterile fashion  Needle size: 18 G  Approach: anterolateral  Medications administered: 4 mL bupivacaine 0.25 %; 40 mg triamcinolone acetonide 40 mg/mL    Aspirate amount: 20 mL  Aspirate: clear, serous and yellow    Patient tolerance: patient tolerated the procedure well with no immediate complications  Dressing:  Sterile dressing applied          Scribe Attestation    I,:  Qamar Crawley PA-C am acting as a scribe while in the presence of the attending physician.:       I,:  Shirley Chang DO personally performed the services described in this documentation    as scribed in my presence.:

## 2023-09-12 ENCOUNTER — OFFICE VISIT (OUTPATIENT)
Dept: BARIATRICS | Facility: CLINIC | Age: 63
End: 2023-09-12
Payer: MEDICARE

## 2023-09-12 VITALS
HEART RATE: 75 BPM | DIASTOLIC BLOOD PRESSURE: 70 MMHG | BODY MASS INDEX: 30.13 KG/M2 | TEMPERATURE: 97.8 F | HEIGHT: 66 IN | WEIGHT: 187.5 LBS | SYSTOLIC BLOOD PRESSURE: 112 MMHG

## 2023-09-12 DIAGNOSIS — E66.9 OBESITY, CLASS I, BMI 30-34.9: ICD-10-CM

## 2023-09-12 DIAGNOSIS — Z98.84 BARIATRIC SURGERY STATUS: ICD-10-CM

## 2023-09-12 DIAGNOSIS — R10.9 ABDOMINAL PAIN: ICD-10-CM

## 2023-09-12 DIAGNOSIS — F41.1 GENERALIZED ANXIETY DISORDER: Chronic | ICD-10-CM

## 2023-09-12 DIAGNOSIS — K91.2 POSTSURGICAL MALABSORPTION: ICD-10-CM

## 2023-09-12 DIAGNOSIS — Z48.815 ENCOUNTER FOR SURGICAL AFTERCARE FOLLOWING SURGERY OF DIGESTIVE SYSTEM: Primary | ICD-10-CM

## 2023-09-12 DIAGNOSIS — K21.9 GERD (GASTROESOPHAGEAL REFLUX DISEASE): ICD-10-CM

## 2023-09-12 PROCEDURE — 99213 OFFICE O/P EST LOW 20 MIN: CPT | Performed by: PHYSICIAN ASSISTANT

## 2023-09-12 RX ORDER — MULTIVIT-MIN/IRON/FOLIC ACID/K 18-600-40
1 CAPSULE ORAL DAILY
Qty: 90 CAPSULE | Refills: 2 | Status: SHIPPED | OUTPATIENT
Start: 2023-09-12

## 2023-09-12 RX ORDER — DOXEPIN HYDROCHLORIDE 100 MG/1
100 CAPSULE ORAL
COMMUNITY
Start: 2023-07-28

## 2023-09-12 RX ORDER — BUPROPION HYDROCHLORIDE 150 MG/1
150 TABLET, EXTENDED RELEASE ORAL EVERY MORNING
COMMUNITY
Start: 2023-07-28

## 2023-09-12 RX ORDER — SUCRALFATE 1 G/1
1 TABLET ORAL 4 TIMES DAILY
Qty: 120 TABLET | Refills: 0 | Status: SHIPPED | OUTPATIENT
Start: 2023-09-12 | End: 2023-10-12

## 2023-09-12 RX ORDER — ALPRAZOLAM 1 MG/1
1 TABLET ORAL EVERY MORNING
COMMUNITY
Start: 2023-07-28

## 2023-09-12 NOTE — PATIENT INSTRUCTIONS
Follow-up in 3 months. We kindly ask that your arrive 15 minutes before your scheduled appointment time with your provider to allow our staff to room you, get your vital signs and update your chart. Get lab work done. Please call the office if you need a script. It is recommended to check with your insurance BEFORE getting labs done to make sure they are covered by your policy. Call our office if you have any problems with abdominal pain especially associated with fever, chills, nausea, vomiting or any other concerns. All  Post-bariatric surgery patients should be aware that very small quantities of any alcohol can cause impairment and it is very possible not to feel the effect. The effect can be in the system for several hours. It is also a stomach irritant. It is advised to AVOID alcohol, Nonsteroidal antiinflammatory drugs (NSAIDS) and nicotine of all forms . Any of these can cause stomach irritation/pain. Discussed the effects of alcohol on a bariatric patient and the increased impairment risk. Keep up the good work!

## 2023-09-12 NOTE — PROGRESS NOTES
Assessment/Plan:     Patient ID: Jocelynn Beaulieu is a 63 y.o. female. Bariatric Surgery Status    She is status post laparoscopic elvira-en-y gastric bypass surgery by Dr Trenton Lee 1/21/2014. Here for OD annual. Overall fair    She reports chronic symptoms of GERD, abdominal bloating and fullness. This can occur with both solids and liquids and she feels food gets "stuck". She was seen by GI and is scheduled to undergo both EGD and colonoscopy. She was prescribed omeprazole 40 mg but states the pharmacy did not give it to her. She states she is going to the pharmacy today to inquire. She also notes some vitamin deficiencies and is asking for vitamin scripts to be sent to her pharmacy if they are covered. She had some vitamins checked in may and for the most part all were wnl - will recheck again to determine if any further vitamins are necessary    PLAN:  - will follow up with patient in 3 months after her procedures through GI   - will start her on carafate as well as she notes occasional "dark red" color when she wipes after BM. Take 4x daily with daily omeprazole   - get labs done    · Continued/Maintain healthy weight loss with good nutrition intakes. · Adequate hydration with at least 64oz. fluid intake. · Follow diet as discussed. · Follow vitamin and mineral recommendations as reviewed with you. · Exercise as tolerated. · Colonoscopy referral made: scheduled  · Mammo: seeing her pcp tomorrow     · Follow-up in 3 months. We kindly ask that your arrive 15 minutes before your scheduled appointment time with your provider to allow our staff to room you, get your vital signs and update your chart. · Get lab work done. Please call the office if you need a script. It is recommended to check with your insurance BEFORE getting labs done to make sure they are covered by your policy.       · Call our office if you have any problems with abdominal pain especially associated with fever, chills, nausea, vomiting or any other concerns. · All  Post-bariatric surgery patients should be aware that very small quantities of any alcohol can cause impairment and it is very possible not to feel the effect. The effect can be in the system for several hours. It is also a stomach irritant. · It is advised to AVOID alcohol, Nonsteroidal antiinflammatory drugs (NSAIDS) and nicotine of all forms . Any of these can cause stomach irritation/pain. · Discussed the effects of alcohol on a bariatric patient and the increased impairment risk. · Keep up the good work! Postsurgical Malabsorption   -At risk for malabsorption of vitamins/minerals secondary to malabsorption and restriction of intake from bariatric surgery  -Currently taking adequate postop bariatric surgery vitamin supplementation  -Next set of bariatric labs ordered for approximately 2 weeks  -Patient received education about the importance of adhering to a lifelong supplementation regimen to avoid vitamin/mineral deficiencies      Diagnoses and all orders for this visit:    Encounter for surgical aftercare following surgery of digestive system  -     Zinc; Future  -     Vitamin D 25 hydroxy; Future  -     Vitamin B12; Future  -     Vitamin B1, whole blood; Future  -     Vitamin A; Future  -     PTH, intact; Future  -     Comprehensive metabolic panel; Future  -     CBC and Platelet; Future  -     Ferritin; Future  -     Folate; Future  -     TIBC Panel (incl. Iron, TIBC, % Iron Saturation); Future    Bariatric surgery status  -     Zinc; Future  -     Vitamin D 25 hydroxy; Future  -     Vitamin B12; Future  -     Vitamin B1, whole blood; Future  -     Vitamin A; Future  -     PTH, intact; Future  -     Comprehensive metabolic panel; Future  -     CBC and Platelet; Future  -     Ferritin; Future  -     Folate; Future  -     TIBC Panel (incl. Iron, TIBC, % Iron Saturation); Future  -     Cholecalciferol (Vitamin D) 50 MCG (2000 UT) CAPS;  Take 1 capsule (2,000 Units total) by mouth in the morning    Postsurgical malabsorption  -     Zinc; Future  -     Vitamin D 25 hydroxy; Future  -     Vitamin B12; Future  -     Vitamin B1, whole blood; Future  -     Vitamin A; Future  -     PTH, intact; Future  -     Comprehensive metabolic panel; Future  -     CBC and Platelet; Future  -     Ferritin; Future  -     Folate; Future  -     TIBC Panel (incl. Iron, TIBC, % Iron Saturation); Future  -     Cholecalciferol (Vitamin D) 50 MCG (2000 UT) CAPS; Take 1 capsule (2,000 Units total) by mouth in the morning    GERD (gastroesophageal reflux disease)  -     Zinc; Future  -     Vitamin D 25 hydroxy; Future  -     Vitamin B12; Future  -     Vitamin B1, whole blood; Future  -     Vitamin A; Future  -     PTH, intact; Future  -     Comprehensive metabolic panel; Future  -     CBC and Platelet; Future  -     Ferritin; Future  -     Folate; Future  -     TIBC Panel (incl. Iron, TIBC, % Iron Saturation); Future    Generalized anxiety disorder  -     Zinc; Future  -     Vitamin D 25 hydroxy; Future  -     Vitamin B12; Future  -     Vitamin B1, whole blood; Future  -     Vitamin A; Future  -     PTH, intact; Future  -     Comprehensive metabolic panel; Future  -     CBC and Platelet; Future  -     Ferritin; Future  -     Folate; Future  -     TIBC Panel (incl. Iron, TIBC, % Iron Saturation); Future    Obesity, Class I, BMI 30-34.9  -     Zinc; Future  -     Vitamin D 25 hydroxy; Future  -     Vitamin B12; Future  -     Vitamin B1, whole blood; Future  -     Vitamin A; Future  -     PTH, intact; Future  -     Comprehensive metabolic panel; Future  -     CBC and Platelet; Future  -     Ferritin; Future  -     Folate; Future  -     TIBC Panel (incl. Iron, TIBC, % Iron Saturation); Future    Other orders  -     doxepin (SINEquan) 100 mg capsule; Take 100 mg by mouth daily at bedtime  -     buPROPion (WELLBUTRIN SR) 150 mg 12 hr tablet;  Take 150 mg by mouth every morning  -     ALPRAZolam Evelina Bills) 1 mg tablet; Take 1 mg by mouth every morning         Subjective:      Patient ID: Moncho Moore is a 63 y.o. female. She is status post laparoscopic elvira-en-y gastric bypass surgery by Dr Isaura Taylor 1/21/2014. Here for OD annual. Overall fair    Initial:265.5  Current:187  EWL: (Weight loss is ahead of schedule at this post surgical period.)  Curly: 154  Current BMI is Body mass index is 30.73 kg/m². · Tolerating a regular diet-not always  · Eating at least 60 grams of protein per day-yes  · Following 30/60 minute rule with liquids-yes  · Drinking at least 64 ounces of fluid per day-yes  · Drinking carbonated beverages-no  · Sufficient exercise-light, walking   · Using NSAIDs regularly-no  · Using nicotine-no  · Using alcohol-no  · Supplements: guy mvi + b12 + vit d + vit c + iron    · EWL is 71%, which places the patient ahead of schedule for expected post surgical weight loss at this time. The following portions of the patient's history were reviewed and updated as appropriate: allergies, current medications, past family history, past medical history, past social history, past surgical history and problem list.    Review of Systems   Constitutional: Negative. Respiratory: Negative. Cardiovascular: Negative. Gastrointestinal: Negative. Neurological: Negative. Psychiatric/Behavioral: Negative. Objective:    /70   Pulse 75   Temp 97.8 °F (36.6 °C) (Tympanic)   Ht 5' 5.5" (1.664 m)   Wt 85 kg (187 lb 8 oz)   LMP  (LMP Unknown)   BMI 30.73 kg/m²      Physical Exam  Vitals and nursing note reviewed. Constitutional:       Appearance: Normal appearance. She is obese. HENT:      Head: Normocephalic and atraumatic. Eyes:      Extraocular Movements: Extraocular movements intact. Pupils: Pupils are equal, round, and reactive to light. Cardiovascular:      Rate and Rhythm: Normal rate and regular rhythm.    Pulmonary:      Effort: Pulmonary effort is normal. Breath sounds: Normal breath sounds. Abdominal:      General: Bowel sounds are normal.      Tenderness: There is no abdominal tenderness. Musculoskeletal:         General: Normal range of motion. Cervical back: Normal range of motion. Skin:     General: Skin is warm and dry. Neurological:      General: No focal deficit present. Mental Status: She is alert and oriented to person, place, and time.    Psychiatric:         Mood and Affect: Mood normal.

## 2023-09-13 ENCOUNTER — OFFICE VISIT (OUTPATIENT)
Dept: FAMILY MEDICINE CLINIC | Facility: CLINIC | Age: 63
End: 2023-09-13

## 2023-09-13 VITALS
HEART RATE: 74 BPM | WEIGHT: 182 LBS | BODY MASS INDEX: 30.32 KG/M2 | RESPIRATION RATE: 16 BRPM | HEIGHT: 65 IN | SYSTOLIC BLOOD PRESSURE: 108 MMHG | DIASTOLIC BLOOD PRESSURE: 60 MMHG | TEMPERATURE: 97.1 F | OXYGEN SATURATION: 99 %

## 2023-09-13 DIAGNOSIS — F43.21 GRIEF: ICD-10-CM

## 2023-09-13 DIAGNOSIS — G89.29 CHRONIC BILATERAL LOW BACK PAIN WITHOUT SCIATICA: Primary | ICD-10-CM

## 2023-09-13 DIAGNOSIS — M54.50 CHRONIC BILATERAL LOW BACK PAIN WITHOUT SCIATICA: Primary | ICD-10-CM

## 2023-09-13 PROBLEM — J06.9 UPPER RESPIRATORY TRACT INFECTION: Status: RESOLVED | Noted: 2022-10-28 | Resolved: 2023-09-13

## 2023-09-13 PROBLEM — E65 PANNUS, ABDOMINAL: Status: RESOLVED | Noted: 2019-06-02 | Resolved: 2023-09-13

## 2023-09-13 PROCEDURE — 99213 OFFICE O/P EST LOW 20 MIN: CPT | Performed by: FAMILY MEDICINE

## 2023-09-13 NOTE — PROGRESS NOTES
Name: Tess Sullivan      : 1960      MRN: [id number]  Encounter Provider: Nery Chen MD  Encounter Date: 2023   Encounter department: [facility] [facility]     1. Chronic bilateral low back pain without sciatica  Assessment & Plan:  Chronic back pain and chronic knee pain making it difficult to perform activities of daily living   Ice, heat, massage, and stretches  May continue to use Tylenol for pain        2. Grief  Assessment & Plan:  Brief counseling given  Death of mother 6 months ago  Death of pet dog 4 days ago         Subjective      HPI   Tess Sullivan is a 63 y.o. female  who presented to the office today for with a history of chronic lower back pain and chronic bilateral knee pain. She is being followed with Orthopedics and has had two steroid injections in her left knee, without much improvement and is now getting approved by insurance for Viscous injections. Patient is also interested in obtaining a home caregiver to assist with household duties. She state she has the application at home and will bring it in to be completed. Mother passed away 6 motnhs ago, death of her pet dog 4 days ago. The following portions of the patient's history were reviewed and updated as appropriate: allergies, current medications, past family history, past medical history, past social history, past surgical history and problem list.    Review of Systems   Constitutional: Negative for chills and fever. HENT: Negative for congestion, rhinorrhea and sore throat. Respiratory: Negative for cough and shortness of breath. Cardiovascular: Negative for chest pain. Gastrointestinal: Negative for diarrhea, nausea and vomiting. Genitourinary: Negative for dysuria. Musculoskeletal: Positive for back pain and gait problem. Skin: Negative for rash. Neurological: Negative for dizziness and headaches.    Psychiatric/Behavioral: Positive for sleep disturbance. The patient is nervous/anxious.         Current Outpatient Medications on File Prior to Visit   Medication Sig   • acetaminophen (TYLENOL) 325 mg tablet Take 2 tablets (650 mg total) by mouth every 6 (six) hours as needed for mild pain   • acetaminophen (TYLENOL) 500 mg tablet Take 2 tablets (1,000 mg total) by mouth every 6 (six) hours as needed for mild pain   • albuterol (PROVENTIL HFA,VENTOLIN HFA) 90 mcg/act inhaler INHALE 2 PUFFS BY MOUTH EVERY 6 HOURS AS NEEDED FOR WHEEZING   • ALPRAZolam (XANAX) 0.5 mg tablet Take 0.5 mg by mouth 2 (two) times a day   • ALPRAZolam (XANAX) 1 mg tablet Take 1 mg by mouth every morning   • amitriptyline (ELAVIL) 25 mg tablet Take 1 tablet (25 mg total) by mouth daily at bedtime   • ascorbic acid (VITAMIN C) 500 MG tablet Take 1 tablet (500 mg total) by mouth every other day Take with iron tablet   • buPROPion (WELLBUTRIN SR) 150 mg 12 hr tablet Take 150 mg by mouth every morning   • butalbital-acetaminophen-caffeine (FIORICET,ESGIC) -40 mg per tablet Take 1 tablet by mouth every 4 (four) hours as needed for headaches   • Cholecalciferol (Vitamin D) 50 MCG (2000 UT) CAPS Take 1 capsule (2,000 Units total) by mouth in the morning   • Cholecalciferol (Vitamin D) 50 MCG (2000 UT) tablet Take 1 tablet (2,000 Units total) by mouth daily   • clonazePAM (KlonoPIN) 0.5 mg tablet    • clotrimazole (LOTRIMIN) 1 % cream Apply topically 2 (two) times a day   • cyclobenzaprine (FLEXERIL) 10 mg tablet Take 1 tablet (10 mg total) by mouth 2 (two) times a day as needed for muscle spasms   • Diclofenac Sodium (VOLTAREN) 1 % Apply 2 g topically 4 (four) times a day   • Diclofenac Sodium (VOLTAREN) 1 % Apply 2 g topically 4 (four) times a day (Patient not taking: Reported on 9/12/2023)   • Diclofenac Sodium (VOLTAREN) 1 % Apply 2 g topically 4 (four) times a day as needed (knee pain) (Patient not taking: Reported on 9/12/2023)   • dicyclomine (BENTYL) 20 mg tablet TAKE 1 TABLET(20 MG) BY MOUTH EVERY 6 HOURS AS NEEDED FOR ABDOMINAL PAIN   • diphenhydrAMINE (BENADRYL) 25 mg tablet Take 1 tablet (25 mg total) by mouth every 6 (six) hours   • doxepin (SINEquan) 100 mg capsule Take 100 mg by mouth daily at bedtime   • ferrous sulfate 324 (65 Fe) mg Take 1 tablet (324 mg total) by mouth every other day   • fluticasone (FLOVENT HFA) 110 MCG/ACT inhaler Inhale 4 puffs 2 (two) times a day Rinse mouth after use. • hydrocortisone 2.5 % cream Apply topically 4 (four) times a day as needed for irritation   • linaCLOtide (Linzess) 72 MCG CAPS Take 72 mcg by mouth daily before breakfast   • meclizine (ANTIVERT) 12.5 MG tablet Take 2 tablets (25 mg total) by mouth 3 (three) times a day as needed for dizziness   • methocarbamol (ROBAXIN) 750 mg tablet Take 750 mg by mouth 4 (four) times a day as needed (Patient not taking: Reported on 6/1/2023)   • methocarbamol (ROBAXIN) 750 mg tablet  (Patient not taking: Reported on 5/31/2023)   • Multiple Vitamin (MULTIVITAMIN) capsule Take 1 capsule by mouth daily   • naloxone (NARCAN) 4 mg/0.1 mL nasal spray Administer 1 spray into a nostril. If breathing does not return to normal or if breathing difficulty resumes after 2-3 minutes, give another dose in the other nostril using a new spray.    • nystatin (MYCOSTATIN) ointment Apply topically 2 (two) times a day for 21 days Apply to umbilicus   • omeprazole (PriLOSEC) 40 MG capsule TAKE 1 CAPSULE(40 MG) BY MOUTH DAILY BEFORE BREAKFAST   • ondansetron (ZOFRAN) 4 mg tablet Take 1 tablet (4 mg total) by mouth every 6 (six) hours (Patient not taking: Reported on 11/5/2021)   • ondansetron (ZOFRAN-ODT) 4 mg disintegrating tablet Take 1 tablet (4 mg total) by mouth every 8 (eight) hours as needed for nausea for up to 10 doses (Patient not taking: Reported on 5/31/2023)   • oxyCODONE-acetaminophen (Percocet) 5-325 mg per tablet Take 1 tablet by mouth every 6 (six) hours as needed for severe pain for up to 12 doses Max Daily Amount: 4 tablets (Patient not taking: Reported on 9/12/2023)   • polyethylene glycol (GLYCOLAX) 17 GM/SCOOP powder Take 17 g by mouth daily (Patient not taking: Reported on 5/31/2023)   • polyethylene glycol (GOLYTELY) 4000 mL solution Take as instructed by GI office (Patient not taking: Reported on 9/12/2023)   • sucralfate (CARAFATE) 1 g tablet Take 1 tablet (1 g total) by mouth 4 (four) times a day Crush and mix with water to take as suspension   • tiZANidine (ZANAFLEX) 2 mg tablet Take 1 tablet (2 mg total) by mouth 3 (three) times a day (Patient not taking: Reported on 5/31/2023)   • topiramate (TOPAMAX) 25 mg sprinkle capsule Take 1 capsule (25 mg total) by mouth 2 (two) times a day (Patient not taking: Reported on 11/5/2021)   • triamcinolone (KENALOG) 0.5 % cream Apply topically 2 (two) times a day   • venlafaxine (EFFEXOR) 75 mg tablet Take 75 mg by mouth 2 (two) times a day with meals   • vitamin B-12 (CYANOCOBALAMIN) 2000 MCG TABS Take 1 tablet (2,000 mcg total) by mouth daily       Objective     /60 (BP Location: Right arm, Patient Position: Sitting, Cuff Size: Large)   Pulse 74   Temp (!) 97.1 °F (36.2 °C) (Temporal)   Resp 16   Ht 5' 5" (1.651 m)   Wt 82.6 kg (182 lb)   LMP  (LMP Unknown)   SpO2 99%   BMI 30.29 kg/m²     Physical Exam  Vitals and nursing note reviewed. Constitutional:       Appearance: She is well-developed. HENT:      Head: Normocephalic and atraumatic. Cardiovascular:      Rate and Rhythm: Normal rate. Pulmonary:      Effort: Pulmonary effort is normal. No respiratory distress. Musculoskeletal:      Lumbar back: Spasms and tenderness present. Right lower leg: Edema present. Left lower leg: No edema. Comments: + paraspinal muscle tenderness left lower lumbar spine   Neurological:      Mental Status: She is alert and oriented to person, place, and time. Psychiatric:         Mood and Affect: Mood is depressed. Affect is tearful. Behavior: Behavior normal. Behavior is cooperative.        Connor Correa MD

## 2023-09-26 PROBLEM — F43.21 GRIEF: Status: ACTIVE | Noted: 2023-09-26

## 2023-09-26 NOTE — ASSESSMENT & PLAN NOTE
Chronic back pain and chronic knee pain making it difficult to perform activities of daily living   Ice, heat, massage, and stretches  May continue to use Tylenol for pain

## 2023-09-28 ENCOUNTER — HOSPITAL ENCOUNTER (OUTPATIENT)
Dept: GASTROENTEROLOGY | Facility: HOSPITAL | Age: 63
Setting detail: OUTPATIENT SURGERY
End: 2023-09-28
Attending: INTERNAL MEDICINE
Payer: MEDICARE

## 2023-09-28 ENCOUNTER — ANESTHESIA (OUTPATIENT)
Dept: GASTROENTEROLOGY | Facility: HOSPITAL | Age: 63
End: 2023-09-28

## 2023-09-28 ENCOUNTER — ANESTHESIA EVENT (OUTPATIENT)
Dept: GASTROENTEROLOGY | Facility: HOSPITAL | Age: 63
End: 2023-09-28

## 2023-09-28 VITALS
TEMPERATURE: 96.7 F | OXYGEN SATURATION: 96 % | RESPIRATION RATE: 16 BRPM | HEART RATE: 56 BPM | DIASTOLIC BLOOD PRESSURE: 64 MMHG | SYSTOLIC BLOOD PRESSURE: 120 MMHG

## 2023-09-28 DIAGNOSIS — Z12.11 SCREEN FOR COLON CANCER: ICD-10-CM

## 2023-09-28 DIAGNOSIS — K21.00 GASTROESOPHAGEAL REFLUX DISEASE WITH ESOPHAGITIS WITHOUT HEMORRHAGE: ICD-10-CM

## 2023-09-28 LAB — GLUCOSE SERPL-MCNC: 113 MG/DL (ref 65–140)

## 2023-09-28 PROCEDURE — 82948 REAGENT STRIP/BLOOD GLUCOSE: CPT

## 2023-09-28 PROCEDURE — 88305 TISSUE EXAM BY PATHOLOGIST: CPT | Performed by: PATHOLOGY

## 2023-09-28 PROCEDURE — 88342 IMHCHEM/IMCYTCHM 1ST ANTB: CPT | Performed by: PATHOLOGY

## 2023-09-28 RX ORDER — PROPOFOL 10 MG/ML
INJECTION, EMULSION INTRAVENOUS AS NEEDED
Status: DISCONTINUED | OUTPATIENT
Start: 2023-09-28 | End: 2023-09-28

## 2023-09-28 RX ORDER — SODIUM CHLORIDE, SODIUM LACTATE, POTASSIUM CHLORIDE, CALCIUM CHLORIDE 600; 310; 30; 20 MG/100ML; MG/100ML; MG/100ML; MG/100ML
INJECTION, SOLUTION INTRAVENOUS CONTINUOUS PRN
Status: DISCONTINUED | OUTPATIENT
Start: 2023-09-28 | End: 2023-09-28

## 2023-09-28 RX ORDER — PROPOFOL 10 MG/ML
INJECTION, EMULSION INTRAVENOUS CONTINUOUS PRN
Status: DISCONTINUED | OUTPATIENT
Start: 2023-09-28 | End: 2023-09-28

## 2023-09-28 RX ORDER — LIDOCAINE HYDROCHLORIDE 10 MG/ML
INJECTION, SOLUTION EPIDURAL; INFILTRATION; INTRACAUDAL; PERINEURAL AS NEEDED
Status: DISCONTINUED | OUTPATIENT
Start: 2023-09-28 | End: 2023-09-28

## 2023-09-28 RX ADMIN — PROPOFOL 40 MG: 10 INJECTION, EMULSION INTRAVENOUS at 12:49

## 2023-09-28 RX ADMIN — SODIUM CHLORIDE, SODIUM LACTATE, POTASSIUM CHLORIDE, AND CALCIUM CHLORIDE: .6; .31; .03; .02 INJECTION, SOLUTION INTRAVENOUS at 12:32

## 2023-09-28 RX ADMIN — PROPOFOL 100 MG: 10 INJECTION, EMULSION INTRAVENOUS at 12:39

## 2023-09-28 RX ADMIN — PROPOFOL 140 MCG/KG/MIN: 10 INJECTION, EMULSION INTRAVENOUS at 12:39

## 2023-09-28 RX ADMIN — LIDOCAINE HYDROCHLORIDE 100 MG: 10 INJECTION, SOLUTION EPIDURAL; INFILTRATION; INTRACAUDAL at 12:36

## 2023-09-28 NOTE — ANESTHESIA PREPROCEDURE EVALUATION
Procedure:  COLONOSCOPY  EGD    Relevant Problems   ENDO   (+) Secondary hyperparathyroidism of renal origin ([address])      GYN   (+) History of hysterectomy      MUSCULOSKELETAL   (+) Acute exacerbation of chronic low back pain   (+) Chronic back pain   (+) Generalized osteoarthritis   (+) Lumbar spondylosis      NEURO/PSYCH   (+) Acute exacerbation of chronic low back pain   (+) Chronic back pain   (+) Chronic nonintractable headache   (+) Generalized anxiety disorder   (+) Major depressive disorder, recurrent severe without psychotic features ([address])      PULMONARY   (+) Asthma        Physical Exam    Airway    Mallampati score: I  TM Distance: >3 FB  Neck ROM: full     Dental       Cardiovascular  Cardiovascular exam normal    Pulmonary  Pulmonary exam normal     Other Findings        Anesthesia Plan  ASA Score- 2     Anesthesia Type- IV sedation with anesthesia with ASA Monitors. Additional Monitors:   Airway Plan:           Plan Factors-Exercise tolerance (METS): >4 METS. Chart reviewed. EKG reviewed. Imaging results reviewed. Existing labs reviewed. Patient summary reviewed. Induction- intravenous. Postoperative Plan- Plan for postoperative opioid use. Planned trial extubation    Informed Consent- Anesthetic plan and risks discussed with patient. I personally reviewed this patient with the CRNA. Discussed and agreed on the Anesthesia Plan with the CRNA. Tima Serna

## 2023-09-28 NOTE — ANESTHESIA POSTPROCEDURE EVALUATION
Post-Op Assessment Note    CV Status:  Stable  Pain Score: 0    Pain management: adequate     Mental Status:  Alert   Hydration Status:  Stable   PONV Controlled:  None      Post Op Vitals Reviewed: Yes      Staff: CRNA         No notable events documented.     BP      Temp     Pulse    Resp      SpO2

## 2023-09-28 NOTE — H&P
H&P EXAM - Outpatient Endoscopy   Clarence Lopez 63 y.o. female MRN: [id number]    [facility]   Encounter: [id number]        History and Physical - [facility]  Clarence Lopez 63 y.o. female MRN: [id number]                  HPI: Clarence Lopez is a 63y.o. year old female who presents for colon cancer screening, bloating, gerd      REVIEW OF SYSTEMS: Per the HPI, and otherwise unremarkable.     Historical Information   Past Medical History:   Diagnosis Date   • Asthma     Albuterol prn   • Bariatric surgery status    • Depression 2009    managed with Effexor    • Diabetes mellitus ([address]) 2005    resolved with gastric bypass 2015   • Generalized osteoarthritis    • Low vitamin B12 level    • Migraine    • Postgastrectomy malabsorption    • Suicide attempt [facility])    • Vitamin D deficiency      Past Surgical History:   Procedure Laterality Date   • CHOLECYSTECTOMY  2005   • COLONOSCOPY     • COSMETIC SURGERY  05/27/2020    Abdominoplasty   • GASTRIC BYPASS  2015    elvira - en -y    • HYSTERECTOMY     • IR IMAGE [address] / DRAINAGE W TUBE  7/14/2020   • KNEE ARTHROSCOPY      with Lysis of adhesions   • LAPAROSCOPIC SUPRACERVICAL HYSTERECTOMY  2005    due to endometriosis/AUB   • OOPHORECTOMY Left 2005   • CO EXCISION SKIN ABD INFRAUMBILICAL PANNICULECTOMY N/A 5/27/2020    Procedure: PANNICULECTOMY;  Surgeon: Rosa Morales MD;  Location: [facility];  Service: Plastics   • TONSILLECTOMY AND ADENOIDECTOMY     • TUBAL LIGATION  1986   • VAC DRESSING APPLICATION N/A 6/31/7994    Procedure: APPLICATION VAC DRESSING;  Surgeon: Rosa Morales MD;  Location: [facility];  Service: Plastics     Social History   Social History     Substance and Sexual Activity   Alcohol Use Not Currently     Social History     Substance and Sexual Activity   Drug Use Never     Social History     Tobacco Use   Smoking Status Former   • Years: 32.00   • Types: Cigarettes   • Quit date: 05/2013   • Years since quitting: 10.4   Smokeless Tobacco Never     Family History   Problem Relation Age of Onset   • Hypertension Mother    • Kidney cancer Mother    • Diabetes Mother    • Breast cancer Mother    • Heart disease Father    • Stroke Father    • Hypertension Sister    • HIV Brother    • Breast cancer Cousin    • No Known Problems Daughter    • Stomach cancer Maternal Grandmother    • No Known Problems Maternal Grandfather    • No Known Problems Paternal Grandmother    • No Known Problems Paternal Grandfather    • No Known Problems Sister    • No Known Problems Sister    • No Known Problems Daughter    • Prostate cancer Maternal Uncle    • Stomach cancer Maternal Uncle    • Leukemia Grandchild        Meds/Allergies     (Not in a hospital admission)      Allergies   Allergen Reactions   • Motrin [Ibuprofen]      Hx gastric bypass   • Cherry - Food Allergy Rash   • Gabapentin Rash       Objective     /65 (BP Location: Left arm)   Pulse (!) 354   Temp (!) 96.7 °F (35.9 °C) (Temporal)   Resp 18   LMP  (LMP Unknown)   SpO2 96%       PHYSICAL EXAM    Gen: NAD  CV: RRR  CHEST: Clear  ABD: soft, NT/ND  EXT: no edema      ASSESSMENT/PLAN:  This is a 63y.o. year old female here for egd/colonoscopy, and she is stable and optimized for her procedure.

## 2023-10-04 PROCEDURE — 88305 TISSUE EXAM BY PATHOLOGIST: CPT | Performed by: PATHOLOGY

## 2023-10-04 PROCEDURE — 88342 IMHCHEM/IMCYTCHM 1ST ANTB: CPT | Performed by: PATHOLOGY

## 2023-10-04 NOTE — RESULT ENCOUNTER NOTE
Results relayed via Mychart  Unremarkable EGD biopsies  Multiple colonic adenomatous polyps including 1 tubulovillous adenoma greater than 10 mm.   Repeat colonoscopy in 3 years

## 2023-10-05 ENCOUNTER — PROCEDURE VISIT (OUTPATIENT)
Dept: OBGYN CLINIC | Facility: MEDICAL CENTER | Age: 63
End: 2023-10-05
Payer: MEDICARE

## 2023-10-05 VITALS
HEART RATE: 84 BPM | SYSTOLIC BLOOD PRESSURE: 107 MMHG | HEIGHT: 65 IN | BODY MASS INDEX: 30.32 KG/M2 | WEIGHT: 182 LBS | DIASTOLIC BLOOD PRESSURE: 72 MMHG

## 2023-10-05 DIAGNOSIS — M17.12 PRIMARY OSTEOARTHRITIS OF LEFT KNEE: Primary | ICD-10-CM

## 2023-10-05 PROCEDURE — 20610 DRAIN/INJ JOINT/BURSA W/O US: CPT | Performed by: STUDENT IN AN ORGANIZED HEALTH CARE EDUCATION/TRAINING PROGRAM

## 2023-10-05 NOTE — PROGRESS NOTES
Monovisc Left knee  Pain 8/10    Large joint arthrocentesis: L knee  Universal Protocol:  Consent: Verbal consent obtained.   Risks and benefits: risks, benefits and alternatives were discussed  Consent given by: patient  Patient understanding: patient states understanding of the procedure being performed    Supporting Documentation  Indications: pain   Procedure Details  Location: knee - L knee  Preparation: Patient was prepped and draped in the usual sterile fashion  Needle size: 18 G  Approach: lateral  Medications administered: 88 mg hyaluronan 88 MG/4ML    Aspirate amount: 15 mL  Aspirate: clear, serous and yellow    Patient tolerance: patient tolerated the procedure well with no immediate complications  Dressing:  Sterile dressing applied            Maria Dolores Bauer PA-C

## 2023-10-06 DIAGNOSIS — E61.1 IRON DEFICIENCY: ICD-10-CM

## 2023-10-08 RX ORDER — ASCORBIC ACID 500 MG
TABLET ORAL
Qty: 90 TABLET | Refills: 0 | Status: SHIPPED | OUTPATIENT
Start: 2023-10-08

## 2023-10-12 ENCOUNTER — OFFICE VISIT (OUTPATIENT)
Dept: FAMILY MEDICINE CLINIC | Facility: CLINIC | Age: 63
End: 2023-10-12

## 2023-10-12 VITALS
OXYGEN SATURATION: 98 % | HEIGHT: 65 IN | SYSTOLIC BLOOD PRESSURE: 120 MMHG | RESPIRATION RATE: 18 BRPM | HEART RATE: 63 BPM | WEIGHT: 190.2 LBS | BODY MASS INDEX: 31.69 KG/M2 | TEMPERATURE: 97.5 F | DIASTOLIC BLOOD PRESSURE: 72 MMHG

## 2023-10-12 DIAGNOSIS — M25.562 CHRONIC PAIN OF LEFT KNEE: Primary | ICD-10-CM

## 2023-10-12 DIAGNOSIS — G89.29 CHRONIC PAIN OF LEFT KNEE: Primary | ICD-10-CM

## 2023-10-12 PROCEDURE — 99213 OFFICE O/P EST LOW 20 MIN: CPT | Performed by: FAMILY MEDICINE

## 2023-10-12 NOTE — PROGRESS NOTES
Name: Shaq Godinez      : 1960      MRN: [id number]  Encounter Provider: Barbara Hunter MD  Encounter Date: 10/12/2023   Encounter department: [address] [facility] [facility]     1. Chronic pain of left knee  Assessment & Plan:  Chronic  Follows with orthopedic for it   S/p steroid injection x 2 in  and 2023   Most recent injection was with hyaluronan on    Xray showed severe arthritis  Patient report ortho offered knee replacement however she declined it   Currently requested tylenol with codeine or other opioid, it was discussed that it might not be a good idea for this chronic pain,  She has bariatric surgery thus NSAIDS are not prefer   She declines muscle relaxant or tylenol arthritis or topical analgesic   She is agreeable with aqua therapy, order placed    Orders:  -     Ambulatory Referral to Physical Therapy; Future           Subjective      HPI  Shaq Godinez is a 66 y.o. female with history of left knee pain who present to the office for knee pain follow up. She reports she was at the orthopedics doctor office for knee injection which is not helping. Reports that she asked them for tylenol with codeine or stronger medication however they told her she should go to her PCP that is why she is here. No fever, chills, or swelling      Review of Systems   Constitutional:  Negative for chills and fever. Respiratory:  Negative for shortness of breath. Cardiovascular:  Negative for leg swelling. Musculoskeletal:  Positive for arthralgias (left knee). Negative for gait problem and joint swelling.        Current Outpatient Medications on File Prior to Visit   Medication Sig    acetaminophen (TYLENOL) 325 mg tablet Take 2 tablets (650 mg total) by mouth every 6 (six) hours as needed for mild pain    acetaminophen (TYLENOL) 500 mg tablet Take 2 tablets (1,000 mg total) by mouth every 6 (six) hours as needed for mild pain    albuterol (PROVENTIL HFA,VENTOLIN HFA) 90 mcg/act inhaler INHALE 2 PUFFS BY MOUTH EVERY 6 HOURS AS NEEDED FOR WHEEZING    ALPRAZolam (XANAX) 0.5 mg tablet Take 0.5 mg by mouth 2 (two) times a day    ALPRAZolam (XANAX) 1 mg tablet Take 1 mg by mouth every morning    amitriptyline (ELAVIL) 25 mg tablet Take 1 tablet (25 mg total) by mouth daily at bedtime    ascorbic acid (VITAMIN C) 500 mg tablet TAKE 1 TABLET( 500 MG TOTAL) BY MOUTH EVERY OTHER DAY.  TAKE WITH IRON TABLET    buPROPion (WELLBUTRIN SR) 150 mg 12 hr tablet Take 150 mg by mouth every morning    butalbital-acetaminophen-caffeine (FIORICET,ESGIC) -40 mg per tablet Take 1 tablet by mouth every 4 (four) hours as needed for headaches    Cholecalciferol (Vitamin D) 50 MCG (2000 UT) CAPS Take 1 capsule (2,000 Units total) by mouth in the morning    Cholecalciferol (Vitamin D) 50 MCG (2000 UT) tablet Take 1 tablet (2,000 Units total) by mouth daily    clonazePAM (KlonoPIN) 0.5 mg tablet     clotrimazole (LOTRIMIN) 1 % cream Apply topically 2 (two) times a day    cyclobenzaprine (FLEXERIL) 10 mg tablet Take 1 tablet (10 mg total) by mouth 2 (two) times a day as needed for muscle spasms    Diclofenac Sodium (VOLTAREN) 1 % Apply 2 g topically 4 (four) times a day    Diclofenac Sodium (VOLTAREN) 1 % Apply 2 g topically 4 (four) times a day (Patient not taking: Reported on 10/5/2023)    Diclofenac Sodium (VOLTAREN) 1 % Apply 2 g topically 4 (four) times a day as needed (knee pain) (Patient not taking: Reported on 9/12/2023)    dicyclomine (BENTYL) 20 mg tablet TAKE 1 TABLET(20 MG) BY MOUTH EVERY 6 HOURS AS NEEDED FOR ABDOMINAL PAIN    diphenhydrAMINE (BENADRYL) 25 mg tablet Take 1 tablet (25 mg total) by mouth every 6 (six) hours    doxepin (SINEquan) 100 mg capsule Take 100 mg by mouth daily at bedtime    ferrous sulfate 324 (65 Fe) mg Take 1 tablet (324 mg total) by mouth every other day    fluticasone (FLOVENT HFA) 110 MCG/ACT inhaler Inhale 4 puffs 2 (two) times a day Rinse mouth after use.    hydrocortisone 2.5 % cream Apply topically 4 (four) times a day as needed for irritation    linaCLOtide (Linzess) 72 MCG CAPS Take 72 mcg by mouth daily before breakfast    meclizine (ANTIVERT) 12.5 MG tablet Take 2 tablets (25 mg total) by mouth 3 (three) times a day as needed for dizziness    methocarbamol (ROBAXIN) 750 mg tablet Take 750 mg by mouth 4 (four) times a day as needed (Patient not taking: Reported on 6/1/2023)    methocarbamol (ROBAXIN) 750 mg tablet  (Patient not taking: Reported on 5/31/2023)    Multiple Vitamin (MULTIVITAMIN) capsule Take 1 capsule by mouth daily    naloxone (NARCAN) 4 mg/0.1 mL nasal spray Administer 1 spray into a nostril. If breathing does not return to normal or if breathing difficulty resumes after 2-3 minutes, give another dose in the other nostril using a new spray.     nystatin (MYCOSTATIN) ointment Apply topically 2 (two) times a day for 21 days Apply to umbilicus    omeprazole (PriLOSEC) 40 MG capsule TAKE 1 CAPSULE(40 MG) BY MOUTH DAILY BEFORE BREAKFAST    ondansetron (ZOFRAN) 4 mg tablet Take 1 tablet (4 mg total) by mouth every 6 (six) hours (Patient not taking: Reported on 11/5/2021)    ondansetron (ZOFRAN-ODT) 4 mg disintegrating tablet Take 1 tablet (4 mg total) by mouth every 8 (eight) hours as needed for nausea for up to 10 doses (Patient not taking: Reported on 5/31/2023)    oxyCODONE-acetaminophen (Percocet) 5-325 mg per tablet Take 1 tablet by mouth every 6 (six) hours as needed for severe pain for up to 12 doses Max Daily Amount: 4 tablets (Patient not taking: Reported on 9/12/2023)    polyethylene glycol (GLYCOLAX) 17 GM/SCOOP powder Take 17 g by mouth daily (Patient not taking: Reported on 5/31/2023)    sucralfate (CARAFATE) 1 g tablet Take 1 tablet (1 g total) by mouth 4 (four) times a day Crush and mix with water to take as suspension    tiZANidine (ZANAFLEX) 2 mg tablet Take 1 tablet (2 mg total) by mouth 3 (three) times a day (Patient not taking: Reported on 5/31/2023)    topiramate (TOPAMAX) 25 mg sprinkle capsule Take 1 capsule (25 mg total) by mouth 2 (two) times a day (Patient not taking: Reported on 11/5/2021)    triamcinolone (KENALOG) 0.5 % cream Apply topically 2 (two) times a day    venlafaxine (EFFEXOR) 75 mg tablet Take 75 mg by mouth 2 (two) times a day with meals    vitamin B-12 (CYANOCOBALAMIN) 2000 MCG TABS Take 1 tablet (2,000 mcg total) by mouth daily       Objective     /72 (BP Location: Left arm, Patient Position: Sitting, Cuff Size: Standard)   Pulse 63   Temp 97.5 °F (36.4 °C) (Temporal)   Resp 18   Ht 5' 5" (1.651 m)   Wt 86.3 kg (190 lb 3.2 oz)   LMP  (LMP Unknown)   SpO2 98%   BMI 31.65 kg/m²     Physical Exam  Constitutional:       Appearance: Normal appearance. Cardiovascular:      Rate and Rhythm: Normal rate. Pulmonary:      Breath sounds: Normal breath sounds. Musculoskeletal:        Legs:       Comments: Tenderness on palpation    Neurological:      Mental Status: She is alert.        Nataliya Brito MD

## 2023-10-12 NOTE — PATIENT INSTRUCTIONS
Knee Exercises   AMBULATORY CARE:   What you need to know about knee exercises:  Knee exercises help strengthen the muscles around your knee. Strong muscles can help reduce pain and decrease your risk of future injury. Knee exercises also help you heal after an injury or surgery. These are beginning exercises. Ask your healthcare provider if you need to see a physical therapist for more advanced exercises. General guidelines for knee exercises:   Start slowly. As you get stronger, you may be able to do more sets of each exercise or add weights. Stop if you feel pain. It is normal to feel some discomfort at first, but you should not feel pain. Tell your provider or physical therapist if you have pain while you exercise. Regular exercise will help decrease your discomfort over time. Do the exercises on both legs. Do this so both knees remain strong. Warm up before you do knee exercises. Walk or ride a stationary bike for 5 or 10 minutes to warm your muscles. How to perform knee stretches safely:  Always stretch before you do strengthening exercises. Do these stretching exercises again after you do the strengthening exercises. Do these stretches 4 or 5 days a week, or as directed. Standing calf stretch: Face a wall and place both palms flat on the wall, or hold the back of a chair for balance. Keep a slight bend in your knees. Take a big step backward with one leg. Keep your other leg directly under you. Keep both heels flat and press your hips forward. Hold the stretch for 30 seconds, and then relax for 30 seconds. Switch legs. Repeat 2 or 3 times on each leg. Standing quadriceps stretch:  Stand and place one hand against a wall or hold the back of a chair for balance. With your weight on one leg, bend your other leg and grab your ankle. Bring your heel toward your buttocks. Hold the stretch for 30 to 60 seconds. Switch legs. Repeat 2 or 3 times on each leg.          Sitting hamstring stretch:  Sit with both legs straight in front of you. Do not point or flex your toes. Place your palms on the floor and slide your hands forward until you feel the stretch. Do not round your back. Hold the stretch for 30 seconds. Repeat 2 or 3 times. How to perform knee strengthening exercises safely:  Do these exercises 4 or 5 days a week, or as directed. Standing half squats:  Stand with your feet shoulder-width apart. Lean your back against a wall or hold the back of a chair for balance, if needed. Slowly sit down about 10 inches, as if you are going to sit in a chair. Your body weight should be mostly over your heels. Hold the squat for 5 seconds, then rise to a standing position. Do 3 sets of 10 squats to strengthen your buttocks and thighs. Standing hamstring curls: Face a wall and place both palms flat on the wall, or hold the back of a chair for balance. With your weight on one leg, lift your other foot as close to your buttocks as you can. Hold for 5 seconds and then lower your leg. Do 2 sets of 10 curls on each leg. This exercise strengthens the muscles in the back of your thigh. Standing calf raises:  Face a wall and place both palms flat on the wall, or hold the back of a chair for balance. Stand up straight, and do not lean. Place all your weight on one leg by lifting the other foot off the floor. Raise the heel of the foot that is on the floor as high as you can and then lower it. Do 2 sets of 10 calf raises on each leg to strengthen your calf muscles. Straight leg lifts:  Lie on your stomach with straight legs. Fold your arms in front of you and rest your head in your arms. Tighten your leg muscles and raise one leg as high as you can. Hold for 5 seconds, then lower your leg. Do 2 sets of 10 lifts on each leg to strengthen your buttocks. Sitting leg lifts:  Sit in a chair. Slowly straighten and raise one leg. Squeeze your thigh muscles and hold for 5 seconds. Relax and return your foot to the floor. Do 2 sets of 10 lifts on each leg. This helps strengthen the muscles in the front of your thigh. Call your doctor or physical therapist if:   You have new pain or your pain becomes worse. You have questions or concerns about your condition or care. © Copyright [employer] Gibson 2023 Information is for End User's use only and may not be sold, redistributed or otherwise used for commercial purposes. The above information is an  only. It is not intended as medical advice for individual conditions or treatments. Talk to your doctor, nurse or pharmacist before following any medical regimen to see if it is safe and effective for you.

## 2023-10-12 NOTE — ASSESSMENT & PLAN NOTE
Chronic  Follows with orthopedic for it   S/p steroid injection x 2 in June and August 2023   Most recent injection was with hyaluronan on August 5th   Xray showed severe arthritis  Patient report ortho offered knee replacement however she declined it   Currently requested tylenol with codeine or other opioid, it was discussed that it might not be a good idea for this chronic pain,  She has bariatric surgery thus NSAIDS are not prefer   She declines muscle relaxant or tylenol arthritis or topical analgesic   She is agreeable with aqua therapy, order placed

## 2023-10-27 ENCOUNTER — OFFICE VISIT (OUTPATIENT)
Dept: FAMILY MEDICINE CLINIC | Facility: CLINIC | Age: 63
End: 2023-10-27

## 2023-10-27 VITALS
DIASTOLIC BLOOD PRESSURE: 80 MMHG | TEMPERATURE: 98.4 F | SYSTOLIC BLOOD PRESSURE: 124 MMHG | WEIGHT: 188 LBS | BODY MASS INDEX: 31.32 KG/M2 | RESPIRATION RATE: 16 BRPM | OXYGEN SATURATION: 98 % | HEIGHT: 65 IN | HEART RATE: 51 BPM

## 2023-10-27 DIAGNOSIS — B37.2 CANDIDAL INTERTRIGO: ICD-10-CM

## 2023-10-27 DIAGNOSIS — G89.29 CHRONIC PAIN OF LEFT KNEE: ICD-10-CM

## 2023-10-27 DIAGNOSIS — Z00.00 MEDICARE ANNUAL WELLNESS VISIT, SUBSEQUENT: Primary | ICD-10-CM

## 2023-10-27 DIAGNOSIS — K59.00 CONSTIPATION, UNSPECIFIED CONSTIPATION TYPE: ICD-10-CM

## 2023-10-27 DIAGNOSIS — E66.09 CLASS 1 OBESITY DUE TO EXCESS CALORIES WITHOUT SERIOUS COMORBIDITY WITH BODY MASS INDEX (BMI) OF 31.0 TO 31.9 IN ADULT: ICD-10-CM

## 2023-10-27 DIAGNOSIS — J45.20 MILD INTERMITTENT ASTHMA WITHOUT COMPLICATION: ICD-10-CM

## 2023-10-27 DIAGNOSIS — M25.562 CHRONIC PAIN OF LEFT KNEE: ICD-10-CM

## 2023-10-27 DIAGNOSIS — Z12.31 ENCOUNTER FOR SCREENING MAMMOGRAM FOR MALIGNANT NEOPLASM OF BREAST: ICD-10-CM

## 2023-10-27 DIAGNOSIS — E53.8 VITAMIN B12 DEFICIENCY: ICD-10-CM

## 2023-10-27 PROBLEM — B34.9 ACUTE VIRAL SYNDROME: Status: RESOLVED | Noted: 2022-01-21 | Resolved: 2023-10-27

## 2023-10-27 PROBLEM — L30.4 INTERTRIGO: Status: RESOLVED | Noted: 2019-04-24 | Resolved: 2023-10-27

## 2023-10-27 PROBLEM — Z86.0101 HX OF ADENOMATOUS COLONIC POLYPS: Status: ACTIVE | Noted: 2023-10-27

## 2023-10-27 PROBLEM — Z86.010 HX OF ADENOMATOUS COLONIC POLYPS: Status: ACTIVE | Noted: 2023-10-27

## 2023-10-27 PROBLEM — U07.1 COVID-19: Status: RESOLVED | Noted: 2021-11-19 | Resolved: 2023-10-27

## 2023-10-27 PROBLEM — R10.31 GROIN PAIN, RIGHT: Status: RESOLVED | Noted: 2019-07-02 | Resolved: 2023-10-27

## 2023-10-27 PROCEDURE — G0439 PPPS, SUBSEQ VISIT: HCPCS | Performed by: FAMILY MEDICINE

## 2023-10-27 PROCEDURE — 96372 THER/PROPH/DIAG INJ SC/IM: CPT | Performed by: FAMILY MEDICINE

## 2023-10-27 PROCEDURE — 99214 OFFICE O/P EST MOD 30 MIN: CPT | Performed by: FAMILY MEDICINE

## 2023-10-27 RX ORDER — CYANOCOBALAMIN (VITAMIN B-12) 2000 MCG
2000 TABLET ORAL DAILY
Qty: 30 TABLET | Refills: 5 | Status: SHIPPED | OUTPATIENT
Start: 2023-10-27

## 2023-10-27 RX ORDER — POLYETHYLENE GLYCOL 3350 17 G/17G
17 POWDER, FOR SOLUTION ORAL DAILY
Qty: 255 G | Refills: 0 | Status: SHIPPED | OUTPATIENT
Start: 2023-10-27

## 2023-10-27 RX ORDER — KETOROLAC TROMETHAMINE 30 MG/ML
30 INJECTION, SOLUTION INTRAMUSCULAR; INTRAVENOUS ONCE
Status: COMPLETED | OUTPATIENT
Start: 2023-10-27 | End: 2023-10-27

## 2023-10-27 RX ORDER — MULTIVITAMIN
1 CAPSULE ORAL DAILY
Qty: 30 CAPSULE | Refills: 5 | Status: SHIPPED | OUTPATIENT
Start: 2023-10-27 | End: 2024-04-24

## 2023-10-27 RX ORDER — ALBUTEROL SULFATE 90 UG/1
2 AEROSOL, METERED RESPIRATORY (INHALATION) EVERY 6 HOURS PRN
Qty: 25.5 G | Refills: 1 | Status: SHIPPED | OUTPATIENT
Start: 2023-10-27

## 2023-10-27 RX ADMIN — KETOROLAC TROMETHAMINE 30 MG: 30 INJECTION, SOLUTION INTRAMUSCULAR; INTRAVENOUS at 11:40

## 2023-10-27 NOTE — ASSESSMENT & PLAN NOTE
Preventive Screening Reviewed:  Mammogram referral placed  Colonoscopy completed 2023  Lipid and DM screening ordered    Immunizations Reviewed:  Pt declines Influenza vaccine today

## 2023-10-27 NOTE — PATIENT INSTRUCTIONS
Medicare Preventive Visit Patient Instructions  Thank you for completing your Welcome to Medicare Visit or Medicare Annual Wellness Visit today. Your next wellness visit will be due in one year (10/27/2024). The screening/preventive services that you may require over the next 5-10 years are detailed below. Some tests may not apply to you based off risk factors and/or age. Screening tests ordered at today's visit but not completed yet may show as past due. Also, please note that scanned in results may not display below. Preventive Screenings:  Service Recommendations Previous Testing/Comments   Colorectal Cancer Screening  * Colonoscopy    * Fecal Occult Blood Test (FOBT)/Fecal Immunochemical Test (FIT)  * Fecal DNA/Cologuard Test  * Flexible Sigmoidoscopy Age: 43-73 years old   Colonoscopy: every 10 years (may be performed more frequently if at higher risk)  OR  FOBT/FIT: every 1 year  OR  Cologuard: every 3 years  OR  Sigmoidoscopy: every 5 years  Screening may be recommended earlier than age 39 if at higher risk for colorectal cancer. Also, an individualized decision between you and your healthcare provider will decide whether screening between the ages of 77-80 would be appropriate. Colonoscopy: 09/28/2023  FOBT/FIT: Not on file  Cologuard: Not on file  Sigmoidoscopy: Not on file    Screening Current     Breast Cancer Screening Age: 36 years old  Frequency: every 1-2 years  Not required if history of left and right mastectomy Mammogram: 06/20/2019        Cervical Cancer Screening Between the ages of 21-29, pap smear recommended once every 3 years. Between the ages of 32-69, can perform pap smear with HPV co-testing every 5 years.    Recommendations may differ for women with a history of total hysterectomy, cervical cancer, or abnormal pap smears in past. Pap Smear: 06/11/2019    Screening Not Indicated   Hepatitis C Screening Once for adults born between 1945 and 1965  More frequently in patients at high risk for Hepatitis C Hep C Antibody: 09/03/2021    Screening Current   Diabetes Screening 1-2 times per year if you're at risk for diabetes or have pre-diabetes Fasting glucose: 92 mg/dL (5/26/2023)  A1C: 5.4 % (6/3/2019)  Screening Current   Cholesterol Screening Once every 5 years if you don't have a lipid disorder. May order more often based on risk factors. Lipid panel: 05/26/2022    Screening Current     Other Preventive Screenings Covered by Medicare:  Abdominal Aortic Aneurysm (AAA) Screening: covered once if your at risk. You're considered to be at risk if you have a family history of AAA. Lung Cancer Screening: covers low dose CT scan once per year if you meet all of the following conditions: (1) Age 48-67; (2) No signs or symptoms of lung cancer; (3) Current smoker or have quit smoking within the last 15 years; (4) You have a tobacco smoking history of at least 20 pack years (packs per day multiplied by number of years you smoked); (5) You get a written order from a healthcare provider. Glaucoma Screening: covered annually if you're considered high risk: (1) You have diabetes OR (2) Family history of glaucoma OR (3)  aged 48 and older OR (3)  American aged 72 and older  Osteoporosis Screening: covered every 2 years if you meet one of the following conditions: (1) You're estrogen deficient and at risk for osteoporosis based off medical history and other findings; (2) Have a vertebral abnormality; (3) On glucocorticoid therapy for more than 3 months; (4) Have primary hyperparathyroidism; (5) On osteoporosis medications and need to assess response to drug therapy. Last bone density test (DXA Scan): 08/04/2023. HIV Screening: covered annually if you're between the age of 14-79. Also covered annually if you are younger than 13 and older than 72 with risk factors for HIV infection. For pregnant patients, it is covered up to 3 times per pregnancy.     Immunizations:  Immunization Recommendations   Influenza Vaccine Annual influenza vaccination during flu season is recommended for all persons aged >= 6 months who do not have contraindications   Pneumococcal Vaccine   * Pneumococcal conjugate vaccine = PCV13 (Prevnar 13), PCV15 (Vaxneuvance), PCV20 (Prevnar 20)  * Pneumococcal polysaccharide vaccine = PPSV23 (Pneumovax) Adults 27-44 yo with certain risk factors or if 69+ yo  If never received any pneumonia vaccine: recommend Prevnar 20 (PCV20)  Give PCV20 if previously received 1 dose of PCV13 or PPSV23   Hepatitis B Vaccine 3 dose series if at intermediate or high risk (ex: diabetes, end stage renal disease, liver disease)   Respiratory syncytial virus (RSV) Vaccine - COVERED BY MEDICARE PART D  * RSVPreF3 (Arexvy) CDC recommends that adults 61years of age and older may receive a single dose of RSV vaccine using shared clinical decision-making (SCDM)   Tetanus (Td) Vaccine - COST NOT COVERED BY MEDICARE PART B Following completion of primary series, a booster dose should be given every 10 years to maintain immunity against tetanus. Td may also be given as tetanus wound prophylaxis. Tdap Vaccine - COST NOT COVERED BY MEDICARE PART B Recommended at least once for all adults. For pregnant patients, recommended with each pregnancy. Shingles Vaccine (Shingrix) - COST NOT COVERED BY MEDICARE PART B  2 shot series recommended in those 23 years and older who have or will have weakened immune systems or those 50 years and older     Health Maintenance Due:      Topic Date Due   • Colorectal Cancer Screening  09/27/2026   • HIV Screening  Completed   • Hepatitis C Screening  Completed     Immunizations Due:      Topic Date Due   • COVID-19 Vaccine (1) Never done   • Pneumococcal Vaccine: Pediatrics (0 to 5 Years) and At-Risk Patients (6 to 59 Years) (2 - PCV) 03/21/2013   • Influenza Vaccine (1) 09/01/2023     Advance Directives   What are advance directives?   Advance directives are legal documents that state your wishes and plans for medical care. These plans are made ahead of time in case you lose your ability to make decisions for yourself. Advance directives can apply to any medical decision, such as the treatments you want, and if you want to donate organs. What are the types of advance directives? There are many types of advance directives, and each state has rules about how to use them. You may choose a combination of any of the following:  Living will: This is a written record of the treatment you want. You can also choose which treatments you do not want, which to limit, and which to stop at a certain time. This includes surgery, medicine, IV fluid, and tube feedings. Durable power of  for [facility]): This is a written record that states who you want to make healthcare choices for you when you are unable to make them for yourself. This person, called a proxy, is usually a family member or a friend. You may choose more than 1 proxy. Do not resuscitate (DNR) order:  A DNR order is used in case your heart stops beating or you stop breathing. It is a request not to have certain forms of treatment, such as CPR. A DNR order may be included in other types of advance directives. Medical directive: This covers the care that you want if you are in a coma, near death, or unable to make decisions for yourself. You can list the treatments you want for each condition. Treatment may include pain medicine, surgery, blood transfusions, dialysis, IV or tube feedings, and a ventilator (breathing machine). Values history: This document has questions about your views, beliefs, and how you feel and think about life. This information can help others choose the care that you would choose. Why are advance directives important? An advance directive helps you control your care. Although spoken wishes may be used, it is better to have your wishes written down.  Spoken wishes can be misunderstood, or not followed. Treatments may be given even if you do not want them. An advance directive may make it easier for your family to make difficult choices about your care. Weight Management   Why it is important to manage your weight:  Being overweight increases your risk of health conditions such as heart disease, high blood pressure, type 2 diabetes, and certain types of cancer. It can also increase your risk for osteoarthritis, sleep apnea, and other respiratory problems. Aim for a slow, steady weight loss. Even a small amount of weight loss can lower your risk of health problems. How to lose weight safely:  A safe and healthy way to lose weight is to eat fewer calories and get regular exercise. You can lose up about 1 pound a week by decreasing the number of calories you eat by 500 calories each day. Healthy meal plan for weight management:  A healthy meal plan includes a variety of foods, contains fewer calories, and helps you stay healthy. A healthy meal plan includes the following:  Eat whole-grain foods more often. A healthy meal plan should contain fiber. Fiber is the part of grains, fruits, and vegetables that is not broken down by your body. Whole-grain foods are healthy and provide extra fiber in your diet. Some examples of whole-grain foods are whole-wheat breads and pastas, oatmeal, brown rice, and bulgur. Eat a variety of vegetables every day. Include dark, leafy greens such as spinach, kale, duane greens, and mustard greens. Eat yellow and orange vegetables such as carrots, sweet potatoes, and winter squash. Eat a variety of fruits every day. Choose fresh or canned fruit (canned in its own juice or light syrup) instead of juice. Fruit juice has very little or no fiber. Eat low-fat dairy foods. Drink fat-free (skim) milk or 1% milk. Eat fat-free yogurt and low-fat cottage cheese. Try low-fat cheeses such as mozzarella and other reduced-fat cheeses.   Choose meat and other protein foods that are low in fat. Choose beans or other legumes such as split peas or lentils. Choose fish, skinless poultry (chicken or turkey), or lean cuts of red meat (beef or pork). Before you cook meat or poultry, cut off any visible fat. Use less fat and oil. Try baking foods instead of frying them. Add less fat, such as margarine, sour cream, regular salad dressing and mayonnaise to foods. Eat fewer high-fat foods. Some examples of high-fat foods include french fries, doughnuts, ice cream, and cakes. Eat fewer sweets. Limit foods and drinks that are high in sugar. This includes candy, cookies, regular soda, and sweetened drinks. Exercise:  Exercise at least 30 minutes per day on most days of the week. Some examples of exercise include walking, biking, dancing, and swimming. You can also fit in more physical activity by taking the stairs instead of the elevator or parking farther away from stores. Ask your healthcare provider about the best exercise plan for you. © Copyright [employer] 2018 Information is for End User's use only and may not be sold, redistributed or otherwise used for commercial purposes. All illustrations and images included in CareNotes® are the copyrighted property of [employer] or [facility] Preventive Visit Patient Instructions  Thank you for completing your Welcome to Medicare Visit or Medicare Annual Wellness Visit today. Your next wellness visit will be due in one year (10/27/2024). The screening/preventive services that you may require over the next 5-10 years are detailed below. Some tests may not apply to you based off risk factors and/or age. Screening tests ordered at today's visit but not completed yet may show as past due. Also, please note that scanned in results may not display below.   Preventive Screenings:  Service Recommendations Previous Testing/Comments   Colorectal Cancer Screening  * Colonoscopy    * Fecal Occult Blood Test (FOBT)/Fecal Immunochemical Test (FIT)  * Fecal DNA/Cologuard Test  * Flexible Sigmoidoscopy Age: 43-73 years old   Colonoscopy: every 10 years (may be performed more frequently if at higher risk)  OR  FOBT/FIT: every 1 year  OR  Cologuard: every 3 years  OR  Sigmoidoscopy: every 5 years  Screening may be recommended earlier than age 39 if at higher risk for colorectal cancer. Also, an individualized decision between you and your healthcare provider will decide whether screening between the ages of 77-80 would be appropriate. Colonoscopy: 09/28/2023  FOBT/FIT: Not on file  Cologuard: Not on file  Sigmoidoscopy: Not on file    Screening Current     Breast Cancer Screening Age: 36 years old  Frequency: every 1-2 years  Not required if history of left and right mastectomy Mammogram: 06/20/2019        Cervical Cancer Screening Between the ages of 21-29, pap smear recommended once every 3 years. Between the ages of 32-69, can perform pap smear with HPV co-testing every 5 years. Recommendations may differ for women with a history of total hysterectomy, cervical cancer, or abnormal pap smears in past. Pap Smear: 06/11/2019    Screening Not Indicated   Hepatitis C Screening Once for adults born between 1945 and 1965  More frequently in patients at high risk for Hepatitis C Hep C Antibody: 09/03/2021    Screening Current   Diabetes Screening 1-2 times per year if you're at risk for diabetes or have pre-diabetes Fasting glucose: 92 mg/dL (5/26/2023)  A1C: 5.4 % (6/3/2019)  Screening Current   Cholesterol Screening Once every 5 years if you don't have a lipid disorder. May order more often based on risk factors. Lipid panel: 05/26/2022    Screening Current     Other Preventive Screenings Covered by Medicare:  Abdominal Aortic Aneurysm (AAA) Screening: covered once if your at risk. You're considered to be at risk if you have a family history of AAA.   Lung Cancer Screening: covers low dose CT scan once per year if you meet all of the following conditions: (1) Age 48-67; (2) No signs or symptoms of lung cancer; (3) Current smoker or have quit smoking within the last 15 years; (4) You have a tobacco smoking history of at least 20 pack years (packs per day multiplied by number of years you smoked); (5) You get a written order from a healthcare provider. Glaucoma Screening: covered annually if you're considered high risk: (1) You have diabetes OR (2) Family history of glaucoma OR (3)  aged 48 and older OR (3)  American aged 72 and older  Osteoporosis Screening: covered every 2 years if you meet one of the following conditions: (1) You're estrogen deficient and at risk for osteoporosis based off medical history and other findings; (2) Have a vertebral abnormality; (3) On glucocorticoid therapy for more than 3 months; (4) Have primary hyperparathyroidism; (5) On osteoporosis medications and need to assess response to drug therapy. Last bone density test (DXA Scan): 08/04/2023. HIV Screening: covered annually if you're between the age of 14-79. Also covered annually if you are younger than 13 and older than 72 with risk factors for HIV infection. For pregnant patients, it is covered up to 3 times per pregnancy.     Immunizations:  Immunization Recommendations   Influenza Vaccine Annual influenza vaccination during flu season is recommended for all persons aged >= 6 months who do not have contraindications   Pneumococcal Vaccine   * Pneumococcal conjugate vaccine = PCV13 (Prevnar 13), PCV15 (Vaxneuvance), PCV20 (Prevnar 20)  * Pneumococcal polysaccharide vaccine = PPSV23 (Pneumovax) Adults 12-64 yo with certain risk factors or if 69+ yo  If never received any pneumonia vaccine: recommend Prevnar 20 (PCV20)  Give PCV20 if previously received 1 dose of PCV13 or PPSV23   Hepatitis B Vaccine 3 dose series if at intermediate or high risk (ex: diabetes, end stage renal disease, liver disease)   Respiratory syncytial virus (RSV) Vaccine - COVERED BY MEDICARE PART D  * RSVPreF3 (Arexvy) CDC recommends that adults 61years of age and older may receive a single dose of RSV vaccine using shared clinical decision-making (SCDM)   Tetanus (Td) Vaccine - COST NOT COVERED BY MEDICARE PART B Following completion of primary series, a booster dose should be given every 10 years to maintain immunity against tetanus. Td may also be given as tetanus wound prophylaxis. Tdap Vaccine - COST NOT COVERED BY MEDICARE PART B Recommended at least once for all adults. For pregnant patients, recommended with each pregnancy. Shingles Vaccine (Shingrix) - COST NOT COVERED BY MEDICARE PART B  2 shot series recommended in those 23 years and older who have or will have weakened immune systems or those 50 years and older     Health Maintenance Due:      Topic Date Due   • Colorectal Cancer Screening  09/27/2026   • HIV Screening  Completed   • Hepatitis C Screening  Completed     Immunizations Due:      Topic Date Due   • COVID-19 Vaccine (1) Never done   • Pneumococcal Vaccine: Pediatrics (0 to 5 Years) and At-Risk Patients (6 to 59 Years) (2 - PCV) 03/21/2013   • Influenza Vaccine (1) 09/01/2023     Advance Directives   What are advance directives? Advance directives are legal documents that state your wishes and plans for medical care. These plans are made ahead of time in case you lose your ability to make decisions for yourself. Advance directives can apply to any medical decision, such as the treatments you want, and if you want to donate organs. What are the types of advance directives? There are many types of advance directives, and each state has rules about how to use them. You may choose a combination of any of the following:  Living will: This is a written record of the treatment you want. You can also choose which treatments you do not want, which to limit, and which to stop at a certain time.  This includes surgery, medicine, IV fluid, and tube feedings. Durable power of  for [facility]): This is a written record that states who you want to make healthcare choices for you when you are unable to make them for yourself. This person, called a proxy, is usually a family member or a friend. You may choose more than 1 proxy. Do not resuscitate (DNR) order:  A DNR order is used in case your heart stops beating or you stop breathing. It is a request not to have certain forms of treatment, such as CPR. A DNR order may be included in other types of advance directives. Medical directive: This covers the care that you want if you are in a coma, near death, or unable to make decisions for yourself. You can list the treatments you want for each condition. Treatment may include pain medicine, surgery, blood transfusions, dialysis, IV or tube feedings, and a ventilator (breathing machine). Values history: This document has questions about your views, beliefs, and how you feel and think about life. This information can help others choose the care that you would choose. Why are advance directives important? An advance directive helps you control your care. Although spoken wishes may be used, it is better to have your wishes written down. Spoken wishes can be misunderstood, or not followed. Treatments may be given even if you do not want them. An advance directive may make it easier for your family to make difficult choices about your care. Weight Management   Why it is important to manage your weight:  Being overweight increases your risk of health conditions such as heart disease, high blood pressure, type 2 diabetes, and certain types of cancer. It can also increase your risk for osteoarthritis, sleep apnea, and other respiratory problems. Aim for a slow, steady weight loss. Even a small amount of weight loss can lower your risk of health problems.   How to lose weight safely:  A safe and healthy way to lose weight is to eat fewer calories and get regular exercise. You can lose up about 1 pound a week by decreasing the number of calories you eat by 500 calories each day. Healthy meal plan for weight management:  A healthy meal plan includes a variety of foods, contains fewer calories, and helps you stay healthy. A healthy meal plan includes the following:  Eat whole-grain foods more often. A healthy meal plan should contain fiber. Fiber is the part of grains, fruits, and vegetables that is not broken down by your body. Whole-grain foods are healthy and provide extra fiber in your diet. Some examples of whole-grain foods are whole-wheat breads and pastas, oatmeal, brown rice, and bulgur. Eat a variety of vegetables every day. Include dark, leafy greens such as spinach, kale, duane greens, and mustard greens. Eat yellow and orange vegetables such as carrots, sweet potatoes, and winter squash. Eat a variety of fruits every day. Choose fresh or canned fruit (canned in its own juice or light syrup) instead of juice. Fruit juice has very little or no fiber. Eat low-fat dairy foods. Drink fat-free (skim) milk or 1% milk. Eat fat-free yogurt and low-fat cottage cheese. Try low-fat cheeses such as mozzarella and other reduced-fat cheeses. Choose meat and other protein foods that are low in fat. Choose beans or other legumes such as split peas or lentils. Choose fish, skinless poultry (chicken or turkey), or lean cuts of red meat (beef or pork). Before you cook meat or poultry, cut off any visible fat. Use less fat and oil. Try baking foods instead of frying them. Add less fat, such as margarine, sour cream, regular salad dressing and mayonnaise to foods. Eat fewer high-fat foods. Some examples of high-fat foods include french fries, doughnuts, ice cream, and cakes. Eat fewer sweets. Limit foods and drinks that are high in sugar. This includes candy, cookies, regular soda, and sweetened drinks.   Exercise:  Exercise at least 30 minutes per day on most days of the week. Some examples of exercise include walking, biking, dancing, and swimming. You can also fit in more physical activity by taking the stairs instead of the elevator or parking farther away from stores. Ask your healthcare provider about the best exercise plan for you. © Copyright [employer] 2018 Information is for End User's use only and may not be sold, redistributed or otherwise used for commercial purposes.  All illustrations and images included in CareNotes® are the copyrighted property of [employer] Inc. or  [employer]

## 2023-10-27 NOTE — PROGRESS NOTES
Assessment and Plan:     Problem List Items Addressed This Visit        Respiratory    Asthma    Relevant Medications    albuterol (PROVENTIL HFA,VENTOLIN HFA) 90 mcg/act inhaler       Other    Vitamin B12 deficiency    Relevant Medications    vitamin B-12 (CYANOCOBALAMIN) 2000 MCG TABS    Multiple Vitamin (multivitamin) capsule    Chronic pain of left knee     Chronic left knee pain due to osteorthritis  S/p steroid injections and hyaluronan  Left knee xray: moderate to severe osteoarthritis  Patient declines knee replacement as advised by Orhtopedics  H/o Bariatric surgery, intolerant to NSAIDS  Discussed options for her knee pain  Ice, heat, massage, stretches  Pt requesting Tylenol with codeine or opioid for at least one month for her pain, declined due to previous h/o opioid dependence  Offered to speak with her psychiatrist in regards to managing her chronic pain.   Pt unhappy with decision, and states will keep presenting to the ER for medications         Relevant Medications    ketorolac (TORADOL) injection 30 mg (Completed)    Medicare annual wellness visit, subsequent - Primary     Preventive Screening Reviewed:  Mammogram referral placed  Colonoscopy completed 2023  Lipid and DM screening ordered    Immunizations Reviewed:  Pt declines Influenza vaccine today          Other Visit Diagnoses     Constipation, unspecified constipation type        Relevant Medications    polyethylene glycol (GLYCOLAX) 17 GM/SCOOP powder    Encounter for screening mammogram for malignant neoplasm of breast        Relevant Orders    Mammo screening bilateral w 3d & cad    Class 1 obesity due to excess calories without serious comorbidity with body mass index (BMI) of 31.0 to 31.9 in adult        Relevant Orders    Lipid panel    HEMOGLOBIN A1C W/ EAG ESTIMATION    Candidal intertrigo             Preventive health issues were discussed with patient, and age appropriate screening tests were ordered as noted in patient's After Visit Summary. Personalized health advice and appropriate referrals for health education or preventive services given if needed, as noted in patient's After Visit Summary. History of Present Illness:     Patient presents for a Medicare Wellness Visit. Pt states she is in "100"/10 pain in her left knee. She is s/p Hyaluronan injection in her left knee after unsucesful steroid injections for her pain. She states orthopedics offered her left knee replacement but she is declining at this time. She has a knee sleeve on today to help support her knee. Patient is requesting a stronger medication to help with the knee pain. She states that it is unfair that she is unable to receive opioid medication when "other people are getting it like candy and using it."  Patient declines ever having an opioid dependence history or overdose history, unlike what is documented in chart review. Patient very upset during the visit. HPI   Patient Care Team:  Tram Ren MD as PCP - General (Family Medicine)  Merline Brilliant, MD Rochele Butts, MD     Review of Systems:     Review of Systems   Constitutional:  Negative for chills and fever. HENT:  Negative for congestion, rhinorrhea and sore throat. Respiratory:  Negative for cough and shortness of breath. Cardiovascular:  Negative for chest pain. Gastrointestinal:  Positive for constipation. Negative for diarrhea, nausea and vomiting. Musculoskeletal:  Positive for arthralgias and back pain. Skin:  Negative for rash. Neurological:  Negative for dizziness and headaches. Psychiatric/Behavioral:  The patient is nervous/anxious.          Problem List:     Patient Active Problem List   Diagnosis   • Asthma   • History of bariatric surgery   • Intentional drug overdose ([address])   • Major depressive disorder, recurrent severe without psychotic features ([address])   • Hypoglycemia   • Chronic back pain   • Generalized anxiety disorder   • Postgastrectomy malabsorption • Bilateral carpal tunnel syndrome   • Generalized osteoarthritis   • Chronic nonintractable headache   • Epigastric abdominal pain   • Vitamin D deficiency   • Vitamin B12 deficiency   • Poor appetite   • Secondary hyperparathyroidism of renal origin ([address])   • Lumbar spondylosis   • Localized adiposity   • Status post panniculectomy   • History of hysterectomy   • Acute exacerbation of chronic low back pain   • Joint pain   • Grief   • Chronic pain of left knee   • Hx of adenomatous colonic polyps   • Medicare annual wellness visit, subsequent      Past Medical and Surgical History:     Past Medical History:   Diagnosis Date   • Asthma     Albuterol prn   • Bariatric surgery status    • COVID-19 11/19/2021   • Depression 2009    managed with Effexor    • Diabetes mellitus ([address]) 2005    resolved with gastric bypass 2015   • Generalized osteoarthritis    • Low vitamin B12 level    • Migraine    • Postgastrectomy malabsorption    • Suicide attempt [facility])    • Vitamin D deficiency      Past Surgical History:   Procedure Laterality Date   • CHOLECYSTECTOMY  2005   • COLONOSCOPY     • COSMETIC SURGERY  05/27/2020    Abdominoplasty   • GASTRIC BYPASS  2015    elvira - en -y    • HYSTERECTOMY     • IR IMAGE GUIDED ASPIRATION / DRAINAGE W TUBE  7/14/2020   • KNEE ARTHROSCOPY      with Lysis of adhesions   • LAPAROSCOPIC SUPRACERVICAL HYSTERECTOMY  2005    due to endometriosis/AUB   • OOPHORECTOMY Left 2005   • ND EXCISION SKIN ABD INFRAUMBILICAL PANNICULECTOMY N/A 5/27/2020    Procedure: PANNICULECTOMY;  Surgeon: Diannah Denver, MD;  Location: [facility];  Service: Plastics   • TONSILLECTOMY AND ADENOIDECTOMY     • TUBAL LIGATION  1986   • VAC DRESSING APPLICATION N/A 0/39/9151    Procedure: APPLICATION VAC DRESSING;  Surgeon: Diannah Denver, MD;  Location: [facility];  Service: Plastics      Family History:     Family History   Problem Relation Age of Onset   • Hypertension Mother    • Kidney cancer Mother • Diabetes Mother    • Breast cancer Mother    • Heart disease Father    • Stroke Father    • Hypertension Sister    • HIV Brother    • Breast cancer Cousin    • No Known Problems Daughter    • Stomach cancer Maternal Grandmother    • No Known Problems Maternal Grandfather    • No Known Problems Paternal Grandmother    • No Known Problems Paternal Grandfather    • No Known Problems Sister    • No Known Problems Sister    • No Known Problems Daughter    • Prostate cancer Maternal Uncle    • Stomach cancer Maternal Uncle    • Leukemia Grandchild       Social History:     Social History     Socioeconomic History   • Marital status: Single     Spouse name: None   • Number of children: None   • Years of education: None   • Highest education level: None   Occupational History   • None   Tobacco Use   • Smoking status: Former     Years: 32.00     Types: Cigarettes     Quit date: 05/2013     Years since quitting: 10.4   • Smokeless tobacco: Never   Vaping Use   • Vaping Use: Never used   Substance and Sexual Activity   • Alcohol use: Not Currently   • Drug use: Never   • Sexual activity: Not Currently     Partners: Male     Birth control/protection: Female Sterilization, Post-menopausal   Other Topics Concern   • None   Social History Narrative   • None     Social Determinants of Health     Financial Resource Strain: Low Risk  (2/13/2023)    Overall Financial Resource Strain (CARDIA)    • Difficulty of Paying Living Expenses: Not hard at all   Food Insecurity: No Food Insecurity (2/13/2023)    Hunger Vital Sign    • Worried About Running Out of Food in the Last Year: Never true    • Ran Out of Food in the Last Year: Never true   Transportation Needs: No Transportation Needs (2/13/2023)    PRAPARE - Transportation    • Lack of Transportation (Medical): No    • Lack of Transportation (Non-Medical):  No   Physical Activity: Not on file   Stress: Not on file   Social Connections: Not on file   Intimate Partner Violence: Not on file   Housing Stability: Not on file      Medications and Allergies:     Current Outpatient Medications   Medication Sig Dispense Refill   • albuterol (PROVENTIL HFA,VENTOLIN HFA) 90 mcg/act inhaler Inhale 2 puffs every 6 (six) hours as needed for wheezing 25.5 g 1   • ALPRAZolam (XANAX) 1 mg tablet Take 1 mg by mouth every morning     • Diclofenac Sodium (VOLTAREN) 1 % Apply 2 g topically 4 (four) times a day 100 g 3   • doxepin (SINEquan) 100 mg capsule Take 100 mg by mouth daily at bedtime     • linaCLOtide (Linzess) 72 MCG CAPS Take 72 mcg by mouth daily before breakfast 30 capsule 3   • meclizine (ANTIVERT) 12.5 MG tablet Take 2 tablets (25 mg total) by mouth 3 (three) times a day as needed for dizziness 30 tablet 0   • Multiple Vitamin (multivitamin) capsule Take 1 capsule by mouth daily 30 capsule 5   • nystatin (MYCOSTATIN) ointment Apply topically 2 (two) times a day for 21 days Apply to umbilicus 30 g 0   • ondansetron (ZOFRAN-ODT) 4 mg disintegrating tablet Take 1 tablet (4 mg total) by mouth every 8 (eight) hours as needed for nausea for up to 10 doses 10 tablet 0   • polyethylene glycol (GLYCOLAX) 17 GM/SCOOP powder Take 17 g by mouth daily 255 g 0   • tiZANidine (ZANAFLEX) 2 mg tablet Take 1 tablet (2 mg total) by mouth 3 (three) times a day 90 tablet 1   • venlafaxine (EFFEXOR) 75 mg tablet Take 75 mg by mouth 2 (two) times a day with meals     • vitamin B-12 (CYANOCOBALAMIN) 2000 MCG TABS Take 1 tablet (2,000 mcg total) by mouth daily 30 tablet 5   • ascorbic acid (VITAMIN C) 500 mg tablet TAKE 1 TABLET( 500 MG TOTAL) BY MOUTH EVERY OTHER DAY.  TAKE WITH IRON TABLET 90 tablet 0   • Cholecalciferol (Vitamin D) 50 MCG (2000 UT) CAPS Take 1 capsule (2,000 Units total) by mouth in the morning 90 capsule 2   • Cholecalciferol (Vitamin D) 50 MCG (2000 UT) tablet Take 1 tablet (2,000 Units total) by mouth daily 90 tablet 1   • dicyclomine (BENTYL) 20 mg tablet TAKE 1 TABLET(20 MG) BY MOUTH EVERY 6 HOURS AS NEEDED FOR ABDOMINAL PAIN 360 tablet 0   • ferrous sulfate 324 (65 Fe) mg Take 1 tablet (324 mg total) by mouth every other day 90 tablet 0   • fluticasone (FLOVENT HFA) 110 MCG/ACT inhaler Inhale 4 puffs 2 (two) times a day Rinse mouth after use. 12 g 2   • naloxone (NARCAN) 4 mg/0.1 mL nasal spray Administer 1 spray into a nostril. If breathing does not return to normal or if breathing difficulty resumes after 2-3 minutes, give another dose in the other nostril using a new spray.  1 each 1   • omeprazole (PriLOSEC) 40 MG capsule TAKE 1 CAPSULE(40 MG) BY MOUTH DAILY BEFORE BREAKFAST 90 capsule 0   • sucralfate (CARAFATE) 1 g tablet Take 1 tablet (1 g total) by mouth 4 (four) times a day Crush and mix with water to take as suspension 120 tablet 0   • triamcinolone (KENALOG) 0.5 % cream Apply topically 2 (two) times a day 15 g 1     Current Facility-Administered Medications   Medication Dose Route Frequency Provider Last Rate Last Admin   • triamcinolone acetonide (KENALOG-40) 40 mg/mL injection 40 mg  40 mg Intramuscular Once Anny Courtney MD         Allergies   Allergen Reactions   • Motrin [Ibuprofen]      Hx gastric bypass   • Cherry - Food Allergy Rash   • Gabapentin Rash      Immunizations:     Immunization History   Administered Date(s) Administered   • INFLUENZA 12/09/2013, 12/18/2014, 02/08/2017   • Influenza, recombinant, quadrivalent,injectable, preservative free 10/22/2019, 03/17/2021   • Pneumococcal Polysaccharide PPV23 03/21/2012   • Tdap 03/21/2012   • Tuberculin Skin Test-PPD Intradermal 11/26/2021      Health Maintenance:         Topic Date Due   • Colorectal Cancer Screening  09/27/2026   • HIV Screening  Completed   • Hepatitis C Screening  Completed         Topic Date Due   • COVID-19 Vaccine (1) Never done   • Pneumococcal Vaccine: Pediatrics (0 to 5 Years) and At-Risk Patients (6 to 59 Years) (2 - PCV) 03/21/2013   • Influenza Vaccine (1) 09/01/2023      Medicare Screening Tests and Risk Assessments:     Zulema Wright is here for her Initial Wellness visit. Health Risk Assessment:   Patient rates overall health as fair. Patient feels that their physical health rating is much worse. Patient is dissatisfied with their life. Eyesight was rated as slightly worse. Hearing was rated as same. Patient feels that their emotional and mental health rating is much worse. Patients states they are sometimes angry. Patient states they are sometimes unusually tired/fatigued. Pain experienced in the last 7 days has been a lot. Patient's pain rating has been 10/10. Patient states that she has experienced no weight loss or gain in last 6 months. + poor appetite    Depression Screening:   PHQ-9 Score: 14      Fall Risk Screening: In the past year, patient has experienced: history of falling in past year    Number of falls: 2 or more  Injured during fall?: No    Feels unsteady when standing or walking?: Yes    Worried about falling?: No      Urinary Incontinence Screening:   Patient has not leaked urine accidently in the last six months. Home Safety:  Patient has trouble with stairs inside or outside of their home. Patient has working smoke alarms and has working carbon monoxide detector. Home safety hazards include: none. Lives in housing with her grandson    Nutrition:   Current diet is Regular. Medications:   Patient is currently taking over-the-counter supplements. OTC medications include: see medication list. Patient is able to manage medications. Vitamin C, D, and Iron    Activities of Daily Living (ADLs)/Instrumental Activities of Daily Living (IADLs):   Walk and transfer into and out of bed and chair?: Yes  Dress and groom yourself?: Yes    Bathe or shower yourself?: Yes    Feed yourself?  Yes  Do your laundry/housekeeping?: Yes  Manage your money, pay your bills and track your expenses?: Yes  Make your own meals?: Yes    Do your own shopping?: Yes    Previous Hospitalizations:   Any hospitalizations or ED visits within the last 12 months?: No      Advance Care Planning:   Living will: No    Durable POA for healthcare: No    Advanced directive: No      Cognitive Screening:   Provider or family/friend/caregiver concerned regarding cognition?: No    PREVENTIVE SCREENINGS      Cardiovascular Screening:    General: Screening Current      Diabetes Screening:     General: Screening Current      Colorectal Cancer Screening:     General: Screening Current      Cervical Cancer Screening:    General: Screening Not Indicated      Osteoporosis Screening:    General: Screening Current      Abdominal Aortic Aneurysm (AAA) Screening:        General: Screening Not Indicated      Lung Cancer Screening:     General: Screening Not Indicated      Hepatitis C Screening:    General: Screening Current    Screening, Brief Intervention, and Referral to Treatment (SBIRT)    Screening  Typical number of drinks in a day: 0  Typical number of drinks in a week: 0  Interpretation: Low risk drinking behavior. Single Item Drug Screening:  How often have you used an illegal drug (including marijuana) or a prescription medication for non-medical reasons in the past year? never    Single Item Drug Screen Score: 0  Interpretation: Negative screen for possible drug use disorder    No results found. Physical Exam:     /80 (BP Location: Right arm, Patient Position: Sitting, Cuff Size: Standard)   Pulse (!) 51   Temp 98.4 °F (36.9 °C) (Temporal)   Resp 16   Ht 5' 5" (1.651 m)   Wt 85.3 kg (188 lb)   LMP  (LMP Unknown)   SpO2 98%   BMI 31.28 kg/m²     Physical Exam  Vitals and nursing note reviewed. Constitutional:       General: She is not in acute distress. Appearance: She is well-developed. HENT:      Head: Normocephalic and atraumatic. Eyes:      Conjunctiva/sclera: Conjunctivae normal.   Cardiovascular:      Rate and Rhythm: Normal rate and regular rhythm.       Heart sounds: No murmur heard.  Pulmonary:      Effort: Pulmonary effort is normal. No respiratory distress. Breath sounds: Normal breath sounds. Abdominal:      Palpations: Abdomen is soft. Tenderness: There is no abdominal tenderness. Musculoskeletal:         General: No swelling. Cervical back: Neck supple. Comments: + knee sleeve on the left knee   Skin:     General: Skin is warm and dry. Capillary Refill: Capillary refill takes less than 2 seconds. Neurological:      Mental Status: She is alert. Psychiatric:         Mood and Affect: Mood is anxious.           Rodger Ayon MD

## 2023-10-27 NOTE — ASSESSMENT & PLAN NOTE
Chronic left knee pain due to osteorthritis  S/p steroid injections and hyaluronan  Left knee xray: moderate to severe osteoarthritis  Patient declines knee replacement as advised by [facility]  H/o Bariatric surgery, intolerant to NSAIDS  Discussed options for her knee pain  Ice, heat, massage, stretches  Pt requesting Tylenol with codeine or opioid for at least one month for her pain, declined due to previous h/o opioid dependence  Offered to speak with her psychiatrist in regards to managing her chronic pain.   Pt unhappy with decision, and states will keep presenting to the ER for medications

## 2023-11-17 ENCOUNTER — HOSPITAL ENCOUNTER (EMERGENCY)
Facility: HOSPITAL | Age: 63
Discharge: HOME | End: 2023-11-17
Attending: EMERGENCY MEDICINE
Payer: MEDICARE

## 2023-11-17 VITALS
TEMPERATURE: 98.4 F | RESPIRATION RATE: 14 BRPM | HEART RATE: 69 BPM | DIASTOLIC BLOOD PRESSURE: 62 MMHG | OXYGEN SATURATION: 98 % | SYSTOLIC BLOOD PRESSURE: 117 MMHG

## 2023-11-17 DIAGNOSIS — K21.00 GASTROESOPHAGEAL REFLUX DISEASE WITH ESOPHAGITIS WITHOUT HEMORRHAGE: ICD-10-CM

## 2023-11-17 DIAGNOSIS — M17.12 OSTEOARTHRITIS OF LEFT KNEE: Primary | ICD-10-CM

## 2023-11-17 PROCEDURE — 99283 EMERGENCY DEPT VISIT LOW MDM: CPT

## 2023-11-17 PROCEDURE — 99284 EMERGENCY DEPT VISIT MOD MDM: CPT | Performed by: EMERGENCY MEDICINE

## 2023-11-17 RX ORDER — TRAMADOL HYDROCHLORIDE 50 MG/1
50 TABLET ORAL EVERY 8 HOURS PRN
Qty: 20 TABLET | Refills: 0 | Status: SHIPPED | OUTPATIENT
Start: 2023-11-17 | End: 2023-11-27

## 2023-11-17 RX ORDER — OMEPRAZOLE 40 MG/1
CAPSULE, DELAYED RELEASE ORAL
Qty: 90 CAPSULE | Refills: 1 | Status: SHIPPED | OUTPATIENT
Start: 2023-11-17

## 2023-11-17 NOTE — ED PROVIDER NOTES
History  Chief Complaint   Patient presents with    Knee Swelling     Pt reports left knee swelling that started 3 weeks ago pt reports got an injection in her knee 1 month ago      65YO F with PMHx of asthma, depression, T2D, bariatric surgery who presents with L knee pain. Pt has h/o OA for years. Pt noted that her L knee pain has worsened since the steroid injection back in August. Pt also reports getting hyaluronan injection on 10/5/2023, which has not worsened or improved her L knee pain. Denies any fevers, chills. On chart review, pt has declined knee replacement. Pt has also declined muscle relaxants, voltaren gel. Pt has tried tylenol and physical therapy which she reports has not helped. Minimal pain when she does not move the knee. Pain is worsened by flexion. Knee pain is so bad that she could not sleep at night at times. Prior to Admission Medications   Prescriptions Last Dose Informant Patient Reported? Taking? ALPRAZolam (XANAX) 1 mg tablet   Yes No   Sig: Take 1 mg by mouth every morning   Cholecalciferol (Vitamin D) 50 MCG (2000 UT) CAPS   No No   Sig: Take 1 capsule (2,000 Units total) by mouth in the morning   Cholecalciferol (Vitamin D) 50 MCG (2000 UT) tablet   No No   Sig: Take 1 tablet (2,000 Units total) by mouth daily   Diclofenac Sodium (VOLTAREN) 1 %   No No   Sig: Apply 2 g topically 4 (four) times a day   Multiple Vitamin (multivitamin) capsule   No No   Sig: Take 1 capsule by mouth daily   albuterol (PROVENTIL HFA,VENTOLIN HFA) 90 mcg/act inhaler   No No   Sig: Inhale 2 puffs every 6 (six) hours as needed for wheezing   ascorbic acid (VITAMIN C) 500 mg tablet   No No   Sig: TAKE 1 TABLET( 500 MG TOTAL) BY MOUTH EVERY OTHER DAY.  TAKE WITH IRON TABLET   dicyclomine (BENTYL) 20 mg tablet   No No   Sig: TAKE 1 TABLET(20 MG) BY MOUTH EVERY 6 HOURS AS NEEDED FOR ABDOMINAL PAIN   doxepin (SINEquan) 100 mg capsule   Yes No   Sig: Take 100 mg by mouth daily at bedtime   ferrous sulfate 324 (65 Fe) mg   No No   Sig: Take 1 tablet (324 mg total) by mouth every other day   fluticasone (FLOVENT HFA) 110 MCG/ACT inhaler   No No   Sig: Inhale 4 puffs 2 (two) times a day Rinse mouth after use.   linaCLOtide (Linzess) 72 MCG CAPS   No No   Sig: Take 72 mcg by mouth daily before breakfast   meclizine (ANTIVERT) 12.5 MG tablet  Self No No   Sig: Take 2 tablets (25 mg total) by mouth 3 (three) times a day as needed for dizziness   naloxone (NARCAN) 4 mg/0.1 mL nasal spray   No No   Sig: Administer 1 spray into a nostril. If breathing does not return to normal or if breathing difficulty resumes after 2-3 minutes, give another dose in the other nostril using a new spray.    nystatin (MYCOSTATIN) ointment   No No   Sig: Apply topically 2 (two) times a day for 21 days Apply to umbilicus   omeprazole (PriLOSEC) 40 MG capsule   No No   Sig: TAKE 1 CAPSULE(40 MG) BY MOUTH DAILY BEFORE BREAKFAST   ondansetron (ZOFRAN-ODT) 4 mg disintegrating tablet   No No   Sig: Take 1 tablet (4 mg total) by mouth every 8 (eight) hours as needed for nausea for up to 10 doses   polyethylene glycol (GLYCOLAX) 17 GM/SCOOP powder   No No   Sig: Take 17 g by mouth daily   sucralfate (CARAFATE) 1 g tablet   No No   Sig: Take 1 tablet (1 g total) by mouth 4 (four) times a day Crush and mix with water to take as suspension   tiZANidine (ZANAFLEX) 2 mg tablet   No No   Sig: Take 1 tablet (2 mg total) by mouth 3 (three) times a day   triamcinolone (KENALOG) 0.5 % cream   No No   Sig: Apply topically 2 (two) times a day   venlafaxine (EFFEXOR) 75 mg tablet   Yes No   Sig: Take 75 mg by mouth 2 (two) times a day with meals   vitamin B-12 (CYANOCOBALAMIN) 2000 MCG TABS   No No   Sig: Take 1 tablet (2,000 mcg total) by mouth daily      Facility-Administered Medications Last Administration Doses Remaining   triamcinolone acetonide (KENALOG-40) 40 mg/mL injection 40 mg None recorded 1          Past Medical History:   Diagnosis Date    Asthma Albuterol prn    Bariatric surgery status     COVID-19 11/19/2021    Depression 2009    managed with Effexor     Diabetes mellitus ([address]) 2005    resolved with gastric bypass 2015    Generalized osteoarthritis     Low vitamin B12 level     Migraine     Postgastrectomy malabsorption     Suicide attempt ([address])     Vitamin D deficiency        Past Surgical History:   Procedure Laterality Date    CHOLECYSTECTOMY  2005    COLONOSCOPY      COSMETIC SURGERY  05/27/2020    Abdominoplasty    GASTRIC BYPASS  2015    elvira - en -y     HYSTERECTOMY      [place] [id number] / DRAINAGE W TUBE  7/14/2020    KNEE ARTHROSCOPY      with Lysis of adhesions    LAPAROSCOPIC SUPRACERVICAL HYSTERECTOMY  2005    due to endometriosis/AUB    OOPHORECTOMY Left 2005    TN EXCISION SKIN ABD INFRAUMBILICAL PANNICULECTOMY N/A 5/27/2020    Procedure: PANNICULECTOMY;  Surgeon: Tony Quiroz MD;  Location: [facility];  Service: [address] [place] [address] N/A 6/96/5796    Procedure: Isaías Danni;  Surgeon: Tony Quiroz MD;  Location: [facility];  Service: Plastics       Family History   Problem Relation Age of Onset    Hypertension Mother     Kidney cancer Mother     Diabetes Mother     Breast cancer Mother     Heart disease Father     Stroke Father     Hypertension Sister     HIV Brother     Breast cancer Cousin     No Known Problems Daughter     Stomach cancer Maternal Grandmother     No Known Problems Maternal Grandfather     No Known Problems Paternal Grandmother     No Known Problems Paternal Grandfather     No Known Problems Sister     No Known Problems Sister     No Known Problems Daughter     Prostate cancer Maternal Uncle     Stomach cancer Maternal Uncle     Leukemia Grandchild      I have reviewed and agree with the history as documented.     E-Cigarette/Vaping    E-Cigarette Use Never User      E-Cigarette/Vaping Substances Nicotine No     THC No     CBD No     Flavoring No     Other No     Unknown No      Social History     Tobacco Use    Smoking status: Former     Years: 32.00     Types: Cigarettes     Quit date: 05/2013     Years since quitting: 10.5    Smokeless tobacco: Never   Vaping Use    Vaping Use: Never used   Substance Use Topics    Alcohol use: Not Currently    Drug use: Never        Review of Systems   Constitutional:  Negative for chills, fatigue and fever. HENT:  Negative for congestion, hearing loss, rhinorrhea and sore throat. Eyes:  Negative for visual disturbance. Respiratory:  Negative for cough and shortness of breath. Cardiovascular:  Negative for chest pain and palpitations. Gastrointestinal:  Negative for abdominal pain, blood in stool, constipation, diarrhea, nausea and vomiting. Genitourinary:  Negative for dysuria, hematuria and menstrual problem. Musculoskeletal:         L knee joint pain   Skin:  Negative for rash. Neurological:  Negative for dizziness, light-headedness, numbness and headaches. Physical Exam  ED Triage Vitals [11/17/23 1437]   Temperature Pulse Respirations Blood Pressure SpO2   98.4 °F (36.9 °C) 69 14 117/62 98 %      Temp Source Heart Rate Source Patient Position - Orthostatic VS BP Location FiO2 (%)   Oral Monitor Sitting Right arm --      Pain Score       --             Orthostatic Vital Signs  Vitals:    11/17/23 1437   BP: 117/62   Pulse: 69   Patient Position - Orthostatic VS: Sitting       Physical Exam  Vitals reviewed. Constitutional:       General: She is not in acute distress. Appearance: She is not ill-appearing, toxic-appearing or diaphoretic. HENT:      Head: Normocephalic and atraumatic. Nose: Nose normal. No congestion or rhinorrhea. Mouth/Throat:      Mouth: Mucous membranes are moist.      Pharynx: Oropharynx is clear. No oropharyngeal exudate or posterior oropharyngeal erythema. Eyes:      General: No scleral icterus. Right eye: No discharge. Left eye: No discharge. Conjunctiva/sclera: Conjunctivae normal.   Neck:      Vascular: No carotid bruit. Cardiovascular:      Rate and Rhythm: Normal rate and regular rhythm. Pulses: Normal pulses. Heart sounds: Normal heart sounds. No murmur heard. No friction rub. No gallop. Pulmonary:      Effort: Pulmonary effort is normal. No respiratory distress. Breath sounds: Normal breath sounds. No stridor. No wheezing, rhonchi or rales. Chest:      Chest wall: No tenderness. Abdominal:      General: Abdomen is flat. Bowel sounds are normal. There is no distension. Palpations: Abdomen is soft. There is no mass. Tenderness: There is no abdominal tenderness. There is no guarding or rebound. Hernia: No hernia is present. Musculoskeletal:      Cervical back: Normal range of motion and neck supple. No rigidity or tenderness. Right lower leg: No edema. Left lower leg: No edema. Comments: Pedal pulses 2+ bilaterally   Lymphadenopathy:      Cervical: No cervical adenopathy. Skin:     General: Skin is warm and dry. Capillary Refill: Capillary refill takes less than 2 seconds. Neurological:      Mental Status: She is alert. Comments: conversant   Psychiatric:         Mood and Affect: Mood normal.         Behavior: Behavior normal.         ED Medications  Medications - No data to display    Diagnostic Studies  Results Reviewed       None                   No orders to display         Procedures  Procedures      ED Course                                       Medical Decision Making  30QD F who presents with L knee pain. Pt has a h/o severe L knee arthritis. Has tried various conservative therapy methods, which has provided minimal relief. Declined knee replacement. Pt has had steroid injections in August, which she reports has not helped relieve her pain.  Pt has also had hyaluronan injections early October, which has also not helped. Had an xray done in May. L knee appears nonerythematous, nonedematous compared to R knee. L knee feels warmer than R knee. Mild tenderness to palpation. Pain is exacerbated by movement of knee. Active ROM is limited in flexion (about 80 degrees) due to pain. Pt has no to minimal L knee pain at rest. Afebrile. Denies any IVDU. PDMP check shows that pt was last prescribed 12 tablets of codeine back in August. Pt is discharged with short course of tramadol. Discussed in length that pt should follow-up outpatient with orthopedics to explore various therapy options; provided information of other offices. Disposition  Final diagnoses:   Osteoarthritis of left knee     Time reflects when diagnosis was documented in both MDM as applicable and the Disposition within this note       Time User Action Codes Description Comment    11/17/2023  4:19 PM Steve Malhotra Add [M17.12] Osteoarthritis of left knee           ED Disposition       ED Disposition   Discharge    Condition   Stable    Date/Time   Fri Nov 17, 2023  4:20 PM    Comment   Christal Cochran discharge to home/self care.                    Follow-up Information       Follow up With Specialties Details Why Contact Info Additional [address] Specialists [facility] Orthopedic Surgery   [address] [address]  [facility] [address] [place], [place], [facility] [address]    [address] Specialists [facility] Orthopedic Surgery   [address] [id number] [phone] [address], [address] [place] [address]  Use Entrance A             Discharge Medication List as of 11/17/2023  4:36 PM        START taking these medications    Details   traMADol (Ultram) 50 mg tablet Take 1 tablet (50 mg total) by mouth every 8 (eight) hours as needed for moderate pain for up to 10 days, Starting Fri 11/17/2023, Until Mon 11/27/2023 at 2359, Normal           CONTINUE these medications which have NOT CHANGED    Details   albuterol (PROVENTIL HFA,VENTOLIN HFA) 90 mcg/act inhaler Inhale 2 puffs every 6 (six) hours as needed for wheezing, Starting Fri 10/27/2023, Normal      ALPRAZolam (XANAX) 1 mg tablet Take 1 mg by mouth every morning, Starting Fri 7/28/2023, Historical Med      ascorbic acid (VITAMIN C) 500 mg tablet TAKE 1 TABLET( 500 MG TOTAL) BY MOUTH EVERY OTHER DAY.  TAKE WITH IRON TABLET, Normal      Cholecalciferol (Vitamin D) 50 MCG (2000 UT) CAPS Take 1 capsule (2,000 Units total) by mouth in the morning, Starting Tue 9/12/2023, Normal      Cholecalciferol (Vitamin D) 50 MCG (2000 UT) tablet Take 1 tablet (2,000 Units total) by mouth daily, Starting Mon 2/20/2023, Normal      Diclofenac Sodium (VOLTAREN) 1 % Apply 2 g topically 4 (four) times a day, Starting Thu 5/25/2023, Normal      dicyclomine (BENTYL) 20 mg tablet TAKE 1 TABLET(20 MG) BY MOUTH EVERY 6 HOURS AS NEEDED FOR ABDOMINAL PAIN, Normal      doxepin (SINEquan) 100 mg capsule Take 100 mg by mouth daily at bedtime, Starting Fri 7/28/2023, Historical Med      ferrous sulfate 324 (65 Fe) mg Take 1 tablet (324 mg total) by mouth every other day, Starting Tue 5/30/2023, Normal      fluticasone (FLOVENT HFA) 110 MCG/ACT inhaler Inhale 4 puffs 2 (two) times a day Rinse mouth after use., Starting Fri 1/21/2022, Until Tue 9/12/2023, Normal      linaCLOtide (Linzess) 72 MCG CAPS Take 72 mcg by mouth daily before breakfast, Starting Wed 8/9/2023, Normal      meclizine (ANTIVERT) 12.5 MG tablet Take 2 tablets (25 mg total) by mouth 3 (three) times a day as needed for dizziness, Starting Fri 8/23/2019, Print      Multiple Vitamin (multivitamin) capsule Take 1 capsule by mouth daily, Starting Fri 10/27/2023, Until Wed 4/24/2024, Normal      naloxone (NARCAN) 4 mg/0.1 mL nasal spray Administer 1 spray into a nostril. If breathing does not return to normal or if breathing difficulty resumes after 2-3 minutes, give another dose in the other nostril using a new spray., Normal      nystatin (MYCOSTATIN) ointment Apply topically 2 (two) times a day for 21 days Apply to umbilicus, Starting Thu 7/52/1923, Until Fri 10/27/2023, Normal      omeprazole (PriLOSEC) 40 MG capsule TAKE 1 CAPSULE(40 MG) BY MOUTH DAILY BEFORE BREAKFAST, Normal      ondansetron (ZOFRAN-ODT) 4 mg disintegrating tablet Take 1 tablet (4 mg total) by mouth every 8 (eight) hours as needed for nausea for up to 10 doses, Starting Mon 9/6/2021, Print      polyethylene glycol (GLYCOLAX) 17 GM/SCOOP powder Take 17 g by mouth daily, Starting Fri 10/27/2023, Normal      sucralfate (CARAFATE) 1 g tablet Take 1 tablet (1 g total) by mouth 4 (four) times a day Crush and mix with water to take as suspension, Starting Tue 9/12/2023, Until Thu 10/12/2023, Normal      tiZANidine (ZANAFLEX) 2 mg tablet Take 1 tablet (2 mg total) by mouth 3 (three) times a day, Starting Mon 1/24/2022, Normal      triamcinolone (KENALOG) 0.5 % cream Apply topically 2 (two) times a day, Starting Wed 6/3/2020, Normal      venlafaxine (EFFEXOR) 75 mg tablet Take 75 mg by mouth 2 (two) times a day with meals, Starting Wed 12/30/2020, Historical Med      vitamin B-12 (CYANOCOBALAMIN) 2000 MCG TABS Take 1 tablet (2,000 mcg total) by mouth daily, Starting Fri 10/27/2023, Normal           No discharge procedures on file. PDMP Review         Value Time User    PDMP Reviewed  Yes 8/31/2023  4:43 PM Telma Malone PA-C             ED Provider  Attending physically available and evaluated Jina Harris. I managed the patient along with the ED Attending.     Electronically Signed by           Elliot Aj DO  11/17/23 3503

## 2023-11-17 NOTE — DISCHARGE INSTRUCTIONS
Please call one of the orthopedic offices attached below. Use cane/walker whenever you are ambulating.

## 2023-11-17 NOTE — ED ATTENDING ATTESTATION
11/17/2023  Evens Dunham MD, saw and evaluated the patient. I have discussed the patient with the resident/non-physician practitioner and agree with the resident's/non-physician practitioner's findings, Plan of Care, and MDM as documented in the resident's/non-physician practitioner's note, except where noted. All available labs and Radiology studies were reviewed. I was present for key portions of any procedure(s) performed by the resident/non-physician practitioner and I was immediately available to provide assistance. At this point I agree with the current assessment done in the Emergency Department. I have conducted an independent evaluation of this patient a history and physical is as follows:    ED Course         Critical Care Time  Procedures    65 y/o female who presents with L knee pain chronically but worse over the past month. She's tried tylenol, muscle relaxants, gels without relief. No fevers, no redness. No new injury. No open wounds. Pt. had steroid injections in L knee from ortho in Aug. , then had a hyaluronan injection in Oct. From ortho - which she says hasn't helped her pain. Well-appearing, no distress, L knee no redness or swelling noted, active ROM limited secondary to pain, +n/v intact    Pt. Was instructed to use her cane and knee immobilizer that she has at home. Will give her tramadol and have her follow up with ortho.   Pt. Femi Ayon with plan

## 2023-12-12 ENCOUNTER — OFFICE VISIT (OUTPATIENT)
Dept: BARIATRICS | Facility: CLINIC | Age: 63
End: 2023-12-12
Payer: MEDICARE

## 2023-12-12 VITALS
BODY MASS INDEX: 29.73 KG/M2 | SYSTOLIC BLOOD PRESSURE: 110 MMHG | DIASTOLIC BLOOD PRESSURE: 76 MMHG | HEIGHT: 66 IN | TEMPERATURE: 97.6 F | WEIGHT: 185 LBS | HEART RATE: 63 BPM

## 2023-12-12 DIAGNOSIS — K91.2 POSTSURGICAL MALABSORPTION: ICD-10-CM

## 2023-12-12 DIAGNOSIS — Z48.815 ENCOUNTER FOR SURGICAL AFTERCARE FOLLOWING SURGERY OF DIGESTIVE SYSTEM: Primary | ICD-10-CM

## 2023-12-12 DIAGNOSIS — Z98.84 BARIATRIC SURGERY STATUS: ICD-10-CM

## 2023-12-12 DIAGNOSIS — E66.9 OBESITY, CLASS I, BMI 30-34.9: ICD-10-CM

## 2023-12-12 PROCEDURE — 99213 OFFICE O/P EST LOW 20 MIN: CPT | Performed by: PHYSICIAN ASSISTANT

## 2023-12-12 RX ORDER — DEXAMETHASONE SODIUM PHOSPHATE 1 MG/ML
SOLUTION/ DROPS OPHTHALMIC
COMMUNITY
Start: 2023-11-13

## 2023-12-12 RX ORDER — CYCLOSPORINE 0.5 MG/ML
EMULSION OPHTHALMIC
COMMUNITY
Start: 2023-11-10

## 2023-12-12 RX ORDER — DULOXETIN HYDROCHLORIDE 30 MG/1
CAPSULE, DELAYED RELEASE ORAL
COMMUNITY
Start: 2023-12-11

## 2023-12-12 NOTE — PROGRESS NOTES
Assessment/Plan:         There are no diagnoses linked to this encounter. She is status post laparoscopic elvira-en-y gastric bypass surgery by Dr Collette Hidden 1/21/2014. Here for 3 moths follow up    She had EGD and colonoscopy through GI for chronic symptoms of GERD, abdominal bloating and fullness. EGD showed:  IMPRESSION:  The esophagus appeared normal.  The gastric pouch appeared normal. Performed random biopsy to rule out H. pylori. The gastrojejunal anastomosis appeared normal.  The jejunum appeared normal. Performed random biopsy to rule out celiac disease  Final Diagnosis   A. Labeled Stomach, jejunal bx r/o ciliac:  Small intestinal mucosa with preserved villous architecture  No histomorphologic features of celiac disease, dysplasia, malignancy or parasites identified     B. Stomach, gsstric bx r/o h pylori:  Chronic inactive gastritis with lymphoid follicle  H. Pylori immunostain is negative        Since her EGD patient states the above symptoms have improved but she continues with lack of appetite and hunger. She states she can go almost all day without eating. No recent medication changes or other recent triggers that could cause lack of appetite. She states she discussed this with her PCP who started her on vitamins but this has been unhelpful despite taking daily. She continues on omeprazole 40 mg daily. She does still drink at least 1-2 protein shakes daily and gets adequate hydration. Recommend visit with RD however due to finances states this will be difficult to arrange. Advised her to get her blood work that was ordered at last visit. Advised small frequent meals throughout the day as tolerated. Follow up also with PCP. Return for follow up in 3 months. Subjective:      Patient ID: Eva Pierre is a 66 y.o. female. HPIShe is status post laparoscopic elvira-en-y gastric bypass surgery by Dr Collette Hidden 1/21/2014.  Here for 3 moths follow up    The following portions of the patient's history were reviewed and updated as appropriate: allergies, current medications, past family history, past medical history, past social history, past surgical history, and problem list.    Review of Systems   Constitutional:  Positive for appetite change. Negative for chills and fever. Respiratory: Negative. Cardiovascular: Negative. Gastrointestinal: Negative. Neurological: Negative. Psychiatric/Behavioral: Negative. Objective:      /76   Pulse 63   Temp 97.6 °F (36.4 °C) (Tympanic)   Ht 5' 5.5" (1.664 m)   Wt 83.9 kg (185 lb)   LMP  (LMP Unknown)   BMI 30.32 kg/m²          Physical Exam  Vitals and nursing note reviewed. Constitutional:       Appearance: Normal appearance. She is obese. HENT:      Head: Normocephalic and atraumatic. Eyes:      Extraocular Movements: Extraocular movements intact. Pupils: Pupils are equal, round, and reactive to light. Cardiovascular:      Rate and Rhythm: Normal rate. Pulmonary:      Effort: Pulmonary effort is normal.   Musculoskeletal:         General: Normal range of motion. Cervical back: Normal range of motion. Skin:     General: Skin is warm and dry. Neurological:      General: No focal deficit present. Mental Status: She is alert and oriented to person, place, and time.    Psychiatric:         Mood and Affect: Mood normal.

## 2023-12-26 PROBLEM — Z00.00 MEDICARE ANNUAL WELLNESS VISIT, SUBSEQUENT: Status: RESOLVED | Noted: 2023-10-27 | Resolved: 2023-12-26

## 2024-01-05 DIAGNOSIS — E61.1 IRON DEFICIENCY: ICD-10-CM

## 2024-01-05 RX ORDER — FERROUS SULFATE 324(65)MG
324 TABLET, DELAYED RELEASE (ENTERIC COATED) ORAL EVERY OTHER DAY
Qty: 90 TABLET | Refills: 0 | Status: SHIPPED | OUTPATIENT
Start: 2024-01-05

## 2024-01-12 ENCOUNTER — HOSPITAL ENCOUNTER (EMERGENCY)
Facility: HOSPITAL | Age: 64
Discharge: HOME | End: 2024-01-12
Attending: EMERGENCY MEDICINE
Payer: MEDICARE

## 2024-01-12 VITALS
SYSTOLIC BLOOD PRESSURE: 151 MMHG | DIASTOLIC BLOOD PRESSURE: 74 MMHG | HEART RATE: 74 BPM | TEMPERATURE: 98.3 F | OXYGEN SATURATION: 99 % | RESPIRATION RATE: 18 BRPM

## 2024-01-12 DIAGNOSIS — J11.1 INFLUENZA-LIKE ILLNESS: Primary | ICD-10-CM

## 2024-01-12 LAB
FLUAV RNA RESP QL NAA+PROBE: NEGATIVE
FLUBV RNA RESP QL NAA+PROBE: NEGATIVE
RSV RNA RESP QL NAA+PROBE: NEGATIVE
SARS-COV-2 RNA RESP QL NAA+PROBE: NEGATIVE

## 2024-01-12 PROCEDURE — 99284 EMERGENCY DEPT VISIT MOD MDM: CPT | Performed by: EMERGENCY MEDICINE

## 2024-01-12 PROCEDURE — 99283 EMERGENCY DEPT VISIT LOW MDM: CPT

## 2024-01-12 PROCEDURE — 0241U HB NFCT DS VIR RESP RNA 4 TRGT: CPT | Performed by: EMERGENCY MEDICINE

## 2024-01-12 RX ORDER — ACETAMINOPHEN 325 MG/1
975 TABLET ORAL ONCE
Status: COMPLETED | OUTPATIENT
Start: 2024-01-12 | End: 2024-01-12

## 2024-01-12 RX ADMIN — ACETAMINOPHEN 325MG 975 MG: 325 TABLET ORAL at 10:46

## 2024-01-12 NOTE — ED PROVIDER NOTES
History  Chief Complaint   Patient presents with    Chills     Cough, chills, bodyaches starting yesterday. Granddaughter at home with flu.      Patient is a 63-year-old female.  She was exposed to the flu.  She is not flu vaccinated.  3 days ago she became ill with cough, sore throat, headache, fever, and myalgias.  No congestion.  No chest pain or difficulty breathing.  No nausea or vomiting.        Prior to Admission Medications   Prescriptions Last Dose Informant Patient Reported? Taking?   ALPRAZolam (XANAX) 1 mg tablet   Yes No   Sig: Take 1 mg by mouth every morning   Cholecalciferol (Vitamin D) 50 MCG (2000 UT) CAPS   No No   Sig: Take 1 capsule (2,000 Units total) by mouth in the morning   Cholecalciferol (Vitamin D) 50 MCG (2000 UT) tablet   No No   Sig: Take 1 tablet (2,000 Units total) by mouth daily   DULoxetine (CYMBALTA) 30 mg delayed release capsule   Yes No   Diclofenac Sodium (VOLTAREN) 1 %   No No   Sig: Apply 2 g topically 4 (four) times a day   Multiple Vitamin (multivitamin) capsule   No No   Sig: Take 1 capsule by mouth daily   Restasis 0.05 % ophthalmic emulsion   Yes No   Sig: INSTILL 1 DROP IN BOTH EYES TWICE DAILY   albuterol (PROVENTIL HFA,VENTOLIN HFA) 90 mcg/act inhaler   No No   Sig: Inhale 2 puffs every 6 (six) hours as needed for wheezing   ascorbic acid (VITAMIN C) 500 mg tablet   No No   Sig: TAKE 1 TABLET( 500 MG TOTAL) BY MOUTH EVERY OTHER DAY. TAKE WITH IRON TABLET   dexamethasone sodium phosphate 0.1 % ophthalmic solution   Yes No   Sig: INSTILL 1 DROP IN BOTH EYES TWICE DAILY FOR 2 WEEKS   dicyclomine (BENTYL) 20 mg tablet   No No   Sig: TAKE 1 TABLET(20 MG) BY MOUTH EVERY 6 HOURS AS NEEDED FOR ABDOMINAL PAIN   doxepin (SINEquan) 100 mg capsule   Yes No   Sig: Take 100 mg by mouth daily at bedtime   ferrous sulfate 324 (65 Fe) mg   No No   Sig: TAKE 1 TABLET BY MOUTH EVERY OTHER DAY   fluticasone (FLOVENT HFA) 110 MCG/ACT inhaler   No No   Sig: Inhale 4 puffs 2 (two) times a  day Rinse mouth after use.   linaCLOtide (Linzess) 72 MCG CAPS   No No   Sig: Take 72 mcg by mouth daily before breakfast   meclizine (ANTIVERT) 12.5 MG tablet  Self No No   Sig: Take 2 tablets (25 mg total) by mouth 3 (three) times a day as needed for dizziness   naloxone (NARCAN) 4 mg/0.1 mL nasal spray   No No   Sig: Administer 1 spray into a nostril. If breathing does not return to normal or if breathing difficulty resumes after 2-3 minutes, give another dose in the other nostril using a new spray.   nystatin (MYCOSTATIN) ointment   No No   Sig: Apply topically 2 (two) times a day for 21 days Apply to umbilicus   omeprazole (PriLOSEC) 40 MG capsule   No No   Sig: TAKE 1 CAPSULE(40 MG) BY MOUTH DAILY BEFORE BREAKFAST   ondansetron (ZOFRAN-ODT) 4 mg disintegrating tablet   No No   Sig: Take 1 tablet (4 mg total) by mouth every 8 (eight) hours as needed for nausea for up to 10 doses   polyethylene glycol (GLYCOLAX) 17 GM/SCOOP powder   No No   Sig: Take 17 g by mouth daily   sucralfate (CARAFATE) 1 g tablet   No No   Sig: Take 1 tablet (1 g total) by mouth 4 (four) times a day Crush and mix with water to take as suspension   tiZANidine (ZANAFLEX) 2 mg tablet   No No   Sig: Take 1 tablet (2 mg total) by mouth 3 (three) times a day   triamcinolone (KENALOG) 0.5 % cream   No No   Sig: Apply topically 2 (two) times a day   venlafaxine (EFFEXOR) 75 mg tablet   Yes No   Sig: Take 75 mg by mouth 2 (two) times a day with meals   vitamin B-12 (CYANOCOBALAMIN) 2000 MCG TABS   No No   Sig: Take 1 tablet (2,000 mcg total) by mouth daily      Facility-Administered Medications Last Administration Doses Remaining   triamcinolone acetonide (KENALOG-40) 40 mg/mL injection 40 mg None recorded 1          Past Medical History:   Diagnosis Date    Asthma     Albuterol prn    Bariatric surgery status     COVID-19 11/19/2021    Depression 2009    managed with Effexor     Diabetes mellitus (HCC) 2005    resolved with gastric bypass 2015     Generalized osteoarthritis     Low vitamin B12 level     Migraine     Postgastrectomy malabsorption     Suicide attempt (HCC)     Vitamin D deficiency        Past Surgical History:   Procedure Laterality Date    CHOLECYSTECTOMY  2005    COLONOSCOPY      COSMETIC SURGERY  05/27/2020    Abdominoplasty    GASTRIC BYPASS  2015    elvira - en -y     HYSTERECTOMY      IR IMAGE GUIDED ASPIRATION / DRAINAGE W TUBE  7/14/2020    KNEE ARTHROSCOPY      with Lysis of adhesions    LAPAROSCOPIC SUPRACERVICAL HYSTERECTOMY  2005    due to endometriosis/AUB    OOPHORECTOMY Left 2005    WI EXCISION SKIN ABD INFRAUMBILICAL PANNICULECTOMY N/A 5/27/2020    Procedure: PANNICULECTOMY;  Surgeon: Bacilio Iraheta MD;  Location: [facility];  Service: Plastics    TONSILLECTOMY AND ADENOIDECTOMY      TUBAL LIGATION  1986    VAC DRESSING APPLICATION N/A 5/27/2020    Procedure: APPLICATION VAC DRESSING;  Surgeon: Bacilio Iraheta MD;  Location: [facility];  Service: Plastics       Family History   Problem Relation Age of Onset    Hypertension Mother     Kidney cancer Mother     Diabetes Mother     Breast cancer Mother     Heart disease Father     Stroke Father     Hypertension Sister     HIV Brother     Breast cancer Cousin     No Known Problems Daughter     Stomach cancer Maternal Grandmother     No Known Problems Maternal Grandfather     No Known Problems Paternal Grandmother     No Known Problems Paternal Grandfather     No Known Problems Sister     No Known Problems Sister     No Known Problems Daughter     Prostate cancer Maternal Uncle     Stomach cancer Maternal Uncle     Leukemia Grandchild      I have reviewed and agree with the history as documented.    E-Cigarette/Vaping    E-Cigarette Use Never User      E-Cigarette/Vaping Substances    Nicotine No     THC No     CBD No     Flavoring No     Other No     Unknown No      Social History     Tobacco Use    Smoking status: Former     Current packs/day: 0.00     Types:  Cigarettes     Start date: 05/1981     Quit date: 05/2013     Years since quitting: 10.7    Smokeless tobacco: Never   Vaping Use    Vaping status: Never Used   Substance Use Topics    Alcohol use: Not Currently    Drug use: Never       Review of Systems   Constitutional:  Positive for chills and fever.   HENT:  Positive for sore throat. Negative for rhinorrhea.    Eyes:  Negative for pain, redness and visual disturbance.   Respiratory:  Positive for cough. Negative for shortness of breath.    Cardiovascular:  Negative for chest pain and leg swelling.   Gastrointestinal:  Negative for abdominal pain, diarrhea and vomiting.   Endocrine: Negative for polydipsia and polyuria.   Genitourinary:  Negative for dysuria, frequency, hematuria, vaginal bleeding and vaginal discharge.   Musculoskeletal:  Positive for myalgias. Negative for back pain and neck pain.   Skin:  Negative for rash and wound.   Allergic/Immunologic: Negative for immunocompromised state.   Neurological:  Positive for headaches. Negative for weakness and numbness.   Hematological:  Does not bruise/bleed easily.   Psychiatric/Behavioral:  Negative for hallucinations and suicidal ideas.    All other systems reviewed and are negative.      Physical Exam  Physical Exam  Vitals reviewed.   Constitutional:       General: She is not in acute distress.  HENT:      Head: Normocephalic and atraumatic.      Nose: Nose normal.      Mouth/Throat:      Mouth: Mucous membranes are moist.   Eyes:      General:         Right eye: No discharge.         Left eye: No discharge.      Conjunctiva/sclera: Conjunctivae normal.   Cardiovascular:      Rate and Rhythm: Normal rate and regular rhythm.      Pulses: Normal pulses.      Heart sounds: Normal heart sounds. No murmur heard.     No friction rub. No gallop.   Pulmonary:      Effort: Pulmonary effort is normal. No respiratory distress.      Breath sounds: Normal breath sounds. No stridor. No wheezing, rhonchi or rales.    Abdominal:      General: Bowel sounds are normal. There is no distension.      Palpations: Abdomen is soft.      Tenderness: There is no abdominal tenderness. There is no right CVA tenderness, left CVA tenderness, guarding or rebound.   Musculoskeletal:         General: No swelling, tenderness, deformity or signs of injury. Normal range of motion.      Cervical back: Normal range of motion and neck supple. No rigidity.      Right lower leg: No edema.      Left lower leg: No edema.      Comments: No calf tenderness or unilateral leg swelling.   Skin:     General: Skin is warm and dry.      Coloration: Skin is not jaundiced.      Findings: No rash.   Neurological:      General: No focal deficit present.      Mental Status: She is alert and oriented to person, place, and time.      Sensory: No sensory deficit.      Motor: Motor function is intact.   Psychiatric:         Mood and Affect: Mood normal.         Behavior: Behavior normal.         Vital Signs  ED Triage Vitals   Temperature Pulse Respirations Blood Pressure SpO2   01/12/24 1010 01/12/24 1010 01/12/24 1010 01/12/24 1010 01/12/24 1010   98.3 °F (36.8 °C) 74 18 151/74 99 %      Temp src Heart Rate Source Patient Position - Orthostatic VS BP Location FiO2 (%)   -- -- -- -- --             Pain Score       01/12/24 1046       5           Vitals:    01/12/24 1010   BP: 151/74   Pulse: 74         Visual Acuity      ED Medications  Medications   acetaminophen (TYLENOL) tablet 975 mg (975 mg Oral Given 1/12/24 1046)       Diagnostic Studies  Results Reviewed       Procedure Component Value Units Date/Time    FLU/RSV/COVID - if FLU/RSV clinically relevant [[id number]] Collected: 01/12/24 1045    Lab Status: No result Specimen: Nares from Nose                    No orders to display              Procedures  Procedures         ED Course                               SBIRT 22yo+      Flowsheet Row Most Recent Value   Initial Alcohol Screen: US AUDIT-C     1. How often do  you have a drink containing alcohol? 0 Filed at: 01/12/2024 1017   2. How many drinks containing alcohol do you have on a typical day you are drinking?  0 Filed at: 01/12/2024 1017   3b. FEMALE Any Age, or MALE 65+: How often do you have 4 or more drinks on one occassion? 0 Filed at: 01/12/2024 1017   Audit-C Score 0 Filed at: 01/12/2024 1017   BC: How many times in the past year have you...    Used an illegal drug or used a prescription medication for non-medical reasons? Never Filed at: 01/12/2024 1017                      Medical Decision Making  Patient is not septic.  Neck is supple.  This is not meningitis.  Well-hydrated.  Lungs are clear.  Doubt pneumonia.  Most likely flu.  Also tested for COVID.  Normal oxygen saturations.  Appropriate for discharge and outpatient management.    Amount and/or Complexity of Data Reviewed  Labs: ordered.    Risk  OTC drugs.  Decision regarding hospitalization.             Disposition  Final diagnoses:   Influenza-like illness     Time reflects when diagnosis was documented in both MDM as applicable and the Disposition within this note       Time User Action Codes Description Comment    1/12/2024 10:50 AM Finesse Queen Add [J11.1] Influenza-like illness           ED Disposition       ED Disposition   Discharge    Condition   Stable    Date/Time   Fri Jan 12, 2024 1050    Comment   Aislinn Rouse discharge to home/self care.                   Follow-up Information       Follow up With Specialties Details Why Contact Info    April Reagan MD Family Medicine In 1 week As needed [address] [facility] [address]  [phone]              Patient's Medications   Discharge Prescriptions    No medications on file       No discharge procedures on file.    PDMP Review         Value Time User    PDMP Reviewed  Yes 8/31/2023  4:43 PM Kylah Goode PA-C            ED Provider  Electronically Signed by             Finesse Queen MD  01/12/24 1051

## 2024-02-15 ENCOUNTER — OFFICE VISIT (OUTPATIENT)
Dept: FAMILY MEDICINE CLINIC | Facility: CLINIC | Age: 64
End: 2024-02-15

## 2024-02-15 VITALS
BODY MASS INDEX: 27.8 KG/M2 | RESPIRATION RATE: 18 BRPM | SYSTOLIC BLOOD PRESSURE: 124 MMHG | DIASTOLIC BLOOD PRESSURE: 70 MMHG | WEIGHT: 173 LBS | HEIGHT: 66 IN | OXYGEN SATURATION: 97 % | TEMPERATURE: 97 F | HEART RATE: 68 BPM

## 2024-02-15 DIAGNOSIS — N39.0 URINARY TRACT INFECTION WITHOUT HEMATURIA, SITE UNSPECIFIED: ICD-10-CM

## 2024-02-15 DIAGNOSIS — N39.0 URINARY TRACT INFECTION WITHOUT HEMATURIA, SITE UNSPECIFIED: Primary | ICD-10-CM

## 2024-02-15 LAB
SL AMB  POCT GLUCOSE, UA: NORMAL
SL AMB LEUKOCYTE ESTERASE,UA: NORMAL
SL AMB POCT BILIRUBIN,UA: NORMAL
SL AMB POCT BLOOD,UA: NORMAL
SL AMB POCT COLOR,UA: YELLOW
SL AMB POCT KETONES,UA: NORMAL
SL AMB POCT NITRITE,UA: NORMAL
SL AMB POCT PH,UA: 6
SL AMB POCT SPECIFIC GRAVITY,UA: 1.02
SL AMB POCT URINE PROTEIN: NORMAL
SL AMB POCT UROBILINOGEN: NORMAL

## 2024-02-15 PROCEDURE — 81002 URINALYSIS NONAUTO W/O SCOPE: CPT | Performed by: FAMILY MEDICINE

## 2024-02-15 PROCEDURE — 99213 OFFICE O/P EST LOW 20 MIN: CPT | Performed by: FAMILY MEDICINE

## 2024-02-15 RX ORDER — NITROFURANTOIN 25; 75 MG/1; MG/1
100 CAPSULE ORAL 2 TIMES DAILY
Qty: 10 CAPSULE | Refills: 0 | Status: SHIPPED | OUTPATIENT
Start: 2024-02-15 | End: 2024-02-20

## 2024-02-15 NOTE — PROGRESS NOTES
Name: Aislinn Rouse      : 1960      MRN: [id number]  Encounter Provider: Cristóbal Mancia MD  Encounter Date: 2/15/2024   Encounter department: [facility]    Assessment & Plan   1. Urinary tract infection without hematuria, site unspecified  Assessment & Plan:  1 day hx of dysuria with inc freq without additional symx    Diptick -ve    - In view of negative dipstick, pt still keen for home supply of rx should symx worsen as have done previously  - Will send culture    Orders:  -     nitrofurantoin (MACROBID) 100 mg capsule; Take 1 capsule (100 mg total) by mouth 2 (two) times a day for 5 days       Subjective   Aislinn Rouse is seen as same day   Endorsing 1 day hx of dysuria, without flank pain or fever  Does have inc frequency  No SI or additional symx      Review of Systems   Constitutional:  Negative for chills and fever.   HENT:  Negative for ear pain and sore throat.    Eyes:  Negative for pain and visual disturbance.   Respiratory:  Negative for cough and shortness of breath.    Cardiovascular:  Negative for chest pain and palpitations.   Gastrointestinal:  Negative for abdominal pain and vomiting.   Genitourinary:  Positive for dysuria and frequency. Negative for decreased urine volume, hematuria, urgency and vaginal discharge.   Musculoskeletal:  Negative for arthralgias and back pain.   Skin:  Negative for color change and rash.   Neurological:  Negative for seizures and syncope.   All other systems reviewed and are negative.      Current Outpatient Medications on File Prior to Visit   Medication Sig    albuterol (PROVENTIL HFA,VENTOLIN HFA) 90 mcg/act inhaler Inhale 2 puffs every 6 (six) hours as needed for wheezing    ALPRAZolam (XANAX) 1 mg tablet Take 1 mg by mouth every morning    ascorbic acid (VITAMIN C) 500 mg tablet TAKE 1 TABLET( 500 MG TOTAL) BY MOUTH EVERY OTHER DAY. TAKE WITH IRON TABLET    Cholecalciferol (Vitamin D) 50 MCG ( UT) CAPS  Take 1 capsule (2,000 Units total) by mouth in the morning    Cholecalciferol (Vitamin D) 50 MCG (2000 UT) tablet Take 1 tablet (2,000 Units total) by mouth daily    dexamethasone sodium phosphate 0.1 % ophthalmic solution INSTILL 1 DROP IN BOTH EYES TWICE DAILY FOR 2 WEEKS    Diclofenac Sodium (VOLTAREN) 1 % Apply 2 g topically 4 (four) times a day    dicyclomine (BENTYL) 20 mg tablet TAKE 1 TABLET(20 MG) BY MOUTH EVERY 6 HOURS AS NEEDED FOR ABDOMINAL PAIN    doxepin (SINEquan) 100 mg capsule Take 100 mg by mouth daily at bedtime    DULoxetine (CYMBALTA) 30 mg delayed release capsule     ferrous sulfate 324 (65 Fe) mg TAKE 1 TABLET BY MOUTH EVERY OTHER DAY    fluticasone (FLOVENT HFA) 110 MCG/ACT inhaler Inhale 4 puffs 2 (two) times a day Rinse mouth after use.    linaCLOtide (Linzess) 72 MCG CAPS Take 72 mcg by mouth daily before breakfast    meclizine (ANTIVERT) 12.5 MG tablet Take 2 tablets (25 mg total) by mouth 3 (three) times a day as needed for dizziness    Multiple Vitamin (multivitamin) capsule Take 1 capsule by mouth daily    naloxone (NARCAN) 4 mg/0.1 mL nasal spray Administer 1 spray into a nostril. If breathing does not return to normal or if breathing difficulty resumes after 2-3 minutes, give another dose in the other nostril using a new spray.    nystatin (MYCOSTATIN) ointment Apply topically 2 (two) times a day for 21 days Apply to umbilicus    omeprazole (PriLOSEC) 40 MG capsule TAKE 1 CAPSULE(40 MG) BY MOUTH DAILY BEFORE BREAKFAST    ondansetron (ZOFRAN-ODT) 4 mg disintegrating tablet Take 1 tablet (4 mg total) by mouth every 8 (eight) hours as needed for nausea for up to 10 doses    polyethylene glycol (GLYCOLAX) 17 GM/SCOOP powder Take 17 g by mouth daily    Restasis 0.05 % ophthalmic emulsion INSTILL 1 DROP IN BOTH EYES TWICE DAILY    sucralfate (CARAFATE) 1 g tablet Take 1 tablet (1 g total) by mouth 4 (four) times a day Crush and mix with water to take as suspension    tiZANidine (ZANAFLEX)  "2 mg tablet Take 1 tablet (2 mg total) by mouth 3 (three) times a day    triamcinolone (KENALOG) 0.5 % cream Apply topically 2 (two) times a day    venlafaxine (EFFEXOR) 75 mg tablet Take 75 mg by mouth 2 (two) times a day with meals    vitamin B-12 (CYANOCOBALAMIN) 2000 MCG TABS Take 1 tablet (2,000 mcg total) by mouth daily       Objective     /70 (BP Location: Right arm, Patient Position: Sitting, Cuff Size: Large)   Pulse 68   Temp (!) 97 °F (36.1 °C) (Temporal)   Resp 18   Ht 5' 5.5\" (1.664 m)   Wt 78.5 kg (173 lb)   LMP  (LMP Unknown)   SpO2 97%   BMI 28.35 kg/m²     Physical Exam  Vitals and nursing note reviewed.   Constitutional:       Appearance: Normal appearance.   HENT:      Head: Normocephalic.      Nose: Nose normal.      Mouth/Throat:      Mouth: Mucous membranes are moist.   Neck:      Thyroid: No thyroid mass, thyromegaly or thyroid tenderness.   Cardiovascular:      Rate and Rhythm: Normal rate and regular rhythm.      Pulses: Normal pulses.      Heart sounds: Normal heart sounds.   Pulmonary:      Effort: Pulmonary effort is normal.      Breath sounds: Normal breath sounds.   Abdominal:      Palpations: Abdomen is soft.      Tenderness: There is no right CVA tenderness or left CVA tenderness.   Musculoskeletal:      Cervical back: Full passive range of motion without pain.   Skin:     General: Skin is warm.   Neurological:      General: No focal deficit present.      Mental Status: She is alert and oriented to person, place, and time.       Cristóbal Mancia MD    "

## 2024-02-15 NOTE — ASSESSMENT & PLAN NOTE
1 day hx of dysuria with inc freq without additional symx    Diptick -ve    - In view of negative dipstick, pt still keen for home supply of rx should symx worsen as have done previously  - Will send culture

## 2024-02-21 PROBLEM — N39.0 URINARY TRACT INFECTION WITHOUT HEMATURIA: Status: RESOLVED | Noted: 2024-02-15 | Resolved: 2024-02-21

## 2024-02-29 ENCOUNTER — HOSPITAL ENCOUNTER (EMERGENCY)
Facility: HOSPITAL | Age: 64
Discharge: HOME | End: 2024-02-29
Attending: EMERGENCY MEDICINE
Payer: MEDICARE

## 2024-02-29 VITALS
SYSTOLIC BLOOD PRESSURE: 132 MMHG | DIASTOLIC BLOOD PRESSURE: 67 MMHG | TEMPERATURE: 98.7 F | RESPIRATION RATE: 16 BRPM | HEART RATE: 65 BPM | BODY MASS INDEX: 29.83 KG/M2 | OXYGEN SATURATION: 100 % | WEIGHT: 182 LBS

## 2024-02-29 DIAGNOSIS — M17.12 OSTEOARTHRITIS OF LEFT KNEE: ICD-10-CM

## 2024-02-29 DIAGNOSIS — M71.22 BAKER CYST, LEFT: Primary | ICD-10-CM

## 2024-02-29 PROCEDURE — 96372 THER/PROPH/DIAG INJ SC/IM: CPT

## 2024-02-29 PROCEDURE — 99283 EMERGENCY DEPT VISIT LOW MDM: CPT

## 2024-02-29 PROCEDURE — 99284 EMERGENCY DEPT VISIT MOD MDM: CPT | Performed by: EMERGENCY MEDICINE

## 2024-02-29 RX ORDER — METHYLPREDNISOLONE 4 MG/1
TABLET ORAL
Qty: 21 TABLET | Refills: 0 | Status: SHIPPED | OUTPATIENT
Start: 2024-02-29

## 2024-02-29 RX ORDER — KETOROLAC TROMETHAMINE 30 MG/ML
30 INJECTION, SOLUTION INTRAMUSCULAR; INTRAVENOUS ONCE
Status: COMPLETED | OUTPATIENT
Start: 2024-02-29 | End: 2024-02-29

## 2024-02-29 RX ADMIN — KETOROLAC TROMETHAMINE 30 MG: 30 INJECTION, SOLUTION INTRAMUSCULAR; INTRAVENOUS at 17:15

## 2024-02-29 NOTE — ED PROVIDER NOTES
"History  Chief Complaint   Patient presents with    Knee Pain     Pt reports posterior left knee pain that started last night.  Worse with ambulation     63y F w/ known left knee osteoarthritis here for evaluation of left knee pain. Reports pain to the posterior portion of the knee that started last night.  Pt w/ stairs in home and reports has been going up/down more 2/2 cleaning/house activity. Denies any falls, slips or injuries to the knee.  Taking acetaminophen w/o improvement of pain.  Denies any redness, swelling, increased warmth to the knee.  Denies any calf pain or LE swelling.  Reports pain in the posterior knee, worse w/ weight bearing/pressure, nothing makes it better.    Pt follows w/ orthopedics for her left knee osteoarthritis and when asked if she called orthopedics regarding her pain, she states \"I'm mad at him/I don't like him\" because she states when she had her last knee injection, she had increased pain afterward and hasn't seen them since then.      History provided by:  Patient and medical records   used: No        Prior to Admission Medications   Prescriptions Last Dose Informant Patient Reported? Taking?   ALPRAZolam (XANAX) 1 mg tablet   Yes No   Sig: Take 1 mg by mouth every morning   Cholecalciferol (Vitamin D) 50 MCG (2000 UT) CAPS   No No   Sig: Take 1 capsule (2,000 Units total) by mouth in the morning   Cholecalciferol (Vitamin D) 50 MCG (2000 UT) tablet   No No   Sig: Take 1 tablet (2,000 Units total) by mouth daily   DULoxetine (CYMBALTA) 30 mg delayed release capsule   Yes No   Diclofenac Sodium (VOLTAREN) 1 %   No No   Sig: Apply 2 g topically 4 (four) times a day   Multiple Vitamin (multivitamin) capsule   No No   Sig: Take 1 capsule by mouth daily   Restasis 0.05 % ophthalmic emulsion   Yes No   Sig: INSTILL 1 DROP IN BOTH EYES TWICE DAILY   albuterol (PROVENTIL HFA,VENTOLIN HFA) 90 mcg/act inhaler   No No   Sig: Inhale 2 puffs every 6 (six) hours as needed " for wheezing   ascorbic acid (VITAMIN C) 500 mg tablet   No No   Sig: TAKE 1 TABLET( 500 MG TOTAL) BY MOUTH EVERY OTHER DAY. TAKE WITH IRON TABLET   dexamethasone sodium phosphate 0.1 % ophthalmic solution   Yes No   Sig: INSTILL 1 DROP IN BOTH EYES TWICE DAILY FOR 2 WEEKS   dicyclomine (BENTYL) 20 mg tablet   No No   Sig: TAKE 1 TABLET(20 MG) BY MOUTH EVERY 6 HOURS AS NEEDED FOR ABDOMINAL PAIN   doxepin (SINEquan) 100 mg capsule   Yes No   Sig: Take 100 mg by mouth daily at bedtime   ferrous sulfate 324 (65 Fe) mg   No No   Sig: TAKE 1 TABLET BY MOUTH EVERY OTHER DAY   fluticasone (FLOVENT HFA) 110 MCG/ACT inhaler   No No   Sig: Inhale 4 puffs 2 (two) times a day Rinse mouth after use.   linaCLOtide (Linzess) 72 MCG CAPS   No No   Sig: Take 72 mcg by mouth daily before breakfast   meclizine (ANTIVERT) 12.5 MG tablet  Self No No   Sig: Take 2 tablets (25 mg total) by mouth 3 (three) times a day as needed for dizziness   naloxone (NARCAN) 4 mg/0.1 mL nasal spray   No No   Sig: Administer 1 spray into a nostril. If breathing does not return to normal or if breathing difficulty resumes after 2-3 minutes, give another dose in the other nostril using a new spray.   nystatin (MYCOSTATIN) ointment   No No   Sig: Apply topically 2 (two) times a day for 21 days Apply to umbilicus   omeprazole (PriLOSEC) 40 MG capsule   No No   Sig: TAKE 1 CAPSULE(40 MG) BY MOUTH DAILY BEFORE BREAKFAST   ondansetron (ZOFRAN-ODT) 4 mg disintegrating tablet   No No   Sig: Take 1 tablet (4 mg total) by mouth every 8 (eight) hours as needed for nausea for up to 10 doses   polyethylene glycol (GLYCOLAX) 17 GM/SCOOP powder   No No   Sig: Take 17 g by mouth daily   sucralfate (CARAFATE) 1 g tablet   No No   Sig: Take 1 tablet (1 g total) by mouth 4 (four) times a day Crush and mix with water to take as suspension   tiZANidine (ZANAFLEX) 2 mg tablet   No No   Sig: Take 1 tablet (2 mg total) by mouth 3 (three) times a day   triamcinolone (KENALOG) 0.5  % cream   No No   Sig: Apply topically 2 (two) times a day   venlafaxine (EFFEXOR) 75 mg tablet   Yes No   Sig: Take 75 mg by mouth 2 (two) times a day with meals   vitamin B-12 (CYANOCOBALAMIN) 2000 MCG TABS   No No   Sig: Take 1 tablet (2,000 mcg total) by mouth daily      Facility-Administered Medications Last Administration Doses Remaining   triamcinolone acetonide (KENALOG-40) 40 mg/mL injection 40 mg None recorded 1          Past Medical History:   Diagnosis Date    Asthma     Albuterol prn    Bariatric surgery status     COVID-19 11/19/2021    Depression 2009    managed with Effexor     Diabetes mellitus (HCC) 2005    resolved with gastric bypass 2015    Generalized osteoarthritis     Low vitamin B12 level     Migraine     Postgastrectomy malabsorption     Suicide attempt (HCC)     Vitamin D deficiency        Past Surgical History:   Procedure Laterality Date    CHOLECYSTECTOMY  2005    COLONOSCOPY      COSMETIC SURGERY  05/27/2020    Abdominoplasty    GASTRIC BYPASS  2015    elvira - en -y     HYSTERECTOMY      IR IMAGE GUIDED ASPIRATION / DRAINAGE W TUBE  7/14/2020    KNEE ARTHROSCOPY      with Lysis of adhesions    LAPAROSCOPIC SUPRACERVICAL HYSTERECTOMY  2005    due to endometriosis/AUB    OOPHORECTOMY Left 2005    CT EXCISION SKIN ABD INFRAUMBILICAL PANNICULECTOMY N/A 5/27/2020    Procedure: PANNICULECTOMY;  Surgeon: Bacilio Iraheta MD;  [facility]: [facility];  Service: Plastics    TONSILLECTOMY AND ADENOIDECTOMY      TUBAL LIGATION  1986    VAC DRESSING APPLICATION N/A 5/27/2020    Procedure: APPLICATION VAC DRESSING;  Surgeon: Bacilio Iraheta MD;  Location: [facility];  Service: Plastics       Family History   Problem Relation Age of Onset    Hypertension Mother     Kidney cancer Mother     Diabetes Mother     Breast cancer Mother     Heart disease Father     Stroke Father     Hypertension Sister     HIV Brother     Breast cancer Cousin     No Known Problems Daughter     Stomach cancer  Maternal Grandmother     No Known Problems Maternal Grandfather     No Known Problems Paternal Grandmother     No Known Problems Paternal Grandfather     No Known Problems Sister     No Known Problems Sister     No Known Problems Daughter     Prostate cancer Maternal Uncle     Stomach cancer Maternal Uncle     Leukemia Grandchild      I have reviewed and agree with the history as documented.    E-Cigarette/Vaping    E-Cigarette Use Never User      E-Cigarette/Vaping Substances    Nicotine No     THC No     CBD No     Flavoring No     Other No     Unknown No      Social History     Tobacco Use    Smoking status: Former     Current packs/day: 0.00     Types: Cigarettes     Start date: 05/1981     Quit date: 05/2013     Years since quitting: 10.8    Smokeless tobacco: Never   Vaping Use    Vaping status: Never Used   Substance Use Topics    Alcohol use: Not Currently    Drug use: Never       Review of Systems   All other systems reviewed and are negative.      Physical Exam  Physical Exam  Vitals and nursing note reviewed.   Constitutional:       Appearance: Normal appearance.   HENT:      Mouth/Throat:      Mouth: Mucous membranes are moist.   Eyes:      Conjunctiva/sclera: Conjunctivae normal.   Cardiovascular:      Rate and Rhythm: Normal rate.   Pulmonary:      Effort: Pulmonary effort is normal.   Musculoskeletal:      Cervical back: Normal range of motion.      Left knee: No deformity, effusion, erythema, ecchymosis or bony tenderness. Normal range of motion. Tenderness present. No medial joint line, lateral joint line, MCL, LCL, ACL, PCL or patellar tendon tenderness. Normal pulse.      Left lower leg: No swelling or tenderness. No edema.        Legs:    Skin:     General: Skin is warm.   Neurological:      General: No focal deficit present.      Mental Status: She is alert.      Sensory: No sensory deficit.      Motor: No weakness.   Psychiatric:         Mood and Affect: Mood normal.         Vital Signs  ED  "Triage Vitals [02/29/24 1635]   Temperature Pulse Respirations Blood Pressure SpO2   98.7 °F (37.1 °C) 65 16 132/67 100 %      Temp Source Heart Rate Source Patient Position - Orthostatic VS BP Location FiO2 (%)   Oral Monitor Sitting Right arm --      Pain Score       10 - Worst Possible Pain           Vitals:    02/29/24 1635   BP: 132/67   Pulse: 65   Patient Position - Orthostatic VS: Sitting         Visual Acuity      ED Medications  Medications   ketorolac (TORADOL) injection 30 mg (30 mg Intramuscular Given 2/29/24 1715)       Diagnostic Studies  Results Reviewed       None                   No orders to display              Procedures  Procedures         ED Course  ED Course as of 02/29/24 1721   Thu Feb 29, 2024 1715 PDMP reviewed  Regular alprazolam prescription.  Previous prescription for tramadol in November and percocet before that. No sig red flags.                               SBIRT 22yo+      Flowsheet Row Most Recent Value   Initial Alcohol Screen: US AUDIT-C     1. How often do you have a drink containing alcohol? 0 Filed at: 02/29/2024 1637   2. How many drinks containing alcohol do you have on a typical day you are drinking?  0 Filed at: 02/29/2024 1637   3a. Male UNDER 65: How often do you have five or more drinks on one occasion? 0 Filed at: 02/29/2024 1637   3b. FEMALE Any Age, or MALE 65+: How often do you have 4 or more drinks on one occassion? 0 Filed at: 02/29/2024 1637   Audit-C Score 0 Filed at: 02/29/2024 1637   BC: How many times in the past year have you...    Used an illegal drug or used a prescription medication for non-medical reasons? Never Filed at: 02/29/2024 1637                      Medical Decision Making  Atraumatic left knee pain w/ findings of tenderness, focal swelling to the popliteal fossa likely related to baker's cyst.  Pt upset that no imaging to be done (\"your're not going to do an MRI or a CT scan?\")  explained to patient that given atraumatic pain, no xray " needed, MRI not done in ED and isn't warranted at this time due to no trauma/no concern for internal derangement of knee, no CT needed b/c no concern for trauma/infection. No need for LE venous duplex as no LE swelling/calf pain to raise concern for DVT.      Will recommend benoit PIZANO ACE. Given IM ketorolac for pain, short course of steroids for inflammation/pain relief.  Pt informed no clinical reason for opiate pain medication at this time.    Encouraged to call her orthopedic doctor tomorrow for further outpatient management of her concerns.    Problems Addressed:  Baker cyst, left: acute illness or injury  Osteoarthritis of left knee: chronic illness or injury with exacerbation, progression, or side effects of treatment    Amount and/or Complexity of Data Reviewed  External Data Reviewed: notes.     Details: PDMP reviewed  Outpt ortho notes reviewed    Risk  Prescription drug management.             Disposition  Final diagnoses:   Baker cyst, left   Osteoarthritis of left knee     Time reflects when diagnosis was documented in both MDM as applicable and the Disposition within this note       Time User Action Codes Description Comment    2/29/2024  5:07 PM Nesha Uribe Add [M71.22] Guy cyst, left     2/29/2024  5:07 PM Nesha Uribe Add [M17.12] Osteoarthritis of left knee           ED Disposition       ED Disposition   Discharge    Condition   Stable    Date/Time   Thu Feb 29, 2024 1707    Comment   Aislinn Rouse discharge to home/self care.                   Follow-up Information       Follow up With Specialties Details Why Contact Info Additional Information    [facility] [place] [facility] Call in 1 day to schedule a follow up appointment for further evaluation and treatment [address]  [facility] [address] [phone] [facility] [place], [facility] [address], [place], [place], [address]    [phone]            Patient's Medications   Discharge Prescriptions    METHYLPREDNISOLONE 4 MG TABLET THERAPY PACK    Use as directed on package       Start Date: 2/29/2024 End Date: --       Order Dose: --       Quantity: 21 tablet    Refills: 0       No discharge procedures on file.    PDMP Review         Value Time User    PDMP Reviewed  Yes 2/29/2024  5:05 PM Nesha Uribe DO            ED Provider  Electronically Signed by             Nesha Uribe DO  02/29/24 [id number]

## 2024-03-12 ENCOUNTER — TELEPHONE (OUTPATIENT)
Dept: BARIATRICS | Facility: CLINIC | Age: 64
End: 2024-03-12

## 2024-03-15 ENCOUNTER — OFFICE VISIT (OUTPATIENT)
Dept: BARIATRICS | Facility: CLINIC | Age: 64
End: 2024-03-15
Payer: MEDICARE

## 2024-03-15 VITALS
BODY MASS INDEX: 29.97 KG/M2 | TEMPERATURE: 96.5 F | WEIGHT: 186.5 LBS | HEIGHT: 66 IN | OXYGEN SATURATION: 99 % | DIASTOLIC BLOOD PRESSURE: 76 MMHG | SYSTOLIC BLOOD PRESSURE: 116 MMHG | HEART RATE: 64 BPM

## 2024-03-15 DIAGNOSIS — E66.9 OBESITY, CLASS I, BMI 30-34.9: ICD-10-CM

## 2024-03-15 DIAGNOSIS — K91.2 POSTSURGICAL MALABSORPTION: ICD-10-CM

## 2024-03-15 DIAGNOSIS — Z98.84 BARIATRIC SURGERY STATUS: ICD-10-CM

## 2024-03-15 DIAGNOSIS — Z48.815 ENCOUNTER FOR SURGICAL AFTERCARE FOLLOWING SURGERY OF DIGESTIVE SYSTEM: Primary | ICD-10-CM

## 2024-03-15 PROCEDURE — 99213 OFFICE O/P EST LOW 20 MIN: CPT | Performed by: PHYSICIAN ASSISTANT

## 2024-03-15 NOTE — PROGRESS NOTES
Assessment/Plan:     Diagnoses and all orders for this visit:    Encounter for surgical aftercare following surgery of digestive system    Bariatric surgery status    Postsurgical malabsorption    Obesity, Class I, BMI 30-34.9          She is status post laparoscopic elvira-en-y gastric bypass surgery by Dr Monroe 1/21/2014. Here for 3 moths follow up   She had EGD and colonoscopy through GI for chronic symptoms of GERD, abdominal bloating and fullness. EGD showed:  IMPRESSION:  The esophagus appeared normal.  The gastric pouch appeared normal. Performed random biopsy to rule out H. pylori.  The gastrojejunal anastomosis appeared normal.  The jejunum appeared normal. Performed random biopsy to rule out celiac disease  Final Diagnosis   A. Labeled Stomach, jejunal bx r/o ciliac:  Small intestinal mucosa with preserved villous architecture  No histomorphologic features of celiac disease, dysplasia, malignancy or parasites identified     B. Stomach, gsstric bx r/o h pylori:  Chronic inactive gastritis with lymphoid follicle  H. Pylori immunostain is negative        Overall she is feeling better since her last visit. Still has lack of hunger at times, but is having trouble with getting coverage for new dentures which makes it even more difficult with regards to her lack of hunger and appetite. She continues on omeprazole 40 mg daily. She does still drink at least 1-2 protein shakes daily and gets adequate hydration. She has been taking her vitamins daily.     Recommend visit with RD however due to finances states this will be difficult to arrange. Advised her to get her blood work . Advised small frequent meals throughout the day as tolerated. Follow up also with PCP. Return for follow up in 6 months.     Subjective:      Patient ID: Aislinn Rouse is a 63 y.o. female.    HPI 3 month follow up     The following portions of the patient's history were reviewed and updated as appropriate: allergies, current medications,  "past family history, past medical history, past social history, past surgical history, and problem list.    Review of Systems   Constitutional: Negative.    HENT:  Positive for dental problem.    Respiratory: Negative.     Cardiovascular: Negative.    Gastrointestinal: Negative.    Neurological: Negative.    Psychiatric/Behavioral: Negative.           Objective:      /76 (BP Location: Left arm, Patient Position: Sitting, Cuff Size: Large)   Pulse 64   Temp (!) 96.5 °F (35.8 °C) (Tympanic)   Ht 5' 5.5\" (1.664 m)   Wt 84.6 kg (186 lb 8 oz)   LMP  (LMP Unknown)   SpO2 99%   BMI 30.56 kg/m²          Physical Exam  Vitals and nursing note reviewed.   Constitutional:       Appearance: Normal appearance. She is obese.   HENT:      Head: Normocephalic and atraumatic.   Eyes:      Extraocular Movements: Extraocular movements intact.      Pupils: Pupils are equal, round, and reactive to light.   Cardiovascular:      Rate and Rhythm: Normal rate.   Pulmonary:      Effort: Pulmonary effort is normal.   Musculoskeletal:         General: Normal range of motion.      Cervical back: Normal range of motion.   Skin:     General: Skin is warm and dry.   Neurological:      General: No focal deficit present.      Mental Status: She is alert and oriented to person, place, and time.   Psychiatric:         Mood and Affect: Mood normal.           "

## 2024-04-02 ENCOUNTER — OFFICE VISIT (OUTPATIENT)
Dept: OBGYN CLINIC | Facility: MEDICAL CENTER | Age: 64
End: 2024-04-02
Payer: MEDICARE

## 2024-04-02 VITALS
BODY MASS INDEX: 30.99 KG/M2 | SYSTOLIC BLOOD PRESSURE: 142 MMHG | DIASTOLIC BLOOD PRESSURE: 71 MMHG | HEIGHT: 65 IN | WEIGHT: 186 LBS | HEART RATE: 89 BPM

## 2024-04-02 DIAGNOSIS — M70.52 PES ANSERINUS BURSITIS OF LEFT KNEE: ICD-10-CM

## 2024-04-02 DIAGNOSIS — M17.12 PRIMARY OSTEOARTHRITIS OF LEFT KNEE: Primary | ICD-10-CM

## 2024-04-02 PROCEDURE — 99213 OFFICE O/P EST LOW 20 MIN: CPT | Performed by: STUDENT IN AN ORGANIZED HEALTH CARE EDUCATION/TRAINING PROGRAM

## 2024-04-02 PROCEDURE — 20610 DRAIN/INJ JOINT/BURSA W/O US: CPT | Performed by: STUDENT IN AN ORGANIZED HEALTH CARE EDUCATION/TRAINING PROGRAM

## 2024-04-02 RX ORDER — TRIAMCINOLONE ACETONIDE 40 MG/ML
40 INJECTION, SUSPENSION INTRA-ARTICULAR; INTRAMUSCULAR
Status: COMPLETED | OUTPATIENT
Start: 2024-04-02 | End: 2024-04-02

## 2024-04-02 RX ORDER — LIDOCAINE HYDROCHLORIDE 10 MG/ML
4 INJECTION, SOLUTION INFILTRATION; PERINEURAL
Status: COMPLETED | OUTPATIENT
Start: 2024-04-02 | End: 2024-04-02

## 2024-04-02 RX ORDER — LIDOCAINE HYDROCHLORIDE 10 MG/ML
2 INJECTION, SOLUTION INFILTRATION; PERINEURAL
Status: COMPLETED | OUTPATIENT
Start: 2024-04-02 | End: 2024-04-02

## 2024-04-02 RX ADMIN — TRIAMCINOLONE ACETONIDE 40 MG: 40 INJECTION, SUSPENSION INTRA-ARTICULAR; INTRAMUSCULAR at 10:45

## 2024-04-02 RX ADMIN — LIDOCAINE HYDROCHLORIDE 2 ML: 10 INJECTION, SOLUTION INFILTRATION; PERINEURAL at 10:45

## 2024-04-02 RX ADMIN — LIDOCAINE HYDROCHLORIDE 4 ML: 10 INJECTION, SOLUTION INFILTRATION; PERINEURAL at 10:45

## 2024-04-02 NOTE — PROGRESS NOTES
Knee Follow up Office Note    Assessment:     1. Primary osteoarthritis of left knee    2. Pes anserinus bursitis of left knee          Plan:     Problem List Items Addressed This Visit    None  Visit Diagnoses       Primary osteoarthritis of left knee    -  Primary    Relevant Orders    Ambulatory Referral to Physical Therapy    Pes anserinus bursitis of left knee        Relevant Orders    Ambulatory Referral to Physical Therapy              64 y/o female with left knee pain secondary to osteoarthritis and pes bursitis. She has an acute exacerbation of her OA today with increased effusion and pain.  She also has symptoms consistent with pes bursitis today.  We discussed continuing with conservative treatment to include aspiration and cortisone injection into the knee joint as well as cortisone injection into the pes bursa.  Patient agreed to both cortisone injections.  Patient should avoid strenuous activities for the next 1-2 days. Patient should avoid vaccines for the next 2 weeks if possible. Can apply cold compress for soreness. If patient feels relief with the cortisone injections, procedure can be repeated every 3 months. She was encouraged to stay active and keep the knee moving. Physical therapy ordered today as well.  Follow up as needed.  May repeat injections in 3 months.    Subjective:     Patient ID: Aislinn Rouse is a 63 y.o. female.  Chief Complaint:  HPI:  63 y.o. female presents to the office for follow up evaluation of left knee pain.  She has known OA of the left knee which she has been treating for conservatively with intermittent injections.  She had a cortisone injection in August with minimal improvement in symptoms as well as a visco injection in October.  Since that time she has been to the ER twice for increased pain in her Left knee. She states that the majority of her pain is in the posterior and medial aspects of the knee.  She has been using Tylenol for pain without relief. She  feels that she is unable to flex her knee due to the severity of pain.    She is unable to take NSAIDs due to gastric bypass. Pain 8/10.     Allergy:  Allergies   Allergen Reactions    Motrin [Ibuprofen]      Hx gastric bypass    Cherry - Food Allergy Rash    Gabapentin Rash     Medications:  all current active meds have been reviewed  Past Medical History:  Past Medical History:   Diagnosis Date    Asthma     Albuterol prn    Bariatric surgery status     COVID-19 11/19/2021    Depression 2009    managed with Effexor     Diabetes mellitus (HCC) 2005    resolved with gastric bypass 2015    Generalized osteoarthritis     Low vitamin B12 level     Migraine     Postgastrectomy malabsorption     Suicide attempt (HCC)     Vitamin D deficiency      Past Surgical History:  Past Surgical History:   Procedure Laterality Date    CHOLECYSTECTOMY  2005    COLONOSCOPY      COSMETIC SURGERY  05/27/2020    Abdominoplasty    GASTRIC BYPASS  2015    elvira - en -y     HYSTERECTOMY      IR IMAGE GUIDED ASPIRATION / DRAINAGE W TUBE  7/14/2020    KNEE ARTHROSCOPY      with Lysis of adhesions    LAPAROSCOPIC SUPRACERVICAL HYSTERECTOMY  2005    due to endometriosis/AUB    OOPHORECTOMY Left 2005    MA EXCISION SKIN ABD INFRAUMBILICAL PANNICULECTOMY N/A 5/27/2020    Procedure: PANNICULECTOMY;  Surgeon: Bacilio Iraheta MD;  [facility]: [facility];  Service: Plastics    TONSILLECTOMY AND ADENOIDECTOMY      TUBAL LIGATION  1986    VAC DRESSING APPLICATION N/A 5/27/2020    Procedure: APPLICATION VAC DRESSING;  Surgeon: Bacilio Iraheta MD;  Location: [facility];  Service: Plastics     Family History:  Family History   Problem Relation Age of Onset    Hypertension Mother     Kidney cancer Mother     Diabetes Mother     Breast cancer Mother     Heart disease Father     Stroke Father     Hypertension Sister     HIV Brother     Breast cancer Cousin     No Known Problems Daughter     Stomach cancer Maternal Grandmother     No Known  Problems Maternal Grandfather     No Known Problems Paternal Grandmother     No Known Problems Paternal Grandfather     No Known Problems Sister     No Known Problems Sister     No Known Problems Daughter     Prostate cancer Maternal Uncle     Stomach cancer Maternal Uncle     Leukemia Grandchild      Social History:  Social History     Substance and Sexual Activity   Alcohol Use Not Currently     Social History     Substance and Sexual Activity   Drug Use Never     Social History     Tobacco Use   Smoking Status Former    Current packs/day: 0.00    Types: Cigarettes    Start date: 05/1981    Quit date: 05/2013    Years since quitting: 10.9   Smokeless Tobacco Never           ROS:  General: Per HPI  Skin: Negative, except if noted below  HEENT: Negative  Respiratory: Negative  Cardiovascular: Negative  Gastrointestinal: Negative  Urinary: Negative  Vascular: Negative  Musculoskeletal: Positive per HPI   Neurologic: Positive per HPI  Endocrine: Negative    Objective:  BP Readings from Last 1 Encounters:   04/02/24 142/71      Wt Readings from Last 1 Encounters:   04/02/24 84.4 kg (186 lb)        Respiratory:   non-labored respirations    Lymphatics:  no palpable lymph nodes    Gait:   antalgic    Neurologic:   Alert and oriented times 3  Patient with normal sensation except as noted below  Deep tendon reflexes 2+ except as noted in MSK exam    Bilateral Lower Extremity:  Left Knee:      Inspection:  Well healed portal incisions    Overall limb alignment neutral    Effusion: moderate    ROM 0-95 with pain    Extensor Lag: none    Palpation: medial and posterior Joint line tenderness to palpation, +TTP over pes bursa    AP Stability at 90 deg stable     M/L stability in full extension stable    M/L stability in midflexion stable    Motor: 5/5 IP/Q/HS/TA/GS    Pulses: 2+ DP / 2+ PT    SILT DP/SP/S/S/TN    Imaging:  No new imaging today    BMI:   Estimated body mass index is 30.95 kg/m² as calculated from the  "following:    Height as of this encounter: 5' 5\" (1.651 m).    Weight as of this encounter: 84.4 kg (186 lb).  BSA:   Estimated body surface area is 1.92 meters squared as calculated from the following:    Height as of this encounter: 5' 5\" (1.651 m).    Weight as of this encounter: 84.4 kg (186 lb).         Large joint arthrocentesis: L knee  Universal Protocol:  Consent: Verbal consent obtained.  Risks and benefits: risks, benefits and alternatives were discussed  Consent given by: patient  Patient understanding: patient states understanding of the procedure being performed  Supporting Documentation  Indications: pain and joint swelling   Procedure Details  Location: knee - L knee  Preparation: Patient was prepped and draped in the usual sterile fashion  Needle size: 18 G  Approach: lateral  Medications administered: 40 mg triamcinolone acetonide 40 mg/mL; 4 mL lidocaine 1 %    Aspirate amount: 17 mL  Aspirate: yellow, serous, clear and blood-tinged  Patient tolerance: patient tolerated the procedure well with no immediate complications  Dressing:  Sterile dressing applied      Large joint arthrocentesis: L knee  Universal Protocol:  Consent: Verbal consent obtained.  Risks and benefits: risks, benefits and alternatives were discussed  Consent given by: patient  Patient understanding: patient states understanding of the procedure being performed  Supporting Documentation  Indications: pain   Procedure Details  Location: knee - L knee  Preparation: Patient was prepped and draped in the usual sterile fashion  Needle size: 22 G  Approach: anterolateral  Medications administered: 2 mL lidocaine 1 %; 40 mg triamcinolone acetonide 40 mg/mL    Patient tolerance: patient tolerated the procedure well with no immediate complications  Dressing:  Sterile dressing applied          Scribe Attestation      I,:  Kylah Goode PA-C am acting as a scribe while in the presence of the attending physician.:       I,:  Nash Suero " DO Jasen personally performed the services described in this documentation    as scribed in my presence.:

## 2024-04-02 NOTE — LETTER
Date: 4/2/2024    To whom it may concern:     This is to certify that Aislinn Rouse has been under my care for the following diagnosis: Left knee pain and osteoarthritis.        I feel that Aislinn Rouse is unable to serve on Jury Duty at this time for the above mentioned medical reasons.              Sincerely,  Dr. Nash Aggarwal D.O.

## 2024-04-03 ENCOUNTER — TELEPHONE (OUTPATIENT)
Dept: FAMILY MEDICINE CLINIC | Facility: CLINIC | Age: 64
End: 2024-04-03

## 2024-04-03 NOTE — TELEPHONE ENCOUNTER
Received [facility]O request from  [facility] [place] received on 4/3/24. Request was scanned into chart and faxed to [facility].

## 2024-04-22 LAB — HBA1C MFR BLD HPLC: 5.1 %

## 2024-05-13 ENCOUNTER — TELEPHONE (OUTPATIENT)
Age: 64
End: 2024-05-13

## 2024-05-13 NOTE — TELEPHONE ENCOUNTER
Caller: YAJAIRA iqbal     Doctor: Jasen     Reason for call: Requested if patient can get some imaging since they are having a lot pain     Call back#: [phone]

## 2024-05-16 ENCOUNTER — OFFICE VISIT (OUTPATIENT)
Dept: OBGYN CLINIC | Facility: MEDICAL CENTER | Age: 64
End: 2024-05-16
Payer: MEDICARE

## 2024-05-16 ENCOUNTER — APPOINTMENT (OUTPATIENT)
Dept: RADIOLOGY | Facility: MEDICAL CENTER | Age: 64
End: 2024-05-16
Payer: MEDICARE

## 2024-05-16 VITALS
HEIGHT: 65 IN | BODY MASS INDEX: 30.99 KG/M2 | SYSTOLIC BLOOD PRESSURE: 122 MMHG | DIASTOLIC BLOOD PRESSURE: 84 MMHG | HEART RATE: 80 BPM | WEIGHT: 186 LBS

## 2024-05-16 DIAGNOSIS — K91.2 POSTSURGICAL MALABSORPTION: ICD-10-CM

## 2024-05-16 DIAGNOSIS — M54.50 CHRONIC LEFT-SIDED LOW BACK PAIN WITHOUT SCIATICA: ICD-10-CM

## 2024-05-16 DIAGNOSIS — M17.12 PRIMARY OSTEOARTHRITIS OF LEFT KNEE: Primary | ICD-10-CM

## 2024-05-16 DIAGNOSIS — Z01.89 ENCOUNTER FOR LOWER EXTREMITY COMPARISON IMAGING STUDY: ICD-10-CM

## 2024-05-16 DIAGNOSIS — M17.12 PRIMARY OSTEOARTHRITIS OF LEFT KNEE: ICD-10-CM

## 2024-05-16 DIAGNOSIS — Z98.84 BARIATRIC SURGERY STATUS: ICD-10-CM

## 2024-05-16 DIAGNOSIS — G89.29 CHRONIC LEFT-SIDED LOW BACK PAIN WITHOUT SCIATICA: ICD-10-CM

## 2024-05-16 PROCEDURE — 73564 X-RAY EXAM KNEE 4 OR MORE: CPT

## 2024-05-16 PROCEDURE — 73560 X-RAY EXAM OF KNEE 1 OR 2: CPT

## 2024-05-16 PROCEDURE — 99213 OFFICE O/P EST LOW 20 MIN: CPT | Performed by: PHYSICIAN ASSISTANT

## 2024-05-16 RX ORDER — BUPROPION HYDROCHLORIDE 75 MG/1
TABLET ORAL
COMMUNITY
Start: 2024-05-06

## 2024-05-16 RX ORDER — BUPROPION HYDROCHLORIDE 150 MG/1
150 TABLET, EXTENDED RELEASE ORAL EVERY MORNING
COMMUNITY
Start: 2024-04-22

## 2024-05-16 RX ORDER — MULTIVITAMIN
1 TABLET ORAL DAILY
COMMUNITY
Start: 2024-04-22

## 2024-05-16 NOTE — PROGRESS NOTES
Knee Follow up Office Note    Assessment:     1. Primary osteoarthritis of left knee    2. Encounter for lower extremity comparison imaging study          Plan:     Problem List Items Addressed This Visit    None  Visit Diagnoses       Primary osteoarthritis of left knee    -  Primary    Relevant Orders    XR knee 4+ vw left injury    Encounter for lower extremity comparison imaging study        Relevant Orders    XR knee 1 or 2 vw right              62 y/o female with left knee pain secondary to osteoarthritis.  She had a cortisone injection into her left knee and the pes at last visit with only about 3 weeks of relief.  She has been attending PT with continued worsening of her pain.  Xrays of the left knee performed today showing that her arthritis has progressed since last year and she now has bone on bone arthritis.  We discussed continuation of conservative care vs surgical treatment options.  She had some relief with visco, so we will plan on repeating visco.  Hinged knee brace provided today.  We added PT for her lumbar spine. Referral for pain management placed today at patient's request.  Follow up for visco.    **The patient did make a comment about giving up, when asked about plans of injuring or harming herself, she states that this is not what she means and she has no ideas of harming herself.    Subjective:     Patient ID: Aislinn Rouse is a 63 y.o. female.  Chief Complaint:  HPI:  63 y.o. female presents to the office for follow up evaluation of left knee pain.  She has known OA of the left knee which she has been treating for conservatively with intermittent injections.  She had a cortisone injection in April as well as a pes bursa injection with only 3 weeks of relief.  She also had a  visco injection in October with some relief..  Since that time she has been in PT and complaining of increased pain in her Left knee. She states that the majority of her pain is in the posterior and medial  aspects of the knee.  She has been using Tylenol for pain without relief. She feels that she is unable to flex her knee due to the severity of pain and the swelling.    She is unable to take NSAIDs due to gastric bypass. Pain 8/10.     Allergy:  Allergies   Allergen Reactions    Motrin [Ibuprofen]      Hx gastric bypass    Cherry - Food Allergy Rash    Gabapentin Rash     Medications:  all current active meds have been reviewed  Past Medical History:  Past Medical History:   Diagnosis Date    Asthma     Albuterol prn    Bariatric surgery status     COVID-19 11/19/2021    Depression 2009    managed with Effexor     Diabetes mellitus (HCC) 2005    resolved with gastric bypass 2015    Generalized osteoarthritis     Low vitamin B12 level     Migraine     Postgastrectomy malabsorption     Suicide attempt (HCC)     Vitamin D deficiency      Past Surgical History:  Past Surgical History:   Procedure Laterality Date    CHOLECYSTECTOMY  2005    COLONOSCOPY      COSMETIC SURGERY  05/27/2020    Abdominoplasty    GASTRIC BYPASS  2015    elvira - en -y     HYSTERECTOMY      IR IMAGE GUIDED ASPIRATION / DRAINAGE W TUBE  7/14/2020    KNEE ARTHROSCOPY      with Lysis of adhesions    LAPAROSCOPIC SUPRACERVICAL HYSTERECTOMY  2005    due to endometriosis/AUB    OOPHORECTOMY Left 2005    LA EXCISION SKIN ABD INFRAUMBILICAL PANNICULECTOMY N/A 5/27/2020    Procedure: PANNICULECTOMY;  Surgeon: Bacilio Iraheta MD;  Location: [facility];  Service: Plastics    TONSILLECTOMY AND ADENOIDECTOMY      TUBAL LIGATION  1986    VAC DRESSING APPLICATION N/A 5/27/2020    Procedure: APPLICATION VAC DRESSING;  Surgeon: Bacilio Iraheta MD;  Location: [facility];  Service: Plastics     Family History:  Family History   Problem Relation Age of Onset    Hypertension Mother     Kidney cancer Mother     Diabetes Mother     Breast cancer Mother     Heart disease Father     Stroke Father     Hypertension Sister     HIV Brother     Breast cancer  Cousin     No Known Problems Daughter     Stomach cancer Maternal Grandmother     No Known Problems Maternal Grandfather     No Known Problems Paternal Grandmother     No Known Problems Paternal Grandfather     No Known Problems Sister     No Known Problems Sister     No Known Problems Daughter     Prostate cancer Maternal Uncle     Stomach cancer Maternal Uncle     Leukemia Grandchild      Social History:  Social History     Substance and Sexual Activity   Alcohol Use Not Currently     Social History     Substance and Sexual Activity   Drug Use Never     Social History     Tobacco Use   Smoking Status Former    Current packs/day: 0.00    Types: Cigarettes    Start date: 1981    Quit date: 2013    Years since quittin.0   Smokeless Tobacco Never           ROS:  General: Per HPI  Skin: Negative, except if noted below  HEENT: Negative  Respiratory: Negative  Cardiovascular: Negative  Gastrointestinal: Negative  Urinary: Negative  Vascular: Negative  Musculoskeletal: Positive per HPI   Neurologic: Positive per HPI  Endocrine: Negative    Objective:  BP Readings from Last 1 Encounters:   24 122/84      Wt Readings from Last 1 Encounters:   24 84.4 kg (186 lb)        Respiratory:   non-labored respirations    Lymphatics:  no palpable lymph nodes    Gait:   antalgic    Neurologic:   Alert and oriented times 3  Patient with normal sensation except as noted below  Deep tendon reflexes 2+ except as noted in MSK exam    Bilateral Lower Extremity:  Left Knee:      Inspection:  Well healed portal incisions    Overall limb alignment neutral    Effusion: mild    ROM 0-105 with pain    Extensor Lag: none    Palpation: medial and posterior Joint line tenderness to palpation    AP Stability at 90 deg stable     M/L stability in full extension stable    M/L stability in midflexion stable    Motor: 5/5 IP/Q/HS/TA/GS    Pulses: 2+ DP / 2+ PT    SILT DP/SP/S/S/TN      Imaging:  My interpretation XR AP scanogram/AP  "bilateral knee/lateral/diego/sunrise left knee: severe joint space narrowing, subchondral sclerosis, subchondral cysts, osteophyte formation. No fracture or dislocation.       BMI:   Estimated body mass index is 30.95 kg/m² as calculated from the following:    Height as of this encounter: 5' 5\" (1.651 m).    Weight as of this encounter: 84.4 kg (186 lb).  BSA:   Estimated body surface area is 1.92 meters squared as calculated from the following:    Height as of this encounter: 5' 5\" (1.651 m).    Weight as of this encounter: 84.4 kg (186 lb).             Kylah Goode PA-C    "

## 2024-05-17 RX ORDER — MULTIVIT-MIN/IRON/FOLIC ACID/K 18-600-40
1 CAPSULE ORAL DAILY
Qty: 90 CAPSULE | Refills: 1 | Status: SHIPPED | OUTPATIENT
Start: 2024-05-17

## 2024-06-04 ENCOUNTER — TELEPHONE (OUTPATIENT)
Dept: PAIN MEDICINE | Facility: CLINIC | Age: 64
End: 2024-06-04

## 2024-06-04 NOTE — TELEPHONE ENCOUNTER
Caller: Scot from [facility]     Doctor: Fabian    Reason for call: wanted to give you Heads up, new patient has a herniated disc, states we should order MRI     Consult scheduled 6/17    Call back#: [phone]

## 2024-06-17 ENCOUNTER — CONSULT (OUTPATIENT)
Dept: PAIN MEDICINE | Facility: MEDICAL CENTER | Age: 64
End: 2024-06-17
Payer: MEDICARE

## 2024-06-17 VITALS
DIASTOLIC BLOOD PRESSURE: 72 MMHG | HEART RATE: 86 BPM | OXYGEN SATURATION: 99 % | BODY MASS INDEX: 31.65 KG/M2 | SYSTOLIC BLOOD PRESSURE: 118 MMHG | WEIGHT: 190 LBS | HEIGHT: 65 IN

## 2024-06-17 DIAGNOSIS — M54.50 CHRONIC LEFT-SIDED LOW BACK PAIN WITHOUT SCIATICA: ICD-10-CM

## 2024-06-17 DIAGNOSIS — M47.816 LUMBAR SPONDYLOSIS: Primary | ICD-10-CM

## 2024-06-17 DIAGNOSIS — G89.29 CHRONIC LEFT-SIDED LOW BACK PAIN WITHOUT SCIATICA: ICD-10-CM

## 2024-06-17 PROCEDURE — 99204 OFFICE O/P NEW MOD 45 MIN: CPT | Performed by: PHYSICAL MEDICINE & REHABILITATION

## 2024-06-17 PROCEDURE — G2211 COMPLEX E/M VISIT ADD ON: HCPCS | Performed by: PHYSICAL MEDICINE & REHABILITATION

## 2024-06-17 NOTE — PROGRESS NOTES
Assessment  1. Lumbar spondylosis    2. Chronic left-sided low back pain without sciatica        Plan  The patient has been experiencing moderate to severe axial spine pain that is causing functional deficit.  The pain has been present for at least 3 months and is not improving with conservative care including one or more of the following: home exercise regimen, physician directed home exercise program, physical therapy, or chiropractic care.  Currently the patient is not experiencing any radicular features nor neurogenic claudication.  Non-facet pathology has been ruled out on clinical evaluation.     We will schedule the patient for left L3-5 medial branch nerve blocks with intention of moving forward towards radiofrequency ablation if there is an appropriate diagnostic response. The initial blocks will be performed with 2% lidocaine and if an appropriate response is obtained upon review of the patient's pain diary, a confirmatory block will be scheduled with 0.5% bupivacaine at least 2 weeks after the initial block.    In the office today, we reviewed the nature of facet joint pathology in depth using a spine model. We discussed the approach we would use for the injections and provided literature for home review. The patient understands the risks associated with the procedure including bleeding, infection, tissue injury, and allergic reaction and provided verbal informed consent in the office today.  My impressions and treatment recommendations were discussed in detail with the patient who verbalized understanding and had no further questions.  Discharge instructions were provided. I personally saw and examined the patient and I agree with the above discussed plan of care.    Orders Placed This Encounter   Procedures    FL spine and pain procedure     Standing Status:   Future     Standing Expiration Date:   6/17/2025     Order Specific Question:   Reason for Exam:     Answer:   (L) L3-5 MBB#1     Order Specific  Question:   Anticoagulant hold needed?     Answer:   no     No orders of the defined types were placed in this encounter.      History of Present Illness    Aislinn Rouse is a 63 y.o. female seen in consultation at the request of Kylah Goode PA-C regarding chronic axial lower back pain.  Patient's been experiencing the symptoms chronically greater than 6 months without any significant inciting event or trauma.  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  She is describing severe lower back pain rated as a 10/10 which is constant throughout the entirety of the day characterized as burning cramping shooting numbness sharp and throbbing.  She does describe some lower extremity weakness and dropping objects.  Also describing severe knee pain for which she is seeing orthopedics.    Aggravating and alleviating factors of the back pain are unknown.    Diagnostic studies include x-rays of the lumbar spine demonstrating facet arthropathy in the lower lumbar facet joints.    She is currently working with physical therapy noticing some moderate relief while working with them as well as heat or ice application.    Social history negative for tobacco marijuana and alcohol use.    Currently using Tylenol for pain relief and not experiencing significant benefit.    I have personally reviewed and/or updated the patient's past medical history, past surgical history, family history, social history, current medications, allergies, and vital signs today.     Review of Systems   Constitutional:  Negative for fever and unexpected weight change.   HENT:  Negative for trouble swallowing.    Eyes:  Negative for visual disturbance.   Respiratory:  Negative for shortness of breath and wheezing.    Cardiovascular:  Negative for chest pain and palpitations.   Gastrointestinal:  Negative for constipation, diarrhea, nausea and vomiting.   Endocrine: Negative for cold intolerance, heat intolerance and polydipsia.   Genitourinary:   Negative for difficulty urinating and frequency.   Musculoskeletal:  Positive for back pain, gait problem, joint swelling and myalgias. Negative for arthralgias.   Skin:  Negative for rash.   Neurological:  Negative for dizziness, seizures, syncope, weakness and headaches.   Hematological:  Does not bruise/bleed easily.   Psychiatric/Behavioral:  Negative for dysphoric mood.    All other systems reviewed and are negative.      Patient Active Problem List   Diagnosis    Asthma    History of bariatric surgery    Intentional drug overdose (HCC)    Major depressive disorder, recurrent severe without psychotic features (HCC)    Hypoglycemia    Chronic back pain    Generalized anxiety disorder    Postgastrectomy malabsorption    Bilateral carpal tunnel syndrome    Generalized osteoarthritis    Chronic nonintractable headache    Epigastric abdominal pain    Vitamin D deficiency    Vitamin B12 deficiency    Poor appetite    Secondary hyperparathyroidism of renal origin (HCC)    Lumbar spondylosis    Localized adiposity    Status post panniculectomy    History of hysterectomy    Acute exacerbation of chronic low back pain    Joint pain    Grief    Chronic pain of left knee    Hx of adenomatous colonic polyps    Primary osteoarthritis of left knee       Past Medical History:   Diagnosis Date    Anxiety     Asthma     Albuterol prn    Bariatric surgery status     COVID-19 11/19/2021    Depression 2009    managed with Effexor     Generalized osteoarthritis     Low vitamin B12 level     Migraine     Postgastrectomy malabsorption     Suicide attempt (HCC)     Vitamin D deficiency        Past Surgical History:   Procedure Laterality Date    CHOLECYSTECTOMY  2005    COLONOSCOPY      COSMETIC SURGERY  05/27/2020    Abdominoplasty    GASTRIC BYPASS  2015    elvira - en -y     HYSTERECTOMY      IR IMAGE GUIDED ASPIRATION / DRAINAGE W TUBE  7/14/2020    KNEE ARTHROSCOPY      with Lysis of adhesions    LAPAROSCOPIC SUPRACERVICAL  HYSTERECTOMY  2005    due to endometriosis/AUB    OOPHORECTOMY Left 2005    OR EXCISION SKIN ABD INFRAUMBILICAL PANNICULECTOMY N/A 2020    Procedure: PANNICULECTOMY;  Surgeon: Bacilio Iraheta MD;  Location: [facility];  Service: Plastics    TONSILLECTOMY AND ADENOIDECTOMY      TUBAL LIGATION      VAC DRESSING APPLICATION N/A 2020    Procedure: APPLICATION VAC DRESSING;  Surgeon: Bacilio Iraheta MD;  Location: [facility];  Service: Plastics       Family History   Problem Relation Age of Onset    Hypertension Mother     Kidney cancer Mother     Diabetes Mother     Breast cancer Mother     Heart disease Father     Stroke Father     Hypertension Sister     HIV Brother     Breast cancer Cousin     No Known Problems Daughter     Stomach cancer Maternal Grandmother     No Known Problems Maternal Grandfather     No Known Problems Paternal Grandmother     No Known Problems Paternal Grandfather     No Known Problems Sister     No Known Problems Sister     No Known Problems Daughter     Prostate cancer Maternal Uncle     Stomach cancer Maternal Uncle     Leukemia Grandchild        Social History     Occupational History    Not on file   Tobacco Use    Smoking status: Former     Current packs/day: 0.00     Types: Cigarettes     Start date: 1981     Quit date: 2013     Years since quittin.1    Smokeless tobacco: Never   Vaping Use    Vaping status: Never Used   Substance and Sexual Activity    Alcohol use: Not Currently    Drug use: Never    Sexual activity: Not Currently     Partners: Male     Birth control/protection: Female Sterilization, Post-menopausal       Current Outpatient Medications on File Prior to Visit   Medication Sig    albuterol (PROVENTIL HFA,VENTOLIN HFA) 90 mcg/act inhaler Inhale 2 puffs every 6 (six) hours as needed for wheezing    ALPRAZolam (XANAX) 1 mg tablet Take 1 mg by mouth every morning    ascorbic acid (VITAMIN C) 500 mg tablet TAKE 1 TABLET( 500 MG TOTAL)  BY MOUTH EVERY OTHER DAY. TAKE WITH IRON TABLET    buPROPion (WELLBUTRIN SR) 150 mg 12 hr tablet Take 150 mg by mouth every morning    buPROPion (WELLBUTRIN) 75 mg tablet     Cholecalciferol (Vitamin D) 50 MCG (2000 UT) CAPS Take 1 capsule (2,000 Units total) by mouth in the morning    dexamethasone sodium phosphate 0.1 % ophthalmic solution INSTILL 1 DROP IN BOTH EYES TWICE DAILY FOR 2 WEEKS    dicyclomine (BENTYL) 20 mg tablet TAKE 1 TABLET(20 MG) BY MOUTH EVERY 6 HOURS AS NEEDED FOR ABDOMINAL PAIN    doxepin (SINEquan) 100 mg capsule Take 100 mg by mouth daily at bedtime    DULoxetine (CYMBALTA) 30 mg delayed release capsule     ferrous sulfate 324 (65 Fe) mg TAKE 1 TABLET BY MOUTH EVERY OTHER DAY    meclizine (ANTIVERT) 12.5 MG tablet Take 2 tablets (25 mg total) by mouth 3 (three) times a day as needed for dizziness    Multiple Vitamin (Multivitamin) TABS Take 1 tablet by mouth daily    omeprazole (PriLOSEC) 40 MG capsule TAKE 1 CAPSULE(40 MG) BY MOUTH DAILY BEFORE BREAKFAST    ondansetron (ZOFRAN-ODT) 4 mg disintegrating tablet Take 1 tablet (4 mg total) by mouth every 8 (eight) hours as needed for nausea for up to 10 doses    Restasis 0.05 % ophthalmic emulsion INSTILL 1 DROP IN BOTH EYES TWICE DAILY    tiZANidine (ZANAFLEX) 2 mg tablet Take 1 tablet (2 mg total) by mouth 3 (three) times a day    triamcinolone (KENALOG) 0.5 % cream Apply topically 2 (two) times a day    venlafaxine (EFFEXOR) 75 mg tablet Take 75 mg by mouth 2 (two) times a day with meals    vitamin B-12 (CYANOCOBALAMIN) 2000 MCG TABS Take 1 tablet (2,000 mcg total) by mouth daily    Cholecalciferol (Vitamin D) 50 MCG (2000 UT) tablet Take 1 tablet (2,000 Units total) by mouth daily (Patient not taking: Reported on 3/15/2024)    Diclofenac Sodium (VOLTAREN) 1 % Apply 2 g topically 4 (four) times a day    fluticasone (FLOVENT HFA) 110 MCG/ACT inhaler Inhale 4 puffs 2 (two) times a day Rinse mouth after use.    linaCLOtide (Linzess) 72 MCG CAPS  "Take 72 mcg by mouth daily before breakfast    methylPREDNISolone 4 MG tablet therapy pack Use as directed on package (Patient not taking: Reported on 3/15/2024)    Multiple Vitamin (multivitamin) capsule Take 1 capsule by mouth daily    naloxone (NARCAN) 4 mg/0.1 mL nasal spray Administer 1 spray into a nostril. If breathing does not return to normal or if breathing difficulty resumes after 2-3 minutes, give another dose in the other nostril using a new spray. (Patient not taking: Reported on 3/15/2024)    nystatin (MYCOSTATIN) ointment Apply topically 2 (two) times a day for 21 days Apply to umbilicus    polyethylene glycol (GLYCOLAX) 17 GM/SCOOP powder Take 17 g by mouth daily    sucralfate (CARAFATE) 1 g tablet Take 1 tablet (1 g total) by mouth 4 (four) times a day Crush and mix with water to take as suspension     Current Facility-Administered Medications on File Prior to Visit   Medication    triamcinolone acetonide (KENALOG-40) 40 mg/mL injection 40 mg       Allergies   Allergen Reactions    Motrin [Ibuprofen]      Hx gastric bypass    Cherry - Food Allergy Rash    Gabapentin Rash       Physical Exam    /72   Pulse 86   Ht 5' 5\" (1.651 m)   Wt 86.2 kg (190 lb)   LMP  (LMP Unknown)   SpO2 99%   BMI 31.62 kg/m²     Constitutional: normal, well developed, well nourished, alert, in no distress and non-toxic and no overt pain behavior.  Eyes: anicteric  HEENT: grossly intact  Neck: supple, symmetric, trachea midline and no masses   Pulmonary:even and unlabored  Cardiovascular:No edema or pitting edema present  Skin:Normal without rashes or lesions and well hydrated  Psychiatric:Mood and affect appropriate  Neurologic:Cranial Nerves II-XII grossly intact  Musculoskeletal:normal, except for significant tenderness to palpation over the left lumbar facet joints, pain is worsened with lumbar extension as well as extension with rotation towards the left    Imaging    Study Result    Narrative & Impression "   LUMBAR SPINE     INDICATION:   fall.     COMPARISON:  Lumbar spine plain films from 6/3/2019.     VIEWS:  XR SPINE LUMBAR 2 OR 3 VIEWS INJURY        FINDINGS:     There are 5 non rib bearing lumbar vertebral bodies.      There is no evidence of acute fracture or destructive osseous lesion.     Mild anterolisthesis of L3 on L4.  Mild anterolisthesis of L4 on L5.      Mild degenerative facet disease throughout the lumbar spine.     The pedicles appear intact.     Soft tissues are unremarkable.     IMPRESSION:     No acute osseous abnormality.       Degenerative changes as described.        Workstation performed: [id number]

## 2024-06-25 ENCOUNTER — PROCEDURE VISIT (OUTPATIENT)
Dept: OBGYN CLINIC | Facility: MEDICAL CENTER | Age: 64
End: 2024-06-25
Payer: MEDICARE

## 2024-06-25 VITALS
HEIGHT: 65 IN | WEIGHT: 190 LBS | DIASTOLIC BLOOD PRESSURE: 75 MMHG | BODY MASS INDEX: 31.65 KG/M2 | SYSTOLIC BLOOD PRESSURE: 111 MMHG | HEART RATE: 66 BPM

## 2024-06-25 DIAGNOSIS — M17.12 PRIMARY OSTEOARTHRITIS OF LEFT KNEE: Primary | ICD-10-CM

## 2024-06-25 DIAGNOSIS — M70.52 PES ANSERINUS BURSITIS OF LEFT KNEE: ICD-10-CM

## 2024-06-25 PROCEDURE — 20610 DRAIN/INJ JOINT/BURSA W/O US: CPT | Performed by: PHYSICIAN ASSISTANT

## 2024-06-25 RX ORDER — LIDOCAINE HYDROCHLORIDE 10 MG/ML
2 INJECTION, SOLUTION INFILTRATION; PERINEURAL
Status: COMPLETED | OUTPATIENT
Start: 2024-06-25 | End: 2024-06-25

## 2024-06-25 RX ORDER — TRIAMCINOLONE ACETONIDE 40 MG/ML
40 INJECTION, SUSPENSION INTRA-ARTICULAR; INTRAMUSCULAR
Status: COMPLETED | OUTPATIENT
Start: 2024-06-25 | End: 2024-06-25

## 2024-06-25 RX ADMIN — TRIAMCINOLONE ACETONIDE 40 MG: 40 INJECTION, SUSPENSION INTRA-ARTICULAR; INTRAMUSCULAR at 10:00

## 2024-06-25 RX ADMIN — LIDOCAINE HYDROCHLORIDE 2 ML: 10 INJECTION, SOLUTION INFILTRATION; PERINEURAL at 10:00

## 2024-06-25 NOTE — PROGRESS NOTES
Synvisc One Left knee, pes bursitis left knee  Pain 7/10      Large joint arthrocentesis: L knee  Universal Protocol:  Consent: Verbal consent obtained.  Risks and benefits: risks, benefits and alternatives were discussed  Consent given by: patient  Patient understanding: patient states understanding of the procedure being performed  Supporting Documentation  Indications: pain and joint swelling   Procedure Details  Location: knee - L knee  Preparation: Patient was prepped and draped in the usual sterile fashion  Needle size: 18 G  Approach: lateral  Medications administered: 48 mg hylan 48 MG/6ML    Aspirate amount: 17 mL  Aspirate: clear, serous and yellow  Patient tolerance: patient tolerated the procedure well with no immediate complications  Dressing:  Sterile dressing applied      Large joint arthrocentesis: L pes anserine bursa  Universal Protocol:  Consent: Verbal consent obtained.  Risks and benefits: risks, benefits and alternatives were discussed  Consent given by: patient  Patient understanding: patient states understanding of the procedure being performed  Supporting Documentation  Indications: pain   Procedure Details  Location: knee - L pes anserine bursa  Preparation: Patient was prepped and draped in the usual sterile fashion  Needle size: 22 G  Approach: medial  Medications administered: 2 mL lidocaine 1 %; 40 mg triamcinolone acetonide 40 mg/mL    Patient tolerance: patient tolerated the procedure well with no immediate complications  Dressing:  Sterile dressing applied          Follow up in 3 months as needed for cortisone  HEP for hamstring stretches provided today        Kylah Goode PA-C

## 2024-07-12 ENCOUNTER — HOSPITAL ENCOUNTER (OUTPATIENT)
Dept: RADIOLOGY | Facility: MEDICAL CENTER | Age: 64
End: 2024-07-12
Payer: MEDICARE

## 2024-07-12 VITALS
HEART RATE: 66 BPM | DIASTOLIC BLOOD PRESSURE: 76 MMHG | SYSTOLIC BLOOD PRESSURE: 110 MMHG | TEMPERATURE: 97.8 F | RESPIRATION RATE: 20 BRPM | OXYGEN SATURATION: 98 %

## 2024-07-12 DIAGNOSIS — M47.816 LUMBAR SPONDYLOSIS: ICD-10-CM

## 2024-07-12 PROCEDURE — 64494 INJ PARAVERT F JNT L/S 2 LEV: CPT | Performed by: PHYSICAL MEDICINE & REHABILITATION

## 2024-07-12 PROCEDURE — 64493 INJ PARAVERT F JNT L/S 1 LEV: CPT | Performed by: PHYSICAL MEDICINE & REHABILITATION

## 2024-07-12 RX ADMIN — Medication 1.5 ML: at 13:38

## 2024-07-12 NOTE — H&P
History of Present Illness: The patient is a 63 y.o. female who presents with complaints of left low back pain    Past Medical History:   Diagnosis Date    Anxiety     Asthma     Albuterol prn    Bariatric surgery status     COVID-19 11/19/2021    Depression 2009    managed with Effexor     Generalized osteoarthritis     Low vitamin B12 level     Migraine     Postgastrectomy malabsorption     Suicide attempt (HCC)     Vitamin D deficiency        Past Surgical History:   Procedure Laterality Date    CHOLECYSTECTOMY  2005    COLONOSCOPY      COSMETIC SURGERY  05/27/2020    Abdominoplasty    GASTRIC BYPASS  2015    elvira - en -y     HYSTERECTOMY      IR IMAGE GUIDED ASPIRATION / DRAINAGE W TUBE  7/14/2020    KNEE ARTHROSCOPY      with Lysis of adhesions    LAPAROSCOPIC SUPRACERVICAL HYSTERECTOMY  2005    due to endometriosis/AUB    OOPHORECTOMY Left 2005    NC EXCISION SKIN ABD INFRAUMBILICAL PANNICULECTOMY N/A 5/27/2020    Procedure: PANNICULECTOMY;  Surgeon: Bacilio Iraheta MD;  [facility]: [facility];  Service: Plastics    TONSILLECTOMY AND ADENOIDECTOMY      TUBAL LIGATION  1986    VAC DRESSING APPLICATION N/A 5/27/2020    Procedure: APPLICATION VAC DRESSING;  Surgeon: Bacilio Iraheta MD;  [facility]: [facility];  Service: Plastics         Current Outpatient Medications:     albuterol (PROVENTIL HFA,VENTOLIN HFA) 90 mcg/act inhaler, Inhale 2 puffs every 6 (six) hours as needed for wheezing, Disp: 25.5 g, Rfl: 1    ALPRAZolam (XANAX) 1 mg tablet, Take 1 mg by mouth every morning, Disp: , Rfl:     ascorbic acid (VITAMIN C) 500 mg tablet, TAKE 1 TABLET( 500 MG TOTAL) BY MOUTH EVERY OTHER DAY. TAKE WITH IRON TABLET, Disp: 90 tablet, Rfl: 0    buPROPion (WELLBUTRIN SR) 150 mg 12 hr tablet, Take 150 mg by mouth every morning, Disp: , Rfl:     buPROPion (WELLBUTRIN) 75 mg tablet, , Disp: , Rfl:     Cholecalciferol (Vitamin D) 50 MCG (2000 UT) CAPS, Take 1 capsule (2,000 Units total) by mouth in the morning,  Disp: 90 capsule, Rfl: 1    Cholecalciferol (Vitamin D) 50 MCG (2000 UT) tablet, Take 1 tablet (2,000 Units total) by mouth daily (Patient not taking: Reported on 3/15/2024), Disp: 90 tablet, Rfl: 1    dexamethasone sodium phosphate 0.1 % ophthalmic solution, INSTILL 1 DROP IN BOTH EYES TWICE DAILY FOR 2 WEEKS, Disp: , Rfl:     Diclofenac Sodium (VOLTAREN) 1 %, Apply 2 g topically 4 (four) times a day, Disp: 100 g, Rfl: 3    dicyclomine (BENTYL) 20 mg tablet, TAKE 1 TABLET(20 MG) BY MOUTH EVERY 6 HOURS AS NEEDED FOR ABDOMINAL PAIN, Disp: 360 tablet, Rfl: 0    doxepin (SINEquan) 100 mg capsule, Take 100 mg by mouth daily at bedtime, Disp: , Rfl:     DULoxetine (CYMBALTA) 30 mg delayed release capsule, , Disp: , Rfl:     ferrous sulfate 324 (65 Fe) mg, TAKE 1 TABLET BY MOUTH EVERY OTHER DAY, Disp: 90 tablet, Rfl: 0    fluticasone (FLOVENT HFA) 110 MCG/ACT inhaler, Inhale 4 puffs 2 (two) times a day Rinse mouth after use., Disp: 12 g, Rfl: 2    linaCLOtide (Linzess) 72 MCG CAPS, Take 72 mcg by mouth daily before breakfast, Disp: 30 capsule, Rfl: 3    meclizine (ANTIVERT) 12.5 MG tablet, Take 2 tablets (25 mg total) by mouth 3 (three) times a day as needed for dizziness, Disp: 30 tablet, Rfl: 0    methylPREDNISolone 4 MG tablet therapy pack, Use as directed on package (Patient not taking: Reported on 3/15/2024), Disp: 21 tablet, Rfl: 0    Multiple Vitamin (multivitamin) capsule, Take 1 capsule by mouth daily, Disp: 30 capsule, Rfl: 5    Multiple Vitamin (Multivitamin) TABS, Take 1 tablet by mouth daily, Disp: , Rfl:     naloxone (NARCAN) 4 mg/0.1 mL nasal spray, Administer 1 spray into a nostril. If breathing does not return to normal or if breathing difficulty resumes after 2-3 minutes, give another dose in the other nostril using a new spray. (Patient not taking: Reported on 3/15/2024), Disp: 1 each, Rfl: 1    nystatin (MYCOSTATIN) ointment, Apply topically 2 (two) times a day for 21 days Apply to umbilicus, Disp: 30  g, Rfl: 0    omeprazole (PriLOSEC) 40 MG capsule, TAKE 1 CAPSULE(40 MG) BY MOUTH DAILY BEFORE BREAKFAST, Disp: 90 capsule, Rfl: 1    ondansetron (ZOFRAN-ODT) 4 mg disintegrating tablet, Take 1 tablet (4 mg total) by mouth every 8 (eight) hours as needed for nausea for up to 10 doses, Disp: 10 tablet, Rfl: 0    polyethylene glycol (GLYCOLAX) 17 GM/SCOOP powder, Take 17 g by mouth daily, Disp: 255 g, Rfl: 0    Restasis 0.05 % ophthalmic emulsion, INSTILL 1 DROP IN BOTH EYES TWICE DAILY, Disp: , Rfl:     sucralfate (CARAFATE) 1 g tablet, Take 1 tablet (1 g total) by mouth 4 (four) times a day Crush and mix with water to take as suspension, Disp: 120 tablet, Rfl: 0    tiZANidine (ZANAFLEX) 2 mg tablet, Take 1 tablet (2 mg total) by mouth 3 (three) times a day, Disp: 90 tablet, Rfl: 1    triamcinolone (KENALOG) 0.5 % cream, Apply topically 2 (two) times a day, Disp: 15 g, Rfl: 1    venlafaxine (EFFEXOR) 75 mg tablet, Take 75 mg by mouth 2 (two) times a day with meals, Disp: , Rfl:     vitamin B-12 (CYANOCOBALAMIN) 2000 MCG TABS, Take 1 tablet (2,000 mcg total) by mouth daily, Disp: 30 tablet, Rfl: 5    Current Facility-Administered Medications:     triamcinolone acetonide (KENALOG-40) 40 mg/mL injection 40 mg, 40 mg, Intramuscular, Once, April Reagan MD    Allergies   Allergen Reactions    Motrin [Ibuprofen]      Hx gastric bypass    Cherry - Food Allergy Rash    Gabapentin Rash       Physical Exam:   Vitals:    07/12/24 1327   BP: 129/88   Pulse: 75   Resp: 20   Temp: 97.8 °F (36.6 °C)   SpO2: 99%     General: Awake, Alert, Oriented x 3, Mood and affect appropriate  Respiratory: Respirations even and unlabored  Cardiovascular: Peripheral pulses intact; no edema  Musculoskeletal Exam: left low back pain    ASA Score: 2    Patient/Chart Verification  Patient ID Verified: Verbal  ID Band Applied: No  Consents Confirmed: Procedural, To be obtained in the Pre-Procedure area  H&P( within 30 days) Verified: To be  obtained in the Pre-Procedure area  Interval H&P(within 24 hr) Complete (required for Outpatients and Surgery Admit only): To be obtained in the Pre-Procedure area  Allergies Reviewed: Yes  Anticoag/NSAID held?: NA  Currently on antibiotics?: No    Assessment:   1. Lumbar spondylosis        Plan: (L) L3-5 MBB#1

## 2024-07-12 NOTE — DISCHARGE INSTRUCTIONS

## 2024-07-16 ENCOUNTER — TELEPHONE (OUTPATIENT)
Dept: PAIN MEDICINE | Facility: CLINIC | Age: 64
End: 2024-07-16

## 2024-07-17 ENCOUNTER — TELEPHONE (OUTPATIENT)
Dept: RADIOLOGY | Facility: MEDICAL CENTER | Age: 64
End: 2024-07-17

## 2024-07-17 NOTE — TELEPHONE ENCOUNTER
Procedure scheduled 7/30/24    Reviewed instructions: , NPO 1 hour prior, loose-fitting/comfortable clothes, if ill/fever/infx/abx to call and reschedule.  Also pain level at leat 5/10 and refrain from PRN, as-needed pain meds 6h prior.  Patient stated verbal understanding.

## 2024-07-17 NOTE — TELEPHONE ENCOUNTER
Spoke to patient and gave her available dates and times for procedure.  Patient will call back after she speaks to the her transportation to schedule the appointment.

## 2024-07-30 ENCOUNTER — HOSPITAL ENCOUNTER (OUTPATIENT)
Dept: RADIOLOGY | Facility: MEDICAL CENTER | Age: 64
Discharge: HOME | End: 2024-07-30
Payer: MEDICARE

## 2024-07-30 VITALS
HEART RATE: 68 BPM | SYSTOLIC BLOOD PRESSURE: 125 MMHG | OXYGEN SATURATION: 95 % | RESPIRATION RATE: 20 BRPM | TEMPERATURE: 97.8 F | DIASTOLIC BLOOD PRESSURE: 86 MMHG

## 2024-07-30 DIAGNOSIS — M47.816 LUMBAR SPONDYLOSIS: ICD-10-CM

## 2024-07-30 PROCEDURE — 64494 INJ PARAVERT F JNT L/S 2 LEV: CPT | Performed by: PHYSICAL MEDICINE & REHABILITATION

## 2024-07-30 PROCEDURE — 64493 INJ PARAVERT F JNT L/S 1 LEV: CPT | Performed by: PHYSICAL MEDICINE & REHABILITATION

## 2024-07-30 RX ORDER — BUPIVACAINE HYDROCHLORIDE 5 MG/ML
1.5 INJECTION, SOLUTION EPIDURAL; INTRACAUDAL ONCE
Status: COMPLETED | OUTPATIENT
Start: 2024-07-30 | End: 2024-07-30

## 2024-07-30 RX ADMIN — BUPIVACAINE HYDROCHLORIDE 1.5 ML: 5 INJECTION, SOLUTION EPIDURAL; INTRACAUDAL at 09:17

## 2024-07-30 NOTE — DISCHARGE INSTRUCTIONS

## 2024-07-30 NOTE — H&P
History of Present Illness: The patient is a 63 y.o. female who presents with complaints of left low back pain    Past Medical History:   Diagnosis Date    Anxiety     Asthma     Albuterol prn    Bariatric surgery status     COVID-19 11/19/2021    Depression 2009    managed with Effexor     Generalized osteoarthritis     Low vitamin B12 level     Migraine     Postgastrectomy malabsorption     Suicide attempt (HCC)     Vitamin D deficiency        Past Surgical History:   Procedure Laterality Date    CHOLECYSTECTOMY  2005    COLONOSCOPY      COSMETIC SURGERY  05/27/2020    Abdominoplasty    GASTRIC BYPASS  2015    elvira - en -y     HYSTERECTOMY      IR IMAGE GUIDED ASPIRATION / DRAINAGE W TUBE  7/14/2020    KNEE ARTHROSCOPY      with Lysis of adhesions    LAPAROSCOPIC SUPRACERVICAL HYSTERECTOMY  2005    due to endometriosis/AUB    OOPHORECTOMY Left 2005    VT EXCISION SKIN ABD INFRAUMBILICAL PANNICULECTOMY N/A 5/27/2020    Procedure: PANNICULECTOMY;  Surgeon: Bacilio Iraheta MD;  [facility]: [facility];  Service: Plastics    TONSILLECTOMY AND ADENOIDECTOMY      TUBAL LIGATION  1986    VAC DRESSING APPLICATION N/A 5/27/2020    Procedure: APPLICATION VAC DRESSING;  Surgeon: Bacilio Iraheta MD;  [facility];  Service: Plastics         Current Outpatient Medications:     albuterol (PROVENTIL HFA,VENTOLIN HFA) 90 mcg/act inhaler, Inhale 2 puffs every 6 (six) hours as needed for wheezing, Disp: 25.5 g, Rfl: 1    ALPRAZolam (XANAX) 1 mg tablet, Take 1 mg by mouth every morning, Disp: , Rfl:     ascorbic acid (VITAMIN C) 500 mg tablet, TAKE 1 TABLET( 500 MG TOTAL) BY MOUTH EVERY OTHER DAY. TAKE WITH IRON TABLET, Disp: 90 tablet, Rfl: 0    buPROPion (WELLBUTRIN SR) 150 mg 12 hr tablet, Take 150 mg by mouth every morning, Disp: , Rfl:     buPROPion (WELLBUTRIN) 75 mg tablet, , Disp: , Rfl:     Cholecalciferol (Vitamin D) 50 MCG (2000 UT) CAPS, Take 1 capsule (2,000 Units total) by mouth in the morning,  Disp: 90 capsule, Rfl: 1    Cholecalciferol (Vitamin D) 50 MCG (2000 UT) tablet, Take 1 tablet (2,000 Units total) by mouth daily (Patient not taking: Reported on 3/15/2024), Disp: 90 tablet, Rfl: 1    dexamethasone sodium phosphate 0.1 % ophthalmic solution, INSTILL 1 DROP IN BOTH EYES TWICE DAILY FOR 2 WEEKS, Disp: , Rfl:     Diclofenac Sodium (VOLTAREN) 1 %, Apply 2 g topically 4 (four) times a day, Disp: 100 g, Rfl: 3    dicyclomine (BENTYL) 20 mg tablet, TAKE 1 TABLET(20 MG) BY MOUTH EVERY 6 HOURS AS NEEDED FOR ABDOMINAL PAIN, Disp: 360 tablet, Rfl: 0    doxepin (SINEquan) 100 mg capsule, Take 100 mg by mouth daily at bedtime, Disp: , Rfl:     DULoxetine (CYMBALTA) 30 mg delayed release capsule, , Disp: , Rfl:     ferrous sulfate 324 (65 Fe) mg, TAKE 1 TABLET BY MOUTH EVERY OTHER DAY, Disp: 90 tablet, Rfl: 0    fluticasone (FLOVENT HFA) 110 MCG/ACT inhaler, Inhale 4 puffs 2 (two) times a day Rinse mouth after use., Disp: 12 g, Rfl: 2    linaCLOtide (Linzess) 72 MCG CAPS, Take 72 mcg by mouth daily before breakfast, Disp: 30 capsule, Rfl: 3    meclizine (ANTIVERT) 12.5 MG tablet, Take 2 tablets (25 mg total) by mouth 3 (three) times a day as needed for dizziness, Disp: 30 tablet, Rfl: 0    Multiple Vitamin (multivitamin) capsule, Take 1 capsule by mouth daily, Disp: 30 capsule, Rfl: 5    Multiple Vitamin (Multivitamin) TABS, Take 1 tablet by mouth daily, Disp: , Rfl:     naloxone (NARCAN) 4 mg/0.1 mL nasal spray, Administer 1 spray into a nostril. If breathing does not return to normal or if breathing difficulty resumes after 2-3 minutes, give another dose in the other nostril using a new spray. (Patient not taking: Reported on 3/15/2024), Disp: 1 each, Rfl: 1    nystatin (MYCOSTATIN) ointment, Apply topically 2 (two) times a day for 21 days Apply to umbilicus, Disp: 30 g, Rfl: 0    omeprazole (PriLOSEC) 40 MG capsule, TAKE 1 CAPSULE(40 MG) BY MOUTH DAILY BEFORE BREAKFAST, Disp: 90 capsule, Rfl: 1    ondansetron  (ZOFRAN-ODT) 4 mg disintegrating tablet, Take 1 tablet (4 mg total) by mouth every 8 (eight) hours as needed for nausea for up to 10 doses, Disp: 10 tablet, Rfl: 0    polyethylene glycol (GLYCOLAX) 17 GM/SCOOP powder, Take 17 g by mouth daily, Disp: 255 g, Rfl: 0    Restasis 0.05 % ophthalmic emulsion, INSTILL 1 DROP IN BOTH EYES TWICE DAILY, Disp: , Rfl:     sucralfate (CARAFATE) 1 g tablet, Take 1 tablet (1 g total) by mouth 4 (four) times a day Crush and mix with water to take as suspension, Disp: 120 tablet, Rfl: 0    tiZANidine (ZANAFLEX) 2 mg tablet, Take 1 tablet (2 mg total) by mouth 3 (three) times a day, Disp: 90 tablet, Rfl: 1    triamcinolone (KENALOG) 0.5 % cream, Apply topically 2 (two) times a day, Disp: 15 g, Rfl: 1    venlafaxine (EFFEXOR) 75 mg tablet, Take 75 mg by mouth 2 (two) times a day with meals, Disp: , Rfl:     vitamin B-12 (CYANOCOBALAMIN) 2000 MCG TABS, Take 1 tablet (2,000 mcg total) by mouth daily, Disp: 30 tablet, Rfl: 5    Current Facility-Administered Medications:     triamcinolone acetonide (KENALOG-40) 40 mg/mL injection 40 mg, 40 mg, Intramuscular, Once, April Reagan MD    Allergies   Allergen Reactions    Motrin [Ibuprofen]      Hx gastric bypass    Cherry - Food Allergy Rash    Gabapentin Rash       Physical Exam:   Vitals:    07/30/24 0900   BP: 122/79   Pulse: 68   Resp: 20   Temp: 97.8 °F (36.6 °C)   SpO2: 100%     General: Awake, Alert, Oriented x 3, Mood and affect appropriate  Respiratory: Respirations even and unlabored  Cardiovascular: Peripheral pulses intact; no edema  Musculoskeletal Exam: left low back pain    ASA Score: 3    Patient/Chart Verification  Patient ID Verified: Verbal  ID Band Applied: No  Consents Confirmed: Procedural, To be obtained in the Pre-Procedure area  H&P( within 30 days) Verified: To be obtained in the Pre-Procedure area  Interval H&P(within 24 hr) Complete (required for Outpatients and Surgery Admit only): To be obtained in the  Pre-Procedure area  Allergies Reviewed: Yes  Anticoag/NSAID held?: NA  Currently on antibiotics?: No    Assessment:   1. Lumbar spondylosis        Plan: LT L3-5 MBB #2 ([id number], [id number])

## 2024-08-12 ENCOUNTER — TELEPHONE (OUTPATIENT)
Age: 64
End: 2024-08-12

## 2024-08-12 NOTE — TELEPHONE ENCOUNTER
Caller: Aislinn     Doctor: Fabian     Reason for call: Patient calling stating she would like to reschedule procedure please advise     Call back#: [phone]

## 2024-08-12 NOTE — TELEPHONE ENCOUNTER
Pain diary has not been reviewed from procedure completed 7/30/24.  Will call patient to schedule subsequent procedure when pain diary has been reviewed.

## 2024-08-14 ENCOUNTER — TELEPHONE (OUTPATIENT)
Dept: RADIOLOGY | Facility: MEDICAL CENTER | Age: 64
End: 2024-08-14

## 2024-08-14 NOTE — TELEPHONE ENCOUNTER
Procedure scheduled 9/10/24    Reviewed instructions: , NPO 1 hour prior, loose-fitting/comfortable clothing, if ill/fever/infx/abx to call and reschedule.  No need to hold any meds prior.  Patient stated verbal understanding.

## 2024-08-28 ENCOUNTER — APPOINTMENT (OUTPATIENT)
Dept: LAB | Facility: HOSPITAL | Age: 64
End: 2024-08-28
Payer: MEDICARE

## 2024-08-28 DIAGNOSIS — K91.2 POSTSURGICAL MALABSORPTION: ICD-10-CM

## 2024-08-28 DIAGNOSIS — E66.09 CLASS 1 OBESITY DUE TO EXCESS CALORIES WITHOUT SERIOUS COMORBIDITY WITH BODY MASS INDEX (BMI) OF 31.0 TO 31.9 IN ADULT: ICD-10-CM

## 2024-08-28 DIAGNOSIS — F41.1 GENERALIZED ANXIETY DISORDER: Chronic | ICD-10-CM

## 2024-08-28 DIAGNOSIS — K21.9 GERD (GASTROESOPHAGEAL REFLUX DISEASE): ICD-10-CM

## 2024-08-28 DIAGNOSIS — E66.9 OBESITY, CLASS I, BMI 30-34.9: ICD-10-CM

## 2024-08-28 DIAGNOSIS — Z48.815 ENCOUNTER FOR SURGICAL AFTERCARE FOLLOWING SURGERY OF DIGESTIVE SYSTEM: ICD-10-CM

## 2024-08-28 DIAGNOSIS — Z98.84 BARIATRIC SURGERY STATUS: ICD-10-CM

## 2024-08-28 LAB
25(OH)D3 SERPL-MCNC: 11.7 NG/ML (ref 30–100)
ALBUMIN SERPL BCG-MCNC: 3.7 G/DL (ref 3.5–5)
ALP SERPL-CCNC: 75 U/L (ref 34–104)
ALT SERPL W P-5'-P-CCNC: 14 U/L (ref 7–52)
ANION GAP SERPL CALCULATED.3IONS-SCNC: 2 MMOL/L (ref 4–13)
AST SERPL W P-5'-P-CCNC: 15 U/L (ref 13–39)
BILIRUB SERPL-MCNC: 0.59 MG/DL (ref 0.2–1)
BUN SERPL-MCNC: 11 MG/DL (ref 5–25)
CALCIUM SERPL-MCNC: 9.2 MG/DL (ref 8.4–10.2)
CHLORIDE SERPL-SCNC: 106 MMOL/L (ref 96–108)
CHOLEST SERPL-MCNC: 224 MG/DL
CO2 SERPL-SCNC: 30 MMOL/L (ref 21–32)
CREAT SERPL-MCNC: 0.77 MG/DL (ref 0.6–1.3)
ERYTHROCYTE [DISTWIDTH] IN BLOOD BY AUTOMATED COUNT: 13.1 % (ref 11.6–15.1)
EST. AVERAGE GLUCOSE BLD GHB EST-MCNC: 111 MG/DL
FERRITIN SERPL-MCNC: 12 NG/ML (ref 11–307)
FOLATE SERPL-MCNC: 13.8 NG/ML
GFR SERPL CREATININE-BSD FRML MDRD: 83 ML/MIN/1.73SQ M
GLUCOSE P FAST SERPL-MCNC: 108 MG/DL (ref 65–99)
HBA1C MFR BLD: 5.5 %
HCT VFR BLD AUTO: 41.7 % (ref 34.8–46.1)
HDLC SERPL-MCNC: 80 MG/DL
HGB BLD-MCNC: 13.4 G/DL (ref 11.5–15.4)
IRON SATN MFR SERPL: 21 % (ref 15–50)
IRON SERPL-MCNC: 75 UG/DL (ref 50–212)
LDLC SERPL CALC-MCNC: 117 MG/DL (ref 0–100)
MCH RBC QN AUTO: 29 PG (ref 26.8–34.3)
MCHC RBC AUTO-ENTMCNC: 32.1 G/DL (ref 31.4–37.4)
MCV RBC AUTO: 90 FL (ref 82–98)
NONHDLC SERPL-MCNC: 144 MG/DL
PLATELET # BLD AUTO: 221 THOUSANDS/UL (ref 149–390)
PMV BLD AUTO: 11.7 FL (ref 8.9–12.7)
POTASSIUM SERPL-SCNC: 4.1 MMOL/L (ref 3.5–5.3)
PROT SERPL-MCNC: 5.8 G/DL (ref 6.4–8.4)
PTH-INTACT SERPL-MCNC: 145.3 PG/ML (ref 12–88)
RBC # BLD AUTO: 4.62 MILLION/UL (ref 3.81–5.12)
SODIUM SERPL-SCNC: 138 MMOL/L (ref 135–147)
TIBC SERPL-MCNC: 363 UG/DL (ref 250–450)
TRIGL SERPL-MCNC: 136 MG/DL
UIBC SERPL-MCNC: 288 UG/DL (ref 155–355)
VIT B12 SERPL-MCNC: 195 PG/ML (ref 180–914)
WBC # BLD AUTO: 6.33 THOUSAND/UL (ref 4.31–10.16)

## 2024-08-28 PROCEDURE — 80061 LIPID PANEL: CPT

## 2024-08-28 PROCEDURE — 82306 VITAMIN D 25 HYDROXY: CPT

## 2024-08-28 PROCEDURE — 83036 HEMOGLOBIN GLYCOSYLATED A1C: CPT

## 2024-08-28 PROCEDURE — 83540 ASSAY OF IRON: CPT

## 2024-08-28 PROCEDURE — 83550 IRON BINDING TEST: CPT

## 2024-08-28 PROCEDURE — 84425 ASSAY OF VITAMIN B-1: CPT

## 2024-08-28 PROCEDURE — 83970 ASSAY OF PARATHORMONE: CPT

## 2024-08-28 PROCEDURE — 82607 VITAMIN B-12: CPT

## 2024-08-28 PROCEDURE — 84630 ASSAY OF ZINC: CPT

## 2024-08-28 PROCEDURE — 82728 ASSAY OF FERRITIN: CPT

## 2024-08-28 PROCEDURE — 36415 COLL VENOUS BLD VENIPUNCTURE: CPT

## 2024-08-28 PROCEDURE — 80053 COMPREHEN METABOLIC PANEL: CPT

## 2024-08-28 PROCEDURE — 84590 ASSAY OF VITAMIN A: CPT

## 2024-08-28 PROCEDURE — 85027 COMPLETE CBC AUTOMATED: CPT

## 2024-08-28 PROCEDURE — 82746 ASSAY OF FOLIC ACID SERUM: CPT

## 2024-08-31 LAB — ZINC SERPL-MCNC: 69 UG/DL (ref 44–115)

## 2024-09-02 LAB — VIT B1 BLD-SCNC: 109.1 NMOL/L (ref 66.5–200)

## 2024-09-04 DIAGNOSIS — Z98.84 BARIATRIC SURGERY STATUS: Primary | ICD-10-CM

## 2024-09-04 DIAGNOSIS — E53.8 VITAMIN B12 DEFICIENCY: ICD-10-CM

## 2024-09-04 DIAGNOSIS — E21.3 HYPERPARATHYROIDISM (HCC): ICD-10-CM

## 2024-09-04 DIAGNOSIS — K91.2 POSTSURGICAL MALABSORPTION: ICD-10-CM

## 2024-09-04 DIAGNOSIS — E55.9 VITAMIN D DEFICIENCY: ICD-10-CM

## 2024-09-04 LAB — VIT A SERPL-MCNC: 55.4 UG/DL (ref 22–69.5)

## 2024-09-05 ENCOUNTER — CLINICAL SUPPORT (OUTPATIENT)
Dept: FAMILY MEDICINE CLINIC | Facility: CLINIC | Age: 64
End: 2024-09-05

## 2024-09-05 ENCOUNTER — TELEPHONE (OUTPATIENT)
Dept: FAMILY MEDICINE CLINIC | Facility: CLINIC | Age: 64
End: 2024-09-05

## 2024-09-05 DIAGNOSIS — E53.8 VITAMIN B12 DEFICIENCY: Primary | ICD-10-CM

## 2024-09-05 PROCEDURE — 96372 THER/PROPH/DIAG INJ SC/IM: CPT

## 2024-09-05 RX ORDER — CYANOCOBALAMIN 1000 UG/ML
1000 INJECTION, SOLUTION INTRAMUSCULAR; SUBCUTANEOUS
Status: SHIPPED | OUTPATIENT
Start: 2024-09-05

## 2024-09-05 RX ADMIN — CYANOCOBALAMIN 1000 MCG: 1000 INJECTION, SOLUTION INTRAMUSCULAR; SUBCUTANEOUS at 09:23

## 2024-09-05 NOTE — TELEPHONE ENCOUNTER
Patient came for her B12 injection and wishes to know if she has to wait a certain amount of time to donate her plasma after receiving her B12 injection.

## 2024-09-10 ENCOUNTER — HOSPITAL ENCOUNTER (OUTPATIENT)
Dept: RADIOLOGY | Facility: MEDICAL CENTER | Age: 64
Discharge: HOME | End: 2024-09-10
Admitting: PHYSICAL MEDICINE & REHABILITATION
Payer: MEDICARE

## 2024-09-10 ENCOUNTER — TELEPHONE (OUTPATIENT)
Dept: PAIN MEDICINE | Facility: MEDICAL CENTER | Age: 64
End: 2024-09-10

## 2024-09-10 VITALS
SYSTOLIC BLOOD PRESSURE: 128 MMHG | OXYGEN SATURATION: 100 % | RESPIRATION RATE: 18 BRPM | TEMPERATURE: 98.1 F | DIASTOLIC BLOOD PRESSURE: 84 MMHG | HEART RATE: 62 BPM

## 2024-09-10 DIAGNOSIS — M47.816 LUMBAR SPONDYLOSIS: ICD-10-CM

## 2024-09-10 PROCEDURE — 64636 DESTROY L/S FACET JNT ADDL: CPT | Performed by: PHYSICAL MEDICINE & REHABILITATION

## 2024-09-10 PROCEDURE — 64635 DESTROY LUMB/SAC FACET JNT: CPT | Performed by: PHYSICAL MEDICINE & REHABILITATION

## 2024-09-10 RX ORDER — AMOXICILLIN 500 MG/1
500 CAPSULE ORAL 3 TIMES DAILY
COMMUNITY
Start: 2024-08-31

## 2024-09-10 RX ADMIN — Medication 5 ML: at 10:07

## 2024-09-10 NOTE — H&P
History of Present Illness: The patient is a 63 y.o. female who presents with complaints of left low back pain    Past Medical History:   Diagnosis Date    Anxiety     Asthma     Albuterol prn    Bariatric surgery status     COVID-19 11/19/2021    Depression 2009    managed with Effexor     Generalized osteoarthritis     Low vitamin B12 level     Migraine     Postgastrectomy malabsorption     Suicide attempt (HCC)     Vitamin D deficiency        Past Surgical History:   Procedure Laterality Date    CHOLECYSTECTOMY  2005    COLONOSCOPY      COSMETIC SURGERY  05/27/2020    Abdominoplasty    GASTRIC BYPASS  2015    elvira - en -y     HYSTERECTOMY      IR IMAGE GUIDED ASPIRATION / DRAINAGE W TUBE  7/14/2020    KNEE ARTHROSCOPY      with Lysis of adhesions    LAPAROSCOPIC SUPRACERVICAL HYSTERECTOMY  2005    due to endometriosis/AUB    OOPHORECTOMY Left 2005    GA EXCISION SKIN ABD INFRAUMBILICAL PANNICULECTOMY N/A 5/27/2020    Procedure: PANNICULECTOMY;  Surgeon: Bacilio Iraheta MD;  Location: [facility];  Service: Plastics    TONSILLECTOMY AND ADENOIDECTOMY      TUBAL LIGATION  1986    VAC DRESSING APPLICATION N/A 5/27/2020    Procedure: APPLICATION VAC DRESSING;  Surgeon: Bacilio Iraheta MD;  [facility];  Service: Plastics         Current Outpatient Medications:     amoxicillin (AMOXIL) 500 mg capsule, Take 500 mg by mouth 3 (three) times a day, Disp: , Rfl:     albuterol (PROVENTIL HFA,VENTOLIN HFA) 90 mcg/act inhaler, Inhale 2 puffs every 6 (six) hours as needed for wheezing, Disp: 25.5 g, Rfl: 1    ALPRAZolam (XANAX) 1 mg tablet, Take 1 mg by mouth every morning, Disp: , Rfl:     ascorbic acid (VITAMIN C) 500 mg tablet, TAKE 1 TABLET( 500 MG TOTAL) BY MOUTH EVERY OTHER DAY. TAKE WITH IRON TABLET, Disp: 90 tablet, Rfl: 0    buPROPion (WELLBUTRIN SR) 150 mg 12 hr tablet, Take 150 mg by mouth every morning, Disp: , Rfl:     buPROPion (WELLBUTRIN) 75 mg tablet, , Disp: , Rfl:      Cholecalciferol (Vitamin D) 50 MCG (2000 UT) CAPS, Take 1 capsule (2,000 Units total) by mouth in the morning, Disp: 90 capsule, Rfl: 1    Cholecalciferol (Vitamin D) 50 MCG (2000 UT) tablet, Take 1 tablet (2,000 Units total) by mouth daily (Patient not taking: Reported on 3/15/2024), Disp: 90 tablet, Rfl: 1    dexamethasone sodium phosphate 0.1 % ophthalmic solution, INSTILL 1 DROP IN BOTH EYES TWICE DAILY FOR 2 WEEKS, Disp: , Rfl:     Diclofenac Sodium (VOLTAREN) 1 %, Apply 2 g topically 4 (four) times a day, Disp: 100 g, Rfl: 3    dicyclomine (BENTYL) 20 mg tablet, TAKE 1 TABLET(20 MG) BY MOUTH EVERY 6 HOURS AS NEEDED FOR ABDOMINAL PAIN, Disp: 360 tablet, Rfl: 0    doxepin (SINEquan) 100 mg capsule, Take 100 mg by mouth daily at bedtime, Disp: , Rfl:     DULoxetine (CYMBALTA) 30 mg delayed release capsule, , Disp: , Rfl:     ferrous sulfate 324 (65 Fe) mg, TAKE 1 TABLET BY MOUTH EVERY OTHER DAY, Disp: 90 tablet, Rfl: 0    fluticasone (FLOVENT HFA) 110 MCG/ACT inhaler, Inhale 4 puffs 2 (two) times a day Rinse mouth after use., Disp: 12 g, Rfl: 2    linaCLOtide (Linzess) 72 MCG CAPS, Take 72 mcg by mouth daily before breakfast, Disp: 30 capsule, Rfl: 3    meclizine (ANTIVERT) 12.5 MG tablet, Take 2 tablets (25 mg total) by mouth 3 (three) times a day as needed for dizziness, Disp: 30 tablet, Rfl: 0    Multiple Vitamin (multivitamin) capsule, Take 1 capsule by mouth daily, Disp: 30 capsule, Rfl: 5    Multiple Vitamin (Multivitamin) TABS, Take 1 tablet by mouth daily, Disp: , Rfl:     naloxone (NARCAN) 4 mg/0.1 mL nasal spray, Administer 1 spray into a nostril. If breathing does not return to normal or if breathing difficulty resumes after 2-3 minutes, give another dose in the other nostril using a new spray. (Patient not taking: Reported on 3/15/2024), Disp: 1 each, Rfl: 1    nystatin (MYCOSTATIN) ointment, Apply topically 2 (two) times a day for 21 days Apply to umbilicus, Disp: 30 g, Rfl: 0    omeprazole  (PriLOSEC) 40 MG capsule, TAKE 1 CAPSULE(40 MG) BY MOUTH DAILY BEFORE BREAKFAST, Disp: 90 capsule, Rfl: 1    ondansetron (ZOFRAN-ODT) 4 mg disintegrating tablet, Take 1 tablet (4 mg total) by mouth every 8 (eight) hours as needed for nausea for up to 10 doses, Disp: 10 tablet, Rfl: 0    polyethylene glycol (GLYCOLAX) 17 GM/SCOOP powder, Take 17 g by mouth daily, Disp: 255 g, Rfl: 0    Restasis 0.05 % ophthalmic emulsion, INSTILL 1 DROP IN BOTH EYES TWICE DAILY, Disp: , Rfl:     sucralfate (CARAFATE) 1 g tablet, Take 1 tablet (1 g total) by mouth 4 (four) times a day Crush and mix with water to take as suspension, Disp: 120 tablet, Rfl: 0    tiZANidine (ZANAFLEX) 2 mg tablet, Take 1 tablet (2 mg total) by mouth 3 (three) times a day, Disp: 90 tablet, Rfl: 1    triamcinolone (KENALOG) 0.5 % cream, Apply topically 2 (two) times a day, Disp: 15 g, Rfl: 1    venlafaxine (EFFEXOR) 75 mg tablet, Take 75 mg by mouth 2 (two) times a day with meals, Disp: , Rfl:     vitamin B-12 (CYANOCOBALAMIN) 2000 MCG TABS, Take 1 tablet (2,000 mcg total) by mouth daily, Disp: 30 tablet, Rfl: 5    Current Facility-Administered Medications:     cyanocobalamin injection 1,000 mcg, 1,000 mcg, Intramuscular, Q30 Days, April Reagan MD, 1,000 mcg at 09/05/24 0923    lidocaine (PF) (XYLOCAINE-MPF) 2 % injection 5 mL, 5 mL, Perineural, Once, Vincent Peralta,     triamcinolone acetonide (KENALOG-40) 40 mg/mL injection 40 mg, 40 mg, Intramuscular, Once, April Reaagn MD    Allergies   Allergen Reactions    Motrin [Ibuprofen]      Hx gastric bypass    Cherry - Food Allergy Rash    Gabapentin Rash       Physical Exam:   Vitals:    09/10/24 0949   BP: 113/68   Pulse: 65   Resp: 16   Temp: 98.1 °F (36.7 °C)   SpO2: 100%     General: Awake, Alert, Oriented x 3, Mood and affect appropriate  Respiratory: Respirations even and unlabored  Cardiovascular: Peripheral pulses intact; no edema  Musculoskeletal Exam: left low back pain    ASA Score:  3    Patient/Chart Verification  Patient ID Verified: Verbal  Consents Confirmed: Procedural, To be obtained in the Pre-Procedure area  H&P( within 30 days) Verified: To be obtained in the Procedural area  Allergies Reviewed: Yes  Anticoag/NSAID held?: NA  Currently on antibiotics?: No  Pregnancy denied?: NA  Does Patient Have a Prosthetic Device/Implant: No    Assessment:   1. Lumbar spondylosis        Plan: LT L3-5 RFA (75505, [id number])

## 2024-09-10 NOTE — DISCHARGE INSTRUCTIONS

## 2024-09-11 NOTE — TELEPHONE ENCOUNTER
S/W pt. Pt stated current pain level is 0/10. Pt stated needle sites look good, denies S&S of infection, denies fevers, denies soreness and denies sun burn like sensation.  Advised pt if she does get pain to take her prescribed or OTC pain medications and/or use ice/heat and that it takes 4 to 6 weeks to see the full effect.  Scheduled next appt w/ pt for 10/15 at 10:30.  Pt verbalized understanding.

## 2024-09-16 ENCOUNTER — OFFICE VISIT (OUTPATIENT)
Dept: BARIATRICS | Facility: CLINIC | Age: 64
End: 2024-09-16
Payer: MEDICARE

## 2024-09-16 ENCOUNTER — TELEPHONE (OUTPATIENT)
Age: 64
End: 2024-09-16

## 2024-09-16 VITALS
HEIGHT: 66 IN | DIASTOLIC BLOOD PRESSURE: 70 MMHG | RESPIRATION RATE: 20 BRPM | HEART RATE: 78 BPM | OXYGEN SATURATION: 99 % | BODY MASS INDEX: 30.13 KG/M2 | WEIGHT: 187.5 LBS | SYSTOLIC BLOOD PRESSURE: 110 MMHG

## 2024-09-16 DIAGNOSIS — M25.562 CHRONIC PAIN OF LEFT KNEE: ICD-10-CM

## 2024-09-16 DIAGNOSIS — G89.29 CHRONIC PAIN OF LEFT KNEE: ICD-10-CM

## 2024-09-16 DIAGNOSIS — K21.00 GASTROESOPHAGEAL REFLUX DISEASE WITH ESOPHAGITIS WITHOUT HEMORRHAGE: ICD-10-CM

## 2024-09-16 DIAGNOSIS — Z98.84 BARIATRIC SURGERY STATUS: ICD-10-CM

## 2024-09-16 DIAGNOSIS — F41.1 GENERALIZED ANXIETY DISORDER: Chronic | ICD-10-CM

## 2024-09-16 DIAGNOSIS — E66.9 OBESITY, CLASS I, BMI 30-34.9: ICD-10-CM

## 2024-09-16 DIAGNOSIS — E55.9 VITAMIN D DEFICIENCY: ICD-10-CM

## 2024-09-16 DIAGNOSIS — Z48.815 ENCOUNTER FOR SURGICAL AFTERCARE FOLLOWING SURGERY OF DIGESTIVE SYSTEM: Primary | ICD-10-CM

## 2024-09-16 DIAGNOSIS — K91.2 POSTSURGICAL MALABSORPTION: ICD-10-CM

## 2024-09-16 DIAGNOSIS — E53.8 VITAMIN B12 DEFICIENCY: ICD-10-CM

## 2024-09-16 PROCEDURE — 99214 OFFICE O/P EST MOD 30 MIN: CPT | Performed by: PHYSICIAN ASSISTANT

## 2024-09-16 RX ORDER — ERGOCALCIFEROL 1.25 MG/1
50000 CAPSULE, LIQUID FILLED ORAL
Qty: 24 CAPSULE | Refills: 0 | Status: SHIPPED | OUTPATIENT
Start: 2024-09-16 | End: 2024-12-09

## 2024-09-16 RX ORDER — OMEPRAZOLE 40 MG/1
40 CAPSULE, DELAYED RELEASE ORAL ONCE AS NEEDED
Qty: 90 CAPSULE | Refills: 0 | Status: SHIPPED | OUTPATIENT
Start: 2024-09-16 | End: 2024-12-15

## 2024-09-16 RX ORDER — CYANOCOBALAMIN 1000 UG/ML
1000 INJECTION, SOLUTION INTRAMUSCULAR; SUBCUTANEOUS
Status: SHIPPED | OUTPATIENT
Start: 2024-10-07 | End: 2024-12-06

## 2024-09-16 NOTE — PROGRESS NOTES
Assessment/Plan:     Patient ID: Aislinn Rouse is a 63 y.o. female.     Bariatric Surgery Status    She is status post laparoscopic elvira-en-y gastric bypass surgery by Dr Monroe 1/21/2014. Here for annual   Overall fairly well. She had EGD and colonoscopy through GI a few months  for chronic symptoms of GERD, abdominal bloating and fullness.  EGD overall unremarkable. Pt reports still has occasional symptoms which she attributes to stress but overall okay. She continues on omeprazole 40 mg daily. She does still drink at least 1-2 protein shakes daily and gets adequate hydration.     Continued/Maintain healthy weight loss with good nutrition intakes.  Adequate hydration with at least 64oz. fluid intake.  Follow diet as discussed.  Follow vitamin and mineral recommendations as reviewed with you.  Exercise as tolerated.    Colonoscopy referral made: amarilis  Mammo: has order     Follow-up in 6 months. We kindly ask that your arrive 15 minutes before your scheduled appointment time with your provider to allow our staff to room you, get your vital signs and update your chart.    Get lab work done . Please call the office if you need a script.  It is recommended to check with your insurance BEFORE getting labs done to make sure they are covered by your policy.      Call our office if you have any problems with abdominal pain especially associated with fever, chills, nausea, vomiting or any other concerns.    All  Post-bariatric surgery patients should be aware that very small quantities of any alcohol can cause impairment and it is very possible not to feel the effect. The effect can be in the system for several hours.  It is also a stomach irritant.     It is advised to AVOID alcohol, Nonsteroidal antiinflammatory drugs (NSAIDS) and nicotine of all forms . Any of these can cause stomach irritation/pain.    Discussed the effects of alcohol on a bariatric patient and the increased impairment risk.     Keep up the good  work!     Postsurgical Malabsorption   -At risk for malabsorption of vitamins/minerals secondary to malabsorption and restriction of intake from bariatric surgery  -Currently taking adequate postop bariatric surgery vitamin supplementation  -Last set of bariatric labs completed 8/2024- reviewed over mychart   - states she received only one shot of b12 and they would not give her another monthly injection until she gets lab work done, although in our practvie we provide three monthly doses. Will order injection for the next 2 months   -Patient received education about the importance of adhering to a lifelong supplementation regimen to avoid vitamin/mineral deficiencies      Diagnoses and all orders for this visit:    Encounter for surgical aftercare following surgery of digestive system    Vitamin D deficiency  -     ergocalciferol (VITAMIN D2) 50,000 units; Take 1 capsule (50,000 Units total) by mouth 2 (two) times a week with meals    Bariatric surgery status  -     omeprazole (PriLOSEC) 40 MG capsule; Take 1 capsule (40 mg total) by mouth once as needed (epigastric pain)    Postsurgical malabsorption    Obesity, Class I, BMI 30-34.9    Vitamin B12 deficiency  -     cyanocobalamin injection 1,000 mcg    Generalized anxiety disorder    Chronic pain of left knee    Gastroesophageal reflux disease with esophagitis without hemorrhage         Subjective:      Patient ID: Aislinn Rouse is a 63 y.o. female.    She is status post laparoscopic elvira-en-y gastric bypass surgery by Dr Monroe 1/21/2014. Here for annual     Initial: 265.5  Current: 186  EWL: (Weight loss is ahead of schedule at this post surgical period.)  Curly: 154  Current BMI is Body mass index is 30.73 kg/m².    Tolerating a regular diet-mostlyy yes  Eating at least 60 grams of protein per day-yes  Drinking at least 64 ounces of fluid per day-yes  Sufficient exercise-no due to back pain and knee pain  Using nicotine-no  Using alcohol-no  Supplements:  "Multivitamins and vit d2 + iron with vit C      The following portions of the patient's history were reviewed and updated as appropriate: allergies, current medications, past family history, past medical history, past social history, past surgical history and problem list.    Review of Systems   Constitutional: Negative.    Respiratory: Negative.     Cardiovascular: Negative.    Gastrointestinal: Negative.    Musculoskeletal:  Positive for arthralgias and back pain.   Neurological: Negative.    Psychiatric/Behavioral: Negative.           Objective:    /70 (BP Location: Left arm, Patient Position: Sitting, Cuff Size: Adult)   Pulse 78   Resp 20   Ht 5' 5.5\" (1.664 m)   Wt 85 kg (187 lb 8 oz)   LMP  (LMP Unknown)   SpO2 99%   BMI 30.73 kg/m²      Physical Exam  Vitals and nursing note reviewed.   Constitutional:       Appearance: Normal appearance. She is obese.   HENT:      Head: Normocephalic and atraumatic.   Eyes:      Extraocular Movements: Extraocular movements intact.   Cardiovascular:      Rate and Rhythm: Normal rate.   Pulmonary:      Effort: Pulmonary effort is normal.   Musculoskeletal:         General: Normal range of motion.      Cervical back: Normal range of motion.   Skin:     General: Skin is warm and dry.   Neurological:      General: No focal deficit present.      Mental Status: She is alert and oriented to person, place, and time.   Psychiatric:         Mood and Affect: Mood normal.             "

## 2024-09-16 NOTE — PATIENT INSTRUCTIONS
Follow-up in 6 months. We kindly ask that your arrive 15 minutes before your scheduled appointment time with your provider to allow our staff to room you, get your vital signs and update your chart.    Get lab work done . Please call the office if you need a script.  It is recommended to check with your insurance BEFORE getting labs done to make sure they are covered by your policy.      Call our office if you have any problems with abdominal pain especially associated with fever, chills, nausea, vomiting or any other concerns.    All  Post-bariatric surgery patients should be aware that very small quantities of any alcohol can cause impairment and it is very possible not to feel the effect. The effect can be in the system for several hours.  It is also a stomach irritant.     It is advised to AVOID alcohol, Nonsteroidal antiinflammatory drugs (NSAIDS) and nicotine of all forms . Any of these can cause stomach irritation/pain.    Discussed the effects of alcohol on a bariatric patient and the increased impairment risk.     Keep up the good work!

## 2024-09-16 NOTE — PROGRESS NOTES
Date of surgery: 1/21/2014  Procedure: RNY  Performing surgeon: Dr. LUIS    Initial Weight - 265.5 lb   Current Weight - 187.5 lb  Curly Weight - 154.0 lb   Total Body Weight Loss (EWL)- 78.0  EWL% - 71%  TWB % - 29%

## 2024-09-16 NOTE — TELEPHONE ENCOUNTER
Patient would like MARTHA Alba to know that she went to the pharmacy to  her medications but was only able to get the Omeprazole 40mg, the Vitamin D2 cost $61 and is not covered by insurance. Patient would like to know if there are any alternative that her insurance would cover?

## 2024-09-17 ENCOUNTER — TELEPHONE (OUTPATIENT)
Dept: BARIATRICS | Facility: CLINIC | Age: 64
End: 2024-09-17

## 2024-09-17 NOTE — TELEPHONE ENCOUNTER
LVM - I called the pt to read some recommendation from the provider , left a brief voicemail with the vitamins she should be taking , and also let pt know if she had any questions or concerns she may contact the office back.

## 2024-10-01 ENCOUNTER — OFFICE VISIT (OUTPATIENT)
Age: 64
End: 2024-10-01
Payer: MEDICARE

## 2024-10-01 ENCOUNTER — PREP FOR PROCEDURE (OUTPATIENT)
Dept: OBGYN CLINIC | Facility: CLINIC | Age: 64
End: 2024-10-01

## 2024-10-01 VITALS
SYSTOLIC BLOOD PRESSURE: 112 MMHG | BODY MASS INDEX: 31.16 KG/M2 | HEIGHT: 65 IN | WEIGHT: 187 LBS | DIASTOLIC BLOOD PRESSURE: 62 MMHG

## 2024-10-01 DIAGNOSIS — M25.59 PAIN IN OTHER JOINT: ICD-10-CM

## 2024-10-01 DIAGNOSIS — M17.12 PRIMARY OSTEOARTHRITIS OF LEFT KNEE: Primary | ICD-10-CM

## 2024-10-01 PROCEDURE — 99215 OFFICE O/P EST HI 40 MIN: CPT | Performed by: STUDENT IN AN ORGANIZED HEALTH CARE EDUCATION/TRAINING PROGRAM

## 2024-10-01 RX ORDER — CHLORHEXIDINE GLUCONATE 40 MG/ML
SOLUTION TOPICAL DAILY PRN
OUTPATIENT
Start: 2024-10-01

## 2024-10-01 RX ORDER — SODIUM CHLORIDE, SODIUM LACTATE, POTASSIUM CHLORIDE, CALCIUM CHLORIDE 600; 310; 30; 20 MG/100ML; MG/100ML; MG/100ML; MG/100ML
125 INJECTION, SOLUTION INTRAVENOUS CONTINUOUS
OUTPATIENT
Start: 2024-10-01

## 2024-10-01 RX ORDER — ASCORBIC ACID 500 MG
500 TABLET ORAL 2 TIMES DAILY
Qty: 60 TABLET | Refills: 1 | Status: SHIPPED | OUTPATIENT
Start: 2024-10-01

## 2024-10-01 RX ORDER — FOLIC ACID 1 MG/1
1 TABLET ORAL DAILY
Qty: 30 TABLET | Refills: 1 | Status: SHIPPED | OUTPATIENT
Start: 2024-10-01

## 2024-10-01 RX ORDER — MULTIVIT-MIN/IRON FUM/FOLIC AC 7.5 MG-4
1 TABLET ORAL DAILY
Qty: 30 TABLET | Refills: 1 | Status: SHIPPED | OUTPATIENT
Start: 2024-10-01

## 2024-10-01 RX ORDER — ACETAMINOPHEN 325 MG/1
975 TABLET ORAL ONCE
OUTPATIENT
Start: 2024-10-01 | End: 2024-10-01

## 2024-10-01 RX ORDER — CHLORHEXIDINE GLUCONATE ORAL RINSE 1.2 MG/ML
15 SOLUTION DENTAL ONCE
OUTPATIENT
Start: 2024-10-01 | End: 2024-10-01

## 2024-10-01 NOTE — PROGRESS NOTES
Knee Follow up Office Note    Assessment:     1. Primary osteoarthritis of left knee    2. Pain in other joint          Plan:     Problem List Items Addressed This Visit          Musculoskeletal and Integument    Primary osteoarthritis of left knee - Primary    Relevant Medications    ascorbic acid (VITAMIN C) 500 MG tablet    folic acid (FOLVITE) 1 mg tablet    Multiple Vitamins-Minerals (multivitamin with minerals) tablet    Cholecalciferol (VITAMIN D3) 1,000 units tablet    Other Relevant Orders    Case request operating room: ARTHROPLASTY KNEE TOTAL (Completed)    Comprehensive metabolic panel    Hemoglobin A1C W/EAG Estimation    CBC and differential    Anemia Panel w/Reflex    Protime-INR    APTT    Type and screen    Ambulatory referral to Family Practice    Ambulatory referral to Physical Therapy    EKG 12 lead       Surgery/Wound/Pain    Joint pain    Relevant Orders    CBC and differential    Protime-INR    APTT           63 y/o female with left knee pain secondary to osteoarthritis.  She has tried and failed conservative treatment options including cortisone injections and visco injections. She has been attending PT with continued worsening of her pain as well as a hinged knee brace.  We discussed continuation of conservative care vs surgical treatment options.  Discussed surgical treatment of Left TKA including the procedure and recovery time after.  The patient has elected to proceed with Left TKA. Risks and benefits of surgery to include but not limited to bleeding, infection, damage to surrounding structures, hardware failure, instability, fracture, dislocation, need for further surgery, continued pain, stiffness, blood clots, stroke, and heart attack was discussed with the patient. Informed consent was signed today in the office. The patient has met with our surgical schedulers and our preoperative joint replacement pathway has been initiated. All questions were answered. Patient will follow-up 2  weeks post operatively. BMI is appropriate at 31.12 and is a nonsmoker.       Subjective:     Patient ID: Aislinn Rouse is a 64 y.o. female.  Chief Complaint:  HPI:  64 y.o. female presents to the office for follow up evaluation of left knee pain.  She has known OA of the left knee which she has been treating for conservatively with intermittent injections of both cortisone and visco injections.  She feels that she is only getting a few weeks of relief.  Previously working in PT and HEP with minimal relief.  She states that the majority of her pain is in the posterior and medial aspects of the knee.  She has been using Tylenol for pain without relief.   She states that she has been unable to do her daily activities and things she enjoys due to her pain limiting her.  She would like to discuss TKA.    She is unable to take NSAIDs due to gastric bypass. Pain 8/10.     Allergy:  Allergies   Allergen Reactions    Motrin [Ibuprofen]      Hx gastric bypass    Cherry - Food Allergy Rash    Gabapentin Rash     Medications:  all current active meds have been reviewed  Past Medical History:  Past Medical History:   Diagnosis Date    Anxiety     Asthma     Albuterol prn    Bariatric surgery status     COVID-19 11/19/2021    Depression 2009    managed with Effexor     Generalized osteoarthritis     Low vitamin B12 level     Migraine     Postgastrectomy malabsorption     Suicide attempt (HCC)     Vitamin D deficiency      Past Surgical History:  Past Surgical History:   Procedure Laterality Date    CHOLECYSTECTOMY  2005    COLONOSCOPY      COSMETIC SURGERY  05/27/2020    Abdominoplasty    GASTRIC BYPASS  2015    elvira - en -y     HYSTERECTOMY      IR IMAGE GUIDED ASPIRATION / DRAINAGE W TUBE  7/14/2020    KNEE ARTHROSCOPY      with Lysis of adhesions    LAPAROSCOPIC SUPRACERVICAL HYSTERECTOMY  2005    due to endometriosis/AUB    OOPHORECTOMY Left 2005    HI EXCISION SKIN ABD INFRAUMBILICAL PANNICULECTOMY N/A 5/27/2020     Procedure: PANNICULECTOMY;  Surgeon: Bacilio Iraheta MD;  Location: [facility];  Service: Plastics    TONSILLECTOMY AND ADENOIDECTOMY      TUBAL LIGATION      VAC DRESSING APPLICATION N/A 2020    Procedure: APPLICATION VAC DRESSING;  Surgeon: Bacilio Iraheta MD;  Location: [facility];  Service: Plastics     Family History:  Family History   Problem Relation Age of Onset    Hypertension Mother     Kidney cancer Mother     Diabetes Mother     Breast cancer Mother     Heart disease Father     Stroke Father     Hypertension Sister     HIV Brother     Breast cancer Cousin     No Known Problems Daughter     Stomach cancer Maternal Grandmother     No Known Problems Maternal Grandfather     No Known Problems Paternal Grandmother     No Known Problems Paternal Grandfather     No Known Problems Sister     No Known Problems Sister     No Known Problems Daughter     Prostate cancer Maternal Uncle     Stomach cancer Maternal Uncle     Leukemia Grandchild      Social History:  Social History     Substance and Sexual Activity   Alcohol Use Not Currently     Social History     Substance and Sexual Activity   Drug Use Never     Social History     Tobacco Use   Smoking Status Former    Current packs/day: 0.00    Types: Cigarettes    Start date: 1981    Quit date: 2013    Years since quittin.4   Smokeless Tobacco Never           ROS:  General: Per HPI  Skin: Negative, except if noted below  HEENT: Negative  Respiratory: Negative  Cardiovascular: Negative  Gastrointestinal: Negative  Urinary: Negative  Vascular: Negative  Musculoskeletal: Positive per HPI   Neurologic: Positive per HPI  Endocrine: Negative    Objective:  BP Readings from Last 1 Encounters:   10/01/24 112/62      Wt Readings from Last 1 Encounters:   10/01/24 84.8 kg (187 lb)        Respiratory:   non-labored respirations    Lymphatics:  no palpable lymph nodes    Gait:   antalgic    Neurologic:   Alert and oriented times 3  Patient  "with normal sensation except as noted below  Deep tendon reflexes 2+ except as noted in MSK exam    Bilateral Lower Extremity:  Left Knee:      Inspection:  Well healed portal incisions    Overall limb alignment varus alignment, passively correctable    Effusion: mild    ROM 3-115 with pain    Extensor Lag: none    Palpation: medial and posterior Joint line tenderness to palpation    AP Stability at 90 deg stable     M/L stability in full extension stable    M/L stability in midflexion stable    Motor: 5/5 IP/Q/HS/TA/GS    Pulses: 2+ DP / 2+ PT    SILT DP/SP/S/S/TN      No new imaging today      BMI:   Estimated body mass index is 31.12 kg/m² as calculated from the following:    Height as of this encounter: 5' 5\" (1.651 m).    Weight as of this encounter: 84.8 kg (187 lb).  BSA:   Estimated body surface area is 1.92 meters squared as calculated from the following:    Height as of this encounter: 5' 5\" (1.651 m).    Weight as of this encounter: 84.8 kg (187 lb).             Scribe Attestation      I,:  Kylah Goode PA-C am acting as a scribe while in the presence of the attending physician.:       I,:  Nash Aggarwal, DO personally performed the services described in this documentation    as scribed in my presence.:               "

## 2024-10-14 ENCOUNTER — CLINICAL SUPPORT (OUTPATIENT)
Dept: BARIATRICS | Facility: CLINIC | Age: 64
End: 2024-10-14
Payer: MEDICARE

## 2024-10-14 DIAGNOSIS — E53.8 VITAMIN B12 DEFICIENCY: Primary | ICD-10-CM

## 2024-10-14 PROCEDURE — 96372 THER/PROPH/DIAG INJ SC/IM: CPT | Performed by: PHYSICIAN ASSISTANT

## 2024-10-14 PROCEDURE — RECHECK

## 2024-10-14 RX ADMIN — CYANOCOBALAMIN 1000 MCG: 1000 INJECTION, SOLUTION INTRAMUSCULAR; SUBCUTANEOUS at 10:43

## 2024-10-14 NOTE — PROGRESS NOTES
Diagnoses and all orders for this visit:     Encounter for surgical aftercare following surgery of digestive system     Vitamin D deficiency  -     ergocalciferol (VITAMIN D2) 50,000 units; Take 1 capsule (50,000 Units total) by mouth 2 (two) times a week with meals     Bariatric surgery status  -     omeprazole (PriLOSEC) 40 MG capsule; Take 1 capsule (40 mg total) by mouth once as needed (epigastric pain)     Postsurgical malabsorption     Obesity, Class I, BMI 30-34.9     Vitamin B12 deficiency  -     cyanocobalamin injection 1,000 mcg     Generalized anxiety disorder     Chronic pain of left knee     Gastroesophageal reflux disease with esophagitis without hemorrhage       Pt tolerated well  had no issues occurred , gave in left deltoid

## 2024-10-15 ENCOUNTER — OFFICE VISIT (OUTPATIENT)
Dept: PAIN MEDICINE | Facility: MEDICAL CENTER | Age: 64
End: 2024-10-15
Payer: MEDICARE

## 2024-10-15 VITALS
DIASTOLIC BLOOD PRESSURE: 76 MMHG | BODY MASS INDEX: 31.49 KG/M2 | SYSTOLIC BLOOD PRESSURE: 128 MMHG | HEIGHT: 65 IN | WEIGHT: 189 LBS | HEART RATE: 60 BPM

## 2024-10-15 DIAGNOSIS — M54.50 CHRONIC BILATERAL LOW BACK PAIN WITHOUT SCIATICA: ICD-10-CM

## 2024-10-15 DIAGNOSIS — M47.816 LUMBAR SPONDYLOSIS: Primary | ICD-10-CM

## 2024-10-15 DIAGNOSIS — G89.29 CHRONIC BILATERAL LOW BACK PAIN WITHOUT SCIATICA: ICD-10-CM

## 2024-10-15 PROCEDURE — G2211 COMPLEX E/M VISIT ADD ON: HCPCS

## 2024-10-15 PROCEDURE — 99214 OFFICE O/P EST MOD 30 MIN: CPT

## 2024-10-15 NOTE — PROGRESS NOTES
Assessment:  1. Lumbar spondylosis    2. Chronic bilateral low back pain without sciatica        Plan:  Patient's left-sided low back pain has improved by 80-90% following recent left L3-L5 radiofrequency ablation.  She is now experiencing in the same distribution on the right side.  The patient has been experiencing moderate to severe axial spine pain that is causing functional deficit.  The pain has been present for at least 3 months and is not improving with conservative care.  Currently the patient is not experiencing any radicular features nor neurogenic claudication.  Non-facet pathology has been ruled out on clinical evaluation.  The patients pain appears facet mediated on examination today. To help with the low back pain, occurring from facet arthropathy, we discussed medial branch block/radiofrequency ablation. This is a 2 step process, where the patient would first undergo a diagnostic test called a medial branch block injection. If successful, the medial branch block injection would provide % pain relief for a few hours after the procedure. A second confirmatory block will then be completed at least 2 weeks after the first block. The next step would be a radiofrequency ablation. Radiofrequency ablation decreases pain by creating a nerve lesion to interrupt the pain signals, and weaken the pain perceived by the brain. This procedure typically provides 6-18  months of pain relief. The risks, including bleeding, infection, and tissue reaction were reviewed with the patient, and was scheduled for right L3-L5 medial branch block injection.  Restart tizanidine 4 mg every 8 hours as needed for relief of muscle spasms in the low back.  Patient has taken this medication in the past and has tolerated it well without side effects.  Follow-up pending response to MBB #1.    This patient is being managed for a complex and serious condition that requires ongoing, intensive medical management. The nature of this  condition demands constant vigilance and a nuanced approach to treatment. The condition necessitates an in-depth and focused approach to management, including regular monitoring and potential coordination with other healthcare professionals.     Detailed Description of Visit Complexity: This visit involved an intricate evaluation and management of the patient's condition. The complexity of the visit was due to the need for a detailed assessment of the current state, consideration of potential complications, and a careful balancing of treatment options to manage the condition effectively.     Patient's Health Status and History: This patient has a significant pain history which requires regular and detailed management. The condition's impact on their life and health is substantial, necessitating a comprehensive and tailored approach to chronic ongoing care.     My impressions and treatment recommendations were discussed in detail with the patient who verbalized understanding and had no further questions.  Discharge instructions were provided. I personally saw and examined the patient and I agree with the above discussed plan of care.    Orders Placed This Encounter   Procedures    FL spine and pain procedure     Standing Status:   Future     Standing Expiration Date:   10/15/2028     Order Specific Question:   Reason for Exam:     Answer:   Right L3-L5 MBB #1     Order Specific Question:   Anticoagulant hold needed?     Answer:   No     New Medications Ordered This Visit   Medications    tiZANidine (ZANAFLEX) 4 mg tablet     Sig: Take 1 tablet (4 mg total) by mouth every 8 (eight) hours as needed for muscle spasms     Dispense:  90 tablet     Refill:  0       History of Present Illness:  Aislinn Rouse is a 64 y.o. female who presents for a follow up office visit after undergoing left L3-L5 radiofrequency ablation.  Patient states that her left-sided pain has improved by about 90% following this procedure and she  is overall very happy with the results.  She is unfortunately experiencing severe pain in the same distribution on the right side at this time. This is constant, and presently rated a 9/10 in intensity.  She describes the quality of her pain as burning, sharp, throbbing, cramping, and shooting.  She denies any radiation of her pain into the lower extremities.  Denies any lower extremity weakness or numbness associated with this pain complaint.    Patient also complaining today of severe left knee pain.  She is scheduled to undergo left TKA in December with orthopedics.     I have personally reviewed and/or updated the patient's past medical history, past surgical history, family history, social history, current medications, allergies, and vital signs today.     Review of Systems   Constitutional:  Negative for fatigue.   HENT:  Negative for ear pain, hearing loss and sinus pressure.    Eyes:  Negative for pain.   Respiratory:  Negative for wheezing.    Cardiovascular:  Negative for palpitations.   Gastrointestinal:  Negative for abdominal pain.   Endocrine: Negative for polyuria.   Genitourinary:  Negative for frequency.   Musculoskeletal:  Positive for arthralgias, back pain, gait problem and myalgias.   Skin:  Negative for rash.   Neurological:  Positive for numbness. Negative for headaches.   Psychiatric/Behavioral:  Negative for dysphoric mood and sleep disturbance.        Patient Active Problem List   Diagnosis    Asthma    History of bariatric surgery    Intentional drug overdose (HCC)    Major depressive disorder, recurrent severe without psychotic features (HCC)    Hypoglycemia    Chronic back pain    Generalized anxiety disorder    Postgastrectomy malabsorption    Bilateral carpal tunnel syndrome    Generalized osteoarthritis    Chronic nonintractable headache    Epigastric abdominal pain    Vitamin D deficiency    Vitamin B12 deficiency    Poor appetite    Secondary hyperparathyroidism of renal origin  (HCC)    Lumbar spondylosis    Localized adiposity    Status post panniculectomy    History of hysterectomy    Acute exacerbation of chronic low back pain    Joint pain    Grief    Chronic pain of left knee    Hx of adenomatous colonic polyps    Primary osteoarthritis of left knee       Past Medical History:   Diagnosis Date    Anxiety     Asthma     Albuterol prn    Bariatric surgery status     COVID-19 11/19/2021    Depression 2009    managed with Effexor     Generalized osteoarthritis     Low vitamin B12 level     Migraine     Postgastrectomy malabsorption     Suicide attempt (HCC)     Vitamin D deficiency        Past Surgical History:   Procedure Laterality Date    CHOLECYSTECTOMY  2005    COLONOSCOPY      COSMETIC SURGERY  05/27/2020    Abdominoplasty    GASTRIC BYPASS  2015    elvira - en -y     HYSTERECTOMY      IR IMAGE GUIDED ASPIRATION / DRAINAGE W TUBE  7/14/2020    KNEE ARTHROSCOPY      with Lysis of adhesions    LAPAROSCOPIC SUPRACERVICAL HYSTERECTOMY  2005    due to endometriosis/AUB    OOPHORECTOMY Left 2005    MS EXCISION SKIN ABD INFRAUMBILICAL PANNICULECTOMY N/A 5/27/2020    Procedure: PANNICULECTOMY;  Surgeon: Bacilio Iraheta MD;  Location: [facility];  Service: Plastics    TONSILLECTOMY AND ADENOIDECTOMY      TUBAL LIGATION  1986    VAC DRESSING APPLICATION N/A 5/27/2020    Procedure: APPLICATION VAC DRESSING;  Surgeon: Bacilio Iraheta MD;  Location: [facility];  Service: Plastics       Family History   Problem Relation Age of Onset    Hypertension Mother     Kidney cancer Mother     Diabetes Mother     Breast cancer Mother     Heart disease Father     Stroke Father     Hypertension Sister     HIV Brother     Breast cancer Cousin     No Known Problems Daughter     Stomach cancer Maternal Grandmother     No Known Problems Maternal Grandfather     No Known Problems Paternal Grandmother     No Known Problems Paternal Grandfather     No Known Problems Sister     No Known Problems  Sister     No Known Problems Daughter     Prostate cancer Maternal Uncle     Stomach cancer Maternal Uncle     Leukemia Grandchild        Social History     Occupational History    Not on file   Tobacco Use    Smoking status: Former     Current packs/day: 0.00     Types: Cigarettes     Start date: 1981     Quit date: 2013     Years since quittin.4    Smokeless tobacco: Never   Vaping Use    Vaping status: Some Days    Substances: Flavoring   Substance and Sexual Activity    Alcohol use: Not Currently    Drug use: Never    Sexual activity: Not Currently     Partners: Male     Birth control/protection: Female Sterilization, Post-menopausal       Current Outpatient Medications on File Prior to Visit   Medication Sig    albuterol (PROVENTIL HFA,VENTOLIN HFA) 90 mcg/act inhaler Inhale 2 puffs every 6 (six) hours as needed for wheezing    ALPRAZolam (XANAX) 1 mg tablet Take 1 mg by mouth every morning    ascorbic acid (VITAMIN C) 500 mg tablet TAKE 1 TABLET( 500 MG TOTAL) BY MOUTH EVERY OTHER DAY. TAKE WITH IRON TABLET    ascorbic acid (VITAMIN C) 500 MG tablet Take 1 tablet (500 mg total) by mouth 2 (two) times a day    buPROPion (WELLBUTRIN) 75 mg tablet     Cholecalciferol (Vitamin D) 50 MCG (2000 UT) CAPS Take 1 capsule (2,000 Units total) by mouth in the morning    Cholecalciferol (VITAMIN D3) 1,000 units tablet Take 2 tablets (2,000 Units total) by mouth daily    dexamethasone sodium phosphate 0.1 % ophthalmic solution INSTILL 1 DROP IN BOTH EYES TWICE DAILY FOR 2 WEEKS    Diclofenac Sodium (VOLTAREN) 1 % Apply 2 g topically 4 (four) times a day    dicyclomine (BENTYL) 20 mg tablet TAKE 1 TABLET(20 MG) BY MOUTH EVERY 6 HOURS AS NEEDED FOR ABDOMINAL PAIN    doxepin (SINEquan) 100 mg capsule Take 100 mg by mouth daily at bedtime    DULoxetine (CYMBALTA) 30 mg delayed release capsule     ergocalciferol (VITAMIN D2) 50,000 units Take 1 capsule (50,000 Units total) by mouth 2 (two) times a week with meals     ferrous sulfate 324 (65 Fe) mg TAKE 1 TABLET BY MOUTH EVERY OTHER DAY    folic acid (FOLVITE) 1 mg tablet Take 1 tablet (1 mg total) by mouth daily    linaCLOtide (Linzess) 72 MCG CAPS Take 72 mcg by mouth daily before breakfast    meclizine (ANTIVERT) 12.5 MG tablet Take 2 tablets (25 mg total) by mouth 3 (three) times a day as needed for dizziness    Multiple Vitamin (Multivitamin) TABS Take 1 tablet by mouth daily    Multiple Vitamins-Minerals (multivitamin with minerals) tablet Take 1 tablet by mouth daily    omeprazole (PriLOSEC) 40 MG capsule Take 1 capsule (40 mg total) by mouth once as needed (epigastric pain)    ondansetron (ZOFRAN-ODT) 4 mg disintegrating tablet Take 1 tablet (4 mg total) by mouth every 8 (eight) hours as needed for nausea for up to 10 doses    Restasis 0.05 % ophthalmic emulsion INSTILL 1 DROP IN BOTH EYES TWICE DAILY    triamcinolone (KENALOG) 0.5 % cream Apply topically 2 (two) times a day    venlafaxine (EFFEXOR) 75 mg tablet Take 75 mg by mouth 2 (two) times a day with meals    [DISCONTINUED] tiZANidine (ZANAFLEX) 2 mg tablet Take 1 tablet (2 mg total) by mouth 3 (three) times a day    amoxicillin (AMOXIL) 500 mg capsule Take 500 mg by mouth 3 (three) times a day (Patient not taking: Reported on 9/10/2024)    buPROPion (WELLBUTRIN SR) 150 mg 12 hr tablet Take 150 mg by mouth every morning (Patient not taking: Reported on 9/16/2024)    Cholecalciferol (Vitamin D) 50 MCG (2000 UT) tablet Take 1 tablet (2,000 Units total) by mouth daily (Patient not taking: Reported on 3/15/2024)    fluticasone (FLOVENT HFA) 110 MCG/ACT inhaler Inhale 4 puffs 2 (two) times a day Rinse mouth after use.    Multiple Vitamin (multivitamin) capsule Take 1 capsule by mouth daily    naloxone (NARCAN) 4 mg/0.1 mL nasal spray Administer 1 spray into a nostril. If breathing does not return to normal or if breathing difficulty resumes after 2-3 minutes, give another dose in the other nostril using a new spray.  "(Patient not taking: Reported on 3/15/2024)    nystatin (MYCOSTATIN) ointment Apply topically 2 (two) times a day for 21 days Apply to umbilicus    polyethylene glycol (GLYCOLAX) 17 GM/SCOOP powder Take 17 g by mouth daily (Patient not taking: Reported on 9/16/2024)    sucralfate (CARAFATE) 1 g tablet Take 1 tablet (1 g total) by mouth 4 (four) times a day Crush and mix with water to take as suspension    vitamin B-12 (CYANOCOBALAMIN) 2000 MCG TABS Take 1 tablet (2,000 mcg total) by mouth daily (Patient not taking: Reported on 9/16/2024)     Current Facility-Administered Medications on File Prior to Visit   Medication    cyanocobalamin injection 1,000 mcg    cyanocobalamin injection 1,000 mcg    triamcinolone acetonide (KENALOG-40) 40 mg/mL injection 40 mg       Allergies   Allergen Reactions    Motrin [Ibuprofen]      Hx gastric bypass    Cherry - Food Allergy Rash    Gabapentin Rash       Physical Exam:    /76   Pulse 60   Ht 5' 5\" (1.651 m)   Wt 85.7 kg (189 lb)   LMP  (LMP Unknown)   BMI 31.45 kg/m²     Constitutional:normal, well developed, well nourished, alert, in no distress and non-toxic and no overt pain behavior.  Eyes:anicteric  HEENT:grossly intact  Neck:supple, symmetric, trachea midline and no masses   Pulmonary:even and unlabored  Cardiovascular:No edema or pitting edema present  Skin:Normal without rashes or lesions and well hydrated  Psychiatric:Mood and affect appropriate  Neurologic:Cranial Nerves II-XII grossly intact  Musculoskeletal:normal, except for tenderness to palpation over the lower lumbar spine and facet joints.  Pain with lumbar spine extension and rotation to the right.  Forward flexion does not reproduce her pain.  Nontender to palpation over the sacroiliac joints bilaterally.  Lower extremity strength is normal.    "

## 2024-10-24 DIAGNOSIS — M17.12 PRIMARY OSTEOARTHRITIS OF LEFT KNEE: Primary | ICD-10-CM

## 2024-10-24 RX ORDER — MUPIROCIN 20 MG/G
OINTMENT TOPICAL
Qty: 50 G | Refills: 1 | Status: SHIPPED | OUTPATIENT
Start: 2024-10-24

## 2024-10-29 ENCOUNTER — TELEPHONE (OUTPATIENT)
Dept: OBGYN CLINIC | Facility: HOSPITAL | Age: 64
End: 2024-10-29

## 2024-10-29 DIAGNOSIS — M17.12 PRIMARY OSTEOARTHRITIS OF LEFT KNEE: Primary | ICD-10-CM

## 2024-10-29 NOTE — TELEPHONE ENCOUNTER
"Preoperative Elective Admission Assessment    Pt going for preop testing the week of 11/11/24.     Living Situation:    Who does pt live with:  grandson (16yr old)  What kind of home: multi-level  How do they enter the home: front  How many levels in home: 2   # of steps to enter home: 0  # of steps to second floor: 14  Are there handrails: Yes  Are there landings: Yes  Sleeping arrangement: second floor  Where is Bathroom: second floor   Where is the tub or shower: second floor, tub/shower   Dogs or ther pets: birds     First Floor Setup:   Is there a bathroom: No - ordering BSC  Where would pt sleep: couch     DME:  Pt is requesting shower transfer bench. DME ordered, RW and BSC and shower transfer bench 10/29/24. Instructed pt to bring RW on DOS, verbalizes understanding.        We discussed clearing pathways in the home and making sure there is accessibly to use the walker, for example, removing throw rugs.      Patient's Current Level of Function: Ambulates: Independently and ADLs: Independent    Post-op Caregiver:  Per pt, her son will be staying overnight with her for the first couple nights after surgery. Pt stated her daughter also offered pt to stay at home house (bed/bath on first floor) postop. Encouraged pt to discuss with children in the next few days/weeks. NN will f/u with patient to confirm plans.  Discussed meal prepping/planning.   Caregiver Name and phone number for Inpatient discharge needs: Son or son in-law  Currently receive any HHC/aides/community supports: No     Post-op Transport:  Per pt, \"everyone works.\" Pt agrees to SW consult for transportation to/from OP PT.   To/from hospital:  son or son in-law  To/from PT 2-3x/week:  Per pt, \"everyone works.\" Pt agrees to SW consult for transportation to/from OP PT.      Uses community transport now: No     Outpatient Physical Therapy Site:  Site: [facility]  pre and post-op appts scheduled? No, will fax to      Medication " Management: self  Preferred Pharmacy for Post-op Medications: [facility] - [address] [facility], [[id number]] (Meds to Beds)  Blood Management Vitamin Regimen:  Pt takes MV and vit D. Pt will  Vit C and folic acid and begin taking prior to surgery as instructed.    Post-op anticoagulant: to be determined by surgical team postoperatively     DC Plan: Pt plans to be discharged home    Barriers to DC identified preoperatively: transportation to/from OP PT    BMI: 31.45    Patient Education:  Pt educated on post-op pain, early mobilization (POD0), LOS goals, OP PT goals, and preoperative bathing. Patient educated that our goal is to appropriately discharge patient based off their post-op function while striving to maintain maximal independence. The goal is to discharge patient to home and for them to attend outpatient physical therapy.    Assigned to care team? Yes    SW referral: yes, transportation to/from OP PT      SSI Education/Preoperative Bathing   If you have a packet of Barry Wipes, Throw Out - not using at this time    Mupirocin/Bactroban will be at pharmacy as a script from surgeon- use in Nose 2x day for 5 days preop    Preoperative Bathing is now using a CHG 8oz bottle (or two 4oz bottles,  at office or OTC Pharmacy) for 5 days before surgery.

## 2024-10-31 ENCOUNTER — TELEPHONE (OUTPATIENT)
Dept: RADIOLOGY | Facility: MEDICAL CENTER | Age: 64
End: 2024-10-31

## 2024-10-31 ENCOUNTER — PATIENT OUTREACH (OUTPATIENT)
Dept: OBGYN CLINIC | Facility: HOSPITAL | Age: 64
End: 2024-10-31

## 2024-10-31 DIAGNOSIS — M17.12 PRIMARY OSTEOARTHRITIS OF LEFT KNEE: Primary | ICD-10-CM

## 2024-10-31 RX ORDER — MUPIROCIN 20 MG/G
OINTMENT TOPICAL
Qty: 15 G | Refills: 0 | Status: SHIPPED | OUTPATIENT
Start: 2024-10-31

## 2024-10-31 NOTE — TELEPHONE ENCOUNTER
On reminder call, patient stated that she had a dental procedure on 10/30 and had to take an antibiotic for it. Patient is scheduled 11/1 for Rt L3-5 MBB #1.

## 2024-10-31 NOTE — TELEPHONE ENCOUNTER
"RN s/w pt regarding previous.  Per pt she had two teeth pulled on Monday 10/28 and was given antibiotics \"just in case.\"    Per pt she finished her last dose yesterday adn does not have any s/s of infection.    Pt to have Rt Sided L5-S1 MBB tomorrow.    RN said ok to keep appt at this time call if anything changes such as fever increased pain in mouth.    --please advise --  Agree with RN ok to keep MBB#1 for tomorrow 11/1?  "

## 2024-10-31 NOTE — PROGRESS NOTES
JORGE received a new referral in regard to pt scheduled for arthroplasty of left knee on 12/05/2024. [facility] reviewed NN notes prior to contacting pt. Pt lives with her 16 year old grandson in a multi level home. Pt ambulates independently and is independent with ADLs. Pt son will be staying with pt the first few nights after surgery. Pts daughter also offered for pt to stay at her home after surgery. Pt was encouraged to discuss with her children to make a secure caregiver support plan.   Son or son - in - law will take pt to and form surgery. Pt has no transportation planned to and from OP PT. [facility] referral placed in regard to same.   CM contacted pt today and introduced self and role. Pt confirmed she will have all caregiver support planned. Pts son will stay with pt or pt will stay with her daughter. Pt shared her concern is transportation to and from OP PT. SWCM and pt first discussed the [employer] @ [phone] ext [phone]. Pt is familiar with same and will call and request an application so she can have that service as a transportation option. [facility] also inquired if pt has contacted her insurance to inquire if she has any transportation options, and pt has not. [facility] offered to contact pt insurance to inquire about same. Pt reports her only concern is in regard to transportation.   After call with pt, JORGE contacted [employer] @ [phone]. [facility] was advised pt has transportation benefits through [employer], pt receives 101 one way rides up to 60 miles per year. Pt needs to contact them 2 - 3 days in advance to scheduled a ride. Phone number to schedule is [phone]. [facility] attempted to reach pt to inform her of same and I was unable to reach pt. A message was left providing her this information. [facility] will remain available and continue to support pt.

## 2024-11-01 ENCOUNTER — HOSPITAL ENCOUNTER (OUTPATIENT)
Dept: RADIOLOGY | Facility: MEDICAL CENTER | Age: 64
End: 2024-11-01
Payer: MEDICARE

## 2024-11-01 VITALS
HEART RATE: 57 BPM | TEMPERATURE: 97.9 F | DIASTOLIC BLOOD PRESSURE: 84 MMHG | RESPIRATION RATE: 18 BRPM | SYSTOLIC BLOOD PRESSURE: 126 MMHG | OXYGEN SATURATION: 100 %

## 2024-11-01 DIAGNOSIS — M47.816 LUMBAR SPONDYLOSIS: ICD-10-CM

## 2024-11-01 LAB
DME PARACHUTE DELIVERY DATE ACTUAL: NORMAL
DME PARACHUTE DELIVERY DATE REQUESTED: NORMAL
DME PARACHUTE ITEM DESCRIPTION: NORMAL
DME PARACHUTE ORDER STATUS: NORMAL
DME PARACHUTE SUPPLIER NAME: NORMAL
DME PARACHUTE SUPPLIER PHONE: NORMAL

## 2024-11-01 PROCEDURE — 64493 INJ PARAVERT F JNT L/S 1 LEV: CPT | Performed by: PHYSICAL MEDICINE & REHABILITATION

## 2024-11-01 PROCEDURE — 64494 INJ PARAVERT F JNT L/S 2 LEV: CPT | Performed by: PHYSICAL MEDICINE & REHABILITATION

## 2024-11-01 RX ADMIN — Medication 1.5 ML: at 10:29

## 2024-11-01 NOTE — H&P
History of Present Illness: The patient is a 64 y.o. female who presents with complaints of right low back pain    Past Medical History:   Diagnosis Date    Anxiety     Asthma     Albuterol prn    Bariatric surgery status     COVID-19 11/19/2021    Depression 2009    managed with Effexor     Generalized osteoarthritis     Low vitamin B12 level     Migraine     Postgastrectomy malabsorption     Suicide attempt (HCC)     Vitamin D deficiency        Past Surgical History:   Procedure Laterality Date    CHOLECYSTECTOMY  2005    COLONOSCOPY      COSMETIC SURGERY  05/27/2020    Abdominoplasty    GASTRIC BYPASS  2015    elvira - en -y     HYSTERECTOMY      IR IMAGE GUIDED ASPIRATION / DRAINAGE W TUBE  7/14/2020    KNEE ARTHROSCOPY      with Lysis of adhesions    LAPAROSCOPIC SUPRACERVICAL HYSTERECTOMY  2005    due to endometriosis/AUB    OOPHORECTOMY Left 2005    MA EXCISION SKIN ABD INFRAUMBILICAL PANNICULECTOMY N/A 5/27/2020    Procedure: PANNICULECTOMY;  Surgeon: Bacilio Iraheta MD;  [facility]: [facility];  Service: Plastics    TONSILLECTOMY AND ADENOIDECTOMY      TUBAL LIGATION  1986    VAC DRESSING APPLICATION N/A 5/27/2020    Procedure: APPLICATION VAC DRESSING;  Surgeon: Bacilio Iraheta MD;  [facility];  Service: Plastics         Current Outpatient Medications:     albuterol (PROVENTIL HFA,VENTOLIN HFA) 90 mcg/act inhaler, Inhale 2 puffs every 6 (six) hours as needed for wheezing, Disp: 25.5 g, Rfl: 1    ALPRAZolam (XANAX) 1 mg tablet, Take 1 mg by mouth every morning, Disp: , Rfl:     ascorbic acid (VITAMIN C) 500 mg tablet, TAKE 1 TABLET( 500 MG TOTAL) BY MOUTH EVERY OTHER DAY. TAKE WITH IRON TABLET, Disp: 90 tablet, Rfl: 0    ascorbic acid (VITAMIN C) 500 MG tablet, Take 1 tablet (500 mg total) by mouth 2 (two) times a day, Disp: 60 tablet, Rfl: 1    buPROPion (WELLBUTRIN SR) 150 mg 12 hr tablet, Take 150 mg by mouth every morning (Patient not taking: Reported on 9/16/2024), Disp: , Rfl:      buPROPion (WELLBUTRIN) 75 mg tablet, , Disp: , Rfl:     Cholecalciferol (Vitamin D) 50 MCG (2000 UT) CAPS, Take 1 capsule (2,000 Units total) by mouth in the morning, Disp: 90 capsule, Rfl: 1    Cholecalciferol (Vitamin D) 50 MCG (2000 UT) tablet, Take 1 tablet (2,000 Units total) by mouth daily (Patient not taking: Reported on 3/15/2024), Disp: 90 tablet, Rfl: 1    Cholecalciferol (VITAMIN D3) 1,000 units tablet, Take 2 tablets (2,000 Units total) by mouth daily, Disp: 60 tablet, Rfl: 1    dexamethasone sodium phosphate 0.1 % ophthalmic solution, INSTILL 1 DROP IN BOTH EYES TWICE DAILY FOR 2 WEEKS, Disp: , Rfl:     Diclofenac Sodium (VOLTAREN) 1 %, Apply 2 g topically 4 (four) times a day, Disp: 100 g, Rfl: 3    dicyclomine (BENTYL) 20 mg tablet, TAKE 1 TABLET(20 MG) BY MOUTH EVERY 6 HOURS AS NEEDED FOR ABDOMINAL PAIN, Disp: 360 tablet, Rfl: 0    doxepin (SINEquan) 100 mg capsule, Take 100 mg by mouth daily at bedtime, Disp: , Rfl:     DULoxetine (CYMBALTA) 30 mg delayed release capsule, , Disp: , Rfl:     ergocalciferol (VITAMIN D2) 50,000 units, Take 1 capsule (50,000 Units total) by mouth 2 (two) times a week with meals, Disp: 24 capsule, Rfl: 0    ferrous sulfate 324 (65 Fe) mg, TAKE 1 TABLET BY MOUTH EVERY OTHER DAY, Disp: 90 tablet, Rfl: 0    fluticasone (FLOVENT HFA) 110 MCG/ACT inhaler, Inhale 4 puffs 2 (two) times a day Rinse mouth after use., Disp: 12 g, Rfl: 2    folic acid (FOLVITE) 1 mg tablet, Take 1 tablet (1 mg total) by mouth daily, Disp: 30 tablet, Rfl: 1    linaCLOtide (Linzess) 72 MCG CAPS, Take 72 mcg by mouth daily before breakfast, Disp: 30 capsule, Rfl: 3    meclizine (ANTIVERT) 12.5 MG tablet, Take 2 tablets (25 mg total) by mouth 3 (three) times a day as needed for dizziness, Disp: 30 tablet, Rfl: 0    Multiple Vitamin (multivitamin) capsule, Take 1 capsule by mouth daily, Disp: 30 capsule, Rfl: 5    Multiple Vitamin (Multivitamin) TABS, Take 1 tablet by mouth daily, Disp: , Rfl:      Multiple Vitamins-Minerals (multivitamin with minerals) tablet, Take 1 tablet by mouth daily, Disp: 30 tablet, Rfl: 1    mupirocin (BACTROBAN) 2 % ointment, Apply topically to the inside of the left and right nostrils twice daily for 5 days before surgery, including the morning of surgery., Disp: 15 g, Rfl: 0    naloxone (NARCAN) 4 mg/0.1 mL nasal spray, Administer 1 spray into a nostril. If breathing does not return to normal or if breathing difficulty resumes after 2-3 minutes, give another dose in the other nostril using a new spray. (Patient not taking: Reported on 3/15/2024), Disp: 1 each, Rfl: 1    nystatin (MYCOSTATIN) ointment, Apply topically 2 (two) times a day for 21 days Apply to umbilicus, Disp: 30 g, Rfl: 0    omeprazole (PriLOSEC) 40 MG capsule, Take 1 capsule (40 mg total) by mouth once as needed (epigastric pain), Disp: 90 capsule, Rfl: 0    ondansetron (ZOFRAN-ODT) 4 mg disintegrating tablet, Take 1 tablet (4 mg total) by mouth every 8 (eight) hours as needed for nausea for up to 10 doses, Disp: 10 tablet, Rfl: 0    polyethylene glycol (GLYCOLAX) 17 GM/SCOOP powder, Take 17 g by mouth daily (Patient not taking: Reported on 9/16/2024), Disp: 255 g, Rfl: 0    Restasis 0.05 % ophthalmic emulsion, INSTILL 1 DROP IN BOTH EYES TWICE DAILY, Disp: , Rfl:     sucralfate (CARAFATE) 1 g tablet, Take 1 tablet (1 g total) by mouth 4 (four) times a day Crush and mix with water to take as suspension, Disp: 120 tablet, Rfl: 0    tiZANidine (ZANAFLEX) 4 mg tablet, Take 1 tablet (4 mg total) by mouth every 8 (eight) hours as needed for muscle spasms, Disp: 90 tablet, Rfl: 0    triamcinolone (KENALOG) 0.5 % cream, Apply topically 2 (two) times a day, Disp: 15 g, Rfl: 1    venlafaxine (EFFEXOR) 75 mg tablet, Take 75 mg by mouth 2 (two) times a day with meals, Disp: , Rfl:     vitamin B-12 (CYANOCOBALAMIN) 2000 MCG TABS, Take 1 tablet (2,000 mcg total) by mouth daily (Patient not taking: Reported on 9/16/2024), Disp:  30 tablet, Rfl: 5    Current Facility-Administered Medications:     cyanocobalamin injection 1,000 mcg, 1,000 mcg, Intramuscular, Q30 Days, April Reagan MD, 1,000 mcg at 09/05/24 0923    cyanocobalamin injection 1,000 mcg, 1,000 mcg, Intramuscular, Q30 Days, , 1,000 mcg at 10/14/24 1043    triamcinolone acetonide (KENALOG-40) 40 mg/mL injection 40 mg, 40 mg, Intramuscular, Once, April Reagan MD    Allergies   Allergen Reactions    Motrin [Ibuprofen]      Hx gastric bypass    Cherry - Food Allergy Rash    Gabapentin Rash       Physical Exam:   Vitals:    11/01/24 1019   BP: 127/80   Pulse: 100   Resp: 18   Temp: 97.9 °F (36.6 °C)   SpO2: 99%     General: Awake, Alert, Oriented x 3, Mood and affect appropriate  Respiratory: Respirations even and unlabored  Cardiovascular: Peripheral pulses intact; no edema  Musculoskeletal Exam: right low back pain    ASA Score: 3    Patient/Chart Verification  Patient ID Verified: Verbal  Consents Confirmed: Procedural, To be obtained in the Pre-Procedure area  H&P( within 30 days) Verified: To be obtained in the Procedural area  Allergies Reviewed: Yes  Anticoag/NSAID held?: NA  Currently on antibiotics?: No  Pregnancy denied?: NA    Assessment:   1. Lumbar spondylosis        Plan: Right L3-L5 MBB #1

## 2024-11-01 NOTE — DISCHARGE INSTRUCTIONS

## 2024-11-11 ENCOUNTER — APPOINTMENT (OUTPATIENT)
Dept: LAB | Facility: HOSPITAL | Age: 64
End: 2024-11-11
Payer: MEDICARE

## 2024-11-11 ENCOUNTER — CLINICAL SUPPORT (OUTPATIENT)
Dept: BARIATRICS | Facility: CLINIC | Age: 64
End: 2024-11-11
Payer: MEDICARE

## 2024-11-11 DIAGNOSIS — E53.8 VITAMIN B12 DEFICIENCY: Primary | ICD-10-CM

## 2024-11-11 DIAGNOSIS — M17.12 PRIMARY OSTEOARTHRITIS OF LEFT KNEE: ICD-10-CM

## 2024-11-11 DIAGNOSIS — M25.59 PAIN IN OTHER JOINT: ICD-10-CM

## 2024-11-11 LAB
ABO GROUP BLD: NORMAL
ALBUMIN SERPL BCG-MCNC: 4.1 G/DL (ref 3.5–5)
ALP SERPL-CCNC: 86 U/L (ref 34–104)
ALT SERPL W P-5'-P-CCNC: 11 U/L (ref 7–52)
ANION GAP SERPL CALCULATED.3IONS-SCNC: 4 MMOL/L (ref 4–13)
APTT PPP: 25 SECONDS (ref 23–34)
AST SERPL W P-5'-P-CCNC: 14 U/L (ref 13–39)
ATRIAL RATE: 64 BPM
BASOPHILS # BLD AUTO: 0.03 THOUSANDS/ÂΜL (ref 0–0.1)
BASOPHILS NFR BLD AUTO: 1 % (ref 0–1)
BILIRUB SERPL-MCNC: 0.54 MG/DL (ref 0.2–1)
BLD GP AB SCN SERPL QL: NEGATIVE
BUN SERPL-MCNC: 19 MG/DL (ref 5–25)
CALCIUM SERPL-MCNC: 9.6 MG/DL (ref 8.4–10.2)
CHLORIDE SERPL-SCNC: 106 MMOL/L (ref 96–108)
CO2 SERPL-SCNC: 30 MMOL/L (ref 21–32)
CREAT SERPL-MCNC: 0.77 MG/DL (ref 0.6–1.3)
EOSINOPHIL # BLD AUTO: 0.05 THOUSAND/ÂΜL (ref 0–0.61)
EOSINOPHIL NFR BLD AUTO: 1 % (ref 0–6)
ERYTHROCYTE [DISTWIDTH] IN BLOOD BY AUTOMATED COUNT: 13.1 % (ref 11.6–15.1)
EST. AVERAGE GLUCOSE BLD GHB EST-MCNC: 117 MG/DL
GFR SERPL CREATININE-BSD FRML MDRD: 83 ML/MIN/1.73SQ M
GLUCOSE P FAST SERPL-MCNC: 106 MG/DL (ref 65–99)
HBA1C MFR BLD: 5.7 %
HCT VFR BLD AUTO: 40.9 % (ref 34.8–46.1)
HGB BLD-MCNC: 13 G/DL (ref 11.5–15.4)
IMM GRANULOCYTES # BLD AUTO: 0.02 THOUSAND/UL (ref 0–0.2)
IMM GRANULOCYTES NFR BLD AUTO: 0 % (ref 0–2)
INR PPP: 1.02 (ref 0.85–1.19)
LYMPHOCYTES # BLD AUTO: 1.84 THOUSANDS/ÂΜL (ref 0.6–4.47)
LYMPHOCYTES NFR BLD AUTO: 28 % (ref 14–44)
MCH RBC QN AUTO: 28.6 PG (ref 26.8–34.3)
MCHC RBC AUTO-ENTMCNC: 31.8 G/DL (ref 31.4–37.4)
MCV RBC AUTO: 90 FL (ref 82–98)
MONOCYTES # BLD AUTO: 0.41 THOUSAND/ÂΜL (ref 0.17–1.22)
MONOCYTES NFR BLD AUTO: 6 % (ref 4–12)
NEUTROPHILS # BLD AUTO: 4.3 THOUSANDS/ÂΜL (ref 1.85–7.62)
NEUTS SEG NFR BLD AUTO: 64 % (ref 43–75)
NRBC BLD AUTO-RTO: 0 /100 WBCS
P AXIS: 44 DEGREES
PLATELET # BLD AUTO: 193 THOUSANDS/UL (ref 149–390)
PMV BLD AUTO: 11.9 FL (ref 8.9–12.7)
POTASSIUM SERPL-SCNC: 4.3 MMOL/L (ref 3.5–5.3)
PR INTERVAL: 146 MS
PROT SERPL-MCNC: 6.4 G/DL (ref 6.4–8.4)
PROTHROMBIN TIME: 13.6 SECONDS (ref 12.3–15)
QRS AXIS: 33 DEGREES
QRSD INTERVAL: 76 MS
QT INTERVAL: 418 MS
QTC INTERVAL: 432 MS
RBC # BLD AUTO: 4.54 MILLION/UL (ref 3.81–5.12)
RH BLD: POSITIVE
SODIUM SERPL-SCNC: 140 MMOL/L (ref 135–147)
SPECIMEN EXPIRATION DATE: NORMAL
T WAVE AXIS: 11 DEGREES
VENTRICULAR RATE: 64 BPM
WBC # BLD AUTO: 6.65 THOUSAND/UL (ref 4.31–10.16)

## 2024-11-11 PROCEDURE — 96372 THER/PROPH/DIAG INJ SC/IM: CPT | Performed by: PHYSICIAN ASSISTANT

## 2024-11-11 PROCEDURE — 85025 COMPLETE CBC W/AUTO DIFF WBC: CPT

## 2024-11-11 PROCEDURE — 87081 CULTURE SCREEN ONLY: CPT

## 2024-11-11 PROCEDURE — 36415 COLL VENOUS BLD VENIPUNCTURE: CPT

## 2024-11-11 PROCEDURE — 80053 COMPREHEN METABOLIC PANEL: CPT

## 2024-11-11 PROCEDURE — 85610 PROTHROMBIN TIME: CPT

## 2024-11-11 PROCEDURE — RECHECK

## 2024-11-11 PROCEDURE — 85730 THROMBOPLASTIN TIME PARTIAL: CPT

## 2024-11-11 PROCEDURE — 83036 HEMOGLOBIN GLYCOSYLATED A1C: CPT

## 2024-11-11 PROCEDURE — 93010 ELECTROCARDIOGRAM REPORT: CPT | Performed by: INTERNAL MEDICINE

## 2024-11-11 PROCEDURE — 86900 BLOOD TYPING SEROLOGIC ABO: CPT

## 2024-11-11 PROCEDURE — 86901 BLOOD TYPING SEROLOGIC RH(D): CPT

## 2024-11-11 PROCEDURE — 86850 RBC ANTIBODY SCREEN: CPT

## 2024-11-11 RX ADMIN — CYANOCOBALAMIN 1000 MCG: 1000 INJECTION, SOLUTION INTRAMUSCULAR; SUBCUTANEOUS at 10:30

## 2024-11-11 NOTE — PROGRESS NOTES
Vitamin D deficiency  -     ergocalciferol (VITAMIN D2) 50,000 units; Take 1 capsule (50,000 Units total) by mouth 2 (two) times a week with mealsd  Encounter for surgical aftercare following surgery of digestive system    Vitamin D deficiency    Bariatric surgery status    Postsurgical malabsorption    Obesity, Class I, BMI 30-34.9    Vitamin B12 deficiency    Generalized anxiety disorder    Chronic pain of left knee    Gastroesophageal reflux disease with esophagitis without hemorrhage    Pt tolerated  well / no issues , gave in left deltoid

## 2024-11-12 LAB — MRSA NOSE QL CULT: NORMAL

## 2024-11-13 ENCOUNTER — PATIENT OUTREACH (OUTPATIENT)
Dept: OBGYN CLINIC | Facility: HOSPITAL | Age: 64
End: 2024-11-13

## 2024-11-13 NOTE — PATIENT INSTRUCTIONS
BEFORE SURGERY    Contact your surgical nurse  navigator with any questions regarding preoperative plan or schedule.  Stop all over the counter supplements, herbal, naturopathic  medications for 1 week prior to surgery UNLESS prescribed by your surgeon  Hold NSAIDS (i.e. advil, alleve, motrin, ibuprofen, celebrex) minimum 5 days prior to surgery  Follow presurgical medication instructions provided by preadmission nursing team reviewed during your presurgery phone call  Strategies for optimizing your surgery through breathing exercises, nutrition and physical activity can be found at [email or web address]  Call [phone] with any presurgical concerns or medications questions or use the messaging feature in your [employer] claudia to contact your provider    AFTER SURGERY    Recommend using Tylenol ( acetaminophen ) 1000 mg every eight hours during the first week post discharge along with icing the area for 20 mins every 3-4 hours while awake can be helpful in reducing your need for post operative opioid use. This opioid sparing plan can be used along side your surgeons pain plan.  Use stool softener over the counter (colace) daily after surgery during the first 1-2 weeks to avoid post operative constipation issues  If no bowel movement within 3 days after surgery then use over the counter Miralax in addition to your stool softener   If cleared by your surgical team for activity then early and often walking is encouraged and can be important in prevention of post surgical blood clots. Additionally spend as much time out of bed as possible and allowed by your surgical team  Use your incentive spirometer twice per hour in the first seven days after surgery to help prevent post surgery lung complications and infections  It is very important you follow the instructions from your surgeon regarding any medications for after surgery blood clot prevention. Compliance with these medications is very important.  Call [phone] with  any post discharge concerns or medical issues or use the messaging feature in your [employer] claudia to contact your provider

## 2024-11-13 NOTE — PROGRESS NOTES
Internal Medicine Pre-Operative Evaluation:     Reason for Visit: Pre-operative Evaluation for Risk Stratification and Optimization    Patient ID: Aislinn Rouse is a 64 y.o. female.     Surgery: Arthroplasty of left knee  Referring Provider: Dr Aggarwal      Recommendations to Proceed withSurgery    Patient is considered to be Low risk for Medium risk procedure.     After evaluation and discussion with patient with emphasis that all surgery has some degree of inherent risk it is acknowledged by patient this risk is Acceptable.    Patient is optimized and may proceed with planned procedure.     Assessment    Pre-operative Medical Evaluation for planned surgery  Recommendations as listed in PLAN section below  Contact surgical nurse  navigator with any questions regarding preoperative plan or schedule.      Assessment & Plan  Primary osteoarthritis of left knee  Failed outpatient conservative measures  Elected to undergo total joint arthroplasty    History of bariatric surgery  Continue bariatric vitamins  Major depressive disorder, recurrent severe without psychotic features (HCC)  Continue current medications  Mild intermittent asthma without complication  stable  Preoperative clearance             Plan:     1. Further preoperative workup as follows:   - none no further testing required may proceed with surgery    2. Preoperative Medication Management Review performed by PAT nursing  YES    3. Patient requires further consultation with:   No Consults Required    4. Discharge Planning / Barriers to Discharge  none identified - patients has post discharge therapy plan in place, transportation arranged for discharge day, adequate family support at home to assist with discharge to home.        Subjective:           History of Present Illness:     Aislinn Rouse is a 64 y.o. female who presents to the office today for a preoperative consultation at the request of surgeon. The patient understands this is an  elective procedure and not emergent. They are electing to undergo planned procedure with an understanding that all surgery has inherent risk. They have worked with their surgeon and failed conservative treatment measures. Today they present for preoperative risk assessment and recommendations for optimization in preparation for surgery.    Pt seen in surgical optimization center for upcoming proposed surgery. They have failed previous conservative measures and have elected surgical intervention.     Pt meets presurgical lab and BMI optimization goals.      Aislinn Rouse has an IN HOSPITAL cardiac risk of RCI RISK CLASS I (0 risk factors, risk of major cardiac compl. appr. 0.5%) based on RCRI calculator    Cardiac Risk Estimation: per the Revised Cardiac Risk Index (Circ. 100:1043, 1999),         Pre-op Exam    Pt seen in surgical optimization center for upcoming proposed surgery. They have failed previous conservative measures and have elected surgical intervention.     Pt meets presurgical lab and BMI optimization goals.      Aislinn Rouse has an IN HOSPITAL cardiac risk of RCI RISK CLASS I (0 risk factors, risk of major cardiac compl. appr. 0.5%) based on RCRI calculator    Cardiac Risk Estimation: per the Revised Cardiac Risk Index (Circ. 100:1043, 1999),           Previous history of bleeding disorders or clots?: No  Previous Anesthesia reaction?: No  Prolonged steroid use in the last 6 months?: No    Assessment of Cardiac Risk:   - Unstable or severe angina or MI in the last 6 weeks or history of stent placement in the last year?: No   - Decompensated heart failure (e.g. New onset heart failure, NYHA  Class IV heart failure, or worsening existing heart failure)?: No  - Significant arrhythmias such as high grade AV block, symptomatic ventricular arrhythmia, newly recognized ventricular tachycardia, supraventricular tachycardia with resting heart rate >100, or symptomatic bradycardia?: No  - Severe  "heart valve disease including aortic stenosis or symptomatic mitral stenosis?: No      Pre-operative Risk Factors:  Elevated-risk surgery: No    History of cerebrovascular disease: No    History of ischemic heart disease: No  Pre-operative treatment with insulin: No  Pre-operative creatinine >2 mg/dL: No    History of congestive heart failure: No    D[facility] Activity Status Index (DASI):   DASI Total Score: 28.7  METs: 6.3        ROS: No TIA's or unusual headaches, no dysphagia.  No prolonged cough. No dyspnea or chest pain on exertion.  No abdominal pain, change in bowel habits, black or bloody stools.  No urinary tract or BPH symptoms.  Positive reported pain in arthritic joint. Positive difficulty with gait. No skin rashes or issues.      Objective:    /78   Pulse 66   Ht 5' 5\" (1.651 m)   Wt 83.9 kg (185 lb)   LMP  (LMP Unknown)   BMI 30.79 kg/m²       General Appearance: no distress, conversive  HEENT: PERRLA, conjuctiva normal; oropharynx clear; mucous membranes moist;   Neck:  Supple, no lymphadenopathy or thyromegaly  Lungs: breath sounds normal, normal respiratory effort, no retractions, expiratory effort normal  CV: normal heart sounds S1/S2, PMI normal   ABD: soft non tender, no masses , no hepatic or splenomegaly  EXT: DP pulses intact, no lymphadenopathy, no edema  Skin: normal turgor, normal texture, no rash  Psych: affect normal, mood normal  Neuro: AAOx3        The following portions of the patient's history were reviewed and updated as appropriate: allergies, current medications, past family history, past medical history, past social history, past surgical history and problem list.     Past History:       Past Medical History:   Diagnosis Date    Anxiety     Arthritis     Asthma     Albuterol prn    Bariatric surgery status     Chronic pain disorder     left knee    COVID-19 11/19/2021    Depression 2009    managed with Effexor     Generalized osteoarthritis     GERD (gastroesophageal reflux " disease)     Low vitamin B12 level     Migraine     Postgastrectomy malabsorption     Suicide attempt (HCC)     Vitamin D deficiency     Wears dentures     Wears glasses     Past Surgical History:   Procedure Laterality Date    CHOLECYSTECTOMY      COLONOSCOPY      COSMETIC SURGERY  2020    Abdominoplasty    EGD      GASTRIC BYPASS      elvira - en -y     HYSTERECTOMY      IR IMAGE GUIDED ASPIRATION / DRAINAGE W TUBE  2020    KNEE ARTHROSCOPY      with Lysis of adhesions    LAPAROSCOPIC SUPRACERVICAL HYSTERECTOMY  2005    due to endometriosis/AUB    OOPHORECTOMY Left 2005    MN EXCISION SKIN ABD INFRAUMBILICAL PANNICULECTOMY N/A 2020    Procedure: PANNICULECTOMY;  Surgeon: Bacilio Iraheta MD;  [facility];  Service: Plastics    TONSILLECTOMY AND ADENOIDECTOMY      TUBAL LIGATION      VAC DRESSING APPLICATION N/A 2020    Procedure: APPLICATION VAC DRESSING;  Surgeon: Bacilio Iraheta MD;  [facility];  Service: Plastics          Social History     Tobacco Use    Smoking status: Former     Current packs/day: 0.00     Types: Cigarettes     Start date: 1981     Quit date: 2013     Years since quittin.5    Smokeless tobacco: Never   Vaping Use    Vaping status: Former    Substances: Flavoring   Substance Use Topics    Alcohol use: Not Currently    Drug use: Never     Family History   Problem Relation Age of Onset    Hypertension Mother     Kidney cancer Mother     Diabetes Mother     Breast cancer Mother     Heart disease Father     Stroke Father     Hypertension Sister     HIV Brother     Breast cancer Cousin     No Known Problems Daughter     Stomach cancer Maternal Grandmother     No Known Problems Maternal Grandfather     No Known Problems Paternal Grandmother     No Known Problems Paternal Grandfather     No Known Problems Sister     No Known Problems Sister     No Known Problems Daughter     Prostate cancer Maternal Uncle     Stomach  "cancer Maternal Uncle     Leukemia Grandchild           Allergies:     Allergies   Allergen Reactions    Motrin [Ibuprofen]      Hx gastric bypass    Cherry - Food Allergy Rash    Gabapentin Rash        Current Medications:     Current Outpatient Medications   Medication Instructions    ALPRAZolam (XANAX) 1 mg, Every morning    ascorbic acid (VITAMIN C) 500 mg, Oral, 2 times daily    buPROPion (WELLBUTRIN SR) 150 mg, Every morning    Cholecalciferol (VITAMIN D3) 2,000 Units, Oral, Daily    Diclofenac Sodium (VOLTAREN) 2 g, Topical, 4 times daily    dicyclomine (BENTYL) 20 mg tablet TAKE 1 TABLET(20 MG) BY MOUTH EVERY 6 HOURS AS NEEDED FOR ABDOMINAL PAIN    doxepin (SINEQUAN) 100 mg, Daily at bedtime    DULoxetine (CYMBALTA) 30 mg, Daily    ferrous sulfate 324 mg, Oral, Every other day    folic acid (FOLVITE) 1 mg, Oral, Daily    Linzess 72 mcg, Oral, Daily before breakfast    Multiple Vitamins-Minerals (multivitamin with minerals) tablet 1 tablet, Oral, Daily    mupirocin (BACTROBAN) 2 % ointment Apply topically to the inside of the left and right nostrils twice daily for 5 days before surgery, including the morning of surgery.    omeprazole (PRILOSEC) 40 mg, Oral, Once as needed    ondansetron (ZOFRAN-ODT) 4 mg, Oral, Every 8 hours PRN    Restasis 0.05 % ophthalmic emulsion INSTILL 1 DROP IN BOTH EYES TWICE DAILY    sucralfate (CARAFATE) 1 g, Oral, 4 times daily, Crush and mix with water to take as suspension    tiZANidine (ZANAFLEX) 4 mg, Oral, Every 8 hours PRN    venlafaxine (EFFEXOR) 75 mg, 2 times daily with meals    vitamin B-12 (CYANOCOBALAMIN) 2,000 mcg, Oral, Daily           PRE-OP WORKSHEET DATA    Assessment of Pre-Operative Risks     MLJ Quality Hard Stops:    BMI (<40) : Estimated body mass index is 30.79 kg/m² as calculated from the following:    Height as of this encounter: 5' 5\" (1.651 m).    Weight as of this encounter: 83.9 kg (185 lb).    Hgb ( >11):   Lab Results   Component Value Date    HGB " 13.0 11/11/2024    HGB 13.4 08/28/2024    HGB 12.3 06/26/2022       HbA1c (<7.5) :   Lab Results   Component Value Date    HGBA1C 5.7 (H) 11/11/2024       GFR (>60) (Less then 45 = Nephrology consult):    Lab Results   Component Value Date    EGFR 83 11/11/2024    EGFR 83 08/28/2024    EGFR 89 05/26/2023            Pre-Op Data Reviewed:       Laboratory Results: I have personally reviewed the pertinent laboratory results/reports     EKG:I have personally reviewed pertinent reports.  . I personally reviewed and interpreted available tracings in the electronic medical record    Encounter Date: 11/11/24   EKG 12 lead   Result Value    Ventricular Rate 64    Atrial Rate 64    NV Interval 146    QRSD Interval 76    QT Interval 418    QTC Interval 432    P Axis 44    QRS Axis 33    T Wave Axis 11    Narrative    Normal sinus rhythm  Normal ECG  When compared with ECG of 27-Jul-2023 23:03,  No significant change was found  Confirmed by Ellen Cortez ([id number]) on 11/11/2024 1:45:44 PM       OLD RECORDS: reviewed old records in the chart review section if EHR on day of visit.    Previous cardiopulmonary studies within the past year:  Echocardiogram: no   Cardiac Catheterization: no  Stress Test: no      Time of visit including pre-visit chart review, visit and post-visit coordination of plan and care , review of pre-surgical lab work, preparation and time spent documenting note in electronic medical record, time spent face-to-face in physical examination answering patient questions by care team 45 minutes             [facility]

## 2024-11-13 NOTE — H&P (VIEW-ONLY)
Internal Medicine Pre-Operative Evaluation:     Reason for Visit: Pre-operative Evaluation for Risk Stratification and Optimization    Patient ID: Aislinn Rouse is a 64 y.o. female.     Surgery: Arthroplasty of left knee  Referring Provider: Dr Aggarwal      Recommendations to Proceed withSurgery    Patient is considered to be Low risk for Medium risk procedure.     After evaluation and discussion with patient with emphasis that all surgery has some degree of inherent risk it is acknowledged by patient this risk is Acceptable.    Patient is optimized and may proceed with planned procedure.     Assessment    Pre-operative Medical Evaluation for planned surgery  Recommendations as listed in PLAN section below  Contact surgical nurse  navigator with any questions regarding preoperative plan or schedule.      Assessment & Plan  Primary osteoarthritis of left knee  Failed outpatient conservative measures  Elected to undergo total joint arthroplasty    History of bariatric surgery  Continue bariatric vitamins  Major depressive disorder, recurrent severe without psychotic features (HCC)  Continue current medications  Mild intermittent asthma without complication  stable  Preoperative clearance             Plan:     1. Further preoperative workup as follows:   - none no further testing required may proceed with surgery    2. Preoperative Medication Management Review performed by PAT nursing  YES    3. Patient requires further consultation with:   No Consults Required    4. Discharge Planning / Barriers to Discharge  none identified - patients has post discharge therapy plan in place, transportation arranged for discharge day, adequate family support at home to assist with discharge to home.        Subjective:           History of Present Illness:     Aislinn Rouse is a 64 y.o. female who presents to the office today for a preoperative consultation at the request of surgeon. The patient understands this is an  elective procedure and not emergent. They are electing to undergo planned procedure with an understanding that all surgery has inherent risk. They have worked with their surgeon and failed conservative treatment measures. Today they present for preoperative risk assessment and recommendations for optimization in preparation for surgery.    Pt seen in surgical optimization center for upcoming proposed surgery. They have failed previous conservative measures and have elected surgical intervention.     Pt meets presurgical lab and BMI optimization goals.      Aislinn Rouse has an IN HOSPITAL cardiac risk of RCI RISK CLASS I (0 risk factors, risk of major cardiac compl. appr. 0.5%) based on RCRI calculator    Cardiac Risk Estimation: per the Revised Cardiac Risk Index (Circ. 100:1043, 1999),         Pre-op Exam    Pt seen in surgical optimization center for upcoming proposed surgery. They have failed previous conservative measures and have elected surgical intervention.     Pt meets presurgical lab and BMI optimization goals.      Aislinn Rouse has an IN HOSPITAL cardiac risk of RCI RISK CLASS I (0 risk factors, risk of major cardiac compl. appr. 0.5%) based on RCRI calculator    Cardiac Risk Estimation: per the Revised Cardiac Risk Index (Circ. 100:1043, 1999),           Previous history of bleeding disorders or clots?: No  Previous Anesthesia reaction?: No  Prolonged steroid use in the last 6 months?: No    Assessment of Cardiac Risk:   - Unstable or severe angina or MI in the last 6 weeks or history of stent placement in the last year?: No   - Decompensated heart failure (e.g. New onset heart failure, NYHA  Class IV heart failure, or worsening existing heart failure)?: No  - Significant arrhythmias such as high grade AV block, symptomatic ventricular arrhythmia, newly recognized ventricular tachycardia, supraventricular tachycardia with resting heart rate >100, or symptomatic bradycardia?: No  - Severe  "heart valve disease including aortic stenosis or symptomatic mitral stenosis?: No      Pre-operative Risk Factors:  Elevated-risk surgery: No    History of cerebrovascular disease: No    History of ischemic heart disease: No  Pre-operative treatment with insulin: No  Pre-operative creatinine >2 mg/dL: No    History of congestive heart failure: No    D[facility] Activity Status Index (DASI):   DASI Total Score: 28.7  METs: 6.3        ROS: No TIA's or unusual headaches, no dysphagia.  No prolonged cough. No dyspnea or chest pain on exertion.  No abdominal pain, change in bowel habits, black or bloody stools.  No urinary tract or BPH symptoms.  Positive reported pain in arthritic joint. Positive difficulty with gait. No skin rashes or issues.      Objective:    /78   Pulse 66   Ht 5' 5\" (1.651 m)   Wt 83.9 kg (185 lb)   LMP  (LMP Unknown)   BMI 30.79 kg/m²       General Appearance: no distress, conversive  HEENT: PERRLA, conjuctiva normal; oropharynx clear; mucous membranes moist;   Neck:  Supple, no lymphadenopathy or thyromegaly  Lungs: breath sounds normal, normal respiratory effort, no retractions, expiratory effort normal  CV: normal heart sounds S1/S2, PMI normal   ABD: soft non tender, no masses , no hepatic or splenomegaly  EXT: DP pulses intact, no lymphadenopathy, no edema  Skin: normal turgor, normal texture, no rash  Psych: affect normal, mood normal  Neuro: AAOx3        The following portions of the patient's history were reviewed and updated as appropriate: allergies, current medications, past family history, past medical history, past social history, past surgical history and problem list.     Past History:       Past Medical History:   Diagnosis Date    Anxiety     Arthritis     Asthma     Albuterol prn    Bariatric surgery status     Chronic pain disorder     left knee    COVID-19 11/19/2021    Depression 2009    managed with Effexor     Generalized osteoarthritis     GERD (gastroesophageal reflux " disease)     Low vitamin B12 level     Migraine     Postgastrectomy malabsorption     Suicide attempt (HCC)     Vitamin D deficiency     Wears dentures     Wears glasses     Past Surgical History:   Procedure Laterality Date    CHOLECYSTECTOMY      COLONOSCOPY      COSMETIC SURGERY  2020    Abdominoplasty    EGD      GASTRIC BYPASS      elvira - en -y     HYSTERECTOMY      IR IMAGE GUIDED ASPIRATION / DRAINAGE W TUBE  2020    KNEE ARTHROSCOPY      with Lysis of adhesions    LAPAROSCOPIC SUPRACERVICAL HYSTERECTOMY  2005    due to endometriosis/AUB    OOPHORECTOMY Left 2005    LA EXCISION SKIN ABD INFRAUMBILICAL PANNICULECTOMY N/A 2020    Procedure: PANNICULECTOMY;  Surgeon: Bacilio Iraheta MD;  Location: [facility];  Service: Plastics    TONSILLECTOMY AND ADENOIDECTOMY      TUBAL LIGATION      VAC DRESSING APPLICATION N/A 2020    Procedure: APPLICATION VAC DRESSING;  Surgeon: Bacilio Iraheta MD;  Location: [facility];  Service: Plastics          Social History     Tobacco Use    Smoking status: Former     Current packs/day: 0.00     Types: Cigarettes     Start date: 1981     Quit date: 2013     Years since quittin.5    Smokeless tobacco: Never   Vaping Use    Vaping status: Former    Substances: Flavoring   Substance Use Topics    Alcohol use: Not Currently    Drug use: Never     Family History   Problem Relation Age of Onset    Hypertension Mother     Kidney cancer Mother     Diabetes Mother     Breast cancer Mother     Heart disease Father     Stroke Father     Hypertension Sister     HIV Brother     Breast cancer Cousin     No Known Problems Daughter     Stomach cancer Maternal Grandmother     No Known Problems Maternal Grandfather     No Known Problems Paternal Grandmother     No Known Problems Paternal Grandfather     No Known Problems Sister     No Known Problems Sister     No Known Problems Daughter     Prostate cancer Maternal Uncle     Stomach  "cancer Maternal Uncle     Leukemia Grandchild           Allergies:     Allergies   Allergen Reactions    Motrin [Ibuprofen]      Hx gastric bypass    Cherry - Food Allergy Rash    Gabapentin Rash        Current Medications:     Current Outpatient Medications   Medication Instructions    ALPRAZolam (XANAX) 1 mg, Every morning    ascorbic acid (VITAMIN C) 500 mg, Oral, 2 times daily    buPROPion (WELLBUTRIN SR) 150 mg, Every morning    Cholecalciferol (VITAMIN D3) 2,000 Units, Oral, Daily    Diclofenac Sodium (VOLTAREN) 2 g, Topical, 4 times daily    dicyclomine (BENTYL) 20 mg tablet TAKE 1 TABLET(20 MG) BY MOUTH EVERY 6 HOURS AS NEEDED FOR ABDOMINAL PAIN    doxepin (SINEQUAN) 100 mg, Daily at bedtime    DULoxetine (CYMBALTA) 30 mg, Daily    ferrous sulfate 324 mg, Oral, Every other day    folic acid (FOLVITE) 1 mg, Oral, Daily    Linzess 72 mcg, Oral, Daily before breakfast    Multiple Vitamins-Minerals (multivitamin with minerals) tablet 1 tablet, Oral, Daily    mupirocin (BACTROBAN) 2 % ointment Apply topically to the inside of the left and right nostrils twice daily for 5 days before surgery, including the morning of surgery.    omeprazole (PRILOSEC) 40 mg, Oral, Once as needed    ondansetron (ZOFRAN-ODT) 4 mg, Oral, Every 8 hours PRN    Restasis 0.05 % ophthalmic emulsion INSTILL 1 DROP IN BOTH EYES TWICE DAILY    sucralfate (CARAFATE) 1 g, Oral, 4 times daily, Crush and mix with water to take as suspension    tiZANidine (ZANAFLEX) 4 mg, Oral, Every 8 hours PRN    venlafaxine (EFFEXOR) 75 mg, 2 times daily with meals    vitamin B-12 (CYANOCOBALAMIN) 2,000 mcg, Oral, Daily           PRE-OP WORKSHEET DATA    Assessment of Pre-Operative Risks     MLJ Quality Hard Stops:    BMI (<40) : Estimated body mass index is 30.79 kg/m² as calculated from the following:    Height as of this encounter: 5' 5\" (1.651 m).    Weight as of this encounter: 83.9 kg (185 lb).    Hgb ( >11):   Lab Results   Component Value Date    HGB " 13.0 11/11/2024    HGB 13.4 08/28/2024    HGB 12.3 06/26/2022       HbA1c (<7.5) :   Lab Results   Component Value Date    HGBA1C 5.7 (H) 11/11/2024       GFR (>60) (Less then 45 = Nephrology consult):    Lab Results   Component Value Date    EGFR 83 11/11/2024    EGFR 83 08/28/2024    EGFR 89 05/26/2023            Pre-Op Data Reviewed:       Laboratory Results: I have personally reviewed the pertinent laboratory results/reports     EKG:I have personally reviewed pertinent reports.  . I personally reviewed and interpreted available tracings in the electronic medical record    Encounter Date: 11/11/24   EKG 12 lead   Result Value    Ventricular Rate 64    Atrial Rate 64    IN Interval 146    QRSD Interval 76    QT Interval 418    QTC Interval 432    P Axis 44    QRS Axis 33    T Wave Axis 11    Narrative    Normal sinus rhythm  Normal ECG  When compared with ECG of 27-Jul-2023 23:03,  No significant change was found  Confirmed by Ellen Cortez ([id number]) on 11/11/2024 1:45:44 PM       OLD RECORDS: reviewed old records in the chart review section if EHR on day of visit.    Previous cardiopulmonary studies within the past year:  Echocardiogram: no   Cardiac Catheterization: no  Stress Test: no      Time of visit including pre-visit chart review, visit and post-visit coordination of plan and care , review of pre-surgical lab work, preparation and time spent documenting note in electronic medical record, time spent face-to-face in physical examination answering patient questions by care team 45 minutes             [facility]

## 2024-11-13 NOTE — PROGRESS NOTES
KAM contacted pt today to ensure she received the information about transportation. Pt has [address] trips through her insurance per year. Pt needs to call 2-3 days ahead of time to schedule @ [phone]. Pt appreciative of the information. Pt confirmed caregiver support and ride to and from surgery. Pt did inquire if she is a same day surgery or if she will stay overnight. JORGECM messaged NN in regard to same. JORGECM will remain available.

## 2024-11-18 DIAGNOSIS — M17.12 PRIMARY OSTEOARTHRITIS OF LEFT KNEE: ICD-10-CM

## 2024-11-18 RX ORDER — MUPIROCIN 20 MG/G
OINTMENT TOPICAL
Qty: 15 G | Refills: 0 | Status: SHIPPED | OUTPATIENT
Start: 2024-11-18

## 2024-11-18 NOTE — PRE-PROCEDURE INSTRUCTIONS
Pre-Surgery Instructions:   Medication Instructions    ALPRAZolam (XANAX) 1 mg tablet Take day of surgery.    ascorbic acid (VITAMIN C) 500 MG tablet Instructions provided by MD    buPROPion (WELLBUTRIN SR) 150 mg 12 hr tablet Take day of surgery.    Cholecalciferol (VITAMIN D3) 1,000 units tablet Instructions provided by MD    Diclofenac Sodium (VOLTAREN) 1 % Stop taking 3 days prior to surgery.    dicyclomine (BENTYL) 20 mg tablet Uses PRN- OK to take day of surgery    doxepin (SINEquan) 100 mg capsule Take night before surgery    DULoxetine (CYMBALTA) 30 mg delayed release capsule Take day of surgery.    ferrous sulfate 324 (65 Fe) mg Instructions provided by MD    folic acid (FOLVITE) 1 mg tablet Instructions provided by MD    linaCLOtide (Linzess) 72 MCG CAPS Take day of surgery.    Multiple Vitamins-Minerals (multivitamin with minerals) tablet Instructions provided by MD    mupirocin (BACTROBAN) 2 % ointment Instructions provided by MD    omeprazole (PriLOSEC) 40 MG capsule Uses PRN- OK to take day of surgery    ondansetron (ZOFRAN-ODT) 4 mg disintegrating tablet Uses PRN- OK to take day of surgery    Restasis 0.05 % ophthalmic emulsion Take day of surgery.    sucralfate (CARAFATE) 1 g tablet Hold day of surgery.    tiZANidine (ZANAFLEX) 4 mg tablet Uses PRN- DO NOT take day of surgery    venlafaxine (EFFEXOR) 75 mg tablet Hold day of surgery.    vitamin B-12 (CYANOCOBALAMIN) 2000 MCG TABS Instructions provided by MD Meyer verbalizes understanding of the following:    Please reference your “My Surgical Experience Booklet” for additional information to prepare for your upcoming surgery.      - DO NOT EAT OR DRINK ANYTHING after midnight on the evening before your procedure including coffee, tea, gum or hard candy.    - ONLY SIPS OF WATER with your medications are allowed on the morning of your procedure.  - Avoid OTC non-directed Vit/ Suppl/ Herbals 7 days prior to surgery to ensure no drug interactions with  perioperative surgical/ anesthetic meds  - Avoid NSAIDs 3 days prior. Tylenol is ok to take as needed.   - Avoid ASA containing products 5 days prior, unless otherwise instructed by your provider   - Bring a list of meds you take at home with your last dose noted    - Follow the pre surgery showering instructions as listed in the “My Surgical Experience Booklet” or otherwise provided by your surgeon's office.  - Bathing instructions, has chg (neck down, no genitals) & wipes   - No lotions, powders, sprays, deodorant, cologne, perfume, jewelry, body piercings, false lashes or make-up  - Do not use a blade to shave the surgical area 1 week before surgery. It is ok to use clean electric clippers up to 24 hours before surgery. Do not shave any body parts with a razor within 24hrs.  - Do not use dry shampoo, hair spray, hair gel, or any type of hair products.   - Remove nail polish, including gel polish, and any artificial, gel, or acrylic nails if possible.    - For outpatient surgery, arrange for someone to drive you home after the procedure & stay with you until the next morning. Visitor guidelines discussed.   - Bring insurance cards & photo id    - Please Bring a case for your glasses.  - Bring a container for your dentures  - Leave all valuables such as credit cards, money & jewelry at home  - Please bring any specially ordered equipment; to bring walker  - Wear causal clothing that is easy to take on and off. Consider your type of surgery.  - Wear sneakers or non-skid shoes  - Ortho Class    - Notify surgeon if you develop any new illnesses, exposure, develop a rash/ open wounds or have additional questions prior to your surgery.    - Did the surgeon's office give you any other special instructions? Hgb Rx  - Did surgeon require any clearances? [facility] 11/20    You will receive a call one business day prior to surgery with an arrival time and hospital directions. If your surgery is scheduled on a Monday, the hospital  will be calling you on the Friday prior to your surgery.     Please confirm the visitor policy for the day of your procedure when you receive your phone call with an arrival time.

## 2024-11-20 ENCOUNTER — PATIENT OUTREACH (OUTPATIENT)
Dept: OBGYN CLINIC | Facility: HOSPITAL | Age: 64
End: 2024-11-20

## 2024-11-20 ENCOUNTER — OFFICE VISIT (OUTPATIENT)
Age: 64
End: 2024-11-20
Payer: MEDICARE

## 2024-11-20 VITALS
HEIGHT: 65 IN | HEART RATE: 66 BPM | SYSTOLIC BLOOD PRESSURE: 116 MMHG | WEIGHT: 185 LBS | DIASTOLIC BLOOD PRESSURE: 78 MMHG | BODY MASS INDEX: 30.82 KG/M2

## 2024-11-20 DIAGNOSIS — Z98.84 HISTORY OF BARIATRIC SURGERY: ICD-10-CM

## 2024-11-20 DIAGNOSIS — F33.2 MAJOR DEPRESSIVE DISORDER, RECURRENT SEVERE WITHOUT PSYCHOTIC FEATURES (HCC): Chronic | ICD-10-CM

## 2024-11-20 DIAGNOSIS — J45.20 MILD INTERMITTENT ASTHMA WITHOUT COMPLICATION: ICD-10-CM

## 2024-11-20 DIAGNOSIS — M17.12 PRIMARY OSTEOARTHRITIS OF LEFT KNEE: ICD-10-CM

## 2024-11-20 DIAGNOSIS — Z01.818 PREOPERATIVE CLEARANCE: Primary | ICD-10-CM

## 2024-11-20 PROCEDURE — 99205 OFFICE O/P NEW HI 60 MIN: CPT | Performed by: INTERNAL MEDICINE

## 2024-11-20 NOTE — PROGRESS NOTES
SWCM received a message form NN that it is surgeon discretion, pt can be discharged same day or stay overnight for observation. SWCM did call pt and educate on same. SWCM will remain available as needed.

## 2024-11-20 NOTE — ASSESSMENT & PLAN NOTE
Called patient unable to leave a message.   Physical Therapy Visit    Visit Type: Daily Treatment Note  Visit: 2  Referring Provider: Mahnaz Mclaughlin MD  Medical Diagnosis (from order): Diagnosis Information    Diagnosis  V22.2 (ICD-9-CM) - Z34.90 (ICD-10-CM) - Pregnancy, unspecified gestational age         SUBJECTIVE                                                                                                               Patient reports she has been having some soreness from working core and pelvic muscles. Some soreness at the tailbone.   Does note some mid-back pain feels it is from breast feeding.   She does get frequent upper back and shoulder deep tissue massages and has returned to this  Recently.       OBJECTIVE                                                                                                                                       Treatment     Therapeutic Exercise  Hooklying:  Breathing into pelvic floor/diaphragm coordination with pelvic floor  -hand placement on lateral rib cage for end range rib depression during exhalation x 5 cycles  Bridging with adduction to strengthen glut max and lengthen through deep hip rotators x 10  Transversus abdominis activation x 10    With abdominal brace and pelvic stability:  Alternating marching x 10  Straight leg raise x 10, right/left    Four point rock back while maintaining neutral pelvis vs tucking butt under.   -Cat-camel - emphasis on extension  -Alternating upper extremity lifts x 10, right/left    Seated horizontal abduction x 10 - level 3 theraband         Skilled input: verbal instruction/cues and tactile instruction/cues  instruction/cues for: home exercise program    Writer verbally educated and received verbal consent for hand placement, positioning of patient, and techniques to be performed today from patient for clothing adjustments for techniques, therapist position for techniques and hand placement and palpation for techniques as described above and how they are pertinent to  stable   the patient's plan of care.    Home Exercise Program  Access Code: [email or web address]  URL: [email or web address]  Date: 10/21/2022  Prepared by: Rani Perez    Exercises  · Seated Pelvic Floor Elevators - 2 x daily - 7 x weekly - 1 sets - 5 reps  · Supine Diaphragmatic Breathing with Pelvic Floor Lengthening - 1 x daily - 7 x weekly - 1 sets - 10 reps  · Quadruped Rocking Backward - 1 x daily - 7 x weekly - 1 sets - 10 reps  · Quadruped Alternating Arm Lift - 1 x daily - 7 x weekly - 2 sets - 10 reps  · Supine Bridge with Mini Swiss Ball Between Knees - 1 x daily - 7 x weekly - 2 sets - 10 reps  · Supine March - 1 x daily - 7 x weekly - 2 sets - 10 reps  · Small Range Straight Leg Raise - 1 x daily - 7 x weekly - 2 sets - 10 reps  · Seated Shoulder Horizontal Abduction with Resistance - 7 x weekly - 2 sets - 10 reps      ASSESSMENT                                                                                                            Doris tolerates progressive core and mid-back strengthening without increase in pain symptoms. Noted fatigue with lower abdominal recruitment and addition of lower extremity mobility. Discussed feeding/care postures and initiated mid-back strengthening/stabilition exercise today. home exercise program updated and progressed. She will continue to benefit from skilled physical therapy to address objective deficits and return to safe daily and functional activities.     Pain/symptoms after session (out of 10): 0  Patient Education:   Results of above outlined education: Verbalizes understanding and Needs reinforcement    PLAN                                                                                                                           Suggestions for next session as indicated: Progress per plan of care, review home exercise program, progress core and hip strengthening for transition into regular exercise program       Therapy procedure time and total  treatment time can be found documented on the Time Entry flowsheet

## 2024-11-21 ENCOUNTER — ANESTHESIA EVENT (OUTPATIENT)
Age: 64
End: 2024-11-21

## 2024-11-21 ENCOUNTER — ANESTHESIA (OUTPATIENT)
Age: 64
End: 2024-11-21

## 2024-12-01 RX ORDER — CEFADROXIL 500 MG/1
500 CAPSULE ORAL EVERY 12 HOURS SCHEDULED
Qty: 2 CAPSULE | Refills: 0 | Status: CANCELLED | OUTPATIENT
Start: 2024-12-01 | End: 2024-12-02

## 2024-12-01 NOTE — DISCHARGE INSTR - AVS FIRST PAGE
Dr. Aggarwal Knee Replacement    What to Expect/Activity  It is normal to have some discomfort in your knee for several days to weeks.  You are weight bearing as tolerated to your operative leg with assist devices.  Please use crutches/walker when ambulating until your follow-up  Swelling and discomfort in the knee is normal for several days after surgery. For the first 2-3 days, use ice around the knee to help. Use for 20-30 minutes every 1-2 hours for 48 hours, while awake. You may continue beyond 48 hours as needed.  Place one or two pillows underneath your calf, not your knee, to reduce swelling.  Physical therapy on your own at home should start as soon as possible (see below). Please perform heel slides and extension exercises on your own as well (see diagram).  Please use incentive spirometer 10 times per hour while awake (see diagram).    Dressing/Wound Care/Bathing  You may remove your toe-to-groin dressing 24 hours after surgery. There will be a surgical dressing over your incision that stays in place until follow-up unless water gets under the bandage and then it should be removed.   You may start showering 24 hours after surgery, the surgical dressing will remain in place. Please pat the dressing dry. If you notice the dressing appears saturated or is starting to come off, please replace with dry dressing.  You can keep the dressing in place until follow-up in the office.   Do not place any creams, ointments or gels on or around the incision.  No baths, swimming or submerging until cleared by Dr. Aggarwal    Pain Management/Medications  You may resume your usual medications.  Please take the following medications:  Anti-coagulation (blood clot prevention) - aspirin 81mg twice daily for 4 weeks  Pain medication:  Narcotic: Take as directed  NSAID/Anti-inflammatory: Take as directed  Tylenol 1000mg every 8 hours  Zofran (ondasetron) - 4mg every 8 hours as needed for nausea  Stool softeners (senna/colace) -  take daily to prevent constipation as narcotic pain medication causes constipation  Antibiotic - take as directed if prescribed   If you have questions or pain concerns, please contact the office. Pain medication cannot remove all post-operative pain.    Follow up/Call if:  The findings of your surgery will be explained to you and your family immediately after surgery. However, in the post-operative period, during recovery from anesthesia you may not fully remember or fully understand what was said. This will be again gone over when you return for your post-op appointment.  Please contact Dr. Aggarwal's office if you experience the following:  Excessive bleeding (bleeding through your dressing)  Fever greater than 101 degrees F after 48 hours (low grade fevers the day or two after surgery are normal)  Persistent nausea or vomiting  Decreased sensation or discoloration of the operative limb  Pain or swelling that is getting worse and not better with medication    Dr. Aggarwal's Office Contact: [phone]

## 2024-12-04 ENCOUNTER — ANESTHESIA EVENT (OUTPATIENT)
Age: 64
End: 2024-12-04
Payer: MEDICARE

## 2024-12-05 ENCOUNTER — APPOINTMENT (OUTPATIENT)
Age: 64
End: 2024-12-05
Payer: MEDICARE

## 2024-12-05 ENCOUNTER — HOSPITAL ENCOUNTER (OUTPATIENT)
Age: 64
Setting detail: OUTPATIENT SURGERY
Discharge: HOME | DRG: 470 | End: 2024-12-06
Attending: STUDENT IN AN ORGANIZED HEALTH CARE EDUCATION/TRAINING PROGRAM | Admitting: STUDENT IN AN ORGANIZED HEALTH CARE EDUCATION/TRAINING PROGRAM
Payer: MEDICARE

## 2024-12-05 ENCOUNTER — ANESTHESIA (OUTPATIENT)
Age: 64
End: 2024-12-05
Payer: MEDICARE

## 2024-12-05 DIAGNOSIS — M17.12 PRIMARY OSTEOARTHRITIS OF LEFT KNEE: Primary | ICD-10-CM

## 2024-12-05 LAB — GLUCOSE SERPL-MCNC: 94 MG/DL (ref 65–140)

## 2024-12-05 PROCEDURE — 27447 TOTAL KNEE ARTHROPLASTY: CPT | Performed by: STUDENT IN AN ORGANIZED HEALTH CARE EDUCATION/TRAINING PROGRAM

## 2024-12-05 PROCEDURE — 0SRD069 REPLACEMENT OF LEFT KNEE JOINT WITH OXIDIZED ZIRCONIUM ON POLYETHYLENE SYNTHETIC SUBSTITUTE, CEMENTED, OPEN APPROACH: ICD-10-PCS | Performed by: STUDENT IN AN ORGANIZED HEALTH CARE EDUCATION/TRAINING PROGRAM

## 2024-12-05 PROCEDURE — C1776 JOINT DEVICE (IMPLANTABLE): HCPCS | Performed by: STUDENT IN AN ORGANIZED HEALTH CARE EDUCATION/TRAINING PROGRAM

## 2024-12-05 PROCEDURE — C1713 ANCHOR/SCREW BN/BN,TIS/BN: HCPCS | Performed by: STUDENT IN AN ORGANIZED HEALTH CARE EDUCATION/TRAINING PROGRAM

## 2024-12-05 PROCEDURE — 97163 PT EVAL HIGH COMPLEX 45 MIN: CPT | Performed by: PHYSICAL THERAPIST

## 2024-12-05 PROCEDURE — C9290 INJ, BUPIVACAINE LIPOSOME: HCPCS | Performed by: ANESTHESIOLOGY

## 2024-12-05 PROCEDURE — 99222 1ST HOSP IP/OBS MODERATE 55: CPT | Performed by: INTERNAL MEDICINE

## 2024-12-05 PROCEDURE — 73560 X-RAY EXAM OF KNEE 1 OR 2: CPT

## 2024-12-05 PROCEDURE — 97167 OT EVAL HIGH COMPLEX 60 MIN: CPT

## 2024-12-05 PROCEDURE — 27447 TOTAL KNEE ARTHROPLASTY: CPT | Performed by: PHYSICIAN ASSISTANT

## 2024-12-05 PROCEDURE — 82948 REAGENT STRIP/BLOOD GLUCOSE: CPT

## 2024-12-05 DEVICE — ATTUNE KNEE SYSTEM TIBIAL INSERT FIXED BEARING MEDIAL STABILIZED LEFT AOX 7, 6MM
Type: IMPLANTABLE DEVICE | Site: KNEE | Status: FUNCTIONAL
Brand: ATTUNE

## 2024-12-05 DEVICE — ATTUNE PATELLA MEDIALIZED DOME 35MM CEMENTED AOX
Type: IMPLANTABLE DEVICE | Site: KNEE | Status: FUNCTIONAL
Brand: ATTUNE

## 2024-12-05 DEVICE — ATTUNE KNEE SYSTEM FEMORAL POROCOAT CRUCIATE RETAINING SIZE 7 LEFT CEMENTLESS
Type: IMPLANTABLE DEVICE | Site: KNEE | Status: FUNCTIONAL
Brand: ATTUNE

## 2024-12-05 DEVICE — DEPUY CMW 2 FAST SET BONE CEMENT 20G: Type: IMPLANTABLE DEVICE | Site: KNEE | Status: FUNCTIONAL

## 2024-12-05 DEVICE — ATTUNE KNEE SYSTEM TIBIAL BASE AFFIXIUM FIXED BEARING SIZE 5
Type: IMPLANTABLE DEVICE | Site: KNEE | Status: FUNCTIONAL
Brand: ATTUNE AFFIXIUM

## 2024-12-05 RX ORDER — ONDANSETRON 2 MG/ML
INJECTION INTRAMUSCULAR; INTRAVENOUS AS NEEDED
Status: DISCONTINUED | OUTPATIENT
Start: 2024-12-05 | End: 2024-12-05

## 2024-12-05 RX ORDER — ONDANSETRON 4 MG/1
4 TABLET, ORALLY DISINTEGRATING ORAL EVERY 6 HOURS PRN
Qty: 20 TABLET | Refills: 0 | Status: SHIPPED | OUTPATIENT
Start: 2024-12-05

## 2024-12-05 RX ORDER — CEFAZOLIN SODIUM 2 G/50ML
2000 SOLUTION INTRAVENOUS ONCE
Status: COMPLETED | OUTPATIENT
Start: 2024-12-05 | End: 2024-12-05

## 2024-12-05 RX ORDER — CEFAZOLIN SODIUM 2 G/50ML
2000 SOLUTION INTRAVENOUS EVERY 8 HOURS
Status: COMPLETED | OUTPATIENT
Start: 2024-12-05 | End: 2024-12-06

## 2024-12-05 RX ORDER — CHLORHEXIDINE GLUCONATE ORAL RINSE 1.2 MG/ML
15 SOLUTION DENTAL ONCE
Status: COMPLETED | OUTPATIENT
Start: 2024-12-05 | End: 2024-12-05

## 2024-12-05 RX ORDER — BUPROPION HYDROCHLORIDE 150 MG/1
150 TABLET ORAL DAILY
Status: DISCONTINUED | OUTPATIENT
Start: 2024-12-06 | End: 2024-12-06 | Stop reason: HOSPADM

## 2024-12-05 RX ORDER — TRANEXAMIC ACID 10 MG/ML
1000 INJECTION, SOLUTION INTRAVENOUS ONCE
Status: COMPLETED | OUTPATIENT
Start: 2024-12-05 | End: 2024-12-05

## 2024-12-05 RX ORDER — DOCUSATE SODIUM 100 MG/1
100 CAPSULE, LIQUID FILLED ORAL 2 TIMES DAILY
Status: DISCONTINUED | OUTPATIENT
Start: 2024-12-05 | End: 2024-12-06 | Stop reason: HOSPADM

## 2024-12-05 RX ORDER — OXYCODONE HYDROCHLORIDE 5 MG/1
5 TABLET ORAL EVERY 4 HOURS PRN
Qty: 42 TABLET | Refills: 0 | Status: SHIPPED | OUTPATIENT
Start: 2024-12-05 | End: 2024-12-16 | Stop reason: SDUPTHER

## 2024-12-05 RX ORDER — ALPRAZOLAM 0.5 MG
1 TABLET ORAL ONCE
Status: DISCONTINUED | OUTPATIENT
Start: 2024-12-05 | End: 2024-12-05

## 2024-12-05 RX ORDER — CALCIUM CARBONATE 500 MG/1
1000 TABLET, CHEWABLE ORAL DAILY PRN
Status: DISCONTINUED | OUTPATIENT
Start: 2024-12-05 | End: 2024-12-06 | Stop reason: HOSPADM

## 2024-12-05 RX ORDER — ALPRAZOLAM 0.25 MG/1
1 TABLET ORAL
Status: DISCONTINUED | OUTPATIENT
Start: 2024-12-05 | End: 2024-12-06 | Stop reason: HOSPADM

## 2024-12-05 RX ORDER — VENLAFAXINE HYDROCHLORIDE 75 MG/1
75 CAPSULE, EXTENDED RELEASE ORAL DAILY
Status: DISCONTINUED | OUTPATIENT
Start: 2024-12-06 | End: 2024-12-06 | Stop reason: HOSPADM

## 2024-12-05 RX ORDER — AMOXICILLIN 250 MG
1 CAPSULE ORAL DAILY
Qty: 30 TABLET | Refills: 0 | Status: SHIPPED | OUTPATIENT
Start: 2024-12-05

## 2024-12-05 RX ORDER — PROPOFOL 10 MG/ML
INJECTION, EMULSION INTRAVENOUS CONTINUOUS PRN
Status: DISCONTINUED | OUTPATIENT
Start: 2024-12-05 | End: 2024-12-05

## 2024-12-05 RX ORDER — DOXEPIN HYDROCHLORIDE 10 MG/1
10 CAPSULE ORAL
Status: DISCONTINUED | OUTPATIENT
Start: 2024-12-05 | End: 2024-12-06 | Stop reason: HOSPADM

## 2024-12-05 RX ORDER — MIDAZOLAM HYDROCHLORIDE 2 MG/2ML
2 INJECTION, SOLUTION INTRAMUSCULAR; INTRAVENOUS ONCE
Status: DISCONTINUED | OUTPATIENT
Start: 2024-12-05 | End: 2024-12-05 | Stop reason: SDUPTHER

## 2024-12-05 RX ORDER — SODIUM CHLORIDE, SODIUM LACTATE, POTASSIUM CHLORIDE, CALCIUM CHLORIDE 600; 310; 30; 20 MG/100ML; MG/100ML; MG/100ML; MG/100ML
125 INJECTION, SOLUTION INTRAVENOUS CONTINUOUS
Status: DISCONTINUED | OUTPATIENT
Start: 2024-12-05 | End: 2024-12-06 | Stop reason: HOSPADM

## 2024-12-05 RX ORDER — MAGNESIUM HYDROXIDE 1200 MG/15ML
LIQUID ORAL AS NEEDED
Status: DISCONTINUED | OUTPATIENT
Start: 2024-12-05 | End: 2024-12-05 | Stop reason: HOSPADM

## 2024-12-05 RX ORDER — ACETAMINOPHEN 325 MG/1
975 TABLET ORAL ONCE
Status: COMPLETED | OUTPATIENT
Start: 2024-12-05 | End: 2024-12-05

## 2024-12-05 RX ORDER — KETOROLAC TROMETHAMINE 30 MG/ML
15 INJECTION, SOLUTION INTRAMUSCULAR; INTRAVENOUS ONCE
Status: COMPLETED | OUTPATIENT
Start: 2024-12-05 | End: 2024-12-05

## 2024-12-05 RX ORDER — OXYCODONE HYDROCHLORIDE 5 MG/1
5 TABLET ORAL EVERY 4 HOURS PRN
Status: DISCONTINUED | OUTPATIENT
Start: 2024-12-05 | End: 2024-12-06 | Stop reason: HOSPADM

## 2024-12-05 RX ORDER — SIMETHICONE 80 MG
80 TABLET,CHEWABLE ORAL 4 TIMES DAILY PRN
Status: DISCONTINUED | OUTPATIENT
Start: 2024-12-05 | End: 2024-12-06 | Stop reason: HOSPADM

## 2024-12-05 RX ORDER — ASPIRIN 81 MG/1
81 TABLET ORAL 2 TIMES DAILY
Status: DISCONTINUED | OUTPATIENT
Start: 2024-12-05 | End: 2024-12-05

## 2024-12-05 RX ORDER — EPHEDRINE SULFATE 50 MG/ML
INJECTION INTRAVENOUS AS NEEDED
Status: DISCONTINUED | OUTPATIENT
Start: 2024-12-05 | End: 2024-12-05

## 2024-12-05 RX ORDER — ACETAMINOPHEN 325 MG/1
975 TABLET ORAL EVERY 8 HOURS
Status: DISCONTINUED | OUTPATIENT
Start: 2024-12-05 | End: 2024-12-06 | Stop reason: HOSPADM

## 2024-12-05 RX ORDER — DEXAMETHASONE SODIUM PHOSPHATE 10 MG/ML
INJECTION, SOLUTION INTRAMUSCULAR; INTRAVENOUS AS NEEDED
Status: DISCONTINUED | OUTPATIENT
Start: 2024-12-05 | End: 2024-12-05

## 2024-12-05 RX ORDER — SODIUM CHLORIDE, SODIUM LACTATE, POTASSIUM CHLORIDE, CALCIUM CHLORIDE 600; 310; 30; 20 MG/100ML; MG/100ML; MG/100ML; MG/100ML
100 INJECTION, SOLUTION INTRAVENOUS CONTINUOUS
Status: DISCONTINUED | OUTPATIENT
Start: 2024-12-05 | End: 2024-12-06 | Stop reason: HOSPADM

## 2024-12-05 RX ORDER — PANTOPRAZOLE SODIUM 40 MG/1
40 TABLET, DELAYED RELEASE ORAL
Status: DISCONTINUED | OUTPATIENT
Start: 2024-12-05 | End: 2024-12-06 | Stop reason: HOSPADM

## 2024-12-05 RX ORDER — SODIUM CHLORIDE, SODIUM LACTATE, POTASSIUM CHLORIDE, CALCIUM CHLORIDE 600; 310; 30; 20 MG/100ML; MG/100ML; MG/100ML; MG/100ML
50 INJECTION, SOLUTION INTRAVENOUS CONTINUOUS
Status: DISCONTINUED | OUTPATIENT
Start: 2024-12-05 | End: 2024-12-06 | Stop reason: HOSPADM

## 2024-12-05 RX ORDER — SENNOSIDES 8.6 MG
1 TABLET ORAL DAILY
Status: DISCONTINUED | OUTPATIENT
Start: 2024-12-05 | End: 2024-12-06 | Stop reason: HOSPADM

## 2024-12-05 RX ORDER — ASPIRIN 81 MG/1
81 TABLET ORAL 2 TIMES DAILY
Qty: 60 TABLET | Refills: 0 | Status: SHIPPED | OUTPATIENT
Start: 2024-12-05

## 2024-12-05 RX ORDER — ACETAMINOPHEN 10 MG/ML
1000 INJECTION, SOLUTION INTRAVENOUS ONCE
Status: COMPLETED | OUTPATIENT
Start: 2024-12-05 | End: 2024-12-05

## 2024-12-05 RX ORDER — CHLORHEXIDINE GLUCONATE 40 MG/ML
SOLUTION TOPICAL DAILY PRN
Status: DISCONTINUED | OUTPATIENT
Start: 2024-12-05 | End: 2024-12-05 | Stop reason: HOSPADM

## 2024-12-05 RX ORDER — BUPIVACAINE HYDROCHLORIDE 5 MG/ML
INJECTION, SOLUTION EPIDURAL; INTRACAUDAL
Status: COMPLETED | OUTPATIENT
Start: 2024-12-05 | End: 2024-12-05

## 2024-12-05 RX ORDER — FOLIC ACID 1 MG/1
1 TABLET ORAL DAILY
Status: DISCONTINUED | OUTPATIENT
Start: 2024-12-05 | End: 2024-12-06 | Stop reason: HOSPADM

## 2024-12-05 RX ORDER — ASPIRIN 81 MG/1
81 TABLET ORAL 2 TIMES DAILY
Status: DISCONTINUED | OUTPATIENT
Start: 2024-12-06 | End: 2024-12-06 | Stop reason: HOSPADM

## 2024-12-05 RX ORDER — ASCORBIC ACID 500 MG
500 TABLET ORAL 2 TIMES DAILY
Status: DISCONTINUED | OUTPATIENT
Start: 2024-12-05 | End: 2024-12-06 | Stop reason: HOSPADM

## 2024-12-05 RX ORDER — ACETAMINOPHEN 500 MG
1000 TABLET ORAL EVERY 8 HOURS
Qty: 60 TABLET | Refills: 0 | Status: SHIPPED | OUTPATIENT
Start: 2024-12-05 | End: 2024-12-17

## 2024-12-05 RX ORDER — OXYCODONE HYDROCHLORIDE 10 MG/1
10 TABLET ORAL EVERY 4 HOURS PRN
Status: DISCONTINUED | OUTPATIENT
Start: 2024-12-05 | End: 2024-12-06 | Stop reason: HOSPADM

## 2024-12-05 RX ORDER — FENTANYL CITRATE/PF 50 MCG/ML
50 SYRINGE (ML) INJECTION
Status: DISCONTINUED | OUTPATIENT
Start: 2024-12-05 | End: 2024-12-05 | Stop reason: HOSPADM

## 2024-12-05 RX ORDER — HYDROMORPHONE HCL/PF 1 MG/ML
0.5 SYRINGE (ML) INJECTION
Status: COMPLETED | OUTPATIENT
Start: 2024-12-05 | End: 2024-12-05

## 2024-12-05 RX ORDER — ONDANSETRON 2 MG/ML
4 INJECTION INTRAMUSCULAR; INTRAVENOUS ONCE AS NEEDED
Status: COMPLETED | OUTPATIENT
Start: 2024-12-05 | End: 2024-12-05

## 2024-12-05 RX ORDER — ONDANSETRON 2 MG/ML
4 INJECTION INTRAMUSCULAR; INTRAVENOUS EVERY 6 HOURS PRN
Status: DISCONTINUED | OUTPATIENT
Start: 2024-12-05 | End: 2024-12-06 | Stop reason: HOSPADM

## 2024-12-05 RX ORDER — MIDAZOLAM HYDROCHLORIDE 2 MG/2ML
2 INJECTION, SOLUTION INTRAMUSCULAR; INTRAVENOUS ONCE
Status: COMPLETED | OUTPATIENT
Start: 2024-12-05 | End: 2024-12-05

## 2024-12-05 RX ORDER — ACETAMINOPHEN 325 MG/1
650 TABLET ORAL EVERY 4 HOURS PRN
Status: DISCONTINUED | OUTPATIENT
Start: 2024-12-05 | End: 2024-12-06 | Stop reason: HOSPADM

## 2024-12-05 RX ADMIN — BUPIVACAINE 20 ML: 13.3 INJECTION, SUSPENSION, LIPOSOMAL INFILTRATION at 11:00

## 2024-12-05 RX ADMIN — ACETAMINOPHEN 1000 MG: 10 INJECTION INTRAVENOUS at 13:30

## 2024-12-05 RX ADMIN — SODIUM CHLORIDE, SODIUM LACTATE, POTASSIUM CHLORIDE, AND CALCIUM CHLORIDE 100 ML/HR: .6; .31; .03; .02 INJECTION, SOLUTION INTRAVENOUS at 22:09

## 2024-12-05 RX ADMIN — CEFAZOLIN SODIUM 2000 MG: 2 SOLUTION INTRAVENOUS at 11:20

## 2024-12-05 RX ADMIN — FENTANYL CITRATE 50 MCG: 50 INJECTION INTRAMUSCULAR; INTRAVENOUS at 12:56

## 2024-12-05 RX ADMIN — MORPHINE SULFATE 2 MG: 2 INJECTION, SOLUTION INTRAMUSCULAR; INTRAVENOUS at 20:16

## 2024-12-05 RX ADMIN — PHENYLEPHRINE HYDROCHLORIDE 20 MCG/MIN: 10 INJECTION INTRAVENOUS at 11:59

## 2024-12-05 RX ADMIN — CHLORHEXIDINE GLUCONATE 15 ML: 1.2 RINSE ORAL at 09:58

## 2024-12-05 RX ADMIN — FOLIC ACID 1 MG: 1 TABLET ORAL at 14:42

## 2024-12-05 RX ADMIN — ACETAMINOPHEN 325MG 975 MG: 325 TABLET ORAL at 09:58

## 2024-12-05 RX ADMIN — ALPRAZOLAM 1 MG: 0.25 TABLET ORAL at 22:08

## 2024-12-05 RX ADMIN — EPHEDRINE SULFATE 5 MG: 50 INJECTION, SOLUTION INTRAVENOUS at 11:51

## 2024-12-05 RX ADMIN — EPHEDRINE SULFATE 10 MG: 50 INJECTION, SOLUTION INTRAVENOUS at 11:56

## 2024-12-05 RX ADMIN — MIDAZOLAM 2 MG: 1 INJECTION INTRAMUSCULAR; INTRAVENOUS at 11:00

## 2024-12-05 RX ADMIN — SODIUM CHLORIDE, SODIUM LACTATE, POTASSIUM CHLORIDE, AND CALCIUM CHLORIDE 125 ML/HR: .6; .31; .03; .02 INJECTION, SOLUTION INTRAVENOUS at 09:58

## 2024-12-05 RX ADMIN — TRANEXAMIC ACID 1000 MG: 10 INJECTION, SOLUTION INTRAVENOUS at 11:26

## 2024-12-05 RX ADMIN — MORPHINE SULFATE 2 MG: 2 INJECTION, SOLUTION INTRAMUSCULAR; INTRAVENOUS at 17:07

## 2024-12-05 RX ADMIN — DEXAMETHASONE SODIUM PHOSPHATE 10 MG: 10 INJECTION INTRAMUSCULAR; INTRAVENOUS at 11:33

## 2024-12-05 RX ADMIN — BUPIVACAINE HYDROCHLORIDE 10 ML: 5 INJECTION, SOLUTION EPIDURAL; INTRACAUDAL; PERINEURAL at 11:00

## 2024-12-05 RX ADMIN — EPHEDRINE SULFATE 10 MG: 50 INJECTION, SOLUTION INTRAVENOUS at 12:08

## 2024-12-05 RX ADMIN — HYDROMORPHONE HYDROCHLORIDE 0.5 MG: 1 INJECTION, SOLUTION INTRAMUSCULAR; INTRAVENOUS; SUBCUTANEOUS at 13:02

## 2024-12-05 RX ADMIN — PROPOFOL 80 MCG/KG/MIN: 10 INJECTION, EMULSION INTRAVENOUS at 11:25

## 2024-12-05 RX ADMIN — OXYCODONE HYDROCHLORIDE 10 MG: 10 TABLET ORAL at 18:33

## 2024-12-05 RX ADMIN — ONDANSETRON 4 MG: 2 INJECTION INTRAMUSCULAR; INTRAVENOUS at 13:35

## 2024-12-05 RX ADMIN — OXYCODONE HYDROCHLORIDE AND ACETAMINOPHEN 500 MG: 500 TABLET ORAL at 17:05

## 2024-12-05 RX ADMIN — ACETAMINOPHEN 325MG 975 MG: 325 TABLET ORAL at 22:08

## 2024-12-05 RX ADMIN — SODIUM CHLORIDE, SODIUM LACTATE, POTASSIUM CHLORIDE, AND CALCIUM CHLORIDE 100 ML/HR: .6; .31; .03; .02 INJECTION, SOLUTION INTRAVENOUS at 14:38

## 2024-12-05 RX ADMIN — HYDROMORPHONE HYDROCHLORIDE 0.5 MG: 1 INJECTION, SOLUTION INTRAMUSCULAR; INTRAVENOUS; SUBCUTANEOUS at 13:08

## 2024-12-05 RX ADMIN — ONDANSETRON 4 MG: 2 INJECTION INTRAMUSCULAR; INTRAVENOUS at 11:33

## 2024-12-05 RX ADMIN — Medication 1 TABLET: at 14:42

## 2024-12-05 RX ADMIN — SENNOSIDES 8.6 MG: 8.6 TABLET, FILM COATED ORAL at 14:42

## 2024-12-05 RX ADMIN — MEPIVACAINE HYDROCHLORIDE 3 ML: 15 INJECTION, SOLUTION EPIDURAL; INFILTRATION at 11:22

## 2024-12-05 RX ADMIN — KETOROLAC TROMETHAMINE 15 MG: 30 INJECTION, SOLUTION INTRAMUSCULAR at 15:43

## 2024-12-05 RX ADMIN — EPHEDRINE SULFATE 10 MG: 50 INJECTION, SOLUTION INTRAVENOUS at 12:39

## 2024-12-05 RX ADMIN — DOCUSATE SODIUM 100 MG: 100 CAPSULE, LIQUID FILLED ORAL at 17:05

## 2024-12-05 RX ADMIN — HYDROMORPHONE HYDROCHLORIDE 0.5 MG: 1 INJECTION, SOLUTION INTRAMUSCULAR; INTRAVENOUS; SUBCUTANEOUS at 13:16

## 2024-12-05 RX ADMIN — FENTANYL CITRATE 50 MCG: 50 INJECTION INTRAMUSCULAR; INTRAVENOUS at 12:51

## 2024-12-05 RX ADMIN — SODIUM CHLORIDE, SODIUM LACTATE, POTASSIUM CHLORIDE, AND CALCIUM CHLORIDE: .6; .31; .03; .02 INJECTION, SOLUTION INTRAVENOUS at 12:27

## 2024-12-05 RX ADMIN — OXYCODONE 5 MG: 5 TABLET ORAL at 13:41

## 2024-12-05 RX ADMIN — MIDAZOLAM 2 MG: 1 INJECTION INTRAMUSCULAR; INTRAVENOUS at 11:19

## 2024-12-05 RX ADMIN — PANTOPRAZOLE SODIUM 40 MG: 40 TABLET, DELAYED RELEASE ORAL at 14:42

## 2024-12-05 RX ADMIN — CEFAZOLIN SODIUM 2000 MG: 2 SOLUTION INTRAVENOUS at 19:58

## 2024-12-05 NOTE — PHYSICAL THERAPY NOTE
PT EVALUATION    Pt. Name: Aislinn Rouse  Pt. Age: 64 y.o.  MRN: [id number]  LENGTH OF STAY: 0    Patient Active Problem List   Diagnosis    Asthma    History of bariatric surgery    Intentional drug overdose (HCC)    Major depressive disorder, recurrent severe without psychotic features (HCC)    Hypoglycemia    Chronic back pain    Generalized anxiety disorder    Postgastrectomy malabsorption    Bilateral carpal tunnel syndrome    Generalized osteoarthritis    Chronic nonintractable headache    Epigastric abdominal pain    Vitamin D deficiency    Vitamin B12 deficiency    Poor appetite    Secondary hyperparathyroidism of renal origin (HCC)    Lumbar spondylosis    Localized adiposity    Status post panniculectomy    History of hysterectomy    Acute exacerbation of chronic low back pain    Joint pain    Grief    Chronic pain of left knee    Hx of adenomatous colonic polyps    Primary osteoarthritis of left knee    Mild persistent asthma without complication       Admitting Diagnoses:   Primary osteoarthritis of left knee [M17.12]    Past Medical History:   Diagnosis Date    Anxiety     Arthritis     Asthma     Albuterol prn    Bariatric surgery status     Chronic pain disorder     left knee    COVID-19 11/19/2021    Depression 2009    managed with Effexor     Generalized osteoarthritis     GERD (gastroesophageal reflux disease)     Low vitamin B12 level     Migraine     Postgastrectomy malabsorption     Suicide attempt (HCC)     Vitamin D deficiency     Wears dentures     Wears glasses        Past Surgical History:   Procedure Laterality Date    CHOLECYSTECTOMY  2005    COLONOSCOPY      COSMETIC SURGERY  05/27/2020    Abdominoplasty    EGD      GASTRIC BYPASS  2015    elvira - en -y     HYSTERECTOMY      IR IMAGE GUIDED ASPIRATION / DRAINAGE W TUBE  07/14/2020    KNEE ARTHROSCOPY      with Lysis of adhesions    LAPAROSCOPIC SUPRACERVICAL HYSTERECTOMY  2005    due to endometriosis/AUB    OOPHORECTOMY Left  2005    MO EXCISION SKIN ABD INFRAUMBILICAL PANNICULECTOMY N/A 05/27/2020    Procedure: PANNICULECTOMY;  Surgeon: Bacilio Iraheta MD;  Location: [facility];  Service: Plastics    TONSILLECTOMY AND ADENOIDECTOMY      TUBAL LIGATION  1986    VAC DRESSING APPLICATION N/A 05/27/2020    Procedure: APPLICATION VAC DRESSING;  Surgeon: Bacilio Iraheta MD;  Location: [facility];  Service: Plastics       Imaging Studies:  XR knee left 1 or 2 views    (Results Pending)         12/05/24 1645   PT Last Visit   PT Visit Date 12/05/24   Note Type   Note type Evaluation   Additional Comments pt greeted in supine   Pain Assessment   Pain Assessment Tool 0-10   Pain Score 7   Pain Location/Orientation Orientation: Left;Location: Knee   Hospital Pain Intervention(s) Repositioned;Ambulation/increased activity;Elevated;Emotional support;Rest   Restrictions/Precautions   Weight Bearing Precautions Per Order Yes   LLE Weight Bearing Per Order WBAT   Other Precautions Chair Alarm;Bed Alarm;WBS;Fall Risk;Pain   Home Living   Type of Home House   Home Layout Multi-level;Bed/bath upstairs;Stairs to enter without rails  (3 RAMSES, 12+3 R rail to 2nd)   Bathroom Shower/Tub Tub/shower unit   Bathroom Toilet Standard   Bathroom Equipment Tub transfer bench;Commode   Bathroom Accessibility Accessible   Home Equipment Walker;Cane   Additional Comments Pt has to go upstairs to 2nd floor, no bathroom on main level. Refuses to use BSC on main level   Prior Function   Level of [place] Independent with ADLs;Independent with functional mobility;Independent with IADLS   Lives With Other (Comment)  (grandson- works/goes to school during the day)   Receives Help From Family   IADLs Independent with driving;Independent with meal prep;Independent with medication management   Falls in the last 6 months 1 to 4  (1x)   Vocational Retired   Comments no AD at baseline. Per grandson- pt will be alone during the day, he goes to school and works  after school   General   Family/Caregiver Present Yes   Cognition   Overall Cognitive Status WFL   Arousal/Participation Alert   Orientation Level Oriented X4   Following Commands Follows one step commands without difficulty   Comments Pt very tired and in pain   RUE Assessment   RUE Assessment   (see OT note)   LUE Assessment   LUE Assessment   (see OT note)   RLE Assessment   RLE Assessment WFL   LLE Assessment   LLE Assessment X  (did not assess knee due to post op, able to flex hip and move ankle through normal ROM)   Light Touch   RLE Light Touch Grossly intact   LLE Light Touch Impaired   LLE Light Touch Comments Pt noted less sensation around incicion on L LE   Bed Mobility   Supine to Sit 4  Minimal assistance   Additional items Assist x 1;Increased time required;Verbal cues;LE management   Sit to Supine Unable to assess   Additional Comments pt greeted in supine   Transfers   Sit to Stand 3  Moderate assistance   Additional items Assist x 2;Increased time required;Verbal cues  (RW)   Stand to Sit 3  Moderate assistance   Additional items Assist x 2;Increased time required;Verbal cues  (RW)   Additional Comments max cues for hand placement and RW safety in standing.   Ambulation/Elevation   Gait pattern Improper Weight shift;L Knee Chucky;Antalgic;Forward Flexion;Decreased L stance;Excessively slow;Step to;Short stride;Decreased heel strike;Decreased toe off   Gait Assistance 3  Moderate assist   Additional items Assist x 2;Verbal cues   Assistive Device Rolling walker   Distance 4 steps   Stair Management Assistance Not tested   Ambulation/Elevation Additional Comments (S)  Inconsistent buckling in L LE in standing and during ambulation. High pain levels and tearful during session. Max cues needed for LE sequencing and knee extension during standing.   Balance   Static Sitting Fair +   Dynamic Sitting Fair   Static Standing Fair -   Dynamic Standing Poor +   Ambulatory Poor +   Endurance Deficit    Endurance Deficit Yes   Endurance Deficit Description fatigue and pain   Activity Tolerance   Activity Tolerance Patient limited by fatigue;Patient limited by pain;Other (Comment)  (buckling in L LE)   Medical Staff Made Aware RN C, OT [place]   Nurse Made Aware yes   Assessment   Prognosis Good   Problem List Decreased strength;Decreased range of motion;Decreased endurance;Impaired balance;Decreased mobility;Orthopedic restrictions;Pain   Assessment Pt is a 64 y.o. female who presented to [facility] on 12/5/2024 s/p L TKA done by Dr. Aggarwal. Precautions include WBAT. Pt  has a past medical history of Anxiety, Arthritis, Asthma, Bariatric surgery status, Chronic pain disorder, Depression (2009), Generalized osteoarthritis, GERD (gastroesophageal reflux disease), Low vitamin B12 level, Migraine, Postgastrectomy malabsorption, Suicide attempt (HCC), Vitamin D deficiency, Wears dentures, and Wears glasses.. Pt greeted at bedside for PT evaluation on 12/05/24. Pt referred to PT for functional mobility evaluation & D/C planning w/ orders of activity beginning POD #0 and activity as tolerated. PTA, pt reports being I w/o AD and I w/ IADLs. Personal factors affecting pt at time of IE include: lives in 2 story house and stairs to enter home. Please find objective findings from PT assessment regarding body systems outlined above with impairments and limitations including weakness, decreased ROM, impaired balance, decreased endurance, gait deviations, pain, decreased activity tolerance, decreased functional mobility tolerance, fall risk, and orthopedic restrictions.  Please see flow sheet above for objective findings and level of assistance required for safe completion of functional tasks. Pt was able to perform supine to sit with minAx1 for LE management. Pt noted a 7/10 pain at start of session. Pt performed sit<>stand with modAx2 and RW. Pt noted weakness in L LE, and was buckling in standing and during ambulation. Pt able to  take 4 steps to bedside chair with modAx2 and RW. Pt needed max cues for sequencing and RW safety.  Pt demonstrated dec endurance and tolerance to activity. Denies reports of dizziness or SOB t/o session. Patient was left in recliner chair  with call bell and all needs within reach. Pt was educated on fall precautions and reinforced w/ good understanding. Based on pt presentation and impaired function, pt would benefit from level II, (moderate resource intensity) at D/C. From PT/mobility standpoint, pt would benefit from OPPT to address deficits as defined above and maximize level of independence and return pt to PLOF. CM to follow. Nsg staff to continue to mobilized pt (OOB in chair for all meals & ambulate in room/unit) as tolerated to prevent further decline in function. Nsg notified. Co-eval performed with OT to complete this evaluation for the pts best interest given pts medical complexity and functional level.   Barriers to Discharge Inaccessible home environment;Decreased caregiver support  (RAMSES, steps to 2nd floor. Home alone due to grandson going to school and working after school)   Goals   Patient Goals to feel better   [facility] Expiration Date 12/12/24   Short Term Goal #1 1).  Pt will perform bed mobility with Brenda demonstrating appropriate technique 100% of the time in order to improve function.2)  Perform all transfers with Brenda demonstrating safe and appropriate technique 100% of the time in order to improve ability to negotiate safely in home environment.3) Amb with least restrictive AD > 200'x1 with Brenda in order to demonstrate ability to negotiate in home environment.4)  Improve overall strength and balance 1/2 grade in order to optimize ability to perform functional tasks and reduce fall risk.5) Increase activity tolerance to 45 minutes in order to improve endurance to functional tasks.6)  Negotiate stairs using most appropriate technique and Brenda in order to be able to negotiate safely in home  environment. 7) PT for ongoing patient and family/caregiver education, DME needs and d/c planning in order to promote highest level of function in least restrictive environment.   Plan   Treatment/Interventions Functional transfer training;LE strengthening/ROM;Elevations;Therapeutic exercise;Endurance training;Patient/family training;Bed mobility;Gait training;Spoke to nursing;OT;Family;Spoke to case management   PT Frequency Twice a day  (prn)   Discharge Recommendation   Rehab Resource Intensity Level, PT II (Moderate Resource Intensity)   Equipment Recommended Walker  (pt owns)   Additional Comments The patient's AM-PAC Basic Mobility Inpatient Short Form Raw Score is 14. A Raw score of less than or equal to 16 suggests the patient may benefit from discharge to post-acute rehabilitation services. Please also refer to the recommendation of the Physical Therapist for safe discharge planning.   AM-PAC Basic Mobility Inpatient   Turning in Flat Bed Without Bedrails 3   Lying on Back to Sitting on Edge of Flat Bed Without Bedrails 3   Moving Bed to Chair 2   Standing Up From Chair Using Arms 2   Walk in Room 2   Climb 3-5 Stairs With Railing 2   Basic Mobility Inpatient Raw Score 14   Basic Mobility Standardized Score 35.55   [facility] Highest Level Of Mobility   -HLM Goal 4: Move to chair/commode   -HL Achieved 4: Move to chair/commode   End of Consult   Patient Position at End of Consult Bedside chair;Bed/Chair alarm activated;All needs within reach   End of Consult Comments pt stable, left seated in recliner with family in room. Alarm on and call bell within reach. RN updated   Hx/personal factors: co-morbidities, inaccessible home, dec caregiver support, home alone, pain, h/o of falls, and fall risk  Examination: dec mobility, dec balance, dec endurance, dec amb, risk for falls, pain, assessed body system, balance, endurance, amb, D/C disposition & fall risk, WB restrictions, impairements in locomotion,  musculoskeletal, balance, endurance, posture, coordination  Clinical: unpredictable (ongoing medical status, risk for falls, POD #0, and pain mgt)  Complexity: high  Reema Warner, PT

## 2024-12-05 NOTE — PLAN OF CARE
Problem: PAIN - ADULT  Goal: Verbalizes/displays adequate comfort level or baseline comfort level  Description: Interventions:  - Encourage patient to monitor pain and request assistance  - Assess pain using appropriate pain scale  - Administer analgesics based on type and severity of pain and evaluate response  - Implement non-pharmacological measures as appropriate and evaluate response  - Consider cultural and social influences on pain and pain management  - Notify physician/advanced practitioner if interventions unsuccessful or patient reports new pain  Outcome: Progressing     Problem: INFECTION - ADULT  Goal: Absence or prevention of progression during hospitalization  Description: INTERVENTIONS:  - Assess and monitor for signs and symptoms of infection  - Monitor lab/diagnostic results  - Monitor all insertion sites, i.e. indwelling lines, tubes, and drains  - Monitor endotracheal if appropriate and nasal secretions for changes in amount and color  - [place] appropriate cooling/warming therapies per order  - Administer medications as ordered  - Instruct and encourage patient and family to use good hand hygiene technique  - Identify and instruct in appropriate isolation precautions for identified infection/condition  Outcome: Progressing  Goal: Absence of fever/infection during neutropenic period  Description: INTERVENTIONS:  - Monitor WBC    Outcome: Progressing     Problem: SAFETY ADULT  Goal: Patient will remain free of falls  Description: INTERVENTIONS:  - Educate patient/family on patient safety including physical limitations  - Instruct patient to call for assistance with activity   - Consult OT/PT to assist with strengthening/mobility   - Keep Call bell within reach  - Keep bed low and locked with side rails adjusted as appropriate  - Keep care items and personal belongings within reach  - Initiate and maintain comfort rounds  - Make Fall Risk Sign visible to staff  - Offer Toileting every 2 Hours,  in advance of need  - Initiate/Maintain bed/chair alarm  - Obtain necessary fall risk management equipment: rolling walker  - Apply yellow socks and bracelet for high fall risk patients  - Consider moving patient to room near nurses station  Outcome: Progressing  Goal: Maintain or return to baseline ADL function  Description: INTERVENTIONS:  -  Assess patient's ability to carry out ADLs; assess patient's baseline for ADL function and identify physical deficits which impact ability to perform ADLs (bathing, care of mouth/teeth, toileting, grooming, dressing, etc.)  - Assess/evaluate cause of self-care deficits   - Assess range of motion  - Assess patient's mobility; develop plan if impaired  - Assess patient's need for assistive devices and provide as appropriate  - Encourage maximum independence but intervene and supervise when necessary  - Involve family in performance of ADLs  - Assess for home care needs following discharge   - Consider OT consult to assist with ADL evaluation and planning for discharge  - Provide patient education as appropriate  Outcome: Progressing  Goal: Maintains/Returns to pre admission functional level  Description: INTERVENTIONS:  - Perform AM-PAC 6 Click Basic Mobility/ Daily Activity assessment daily.  - Set and communicate daily mobility goal to care team and patient/family/caregiver.   - Collaborate with rehabilitation services on mobility goals if consulted  - Perform Range of Motion 3 times a day.  - Reposition patient every 2 hours.  - Dangle patient 3 times a day  - Stand patient 3 times a day  - Ambulate patient 3 times a day  - Out of bed to chair 3 times a day   - Out of bed for meals 3 times a day  - Out of bed for toileting  - Record patient progress and toleration of activity level   Outcome: Progressing     Problem: DISCHARGE PLANNING  Goal: Discharge to home or other facility with appropriate resources  Description: INTERVENTIONS:  - Identify barriers to discharge  w/patient and caregiver  - Arrange for needed discharge resources and transportation as appropriate  - Identify discharge learning needs (meds, wound care, etc.)  - Arrange for interpretive services to assist at discharge as needed  - Refer to Case Management Department for coordinating discharge planning if the patient needs post-hospital services based on physician/advanced practitioner order or complex needs related to functional status, cognitive ability, or social support system  Outcome: Progressing     Problem: Knowledge Deficit  Goal: Patient/family/caregiver demonstrates understanding of disease process, treatment plan, medications, and discharge instructions  Description: Complete learning assessment and assess knowledge base.  Interventions:  - Provide teaching at level of understanding  - Provide teaching via preferred learning methods  Outcome: Progressing

## 2024-12-05 NOTE — OP NOTE
OPERATIVE REPORT  PATIENT NAME: Aislinn Rouse  : 1960  MRN: [id number]  Pt Location:  [facility] OR ROOM 05    Surgery Date: 2024    Surgeons and Role:     * Nash Aggarwal, DO - Primary     * MARTHA Hallman-C - Assisting      * Viri Flood OT-C - Assisting     Preop Diagnosis:  Primary osteoarthritis of left knee [M17.12]    Post-Op Diagnosis Codes:     * Primary osteoarthritis of left knee [M17.12]    Procedure(s):  Left - ARTHROPLASTY KNEE TOTAL    Specimens:  * No specimens in log *    Estimated Blood Loss:   25 cc    Drains:  * No LDAs found *    Anesthesia Type:   Spinal      Operative Indications:  Primary osteoarthritis of left knee [M17.12]    65 y/o female with left knee pain secondary to osteoarthritis. She has tried and failed conservative treatment options including cortisone injections and visco injections. She has been attending PT with continued worsening of her pain as well as a hinged knee brace. The patient has elected to proceed with Left TKA. Risks and benefits of surgery to include but not limited to bleeding, infection, damage to surrounding structures, hardware failure, instability, fracture, dislocation, need for further surgery, continued pain, stiffness, blood clots, stroke, and heart attack was discussed with the patient.     Operative Findings:  Severe tricompartmental osteoarthritis   Pre-op ROM 3-115  Post-op ROM 0-130+ calf to thigh gravity-assisted flexion    Implant Name Type Inv. Item Serial No.  Lot No. LRB No. Used Action   DEPUY BONE CEMENT CMW2 - [id number]  DEPUY BONE CEMENT CMW2  DEPUY [id number] Left 1 Implanted   INSERT TIB SZ 7 6MM LT FX BRNG MED STAB ATTUNE - [id number]  INSERT TIB SZ 7 6MM LT FX BRNG MED STAB ATTUNE  DEPUY [id number] Left 1 Implanted   BASEPLATE TIBIAL SZ 5 FX BRNG  ATTUNE - [id number]  BASEPLATE TIBIAL SZ 5 FX BRNG  ATTUNE  DEPUY [id number] Left 1 Implanted   COMPONENT PATELLA 35MM MEDIAL DOME ATTUNE - [id number]  COMPONENT  PATELLA 35MM MEDIAL DOME ATTUNE  DEPUY [id number] Left 1 Implanted   COMPONENT FEM SZ 7 LT POR CR ATTUNE - [id number]  COMPONENT FEM SZ 7 LT POR CR ATTUNE  DEPUY [id number] Left 1 Implanted     Complications:   None    Knee Technique: Suture (direct) Repair  Knee Approach: Medial Parapatellar    Chronic Narcotic Use:  No      Procedure and Technique:  Patient was seen in the preoperative holding area.  Informed consent was confirmed and all questions were answered. Operative site was confirmed and marked. Patient was taken to the operating room and transferred to the operating room table. Anesthesia was performed. The patient was then placed supine and all bony prominences were well-padded. Left lower extremity was prepped and draped in usual sterile fashion with chlorhexidine scrub.  Patient was given perioperative antibiotics prior to incision and SCDs were placed on the non-operative leg.  A formal time-out was performed identifying the patient and confirmed operative site.  The knee was exsanguinated and a pneumatic tourniquet was inflated at 250 mmHg.  A slightly medial midline incision was performed from 3 fingerbreadths above the patella to the tibial tubercle.  This incision was carried down through skin and subcutaneous tissues to the level of the extensor mechanism.  A small medial skin flap was created.  A medial parapatellar approach was performed into the knee being careful to avoid the patellar tendon.  The anterior horn of the medial and lateral meniscus was released.  The medial peel was performed to the mid coronal line.  Lateral patellofemoral plica was excised and the patella was everted.  The fat pad was removed being careful to avoid the patellar tendon.  Peripheral osteophytes removed at this time as was the anterior cruciate ligament.  The intramedullary femoral drill was now used just medial and anterior to the PCL insertion at the sulcus terminalis.  A drop sushma was used to confirm intramedullary  placement of the drill.  The distal femoral cutting jig was now inserted into the intramedullary canal set to 5° of valgus per pre-op template and 9 mm distal resection.  Distal resection was checked with an Chi wing and deemed appropriate.  The distal femur was cut.  At this time the distal femoral cutting guide was removed and the sizing guide was placed on the distal femur.  A posterior referencing system was used and pins placed with 3° of external rotation.  The femur was sized to the above size.  The appropriate cutting block was then placed over the pins and rotation was confirmed being parallel to the epicondylar access and perpendicular to Whitesides line.  Retractors were placed to protect the collateral ligaments and the anterior, posterior, posterior chamfer, anterior chamfer was cut. The distal 5th cut was also performed.  Bone fragments were removed with curved osteotome and then posterior Hohmann was placed to sublux the tibia forward.  An extramedullary tibial guide was used veing cognizant of varus valgus alignment, slope and depth of resection.  The guide was pinned in place and then a drop sushma was used to confirm appropriate alignment.  The tibia was cut and removed.  At this time the bilateral menisci and posterior osteophytes of the femur were resected with the use of a laminar .  Once adequate osteophytes were removed the knee was trialed with the above implants.  The knee was found to have excellent stability with a 6 mm MS insert.  At this time the patella was measured and resected to appropriate depth.  The patella sized to the above size and 3 drill holes were performed.  At this time the knee was again taken through range of motion and found to have excellent stability and excellent patellar tracking.  The trials were floated and rotation of tibia marked.  The femoral lug drills were drilled.  The femur and trial poly were removed and posterior Hohmann placed to sublux the tibia  forward.  The Press-Fit tibial base plate was then aligned on the cut surface of the tibia matching where the trial was floated at approximately the medial 1/3 the tibial tubercle.  The base plate was pinned in place and prep tower impacted.  The Boss Reamer for the Press-Fit tibia was used 1st followed by keel punch.  The peripheral holes were then drilled as well.  At this time all trials were removed and the knee was copiously irrigated with normal saline solution.  The Press-Fit base plate was impacted into place and assured to be flush in all corners.  The MS polyethylene was impacted into the clean tray. The posterior Hohmann was then removed.  The Press-Fit femoral component was impacted in place and assured to be flush on all bony surfaces.  The patella cut surface was dried and the domed patella was cemented into place and held with a clamp.  Peripheral cement was cleared and the clamp was held until cement cured.  The tourniquet was released at 24 minutes and all bleeders were coagulated.  The knee was irrigated with Irrisept solution and then the remainder of the 3 L of normal saline solution.  The joint local was injected in the posterior capsule and around the tissues of the knee.  The knee was taken through a final range of motion and found to have excellent stability and patellar tracking.  All instrument and sponge counts were correct x2.  The arthrotomy was closed with #1 Stratafix suture.  Subcutaneous tissues were closed with 2-0 Vicryl.  The skin was closed with 3-0 Stratafix followed by Dermabond.  A sterile Mepilex was placed along with a thigh foot Ace wrap.  Patient was awoken from anesthesia and taken to recovery room in stable condition.    64F s/p L TKA 12/5  - multi-modal pain control  - ancef x 24 hrs  - DVT ppx: aspirin 81mg BID x 4 weeks  - PT/OT  - WBAT  - ROM as tolerated, pillow/blankets under achilles not behind knee while in bed  - f/u 10-14 days      I was present for the entire  procedure., A qualified resident physician was not available., and A physician assistant was required during the procedure for retraction, tissue handling, dissection and suturing.    Patient Disposition:  PACU       SIGNATURE: Nash Aggarwal DO  DATE: December 5, 2024  TIME: 12:47 PM

## 2024-12-05 NOTE — ANESTHESIA PREPROCEDURE EVALUATION
Procedure:  ARTHROPLASTY KNEE TOTAL (Left: Knee)     - denies any chest pain, palpitations, shortness of breath, syncope, lightheadedness, seizures   - denies any recent infectious symptoms such as fevers, chills, cough   - denies taking any anticoagulation medications or any issues with bleeding, bruising, clotting    EKG (11/11/24):  NSR, HR 64bpm, Qtc 432    Relevant Problems   ANESTHESIA (within normal limits)      CARDIO (within normal limits)      ENDO   (+) Secondary hyperparathyroidism of renal origin (HCC)      GI/HEPATIC (within normal limits)      /RENAL (within normal limits)      GYN   (+) History of hysterectomy      HEMATOLOGY (within normal limits)      MUSCULOSKELETAL   (+) Acute exacerbation of chronic low back pain   (+) Chronic back pain   (+) Generalized osteoarthritis   (+) Lumbar spondylosis   (+) Primary osteoarthritis of left knee      NEURO/PSYCH   (+) Acute exacerbation of chronic low back pain   (+) Chronic back pain   (+) Chronic nonintractable headache   (+) Generalized anxiety disorder   (+) Major depressive disorder, recurrent severe without psychotic features (HCC)      PULMONARY   (+) Asthma   (+) Mild persistent asthma without complication      Lab Results   Component Value Date    WBC 6.65 11/11/2024    HGB 13.0 11/11/2024    HCT 40.9 11/11/2024    MCV 90 11/11/2024     11/11/2024     Lab Results   Component Value Date     05/24/2015    SODIUM 140 11/11/2024    K 4.3 11/11/2024     11/11/2024    CO2 30 11/11/2024    ANIONGAP 2 (L) 05/24/2015    AGAP 4 11/11/2024    BUN 19 11/11/2024    CREATININE 0.77 11/11/2024    GLUC 92 06/26/2022    GLUF 106 (H) 11/11/2024    CALCIUM 9.6 11/11/2024    AST 14 11/11/2024    ALT 11 11/11/2024    ALKPHOS 86 11/11/2024    PROT 7.4 05/24/2015    TP 6.4 11/11/2024    BILITOT 0.3 05/24/2015    TBILI 0.54 11/11/2024    EGFR 83 11/11/2024     Lab Results   Component Value Date    PTT 25 11/11/2024     Lab Results   Component Value  Date    INR 1.02 11/11/2024    INR 1.01 03/18/2019    PROTIME 13.6 11/11/2024    PROTIME 10.7 03/18/2019       Physical Exam    Airway    Mallampati score: II  TM Distance: >3 FB  Neck ROM: full     Dental       Cardiovascular  Rhythm: regular, Rate: normal, Cardiovascular exam normal    Pulmonary  Pulmonary exam normal Breath sounds clear to auscultation    Other Findings  post-pubertal.      Anesthesia Plan  ASA Score- 2     Anesthesia Type- spinal with ASA Monitors.         Additional Monitors:     Airway Plan:     Comment: Spinal with IV sedation, preoperative adductor canal block.       Plan Factors-Exercise tolerance (METS): >4 METS.    Chart reviewed. EKG reviewed.  Existing labs reviewed. Patient summary reviewed.    Patient is not a current smoker.  Patient did not smoke on day of surgery.    Obstructive sleep apnea risk education given perioperatively.        Induction- intravenous.    Postoperative Plan- Plan for postoperative opioid use.         Informed Consent- Anesthetic plan and risks discussed with patient.  I personally reviewed this patient with the CRNA. Discussed and agreed on the Anesthesia Plan with the CRNA..

## 2024-12-05 NOTE — ANESTHESIA POSTPROCEDURE EVALUATION
Post-Op Assessment Note    CV Status:  Stable  Pain Score: 0    Pain management: adequate       Mental Status:  Alert and awake   Hydration Status:  Euvolemic   PONV Controlled:  Controlled   Airway Patency:  Patent     Post Op Vitals Reviewed: Yes    No anethesia notable event occurred.    Staff: Anesthesiologist, CRNA   Comments: Ephedrine 10mg given for low BP; note starting SBP high 90s - low 100s        Last Filed PACU Vitals:  Vitals Value Taken Time   Temp 98 °F (36.7 °C) 12/05/24 1235   Pulse 66 12/05/24 1240   BP 82/46 12/05/24 1238   Resp 17 12/05/24 1240   SpO2 99 % 12/05/24 1240   Vitals shown include unfiled device data.    Modified Jennifer:  Activity: 0 (12/5/2024 12:35 PM)  Respiration: 1 (12/5/2024 12:35 PM)  Circulation: 2 (12/5/2024 12:35 PM)  Consciousness: 0 (12/5/2024 12:35 PM)  Oxygen Saturation: 1 (12/5/2024 12:35 PM)  Modified Jennifer Score: 4 (12/5/2024 12:35 PM)

## 2024-12-05 NOTE — OCCUPATIONAL THERAPY NOTE
Occupational Therapy Evaluation     Patient Name: Aislinn Rouse  Today's Date: 12/5/2024  Problem List  Principal Problem:    Primary osteoarthritis of left knee    Past Medical History  Past Medical History:   Diagnosis Date    Anxiety     Arthritis     Asthma     Albuterol prn    Bariatric surgery status     Chronic pain disorder     left knee    COVID-19 11/19/2021    Depression 2009    managed with Effexor     Generalized osteoarthritis     GERD (gastroesophageal reflux disease)     Low vitamin B12 level     Migraine     Postgastrectomy malabsorption     Suicide attempt (HCC)     Vitamin D deficiency     Wears dentures     Wears glasses      Past Surgical History  Past Surgical History:   Procedure Laterality Date    CHOLECYSTECTOMY  2005    COLONOSCOPY      COSMETIC SURGERY  05/27/2020    Abdominoplasty    EGD      GASTRIC BYPASS  2015    elvira - en -y     HYSTERECTOMY      IR IMAGE GUIDED ASPIRATION / DRAINAGE W TUBE  07/14/2020    KNEE ARTHROSCOPY      with Lysis of adhesions    LAPAROSCOPIC SUPRACERVICAL HYSTERECTOMY  2005    due to endometriosis/AUB    OOPHORECTOMY Left 2005    WA EXCISION SKIN ABD INFRAUMBILICAL PANNICULECTOMY N/A 05/27/2020    Procedure: PANNICULECTOMY;  Surgeon: Bacilio Iraheta MD;  [facility]: [facility];  Service: Plastics    TONSILLECTOMY AND ADENOIDECTOMY      TUBAL LIGATION  1986    VAC DRESSING APPLICATION N/A 05/27/2020    Procedure: APPLICATION VAC DRESSING;  Surgeon: Bacilio Iraheta MD;  Location: [facility];  Service: Plastics         12/05/24 1626   OT Last Visit   OT Visit Date 12/05/24   Note Type   Note type Evaluation   Additional Comments pt greeted in supine, agreeable to OT evaluation   Pain Assessment   Pain Assessment Tool 0-10   Pain Score 7   Pain Location/Orientation Orientation: Left;Location: Knee   Hospital Pain Intervention(s) Repositioned;Ambulation/increased activity;Emotional support;Rest   Restrictions/Precautions   Weight Bearing  Precautions Per Order Yes   LLE Weight Bearing Per Order WBAT   Other Precautions Chair Alarm;Bed Alarm;WBS;Multiple lines;Fall Risk;Pain   Home Living   Type of Home House   Home Layout Multi-level;Bed/bath upstairs;Stairs to enter without rails  (3 RAMSES no handrail, FF to 2nd floor 12+3 w/ R handrail)   Bathroom Shower/Tub Tub/shower unit   Bathroom Toilet Standard   Bathroom Equipment Tub transfer bench;Commode   Bathroom Accessibility Accessible   Home Equipment Walker;Cane   Additional Comments (S)  Pt reporting she has to go upstairs to 2nd floor, no bathroom on main level. Pt reporting she will not use the BSC on the main level of the home.   Prior Function   Level of [place] Independent with ADLs;Independent with functional mobility;Independent with IADLS   Lives With Other (Comment)  (grandson, works and goes to school during the day)   Receives Help From Family   IADLs Independent with driving;Independent with meal prep;Independent with medication management   Falls in the last 6 months 1 to 4  (1x)   Vocational Retired   Comments no AD use at baseline   Lifestyle   Autonomy Independent with all ADLs/IADLs, no AD use at baseline   Reciprocal Relationships Family; daughter and grandson but both work during the day   Service to Others Retired   Intrinsic Gratification dance   General   Family/Caregiver Present Yes   ADL   Where Assessed Edge of bed   Eating Assistance 5  Supervision/Setup   Grooming Assistance 5  Supervision/Setup   UB Bathing Assistance 5  Supervision/Setup   LB Bathing Assistance 3  Moderate Assistance   UB Dressing Assistance 4  Minimal Assistance   LB Dressing Assistance 3  Moderate Assistance   Toileting Assistance  4  Minimal Assistance   Bed Mobility   Supine to Sit 4  Minimal assistance   Additional items Assist x 1;HOB elevated;Bedrails;Increased time required;Verbal cues;LE management   Sit to Supine Unable to assess   Additional Comments Pt greeted in supine, ended session  seated in chair.   Transfers   Sit to Stand 3  Moderate assistance   Additional items Assist x 2;Increased time required;Verbal cues  (RW)   Stand to Sit 3  Moderate assistance   Additional items Assist x 2;Increased time required;Verbal cues  (RW)   Additional Comments (S)  MAX verbal cues for hand placement and leg extension due to LLE knee buckling.  verbal cues required for safety with RW   Functional Mobility   Functional Mobility 3  Moderate assistance   Additional Comments Pt completed short functional mobility from EOB to chair with use of RW and modAx2. LLE knee buckling noted   Additional items Rolling walker   Balance   Static Sitting Fair +   Dynamic Sitting Fair   Static Standing Fair -   Dynamic Standing Poor +   Ambulatory Poor +   Activity Tolerance   Activity Tolerance Patient limited by fatigue;Patient limited by pain;Treatment limited secondary to medical complications (Comment)  (LLE knee buckling)   Medical Staff Made Aware PT Ali, Pt seen for co-evaluation/treatment with skilled Physical Therapy due to pt's medical complexity, decreased endurance, overall functional level, overall safety, and post surgical day #0.   Nurse Made Aware LAYO KINSEY Assessment   RUE Assessment WFL   LUE Assessment   LUE Assessment WFL   Hand Function   Gross Motor Coordination Functional   Fine Motor Coordination Functional   Cognition   Overall Cognitive Status WFL   Arousal/Participation Alert;Cooperative   Attention Within functional limits   Orientation Level Oriented X4   Memory Within functional limits   Following Commands Follows all commands and directions without difficulty   Comments Cooperative but extremely tired and in pain.   Assessment   Limitation Decreased ADL status;Decreased endurance;Decreased self-care trans;Decreased high-level ADLs   Prognosis Good   Assessment Pt is a 64 y.o. female seen for OT evaluation s/p adm [facility] on [facility]/5/[facility]24 w/ Primary osteoarthritis of left knee .  Comorbidities affecting pt’s functional performance include a significant PMH of anxiety, arthritis, asthma, depression, osteoarthritis, GERD, migraines, chronic pain disorder, bariatric surgery. Pt with active OT orders and activity orders for Activity beginning POD #0. WBAT LLE. Pt lives with grandson in a 2SH with 3 RAMSES. Pt has no bathroom on main level. Tub/shower and standard toilets with BSC. At baseline, pt was independent with all ADLs/IADLs. Pt completed supine to sit with Armen. Pt completed sit to stand with modAx2 and use of RW. Pt completed short functional mobility from EOB to chair with use of RW and modAx2. LLE knee buckling noted. MAX verbal cues for hand placement and safety with RW required during session. Upon evaluation, pt currently requires Armen for UB ADLs, modA for LB ADLs, Armen for toileting, Armen for bed mobility, modA for functional mobility, and modA for transfers 2* the following deficits impacting occupational performance: weakness, decreased strength , decreased balance, decreased activity tolerance, increased pain, and orthopedic restrictions. These impairments, as well at pt’s personal factors of: RAMSES home environment, steps within home environment, limited home support, difficulty performing ADLs, difficulty performing IADLs, difficulty performing transfers/mobility, WBS, fall risk , and new use of AD for functional transfers/mobility limit pt’s ability to safely engage in all baseline areas of occupation. Based on the aforementioned OT evaluation, functional performance deficits, and assessments, pt has been identified as a high complexity evaluation. Pt to continue to benefit from continued acute OT services during hospital stay to address defined deficits and to maximize level of functional independence in the following Occupational Performance areas: grooming, bathing/shower, toilet hygiene, dressing, health maintenance, functional mobility, community mobility, clothing  management, cleaning, meal prep, and household maintenance. From OT standpoint, recommend II; Moderate Resource Intensity upon D/C. OT will continue to follow pt 3-5x/wk.   Goals   Patient Goals to feel better   STG Time Frame 1-3   Short Term Goal #1 Pt will improve activity tolerance to G for min 30 min treatment sessions for increase engagement in functional tasks   Short Term Goal #2 Pt will complete bed mobility at a mod I level w/ G balance/safety demonstrated to decrease caregiver assistance required   Short Term Goal  Pt will complete LB dressing/self care w/ mod I using adaptive device and DME as needed   LTG Time Frame 3-7   Long Term Goal #1 Pt will complete toileting w/ mod I w/ G hygiene/thoroughness using DME as needed   Long Term Goal #2 Pt will improve functional transfers to mod I on/off all surfaces using DME as needed w/ G balance/safety   Long Term Goal Pt will improve functional mobility during ADL/IADL/leisure tasks to mod I using DME as needed w/ G balance/safety   Plan   Treatment Interventions ADL retraining;Functional transfer training;Endurance training;Patient/family training;Equipment evaluation/education;Compensatory technique education;Continued evaluation;Energy conservation;Activityengagement   Goal Expiration Date 12/12/24   OT Treatment Day 0   OT Frequency 3-5x/wk   Discharge Recommendation   Rehab Resource Intensity Level, OT II (Moderate Resource Intensity)   Additional Comments  The patient's raw score on the AM-PAC Daily Activity Inpatient Short Form is 16 . A raw score of less than 19 suggests the patient may benefit from discharge to post-acute rehabilitation services. Please refer to the recommendation of the Occupational Therapist for safe discharge planning.   -PAC Daily Activity Inpatient   Lower Body Dressing 2   Bathing 2   Toileting 2   Upper Body Dressing 3   Grooming 3   Eating 4   Daily Activity Raw Score 16   Daily Activity Standardized Score (Calc for Raw Score  >=11) 35.96   AM-PAC Applied Cognition Inpatient   Following a Speech/Presentation 4   Understanding Ordinary Conversation 4   Taking Medications 4   Remembering Where Things Are Placed or Put Away 4   Remembering List of 4-5 Errands 4   Taking Care of Complicated Tasks 4   Applied Cognition Raw Score 24   Applied Cognition Standardized Score 62.21   End of Consult   Education Provided Yes;Family or social support of family present for education by provider   Patient Position at End of Consult Bedside chair;Bed/Chair alarm activated;All needs within reach   Nurse Communication Nurse aware of consult   End of Consult Comments Pt seated OOB in chair with chair alarm activated at end of session. Call bell and phone within reach. All needs met and pt reports no further questions for OT at this time.   Savanah Casper, OT

## 2024-12-05 NOTE — PLAN OF CARE
Problem: OCCUPATIONAL THERAPY ADULT  Goal: Performs self-care activities at highest level of function for planned discharge setting.  See evaluation for individualized goals.  Description: Treatment Interventions: ADL retraining, Functional transfer training, Endurance training, Patient/family training, Equipment evaluation/education, Compensatory technique education, Continued evaluation, Energy conservation, Activityengagement          See flowsheet documentation for full assessment, interventions and recommendations.   Note: Limitation: Decreased ADL status, Decreased endurance, Decreased self-care trans, Decreased high-level ADLs  Prognosis: Good  Assessment: Pt is a 64 y.o. female seen for OT evaluation s/p adm to [facility] on 12/5/2024 w/ Primary osteoarthritis of left knee . Comorbidities affecting pt’s functional performance include a significant PMH of anxiety, arthritis, asthma, depression, osteoarthritis, GERD, migraines, chronic pain disorder, bariatric surgery. Pt with active OT orders and activity orders for Activity beginning POD #0. WBAT LLE. Pt lives with grandson in a 2SH with 3 RAMSES. Pt has no bathroom on main level. Tub/shower and standard toilets with BSC. At baseline, pt was independent with all ADLs/IADLs. Pt completed supine to sit with Armen. Pt completed sit to stand with modAx2 and use of RW. Pt completed short functional mobility from EOB to chair with use of RW and modAx2. LLE knee buckling noted. MAX verbal cues for hand placement and safety with RW required during session. Upon evaluation, pt currently requires Armen for UB ADLs, modA for LB ADLs, Armen for toileting, Armen for bed mobility, modA for functional mobility, and modA for transfers 2* the following deficits impacting occupational performance: weakness, decreased strength , decreased balance, decreased activity tolerance, increased pain, and orthopedic restrictions. These impairments, as well at pt’s personal factors of:  Pharmacy Vancomycin Initial Note  Date of Service January 15, 2024  Patient's  1969  54 year old, female    Indication: Sepsis    Current estimated CrCl = Estimated Creatinine Clearance: 58.4 mL/min (A) (based on SCr of 1.39 mg/dL (H)).    Creatinine for last 3 days  1/15/2024: 10:34 AM Creatinine 1.39 mg/dL    Recent Vancomycin Level(s) for last 3 days  No results found for requested labs within last 3 days.      Vancomycin IV Administrations (past 72 hours)                     vancomycin (VANCOCIN) 2,500 mg in sodium chloride 0.9 % 500 mL intermittent infusion (mg) 2,500 mg New Bag 01/15/24 1249                    Nephrotoxins and other renal medications (From now, onward)      Start     Dose/Rate Route Frequency Ordered Stop    24 1200  vancomycin (VANCOCIN) 1,000 mg in 200 mL dextrose intermittent infusion         1,000 mg  200 mL/hr over 1 Hours Intravenous EVERY 24 HOURS 01/15/24 1544      01/15/24 1730  piperacillin-tazobactam (ZOSYN) 3.375 g vial to attach to  mL bag         3.375 g  over 240 Minutes Intravenous EVERY 8 HOURS 01/15/24 1511              Contrast Orders - past 72 hours (72h ago, onward)      Start     Dose/Rate Route Frequency Stop    01/15/24 1400  iopamidol (ISOVUE-370) solution 75 mL         75 mL Intravenous ONCE 01/15/24 1351            [employer] Prediction of Planned Initial Vancomycin Regimen  Loading dose: 2500 mg IV once on 1/15 at 1249  Regimen: 1000 mg IV every 24 hours.  Start time: 12:00 on 2024  Exposure target: AUC24 (range)400-600 mg/L.hr   AUC24,ss: 459 mg/L.hr  Probability of AUC24 > 400: 60 %  Ctrough,ss: 12.5 mg/L  Probability of Ctrough,ss > 20: 28 %  Probability of nephrotoxicity (Lodise ALEX ): 8 %          Plan:  Start vancomycin  1000 mg IV q24h.   Vancomycin monitoring method: AUC  Vancomycin therapeutic monitoring goal: 400-600 mg*h/L  Pharmacy will check vancomycin levels as appropriate in 1-3 Days.    Serum creatinine levels will be  RAMSES home environment, steps within home environment, limited home support, difficulty performing ADLs, difficulty performing IADLs, difficulty performing transfers/mobility, WBS, fall risk , and new use of AD for functional transfers/mobility limit pt’s ability to safely engage in all baseline areas of occupation. Based on the aforementioned OT evaluation, functional performance deficits, and assessments, pt has been identified as a high complexity evaluation. Pt to continue to benefit from continued acute OT services during hospital stay to address defined deficits and to maximize level of functional independence in the following Occupational Performance areas: grooming, bathing/shower, toilet hygiene, dressing, health maintenance, functional mobility, community mobility, clothing management, cleaning, meal prep, and household maintenance. From OT standpoint, recommend II; Moderate Resource Intensity upon D/C. OT will continue to follow pt 3-5x/wk.     Rehab Resource Intensity Level, OT: II (Moderate Resource Intensity)           ordered daily for the first week of therapy and at least twice weekly for subsequent weeks.      Raiza Warren, LT[facility], located within [facility]

## 2024-12-05 NOTE — ANESTHESIA PROCEDURE NOTES
Peripheral Block    Patient location during procedure: holding area  Start time: 12/5/2024 11:00 AM  Reason for block: at surgeon's request and post-op pain management  Staffing  Performed by: Yois Faria MD  Authorized by: Yosi Faria MD    Preanesthetic Checklist  Completed: patient identified, IV checked, site marked, risks and benefits discussed, surgical consent, monitors and equipment checked, pre-op evaluation and timeout performed  Peripheral Block  Patient position: supine  Prep: ChloraPrep  Patient monitoring: frequent blood pressure checks, continuous pulse oximetry and heart rate  Block type: Adductor Canal  Laterality: left  Injection technique: single-shot  Procedures: ultrasound guided, Ultrasound guidance required for the procedure to increase accuracy and safety of medication placement and decrease risk of complications.  Ultrasound permanent image saved  bupivacaine (PF) (MARCAINE) 0.5 % injection 20 mL - Perineural   10 mL - 12/5/2024 11:00:00 AM  bupivacaine liposomal (EXPAREL) 1.3 % injection 20 mL - Perineural   20 mL - 12/5/2024 11:00:00 AM  Needle  Needle type: Stimuplex   Needle gauge: 20 G  Needle length: 4 in  Needle localization: anatomical landmarks and ultrasound guidance  Assessment  Injection assessment: incremental injection, frequent aspiration, injected with ease, negative aspiration, negative for heart rate change, no paresthesia on injection, no symptoms of intraneural/intravenous injection and needle tip visualized at all times  Paresthesia pain: none  Post-procedure:  site cleaned  patient tolerated the procedure well with no immediate complications

## 2024-12-05 NOTE — ANESTHESIA PROCEDURE NOTES
Spinal Block    Patient location during procedure: OR  Start time: 12/5/2024 11:22 AM  Reason for block: procedure for pain and at surgeon's request  Staffing  Performed by: Alexys Gooden CRNA  Authorized by: Yosi Faria MD    Preanesthetic Checklist  Completed: patient identified, IV checked, site marked, risks and benefits discussed, surgical consent, monitors and equipment checked, pre-op evaluation and timeout performed  Spinal Block  Patient position: sitting  Prep: ChloraPrep and site prepped and draped  Patient monitoring: frequent blood pressure checks, continuous pulse ox and heart rate  Approach: midline  Location: L3-4  Needle  Needle type: Pencan   Needle gauge: 24 G  Needle length: 4 in  Assessment  Sensory level: T4  Injection Assessment:  negative aspiration for heme, no paresthesia on injection and positive aspiration for clear CSF.  Post-procedure:  site cleaned

## 2024-12-05 NOTE — PLAN OF CARE
Problem: PHYSICAL THERAPY ADULT  Goal: Performs mobility at highest level of function for planned discharge setting.  See evaluation for individualized goals.  Description: Treatment/Interventions: Functional transfer training, LE strengthening/ROM, Elevations, Therapeutic exercise, Endurance training, Patient/family training, Bed mobility, Gait training, Spoke to nursing, OT, Family, Spoke to case management  Equipment Recommended: Walker (pt owns)       See flowsheet documentation for full assessment, interventions and recommendations.  Note: Prognosis: Good  Problem List: Decreased strength, Decreased range of motion, Decreased endurance, Impaired balance, Decreased mobility, Orthopedic restrictions, Pain  Assessment: Pt is a 64 y.o. female who presented to [facility] on 12/5/2024 s/p L TKA done by Dr. Aggarwal. Precautions include WBAT. Pt  has a past medical history of Anxiety, Arthritis, Asthma, Bariatric surgery status, Chronic pain disorder, Depression (2009), Generalized osteoarthritis, GERD (gastroesophageal reflux disease), Low vitamin B12 level, Migraine, Postgastrectomy malabsorption, Suicide attempt (HCC), Vitamin D deficiency, Wears dentures, and Wears glasses.. Pt greeted at bedside for PT evaluation on 12/05/24. Pt referred to PT for functional mobility evaluation & D/C planning w/ orders of activity beginning POD #0 and activity as tolerated. PTA, pt reports being I w/o AD and I w/ IADLs. Personal factors affecting pt at time of IE include: lives in 2 story house and stairs to enter home. Please find objective findings from PT assessment regarding body systems outlined above with impairments and limitations including weakness, decreased ROM, impaired balance, decreased endurance, gait deviations, pain, decreased activity tolerance, decreased functional mobility tolerance, fall risk, and orthopedic restrictions.  Please see flow sheet above for objective findings and level of assistance required for safe  completion of functional tasks. Pt was able to perform supine to sit with minAx1 for LE management. Pt noted a 7/10 pain at start of session. Pt performed sit<>stand with modAx2 and RW. Pt noted weakness in L LE, and was buckling in standing and during ambulation. Pt able to take 4 steps to bedside chair with modAx2 and RW. Pt needed max cues for sequencing and RW safety.  Pt demonstrated dec endurance and tolerance to activity. Denies reports of dizziness or SOB t/o session. Patient was left in recliner chair  with call bell and all needs within reach. Pt was educated on fall precautions and reinforced w/ good understanding. Based on pt presentation and impaired function, pt would benefit from level II, (moderate resource intensity) at D/C. From PT/mobility standpoint, pt would benefit from OPPT to address deficits as defined above and maximize level of independence and return pt to PLOF. CM to follow. Nsg staff to continue to mobilized pt (OOB in chair for all meals & ambulate in room/unit) as tolerated to prevent further decline in function. Nsg notified. Co-eval performed with OT to complete this evaluation for the pts best interest given pts medical complexity and functional level.  Barriers to Discharge: Inaccessible home environment, Decreased caregiver support (RAMSES, steps to 2nd floor. Home alone due to grandson going to school and working after school)     Rehab Resource Intensity Level, PT: II (Moderate Resource Intensity)    See flowsheet documentation for full assessment.

## 2024-12-06 ENCOUNTER — HOSPITAL ENCOUNTER (INPATIENT)
Facility: HOSPITAL | Age: 64
LOS: 2 days | Discharge: HOME HEALTH | DRG: 470 | End: 2024-12-08
Attending: STUDENT IN AN ORGANIZED HEALTH CARE EDUCATION/TRAINING PROGRAM | Admitting: STUDENT IN AN ORGANIZED HEALTH CARE EDUCATION/TRAINING PROGRAM
Payer: MEDICARE

## 2024-12-06 VITALS
DIASTOLIC BLOOD PRESSURE: 71 MMHG | SYSTOLIC BLOOD PRESSURE: 127 MMHG | BODY MASS INDEX: 31.09 KG/M2 | HEART RATE: 71 BPM | WEIGHT: 186.6 LBS | TEMPERATURE: 99.7 F | HEIGHT: 65 IN | OXYGEN SATURATION: 97 % | RESPIRATION RATE: 18 BRPM

## 2024-12-06 DIAGNOSIS — M17.12 PRIMARY OSTEOARTHRITIS OF LEFT KNEE: Primary | ICD-10-CM

## 2024-12-06 LAB
ANION GAP SERPL CALCULATED.3IONS-SCNC: 4 MMOL/L (ref 4–13)
BUN SERPL-MCNC: 13 MG/DL (ref 5–25)
CALCIUM SERPL-MCNC: 9 MG/DL (ref 8.4–10.2)
CHLORIDE SERPL-SCNC: 104 MMOL/L (ref 96–108)
CO2 SERPL-SCNC: 31 MMOL/L (ref 21–32)
CREAT SERPL-MCNC: 0.75 MG/DL (ref 0.6–1.3)
ERYTHROCYTE [DISTWIDTH] IN BLOOD BY AUTOMATED COUNT: 12.5 % (ref 11.6–15.1)
GFR SERPL CREATININE-BSD FRML MDRD: 85 ML/MIN/1.73SQ M
GLUCOSE SERPL-MCNC: 151 MG/DL (ref 65–140)
HCT VFR BLD AUTO: 32.1 % (ref 34.8–46.1)
HGB BLD-MCNC: 10.2 G/DL (ref 11.5–15.4)
MCH RBC QN AUTO: 28.7 PG (ref 26.8–34.3)
MCHC RBC AUTO-ENTMCNC: 31.8 G/DL (ref 31.4–37.4)
MCV RBC AUTO: 90 FL (ref 82–98)
PLATELET # BLD AUTO: 182 THOUSANDS/UL (ref 149–390)
PMV BLD AUTO: 12.2 FL (ref 8.9–12.7)
POTASSIUM SERPL-SCNC: 4.3 MMOL/L (ref 3.5–5.3)
RBC # BLD AUTO: 3.55 MILLION/UL (ref 3.81–5.12)
SODIUM SERPL-SCNC: 139 MMOL/L (ref 135–147)
WBC # BLD AUTO: 9.01 THOUSAND/UL (ref 4.31–10.16)

## 2024-12-06 PROCEDURE — 80048 BASIC METABOLIC PNL TOTAL CA: CPT | Performed by: PHYSICIAN ASSISTANT

## 2024-12-06 PROCEDURE — 99232 SBSQ HOSP IP/OBS MODERATE 35: CPT | Performed by: INTERNAL MEDICINE

## 2024-12-06 PROCEDURE — 97116 GAIT TRAINING THERAPY: CPT

## 2024-12-06 PROCEDURE — 99024 POSTOP FOLLOW-UP VISIT: CPT | Performed by: STUDENT IN AN ORGANIZED HEALTH CARE EDUCATION/TRAINING PROGRAM

## 2024-12-06 PROCEDURE — 85027 COMPLETE CBC AUTOMATED: CPT | Performed by: PHYSICIAN ASSISTANT

## 2024-12-06 PROCEDURE — 97110 THERAPEUTIC EXERCISES: CPT

## 2024-12-06 PROCEDURE — 97530 THERAPEUTIC ACTIVITIES: CPT

## 2024-12-06 RX ORDER — OXYCODONE HYDROCHLORIDE 5 MG/1
5 TABLET ORAL EVERY 4 HOURS PRN
Refills: 0 | Status: DISCONTINUED | OUTPATIENT
Start: 2024-12-06 | End: 2024-12-08 | Stop reason: HOSPADM

## 2024-12-06 RX ORDER — VENLAFAXINE HYDROCHLORIDE 75 MG/1
75 CAPSULE, EXTENDED RELEASE ORAL DAILY
Status: CANCELLED | OUTPATIENT
Start: 2024-12-07

## 2024-12-06 RX ORDER — FOLIC ACID 1 MG/1
1 TABLET ORAL DAILY
Status: CANCELLED | OUTPATIENT
Start: 2024-12-07

## 2024-12-06 RX ORDER — SODIUM CHLORIDE, SODIUM LACTATE, POTASSIUM CHLORIDE, CALCIUM CHLORIDE 600; 310; 30; 20 MG/100ML; MG/100ML; MG/100ML; MG/100ML
125 INJECTION, SOLUTION INTRAVENOUS CONTINUOUS
Status: DISCONTINUED | OUTPATIENT
Start: 2024-12-06 | End: 2024-12-06

## 2024-12-06 RX ORDER — SIMETHICONE 80 MG
80 TABLET,CHEWABLE ORAL 4 TIMES DAILY PRN
Status: DISCONTINUED | OUTPATIENT
Start: 2024-12-06 | End: 2024-12-08 | Stop reason: HOSPADM

## 2024-12-06 RX ORDER — SODIUM CHLORIDE, SODIUM LACTATE, POTASSIUM CHLORIDE, CALCIUM CHLORIDE 600; 310; 30; 20 MG/100ML; MG/100ML; MG/100ML; MG/100ML
100 INJECTION, SOLUTION INTRAVENOUS CONTINUOUS
Status: CANCELLED | OUTPATIENT
Start: 2024-12-06

## 2024-12-06 RX ORDER — ACETAMINOPHEN 325 MG/1
975 TABLET ORAL EVERY 8 HOURS
Status: CANCELLED | OUTPATIENT
Start: 2024-12-06

## 2024-12-06 RX ORDER — ONDANSETRON 2 MG/ML
4 INJECTION INTRAMUSCULAR; INTRAVENOUS EVERY 6 HOURS PRN
Status: DISCONTINUED | OUTPATIENT
Start: 2024-12-06 | End: 2024-12-08 | Stop reason: HOSPADM

## 2024-12-06 RX ORDER — OXYCODONE HYDROCHLORIDE 10 MG/1
10 TABLET ORAL EVERY 4 HOURS PRN
Refills: 0 | Status: DISCONTINUED | OUTPATIENT
Start: 2024-12-06 | End: 2024-12-08 | Stop reason: HOSPADM

## 2024-12-06 RX ORDER — ACETAMINOPHEN 325 MG/1
650 TABLET ORAL EVERY 4 HOURS PRN
Status: CANCELLED | OUTPATIENT
Start: 2024-12-06

## 2024-12-06 RX ORDER — SODIUM CHLORIDE, SODIUM LACTATE, POTASSIUM CHLORIDE, CALCIUM CHLORIDE 600; 310; 30; 20 MG/100ML; MG/100ML; MG/100ML; MG/100ML
100 INJECTION, SOLUTION INTRAVENOUS CONTINUOUS
Status: DISCONTINUED | OUTPATIENT
Start: 2024-12-06 | End: 2024-12-06

## 2024-12-06 RX ORDER — PANTOPRAZOLE SODIUM 40 MG/1
40 TABLET, DELAYED RELEASE ORAL
Status: DISCONTINUED | OUTPATIENT
Start: 2024-12-07 | End: 2024-12-08 | Stop reason: HOSPADM

## 2024-12-06 RX ORDER — SODIUM CHLORIDE, SODIUM LACTATE, POTASSIUM CHLORIDE, CALCIUM CHLORIDE 600; 310; 30; 20 MG/100ML; MG/100ML; MG/100ML; MG/100ML
50 INJECTION, SOLUTION INTRAVENOUS CONTINUOUS
Status: CANCELLED | OUTPATIENT
Start: 2024-12-06

## 2024-12-06 RX ORDER — OXYCODONE HYDROCHLORIDE 5 MG/1
5 TABLET ORAL EVERY 4 HOURS PRN
Refills: 0 | Status: CANCELLED | OUTPATIENT
Start: 2024-12-06

## 2024-12-06 RX ORDER — ASPIRIN 81 MG/1
81 TABLET ORAL 2 TIMES DAILY
Status: DISCONTINUED | OUTPATIENT
Start: 2024-12-07 | End: 2024-12-08 | Stop reason: HOSPADM

## 2024-12-06 RX ORDER — ACETAMINOPHEN 325 MG/1
650 TABLET ORAL EVERY 4 HOURS PRN
Status: DISCONTINUED | OUTPATIENT
Start: 2024-12-06 | End: 2024-12-08 | Stop reason: HOSPADM

## 2024-12-06 RX ORDER — FOLIC ACID 1 MG/1
1 TABLET ORAL DAILY
Status: DISCONTINUED | OUTPATIENT
Start: 2024-12-07 | End: 2024-12-08 | Stop reason: HOSPADM

## 2024-12-06 RX ORDER — OXYCODONE HYDROCHLORIDE 10 MG/1
10 TABLET ORAL EVERY 4 HOURS PRN
Refills: 0 | Status: CANCELLED | OUTPATIENT
Start: 2024-12-06

## 2024-12-06 RX ORDER — CALCIUM CARBONATE 500 MG/1
1000 TABLET, CHEWABLE ORAL DAILY PRN
Status: DISCONTINUED | OUTPATIENT
Start: 2024-12-06 | End: 2024-12-08 | Stop reason: HOSPADM

## 2024-12-06 RX ORDER — PANTOPRAZOLE SODIUM 40 MG/1
40 TABLET, DELAYED RELEASE ORAL
Status: CANCELLED | OUTPATIENT
Start: 2024-12-07

## 2024-12-06 RX ORDER — SENNOSIDES 8.6 MG
1 TABLET ORAL DAILY
Status: DISCONTINUED | OUTPATIENT
Start: 2024-12-07 | End: 2024-12-08 | Stop reason: HOSPADM

## 2024-12-06 RX ORDER — ASCORBIC ACID 500 MG
500 TABLET ORAL 2 TIMES DAILY
Status: DISCONTINUED | OUTPATIENT
Start: 2024-12-07 | End: 2024-12-08 | Stop reason: HOSPADM

## 2024-12-06 RX ORDER — ASCORBIC ACID 500 MG
500 TABLET ORAL 2 TIMES DAILY
Status: CANCELLED | OUTPATIENT
Start: 2024-12-06

## 2024-12-06 RX ORDER — SODIUM CHLORIDE, SODIUM LACTATE, POTASSIUM CHLORIDE, CALCIUM CHLORIDE 600; 310; 30; 20 MG/100ML; MG/100ML; MG/100ML; MG/100ML
125 INJECTION, SOLUTION INTRAVENOUS CONTINUOUS
Status: CANCELLED | OUTPATIENT
Start: 2024-12-06

## 2024-12-06 RX ORDER — DOCUSATE SODIUM 100 MG/1
100 CAPSULE, LIQUID FILLED ORAL 2 TIMES DAILY
Status: CANCELLED | OUTPATIENT
Start: 2024-12-06

## 2024-12-06 RX ORDER — ALPRAZOLAM 0.5 MG
1 TABLET ORAL
Status: DISCONTINUED | OUTPATIENT
Start: 2024-12-06 | End: 2024-12-08 | Stop reason: HOSPADM

## 2024-12-06 RX ORDER — VENLAFAXINE HYDROCHLORIDE 37.5 MG/1
75 CAPSULE, EXTENDED RELEASE ORAL DAILY
Status: DISCONTINUED | OUTPATIENT
Start: 2024-12-07 | End: 2024-12-08 | Stop reason: HOSPADM

## 2024-12-06 RX ORDER — DOCUSATE SODIUM 100 MG/1
100 CAPSULE, LIQUID FILLED ORAL 2 TIMES DAILY
Status: DISCONTINUED | OUTPATIENT
Start: 2024-12-07 | End: 2024-12-08 | Stop reason: HOSPADM

## 2024-12-06 RX ORDER — ASPIRIN 81 MG/1
81 TABLET ORAL 2 TIMES DAILY
Status: CANCELLED | OUTPATIENT
Start: 2024-12-06

## 2024-12-06 RX ORDER — SODIUM CHLORIDE, SODIUM LACTATE, POTASSIUM CHLORIDE, CALCIUM CHLORIDE 600; 310; 30; 20 MG/100ML; MG/100ML; MG/100ML; MG/100ML
50 INJECTION, SOLUTION INTRAVENOUS CONTINUOUS
Status: DISCONTINUED | OUTPATIENT
Start: 2024-12-06 | End: 2024-12-07

## 2024-12-06 RX ORDER — ACETAMINOPHEN 325 MG/1
975 TABLET ORAL EVERY 8 HOURS
Status: DISCONTINUED | OUTPATIENT
Start: 2024-12-06 | End: 2024-12-08 | Stop reason: HOSPADM

## 2024-12-06 RX ORDER — BUPROPION HYDROCHLORIDE 150 MG/1
150 TABLET ORAL DAILY
Status: DISCONTINUED | OUTPATIENT
Start: 2024-12-07 | End: 2024-12-08 | Stop reason: HOSPADM

## 2024-12-06 RX ORDER — SENNOSIDES 8.6 MG
1 TABLET ORAL DAILY
Status: CANCELLED | OUTPATIENT
Start: 2024-12-07

## 2024-12-06 RX ORDER — ALPRAZOLAM 0.25 MG/1
1 TABLET ORAL
Status: CANCELLED | OUTPATIENT
Start: 2024-12-06

## 2024-12-06 RX ORDER — CALCIUM CARBONATE 500 MG/1
1000 TABLET, CHEWABLE ORAL DAILY PRN
Status: CANCELLED | OUTPATIENT
Start: 2024-12-06

## 2024-12-06 RX ORDER — SIMETHICONE 80 MG
80 TABLET,CHEWABLE ORAL 4 TIMES DAILY PRN
Status: CANCELLED | OUTPATIENT
Start: 2024-12-06

## 2024-12-06 RX ORDER — BUPROPION HYDROCHLORIDE 150 MG/1
150 TABLET ORAL DAILY
Status: CANCELLED | OUTPATIENT
Start: 2024-12-07

## 2024-12-06 RX ORDER — ONDANSETRON 2 MG/ML
4 INJECTION INTRAMUSCULAR; INTRAVENOUS EVERY 6 HOURS PRN
Status: CANCELLED | OUTPATIENT
Start: 2024-12-06

## 2024-12-06 RX ORDER — DOXEPIN HYDROCHLORIDE 10 MG/1
10 CAPSULE ORAL
Status: DISCONTINUED | OUTPATIENT
Start: 2024-12-06 | End: 2024-12-08 | Stop reason: HOSPADM

## 2024-12-06 RX ORDER — DOXEPIN HYDROCHLORIDE 10 MG/1
10 CAPSULE ORAL
Status: CANCELLED | OUTPATIENT
Start: 2024-12-06

## 2024-12-06 RX ADMIN — ACETAMINOPHEN 325MG 975 MG: 325 TABLET ORAL at 14:08

## 2024-12-06 RX ADMIN — MORPHINE SULFATE 2 MG: 2 INJECTION, SOLUTION INTRAMUSCULAR; INTRAVENOUS at 03:52

## 2024-12-06 RX ADMIN — ACETAMINOPHEN 325MG 975 MG: 325 TABLET ORAL at 05:53

## 2024-12-06 RX ADMIN — CEFAZOLIN SODIUM 2000 MG: 2 SOLUTION INTRAVENOUS at 03:53

## 2024-12-06 RX ADMIN — ASPIRIN 81 MG: 81 TABLET, COATED ORAL at 08:30

## 2024-12-06 RX ADMIN — BUPROPION HYDROCHLORIDE 150 MG: 150 TABLET, EXTENDED RELEASE ORAL at 08:30

## 2024-12-06 RX ADMIN — DOCUSATE SODIUM 100 MG: 100 CAPSULE, LIQUID FILLED ORAL at 08:30

## 2024-12-06 RX ADMIN — OXYCODONE HYDROCHLORIDE 10 MG: 10 TABLET ORAL at 00:58

## 2024-12-06 RX ADMIN — VENLAFAXINE HYDROCHLORIDE 75 MG: 75 CAPSULE, EXTENDED RELEASE ORAL at 08:30

## 2024-12-06 RX ADMIN — OXYCODONE HYDROCHLORIDE AND ACETAMINOPHEN 500 MG: 500 TABLET ORAL at 08:29

## 2024-12-06 RX ADMIN — OXYCODONE HYDROCHLORIDE 10 MG: 10 TABLET ORAL at 07:14

## 2024-12-06 RX ADMIN — CYANOCOBALAMIN TAB 500 MCG 2000 MCG: 500 TAB at 08:29

## 2024-12-06 RX ADMIN — PANTOPRAZOLE SODIUM 40 MG: 40 TABLET, DELAYED RELEASE ORAL at 05:54

## 2024-12-06 RX ADMIN — FOLIC ACID 1 MG: 1 TABLET ORAL at 08:29

## 2024-12-06 RX ADMIN — MORPHINE SULFATE 2 MG: 2 INJECTION, SOLUTION INTRAMUSCULAR; INTRAVENOUS at 10:43

## 2024-12-06 RX ADMIN — OXYCODONE HYDROCHLORIDE 10 MG: 10 TABLET ORAL at 13:06

## 2024-12-06 RX ADMIN — MORPHINE SULFATE 2 MG: 2 INJECTION, SOLUTION INTRAMUSCULAR; INTRAVENOUS at 19:50

## 2024-12-06 RX ADMIN — OXYCODONE HYDROCHLORIDE 10 MG: 10 TABLET ORAL at 21:35

## 2024-12-06 RX ADMIN — ACETAMINOPHEN 975 MG: 325 TABLET, FILM COATED ORAL at 22:16

## 2024-12-06 RX ADMIN — Medication 1 TABLET: at 08:29

## 2024-12-06 RX ADMIN — SENNOSIDES 8.6 MG: 8.6 TABLET, FILM COATED ORAL at 08:30

## 2024-12-06 NOTE — H&P (VIEW-ONLY)
Progress Note - Internal Medicine   Name: Aislinn Rouse 64 y.o. female I MRN: [id number]  Unit/Bed#: WE 2 N -01 I Date of Admission: 12/5/2024   Date of Service: 12/6/2024    Assessment & Plan  Primary osteoarthritis of left knee  POD1 S/P left total knee arthroplasty  - continue postop pain control measures  - follow postop bowel regimen to help decrease narcotic induced constipation  - follow CBC and BMP, will treat accordingly  - encourage use incentive spirometer  - PT/OT  - DVT prophylaxis in place and reviewed    Generalized anxiety disorder  - resume current medication  Major depressive disorder, recurrent severe without psychotic features (HCC)  - resume current medication  Vitamin B12 deficiency  - resume vitamins  History of bariatric surgery  - resume vitamins    Internal Medicine Progress Note  Patient: Aislinn Rouse    Patients overnight issues or events were reviewed with nursing or staff during rounds or morning huddle session. Patient denies headache, dizziness, chest pain or shortness of breath.    Aislinn Rouse  is seen and examined and management for following issues:    Status Post left Total KNEE ARTHROPLASTY  Pain controlled over night yes  Continue encourage incentive spirometry  DVT prophylaxis in place and reviewed  Today's lab results pending    The above assessment and plan was reviewed and updated as determined by my evaluation of the patient on 12/6/2024.    Labs:       Results from last 7 days   Lab Units 12/05/24  1012   POC GLUCOSE mg/dl 94       Review of Scheduled Meds:  Current Facility-Administered Medications   Medication Dose Route Frequency Provider Last Rate    acetaminophen  650 mg Oral Q4H PRN Kylah Goode PA-C      acetaminophen  975 mg Oral Q8H Kylah Goode PA-C      ALPRAZolam  1 mg Oral HS PRN Laura Loyd PA-C      ascorbic acid  500 mg Oral BID Kylah Goode PA-C      aspirin  81 mg Oral BID Laura Loyd PA-C      buPROPion  150 mg  Oral Daily Laura Loyd PA-C      calcium carbonate  1,000 mg Oral Daily PRN Kylah Goode PA-C      cyanocobalamin  2,000 mcg Oral Daily Laura Loyd PA-C      docusate sodium  100 mg Oral BID Kylah Goode PA-C      doxepin  10 mg Oral HS PRN Laura Loyd PA-C      folic acid  1 mg Oral Daily Kylah Goode PA-C      lactated ringers  1,000 mL Intravenous Once PRN Kylah Goode PA-C      And    lactated ringers  1,000 mL Intravenous Once PRN Kylah Goode PA-C      lactated ringers  125 mL/hr Intravenous Continuous Kylah Goode PA-C Stopped (12/05/24 1435)    lactated ringers  50 mL/hr Intravenous Continuous Alexys Gooden CRNA      lactated ringers  100 mL/hr Intravenous Continuous Kylah Goode PA-C 100 mL/hr (12/05/24 2209)    morphine injection  2 mg Intravenous Q2H PRN Kylah Goode PA-C      multivitamin-minerals  1 tablet Oral Daily Kylah Goode PA-C      ondansetron  4 mg Intravenous Q6H PRN Kylah Goode PA-C      oxyCODONE  10 mg Oral Q4H PRN Kylah Goode PA-C      oxyCODONE  5 mg Oral Q4H PRN Kylah Goode PA-C      pantoprazole  40 mg Oral Early Morning Kylah Goode PA-C      senna  1 tablet Oral Daily Kylah Goode PA-C      simethicone  80 mg Oral 4x Daily PRN Kylah Goode PA-C      sodium chloride  1,000 mL Intravenous Once PRN Kylah Goode PA-C      And    sodium chloride  1,000 mL Intravenous Once PRN Kylah Goode PA-C      venlafaxine  75 mg Oral Daily Laura Loyd PA-C         Physical Examination:    Vitals:    12/06/24 0058   BP: 119/51   Pulse: (!) 45   Resp: 18   Temp:    SpO2: 99%        General Appearance: no distress, conversive  HEENT: PERRLA, conjuctiva normal; oropharynx clear; mucous membranes moist;   Lungs: CTA, normal respiratory effort, no retractions, expiratory effort normal  CV: regular rate and rhythm , PMI normal   ABD: soft non tender, no distention, normal BS x 4  EXT: DP pulses intact, no  lymphadenopathy, no pitting edema; surgical dressing in place  Neuro: AAOx3      The above physical exam was reviewed and updated as determined by my evaluation of the patient on 12/6/2024.    VTE Pharmacologic Prophylaxis: aspiring  Code Status: Level 1 - Full Code  Current Length of Stay: 0 day(s)    Coordination of patient's care was performed in conjunction with primary service. Time invested included review of patient's labs, vitals, and management of their comorbidities with continued monitoring, examination of patient as well as answering patient questions, documenting her findings and creating progress note in electronic medical record,  ordering appropriate diagnostic testing. Medical decision making for the day was made by supervising physician unless otherwise noted in their attestation statement.    ** Please Note:  voice to text software may have been used in the creation of this document. Although proof errors in transcription or interpretation are a potential of such software**

## 2024-12-06 NOTE — PLAN OF CARE
Problem: PAIN - ADULT  Goal: Verbalizes/displays adequate comfort level or baseline comfort level  Description: Interventions:  - Encourage patient to monitor pain and request assistance  - Assess pain using appropriate pain scale  - Administer analgesics based on type and severity of pain and evaluate response  - Implement non-pharmacological measures as appropriate and evaluate response  - Consider cultural and social influences on pain and pain management  - Notify physician/advanced practitioner if interventions unsuccessful or patient reports new pain  Outcome: Progressing     Problem: INFECTION - ADULT  Goal: Absence or prevention of progression during hospitalization  Description: INTERVENTIONS:  - Assess and monitor for signs and symptoms of infection  - Monitor lab/diagnostic results  - Monitor all insertion sites, i.e. indwelling lines, tubes, and drains  - Monitor endotracheal if appropriate and nasal secretions for changes in amount and color  - [place] appropriate cooling/warming therapies per order  - Administer medications as ordered  - Instruct and encourage patient and family to use good hand hygiene technique  - Identify and instruct in appropriate isolation precautions for identified infection/condition  Outcome: Progressing  Goal: Absence of fever/infection during neutropenic period  Description: INTERVENTIONS:  - Monitor WBC    Outcome: Progressing     Problem: SAFETY ADULT  Goal: Patient will remain free of falls  Description: INTERVENTIONS:  - Educate patient/family on patient safety including physical limitations  - Instruct patient to call for assistance with activity   - Consult OT/PT to assist with strengthening/mobility   - Keep Call bell within reach  - Keep bed low and locked with side rails adjusted as appropriate  - Keep care items and personal belongings within reach  - Initiate and maintain comfort rounds  - Make Fall Risk Sign visible to staff  - Offer Toileting every 2 Hours,  in advance of need  - Initiate/Maintain bed/chair alarm  - Obtain necessary fall risk management equipment: rolling walker  - Apply yellow socks and bracelet for high fall risk patients  - Consider moving patient to room near nurses station  Outcome: Progressing  Goal: Maintain or return to baseline ADL function  Description: INTERVENTIONS:  -  Assess patient's ability to carry out ADLs; assess patient's baseline for ADL function and identify physical deficits which impact ability to perform ADLs (bathing, care of mouth/teeth, toileting, grooming, dressing, etc.)  - Assess/evaluate cause of self-care deficits   - Assess range of motion  - Assess patient's mobility; develop plan if impaired  - Assess patient's need for assistive devices and provide as appropriate  - Encourage maximum independence but intervene and supervise when necessary  - Involve family in performance of ADLs  - Assess for home care needs following discharge   - Consider OT consult to assist with ADL evaluation and planning for discharge  - Provide patient education as appropriate  Outcome: Progressing  Goal: Maintains/Returns to pre admission functional level  Description: INTERVENTIONS:  - Perform AM-PAC 6 Click Basic Mobility/ Daily Activity assessment daily.  - Set and communicate daily mobility goal to care team and patient/family/caregiver.   - Collaborate with rehabilitation services on mobility goals if consulted  - Perform Range of Motion 3 times a day.  - Reposition patient every 2 hours.  - Dangle patient 3 times a day  - Stand patient 3 times a day  - Ambulate patient 3 times a day  - Out of bed to chair 3 times a day   - Out of bed for meals 3 times a day  - Out of bed for toileting  - Record patient progress and toleration of activity level   Outcome: Progressing     Problem: DISCHARGE PLANNING  Goal: Discharge to home or other facility with appropriate resources  Description: INTERVENTIONS:  - Identify barriers to discharge  w/patient and caregiver  - Arrange for needed discharge resources and transportation as appropriate  - Identify discharge learning needs (meds, wound care, etc.)  - Arrange for interpretive services to assist at discharge as needed  - Refer to Case Management Department for coordinating discharge planning if the patient needs post-hospital services based on physician/advanced practitioner order or complex needs related to functional status, cognitive ability, or social support system  Outcome: Progressing     Problem: Knowledge Deficit  Goal: Patient/family/caregiver demonstrates understanding of disease process, treatment plan, medications, and discharge instructions  Description: Complete learning assessment and assess knowledge base.  Interventions:  - Provide teaching at level of understanding  - Provide teaching via preferred learning methods  Outcome: Progressing

## 2024-12-06 NOTE — TRANSPORTATION MEDICAL NECESSITY
"Section I - General Information    Name of Patient: Aislinn Rouse                 : 1960    Medicare #: [id number]  Transport Date: 24 (PCS is valid for round trips on this date and for all repetitive trips in the 60-day range as noted below.)  Origin: [address]                                                         Destination: [facility]   Is the pt's stay covered under Medicare Part A (PPS/DRG)   []     Closest appropriate facility? If no, why is transport to more distant facility required? Yes  If hospice pt, is this transport related to pt's terminal illness? No       Section II - Medical Necessity Questionnaire  Ambulance transportation is medically necessary only if other means of transport are contraindicated or would be potentially harmful to the patient. To meet this requirement, the patient must either be \"bed confined\" or suffer from a condition such that transport by means other than ambulance is contraindicated by the patient's condition. The following questions must be answered by the medical professional signing below for this form to be valid:    1)  Describe the MEDICAL CONDITION (physical and/or mental) of this patient AT THE TIME OF AMBULANCE TRANSPORT that requires the patient to be transported in an ambulance and why transport by other means is contraindicated by the patient's condition:Moderate/severe pain on movement   Medical attendant required   Unable to tolerate seated position for time needed to transport   Other(specify) Requires transfer to higher level of continuous care     2) Is the patient \"bed confined\" as defined below?     No  To be \"be confined\" the patient must satisfy all three of the following conditions: (1) unable to get up from bed without Assistance; AND (2) unable to ambulate; AND (3) unable to sit in a chair or wheelchair.    3) Can this patient safely be transported by car or wheelchair van (i.e., seated " during transport without a medical attendant or monitoring)?   No    4) In addition to completing questions 1-3 above, please check any of the following conditions that apply*:   *Note: supporting documentation for any boxes checked must be maintained in the patient's medical records.  If hosp-hosp transfer, describe services needed at 2nd facility not available at 1st facility?   Moderate/severe pain on movement   Medical attendant required   Unable to tolerate seated position for time needed to transport   Other(specify) Requires transfer to higher level of continuous care       Section III - Signature of Physician or Healthcare Professional  I certify that the above information is true and correct based on my evaluation of this patient, and represent that the patient requires transport by ambulance and that other forms of transport are contraindicated. I understand that this information will be used by the Centers for Medicare and Medicaid Services (CMS) to support the determination of medical necessity for ambulance services, and I represent that I have personal knowledge of the patient's condition at time of transport.    [x]  If this box is checked, I also certify that the patient is physically or mentally incapable of signing the ambulance service's claim and that the institution with which I am affiliated has furnished care, services, or assistance to the patient.    My signature below is made on behalf of the patient pursuant to 42 CFR §424.36(b)(4). In accordance with 42 CFR §424.37, the specific reason(s) that the patient is physically or mentally incapable of signing the claim form is as follows: .      Signature of Physician* or Healthcare Professional____Elle Montalvo RN __________________________________________________________  Signature Date 12/06/24 (For scheduled repetitive transports, this form is not valid for transports performed more than 60 days after this date)    Printed Name & Credentials of  Physician or Healthcare Professional (MD, DO, RN, etc.)________Elle Montalvo RN ________________________  *Form must be signed by patient's attending physician for scheduled, repetitive transports. For non-repetitive, unscheduled ambulance transports, if unable to obtain the signature of the attending physician, any of the following may sign (choose appropriate option below)  [] Physician Assistant []  Clinical Nurse Specialist []  Registered Nurse  []  Nurse Practitioner  [x] Discharge Planner

## 2024-12-06 NOTE — ASSESSMENT & PLAN NOTE
-POD 1 s/p L TKA   -WBAT LLE  -ROM as tolerated, pillow/blankets under achilles not behind knee while in bed   -DVT ppx 81mg BID x 4 weeks  -PT/OT  -Pain control  -AM labs pending  -Discharge planning.

## 2024-12-06 NOTE — PLAN OF CARE
Problem: PAIN - ADULT  Goal: Verbalizes/displays adequate comfort level or baseline comfort level  Description: Interventions:  - Encourage patient to monitor pain and request assistance  - Assess pain using appropriate pain scale  - Administer analgesics based on type and severity of pain and evaluate response  - Implement non-pharmacological measures as appropriate and evaluate response  - Consider cultural and social influences on pain and pain management  - Notify physician/advanced practitioner if interventions unsuccessful or patient reports new pain  Outcome: Progressing     Problem: INFECTION - ADULT  Goal: Absence or prevention of progression during hospitalization  Description: INTERVENTIONS:  - Assess and monitor for signs and symptoms of infection  - Monitor lab/diagnostic results  - Monitor all insertion sites, i.e. indwelling lines, tubes, and drains  - Monitor endotracheal if appropriate and nasal secretions for changes in amount and color  - [place] appropriate cooling/warming therapies per order  - Administer medications as ordered  - Instruct and encourage patient and family to use good hand hygiene technique  - Identify and instruct in appropriate isolation precautions for identified infection/condition  Outcome: Progressing  Goal: Absence of fever/infection during neutropenic period  Description: INTERVENTIONS:  - Monitor WBC    Outcome: Progressing     Problem: SAFETY ADULT  Goal: Patient will remain free of falls  Description: INTERVENTIONS:  - Educate patient/family on patient safety including physical limitations  - Instruct patient to call for assistance with activity   - Consult OT/PT to assist with strengthening/mobility   - Keep Call bell within reach  - Keep bed low and locked with side rails adjusted as appropriate  - Keep care items and personal belongings within reach  - Initiate and maintain comfort rounds  - Make Fall Risk Sign visible to staff  - Offer Toileting every 2 Hours,  in advance of need  - Initiate/Maintain bed/chair alarm  - Obtain necessary fall risk management equipment: rolling walker  - Apply yellow socks and bracelet for high fall risk patients  - Consider moving patient to room near nurses station  Outcome: Progressing  Goal: Maintain or return to baseline ADL function  Description: INTERVENTIONS:  -  Assess patient's ability to carry out ADLs; assess patient's baseline for ADL function and identify physical deficits which impact ability to perform ADLs (bathing, care of mouth/teeth, toileting, grooming, dressing, etc.)  - Assess/evaluate cause of self-care deficits   - Assess range of motion  - Assess patient's mobility; develop plan if impaired  - Assess patient's need for assistive devices and provide as appropriate  - Encourage maximum independence but intervene and supervise when necessary  - Involve family in performance of ADLs  - Assess for home care needs following discharge   - Consider OT consult to assist with ADL evaluation and planning for discharge  - Provide patient education as appropriate  Outcome: Progressing  Goal: Maintains/Returns to pre admission functional level  Description: INTERVENTIONS:  - Perform AM-PAC 6 Click Basic Mobility/ Daily Activity assessment daily.  - Set and communicate daily mobility goal to care team and patient/family/caregiver.   - Collaborate with rehabilitation services on mobility goals if consulted  - Perform Range of Motion 3 times a day.  - Reposition patient every 2 hours.  - Dangle patient 3 times a day  - Stand patient 3 times a day  - Ambulate patient 3 times a day  - Out of bed to chair 3 times a day   - Out of bed for meals 3 times a day  - Out of bed for toileting  - Record patient progress and toleration of activity level   Outcome: Progressing     Problem: DISCHARGE PLANNING  Goal: Discharge to home or other facility with appropriate resources  Description: INTERVENTIONS:  - Identify barriers to discharge  w/patient and caregiver  - Arrange for needed discharge resources and transportation as appropriate  - Identify discharge learning needs (meds, wound care, etc.)  - Arrange for interpretive services to assist at discharge as needed  - Refer to Case Management Department for coordinating discharge planning if the patient needs post-hospital services based on physician/advanced practitioner order or complex needs related to functional status, cognitive ability, or social support system  Outcome: Progressing     Problem: Knowledge Deficit  Goal: Patient/family/caregiver demonstrates understanding of disease process, treatment plan, medications, and discharge instructions  Description: Complete learning assessment and assess knowledge base.  Interventions:  - Provide teaching at level of understanding  - Provide teaching via preferred learning methods  Outcome: Progressing

## 2024-12-06 NOTE — PLAN OF CARE
Problem: PHYSICAL THERAPY ADULT  Goal: Performs mobility at highest level of function for planned discharge setting.  See evaluation for individualized goals.  Description: Treatment/Interventions: Functional transfer training, LE strengthening/ROM, Elevations, Therapeutic exercise, Endurance training, Patient/family training, Bed mobility, Gait training, Spoke to nursing, OT, Family, Spoke to case management  Equipment Recommended: Walker (pt owns)       See flowsheet documentation for full assessment, interventions and recommendations.  Outcome: Progressing  Note: Prognosis: Good (once pain controlled.)  Problem List: Decreased strength, Decreased range of motion, Decreased endurance, Impaired balance, Decreased mobility, Orthopedic restrictions, Pain  Assessment: Seen for progression of PT POC.  Improved since initial evaluation.  Pain continues to be limiting factor to functional progression.  Able to perform transfers and ambulation with less assistance this session. Increased distances for ambulation. Gait slowed, decreased foot clearance and step length.  Antalgic.  Occasional L knee give, but without buckle and use of UE to steady.  Able to progress with attempt to stair training.  Able to perform single leg step up x 3 on 2 inch step only.  Increased pain and unable to progress to  stairs at this time.  Following standing rest, able to ambulate return to room with increased time.  Anticipate with appropriate pain control will be able to progress and achieve IPPT goals for home with PT.  The patient's AM-PAC Basic Mobility Inpatient Short Form Raw Score is 18. A Raw score of greater than 16 suggests the patient may benefit from discharge to home. Please also refer to the recommendation of the Physical Therapist for safe discharge planning. Minimum resource intensity at discharge onces functional goals met and pain adequately managed.  Barriers to Discharge: Inaccessible home environment, Decreased  caregiver support  Barriers to Discharge Comments: 3 RAMSES, 18 steps to bedroom-can have 2nd floor set up with BSC access close to bed.  Rehab Resource Intensity Level, PT: III (Minimum Resource Intensity) (provided pain control improved and acheives IPPT goals for safe d/c to home with PT.)    See flowsheet documentation for full assessment.

## 2024-12-06 NOTE — ASSESSMENT & PLAN NOTE
S/P left total knee arthroplasty  - continue postop pain control measures  - follow postop bowel regimen to help decrease narcotic induced constipation  - follow CBC and BMP, will treat accordingly  - encourage use incentive spirometer  - PT/OT  - DVT prophylaxis in place and reviewed

## 2024-12-06 NOTE — PROGRESS NOTES
Progress Note - Internal Medicine   Name: Aislinn Rouse 64 y.o. female I MRN: [id number]  Unit/Bed#: WE 2 N -01 I Date of Admission: 12/5/2024   Date of Service: 12/6/2024    Assessment & Plan  Primary osteoarthritis of left knee  POD1 S/P left total knee arthroplasty  - continue postop pain control measures  - follow postop bowel regimen to help decrease narcotic induced constipation  - follow CBC and BMP, will treat accordingly  - encourage use incentive spirometer  - PT/OT  - DVT prophylaxis in place and reviewed    Generalized anxiety disorder  - resume current medication  Major depressive disorder, recurrent severe without psychotic features (HCC)  - resume current medication  Vitamin B12 deficiency  - resume vitamins  History of bariatric surgery  - resume vitamins    Internal Medicine Progress Note  Patient: Aislinn Rouse    Patients overnight issues or events were reviewed with nursing or staff during rounds or morning huddle session. Patient denies headache, dizziness, chest pain or shortness of breath.    Aislinn Rouse  is seen and examined and management for following issues:    Status Post left Total KNEE ARTHROPLASTY  Pain controlled over night yes  Continue encourage incentive spirometry  DVT prophylaxis in place and reviewed  Today's lab results pending    The above assessment and plan was reviewed and updated as determined by my evaluation of the patient on 12/6/2024.    Labs:       Results from last 7 days   Lab Units 12/05/24  1012   POC GLUCOSE mg/dl 94       Review of Scheduled Meds:  Current Facility-Administered Medications   Medication Dose Route Frequency Provider Last Rate    acetaminophen  650 mg Oral Q4H PRN Kylah Goode PA-C      acetaminophen  975 mg Oral Q8H Kylah Goode PA-C      ALPRAZolam  1 mg Oral HS PRN Laura Loyd PA-C      ascorbic acid  500 mg Oral BID Kylah Goode PA-C      aspirin  81 mg Oral BID Laura Loyd PA-C      buPROPion  150 mg  Oral Daily Laura Loyd PA-C      calcium carbonate  1,000 mg Oral Daily PRN Kylah Goode PA-C      cyanocobalamin  2,000 mcg Oral Daily Laura Loyd PA-C      docusate sodium  100 mg Oral BID Kylah Goode PA-C      doxepin  10 mg Oral HS PRN Laura Loyd PA-C      folic acid  1 mg Oral Daily Kylah Goode PA-C      lactated ringers  1,000 mL Intravenous Once PRN Kylah Goode PA-C      And    lactated ringers  1,000 mL Intravenous Once PRN Kylah Goode PA-C      lactated ringers  125 mL/hr Intravenous Continuous Kylah Goode PA-C Stopped (12/05/24 1435)    lactated ringers  50 mL/hr Intravenous Continuous Alexys Gooden CRNA      lactated ringers  100 mL/hr Intravenous Continuous Kylah Goode PA-C 100 mL/hr (12/05/24 2209)    morphine injection  2 mg Intravenous Q2H PRN Kylah Goode PA-C      multivitamin-minerals  1 tablet Oral Daily Kylah Goode PA-C      ondansetron  4 mg Intravenous Q6H PRN Kylah Goode PA-C      oxyCODONE  10 mg Oral Q4H PRN Kylah Goode PA-C      oxyCODONE  5 mg Oral Q4H PRN Kylah Goode PA-C      pantoprazole  40 mg Oral Early Morning Kylah Goode PA-C      senna  1 tablet Oral Daily Kylah Goode PA-C      simethicone  80 mg Oral 4x Daily PRN Kylah Goode PA-C      sodium chloride  1,000 mL Intravenous Once PRN Kylah Goode PA-C      And    sodium chloride  1,000 mL Intravenous Once PRN Kylah Goode PA-C      venlafaxine  75 mg Oral Daily Laura Loyd PA-C         Physical Examination:    Vitals:    12/06/24 0058   BP: 119/51   Pulse: (!) 45   Resp: 18   Temp:    SpO2: 99%        General Appearance: no distress, conversive  HEENT: PERRLA, conjuctiva normal; oropharynx clear; mucous membranes moist;   Lungs: CTA, normal respiratory effort, no retractions, expiratory effort normal  CV: regular rate and rhythm , PMI normal   ABD: soft non tender, no distention, normal BS x 4  EXT: DP pulses intact, no  lymphadenopathy, no pitting edema; surgical dressing in place  Neuro: AAOx3      The above physical exam was reviewed and updated as determined by my evaluation of the patient on 12/6/2024.    VTE Pharmacologic Prophylaxis: aspiring  Code Status: Level 1 - Full Code  Current Length of Stay: 0 day(s)    Coordination of patient's care was performed in conjunction with primary service. Time invested included review of patient's labs, vitals, and management of their comorbidities with continued monitoring, examination of patient as well as answering patient questions, documenting her findings and creating progress note in electronic medical record,  ordering appropriate diagnostic testing. Medical decision making for the day was made by supervising physician unless otherwise noted in their attestation statement.    ** Please Note:  voice to text software may have been used in the creation of this document. Although proof errors in transcription or interpretation are a potential of such software**

## 2024-12-06 NOTE — CASE MANAGEMENT
Case Management Progress Note    Patient name Aislinn Rouse  Location 2 N /WE 2 N * MRN [id number]  : 1960 Date 2024       LOS (days): 0  Geometric Mean LOS (GMLOS) (days):   Days to GMLOS:        OBJECTIVE:        Current admission status: Outpatient Surgery  Preferred Pharmacy:   [id number] - [place], [place] - [address]  [address]  [facility] [address]  Phone: [phone] Fax: [phone]    [facility] [place], [place] - [address]  [facility],  [address]  [facility],  First Floor [facility] [address]  Phone: [phone] Fax: [phone]    Primary Care Provider: April Reagan MD    Primary Insurance: [employer] [facility]  Secondary Insurance: [place] [employer] [facility]    PROGRESS NOTE:    Per am [place] medical rounds patient will need transfer to [facility] for pain mgt and Physical Therapy eval on steps once pain is managed.  Medical necessity completed and in Epic.  Nurse Lalita updated same

## 2024-12-06 NOTE — CONSULTS
Consultation - Internal Medicine   Name: Aislinn Rouse 64 y.o. female I MRN: [id number]  Unit/Bed#: WE 2 N -01 I Date of Admission: 12/5/2024   Date of Service: 12/5/2024 I Hospital Day: 0   Consults  Physician Requesting Evaluation: Nash Aggarwal DO   Reason for Evaluation / Principal Problem: Postop Medical Management    Assessment & Plan  Primary osteoarthritis of left knee  S/P left total knee arthroplasty  - continue postop pain control measures  - follow postop bowel regimen to help decrease narcotic induced constipation  - follow CBC and BMP, will treat accordingly  - encourage use incentive spirometer  - PT/OT  - DVT prophylaxis in place and reviewed    Generalized anxiety disorder  - resume current medication  Major depressive disorder, recurrent severe without psychotic features (HCC)  - resume current medication  Vitamin B12 deficiency  - resume vitamins  History of bariatric surgery  - resume vitamins      History of Present Illness   64 year-old female with history of left knee pain that failed out patient conservative measures. She elected to undergo left total knee arthroplasty. We are asked to see patient for post op management of underlying medical co-morbidities as outlined above. Pt did well intra and post operatively with good hemodynamics. Pt currently comfortable and without any reported post op nausea. She denies dizziness, chest pain, or shortness of breath.      Review of Systems  I have reviewed the patient's PMH, PSH, Social History, Family History, Meds, and Allergies    Objective :  Temp:  [97.5 °F (36.4 °C)-98.5 °F (36.9 °C)] 97.5 °F (36.4 °C)  HR:  [52-77] 73  BP: ()/(44-64) 109/54  Resp:  [12-22] 16  SpO2:  [93 %-100 %] 97 %  O2 Device: None (Room air)    Intake & Output:  I/O         12/04 0701 12/05 0700 12/05 0701 12/06 0700    I.V. (mL/kg)  1000 (11.8)    Total Intake(mL/kg)  1000 (11.8)    Urine (mL/kg/hr)  400    Total Output  400    Net  +600        "         Weights:   IBW (Ideal Body Weight): 57 kg    Body mass index is 31.05 kg/m².  Weight (last 2 days)       Date/Time Weight    12/05/24 0957 84.6 (186.6)          Physical Exam  HENT:      Head: Normocephalic.   Cardiovascular:      Rate and Rhythm: Normal rate and regular rhythm.      Pulses: Normal pulses.      Heart sounds: Normal heart sounds.   Pulmonary:      Effort: Pulmonary effort is normal.      Breath sounds: Normal breath sounds.   Abdominal:      General: Bowel sounds are normal. There is no distension.      Palpations: Abdomen is soft.   Musculoskeletal:      Comments: Dressing C/D/I. Motor and sensation grossly intact.    Skin:     General: Skin is warm and dry.      Capillary Refill: Capillary refill takes less than 2 seconds.   Neurological:      Mental Status: She is alert and oriented to person, place, and time.      Sensory: No sensory deficit.   Psychiatric:         Mood and Affect: Mood normal.         Behavior: Behavior normal.           Lab Results: I have reviewed the following results:  No results for input(s): \"WBC\", \"HGB\", \"HCT\", \"PLT\", \"BANDSPCT\", \"SODIUM\", \"K\", \"CL\", \"CO2\", \"BUN\", \"CREATININE\", \"GLUC\", \"CAIONIZED\", \"MG\", \"PHOS\", \"AST\", \"ALT\", \"ALB\", \"TBILI\", \"DBILI\", \"ALKPHOS\", \"PTT\", \"INR\", \"HSTNI0\", \"HSTNI2\", \"BNP\", \"LACTICACID\" in the last 72 hours.  Micro:  Lab Results   Component Value Date    URINECX <10,000 cfu/ml Mixed Contaminants X4 01/09/2017    URINECX No Growth <1000 cfu/mL 01/05/2017       Imaging Results Review: No pertinent imaging studies reviewed.  Other Study Results Review: No additional pertinent studies reviewed.    Currently Ordered Meds:   Current Facility-Administered Medications:     acetaminophen (TYLENOL) tablet 650 mg, Q4H PRN    acetaminophen (TYLENOL) tablet 975 mg, Q8H    ascorbic acid (VITAMIN C) tablet 500 mg, BID    aspirin (ECOTRIN LOW STRENGTH) EC tablet 81 mg, BID    calcium carbonate (TUMS) chewable tablet 1,000 mg, Daily PRN    ceFAZolin " (ANCEF) IVPB (premix in dextrose) 2,000 mg 50 mL, Q8H    docusate sodium (COLACE) capsule 100 mg, BID    folic acid (FOLVITE) tablet 1 mg, Daily    lactated ringers bolus 1,000 mL, Once PRN **AND** lactated ringers bolus 1,000 mL, Once PRN    lactated ringers infusion, Continuous, Last Rate: Stopped (12/05/24 1435)    lactated ringers infusion, Continuous    lactated ringers infusion, Continuous, Last Rate: 100 mL/hr (12/05/24 1438)    morphine injection 2 mg, Q2H PRN    multivitamin-minerals (CENTRUM) tablet 1 tablet, Daily    ondansetron (ZOFRAN) injection 4 mg, Q6H PRN    oxyCODONE (ROXICODONE) immediate release tablet 10 mg, Q4H PRN    oxyCODONE (ROXICODONE) IR tablet 5 mg, Q4H PRN    pantoprazole (PROTONIX) EC tablet 40 mg, Early Morning    senna (SENOKOT) tablet 8.6 mg, Daily    simethicone (MYLICON) chewable tablet 80 mg, 4x Daily PRN    sodium chloride 0.9 % bolus 1,000 mL, Once PRN **AND** sodium chloride 0.9 % bolus 1,000 mL, Once PRN  VTE Pharmacologic Prophylaxis: Aspirin BID starting POD1  VTE Mechanical Prophylaxis: sequential compression device

## 2024-12-06 NOTE — PROGRESS NOTES
"Progress Note - Orthopedics   Name: Aislinn Rouse 64 y.o. female I MRN: [id number]  Unit/Bed#: WE 2 N -01 I Date of Admission: 12/5/2024   Date of Service: 12/6/2024 I Hospital Day: 0     Assessment & Plan  Primary osteoarthritis of left knee  -POD 1 s/p L TKA   -WBAT LLE  -ROM as tolerated, pillow/blankets under achilles not behind knee while in bed   -DVT ppx 81mg BID x 4 weeks  -PT/OT  -Pain control  -AM labs pending  -Discharge planning.          Subjective   64 y.o.female seen and examined this AM.  Reports doing well overnight.  LLE feels sore and heavy.  She has been doing ankle ROM exercises which makes it feel better.  No numbness/tingling in the LLE.    Objective :  Temp:  [97.5 °F (36.4 °C)-98.5 °F (36.9 °C)] 97.8 °F (36.6 °C)  HR:  [45-77] 45  BP: ()/(44-64) 119/51  Resp:  [12-22] 18  SpO2:  [93 %-100 %] 99 %  O2 Device: None (Room air)    Physical Exam  Musculoskeletal: leftlower  ACE dressing c/d/I.  Visible Skin  No erythema or ecchymosis.  TTP anterior thigh region  Motor intact to +FHL/EHL, +ankle dorsi/plantar flexion  Sensation intact to saphenous, sural, tibial, superficial peroneal nerve, and deep peroneal  No calf swelling or tenderness to palpation      Lab Results: I have reviewed the following results:  No results for input(s): \"WBC\", \"HGB\", \"HCT\", \"PLT\", \"BANDSPCT\", \"BUN\", \"CREATININE\", \"PTT\", \"INR\", \"ESR\", \"CRP\" in the last 72 hours.  Blood Culture:  No results found for: \"BLOODCX\"  Wound Culture: No results found for: \"WOUNDCULT\"  "

## 2024-12-06 NOTE — ASSESSMENT & PLAN NOTE
POD1 S/P left total knee arthroplasty  - continue postop pain control measures  - follow postop bowel regimen to help decrease narcotic induced constipation  - follow CBC and BMP, will treat accordingly  - encourage use incentive spirometer  - PT/OT  - DVT prophylaxis in place and reviewed

## 2024-12-06 NOTE — PHYSICAL THERAPY NOTE
PHYSICAL THERAPY NOTE          Patient Name: Aislinn Rouse  Today's Date: 12/6/2024  09:23-10:16       12/06/24 0923   PT Last Visit   PT Visit Date 12/06/24   Note Type   Note Type Treatment   Pain Assessment   Pain Score 7   Pain Location/Orientation Orientation: Left;Location: Knee   Restrictions/Precautions   LLE Weight Bearing Per Order WBAT   Other Precautions Chair Alarm;Bed Alarm;Fall Risk;Pain;Multiple lines  (Masimo)   General   Chart Reviewed Yes   Response to Previous Treatment Patient with no complaints from previous session.   Family/Caregiver Present No   Cognition   Overall Cognitive Status WFL   Arousal/Participation Alert;Cooperative   Attention Within functional limits   Orientation Level Oriented X4   Memory Within functional limits   Following Commands Follows all commands and directions without difficulty   Subjective   Subjective Reports once pain gets better, feels like can go home.   Bed Mobility   Additional Comments OOB in recliner chair upon arrival.   Transfers   Sit to Stand 4  Minimal assistance   Additional items Assist x 1;Increased time required;Verbal cues;Armrests   Stand to Sit 4  Minimal assistance   Additional items Assist x 1;Increased time required;Verbal cues;Armrests   Toilet transfer 5  Supervision   Additional items Increased time required;Verbal cues;Commode;Armrests  (BSC over toilet)   Additional Comments Cues for hand placement. Increased time to complete.   Ambulation/Elevation   Gait pattern Improper Weight shift;Decreased foot clearance;Antalgic;Decreased L stance;Inconsistent opal;Short stride;Excessively slow;Step to;Decreased heel strike  (occasional knee give LLE with UE use to correct. No LOB.)   Gait Assistance 4  Minimal assist  (varies form Armen to close S( with progressive distances))   Additional items Assist x 1;Verbal cues;Tactile cues   Assistive Device Rolling  "walker   Distance Amb with RW 15'x1, 10'x1, then 50'x2.  Increased time.  Cues for sequencing,   Stair Management Assistance 4  Minimal assist   Additional items Assist x 1;Verbal cues;Tactile cues   Stair Management Technique Step to pattern;Foreward;With walker   Number of Stairs 1  (Performed one 2\" step single leg step ups x 3 reps with RW support and Armen.  Unable to progress with stair training at this time related to pain. No knee buckling LLE with transition of RLE onto/off step.)   Ambulation/Elevation Additional Comments Gait slowed, antalgic. Occasional knee give with WB LLE.  Cues for UE use on RW withe improved gait.   Balance   Static Sitting Good   Dynamic Sitting Fair +   Static Standing Fair   Dynamic Standing Poor +   Ambulatory Poor +   Endurance Deficit   Endurance Deficit Yes   Endurance Deficit Description fatigue, pain   Activity Tolerance   Activity Tolerance Patient limited by fatigue;Patient limited by pain   Medical Staff Made Aware NurseIlda.  KAREEM-Bettie   Nurse Made Aware yes   Exercises   TKR Sitting;10 reps;AAROM;Left  (completed in recliner chair.  Pacing needed related to pain.)   Neuro re-ed Education provided on importance of progressive knee ROM with both flexion and extension. Positions in order to optimize ROM of both.   Balance training  2 minutes dynamic balance activities without LOB.   Assessment   Prognosis Good  (once pain controlled.)   Problem List Decreased strength;Decreased range of motion;Decreased endurance;Impaired balance;Decreased mobility;Orthopedic restrictions;Pain   Assessment Seen for progression of PT POC.  Improved since initial evaluation.  Pain continues to be limiting factor to functional progression.  Able to perform transfers and ambulation with less assistance this session. Increased distances for ambulation. Gait slowed, decreased foot clearance and step length.  Antalgic.  Occasional L knee give, but without buckle and use of UE to steady.  " Able to progress with attempt to stair training.  Able to perform single leg step up x 3 on 2 inch step only.  Increased pain and unable to progress to  stairs at this time.  Following standing rest, able to ambulate return to room with increased time.  Anticipate with appropriate pain control will be able to progress and achieve IPPT goals for home with PT.  The patient's AM-[facility] Basic Mobility Inpatient Short Form Raw Score is 18. A Raw score of greater than 16 suggests the patient may benefit from discharge to home. Please also refer to the recommendation of the Physical Therapist for safe discharge planning. Minimum resource intensity at discharge onces functional goals met and pain adequately managed.   Barriers to Discharge Inaccessible home environment;Decreased caregiver support   Barriers to Discharge Comments 3 RAMSES, 18 steps to bedroom-can have 2nd floor set up with BSC access close to bed.   Goals   Patient Goals pain to improve and be able to return home.   STG Expiration Date 12/12/24   PT Treatment Day 1   Plan   Treatment/Interventions Functional transfer training;LE strengthening/ROM;Elevations;Therapeutic exercise;Endurance training;Patient/family training;Equipment eval/education;Bed mobility;Gait training;Compensatory technique education;Continued evaluation;Spoke to nursing;Spoke to case management   Progress Slow progress, decreased activity tolerance  (pain)   PT Frequency Twice a day  (in order to facilitate progress for discharge to home.)   Discharge Recommendation   Rehab Resource Intensity Level, PT III (Minimum Resource Intensity)  (provided pain control improved and acheives IPPT goals for safe d/c to home with PT.)   Equipment Recommended Walker  (pt has)   Walker Package Recommended Wheeled walker   [facility]   Turning in Flat Bed Without Bedrails 3   Lying on Back to Sitting on Edge of Flat Bed Without Bedrails 3   Moving Bed to Chair 3   Standing Up From  Chair Using Arms 3   Walk in Room 3   Climb 3-5 Stairs With Railing 3   Basic Mobility Inpatient Raw Score 18   Basic Mobility Standardized Score 41.05   [facility] Highest Level Of Mobility   -HL Goal 6: Walk 10 steps or more   -HL Achieved 7: Walk 25 feet or more   Education   Education Provided Home exercise program;Mobility training;Assistive device   Patient Demonstrates acceptance/verbal understanding   End of Consult   Patient Position at End of Consult Bedside chair;Bed/Chair alarm activated;All needs within reach   End of Consult Comments stable condition, requesting pain medications given increased pain p session.   Christie Carrillo, PT

## 2024-12-07 ENCOUNTER — HOME HEALTH ADMISSION (OUTPATIENT)
Dept: HOME HEALTH SERVICES | Facility: HOME HEALTHCARE | Age: 64
End: 2024-12-07
Payer: MEDICARE

## 2024-12-07 LAB
ANION GAP SERPL CALCULATED.3IONS-SCNC: 4 MMOL/L (ref 4–13)
BUN SERPL-MCNC: 15 MG/DL (ref 5–25)
CALCIUM SERPL-MCNC: 8.9 MG/DL (ref 8.4–10.2)
CHLORIDE SERPL-SCNC: 103 MMOL/L (ref 96–108)
CO2 SERPL-SCNC: 32 MMOL/L (ref 21–32)
CREAT SERPL-MCNC: 0.69 MG/DL (ref 0.6–1.3)
ERYTHROCYTE [DISTWIDTH] IN BLOOD BY AUTOMATED COUNT: 13 % (ref 11.6–15.1)
GFR SERPL CREATININE-BSD FRML MDRD: 93 ML/MIN/1.73SQ M
GLUCOSE SERPL-MCNC: 105 MG/DL (ref 65–140)
HCT VFR BLD AUTO: 33.3 % (ref 34.8–46.1)
HGB BLD-MCNC: 10.5 G/DL (ref 11.5–15.4)
MCH RBC QN AUTO: 28.8 PG (ref 26.8–34.3)
MCHC RBC AUTO-ENTMCNC: 31.5 G/DL (ref 31.4–37.4)
MCV RBC AUTO: 91 FL (ref 82–98)
PLATELET # BLD AUTO: 174 THOUSANDS/UL (ref 149–390)
PMV BLD AUTO: 12.6 FL (ref 8.9–12.7)
POTASSIUM SERPL-SCNC: 4.1 MMOL/L (ref 3.5–5.3)
RBC # BLD AUTO: 3.65 MILLION/UL (ref 3.81–5.12)
SODIUM SERPL-SCNC: 139 MMOL/L (ref 135–147)
WBC # BLD AUTO: 6.3 THOUSAND/UL (ref 4.31–10.16)

## 2024-12-07 PROCEDURE — 85027 COMPLETE CBC AUTOMATED: CPT

## 2024-12-07 PROCEDURE — 97167 OT EVAL HIGH COMPLEX 60 MIN: CPT

## 2024-12-07 PROCEDURE — 97116 GAIT TRAINING THERAPY: CPT

## 2024-12-07 PROCEDURE — 97163 PT EVAL HIGH COMPLEX 45 MIN: CPT

## 2024-12-07 PROCEDURE — 80048 BASIC METABOLIC PNL TOTAL CA: CPT

## 2024-12-07 PROCEDURE — 99024 POSTOP FOLLOW-UP VISIT: CPT

## 2024-12-07 PROCEDURE — 99232 SBSQ HOSP IP/OBS MODERATE 35: CPT | Performed by: INTERNAL MEDICINE

## 2024-12-07 RX ORDER — HYDROMORPHONE HCL/PF 1 MG/ML
0.5 SYRINGE (ML) INJECTION
Status: DISCONTINUED | OUTPATIENT
Start: 2024-12-07 | End: 2024-12-08 | Stop reason: HOSPADM

## 2024-12-07 RX ADMIN — OXYCODONE HYDROCHLORIDE AND ACETAMINOPHEN 500 MG: 500 TABLET ORAL at 18:00

## 2024-12-07 RX ADMIN — HYDROMORPHONE HYDROCHLORIDE 0.5 MG: 1 INJECTION, SOLUTION INTRAMUSCULAR; INTRAVENOUS; SUBCUTANEOUS at 21:12

## 2024-12-07 RX ADMIN — OXYCODONE HYDROCHLORIDE AND ACETAMINOPHEN 500 MG: 500 TABLET ORAL at 08:50

## 2024-12-07 RX ADMIN — SENNOSIDES 8.6 MG: 8.6 TABLET, FILM COATED ORAL at 08:50

## 2024-12-07 RX ADMIN — OXYCODONE HYDROCHLORIDE 10 MG: 10 TABLET ORAL at 02:49

## 2024-12-07 RX ADMIN — ASPIRIN 81 MG: 81 TABLET, COATED ORAL at 18:00

## 2024-12-07 RX ADMIN — FOLIC ACID 1 MG: 1 TABLET ORAL at 08:50

## 2024-12-07 RX ADMIN — ACETAMINOPHEN 975 MG: 325 TABLET, FILM COATED ORAL at 16:31

## 2024-12-07 RX ADMIN — BUPROPION HYDROCHLORIDE 150 MG: 150 TABLET, EXTENDED RELEASE ORAL at 08:51

## 2024-12-07 RX ADMIN — VENLAFAXINE HYDROCHLORIDE 75 MG: 37.5 CAPSULE, EXTENDED RELEASE ORAL at 08:50

## 2024-12-07 RX ADMIN — ASPIRIN 81 MG: 81 TABLET, COATED ORAL at 08:50

## 2024-12-07 RX ADMIN — CYANOCOBALAMIN TAB 500 MCG 2000 MCG: 500 TAB at 08:51

## 2024-12-07 RX ADMIN — ACETAMINOPHEN 650 MG: 325 TABLET, FILM COATED ORAL at 04:52

## 2024-12-07 RX ADMIN — DOCUSATE SODIUM 100 MG: 100 CAPSULE, LIQUID FILLED ORAL at 18:00

## 2024-12-07 RX ADMIN — OXYCODONE HYDROCHLORIDE 10 MG: 10 TABLET ORAL at 08:44

## 2024-12-07 RX ADMIN — PANTOPRAZOLE SODIUM 40 MG: 40 TABLET, DELAYED RELEASE ORAL at 06:03

## 2024-12-07 RX ADMIN — ACETAMINOPHEN 975 MG: 325 TABLET, FILM COATED ORAL at 08:44

## 2024-12-07 RX ADMIN — MORPHINE SULFATE 2 MG: 2 INJECTION, SOLUTION INTRAMUSCULAR; INTRAVENOUS at 12:25

## 2024-12-07 RX ADMIN — Medication 1 TABLET: at 08:51

## 2024-12-07 RX ADMIN — DOCUSATE SODIUM 100 MG: 100 CAPSULE, LIQUID FILLED ORAL at 08:50

## 2024-12-07 RX ADMIN — OXYCODONE HYDROCHLORIDE 10 MG: 10 TABLET ORAL at 17:59

## 2024-12-07 NOTE — PLAN OF CARE
Problem: PAIN - ADULT  Goal: Verbalizes/displays adequate comfort level or baseline comfort level  Description: Interventions:  - Encourage patient to monitor pain and request assistance  - Assess pain using appropriate pain scale  - Administer analgesics based on type and severity of pain and evaluate response  - Implement non-pharmacological measures as appropriate and evaluate response  - Consider cultural and social influences on pain and pain management  - Notify physician/advanced practitioner if interventions unsuccessful or patient reports new pain  Outcome: Progressing     Problem: SAFETY ADULT  Goal: Patient will remain free of falls  Description: INTERVENTIONS:  - Educate patient/family on patient safety including physical limitations  - Instruct patient to call for assistance with activity   - Consult OT/PT to assist with strengthening/mobility   - Keep Call bell within reach  - Keep bed low and locked with side rails adjusted as appropriate  - Keep care items and personal belongings within reach  - Initiate and maintain comfort rounds  - Make Fall Risk Sign visible to staff  - Offer Toileting every 2 Hours, in advance of need  - Initiate/Maintain bed alarm  - Obtain necessary fall risk management equipment  - Apply yellow socks and bracelet for high fall risk patients  - Consider moving patient to room near nurses station  Outcome: Progressing     Problem: DISCHARGE PLANNING  Goal: Discharge to home or other facility with appropriate resources  Description: INTERVENTIONS:  - Identify barriers to discharge w/patient and caregiver  - Arrange for needed discharge resources and transportation as appropriate  - Identify discharge learning needs (meds, wound care, etc.)  - Arrange for interpretive services to assist at discharge as needed  - Refer to Case Management Department for coordinating discharge planning if the patient needs post-hospital services based on physician/advanced practitioner order or  complex needs related to functional status, cognitive ability, or social support system  Outcome: Progressing     Problem: MUSCULOSKELETAL - ADULT  Goal: Maintain or return mobility to safest level of function  Description: INTERVENTIONS:  - Assess patient's ability to carry out ADLs; assess patient's baseline for ADL function and identify physical deficits which impact ability to perform ADLs (bathing, care of mouth/teeth, toileting, grooming, dressing, etc.)  - Assess/evaluate cause of self-care deficits   - Assess range of motion  - Assess patient's mobility  - Assess patient's need for assistive devices and provide as appropriate  - Encourage maximum independence but intervene and supervise when necessary  - Involve family in performance of ADLs  - Assess for home care needs following discharge   - Consider OT consult to assist with ADL evaluation and planning for discharge  - Provide patient education as appropriate  Outcome: Progressing  Goal: Maintain proper alignment of affected body part  Description: INTERVENTIONS:  - Support, maintain and protect limb and body alignment  - Provide patient/ family with appropriate education  Outcome: Progressing

## 2024-12-07 NOTE — ASSESSMENT & PLAN NOTE
POD 2 s/p L TKA with Dr. Aggarwal on 12/5  WBAT LLE  ROM as tolerated, pillow/blankets under Achilles not behind knee while in bed  DVT ppx 81mg BID x 4 weeks  PT/OT  Pain control  AM labs - Hgb 10.5.  Greater than 2 gram drop ion Hgb indicates diagnosis of ABLA.  No signs of active bleeding in the operative extremity.  Discharge planning.

## 2024-12-07 NOTE — PHYSICAL THERAPY NOTE
PT EVALUATION    Pt. Name: Aislinn Rouse  Pt. Age: 64 y.o.  MRN: [id number]  LENGTH OF STAY: 1      Admitting Diagnoses:   Osteoarthritis of left knee [M17.12]    Past Medical History:   Diagnosis Date    Anxiety     Arthritis     Asthma     Albuterol prn    Bariatric surgery status     Chronic pain disorder     left knee    COVID-19 11/19/2021    Depression 2009    managed with Effexor     Generalized osteoarthritis     GERD (gastroesophageal reflux disease)     Low vitamin B12 level     Migraine     Postgastrectomy malabsorption     Suicide attempt (HCC)     Vitamin D deficiency     Wears dentures     Wears glasses        Past Surgical History:   Procedure Laterality Date    CHOLECYSTECTOMY  2005    COLONOSCOPY      COSMETIC SURGERY  05/27/2020    Abdominoplasty    EGD      GASTRIC BYPASS  2015    elvira - en -y     HYSTERECTOMY      IR IMAGE GUIDED ASPIRATION / DRAINAGE W TUBE  07/14/2020    KNEE ARTHROSCOPY      with Lysis of adhesions    LAPAROSCOPIC SUPRACERVICAL HYSTERECTOMY  2005    due to endometriosis/AUB    OOPHORECTOMY Left 2005    VT EXCISION SKIN ABD INFRAUMBILICAL PANNICULECTOMY N/A 05/27/2020    Procedure: PANNICULECTOMY;  Surgeon: Bacilio Iraheta MD;  [facility];  Service: Plastics    TONSILLECTOMY AND ADENOIDECTOMY      TOTAL KNEE ARTHROPLASTY Left 12/5/2024    Procedure: ARTHROPLASTY KNEE TOTAL;  Surgeon: Nash Aggarwal DO;  [facility];  Service: Orthopedics    TUBAL LIGATION  1986    VAC DRESSING APPLICATION N/A 05/27/2020    Procedure: APPLICATION VAC DRESSING;  Surgeon: Bacilio Iraheta MD;  [facility];  Service: Plastics       Imaging Studies:  No orders to display         12/07/24 0955   PT Last Visit   PT Visit Date 12/07/24   Note Type   Note type Evaluation   Pain Assessment   Pain Score 10 - Worst Possible Pain   Pain Location/Orientation Orientation: Left;Location: Knee   Hospital Pain Intervention(s) Medication (See  MAR);Repositioned;Ambulation/increased activity;Emotional support;Rest;Cold applied   Restrictions/Precautions   LLE Weight Bearing Per Order WBAT   Other Precautions Fall Risk;Pain   Home Living   Type of Home House   Home Layout Two level;Bed/bath upstairs;Stairs to enter without rails;Other (Comment)  (3STE w/o HR; FOS to 2nd floor bed/bath)   Bathroom Shower/Tub Tub/shower unit   Bathroom Toilet Standard   Bathroom Equipment Tub transfer bench;Commode   Home Equipment Walker;Cane   Additional Comments Pt plans to stay upstairs to 2nd floor, no bathroom on main level. Pt prefers not to use BSC on main level.   Prior Function   Level of [place] Independent with functional mobility;Independent with ADLs  (w/o AD)   Lives With Family  (grandson who works part time & goes to school)   Receives Help From Family  (local daughter & son)   Falls in the last 6 months 1 to 4  (1x)   Vocational Retired   Comments (+)    General   Additional Pertinent History s/p L TKR on 12/5/24   Family/Caregiver Present No   Cognition   Overall Cognitive Status WFL   Arousal/Participation Alert   Attention Within functional limits   Orientation Level Oriented X4   Following Commands Follows all commands and directions without difficulty   Subjective   Subjective Pt agreeable to PT/OT evals.   RUE Assessment   RUE Assessment   (refer to OT)   LUE Assessment   LUE Assessment   (refer to OT)   RLE Assessment   RLE Assessment WFL  (4/5 grossly)   LLE Assessment   LLE Assessment X  (3-/5 grossly)   Coordination   Movements are Fluid and Coordinated 1   Sensation WFL   Bed Mobility   Supine to Sit 5  Supervision   Additional items HOB elevated;Increased time required;Verbal cues;LE management   Additional Comments pt able to self assist LLE OOB   Transfers   Sit to Stand 5  Supervision   Additional items Increased time required;Verbal cues;Bedrails   Stand to Sit 5  Supervision   Additional items Armrests;Increased time  required;Verbal cues   Toilet transfer 5  Supervision   Additional items Increased time required;Verbal cues;Standard toilet;Commode;Other  (grab abr)   Additional Comments cues for hand placement & LLE positioning   Ambulation/Elevation   Gait pattern Antalgic;Wide DONA;Decreased L stance;Short stride;Step to;Excessively slow   Gait Assistance 5  Supervision   Additional items Verbal cues   Assistive Device Rolling walker   Distance 15'x1   Ambulation/Elevation Additional Comments no gross LOB noted; inc time to complete 2* to pain   Balance   Static Sitting Good   Dynamic Sitting Fair +   Static Standing Fair  (w/ RW)   Dynamic Standing Fair -  (w/ RW)   Ambulatory Fair -  (w/ RW)   Endurance Deficit   Endurance Deficit Yes   Endurance Deficit Description pain; fatigue   Activity Tolerance   Activity Tolerance Patient limited by fatigue;Patient limited by pain;Treatment limited secondary to medical complications (Comment)   Medical Staff Made Aware OTR Nikkie   Nurse Made Aware yes   Assessment   Prognosis Good   Problem List Decreased strength;Decreased range of motion;Decreased endurance;Impaired balance;Decreased mobility;Pain   Assessment Pt. 64 y.o.female s/p L TKR at [facility] on 12/5/24. [facility] to [facility] for [facility] management. Pt referred to PT for mobility assessment & D/C planning. Please see above for information re: home set-up & PLOF as well as objective findings during PT assessment. PTA, pt reports being I w/o AD. On eval, pt functioning below baseline hence will continue skilled PT to improve function & safety. Pt require S for most functional mobility w/ RW + cues for techniques & safety. Gait deviations as above but no gross LOB noted.  Pt tolerated further mobility training after IE. Please see additional tx below for details. The patient's AM-[facility] Basic Mobility Inpatient Short Form Raw Score is 18. A Raw score of greater than 16 suggests the patient may benefit from discharge to home.  "Please also refer to the recommendation of the Physical Therapist for safe discharge planning. From PT standpoint, will recommend Level III (minimum resource intensity) rehab services and inc family support at D/C. No SOB & dizziness reported t/o session. Nsg staff most recent vital signs as follows: /69 (BP Location: Right arm)   Pulse 63   Temp 98.1 °F (36.7 °C) (Axillary)   Resp 19   Ht 5' 5\" (1.651 m)   Wt 84.6 kg (186 lb 9.6 oz)   LMP  (LMP Unknown)   SpO2 97%   BMI 31.05 kg/m² . At end of session, pt OOB in chair in stable condition, call bell & phone in reach. Fall precautions reinforced w/ good understanding. CM to follow. Nsg staff to continue to mobilized pt (OOB in chair for all meals & ambulate in room/unit) as tolerated to prevent further decline in function. Will also recommend Restorative for daily amb &/or daily OOB in chair as appropriate. Nsg notified. Co-eval was necessary to complete this PT eval for the pt's best interest given pt's medical acuity & complexity.   Goals   Patient Goals to have less pain   STG Expiration Date 12/14/24   Short Term Goal #1 Goals to be met in 7 days; pt will be able to: 1) inc strength & balance by 1/2 grade to improve overall functional mobility & dec fall risk; 2) inc bed mobility to modified I for pt to be able to get in/OOB safely w/ proper techniques 100% of the time, to dec caregiver burden & safely function at home; 3) inc transfers to modified I for pt to transition safely from one surface to another w/o % of the time, to dec caregiver burden & safely function at home; 4) inc amb w/ RW approx. >250' w/ modified I for pt to ambulate community distances w/o any % of the time, to dec caregiver burden & safely function at home; 5) negotiate stairs w/ modified I for inc safety during stair mgt inside/outside of home & dec caregiver burden; 6) pt/caregiver ed   PT Treatment Day 0   Plan   Treatment/Interventions Functional transfer " training;LE strengthening/ROM;Elevations;Therapeutic exercise;Endurance training;Patient/family training;Bed mobility;Gait training;Spoke to nursing;OT   PT Frequency 3-5x/wk   Discharge Recommendation   Rehab Resource Intensity Level, PT III (Minimum Resource Intensity)   Equipment Recommended Walker  (pt has)   Additional Comments restorative for daily amb   AM-P[facility] Basic Mobility Inpatient   Turning in Flat Bed Without Bedrails 3   Lying on Back to Sitting on Edge of Flat Bed Without Bedrails 3   Moving Bed to Chair 3   Standing Up From Chair Using Arms 3   Walk in Room 3   Climb 3-5 Stairs With Railing 3   Basic Mobility Inpatient Raw Score 18   Basic Mobility Standardized Score 41.05   [facility] Highest Level Of Mobility   -HLM Goal 6: Walk 10 steps or more   -HLM Achieved 7: Walk 25 feet or more   Additional Treatment Session   Start Time 0945   End Time 0955   Treatment Assessment After IE, pt tolerated further mobility training. Pt continue to require S for most functional mobility + cues for techniques & safety. Pt able to ambulate w/ RW approx. 80'x1 w/ S. Pt able to negotiate  stairs w/ S, nonreciprocal w/ B/L HR. Pt educated on how to negotiate 3STE outside of home w/ assistance from son & daughter on both sides for support given pt has no HR on either side. Pt also educated on to how negotiate stairs to 2nd floor w/ B/L hand support on single HR or one hand on HR & son on the other side. Pt verbalized good understanding. Will continue PT per POC. Pt may return home today when medically cleared.   Equipment Use RW   Additional Treatment Day 1   End of Consult   Patient Position at End of Consult Bedside chair;All needs within reach   End of Consult Comments Pt in stable condition. All needs in reach.   Hx/personal factors: co-morbidities, inaccessible home, dec caregiver support, use of AD, pain, h/o of falls, fall risk, and assist w/ ADL's  Examination: dec mobility, dec balance, dec  endurance, dec amb, risk for falls, pain  Clinical: unpredictable (ongoing medical status, risk for falls, and pain mgt)  Complexity: high    Len Jaime

## 2024-12-07 NOTE — PROGRESS NOTES
Progress Note - Orthopedics   Name: Aislinn Rouse 64 y.o. female I MRN: [id number]  Unit/Bed#: E2 -01 I Date of Admission: 12/6/2024   Date of Service: 12/7/2024 I Hospital Day: 1    Assessment & Plan  Primary osteoarthritis of left knee  POD 2 s/p L TKA with Dr. Aggarwal on 12/5  WBAT LLE  ROM as tolerated, pillow/blankets under Achilles not behind knee while in bed  DVT ppx 81mg BID x 4 weeks  PT/OT  Pain control  AM labs - Hgb 10.5.  Greater than 2 gram drop ion Hgb indicates diagnosis of ABLA.  No signs of active bleeding in the operative extremity.  Discharge planning.    Ok for discharge from Orthopedics service perspective when pain controlled and cleared by SLIM and PT/OT.    Subjective   64 y.o.female seen and evaluated at chair side.  No new acute overnight events, no new complaints.  Pain is significant today and does not feel much better than it has over th past few days.  She denies subjective fevers, chills, headaches, CP, SOB, N/V, or numbness or tingling.     Objective :  Temp:  [97.7 °F (36.5 °C)-99.7 °F (37.6 °C)] 98.2 °F (36.8 °C)  HR:  [57-74] 63  BP: (101-127)/(53-71) 122/58  Resp:  [18] 18  SpO2:  [90 %-99 %] 94 %  O2 Device: None (Room air)    Physical Exam  Vitals and nursing note reviewed.   Constitutional:       General: She is not in acute distress.     Appearance: She is well-developed.   HENT:      Head: Normocephalic and atraumatic.   Eyes:      Conjunctiva/sclera: Conjunctivae normal.   Cardiovascular:      Rate and Rhythm: Normal rate.   Pulmonary:      Effort: Pulmonary effort is normal. No respiratory distress.   Abdominal:      Palpations: Abdomen is soft.      Tenderness: There is no abdominal tenderness.   Musculoskeletal:      Cervical back: Neck supple.   Skin:     General: Skin is warm and dry.      Capillary Refill: Capillary refill takes less than 2 seconds.   Neurological:      Mental Status: She is alert.   Psychiatric:         Mood and Affect: Mood normal.  "      Musculoskeletal: left lower extremity  Visible skin is without erythema or ecchymosis.  Dressing C/D/I  TTP over the knee  Motor intact to +FHL/EHL, +ankle dorsi/plantar flexion  Sensation intact over the toes  Extremity WWP      Lab Results: I have reviewed the following results:  Recent Labs     12/06/24  0624 12/07/24  0443   WBC 9.01 6.30   HGB 10.2* 10.5*   HCT 32.1* 33.3*    174   BUN 13 15   CREATININE 0.75 0.69     Blood Culture:  No results found for: \"BLOODCX\"  Wound Culture: No results found for: \"WOUNDCULT\"  "

## 2024-12-07 NOTE — PLAN OF CARE
"  Problem: PHYSICAL THERAPY ADULT  Goal: Performs mobility at highest level of function for planned discharge setting.  See evaluation for individualized goals.  Description: Treatment/Interventions: Functional transfer training, LE strengthening/ROM, Elevations, Therapeutic exercise, Endurance training, Patient/family training, Bed mobility, Gait training, Spoke to nursing, OT  Equipment Recommended: Walker (pt has)       See flowsheet documentation for full assessment, interventions and recommendations.  Outcome: Adequate for Discharge  Note: Prognosis: Good  Problem List: Decreased strength, Decreased range of motion, Decreased endurance, Impaired balance, Decreased mobility, Pain  Assessment: Pt. 64 y.o.female s/p L TKR at [facility] on 12/5/24. Transferred to [facility] for pain management. Pt referred to PT for mobility assessment & D/C planning. Please see above for information re: home set-up & PLOF as well as objective findings during PT assessment. PTA, pt reports being I w/o AD. On eval, pt functioning below baseline hence will continue skilled PT to improve function & safety. Pt require S for most functional mobility w/ RW + cues for techniques & safety. Gait deviations as above but no gross LOB noted.  Pt tolerated further mobility training after IE. Please see additional tx below for details. The patient's AM-PAC Basic Mobility Inpatient Short Form Raw Score is 18. A Raw score of greater than 16 suggests the patient may benefit from discharge to home. Please also refer to the recommendation of the Physical Therapist for safe discharge planning. From PT standpoint, will recommend Level III (minimum resource intensity) rehab services and inc family support at D/C. No SOB & dizziness reported t/o session. Nsg staff most recent vital signs as follows: /69 (BP Location: Right arm)   Pulse 63   Temp 98.1 °F (36.7 °C) (Axillary)   Resp 19   Ht 5' 5\" (1.651 m)   Wt 84.6 kg (186 lb 9.6 oz)   LMP  " (LMP Unknown)   SpO2 97%   BMI 31.05 kg/m² . At end of session, pt OOB in chair in stable condition, call bell & phone in reach. Fall precautions reinforced w/ good understanding. CM to follow. Nsg staff to continue to mobilized pt (OOB in chair for all meals & ambulate in room/unit) as tolerated to prevent further decline in function. Will also recommend Rest[facility]ative for daily amb &/or daily OOB in chair as appropriate. Nsg notified. Co-eval was necessary to complete this PT eval for the pt's best interest given pt's medical acuity & complexity.        Rehab Resource Intensity Level, PT: III (Minimum Resource Intensity)    See flowsheet documentation for full assessment.

## 2024-12-07 NOTE — OCCUPATIONAL THERAPY NOTE
Occupational Therapy Evaluation     Patient Name: Aislinn Rouse  Today's Date: 12/7/2024  Problem List  Principal Problem:    Primary osteoarthritis of left knee    Past Medical History  Past Medical History:   Diagnosis Date    Anxiety     Arthritis     Asthma     Albuterol prn    Bariatric surgery status     Chronic pain disorder     left knee    COVID-19 11/19/2021    Depression 2009    managed with Effexor     Generalized osteoarthritis     GERD (gastroesophageal reflux disease)     Low vitamin B12 level     Migraine     Postgastrectomy malabsorption     Suicide attempt (HCC)     Vitamin D deficiency     Wears dentures     Wears glasses      Past Surgical History  Past Surgical History:   Procedure Laterality Date    CHOLECYSTECTOMY  2005    COLONOSCOPY      COSMETIC SURGERY  05/27/2020    Abdominoplasty    EGD      GASTRIC BYPASS  2015    elvira - en -y     HYSTERECTOMY      IR IMAGE GUIDED ASPIRATION / DRAINAGE W TUBE  07/14/2020    KNEE ARTHROSCOPY      with Lysis of adhesions    LAPAROSCOPIC SUPRACERVICAL HYSTERECTOMY  2005    due to endometriosis/AUB    OOPHORECTOMY Left 2005    AL EXCISION SKIN ABD INFRAUMBILICAL PANNICULECTOMY N/A 05/27/2020    Procedure: PANNICULECTOMY;  Surgeon: Bacilio Iraheta MD;  Location: [facility];  Service: Plastics    TONSILLECTOMY AND ADENOIDECTOMY      TOTAL KNEE ARTHROPLASTY Left 12/5/2024    Procedure: ARTHROPLASTY KNEE TOTAL;  Surgeon: Nash Aggarwal DO;  Location: [facility];  Service: Orthopedics    TUBAL LIGATION  1986    VAC DRESSING APPLICATION N/A 05/27/2020    Procedure: APPLICATION VAC DRESSING;  Surgeon: Bacilio Iraheta MD;  Location: [facility];  Service: Plastics        12/07/24 0927   OT Last Visit   OT Visit Date 12/07/24   Note Type   Note type Evaluation   Pain Assessment   Pain Assessment Tool 0-10   Pain Score 10 - Worst Possible Pain   Pain Location/Orientation Orientation: Left;Location: Knee   Hospital Pain Intervention(s)  "Cold applied;Repositioned;Ambulation/increased activity;Emotional support;Rest   Restrictions/Precautions   Weight Bearing Precautions Per Order Yes   LLE Weight Bearing Per Order WBAT   Other Precautions Chair Alarm;Bed Alarm;WBS;Multiple lines;Fall Risk;Pain   Home Living   Type of Home House   Home Layout Two level;Bed/bath upstairs;Stairs to enter without rails  (3 RAMSES w/o HR)   Bathroom Shower/Tub Tub/shower unit   Bathroom Toilet Standard   Bathroom Equipment Tub transfer bench;Commode   Home Equipment Walker;Cane   Prior Function   Level of [place] Independent with ADLs;Independent with functional mobility;Independent with IADLS   Lives With Family  (Grandson- works part time and goes to school)   Receives Help From Family  (Reports her children will be taking turns to check in on her.)   IADLs Independent with meal prep;Independent with driving;Independent with medication management   Falls in the last 6 months 1 to 4   Vocational Retired   Lifestyle   Autonomy Independent with all ADLs/IADLs, no AD use at baseline   Reciprocal Relationships Family; daughter and grandson but both work during the day   Service to Others Retired   Intrinsic Gratification Dance   General   Family/Caregiver Present Yes   Subjective   Subjective \"I know I need to power through\"   ADL   Where Assessed Edge of bed   Eating Assistance 6  Modified independent   Grooming Assistance 5  Supervision/Setup   UB Bathing Assistance 5  Supervision/Setup   LB Bathing Assistance 3  Moderate Assistance   UB Dressing Assistance 5  Supervision/Setup   LB Dressing Assistance 3  Moderate Assistance   Toileting Assistance  5  Supervision/Setup   Bed Mobility   Supine to Sit 4  Minimal assistance   Additional items Assist x 1;HOB elevated;Bedrails;Increased time required;Verbal cues;LE management   Transfers   Sit to Stand 5  Supervision   Additional items Assist x 1;Armrests;Increased time required;Verbal cues;Other  (RW)   Stand to Sit 5  " Supervision   Additional items Assist x 1;Armrests;Increased time required;Verbal cues;Other  (RW)   Toilet transfer 5  Supervision   Additional items Assist x 1;Increased time required;Verbal cues;Other  (BSC placed over  standard toilet; GB and RW)   Functional Mobility   Functional Mobility 6  Modified independent   Additional Comments Ax1; limited household distances. Limited 2* pain   Additional items Rolling walker   Balance   Static Sitting Good   Dynamic Sitting Fair +   Static Standing Fair   Dynamic Standing Fair -   Ambulatory Fair -   Activity Tolerance   Activity Tolerance Patient limited by fatigue;Patient limited by pain   Medical Staff Made Aware PT   Nurse Made Aware Yes   RUE Assessment   RUE Assessment WFL   LUE Assessment   LUE Assessment WFL   Hand Function   Gross Motor Coordination Functional   Fine Motor Coordination Functional   Sensation   Light Touch No apparent deficits   Proprioception   Proprioception No apparent deficits   Vision-Basic Assessment   Current Vision No visual deficits   Vision - Complex Assessment   Ocular Range of Motion Intact   Psychosocial   Patient Behaviors/Mood Anxious   Perception   Inattention/Neglect Appears intact   Cognition   Overall Cognitive Status WFL   Arousal/Participation Alert;Responsive;Cooperative   Attention Within functional limits   Orientation Level Oriented X4   Memory Within functional limits   Following Commands Follows all commands and directions without difficulty   Assessment   Limitation Decreased ADL status;Decreased UE strength;Decreased Safe judgement during ADL;Decreased endurance;Decreased self-care trans;Decreased high-level ADLs   Prognosis Good   Assessment Patient is a 64 y.o. year old female seen for OT eval s/p admit to [facility] on 12/6/2024 with primary OA of L knee s/p TKR.  Comorbidities affecting pt’s functional performance include a significant PMH of: asthma, h/o bariatric surgery, intentional drug OD, MDD, chronic back pain,  YAMEL, b/l CTS, OA, status post panniculectomy, gired, asthma.  Patient with active OT orders and activity orders for Up with assistance. OT consulted to assess ADLs/IADLs/functional mobility and assist w/ D/C planning. Patient demonstrates the following deficits impacting occupational performance: decreased strength , decreased balance, decreased activity tolerance, limited functional reach, decreased safety awareness, increased pain, orthopedic restrictions, increased body habitus , impaired coordination, decreased cardiovascular endurance, and decreased skin integrity . These impairments, as well at pt’s RAMSES home environment, steps within home environment, limited home support, difficulty performing ADLs, difficulty performing IADLs, difficulty performing transfers/mobility, limited insight into deficits, compliance, WBS, fall risk , functional decline , new use of AD for functional transfers/mobility, multiple admissions , and inability to perform caregiver duties , limit pt’s ability to safely engage in all baseline areas of occupation. Pt's CLOF as follows: eating/grooming: Ruben and supervision , UB ADLs: supervision , LB ADLs: modA, toileting: supervision , bed mobility: Armen, functional transfers: supervision , functional mobility: supervision , sitting/standing tolerance: ~8 min. Pt would benefit from continued skilled OT while in acute setting to address deficits as defined above and to maximize (I) w/ ADLs/functional mobility. Occupational performance areas to address include: grooming, bathing/shower, toilet hygiene, dressing, functional mobility, community mobility, clothing management, cleaning, meal prep, household maintenance, and care of children. Based on the aforementioned evaluation, functional performance deficits, and assessments, pt has been identified as a high complexity evaluation. At this time, recommendation for pt to receive post-acute rehabilitation services at a Level III (minimum  resource intensity) due to above deficits and CLOF. OT will continue to follow pt 2-5x/wk to address the goals listed below to  w/in 10-14 days.   Goals   Patient Goals to have less pain   LTG Time Frame 10-14   Plan   Treatment Interventions ADL retraining;Functional transfer training;Endurance training;Patient/family training;Equipment evaluation/education;Compensatory technique education;Continued evaluation;Energy conservation;Activityengagement   Goal Expiration Date 24   OT Treatment Day 0   OT Frequency 3-5x/wk;2-3x/wk   Discharge Recommendation   Rehab Resource Intensity Level, OT III (Minimum Resource Intensity)   Additional Comments  Co treatment with PT secondary to complex medical condition of pt, possible A of 2 required to achieve and maintain transitional movements, requiring the need of skilled therapeutic intervention of 2 therapists to achieve delivery of services.   AM-PAC Daily Activity Inpatient   Lower Body Dressing 2   Bathing 2   Toileting 3   Upper Body Dressing 3   Grooming 3   Eating 4   Daily Activity Raw Score 17   Daily Activity Standardized Score (Calc for Raw Score >=11) 37.26   AM-PAC Applied Cognition Inpatient   Following a Speech/Presentation 4   Understanding Ordinary Conversation 4   Taking Medications 4   Remembering Where Things Are Placed or Put Away 4   Remembering List of 4-5 Errands 4   Taking Care of Complicated Tasks 4   Applied Cognition Raw Score 24   Applied Cognition Standardized Score 62.21     Occupational Therapy goals: In 7-14 days:     1- Patient will verbalize and demonstrate use of energy conservation/deep breathing technique and work simplification skills during functional activity with no verbal cues.   2- Patient will verbalize and demonstrate good body mechanics and joint protection techniques during ADLs/IADLs with no verbal cues   3- Pt will complete bed mobility at a Mod I level w/ G balance/safety demonstrated to decrease caregiver  assistance required   4- Patient will increase OOB/ sitting tolerance to 2-4 hours per day for increased participation in self care and leisure tasks with no s/s of exertion.   5-Patient will increase standing tolerance time to 5 minutes with unilateral UE support to complete sink level ADLs@ mod I level    6- Pt will improve functional transfers to Mod I on/off all surfaces using DME as needed w/ G balance/safety   7- Patient will complete UB ADLs with Brenda utilizing appropriate DME/AE PRN   8- Patient will complete LB ADLs with Brenda utilizing appropriate DME/AE PRN   9- Patient will complete toileting tasks with Brenda with G hygiene/thoroughness utilizing appropriate DME/AE PRN   10- Pt will improve functional mobility during ADL/IADL/leisure tasks to Mod I using DME as needed w/ G balance/safety    11- Pt will be attentive 100% of the time during ongoing cognitive assessment w/ G participation to assist w/ safe d/c planning/recommendations   12- Pt will participate in simulated IADL management task to increase independence to Mod I w/ G safety and endurance   13- Pt will increase BUE strength by 1MM grade via AROM/AAROM/PROM exercises to increase independence in ADLs and transfers         Nikkie Reid, OTR/L

## 2024-12-07 NOTE — PROGRESS NOTES
"  Progress Note - Aislinn Nasim 64 y.o. female MRN: [id number]    Unit/Bed#: E2 -01 Encounter: [id number]    Assessment/Plan:    Status post left total knee replacement  continued swelling and pain associated, postop care by orthopedics    Acute blood loss anemia    hemoglobin 10.5 related to recent knee surgery     MDD with anxiety     stable with Wellbutrin and Effexor      Subjective:   Complains of swelling of pain of left knee, denies chest pain no shortness of breath no nausea vomiting no fevers chills appetites poor      Objective:     Vitals: Blood pressure 122/58, pulse 63, temperature 98.2 °F (36.8 °C), temperature source Temporal, resp. rate 18, height 5' 5\" (1.651 m), weight 84.6 kg (186 lb 9.6 oz), SpO2 94%, not currently breastfeeding.,Body mass index is 31.05 kg/m².      Results from last 7 days   Lab Units 12/07/24  0443   WBC Thousand/uL 6.30   HEMOGLOBIN g/dL 10.5*   HEMATOCRIT % 33.3*   PLATELETS Thousands/uL 174     Results from last 7 days   Lab Units 12/07/24  0443   POTASSIUM mmol/L 4.1   CHLORIDE mmol/L 103   CO2 mmol/L 32   BUN mg/dL 15   CREATININE mg/dL 0.69   CALCIUM mg/dL 8.9       Scheduled Meds:  Current Facility-Administered Medications   Medication Dose Route Frequency Provider Last Rate    acetaminophen  650 mg Oral Q4H PRN Юлия Bland PA-C      acetaminophen  975 mg Oral Q8H Юлия Bland PA-C      ALPRAZolam  1 mg Oral HS PRN Юлия Bland PA-C      ascorbic acid  500 mg Oral BID Юлия Bland PA-C      aspirin  81 mg Oral BID Юлия Bland PA-C      buPROPion  150 mg Oral Daily Юлия Bland PA-C      calcium carbonate  1,000 mg Oral Daily PRN Юлия Bland PA-C      cyanocobalamin  2,000 mcg Oral Daily Юлия Bland PA-C      docusate sodium  100 mg Oral BID Юлия Bland PA-C      doxepin  10 mg Oral HS PRN Юлия Bland PA-C      folic acid  1 mg Oral Daily Юлия Bland PA-C      lactated ringers  50 mL/hr Intravenous " Continuous Юлия Bland PA-C      morphine injection  2 mg Intravenous Q2H PRN Юлия Bland PA-C      multivitamin-minerals  1 tablet Oral Daily Юлия Bland PA-C      ondansetron  4 mg Intravenous Q6H PRN Юлия Bland PA-C      oxyCODONE  10 mg Oral Q4H PRN Юлия Bland PA-C      oxyCODONE  5 mg Oral Q4H PRN Юлия Bland PA-C      pantoprazole  40 mg Oral Daily Before Breakfast Юлия Bland PA-C      senna  1 tablet Oral Daily Юлия Bland PA-C      simethicone  80 mg Oral 4x Daily PRN Юлия Bland PA-C      venlafaxine  75 mg Oral Daily Юлия Bland PA-C         Continuous Infusions:  lactated ringers, 50 mL/hr          Physical exam:  General appearance: Alert no distress interaction appropriate  Head/Eyes: Nonicteric EOMI  Neck: Supple  Lungs: CTA bilateral no wheezing rhonchi or rales  Heart: normal S1 S2 regular  Abdomen: Soft nontender with bowel sounds  Extremities: no edema left knee Ace wrap  Skin: no rash    Invasive Devices       Peripheral Intravenous Line  Duration             Peripheral IV 12/05/24 Right Hand 1 day                        Counseling / Coordination of Care  Total floor / unit time spent today 30  minutes.  Greater than 50% of total time was spent with the patient and / or family counseling and / or coordination of care.  A description of the counseling / coordination of care: Medically stable.

## 2024-12-07 NOTE — UTILIZATION REVIEW
Initial Clinical Review  The patient was transferred to [facility] on 12/6 from [facility], where care began on 12/5. 1 midnight has already been surpassed with active ongoing care.    Outpatient no charge bed  12/6 1936 Changed to Inpatient 12/7 1054     Admission: Date/Time/Statement:   Admission Orders (From admission, onward)       Ordered        12/07/24 1054  INPATIENT ADMISSION  Once                          Orders Placed This Encounter   Procedures    INPATIENT ADMISSION     Standing Status:   Standing     Number of Occurrences:   1     Level of Care:   Med Surg [16]     Estimated length of stay:   More than 2 Midnights     Certification:   I certify that inpatient services are medically necessary for this patient for a duration of greater than two midnights. See H&P and MD Progress Notes for additional information about the patient's course of treatment.       Initial Presentation: 64 y.o. female presents to [facility] as transfer via EMS  from [facility] 12/6. She is status post left total knee arthroplasty performed at [place]  12/5. Requires continue pain management and continued PT  Admitted to [facility]  12/6 1935 as Outpatient no charge bed . Pain management tylenol ATC , morphine 2 mg IV as needed  DVT ppx ASA 81 mg     Date: 12/7      Day 3: Has surpassed a 2nd midnight with active treatments and services.  CHANGED TO INPATIENT  Pt reports significant pain. Morphine 2 mg IV x 1  Dilaudid 0.5 mg IV x 1  pain 9/10 left knee   AM labs Hgb 10.5  No signs of active bleeding in the operative extremity.  WBAT LLE PT/OT  Exam alert no distress , lungs CTA , heart normal S1 S2, left knee ace wrap    Hospitalist 12/7 consulted H/H 10.5 /33.3 continued swelling,  pain post op TKR  MDD with anxiety stable on Wellbutrin and effexor       Scheduled Medications:  acetaminophen, 975 mg, Oral, Q8H  ascorbic acid, 500 mg, Oral, BID  aspirin, 81 mg, Oral,  BID  buPROPion, 150 mg, Oral, Daily  cyanocobalamin, 2,000 mcg, Oral, Daily  docusate sodium, 100 mg, Oral, BID  folic acid, 1 mg, Oral, Daily  multivitamin-minerals, 1 tablet, Oral, Daily  pantoprazole, 40 mg, Oral, Daily Before Breakfast  senna, 1 tablet, Oral, Daily  venlafaxine, 75 mg, Oral, Daily      Continuous IV Infusions:     PRN Meds:     Morphine 2 mg IV 12/6 x 1 12/7 x 1    Dilaudid 0.5 mg IV x 1 12/7   acetaminophen, 650 mg, Oral, Q4H PRN  ALPRAZolam, 1 mg, Oral, HS PRN  calcium carbonate, 1,000 mg, Oral, Daily PRN  doxepin, 10 mg, Oral, HS PRN  ondansetron, 4 mg, Intravenous, Q6H PRN  oxyCODONE, 10 mg, Oral, Q4H PRN  x 7 doses   oxyCODONE, 5 mg, Oral, Q4H PRN  simethicone, 80 mg, Oral, 4x Daily PRN      ED Triage Vitals   Temperature Pulse Respirations Blood Pressure SpO2 Pain Score   12/06/24 2143 12/06/24 2319 12/06/24 2319 12/06/24 2319 12/06/24 2319 12/06/24 1950   99.1 °F (37.3 °C) 74 18 101/54 91 % 10 - Worst Possible Pain     Weight (last 2 days)       Date/Time Weight    12/06/24 1950 84.6 (186.6)            Vital Signs (last 3 days)       Date/Time Temp Pulse Resp BP MAP (mmHg) SpO2 O2 Device Patient Position - Orthostatic VS Pain    12/07/24 1759 -- -- -- -- -- -- -- -- 10 - Worst Possible Pain    12/07/24 1631 -- -- -- -- -- -- -- -- 6    12/07/24 1510 99.4 °F (37.4 °C) 70 18 111/61 66 93 % None (Room air) Sitting --    12/07/24 1225 -- -- -- -- -- -- -- -- 10 - Worst Possible Pain    12/07/24 1150 98.1 °F (36.7 °C) 63 19 122/69 93 97 % None (Room air) Sitting --    12/07/24 0955 -- -- -- -- -- -- -- -- 10 - Worst Possible Pain    12/07/24 0927 -- -- -- -- -- -- -- -- 10 - Worst Possible Pain    12/07/24 0844 -- -- -- -- -- -- None (Room air) -- 10 - Worst Possible Pain    12/07/24 0727 98.2 °F (36.8 °C) 63 18 122/58 83 94 % None (Room air) Lying --    12/07/24 0452 -- -- -- -- -- -- -- -- 5    12/07/24 0249 -- -- -- -- -- -- -- -- 10 - Worst Possible Pain    12/07/24 0234 98 °F (36.7 °C) 70  "18 109/53 77 90 % None (Room air) Lying --    12/06/24 2319 97.7 °F (36.5 °C) 74 18 101/54 73 91 % None (Room air) Lying --    12/06/24 2143 99.1 °F (37.3 °C) -- -- -- -- -- -- -- --    12/06/24 2135 -- -- -- -- -- -- -- -- 9    12/06/24 1950 -- -- -- -- -- -- -- -- 10 - Worst Possible Pain              Pertinent Labs/Diagnostic Test Results:   Radiology:  No orders to display     Cardiology:  No orders to display     GI:  No orders to display           Results from last 7 days   Lab Units 12/07/24 0443 12/06/24  0624   WBC Thousand/uL 6.30 9.01   HEMOGLOBIN g/dL 10.5* 10.2*   HEMATOCRIT % 33.3* 32.1*   PLATELETS Thousands/uL 174 182         Results from last 7 days   Lab Units 12/07/24  0443 12/06/24  0624   SODIUM mmol/L 139 139   POTASSIUM mmol/L 4.1 4.3   CHLORIDE mmol/L 103 104   CO2 mmol/L 32 31   ANION GAP mmol/L 4 4   BUN mg/dL 15 13   CREATININE mg/dL 0.69 0.75   EGFR ml/min/1.73sq m 93 85   CALCIUM mg/dL 8.9 9.0         Results from last 7 days   Lab Units 12/05/24  1012   POC GLUCOSE mg/dl 94     Results from last 7 days   Lab Units 12/07/24  0443 12/06/24  0624   GLUCOSE RANDOM mg/dL 105 151*             No results found for: \"BETA-HYDROXYBUTYRATE\"                                                                                                                                         Past Medical History:   Diagnosis Date    Anxiety     Arthritis     Asthma     Albuterol prn    Bariatric surgery status     Chronic pain disorder     left knee    COVID-19 11/19/2021    Depression 2009    managed with Effexor     Generalized osteoarthritis     GERD (gastroesophageal reflux disease)     Low vitamin B12 level     Migraine     Postgastrectomy malabsorption     Suicide attempt (HCC)     Vitamin D deficiency     Wears dentures     Wears glasses      Present on Admission:   Primary osteoarthritis of left knee      Admitting Diagnosis: Osteoarthritis of left knee [M17.12]  Age/Sex: 64 y.o. female    Network " Utilization Review Department  ATTENTION: Please call with any questions or concerns to [phone] and carefully listen to the prompts so that you are directed to the right person. All voicemails are confidential.   For Discharge needs, contact Care Management DC Support Team at [phone] opt. [phone]  Send all requests for admission clinical reviews, approved or denied determinations and any other requests to dedicated fax number below belonging to the campus where the patient is receiving treatment. List of dedicated fax numbers for the Facilities:  FACILITY NAME UR FAX NUMBER   ADMISSION DENIALS (Administrative/Medical Necessity) [phone]   DISCHARGE SUPPORT TEAM (NETWORK) [phone]   PARENT CHILD HEALTH (Maternity/NICU/Pediatrics) [phone]   [facility] [phone]   [facility] [phone]   [facility] [phone]   [facility] [phone]   [facility] [phone]   [facility] [phone]   [facility] [phone]   [facility] [phone]   [facility] [phone]   [facility] [phone]   [facility] [phone]   [facility] [phone]

## 2024-12-07 NOTE — PLAN OF CARE
Problem: OCCUPATIONAL THERAPY ADULT  Goal: Performs self-care activities at highest level of function for planned discharge setting.  See evaluation for individualized goals.  Description: Treatment Interventions: ADL retraining, Functional transfer training, Endurance training, Patient/family training, Equipment evaluation/education, Compensatory technique education, Continued evaluation, Energy conservation, Activityengagement          See flowsheet documentation for full assessment, interventions and recommendations.   Note: Limitation: Decreased ADL status, Decreased UE strength, Decreased Safe judgement during ADL, Decreased endurance, Decreased self-care trans, Decreased high-level ADLs  Prognosis: Good  Assessment: Patient is a 64 y.o. year old female seen for OT eval s/p admit to [facility] on 12/6/2024 with primary OA of L knee s/p TKR.  Comorbidities affecting pt’s functional performance include a significant PMH of: asthma, h/o bariatric surgery, intentional drug OD, MDD, chronic back pain, YAMEL, b/l CTS, OA, status post panniculectomy, gired, asthma.  Patient with active OT orders and activity orders for Up with assistance. OT consulted to assess ADLs/IADLs/functional mobility and assist w/ D/C planning. Patient demonstrates the following deficits impacting occupational performance: decreased strength , decreased balance, decreased activity tolerance, limited functional reach, decreased safety awareness, increased pain, orthopedic restrictions, increased body habitus , impaired coordination, decreased cardiovascular endurance, and decreased skin integrity . These impairments, as well at pt’s RAMSES home environment, steps within home environment, limited home support, difficulty performing ADLs, difficulty performing IADLs, difficulty performing transfers/mobility, limited insight into deficits, compliance, WBS, fall risk , functional decline , new use of AD for functional transfers/mobility, multiple admissions  , and inability to perform caregiver duties , limit pt’s ability to safely engage in all baseline areas of occupation. Pt's CLOF as follows: eating/grooming: Ruben and supervision , UB ADLs: supervision , LB ADLs: modA, toileting: supervision , bed mobility: Armen, functional transfers: supervision , functional mobility: supervision , sitting/standing tolerance: ~8 min. Pt would benefit from continued skilled OT while in acute setting to address deficits as defined above and to maximize (I) w/ ADLs/functional mobility. Occupational performance areas to address include: grooming, bathing/shower, toilet hygiene, dressing, functional mobility, community mobility, clothing management, cleaning, meal prep, household maintenance, and care of children. Based on the aforementioned evaluation, functional performance deficits, and assessments, pt has been identified as a high complexity evaluation. At this time, recommendation for pt to receive post-acute rehabilitation services at a Level III (minimum resource intensity) due to above deficits and CLOF. OT will continue to follow pt 2-5x/wk to address the goals listed below to  w/in 10-14 days.     Rehab Resource Intensity Level, OT: III (Minimum Resource Intensity)

## 2024-12-07 NOTE — PLAN OF CARE
Problem: PAIN - ADULT  Goal: Verbalizes/displays adequate comfort level or baseline comfort level  Description: Interventions:  - Encourage patient to monitor pain and request assistance  - Assess pain using appropriate pain scale  - Administer analgesics based on type and severity of pain and evaluate response  - Implement non-pharmacological measures as appropriate and evaluate response  - Consider cultural and social influences on pain and pain management  - Notify physician/advanced practitioner if interventions unsuccessful or patient reports new pain  Outcome: Progressing     Problem: INFECTION - ADULT  Goal: Absence or prevention of progression during hospitalization  Description: INTERVENTIONS:  - Assess and monitor for signs and symptoms of infection  - Monitor lab/diagnostic results  - Monitor all insertion sites, i.e. indwelling lines, tubes, and drains  - Monitor endotracheal if appropriate and nasal secretions for changes in amount and color  - Moon appropriate cooling/warming therapies per order  - Administer medications as ordered  - Instruct and encourage patient and family to use good hand hygiene technique  - Identify and instruct in appropriate isolation precautions for identified infection/condition  Outcome: Progressing  Goal: Absence of fever/infection during neutropenic period  Description: INTERVENTIONS:  - Monitor WBC    Outcome: Progressing     Problem: SAFETY ADULT  Goal: Patient will remain free of falls  Description: INTERVENTIONS:  - Educate patient/family on patient safety including physical limitations  - Instruct patient to call for assistance with activity   - Consult OT/PT to assist with strengthening/mobility   - Keep Call bell within reach  - Keep bed low and locked with side rails adjusted as appropriate  - Keep care items and personal belongings within reach  - Initiate and maintain comfort rounds  - Make Fall Risk Sign visible to staff  - Offer Toileting every 2 Hours,  in advance of need  - Initiate/Maintain bed alarm  - Obtain necessary fall risk management equipment  - Apply yellow socks and bracelet for high fall risk patients  - Consider moving patient to room near nurses station  Outcome: Progressing  Goal: Maintain or return to baseline ADL function  Description: INTERVENTIONS:  -  Assess patient's ability to carry out ADLs; assess patient's baseline for ADL function and identify physical deficits which impact ability to perform ADLs (bathing, care of mouth/teeth, toileting, grooming, dressing, etc.)  - Assess/evaluate cause of self-care deficits   - Assess range of motion  - Assess patient's mobility; develop plan if impaired  - Assess patient's need for assistive devices and provide as appropriate  - Encourage maximum independence but intervene and supervise when necessary  - Involve family in performance of ADLs  - Assess for home care needs following discharge   - Consider OT consult to assist with ADL evaluation and planning for discharge  - Provide patient education as appropriate  Outcome: Progressing  Goal: Maintains/Returns to pre admission functional level  Description: INTERVENTIONS:  - Perform AM-PAC 6 Click Basic Mobility/ Daily Activity assessment daily.  - Set and communicate daily mobility goal to care team and patient/family/caregiver.   - Collaborate with rehabilitation services on mobility goals if consulted  - Perform Range of Motion 3 times a day.  - Reposition patient every 2 hours.  - Dangle patient 3 times a day  - Stand patient 3 times a day  - Ambulate patient 3 times a day  - Out of bed to chair 3 times a day   - Out of bed for meals 3 times a day  - Out of bed for toileting  - Record patient progress and toleration of activity level   Outcome: Progressing     Problem: DISCHARGE PLANNING  Goal: Discharge to home or other facility with appropriate resources  Description: INTERVENTIONS:  - Identify barriers to discharge w/patient and caregiver  -  Arrange for needed discharge resources and transportation as appropriate  - Identify discharge learning needs (meds, wound care, etc.)  - Arrange for interpretive services to assist at discharge as needed  - Refer to Case Management Department for coordinating discharge planning if the patient needs post-hospital services based on physician/advanced practitioner order or complex needs related to functional status, cognitive ability, or social support system  Outcome: Progressing     Problem: Knowledge Deficit  Goal: Patient/family/caregiver demonstrates understanding of disease process, treatment plan, medications, and discharge instructions  Description: Complete learning assessment and assess knowledge base.  Interventions:  - Provide teaching at level of understanding  - Provide teaching via preferred learning methods  Outcome: Progressing     Problem: METABOLIC, FLUID AND ELECTROLYTES - ADULT  Goal: Electrolytes maintained within normal limits  Description: INTERVENTIONS:  - Monitor labs and assess patient for signs and symptoms of electrolyte imbalances  - Administer electrolyte replacement as ordered  - Monitor response to electrolyte replacements, including repeat lab results as appropriate  - Instruct patient on fluid and nutrition as appropriate  Outcome: Progressing     Problem: SKIN/TISSUE INTEGRITY - ADULT  Goal: Skin Integrity remains intact(Skin Breakdown Prevention)  Description: Assess:  -Perform Orestes assessment every shift  -Clean and moisturize skin every shift  -Inspect skin when repositioning, toileting, and assisting with ADLS  -Assess under medical devices every shift  -Assess extremities for adequate circulation and sensation     Bed Management:  -Have minimal linens on bed & keep smooth, unwrinkled  -Change linens as needed when moist or perspiring  -Avoid sitting or lying in one position for more than 2 hours while in bed  -Keep HOB at 30 degrees     Toileting:  -Offer bedside  commode  -Assess for incontinence every shift  -Use incontinent care products after each incontinent episode    Activity:  -Mobilize patient 3 times a day  -Encourage activity and walks on unit  -Encourage or provide ROM exercises   -Turn and reposition patient every 2 Hours  -Use appropriate equipment to lift or move patient in bed  -Instruct/ Assist with weight shifting every 2 when out of bed in chair  -Consider limitation of chair time 2 hour intervals    Skin Care:  -Avoid use of baby powder, tape, friction and shearing, hot water or constrictive clothing  -Relieve pressure over bony prominences using allevyn  -Do not massage red bony areas    Next Steps:  -Teach patient strategies to minimize risks    -Consider consults to  interdisciplinary teams  Outcome: Progressing  Goal: Incision(s), wounds(s) or drain site(s) healing without S/S of infection  Description: INTERVENTIONS  - Assess and document dressing, incision, wound bed, drain sites and surrounding tissue  - Provide patient and family education  - Perform skin care/dressing changes every shift  Outcome: Progressing     Problem: MUSCULOSKELETAL - ADULT  Goal: Maintain or return mobility to safest level of function  Description: INTERVENTIONS:  - Assess patient's ability to carry out ADLs; assess patient's baseline for ADL function and identify physical deficits which impact ability to perform ADLs (bathing, care of mouth/teeth, toileting, grooming, dressing, etc.)  - Assess/evaluate cause of self-care deficits   - Assess range of motion  - Assess patient's mobility  - Assess patient's need for assistive devices and provide as appropriate  - Encourage maximum independence but intervene and supervise when necessary  - Involve family in performance of ADLs  - Assess for home care needs following discharge   - Consider OT consult to assist with ADL evaluation and planning for discharge  - Provide patient education as appropriate  Outcome: Progressing  Goal:  Maintain proper alignment of affected body part  Description: INTERVENTIONS:  - Support, maintain and protect limb and body alignment  - Provide patient/ family with appropriate education  Outcome: Progressing

## 2024-12-07 NOTE — CASE MANAGEMENT
Case Management Assessment & Discharge Planning Note    Patient name Aislinn Rouse  Location [facility] /E2 -* MRN [id number]  : 1960 Date 2024       Current Admission Date: 2024  Current Admission Diagnosis:Primary osteoarthritis of left knee   Patient Active Problem List    Diagnosis Date Noted Date Diagnosed    Primary osteoarthritis of left knee 2024     Hx of adenomatous colonic polyps 10/27/2023     Chronic pain of left knee 10/12/2023     Grief 2023     Joint pain 2023     Acute exacerbation of chronic low back pain 2023     History of hysterectomy 2021     Status post panniculectomy 2020     Localized adiposity 02/10/2020     Lumbar spondylosis      Secondary hyperparathyroidism of renal origin (HCC) 12/10/2019     Epigastric abdominal pain 2019     Vitamin D deficiency 2019     Vitamin B12 deficiency 2019     Poor appetite 2019     Postgastrectomy malabsorption 2019     Bilateral carpal tunnel syndrome 2019     Generalized osteoarthritis 2019     Chronic nonintractable headache 2019     Chronic back pain 2019     Generalized anxiety disorder 2019     Hypoglycemia 2019     Asthma 2019     History of bariatric surgery 2019     Intentional drug overdose (HCC) 2019     Major depressive disorder, recurrent severe without psychotic features (HCC) 2019     Mild persistent asthma without complication 2013       LOS (days): 1  Geometric Mean LOS (GMLOS) (days):   Days to GMLOS:     OBJECTIVE:    Risk of Unplanned Readmission Score: 10.34         Current admission status: Inpatient  Referral Reason: [facility]    Preferred Pharmacy:   [id number] - [place] - [id number] [address]  Phone: [phone] Fax: [phone]    [facility]  [place] - [address] [place] [address]  St,  First Floor [facility] [place] [address]  Phone: [phone] Fax: [phone]    Primary Care Provider: April Reagan MD    Primary Insurance: [employer] MEDICARE [facility]  Secondary Insurance: [place] [employer] [facility]    ASSESSMENT:  Active Health Care Proxies    There are no active Health Care Proxies on file.                 Readmission Root Cause  30 Day Readmission: No    Patient Information  Admitted from:: Home  Mental Status: Alert  During Assessment patient was accompanied by: Not accompanied during assessment  Primary Caregiver: Self  Support Systems: Children, Family members, Self  County of Residence: [place]  Home entry access options. Select all that apply.: Stairs  Number of steps to enter home.: 3  Do the steps have railings?: No  Type of Current Residence: [address] [place] [address]  Living Arrangements: Lives w/ Family members  Is patient a ?: No    Activities of Daily Living Prior to Admission  Functional Status: Independent  Completes ADLs independently?: Yes  Ambulates independently?: Yes  Does patient use assisted devices?: Yes  Assisted Devices (DME) used: Shower Chair, Straight Cane, Walker, Bedside Commode  Does patient currently own DME?: Yes  What DME does the patient currently own?: Bedside Commode, Straight Cane, Walker, Shower Chair  Does patient have a history of Outpatient Therapy (PT/OT)?: No  Does the patient have a history of Short-Term Rehab?: No  Does patient have a history of HHC?: Yes  Does patient currently have HHC?: No    Current Home Health Care  Home Health Agency Name:: [facility]    Patient Information Continued  Income Source: SSI/SSD  Does patient have prescription coverage?: Yes  Does patient receive dialysis treatments?: No  Does patient have a history of substance abuse?: No  Does patient have a history of Mental Health Diagnosis?: No         Means of Transportation  Means of Transport to Appts:: Family  transport          DISCHARGE DETAILS:    Discharge planning discussed with:: Chart reviewed. Cm met with pt introduced self, role and discharged process. Pt confirmed lives with grandson, indep with ADLS PTA. PT recommending home w/ HHPT. Pt agreeable to HHPT and [facility]. referral placed and accepted. Family to transport  Freedom of Choice: Yes  Comments - Freedom of Choice: [facility]  CM contacted family/caregiver?: Yes             Contacts  Reason/Outcome: Continuity of Care, Discharge Planning    Requested Home Health Care         Is the patient interested in HHC at discharge?: Yes  Home Health Discipline requested:: Nursing, Physical Therapy  Home Health Agency Name:: [facility]  Home Health Services Needed:: Administration of IV, IM or SC Medications, Evaluate Functional Status and Safety, Gait/ADL Training, Strengthening/Theraputic Exercises to Improve Function  Supporting Clincal Findings:: Limited Endurance                   Treatment Team Recommendation: Home with Home Health Care  Discharge Destination Plan:: Home with Home Health Care  Transport at Discharge : Family                             IMM Given (Date):: 12/07/24  IMM Given to:: Patient

## 2024-12-08 VITALS
HEART RATE: 74 BPM | RESPIRATION RATE: 18 BRPM | DIASTOLIC BLOOD PRESSURE: 72 MMHG | HEIGHT: 65 IN | SYSTOLIC BLOOD PRESSURE: 127 MMHG | WEIGHT: 186.6 LBS | TEMPERATURE: 98.3 F | BODY MASS INDEX: 31.09 KG/M2 | OXYGEN SATURATION: 93 %

## 2024-12-08 LAB
ANION GAP SERPL CALCULATED.3IONS-SCNC: 4 MMOL/L (ref 4–13)
BUN SERPL-MCNC: 13 MG/DL (ref 5–25)
CALCIUM SERPL-MCNC: 8.6 MG/DL (ref 8.4–10.2)
CHLORIDE SERPL-SCNC: 104 MMOL/L (ref 96–108)
CO2 SERPL-SCNC: 32 MMOL/L (ref 21–32)
CREAT SERPL-MCNC: 0.59 MG/DL (ref 0.6–1.3)
ERYTHROCYTE [DISTWIDTH] IN BLOOD BY AUTOMATED COUNT: 13.1 % (ref 11.6–15.1)
GFR SERPL CREATININE-BSD FRML MDRD: 98 ML/MIN/1.73SQ M
GLUCOSE SERPL-MCNC: 96 MG/DL (ref 65–140)
HCT VFR BLD AUTO: 31.2 % (ref 34.8–46.1)
HGB BLD-MCNC: 9.9 G/DL (ref 11.5–15.4)
MCH RBC QN AUTO: 28.4 PG (ref 26.8–34.3)
MCHC RBC AUTO-ENTMCNC: 31.7 G/DL (ref 31.4–37.4)
MCV RBC AUTO: 90 FL (ref 82–98)
PLATELET # BLD AUTO: 177 THOUSANDS/UL (ref 149–390)
PMV BLD AUTO: 12.4 FL (ref 8.9–12.7)
POTASSIUM SERPL-SCNC: 3.7 MMOL/L (ref 3.5–5.3)
RBC # BLD AUTO: 3.48 MILLION/UL (ref 3.81–5.12)
SODIUM SERPL-SCNC: 140 MMOL/L (ref 135–147)
WBC # BLD AUTO: 6.35 THOUSAND/UL (ref 4.31–10.16)

## 2024-12-08 PROCEDURE — 99232 SBSQ HOSP IP/OBS MODERATE 35: CPT | Performed by: INTERNAL MEDICINE

## 2024-12-08 PROCEDURE — NC001 PR NO CHARGE

## 2024-12-08 PROCEDURE — 99024 POSTOP FOLLOW-UP VISIT: CPT

## 2024-12-08 PROCEDURE — 80048 BASIC METABOLIC PNL TOTAL CA: CPT

## 2024-12-08 PROCEDURE — 85027 COMPLETE CBC AUTOMATED: CPT

## 2024-12-08 RX ORDER — FERROUS GLUCONATE 324(38)MG
324 TABLET ORAL
Status: DISCONTINUED | OUTPATIENT
Start: 2024-12-08 | End: 2024-12-08 | Stop reason: HOSPADM

## 2024-12-08 RX ADMIN — FERROUS GLUCONATE 324 MG: 324 TABLET ORAL at 10:07

## 2024-12-08 RX ADMIN — BUPROPION HYDROCHLORIDE 150 MG: 150 TABLET, EXTENDED RELEASE ORAL at 08:52

## 2024-12-08 RX ADMIN — PANTOPRAZOLE SODIUM 40 MG: 40 TABLET, DELAYED RELEASE ORAL at 05:19

## 2024-12-08 RX ADMIN — ACETAMINOPHEN 975 MG: 325 TABLET, FILM COATED ORAL at 08:50

## 2024-12-08 RX ADMIN — OXYCODONE HYDROCHLORIDE 10 MG: 10 TABLET ORAL at 08:51

## 2024-12-08 RX ADMIN — CYANOCOBALAMIN TAB 500 MCG 2000 MCG: 500 TAB at 09:04

## 2024-12-08 RX ADMIN — OXYCODONE HYDROCHLORIDE 10 MG: 10 TABLET ORAL at 05:18

## 2024-12-08 RX ADMIN — OXYCODONE HYDROCHLORIDE 10 MG: 10 TABLET ORAL at 00:06

## 2024-12-08 RX ADMIN — OXYCODONE HYDROCHLORIDE AND ACETAMINOPHEN 500 MG: 500 TABLET ORAL at 08:52

## 2024-12-08 RX ADMIN — ACETAMINOPHEN 975 MG: 325 TABLET, FILM COATED ORAL at 00:05

## 2024-12-08 RX ADMIN — DOCUSATE SODIUM 100 MG: 100 CAPSULE, LIQUID FILLED ORAL at 08:51

## 2024-12-08 RX ADMIN — Medication 1 TABLET: at 08:51

## 2024-12-08 RX ADMIN — VENLAFAXINE HYDROCHLORIDE 75 MG: 37.5 CAPSULE, EXTENDED RELEASE ORAL at 08:52

## 2024-12-08 RX ADMIN — SENNOSIDES 8.6 MG: 8.6 TABLET, FILM COATED ORAL at 08:52

## 2024-12-08 RX ADMIN — FOLIC ACID 1 MG: 1 TABLET ORAL at 08:51

## 2024-12-08 RX ADMIN — ASPIRIN 81 MG: 81 TABLET, COATED ORAL at 08:51

## 2024-12-08 NOTE — ASSESSMENT & PLAN NOTE
POD 3 s/p L TKA with Dr. Aggarwal on 12/5  WBAT LLE  ROM as tolerated, pillow/blankets under Achilles not behind knee while in bed  DVT ppx 81mg BID x 4 weeks  PT/OT  Pain control  AM labs - Hgb 9.9.  Greater than 2 gram drop in Hgb indicates diagnosis of ABLA.  No signs of active bleeding in the operative extremity.  Discharge planning.

## 2024-12-08 NOTE — PROGRESS NOTES
Progress Note - Orthopedics   Name: Aislinn Rouse 64 y.o. female I MRN: [id number]  Unit/Bed#: E2 MS [facility]-01 I Date of Admission: 12/6/2024   Date of Service: 12/8/2024 I Hospital Day: 2    Assessment & Plan  Primary osteoarthritis of left knee  POD 3 s/p L TKA with Dr. Aggarwal on 12/5  WBAT LLE  ROM as tolerated, pillow/blankets under Achilles not behind knee while in bed  DVT ppx 81mg BID x 4 weeks  PT/OT  Pain control  AM labs - Hgb 9.9.  Greater than 2 gram drop in Hgb indicates diagnosis of ABLA.  No signs of active bleeding in the operative extremity.  Discharge planning.    Ok for discharge from Orthopedics service perspective.    Subjective   64 y.o.female seen and evaluated at chair side.  No new acute overnight events, no new complaints.  Pain today is better than is has been.  She denies subjective fevers, chills, headaches, CP, SOB, N/V, or numbness or tingling.  She feels ready to go home today.    Objective :  Temp:  [98.1 °F (36.7 °C)-99.4 °F (37.4 °C)] (P) 99.1 °F (37.3 °C)  HR:  [63-76] (P) 76  BP: (111-129)/(61-72) (P) 124/58  Resp:  [18-20] (P) 20  SpO2:  [91 %-97 %] (P) 94 %  O2 Device: (P) None (Room air)    Physical Exam  Vitals and nursing note reviewed.   Constitutional:       General: She is not in acute distress.     Appearance: She is well-developed.   HENT:      Head: Normocephalic and atraumatic.   Eyes:      Conjunctiva/sclera: Conjunctivae normal.   Cardiovascular:      Rate and Rhythm: Normal rate.   Pulmonary:      Effort: Pulmonary effort is normal. No respiratory distress.   Abdominal:      Palpations: Abdomen is soft.      Tenderness: There is no abdominal tenderness.   Musculoskeletal:      Cervical back: Neck supple.   Skin:     General: Skin is warm and dry.      Capillary Refill: Capillary refill takes less than 2 seconds.   Neurological:      Mental Status: She is alert.   Psychiatric:         Mood and Affect: Mood normal.       Musculoskeletal: left lower  "extremity  Visible skin is without erythema or ecchymosis.  Dressing C/D/I  TTP over the knee  Motor intact to +FHL/EHL, +ankle dorsi/plantar flexion  Sensation intact over the toes  2+ DP pulse      Lab Results: I have reviewed the following results:  Recent Labs     12/06/24  0624 12/07/24  0443 12/08/24  0437   WBC 9.01 6.30 6.35   HGB 10.2* 10.5* 9.9*   HCT 32.1* 33.3* 31.2*    174 177   BUN 13 15 13   CREATININE 0.75 0.69 0.59*     Blood Culture:  No results found for: \"BLOODCX\"  Wound Culture: No results found for: \"WOUNDCULT\"  "

## 2024-12-08 NOTE — INTERVAL H&P NOTE
H&P reviewed. After examining the patient I find no changes in the patients condition since the H&P had been written.     Vitals:    12/07/24 1510   BP: 111/61   Pulse: 70   Resp: 18   Temp: 99.4 °F (37.4 °C)   SpO2: 93%

## 2024-12-08 NOTE — PLAN OF CARE
Problem: PAIN - ADULT  Goal: Verbalizes/displays adequate comfort level or baseline comfort level  Description: Interventions:  - Encourage patient to monitor pain and request assistance  - Assess pain using appropriate pain scale  - Administer analgesics based on type and severity of pain and evaluate response  - Implement non-pharmacological measures as appropriate and evaluate response  - Consider cultural and social influences on pain and pain management  - Notify physician/advanced practitioner if interventions unsuccessful or patient reports new pain  Outcome: Progressing     Problem: INFECTION - ADULT  Goal: Absence or prevention of progression during hospitalization  Description: INTERVENTIONS:  - Assess and monitor for signs and symptoms of infection  - Monitor lab/diagnostic results  - Monitor all insertion sites, i.e. indwelling lines, tubes, and drains  - Monitor endotracheal if appropriate and nasal secretions for changes in amount and color  - [place] appropriate cooling/warming therapies per order  - Administer medications as ordered  - Instruct and encourage patient and family to use good hand hygiene technique  - Identify and instruct in appropriate isolation precautions for identified infection/condition  Outcome: Progressing     Problem: SAFETY ADULT  Goal: Patient will remain free of falls  Description: INTERVENTIONS:  - Educate patient/family on patient safety including physical limitations  - Instruct patient to call for assistance with activity   - Consult OT/PT to assist with strengthening/mobility   - Keep Call bell within reach  - Keep bed low and locked with side rails adjusted as appropriate  - Keep care items and personal belongings within reach  - Initiate and maintain comfort rounds  - Make Fall Risk Sign visible to staff  - Offer Toileting every 2 Hours, in advance of need  - Initiate/Maintain bed alarm  - Obtain necessary fall risk management equipment  - Apply yellow socks and  bracelet for high fall risk patients  - Consider moving patient to room near nurses station  Outcome: Progressing  Goal: Maintains/Returns to pre admission functional level  Description: INTERVENTIONS:  - Perform AM-PAC 6 Click Basic Mobility/ Daily Activity assessment daily.  - Set and communicate daily mobility goal to care team and patient/family/caregiver.   - Collaborate with rehabilitation services on mobility goals if consulted  - Perform Range of Motion 3 times a day.  - Reposition patient every 2 hours.  - Dangle patient 3 times a day  - Stand patient 3 times a day  - Ambulate patient 3 times a day  - Out of bed to chair 3 times a day   - Out of bed for meals 3 times a day  - Out of bed for toileting  - Record patient progress and toleration of activity level   Outcome: Progressing     Problem: DISCHARGE PLANNING  Goal: Discharge to home or other facility with appropriate resources  Description: INTERVENTIONS:  - Identify barriers to discharge w/patient and caregiver  - Arrange for needed discharge resources and transportation as appropriate  - Identify discharge learning needs (meds, wound care, etc.)  - Arrange for interpretive services to assist at discharge as needed  - Refer to Case Management Department for coordinating discharge planning if the patient needs post-hospital services based on physician/advanced practitioner order or complex needs related to functional status, cognitive ability, or social support system  Outcome: Progressing     Problem: Knowledge Deficit  Goal: Patient/family/caregiver demonstrates understanding of disease process, treatment plan, medications, and discharge instructions  Description: Complete learning assessment and assess knowledge base.  Interventions:  - Provide teaching at level of understanding  - Provide teaching via preferred learning methods  Outcome: Progressing     Problem: SKIN/TISSUE INTEGRITY - ADULT  Goal: Skin Integrity remains intact(Skin Breakdown  Prevention)  Description: Assess:  -Perform Orestes assessment every shift  -Clean and moisturize skin every shift  -Inspect skin when repositioning, toileting, and assisting with ADLS  -Assess under medical devices every shift  -Assess extremities for adequate circulation and sensation     Bed Management:  -Have minimal linens on bed & keep smooth, unwrinkled  -Change linens as needed when moist or perspiring  -Avoid sitting or lying in one position for more than 2 hours while in bed  -Keep HOB at 30 degrees     Toileting:  -Offer bedside commode  -Assess for incontinence every shift  -Use incontinent care products after each incontinent episode    Activity:  -Mobilize patient 3 times a day  -Encourage activity and walks on unit  -Encourage or provide ROM exercises   -Turn and reposition patient every 2 Hours  -Use appropriate equipment to lift or move patient in bed  -Instruct/ Assist with weight shifting every 2 when out of bed in chair  -Consider limitation of chair time 2 hour intervals    Skin Care:  -Avoid use of baby powder, tape, friction and shearing, hot water or constrictive clothing  -Relieve pressure over bony prominences using allevyn  -Do not massage red bony areas    Next Steps:  -Teach patient strategies to minimize risks    -Consider consults to  interdisciplinary teams  Outcome: Progressing     Problem: MUSCULOSKELETAL - ADULT  Goal: Maintain proper alignment of affected body part  Description: INTERVENTIONS:  - Support, maintain and protect limb and body alignment  - Provide patient/ family with appropriate education  Outcome: Progressing     Problem: COPING  Goal: Will report anxiety at manageable levels  Description: INTERVENTIONS:  - Administer medication as ordered  - Teach and encourage coping skills  - Provide emotional support  - Assess patient/family for anxiety and ability to cope  Outcome: Progressing     Problem: GASTROINTESTINAL - ADULT  Goal: Maintains or returns to baseline bowel  function  Description: INTERVENTIONS:  - Assess bowel function  - Encourage oral fluids to ensure adequate hydration  - Administer IV fluids if ordered to ensure adequate hydration  - Administer ordered medications as needed  - Encourage mobilization and activity  - Consider nutritional services referral to assist patient with adequate nutrition and appropriate food choices  Outcome: Progressing

## 2024-12-08 NOTE — DISCHARGE SUMMARY
Discharge Summary - Orthopedics   Name: Aislinn Rouse 64 y.o. female I MRN: [id number]  Unit/Bed#: E2 -01 I Date of Admission: 12/6/2024   Date of Service: 12/8/2024 I Hospital Day: 2    Admission Date: 12/5/2024  Discharge Date: 12/08/24  Admitting Diagnosis: Osteoarthritis of left knee [M17.12]  Discharge Diagnosis:   Medical Problems       Resolved Problems  Date Reviewed: 12/7/2024   None         HPI: 65 y/o female seen and evaluated by Dr. Aggarwal with left knee pain secondary to osteoarthritis. She had tried and failed conservative treatment options including cortisone injections and Visco injections. She had been attending PT with continued worsening of her pain.  Hinged knee brace did not help. The patient elected to proceed with Left TKA. Risks and benefits of surgery to include but not limited to bleeding, infection, damage to surrounding structures, hardware failure, instability, fracture, dislocation, need for further surgery, continued pain, stiffness, blood clots, stroke, and heart attack was discussed with the patient.     Procedures Performed: Left - ARTHROPLASTY KNEE TOTAL     Summary of Hospital Course: Hospital Course: The patient was admitted to the hospital on 12/5/2024 and underwent an uncomplicated left total knee arthroplasty. They were transferred to the floor after a brief stay in the post-anesthesia care unit. Their pain was well managed with IV and oral pain medications. They began therapy on post operative day #1. Aspirin was also started for DVT prophylaxis post operatively.  Her hospital stay was without acute complications, but she required transfer from [place] to [place] on 12/6 for a prolonged recovery period due to severe pain. On discharge date pt was cleared by PT and the medicine team and determined to be safe for discharge.  Daily discussion was had with the patient, nursing staff, orthopaedic team, and family members if present.  All questions were answered to the  patients satisifaction.    Significant Findings, Care, Treatment and Services Provided: See above    Complications: None    Condition at Discharge: good     Discharge instructions/Information to patient and family:   See After Visit Summary (AVS) for information provided to patient and family.      Provisions for Follow-Up Care:  See after visit summary for information related to follow-up care and any pertinent home health orders.      PCP: April Reagan MD    Disposition: Home with home health care    Planned Readmission: No     Discharge Medications:  See after visit summary for reconciled discharge medications provided to patient and family.      Discharge Statement:  I have spent a total time of 30 minutes in caring for this patient on the day of the visit/encounter. >30 minutes of time was spent on: Counseling / Coordination of care, Documenting in the medical record, Reviewing / ordering tests, medicine, procedures  , and Communicating with other healthcare professionals .

## 2024-12-08 NOTE — RESTORATIVE TECHNICIAN NOTE
Restorative Technician Note      Patient Name: Aislinn Rouse     Restorative Tech Visit Date: 12/08/24  Note Type: Mobility  Patient Position Upon Consult: Bedside chair  Activity Performed: Ambulated; Range of motion  Assistive Device: Roller walker  Patient Position at End of Consult: All needs within reach; Bedside chair

## 2024-12-08 NOTE — CASE MANAGEMENT
Case Management Assessment & Discharge Planning Note    Patient name Aislinn Rouse  Location [facility] /E2 -* MRN [id number]  : 1960 Date 2024       Current Admission Date: 2024  Current Admission Diagnosis:Primary osteoarthritis of left knee   Patient Active Problem List    Diagnosis Date Noted Date Diagnosed    Primary osteoarthritis of left knee 2024     Hx of adenomatous colonic polyps 10/27/2023     Chronic pain of left knee 10/12/2023     Grief 2023     Joint pain 2023     Acute exacerbation of chronic low back pain 2023     History of hysterectomy 2021     Status post panniculectomy 2020     Localized adiposity 02/10/2020     Lumbar spondylosis      Secondary hyperparathyroidism of renal origin (HCC) 12/10/2019     Epigastric abdominal pain 2019     Vitamin D deficiency 2019     Vitamin B12 deficiency 2019     Poor appetite 2019     Postgastrectomy malabsorption 2019     Bilateral carpal tunnel syndrome 2019     Generalized osteoarthritis 2019     Chronic nonintractable headache 2019     Chronic back pain 2019     Generalized anxiety disorder 2019     Hypoglycemia 2019     Asthma 2019     History of bariatric surgery 2019     Intentional drug overdose (HCC) 2019     Major depressive disorder, recurrent severe without psychotic features (HCC) 2019     Mild persistent asthma without complication 2013       LOS (days): 2  Geometric Mean LOS (GMLOS) (days):   Days to GMLOS:     OBJECTIVE:    Risk of Unplanned Readmission Score: 10.13         Current admission status: Inpatient  Referral Reason: [facility]    Preferred Pharmacy:   [id number] - [place] - [id number] [id number]  Phone: [phone] Fax: [phone]    [facility]  [place] - [address] [place] [address]  St,  First Floor [facility] [place] [address]  Phone: [phone] Fax: [phone]    Primary Care Provider: April Reagan MD    Primary Insurance: [employer]  REP  Secondary Insurance: [place] [employer]    ASSESSMENT:  Active Health Care Proxies    There are no active Health Care Proxies on file.                                                          DISCHARGE DETAILS:    Discharge planning discussed with:: cahrt reviewed. Per medical team, pt is medically cleared for discharged home with [facility]. Cm spoke to pt confirmed discharged and transportation. Per pt, dtr to transport. No other discharged need identified.                           Requested Home Health Care         Home Health Discipline requested:: Occupational Therapy, Physical Therapy

## 2024-12-08 NOTE — PLAN OF CARE
Problem: PAIN - ADULT  Goal: Verbalizes/displays adequate comfort level or baseline comfort level  Description: Interventions:  - Encourage patient to monitor pain and request assistance  - Assess pain using appropriate pain scale  - Administer analgesics based on type and severity of pain and evaluate response  - Implement non-pharmacological measures as appropriate and evaluate response  - Consider cultural and social influences on pain and pain management  - Notify physician/advanced practitioner if interventions unsuccessful or patient reports new pain  Outcome: Progressing     Problem: INFECTION - ADULT  Goal: Absence or prevention of progression during hospitalization  Description: INTERVENTIONS:  - Assess and monitor for signs and symptoms of infection  - Monitor lab/diagnostic results  - Monitor all insertion sites, i.e. indwelling lines, tubes, and drains  - Monitor endotracheal if appropriate and nasal secretions for changes in amount and color  - [place] appropriate cooling/warming therapies per order  - Administer medications as ordered  - Instruct and encourage patient and family to use good hand hygiene technique  - Identify and instruct in appropriate isolation precautions for identified infection/condition  Outcome: Progressing     Problem: SAFETY ADULT  Goal: Patient will remain free of falls  Description: INTERVENTIONS:  - Educate patient/family on patient safety including physical limitations  - Instruct patient to call for assistance with activity   - Consult OT/PT to assist with strengthening/mobility   - Keep Call bell within reach  - Keep bed low and locked with side rails adjusted as appropriate  - Keep care items and personal belongings within reach  - Initiate and maintain comfort rounds  - Make Fall Risk Sign visible to staff  - Offer Toileting every 2 Hours, in advance of need  - Initiate/Maintain bed alarm  - Obtain necessary fall risk management equipment  - Apply yellow socks and  bracelet for high fall risk patients  - Consider moving patient to room near nurses station  Outcome: Progressing  Goal: Maintain or return to baseline ADL function  Description: INTERVENTIONS:  -  Assess patient's ability to carry out ADLs; assess patient's baseline for ADL function and identify physical deficits which impact ability to perform ADLs (bathing, care of mouth/teeth, toileting, grooming, dressing, etc.)  - Assess/evaluate cause of self-care deficits   - Assess range of motion  - Assess patient's mobility; develop plan if impaired  - Assess patient's need for assistive devices and provide as appropriate  - Encourage maximum independence but intervene and supervise when necessary  - Involve family in performance of ADLs  - Assess for home care needs following discharge   - Consider OT consult to assist with ADL evaluation and planning for discharge  - Provide patient education as appropriate  Outcome: Progressing  Goal: Maintains/Returns to pre admission functional level  Description: INTERVENTIONS:  - Perform AM-PAC 6 Click Basic Mobility/ Daily Activity assessment daily.  - Set and communicate daily mobility goal to care team and patient/family/caregiver.   - Collaborate with rehabilitation services on mobility goals if consulted  - Perform Range of Motion 3 times a day.  - Reposition patient every 2 hours.  - Dangle patient 3 times a day  - Stand patient 3 times a day  - Ambulate patient 3 times a day  - Out of bed to chair 3 times a day   - Out of bed for meals 3 times a day  - Out of bed for toileting  - Record patient progress and toleration of activity level   Outcome: Progressing     Problem: DISCHARGE PLANNING  Goal: Discharge to home or other facility with appropriate resources  Description: INTERVENTIONS:  - Identify barriers to discharge w/patient and caregiver  - Arrange for needed discharge resources and transportation as appropriate  - Identify discharge learning needs (meds, wound care,  etc.)  - Arrange for interpretive services to assist at discharge as needed  - Refer to Case Management Department for coordinating discharge planning if the patient needs post-hospital services based on physician/advanced practitioner order or complex needs related to functional status, cognitive ability, or social support system  Outcome: Progressing     Problem: Knowledge Deficit  Goal: Patient/family/caregiver demonstrates understanding of disease process, treatment plan, medications, and discharge instructions  Description: Complete learning assessment and assess knowledge base.  Interventions:  - Provide teaching at level of understanding  - Provide teaching via preferred learning methods  Outcome: Progressing     Problem: METABOLIC, FLUID AND ELECTROLYTES - ADULT  Goal: Electrolytes maintained within normal limits  Description: INTERVENTIONS:  - Monitor labs and assess patient for signs and symptoms of electrolyte imbalances  - Administer electrolyte replacement as ordered  - Monitor response to electrolyte replacements, including repeat lab results as appropriate  - Instruct patient on fluid and nutrition as appropriate  Outcome: Progressing     Problem: SKIN/TISSUE INTEGRITY - ADULT  Goal: Skin Integrity remains intact(Skin Breakdown Prevention)  Description: Assess:  -Perform Orestes assessment every shift  -Clean and moisturize skin every shift  -Inspect skin when repositioning, toileting, and assisting with ADLS  -Assess under medical devices every shift  -Assess extremities for adequate circulation and sensation     Bed Management:  -Have minimal linens on bed & keep smooth, unwrinkled  -Change linens as needed when moist or perspiring  -Avoid sitting or lying in one position for more than 2 hours while in bed  -Keep HOB at 30 degrees     Toileting:  -Offer bedside commode  -Assess for incontinence every shift  -Use incontinent care products after each incontinent episode    Activity:  -Mobilize patient  3 times a day  -Encourage activity and walks on unit  -Encourage or provide ROM exercises   -Turn and reposition patient every 2 Hours  -Use appropriate equipment to lift or move patient in bed  -Instruct/ Assist with weight shifting every 2 when out of bed in chair  -Consider limitation of chair time 2 hour intervals    Skin Care:  -Avoid use of baby powder, tape, friction and shearing, hot water or constrictive clothing  -Relieve pressure over bony prominences using allevyn  -Do not massage red bony areas    Next Steps:  -Teach patient strategies to minimize risks    -Consider consults to  interdisciplinary teams  Outcome: Progressing  Goal: Incision(s), wounds(s) or drain site(s) healing without S/S of infection  Description: INTERVENTIONS  - Assess and document dressing, incision, wound bed, drain sites and surrounding tissue  - Provide patient and family education  - Perform skin care/dressing changes every shift  Outcome: Progressing     Problem: MUSCULOSKELETAL - ADULT  Goal: Maintain or return mobility to safest level of function  Description: INTERVENTIONS:  - Assess patient's ability to carry out ADLs; assess patient's baseline for ADL function and identify physical deficits which impact ability to perform ADLs (bathing, care of mouth/teeth, toileting, grooming, dressing, etc.)  - Assess/evaluate cause of self-care deficits   - Assess range of motion  - Assess patient's mobility  - Assess patient's need for assistive devices and provide as appropriate  - Encourage maximum independence but intervene and supervise when necessary  - Involve family in performance of ADLs  - Assess for home care needs following discharge   - Consider OT consult to assist with ADL evaluation and planning for discharge  - Provide patient education as appropriate  Outcome: Progressing  Goal: Maintain proper alignment of affected body part  Description: INTERVENTIONS:  - Support, maintain and protect limb and body alignment  -  Provide patient/ family with appropriate education  Outcome: Progressing     Problem: COPING  Goal: Will report anxiety at manageable levels  Description: INTERVENTIONS:  - Administer medication as ordered  - Teach and encourage coping skills  - Provide emotional support  - Assess patient/family for anxiety and ability to cope  Outcome: Progressing     Problem: GASTROINTESTINAL - ADULT  Goal: Maintains or returns to baseline bowel function  Description: INTERVENTIONS:  - Assess bowel function  - Encourage oral fluids to ensure adequate hydration  - Administer IV fluids if ordered to ensure adequate hydration  - Administer ordered medications as needed  - Encourage mobilization and activity  - Consider nutritional services referral to assist patient with adequate nutrition and appropriate food choices  Outcome: Progressing

## 2024-12-08 NOTE — PROGRESS NOTES
"  Progress Note - Aislinnpati Rouse 64 y.o. female MRN: [id number]    Unit/Bed#: E2 -01 Encounter: [id number]    Assessment/Plan:    Left total knee replacement  POA day 3, continued pain and swelling, postop care by orthopedic surgery    Acute blood loss anemia  hemoglobin 9.9 continue p.o. iron and vitamin C    MDD with anxiety   mood stable with Wellbutrin and Effexor    Subjective:   Continued pain of left knee, no chest pain no shortness of breath no nausea vomiting fevers chills appetite is good      Objective:     Vitals: Blood pressure (P) 124/58, pulse (P) 76, temperature (P) 99.1 °F (37.3 °C), temperature source (P) Oral, resp. rate (P) 20, height 5' 5\" (1.651 m), weight 84.6 kg (186 lb 9.6 oz), SpO2 (P) 94%, not currently breastfeeding.,Body mass index is 31.05 kg/m².      Results from last 7 days   Lab Units 12/08/24  0437   WBC Thousand/uL 6.35   HEMOGLOBIN g/dL 9.9*   HEMATOCRIT % 31.2*   PLATELETS Thousands/uL 177     Results from last 7 days   Lab Units 12/08/24  0437   POTASSIUM mmol/L 3.7   CHLORIDE mmol/L 104   CO2 mmol/L 32   BUN mg/dL 13   CREATININE mg/dL 0.59*   CALCIUM mg/dL 8.6       Scheduled Meds:  Current Facility-Administered Medications   Medication Dose Route Frequency Provider Last Rate    acetaminophen  650 mg Oral Q4H PRN Юлия Bland PA-C      acetaminophen  975 mg Oral Q8H Юлия Bland PA-C      ALPRAZolam  1 mg Oral HS PRN Юлия Bland PA-C      ascorbic acid  500 mg Oral BID Юлия Bland PA-C      aspirin  81 mg Oral BID Юлия Bland PA-C      buPROPion  150 mg Oral Daily Юлия Bland PA-C      calcium carbonate  1,000 mg Oral Daily PRN Юлия Bland PA-C      cyanocobalamin  2,000 mcg Oral Daily Юлия Bland PA-C      docusate sodium  100 mg Oral BID Юлия Bland PA-C      doxepin  10 mg Oral HS PRN Юлия Bland PA-C      folic acid  1 mg Oral Daily Юлия Bland PA-C      HYDROmorphone  0.5 mg Intravenous Q3H PRN " Юлия Bland PA-C      multivitamin-minerals  1 tablet Oral Daily Юлия Bland PA-C      ondansetron  4 mg Intravenous Q6H PRN Юлия Bland PA-C      oxyCODONE  10 mg Oral Q4H PRN Юлия Bland PA-C      oxyCODONE  5 mg Oral Q4H PRN Юлия Bland PA-C      pantoprazole  40 mg Oral Daily Before Breakfast Юлия Bland PA-C      senna  1 tablet Oral Daily Юлия Bland PA-C      simethicone  80 mg Oral 4x Daily PRN Юлия lBand PA-C      venlafaxine  75 mg Oral Daily Юлия Bland PA-C         Continuous Infusions:         Physical exam:  General appearance: N o distress interaction appropriate  Head/Eyes: Nonicteric EOMI  Neck: Supple  Lungs: CTA bilateral no wheezing rhonchi or rales  Heart: normal S1 S2 regular  Abdomen: Soft nontender with bowel sounds  Extremities: no edema left knee Ace wrapped over swollen  Skin: no rash    Invasive Devices       Peripheral Intravenous Line  Duration             Peripheral IV 12/05/24 Right Hand 2 days                        Counseling / Coordination of Care  Total floor / unit time spent today 30  minutes.  Greater than 50% of total time was spent with the patient and / or family counseling and / or coordination of care.  A description of the counseling / coordination of care: Medically stable.

## 2024-12-09 ENCOUNTER — HOME CARE VISIT (OUTPATIENT)
Dept: HOME HEALTH SERVICES | Facility: HOME HEALTHCARE | Age: 64
End: 2024-12-09
Payer: MEDICARE

## 2024-12-09 ENCOUNTER — TELEPHONE (OUTPATIENT)
Dept: OBGYN CLINIC | Facility: HOSPITAL | Age: 64
End: 2024-12-09

## 2024-12-09 VITALS
WEIGHT: 182 LBS | OXYGEN SATURATION: 97 % | BODY MASS INDEX: 30.32 KG/M2 | SYSTOLIC BLOOD PRESSURE: 132 MMHG | HEIGHT: 65 IN | HEART RATE: 64 BPM | DIASTOLIC BLOOD PRESSURE: 86 MMHG

## 2024-12-09 PROCEDURE — G0151 HHCP-SERV OF PT,EA 15 MIN: HCPCS

## 2024-12-09 PROCEDURE — 400013 VN SOC

## 2024-12-09 NOTE — CASE COMMUNICATION
Medication discrepancies or Major drug interactions:  Major drug to drug interaction:  1) doxepin and venlafaxine  2) DULoxetine and doxepin    Abnormal clinical findings edema L ankle, pain posterior L knee, no bowel movement postop, pt reports dec appetite with liitle PO intake since surgery  Response needed, please respond via In Basket     ke's VNA has Admitted your patient to Home Health service with the following disciplines:  PT and OT  Pt currently declining home OT services.    Patient stated goals of care to be able to get up more frequently with less pain, to get up and down the steps easier to leave the home.  Potential barriers to goal achievement cluttered and small area to mobilize, 2 story home, pain, dec L knee flexion AROM  Primary focus of home health care:Musculoskeletal  Anticipated visit pattern 3wk2  Thank you for allowing us to participate  in the care of your patient.      Thank you,  Aislinn Fierro, KELLEYT, COS-C

## 2024-12-09 NOTE — UTILIZATION REVIEW
NOTIFICATION OF ADMISSION DISCHARGE   This is a Notification of Discharge from [facility]. Please be advised that this patient has been discharge from our facility. Below you will find the admission and discharge date and time including the patient’s disposition.   UTILIZATION REVIEW CONTACT:  Veena Ram  Utilization   Network Utilization Review Department  Phone: [phone] x carefully listen to the prompts. All voicemails are confidential.  Email: [email or web address]     ADMISSION INFORMATION  PRESENTATION DATE: 12/6/2024  6:37 PM  OBERVATION ADMISSION DATE: N/A  INPATIENT ADMISSION DATE: 12/6/24  6:37 PM   DISCHARGE DATE: 12/8/2024 12:43 PM   DISPOSITION:Home with Home Health Care    Network Utilization Review Department  ATTENTION: Please call with any questions or concerns to [phone] and carefully listen to the prompts so that you are directed to the right person. All voicemails are confidential.   For Discharge needs, contact Care Management DC Support Team at [phone] opt. 2  Send all requests for admission clinical reviews, approved or denied determinations and any other requests to dedicated fax number below belonging to the campus where the patient is receiving treatment. List of dedicated fax numbers for the Facilities:  FACILITY NAME UR FAX NUMBER   ADMISSION DENIALS (Administrative/Medical Necessity) [phone]   DISCHARGE SUPPORT TEAM ([facility]) [phone]   PARENT CHILD HEALTH (Maternity/NICU/Pediatrics) [phone]   [facility] [phone]   [facility] [phone]   [facility] [phone]   [facility] [phone]   [facility] [phone]   [facility] [phone]   [facility] [phone]   [facility]  [phone]   [facility] [phone]   [facility] [phone]   [facility] [phone]   [facility] [phone]

## 2024-12-09 NOTE — TELEPHONE ENCOUNTER
"Patient contacted for a postoperative follow up assessment. Patient states current pain level of a 8/10 and is walking with RW.  Patient reports swelling and dressing is Dressing C/D/I Patient is icing the site regularly. NN educated patient on post-op swelling, icing, and constipation.    Confirmed upcoming appointments w/ patient:   PT: VNA to call pt for [facility] appt, then transitioning to  OP PT. VNA number provided to pt to contact office.   Postop 12/17     We reviewed patients AVS medication list. Patient is taking  \"pharmacy only gave me Oxycodone and aspirin.\" Discussed that additional medications were called in, Tylenol, Zofran, Senokot. Pt states her family can follow up with pharmacy this am and NN will contact back to confirm med regimen..    Addendum 12/9: Called pt back and confirmed postop medications picked up from pharmacy. Pt states her granddaughter picked up all medications. We reviewed patients AVS medication list. Patient is taking Tylenol 1000mg every 8 hours, Oxycodone 5mg every 4 hours, ASA 81mg BID, Senokot once daily, and Zofran 4mg PRN. Patient has not had a BM but is taking prescribed Senokot. Discussed postop constipation, increasing fluids/fiber, prunes or prune juice. Pt verbalizes understanding. Pt confirms VNA called her, appt 12/9.      Patient denies nausea, vomiting, abdominal pain, chest pain, shortness of breath, fever, dizziness, and calf pain. Patient does not have any other questions or concerns at this time. Pt was encouraged to call with any questions, concerns or issues.    "

## 2024-12-09 NOTE — UTILIZATION REVIEW
NOTIFICATION OF INPATIENT ADMISSION   AUTHORIZATION REQUEST   SERVICING FACILITY:   [place] [address]  Tax ID: [id number]  NPI: [id number] ATTENDING PROVIDER:  Attending Name and NPI#: Nash Aggarwal Do [[id number]]  Address: [address] [place] [address]  Phone: [phone]     Unable to start auth on [facility] poral     ADMISSION INFORMATION:  Place of Service: Inpatient [facility]  Place of Service Code: 21  Inpatient Admission Date/Time: 12/6/24  6:37 PM  Discharge Date/Time: 12/8/2024 12:43 PM  Admitting Diagnosis Code/Description:  Osteoarthritis of left knee [M17.12]     UTILIZATION REVIEW CONTACT:  Aislinn Lan Utilization   Network Utilization Review Department  Phone: [phone]  Fax [phone]  Email: [email or web address]  Contact for approvals/pending authorizations, clinical reviews, and discharge.     PHYSICIAN ADVISORY SERVICES:  Medical Necessity Denial & Hiza-hk-Kxam Review  Phone: [phone]  Fax: [phone]  Email: [email or web address]     DISCHARGE SUPPORT TEAM:  For Patients Discharge Needs & Updates  Phone: [phone] opt. 2 Fax: [phone]  Email: [email or web address]

## 2024-12-10 ENCOUNTER — PATIENT OUTREACH (OUTPATIENT)
Dept: OBGYN CLINIC | Facility: HOSPITAL | Age: 64
End: 2024-12-10

## 2024-12-10 NOTE — PROGRESS NOTES
SWCM contacted pt today to follow up postoperatively. Pt had arthroplasty of left knee completed on 12/05/2024 and was DC to home as planned. Pt shares she has all needed caregiver support and has started PT. Pt reports no other needs or concerns. SWCM will close referral and remain available for any future needs or concerns.

## 2024-12-11 ENCOUNTER — HOME CARE VISIT (OUTPATIENT)
Dept: HOME HEALTH SERVICES | Facility: HOME HEALTHCARE | Age: 64
End: 2024-12-11
Payer: MEDICARE

## 2024-12-11 ENCOUNTER — TELEPHONE (OUTPATIENT)
Dept: OBGYN CLINIC | Facility: HOSPITAL | Age: 64
End: 2024-12-11

## 2024-12-11 VITALS — HEART RATE: 70 BPM | OXYGEN SATURATION: 96 % | DIASTOLIC BLOOD PRESSURE: 76 MMHG | SYSTOLIC BLOOD PRESSURE: 122 MMHG

## 2024-12-11 PROCEDURE — G0155 HHCP-SVS OF CSW,EA 15 MIN: HCPCS

## 2024-12-11 PROCEDURE — G0151 HHCP-SERV OF PT,EA 15 MIN: HCPCS

## 2024-12-11 NOTE — TELEPHONE ENCOUNTER
Spoke to patient, as I was sent a Staff Message that she was having constipation. SHe denies such, denies complaints at this time.     We discussed activity as tolerated and working with PT- states they are coming out today.

## 2024-12-13 ENCOUNTER — TELEPHONE (OUTPATIENT)
Age: 64
End: 2024-12-13

## 2024-12-13 ENCOUNTER — HOME CARE VISIT (OUTPATIENT)
Dept: HOME HEALTH SERVICES | Facility: HOME HEALTHCARE | Age: 64
End: 2024-12-13
Payer: MEDICARE

## 2024-12-13 VITALS — OXYGEN SATURATION: 97 % | SYSTOLIC BLOOD PRESSURE: 138 MMHG | HEART RATE: 76 BPM | DIASTOLIC BLOOD PRESSURE: 82 MMHG

## 2024-12-13 PROCEDURE — G0151 HHCP-SERV OF PT,EA 15 MIN: HCPCS

## 2024-12-13 NOTE — TELEPHONE ENCOUNTER
"Caller: Patient    Doctor: Jasen    Reason for call: Patient had left knee surgery 12/5 and called stating she just finished home PT and noticed her bandaging has a \"brownish\" color to it. Patient also seemed to be noticeably in a lot of pain while on the phone. Patient would like guidance on what to do; please advise.     Call back#: [phone]  "

## 2024-12-13 NOTE — TELEPHONE ENCOUNTER
"Spoke to patient. She reports after finishing [facility] PT, she noticed \"a  little dot\" of drainage, denies active drainage. Dressing remains intact, denies saturation.     She reports still in \"a lot of pain.\" We reviewed pain regimen. She is taking TYlenol 1000mg TID, and Oxycodone 5mg every 8 hours as well. We discussed oxycodone can be taken every 4-6 hours if needed sooner then the 8 hours. We also discussed toes above noes with elevation, rest and ICE.     pt encouraged to call me with questions, concerns or issues.    "

## 2024-12-16 DIAGNOSIS — M17.12 PRIMARY OSTEOARTHRITIS OF LEFT KNEE: ICD-10-CM

## 2024-12-16 RX ORDER — OXYCODONE HYDROCHLORIDE 5 MG/1
5 TABLET ORAL EVERY 4 HOURS PRN
Qty: 42 TABLET | Refills: 0 | Status: SHIPPED | OUTPATIENT
Start: 2024-12-16 | End: 2024-12-26

## 2024-12-16 NOTE — TELEPHONE ENCOUNTER
Reason for call:   [x] Refill   [] Prior Auth  [] Other:     Office:   [] PCP/Provider -   [x] Specialty/Provider - Ortho    Medication:     oxyCODONE (Roxicodone) 5 immediate release tablet       Dose/Frequency:     5 mg, Oral, Every 4 hours PRN       Quantity: 42    Pharmacy: [id number]  [place] - [address]     Does the patient have enough for 3 days?   [] Yes   [x] No - Send as HP to POD

## 2024-12-17 ENCOUNTER — APPOINTMENT (OUTPATIENT)
Age: 64
End: 2024-12-17
Payer: MEDICARE

## 2024-12-17 ENCOUNTER — HOME CARE VISIT (OUTPATIENT)
Dept: HOME HEALTH SERVICES | Facility: HOME HEALTHCARE | Age: 64
End: 2024-12-17
Payer: MEDICARE

## 2024-12-17 ENCOUNTER — OFFICE VISIT (OUTPATIENT)
Age: 64
End: 2024-12-17

## 2024-12-17 VITALS — BODY MASS INDEX: 30.32 KG/M2 | WEIGHT: 182 LBS | HEIGHT: 65 IN

## 2024-12-17 VITALS — HEART RATE: 78 BPM | OXYGEN SATURATION: 98 % | SYSTOLIC BLOOD PRESSURE: 122 MMHG | DIASTOLIC BLOOD PRESSURE: 82 MMHG

## 2024-12-17 DIAGNOSIS — Z96.652 STATUS POST TOTAL KNEE REPLACEMENT, LEFT: ICD-10-CM

## 2024-12-17 DIAGNOSIS — Z96.652 STATUS POST TOTAL KNEE REPLACEMENT, LEFT: Primary | ICD-10-CM

## 2024-12-17 PROCEDURE — 73562 X-RAY EXAM OF KNEE 3: CPT

## 2024-12-17 PROCEDURE — G0151 HHCP-SERV OF PT,EA 15 MIN: HCPCS

## 2024-12-17 PROCEDURE — G0180 MD CERTIFICATION HHA PATIENT: HCPCS | Performed by: ORTHOPAEDIC SURGERY

## 2024-12-17 PROCEDURE — 99024 POSTOP FOLLOW-UP VISIT: CPT | Performed by: STUDENT IN AN ORGANIZED HEALTH CARE EDUCATION/TRAINING PROGRAM

## 2024-12-17 RX ORDER — PSEUDOEPHED/ACETAMINOPH/DIPHEN 30MG-500MG
2 TABLET ORAL EVERY 8 HOURS
Qty: 60 TABLET | Refills: 0 | Status: SHIPPED | OUTPATIENT
Start: 2024-12-17

## 2024-12-17 RX ORDER — FOLIC ACID 1 MG/1
1000 TABLET ORAL DAILY
Qty: 30 TABLET | Refills: 1 | OUTPATIENT
Start: 2024-12-17

## 2024-12-17 NOTE — PROGRESS NOTES
Subjective: Patient seen and examined. She is having a lot of pain in the knee post operatively in general. She is taking oxycodone for pain control along with tylenol. Progressing well overall. Incision without drainage, mepilex intact.  Denies fevers or chills.  She is ambulating with a rolling walker.  She is getting in home physical therapy.    Physical Exam:  Incision: CDI, no erythema or drainage noted  ROM: 5-80*  5/5 IP/Q/HS/TA/GS, 2+ DP/PT, SILT DP/SP/S/S/TN    XR Left knee: s/p press-fit CR attune TKA, components in good position. No fracture or dislocation.      Assessment/Plan:  63 y/o female s/p Left TKA from 12/4/24.    - continue multi-modal pain control   - Weight bearing status: WBAT LLE  - DVT ppx: Aspirin 81mg twice daily x 4 weeks  - Incision care: May shower, do not scrub or submerge incision  - PT/OT, continue with home PT, focus on her flexion  - F/U 4 weeks      Scribe Attestation      I,:  Kylah Goode PA-C am acting as a scribe while in the presence of the attending physician.:       I,:  Nash Aggarwal, DO personally performed the services described in this documentation    as scribed in my presence.:

## 2024-12-18 ENCOUNTER — HOME CARE VISIT (OUTPATIENT)
Dept: HOME HEALTH SERVICES | Facility: HOME HEALTHCARE | Age: 64
End: 2024-12-18
Payer: MEDICARE

## 2024-12-18 ENCOUNTER — TELEPHONE (OUTPATIENT)
Dept: OBGYN CLINIC | Facility: HOSPITAL | Age: 64
End: 2024-12-18

## 2024-12-18 VITALS — OXYGEN SATURATION: 98 % | HEART RATE: 86 BPM | SYSTOLIC BLOOD PRESSURE: 120 MMHG | DIASTOLIC BLOOD PRESSURE: 76 MMHG

## 2024-12-18 PROCEDURE — G0151 HHCP-SERV OF PT,EA 15 MIN: HCPCS

## 2024-12-18 NOTE — TELEPHONE ENCOUNTER
Caller: Nasim     Doctor: Jasen Li     Reason for call: Please send PT orders for L TKA done on 12/05 to   [facility] at fax number below.    Fax # [phone]

## 2024-12-20 ENCOUNTER — HOME CARE VISIT (OUTPATIENT)
Dept: HOME HEALTH SERVICES | Facility: HOME HEALTHCARE | Age: 64
End: 2024-12-20
Payer: MEDICARE

## 2024-12-20 VITALS — TEMPERATURE: 97.9 F | HEART RATE: 76 BPM | OXYGEN SATURATION: 95 %

## 2024-12-20 PROCEDURE — G0151 HHCP-SERV OF PT,EA 15 MIN: HCPCS

## 2024-12-20 NOTE — CASE COMMUNICATION
"Notifying of DC from home PT services with pt scheduled with [facility] on Monday 12/22.  Of note pt reporting continued high levels of pain even with more consistent use of pain meds as prescribed. Pt reporting pain ranging between 6/10-10/10. Incision dry during todays visit but pt reports occasional \"pink\" drainage from incision after knee flexion exercises. Warmth noted around knee with no increase in redness or swelling noted.  Vitals WNL  with no fever noted.  Please advise pt further"

## 2024-12-21 ENCOUNTER — HOSPITAL ENCOUNTER (EMERGENCY)
Facility: HOSPITAL | Age: 64
Discharge: HOME | End: 2024-12-21
Attending: EMERGENCY MEDICINE
Payer: MEDICARE

## 2024-12-21 VITALS
HEART RATE: 83 BPM | DIASTOLIC BLOOD PRESSURE: 55 MMHG | RESPIRATION RATE: 20 BRPM | WEIGHT: 181.88 LBS | SYSTOLIC BLOOD PRESSURE: 111 MMHG | BODY MASS INDEX: 30.27 KG/M2 | TEMPERATURE: 98.3 F | OXYGEN SATURATION: 99 %

## 2024-12-21 DIAGNOSIS — T78.40XA ALLERGIC REACTION, INITIAL ENCOUNTER: Primary | ICD-10-CM

## 2024-12-21 PROCEDURE — 96375 TX/PRO/DX INJ NEW DRUG ADDON: CPT

## 2024-12-21 PROCEDURE — 99283 EMERGENCY DEPT VISIT LOW MDM: CPT

## 2024-12-21 PROCEDURE — 93005 ELECTROCARDIOGRAM TRACING: CPT

## 2024-12-21 PROCEDURE — 96374 THER/PROPH/DIAG INJ IV PUSH: CPT

## 2024-12-21 PROCEDURE — 99284 EMERGENCY DEPT VISIT MOD MDM: CPT

## 2024-12-21 RX ORDER — PREDNISONE 20 MG/1
40 TABLET ORAL DAILY
Qty: 8 TABLET | Refills: 0 | Status: SHIPPED | OUTPATIENT
Start: 2024-12-21 | End: 2024-12-25

## 2024-12-21 RX ORDER — FAMOTIDINE 20 MG/1
20 TABLET, FILM COATED ORAL 2 TIMES DAILY
Qty: 10 TABLET | Refills: 0 | Status: SHIPPED | OUTPATIENT
Start: 2024-12-21

## 2024-12-21 RX ORDER — FAMOTIDINE 10 MG/ML
20 INJECTION, SOLUTION INTRAVENOUS ONCE
Status: COMPLETED | OUTPATIENT
Start: 2024-12-21 | End: 2024-12-21

## 2024-12-21 RX ORDER — OXYCODONE HYDROCHLORIDE 5 MG/1
5 TABLET ORAL ONCE
Refills: 0 | Status: COMPLETED | OUTPATIENT
Start: 2024-12-21 | End: 2024-12-21

## 2024-12-21 RX ORDER — METHYLPREDNISOLONE SODIUM SUCCINATE 125 MG/2ML
125 INJECTION, POWDER, LYOPHILIZED, FOR SOLUTION INTRAMUSCULAR; INTRAVENOUS ONCE
Status: COMPLETED | OUTPATIENT
Start: 2024-12-21 | End: 2024-12-21

## 2024-12-21 RX ORDER — ACETAMINOPHEN 325 MG/1
650 TABLET ORAL ONCE
Status: COMPLETED | OUTPATIENT
Start: 2024-12-21 | End: 2024-12-21

## 2024-12-21 RX ORDER — DIPHENHYDRAMINE HYDROCHLORIDE 50 MG/ML
12.5 INJECTION INTRAMUSCULAR; INTRAVENOUS ONCE
Status: COMPLETED | OUTPATIENT
Start: 2024-12-21 | End: 2024-12-21

## 2024-12-21 RX ADMIN — DIPHENHYDRAMINE HYDROCHLORIDE 12.5 MG: 50 INJECTION INTRAMUSCULAR; INTRAVENOUS at 21:46

## 2024-12-21 RX ADMIN — OXYCODONE 5 MG: 5 TABLET ORAL at 21:50

## 2024-12-21 RX ADMIN — ACETAMINOPHEN 650 MG: 325 TABLET, FILM COATED ORAL at 21:50

## 2024-12-21 RX ADMIN — METHYLPREDNISOLONE SODIUM SUCCINATE 125 MG: 125 INJECTION, POWDER, FOR SOLUTION INTRAMUSCULAR; INTRAVENOUS at 21:41

## 2024-12-21 RX ADMIN — FAMOTIDINE 20 MG: 10 INJECTION, SOLUTION INTRAVENOUS at 21:41

## 2024-12-22 ENCOUNTER — HOSPITAL ENCOUNTER (EMERGENCY)
Facility: HOSPITAL | Age: 64
Discharge: HOME | End: 2024-12-22
Attending: EMERGENCY MEDICINE
Payer: MEDICARE

## 2024-12-22 VITALS
WEIGHT: 181 LBS | DIASTOLIC BLOOD PRESSURE: 62 MMHG | RESPIRATION RATE: 20 BRPM | BODY MASS INDEX: 30.12 KG/M2 | HEART RATE: 81 BPM | SYSTOLIC BLOOD PRESSURE: 137 MMHG | TEMPERATURE: 97.9 F | OXYGEN SATURATION: 100 %

## 2024-12-22 DIAGNOSIS — Z98.84 BARIATRIC SURGERY STATUS: ICD-10-CM

## 2024-12-22 DIAGNOSIS — T78.40XA ALLERGIC REACTION, INITIAL ENCOUNTER: ICD-10-CM

## 2024-12-22 DIAGNOSIS — L50.9 URTICARIA: Primary | ICD-10-CM

## 2024-12-22 PROCEDURE — 96375 TX/PRO/DX INJ NEW DRUG ADDON: CPT

## 2024-12-22 PROCEDURE — 96361 HYDRATE IV INFUSION ADD-ON: CPT

## 2024-12-22 PROCEDURE — 99283 EMERGENCY DEPT VISIT LOW MDM: CPT

## 2024-12-22 PROCEDURE — 96374 THER/PROPH/DIAG INJ IV PUSH: CPT

## 2024-12-22 PROCEDURE — 99284 EMERGENCY DEPT VISIT MOD MDM: CPT

## 2024-12-22 RX ORDER — DIPHENHYDRAMINE HYDROCHLORIDE 50 MG/ML
25 INJECTION INTRAMUSCULAR; INTRAVENOUS ONCE
Status: COMPLETED | OUTPATIENT
Start: 2024-12-22 | End: 2024-12-22

## 2024-12-22 RX ORDER — EPINEPHRINE 0.3 MG/.3ML
0.3 INJECTION SUBCUTANEOUS ONCE
Qty: 0.6 ML | Refills: 0 | Status: SHIPPED | OUTPATIENT
Start: 2024-12-22 | End: 2024-12-22

## 2024-12-22 RX ORDER — METHYLPREDNISOLONE SODIUM SUCCINATE 125 MG/2ML
80 INJECTION, POWDER, LYOPHILIZED, FOR SOLUTION INTRAMUSCULAR; INTRAVENOUS ONCE
Status: COMPLETED | OUTPATIENT
Start: 2024-12-22 | End: 2024-12-22

## 2024-12-22 RX ORDER — FAMOTIDINE 10 MG/ML
20 INJECTION, SOLUTION INTRAVENOUS ONCE
Status: COMPLETED | OUTPATIENT
Start: 2024-12-22 | End: 2024-12-22

## 2024-12-22 RX ADMIN — DIPHENHYDRAMINE HYDROCHLORIDE 25 MG: 50 INJECTION, SOLUTION INTRAMUSCULAR; INTRAVENOUS at 20:09

## 2024-12-22 RX ADMIN — FAMOTIDINE 20 MG: 10 INJECTION, SOLUTION INTRAVENOUS at 20:11

## 2024-12-22 RX ADMIN — METHYLPREDNISOLONE SODIUM SUCCINATE 80 MG: 125 INJECTION, POWDER, FOR SOLUTION INTRAMUSCULAR; INTRAVENOUS at 20:14

## 2024-12-22 RX ADMIN — SODIUM CHLORIDE 500 ML: 0.9 INJECTION, SOLUTION INTRAVENOUS at 20:09

## 2024-12-22 NOTE — DISCHARGE INSTRUCTIONS
Complete 4 additional days of steroids.  Take Pepcid twice daily for the next 5 days.  You may use Benadryl per box instructions.  Return for new or worsening symptoms.

## 2024-12-22 NOTE — ED PROVIDER NOTES
"Time reflects when diagnosis was documented in both MDM as applicable and the Disposition within this note       Time User Action Codes Description Comment    12/21/2024 10:27 PM Aislinn Ling [T78.40XA] Allergic reaction, initial encounter           ED Disposition       ED Disposition   Discharge    Condition   Stable    Date/Time   Sat Dec 21, 2024 10:29 PM    Comment   Aislinn Rouse discharge to home/self care.                   Assessment & Plan       Medical Decision Making  Patient presents with:  Allergic Reaction: Patient reports scattered hives since last night after dinner, diarrhea this morning. Denies difficulty breathing, swallowing. 50 mg benadryl at 1700 without relief.     Patient presents with urticarial rash over the torso, wrists, hips.  Oropharynx clear, no adventitious breath sounds.  Denies shortness of breath, sensation of oral swelling.  Discussed use of epi in addition to symptomatic medications versus symptomatic medications due to involvement of 2 organ systems.  She denies symptoms of airway compromise.  Patient prefers to move forward with symptomatic medications including Solu-Medrol, Pepcid, Benadryl other than epinephrine.  Patient monitored with continuous cardiac and oxygen monitoring.   Reports significant improvement of itchiness and improvement of rash, and no GI symptoms 1 hour after administration of medications.   Discussed discontinuing use of new body wash, avoiding the new soup, use of steroids, Pepcid, Benadryl over the next few days.  Provided contact for allergy specialist as needed.    Discussed findings from the visit with the patient.  We had a conversation regarding supportive care and indications for return.  Recommended appropriate follow-up.  Patient and/or family understand and agree with plan.    Portions of the record may have been created with voice recognition software. Occasional use of the incorrect word or \"sound a like\" substitutions may have " occurred due to the inherent limitations of voice recognition software. Read the chart carefully and recognize, using context, where substitutions have occurred.       Risk  OTC drugs.  Prescription drug management.             Medications   Famotidine (PF) (PEPCID) injection 20 mg (20 mg Intravenous Given 12/21/24 2141)   methylPREDNISolone sodium succinate (Solu-MEDROL) injection 125 mg (125 mg Intravenous Given 12/21/24 2141)   diphenhydrAMINE (BENADRYL) injection 12.5 mg (12.5 mg Intravenous Given 12/21/24 2146)   oxyCODONE (ROXICODONE) IR tablet 5 mg (5 mg Oral Given 12/21/24 2150)   acetaminophen (TYLENOL) tablet 650 mg (650 mg Oral Given 12/21/24 2150)       ED Risk Strat Scores                                              History of Present Illness       Chief Complaint   Patient presents with    Allergic Reaction     Patient reports scattered hives since last night after dinner, diarrhea this morning. Denies difficulty breathing, swallowing. 50 mg benadryl at 1700 without relief.        Past Medical History:   Diagnosis Date    Anxiety     Arthritis     Asthma     Albuterol prn    Bariatric surgery status     Chronic pain disorder     left knee    COVID-19 11/19/2021    Depression 2009    managed with Effexor     Generalized osteoarthritis     GERD (gastroesophageal reflux disease)     Low vitamin B12 level     Migraine     Postgastrectomy malabsorption     Suicide attempt (HCC)     Vitamin D deficiency     Wears dentures     Wears glasses       Past Surgical History:   Procedure Laterality Date    CHOLECYSTECTOMY  2005    COLONOSCOPY      COSMETIC SURGERY  05/27/2020    Abdominoplasty    EGD      GASTRIC BYPASS  2015    elvira - en -y     HYSTERECTOMY      IR IMAGE GUIDED ASPIRATION / DRAINAGE W TUBE  07/14/2020    KNEE ARTHROSCOPY      with Lysis of adhesions    LAPAROSCOPIC SUPRACERVICAL HYSTERECTOMY  2005    due to endometriosis/AUB    OOPHORECTOMY Left 2005    AZ EXCISION SKIN ABD INFRAUMBILICAL  PANNICULECTOMY N/A 2020    Procedure: PANNICULECTOMY;  Surgeon: Bacilio Iraheta MD;  Location: [facility];  Service: Plastics    TONSILLECTOMY AND ADENOIDECTOMY      TOTAL KNEE ARTHROPLASTY Left 2024    Procedure: ARTHROPLASTY KNEE TOTAL;  Surgeon: Nash Aggarwal DO;  Location:  MAIN OR;  Service: Orthopedics    TUBAL LIGATION  1986    VAC DRESSING APPLICATION N/A 2020    Procedure: APPLICATION VAC DRESSING;  Surgeon: Bacilio Iraheta MD;  Location: [facility];  Service: Plastics      Family History   Problem Relation Age of Onset    Hypertension Mother     Kidney cancer Mother     Diabetes Mother     Breast cancer Mother     Heart disease Father     Stroke Father     Hypertension Sister     HIV Brother     Breast cancer Cousin     No Known Problems Daughter     Stomach cancer Maternal Grandmother     No Known Problems Maternal Grandfather     No Known Problems Paternal Grandmother     No Known Problems Paternal Grandfather     No Known Problems Sister     No Known Problems Sister     No Known Problems Daughter     Prostate cancer Maternal Uncle     Stomach cancer Maternal Uncle     Leukemia Grandchild       Social History     Tobacco Use    Smoking status: Former     Current packs/day: 0.00     Types: Cigarettes     Start date: 1981     Quit date: 2013     Years since quittin.6    Smokeless tobacco: Never   Vaping Use    Vaping status: Former    Substances: Flavoring   Substance Use Topics    Alcohol use: Not Currently    Drug use: Never      E-Cigarette/Vaping    E-Cigarette Use Former User       E-Cigarette/Vaping Substances    Nicotine No     THC No     CBD No     Flavoring Yes     Other No     Unknown No       I have reviewed and agree with the history as documented.     64-year-old female presenting to the emergency department for urticarial rash.  She reports that she woke up this morning with the rash.  Her sites of involvement include the back, chest,  "wrists, hips, buttocks.  She reports that the rash is very itchy.  She reports using a new body soap yesterday as well as having a new soup immediately before bedtime.  States that her son cooked and brought her a \"Asian soup\".  She is unsure of the ingredients but ate this and immediately went to bed.  When she awoke she had mild abdominal upset and an episode of diarrhea as well.  Denies fevers, chills, sore throat, angioedema, shortness of breath, nausea, vomiting, abdominal pain.  She states that she attempted to take Benadryl today without improvement.  She also had a total knee replacement 12/6.  She has since been taking Tylenol and oxycodone.  She states that she has not had trouble with these medications in the past.      Allergic Reaction  Presenting symptoms: rash    Presenting symptoms: no difficulty swallowing        Review of Systems   Constitutional:  Negative for chills, diaphoresis, fatigue and fever.   HENT:  Negative for trouble swallowing and voice change.    Respiratory:  Negative for cough, choking, chest tightness and shortness of breath.    Cardiovascular:  Negative for chest pain, palpitations and leg swelling.   Gastrointestinal:  Positive for diarrhea. Negative for abdominal pain, nausea and vomiting.   Skin:  Positive for rash. Negative for color change.   Neurological:  Negative for dizziness, weakness, numbness and headaches.   All other systems reviewed and are negative.          Objective       ED Triage Vitals [12/21/24 2104]   Temperature Pulse Blood Pressure Respirations SpO2 Patient Position - Orthostatic VS   98.3 °F (36.8 °C) 90 144/87 16 98 % Sitting      Temp Source Heart Rate Source BP Location FiO2 (%) Pain Score    Oral Monitor Right arm -- 10 - Worst Possible Pain      Vitals      Date and Time Temp Pulse SpO2 Resp BP Pain Score FACES Pain Rating User   12/21/24 2200 -- 83 99 % 20 111/55 -- -- TP   12/21/24 2137 -- -- -- -- -- 10 - Worst Possible Pain -- TP   12/21/24 2104 " 98.3 °F (36.8 °C) 90 98 % 16 144/87 10 - Worst Possible Pain L knee, post op knee replacement -- [facility]            Physical Exam  Vitals and nursing note reviewed.   Constitutional:       General: She is not in acute distress.     Appearance: She is well-developed.   HENT:      Head: Normocephalic and atraumatic.   Eyes:      Conjunctiva/sclera: Conjunctivae normal.   Cardiovascular:      Rate and Rhythm: Normal rate and regular rhythm.      Heart sounds: No murmur heard.  Pulmonary:      Effort: Pulmonary effort is normal. No respiratory distress.      Breath sounds: Normal breath sounds.   Abdominal:      Palpations: Abdomen is soft.      Tenderness: There is no abdominal tenderness.   Musculoskeletal:         General: No swelling.      Cervical back: Neck supple.      Right knee: Normal.      Left knee: No erythema.      Comments: Surgical incision present over the left anterior knee.  No significant erythema, swelling, warmth.   Skin:     General: Skin is warm and dry.      Capillary Refill: Capillary refill takes less than 2 seconds.   Neurological:      Mental Status: She is alert.   Psychiatric:         Mood and Affect: Mood normal.         Results Reviewed       None            No orders to display       ECG 12 Lead Documentation Only    Date/Time: 12/21/2024 11:29 PM    Performed by: Aislinn Ling PA-C  Authorized by: Aislinn Ling PA-C    Patient location:  ED  Interpretation:     Interpretation: normal    Rate:     ECG rate assessment: normal    Rhythm:     Rhythm: sinus rhythm    Conduction:     Conduction: normal    ST segments:     ST segments:  Normal  T waves:     T waves: normal        ED Medication and Procedure Management   Prior to Admission Medications   Prescriptions Last Dose Informant Patient Reported? Taking?   ALPRAZolam (XANAX) 1 mg tablet   Yes No   Sig: Take 1 mg by mouth every evening   Acetaminophen Extra Strength 500 MG TABS   No No   Sig: TAKE 2 TABLETS BY MOUTH EVERY 8 HOURS    Cholecalciferol (VITAMIN D3) 1,000 units tablet   No No   Sig: Take 2 tablets (2,000 Units total) by mouth daily   DULoxetine (CYMBALTA) 30 mg delayed release capsule   Yes No   Sig: Take 30 mg by mouth daily   Diclofenac Sodium (VOLTAREN) 1 %   No No   Sig: Apply 2 g topically 4 (four) times a day   Patient taking differently: Apply 2 g topically 4 (four) times a day application L knee   Multiple Vitamins-Minerals (multivitamin with minerals) tablet   No No   Sig: Take 1 tablet by mouth daily   Restasis 0.05 % ophthalmic emulsion   Yes No   Sig: INSTILL 1 DROP IN BOTH EYES TWICE DAILY   ascorbic acid (VITAMIN C) 500 MG tablet   No No   Sig: Take 1 tablet (500 mg total) by mouth 2 (two) times a day   Patient taking differently: Take 500 mg by mouth daily   aspirin (ECOTRIN LOW STRENGTH) 81 mg EC tablet   No No   Sig: Take 1 tablet (81 mg total) by mouth 2 (two) times a day   buPROPion (WELLBUTRIN SR) 150 mg 12 hr tablet   Yes No   Sig: Take 150 mg by mouth every morning 1 tablet in the morning   dicyclomine (BENTYL) 20 mg tablet   No No   Sig: TAKE 1 TABLET(20 MG) BY MOUTH EVERY 6 HOURS AS NEEDED FOR ABDOMINAL PAIN   doxepin (SINEquan) 100 mg capsule   Yes No   Sig: Take 100 mg by mouth daily at bedtime   ferrous sulfate 324 (65 Fe) mg   No No   Sig: TAKE 1 TABLET BY MOUTH EVERY OTHER DAY   folic acid (FOLVITE) 1 mg tablet   No No   Sig: Take 1 tablet (1 mg total) by mouth daily   Patient not taking: Reported on 12/17/2024   linaCLOtide (Linzess) 72 MCG CAPS   No No   Sig: Take 72 mcg by mouth daily before breakfast   omeprazole (PriLOSEC) 40 MG capsule   No No   Sig: Take 1 capsule (40 mg total) by mouth once as needed (epigastric pain)   ondansetron (ZOFRAN-ODT) 4 mg disintegrating tablet   No No   Sig: Take 1 tablet (4 mg total) by mouth every 6 (six) hours as needed for nausea or vomiting   oxyCODONE (Roxicodone) 5 immediate release tablet   No No   Sig: Take 1 tablet (5 mg total) by mouth every 4 (four) hours  as needed for moderate pain or severe pain for up to 10 days Max Daily Amount: 30 mg   senna-docusate sodium (SENOKOT S) 8.6-50 mg per tablet   No No   Sig: Take 1 tablet by mouth daily   sucralfate (CARAFATE) 1 g tablet   No No   Sig: Take 1 tablet (1 g total) by mouth 4 (four) times a day Crush and mix with water to take as suspension   tiZANidine (ZANAFLEX) 4 mg tablet   No No   Sig: Take 1 tablet (4 mg total) by mouth every 8 (eight) hours as needed for muscle spasms   venlafaxine (EFFEXOR) 75 mg tablet   Yes No   Sig: Take 75 mg by mouth 2 (two) times a day with meals   vitamin B-12 (CYANOCOBALAMIN) 2000 MCG TABS   No No   Sig: Take 1 tablet (2,000 mcg total) by mouth daily      Facility-Administered Medications Last Administration Doses Remaining   cyanocobalamin injection 1,000 mcg 9/5/2024  9:23 AM         Discharge Medication List as of 12/21/2024 10:29 PM        START taking these medications    Details   famotidine (PEPCID) 20 mg tablet Take 1 tablet (20 mg total) by mouth 2 (two) times a day, Starting Sat 12/21/2024, Normal      predniSONE 20 mg tablet Take 2 tablets (40 mg total) by mouth daily for 4 days, Starting Sat 12/21/2024, Until Wed 12/25/2024, Normal           CONTINUE these medications which have NOT CHANGED    Details   Acetaminophen Extra Strength 500 MG TABS TAKE 2 TABLETS BY MOUTH EVERY 8 HOURS, Starting Tue 12/17/2024, Normal      ALPRAZolam (XANAX) 1 mg tablet Take 1 mg by mouth every evening, Starting Fri 7/28/2023, Historical Med      ascorbic acid (VITAMIN C) 500 MG tablet Take 1 tablet (500 mg total) by mouth 2 (two) times a day, Starting Tue 10/1/2024, Normal      aspirin (ECOTRIN LOW STRENGTH) 81 mg EC tablet Take 1 tablet (81 mg total) by mouth 2 (two) times a day, Starting Thu 12/5/2024, Normal      buPROPion (WELLBUTRIN SR) 150 mg 12 hr tablet Take 150 mg by mouth every morning 1 tablet in the morning, Starting Mon 4/22/2024, Historical Med      Cholecalciferol (VITAMIN D3)  1,000 units tablet Take 2 tablets (2,000 Units total) by mouth daily, Starting Tue 10/1/2024, Normal      Diclofenac Sodium (VOLTAREN) 1 % Apply 2 g topically 4 (four) times a day, Starting Thu 5/25/2023, Normal      dicyclomine (BENTYL) 20 mg tablet TAKE 1 TABLET(20 MG) BY MOUTH EVERY 6 HOURS AS NEEDED FOR ABDOMINAL PAIN, Normal      doxepin (SINEquan) 100 mg capsule Take 100 mg by mouth daily at bedtime, Starting Fri 7/28/2023, Historical Med      DULoxetine (CYMBALTA) 30 mg delayed release capsule Take 30 mg by mouth daily, Starting Mon 12/11/2023, Historical Med      ferrous sulfate 324 (65 Fe) mg TAKE 1 TABLET BY MOUTH EVERY OTHER DAY, Starting Fri 1/5/2024, Normal      folic acid (FOLVITE) 1 mg tablet Take 1 tablet (1 mg total) by mouth daily, Starting Tue 10/1/2024, Normal      linaCLOtide (Linzess) 72 MCG CAPS Take 72 mcg by mouth daily before breakfast, Starting Wed 8/9/2023, Normal      Multiple Vitamins-Minerals (multivitamin with minerals) tablet Take 1 tablet by mouth daily, Starting Tue 10/1/2024, Normal      omeprazole (PriLOSEC) 40 MG capsule Take 1 capsule (40 mg total) by mouth once as needed (epigastric pain), Starting Mon 9/16/2024, Until Sun 12/15/2024 at 2359, Normal      ondansetron (ZOFRAN-ODT) 4 mg disintegrating tablet Take 1 tablet (4 mg total) by mouth every 6 (six) hours as needed for nausea or vomiting, Starting Thu 12/5/2024, Normal      oxyCODONE (Roxicodone) 5 immediate release tablet Take 1 tablet (5 mg total) by mouth every 4 (four) hours as needed for moderate pain or severe pain for up to 10 days Max Daily Amount: 30 mg, Starting Mon 12/16/2024, Until Thu 12/26/2024 at 2359, Normal      Restasis 0.05 % ophthalmic emulsion INSTILL 1 DROP IN BOTH EYES TWICE DAILY, Historical Med      senna-docusate sodium (SENOKOT S) 8.6-50 mg per tablet Take 1 tablet by mouth daily, Starting Thu 12/5/2024, Normal      sucralfate (CARAFATE) 1 g tablet Take 1 tablet (1 g total) by mouth 4 (four)  times a day Crush and mix with water to take as suspension, Starting Tue 9/12/2023, Until Mon 11/18/2024, Normal      tiZANidine (ZANAFLEX) 4 mg tablet Take 1 tablet (4 mg total) by mouth every 8 (eight) hours as needed for muscle spasms, Starting Tue 10/15/2024, Normal      venlafaxine (EFFEXOR) 75 mg tablet Take 75 mg by mouth 2 (two) times a day with meals, Starting Wed 12/30/2020, Historical Med      vitamin B-12 (CYANOCOBALAMIN) 2000 MCG TABS Take 1 tablet (2,000 mcg total) by mouth daily, Starting Fri 10/27/2023, Normal           No discharge procedures on file.  ED SEPSIS DOCUMENTATION   Time reflects when diagnosis was documented in both MDM as applicable and the Disposition within this note       Time User Action Codes Description Comment    12/21/2024 10:27 PM Aislinn Ling Add [T78.40XA] Allergic reaction, initial encounter                  Aislinn Ling PA-C  12/21/24 [id number]

## 2024-12-23 ENCOUNTER — HOSPITAL ENCOUNTER (EMERGENCY)
Facility: HOSPITAL | Age: 64
Discharge: HOME | End: 2024-12-23
Attending: EMERGENCY MEDICINE
Payer: MEDICARE

## 2024-12-23 VITALS
BODY MASS INDEX: 29.09 KG/M2 | TEMPERATURE: 98.1 F | WEIGHT: 174.82 LBS | OXYGEN SATURATION: 98 % | SYSTOLIC BLOOD PRESSURE: 110 MMHG | RESPIRATION RATE: 19 BRPM | DIASTOLIC BLOOD PRESSURE: 56 MMHG | HEART RATE: 92 BPM

## 2024-12-23 DIAGNOSIS — L50.9 URTICARIA: Primary | ICD-10-CM

## 2024-12-23 LAB
ATRIAL RATE: 80 BPM
P AXIS: 61 DEGREES
PR INTERVAL: 140 MS
QRS AXIS: 53 DEGREES
QRSD INTERVAL: 74 MS
QT INTERVAL: 356 MS
QTC INTERVAL: 410 MS
T WAVE AXIS: -8 DEGREES
VENTRICULAR RATE: 80 BPM

## 2024-12-23 PROCEDURE — 99282 EMERGENCY DEPT VISIT SF MDM: CPT

## 2024-12-23 PROCEDURE — 93010 ELECTROCARDIOGRAM REPORT: CPT | Performed by: INTERNAL MEDICINE

## 2024-12-23 PROCEDURE — 96374 THER/PROPH/DIAG INJ IV PUSH: CPT

## 2024-12-23 PROCEDURE — 96375 TX/PRO/DX INJ NEW DRUG ADDON: CPT

## 2024-12-23 PROCEDURE — 99284 EMERGENCY DEPT VISIT MOD MDM: CPT | Performed by: EMERGENCY MEDICINE

## 2024-12-23 PROCEDURE — 96372 THER/PROPH/DIAG INJ SC/IM: CPT

## 2024-12-23 RX ORDER — DEXAMETHASONE SODIUM PHOSPHATE 10 MG/ML
10 INJECTION, SOLUTION INTRAMUSCULAR; INTRAVENOUS ONCE
Status: COMPLETED | OUTPATIENT
Start: 2024-12-23 | End: 2024-12-23

## 2024-12-23 RX ORDER — DIPHENHYDRAMINE HYDROCHLORIDE 50 MG/ML
25 INJECTION INTRAMUSCULAR; INTRAVENOUS ONCE
Status: COMPLETED | OUTPATIENT
Start: 2024-12-23 | End: 2024-12-23

## 2024-12-23 RX ORDER — HYDROXYZINE HYDROCHLORIDE 25 MG/1
25 TABLET, FILM COATED ORAL ONCE
Status: COMPLETED | OUTPATIENT
Start: 2024-12-23 | End: 2024-12-23

## 2024-12-23 RX ORDER — FAMOTIDINE 10 MG/ML
20 INJECTION, SOLUTION INTRAVENOUS ONCE
Status: COMPLETED | OUTPATIENT
Start: 2024-12-23 | End: 2024-12-23

## 2024-12-23 RX ORDER — EPINEPHRINE 1 MG/ML
0.3 INJECTION, SOLUTION, CONCENTRATE INTRAVENOUS ONCE
Status: COMPLETED | OUTPATIENT
Start: 2024-12-23 | End: 2024-12-23

## 2024-12-23 RX ORDER — OMEPRAZOLE 40 MG/1
CAPSULE, DELAYED RELEASE ORAL
Qty: 90 CAPSULE | Refills: 1 | Status: SHIPPED | OUTPATIENT
Start: 2024-12-23

## 2024-12-23 RX ADMIN — EPINEPHRINE 0.3 MG: 1 INJECTION, SOLUTION, CONCENTRATE INTRAVENOUS at 15:14

## 2024-12-23 RX ADMIN — DEXAMETHASONE SODIUM PHOSPHATE 10 MG: 10 INJECTION INTRAMUSCULAR; INTRAVENOUS at 15:17

## 2024-12-23 RX ADMIN — HYDROXYZINE HYDROCHLORIDE 25 MG: 25 TABLET ORAL at 15:15

## 2024-12-23 RX ADMIN — DIPHENHYDRAMINE HYDROCHLORIDE 25 MG: 50 INJECTION INTRAMUSCULAR; INTRAVENOUS at 15:17

## 2024-12-23 RX ADMIN — FAMOTIDINE 20 MG: 10 INJECTION, SOLUTION INTRAVENOUS at 15:18

## 2024-12-23 NOTE — ED PROVIDER NOTES
"Time reflects when diagnosis was documented in both MDM as applicable and the Disposition within this note       Time User Action Codes Description Comment    12/22/2024  9:34 PM Aislinn Ling [L50.9] Urticaria     12/22/2024  9:53 PM Aislinn Ling [T78.40XA] Allergic reaction, initial encounter           ED Disposition       ED Disposition   Discharge    Condition   Stable    Date/Time   Sun Dec 22, 2024  9:53 PM    Comment   Aislinn Rouse discharge to home/self care.                   Assessment & Plan       Medical Decision Making  Patient reports to the ED with recurrence of urticarial rash and sensation of throat tightening.  She was seen in the ED yesterday and given Solu-Medrol, Pepcid, Benadryl.  Patient felt significantly improved.  Outpatient course of prednisone prescribed.  She states that the pharmacy has not have the prescription ready all day.  She has not yet had her dose of prednisone today.    Patient reports significant improvement with solumedrol. Discussed epinephrine. Patient prefers against it at this time since her symptoms have imporved.  Offered CT neck to evaluate her airway.  Patient states that she feels improved and does not feel that is indicated at this time.  Prescribed EpiPen, discussed continuing course of prednisone, which she states is now filled at the pharmacy.  Discussed follow-up with primary care in 4 to 5 days.    Discussed findings from the visit with the patient.  We had a conversation regarding supportive care and indications for return.  Recommended appropriate follow-up.  Patient and/or family understand and agree with plan.    Portions of the record may have been created with voice recognition software. Occasional use of the incorrect word or \"sound a like\" substitutions may have occurred due to the inherent limitations of voice recognition software. Read the chart carefully and recognize, using context, where substitutions have occurred. " "      Risk  Prescription drug management.             Medications   diphenhydrAMINE (BENADRYL) injection 25 mg (25 mg Intravenous Given 12/22/24 2009)   Famotidine (PF) (PEPCID) injection 20 mg (20 mg Intravenous Given 12/22/24 2011)   methylPREDNISolone sodium succinate (Solu-MEDROL) injection 80 mg (80 mg Intravenous Given 12/22/24 2014)   sodium chloride 0.9 % bolus 500 mL (0 mL Intravenous Stopped 12/22/24 2109)       ED Risk Strat Scores                          SBIRT 22yo+      Flowsheet Row Most Recent Value   Initial Alcohol Screen: US AUDIT-C     1. How often do you have a drink containing alcohol? 0 Filed at: 12/22/2024 1939   2. How many drinks containing alcohol do you have on a typical day you are drinking?  0 Filed at: 12/22/2024 1939   3b. FEMALE Any Age, or MALE 65+: How often do you have 4 or more drinks on one occassion? 0 Filed at: 12/22/2024 1939   Audit-C Score 0 Filed at: 12/22/2024 1939   BC: How many times in the past year have you...    Used an illegal drug or used a prescription medication for non-medical reasons? Never Filed at: 12/22/2024 1939                            History of Present Illness       Chief Complaint   Patient presents with    Rash     Seen yesterday for rash - unable to  steroids from pharmacy. Pt reports rash has worsened and her throat \"feels like its going to collapse\"       Past Medical History:   Diagnosis Date    Anxiety     Arthritis     Asthma     Albuterol prn    Bariatric surgery status     Chronic pain disorder     left knee    COVID-19 11/19/2021    Depression 2009    managed with Effexor     Generalized osteoarthritis     GERD (gastroesophageal reflux disease)     Low vitamin B12 level     Migraine     Postgastrectomy malabsorption     Suicide attempt (HCC)     Vitamin D deficiency     Wears dentures     Wears glasses       Past Surgical History:   Procedure Laterality Date    CHOLECYSTECTOMY  2005    COLONOSCOPY      COSMETIC SURGERY  05/27/2020 "    Abdominoplasty    EGD      GASTRIC BYPASS      elvira - en -y     HYSTERECTOMY      IR IMAGE GUIDED ASPIRATION / DRAINAGE W TUBE  2020    KNEE ARTHROSCOPY      with Lysis of adhesions    LAPAROSCOPIC SUPRACERVICAL HYSTERECTOMY  2005    due to endometriosis/AUB    OOPHORECTOMY Left     SC EXCISION SKIN ABD INFRAUMBILICAL PANNICULECTOMY N/A 2020    Procedure: PANNICULECTOMY;  Surgeon: Bacilio Iraheta MD;  Location: [facility];  Service: Plastics    TONSILLECTOMY AND ADENOIDECTOMY      TOTAL KNEE ARTHROPLASTY Left 2024    Procedure: ARTHROPLASTY KNEE TOTAL;  Surgeon: Nash Aggarwal DO;  Location: [facility];  Service: Orthopedics    TUBAL LIGATION      VAC DRESSING APPLICATION N/A 2020    Procedure: APPLICATION VAC DRESSING;  Surgeon: Bacilio Iraheta MD;  Location: [facility];  Service: Plastics      Family History   Problem Relation Age of Onset    Hypertension Mother     Kidney cancer Mother     Diabetes Mother     Breast cancer Mother     Heart disease Father     Stroke Father     Hypertension Sister     HIV Brother     Breast cancer Cousin     No Known Problems Daughter     Stomach cancer Maternal Grandmother     No Known Problems Maternal Grandfather     No Known Problems Paternal Grandmother     No Known Problems Paternal Grandfather     No Known Problems Sister     No Known Problems Sister     No Known Problems Daughter     Prostate cancer Maternal Uncle     Stomach cancer Maternal Uncle     Leukemia Grandchild       Social History     Tobacco Use    Smoking status: Former     Current packs/day: 0.00     Types: Cigarettes     Start date: 1981     Quit date: 2013     Years since quittin.6    Smokeless tobacco: Never   Vaping Use    Vaping status: Former    Substances: Flavoring   Substance Use Topics    Alcohol use: Not Currently    Drug use: Never      E-Cigarette/Vaping    E-Cigarette Use Former User       E-Cigarette/Vaping Substances     Nicotine No     THC No     CBD No     Flavoring Yes     Other No     Unknown No       I have reviewed and agree with the history as documented.     64-year-old female presenting to the emergency department with urticarial rash.  She was seen in the ER yesterday after waking with a rash.  She had a new soup the night before, used a new body wash the day before, and had a procedure done 2 weeks ago which she has taken Tylenol and oxycodone for. Patient states she does not feel this could be a reaction to her medication as she has refrained from taking it and has had symptoms persist.       Rash  Associated symptoms: shortness of breath    Associated symptoms: no abdominal pain, no fatigue, no fever, no headaches, no nausea, no sore throat, not vomiting and not wheezing        Review of Systems   Constitutional:  Negative for chills, diaphoresis, fatigue and fever.   HENT:  Negative for congestion, sore throat, trouble swallowing and voice change.    Respiratory:  Positive for shortness of breath. Negative for cough, choking, chest tightness, wheezing and stridor.    Cardiovascular:  Negative for chest pain, palpitations and leg swelling.   Gastrointestinal:  Negative for abdominal pain, nausea and vomiting.   Skin:  Positive for rash. Negative for color change.   Neurological:  Negative for dizziness, weakness, numbness and headaches.   All other systems reviewed and are negative.          Objective       ED Triage Vitals [12/22/24 1936]   Temperature Pulse Blood Pressure Respirations SpO2 Patient Position - Orthostatic VS   97.9 °F (36.6 °C) 90 157/73 22 100 % Sitting      Temp Source Heart Rate Source BP Location FiO2 (%) Pain Score    Oral Monitor Right arm -- --      Vitals      Date and Time Temp Pulse SpO2 Resp BP Pain Score FACES Pain Rating User   12/22/24 2156 -- 81 100 % 20 137/62 -- -- CG   12/22/24 2003 -- 84 96 % 20 134/63 -- -- CG   12/22/24 1936 97.9 °F (36.6 °C) 90 100 % 22 157/73 -- -- AS             Physical Exam  Vitals and nursing note reviewed.   Constitutional:       General: She is not in acute distress.     Appearance: She is well-developed. She is not ill-appearing.   HENT:      Head: Normocephalic and atraumatic.      Mouth/Throat:      Mouth: Mucous membranes are moist.      Pharynx: Oropharynx is clear.   Eyes:      Conjunctiva/sclera: Conjunctivae normal.      Pupils: Pupils are equal, round, and reactive to light.   Cardiovascular:      Rate and Rhythm: Normal rate and regular rhythm.      Heart sounds: No murmur heard.  Pulmonary:      Effort: Pulmonary effort is normal. No respiratory distress.      Breath sounds: Normal breath sounds. No stridor. No wheezing, rhonchi or rales.   Abdominal:      General: There is no distension.      Palpations: Abdomen is soft.      Tenderness: There is no abdominal tenderness.   Musculoskeletal:         General: No swelling.      Cervical back: Neck supple.   Skin:     General: Skin is warm and dry.      Capillary Refill: Capillary refill takes less than 2 seconds.      Findings: Rash present.      Comments: Urticaria over the anterior neck, chest, armpits.    Neurological:      General: No focal deficit present.      Mental Status: She is alert.   Psychiatric:         Mood and Affect: Mood normal.         Results Reviewed       None            No orders to display       Procedures    ED Medication and Procedure Management   Prior to Admission Medications   Prescriptions Last Dose Informant Patient Reported? Taking?   ALPRAZolam (XANAX) 1 mg tablet   Yes No   Sig: Take 1 mg by mouth every evening   Acetaminophen Extra Strength 500 MG TABS   No No   Sig: TAKE 2 TABLETS BY MOUTH EVERY 8 HOURS   Cholecalciferol (VITAMIN D3) 1,000 units tablet   No No   Sig: Take 2 tablets (2,000 Units total) by mouth daily   DULoxetine (CYMBALTA) 30 mg delayed release capsule   Yes No   Sig: Take 30 mg by mouth daily   Diclofenac Sodium (VOLTAREN) 1 %   No No   Sig: Apply 2 g  topically 4 (four) times a day   Patient taking differently: Apply 2 g topically 4 (four) times a day application L knee   Multiple Vitamins-Minerals (multivitamin with minerals) tablet   No No   Sig: Take 1 tablet by mouth daily   Restasis 0.05 % ophthalmic emulsion   Yes No   Sig: INSTILL 1 DROP IN BOTH EYES TWICE DAILY   ascorbic acid (VITAMIN C) 500 MG tablet   No No   Sig: Take 1 tablet (500 mg total) by mouth 2 (two) times a day   Patient taking differently: Take 500 mg by mouth daily   aspirin (ECOTRIN LOW STRENGTH) 81 mg EC tablet   No No   Sig: Take 1 tablet (81 mg total) by mouth 2 (two) times a day   buPROPion (WELLBUTRIN SR) 150 mg 12 hr tablet   Yes No   Sig: Take 150 mg by mouth every morning 1 tablet in the morning   dicyclomine (BENTYL) 20 mg tablet   No No   Sig: TAKE 1 TABLET(20 MG) BY MOUTH EVERY 6 HOURS AS NEEDED FOR ABDOMINAL PAIN   doxepin (SINEquan) 100 mg capsule   Yes No   Sig: Take 100 mg by mouth daily at bedtime   famotidine (PEPCID) 20 mg tablet   No No   Sig: Take 1 tablet (20 mg total) by mouth 2 (two) times a day   ferrous sulfate 324 (65 Fe) mg   No No   Sig: TAKE 1 TABLET BY MOUTH EVERY OTHER DAY   folic acid (FOLVITE) 1 mg tablet   No No   Sig: Take 1 tablet (1 mg total) by mouth daily   Patient not taking: Reported on 12/17/2024   linaCLOtide (Linzess) 72 MCG CAPS   No No   Sig: Take 72 mcg by mouth daily before breakfast   omeprazole (PriLOSEC) 40 MG capsule   No No   Sig: Take 1 capsule (40 mg total) by mouth once as needed (epigastric pain)   ondansetron (ZOFRAN-ODT) 4 mg disintegrating tablet   No No   Sig: Take 1 tablet (4 mg total) by mouth every 6 (six) hours as needed for nausea or vomiting   oxyCODONE (Roxicodone) 5 immediate release tablet   No No   Sig: Take 1 tablet (5 mg total) by mouth every 4 (four) hours as needed for moderate pain or severe pain for up to 10 days Max Daily Amount: 30 mg   predniSONE 20 mg tablet   No No   Sig: Take 2 tablets (40 mg total) by mouth  daily for 4 days   senna-docusate sodium (SENOKOT S) 8.6-50 mg per tablet   No No   Sig: Take 1 tablet by mouth daily   sucralfate (CARAFATE) 1 g tablet   No No   Sig: Take 1 tablet (1 g total) by mouth 4 (four) times a day Crush and mix with water to take as suspension   tiZANidine (ZANAFLEX) 4 mg tablet   No No   Sig: Take 1 tablet (4 mg total) by mouth every 8 (eight) hours as needed for muscle spasms   venlafaxine (EFFEXOR) 75 mg tablet   Yes No   Sig: Take 75 mg by mouth 2 (two) times a day with meals   vitamin B-12 (CYANOCOBALAMIN) 2000 MCG TABS   No No   Sig: Take 1 tablet (2,000 mcg total) by mouth daily      Facility-Administered Medications Last Administration Doses Remaining   cyanocobalamin injection 1,000 mcg 9/5/2024  9:23 AM         Discharge Medication List as of 12/22/2024  9:54 PM        START taking these medications    Details   EPINEPHrine (EPIPEN) 0.3 mg/0.3 mL SOAJ Inject 0.3 mL (0.3 mg total) into a muscle once for 1 dose, Starting Sun 12/22/2024, Normal           CONTINUE these medications which have NOT CHANGED    Details   Acetaminophen Extra Strength 500 MG TABS TAKE 2 TABLETS BY MOUTH EVERY 8 HOURS, Starting Tue 12/17/2024, Normal      ALPRAZolam (XANAX) 1 mg tablet Take 1 mg by mouth every evening, Starting Fri 7/28/2023, Historical Med      ascorbic acid (VITAMIN C) 500 MG tablet Take 1 tablet (500 mg total) by mouth 2 (two) times a day, Starting Tue 10/1/2024, Normal      aspirin (ECOTRIN LOW STRENGTH) 81 mg EC tablet Take 1 tablet (81 mg total) by mouth 2 (two) times a day, Starting Thu 12/5/2024, Normal      buPROPion (WELLBUTRIN SR) 150 mg 12 hr tablet Take 150 mg by mouth every morning 1 tablet in the morning, Starting Mon 4/22/2024, Historical Med      Cholecalciferol (VITAMIN D3) 1,000 units tablet Take 2 tablets (2,000 Units total) by mouth daily, Starting Tue 10/1/2024, Normal      Diclofenac Sodium (VOLTAREN) 1 % Apply 2 g topically 4 (four) times a day, Starting Thu  5/25/2023, Normal      dicyclomine (BENTYL) 20 mg tablet TAKE 1 TABLET(20 MG) BY MOUTH EVERY 6 HOURS AS NEEDED FOR ABDOMINAL PAIN, Normal      doxepin (SINEquan) 100 mg capsule Take 100 mg by mouth daily at bedtime, Starting Fri 7/28/2023, Historical Med      DULoxetine (CYMBALTA) 30 mg delayed release capsule Take 30 mg by mouth daily, Starting Mon 12/11/2023, Historical Med      famotidine (PEPCID) 20 mg tablet Take 1 tablet (20 mg total) by mouth 2 (two) times a day, Starting Sat 12/21/2024, Normal      ferrous sulfate 324 (65 Fe) mg TAKE 1 TABLET BY MOUTH EVERY OTHER DAY, Starting Fri 1/5/2024, Normal      folic acid (FOLVITE) 1 mg tablet Take 1 tablet (1 mg total) by mouth daily, Starting Tue 10/1/2024, Normal      linaCLOtide (Linzess) 72 MCG CAPS Take 72 mcg by mouth daily before breakfast, Starting Wed 8/9/2023, Normal      Multiple Vitamins-Minerals (multivitamin with minerals) tablet Take 1 tablet by mouth daily, Starting Tue 10/1/2024, Normal      omeprazole (PriLOSEC) 40 MG capsule Take 1 capsule (40 mg total) by mouth once as needed (epigastric pain), Starting Mon 9/16/2024, Until Sun 12/15/2024 at 2359, Normal      ondansetron (ZOFRAN-ODT) 4 mg disintegrating tablet Take 1 tablet (4 mg total) by mouth every 6 (six) hours as needed for nausea or vomiting, Starting Thu 12/5/2024, Normal      oxyCODONE (Roxicodone) 5 immediate release tablet Take 1 tablet (5 mg total) by mouth every 4 (four) hours as needed for moderate pain or severe pain for up to 10 days Max Daily Amount: 30 mg, Starting Mon 12/16/2024, Until Thu 12/26/2024 at 2359, Normal      predniSONE 20 mg tablet Take 2 tablets (40 mg total) by mouth daily for 4 days, Starting Sat 12/21/2024, Until Wed 12/25/2024, Normal      Restasis 0.05 % ophthalmic emulsion INSTILL 1 DROP IN BOTH EYES TWICE DAILY, Historical Med      senna-docusate sodium (SENOKOT S) 8.6-50 mg per tablet Take 1 tablet by mouth daily, Starting Thu 12/5/2024, Normal       sucralfate (CARAFATE) 1 g tablet Take 1 tablet (1 g total) by mouth 4 (four) times a day Crush and mix with water to take as suspension, Starting Tue 9/12/2023, Until Mon 11/18/2024, Normal      tiZANidine (ZANAFLEX) 4 mg tablet Take 1 tablet (4 mg total) by mouth every 8 (eight) hours as needed for muscle spasms, Starting Tue 10/15/2024, Normal      venlafaxine (EFFEXOR) 75 mg tablet Take 75 mg by mouth 2 (two) times a day with meals, Starting Wed 12/30/2020, Historical Med      vitamin B-12 (CYANOCOBALAMIN) 2000 MCG TABS Take 1 tablet (2,000 mcg total) by mouth daily, Starting Fri 10/27/2023, Normal           No discharge procedures on file.  ED SEPSIS DOCUMENTATION   Time reflects when diagnosis was documented in both MDM as applicable and the Disposition within this note       Time User Action Codes Description Comment    12/22/2024  9:34 PM Aislinn Ling [L50.9] Urticaria     12/22/2024  9:53 PM Aislinn Ling [T78.40XA] Allergic reaction, initial encounter                  Aislinn Ling PA-C  12/23/24 0015

## 2024-12-23 NOTE — DISCHARGE INSTRUCTIONS
Pickup your course of steroids.   Use pepcid and benadryl as discussed.  If your symptoms return for worsen return to the ED.

## 2024-12-23 NOTE — ED PROVIDER NOTES
Pt Name: Aislinn Rouse  MRN: [id number]  Birthdate 1960  Age/Sex: 64 y.o. female  Date of evaluation: 12/23/2024  PCP: April Reagan MD        FINAL IMPRESSION    Final diagnoses:   Urticaria         DISPOSITION/PLAN    Time reflects when diagnosis was documented in both MDM as applicable and the Disposition within this note       Time User Action Codes Description Comment    12/23/2024  5:17 PM Dian Trotter Add [T78.40XA] Allergic reaction, initial encounter     12/23/2024  5:17 PM Dian Trotter Remove [T78.40XA] Allergic reaction, initial encounter     12/23/2024  5:17 PM Dian Trotter Add [L50.9] Urticaria           ED Disposition       ED Disposition   Discharge    Condition   Stable    Date/Time   Mon Dec 23, 2024  5:17 PM    Comment   Aislinn Rouse discharge to home/self care.                   Follow-up Information       Follow up With Specialties Details Why Contact Info    April Reagan MD Family Medicine Schedule an appointment as soon as possible for a visit   [address] [facility] [address]  [phone]                PATIENT REFERRED TO:    April Reagan MD  [address] [facility] [address]  [phone]    Schedule an appointment as soon as possible for a visit         DISCHARGE MEDICATIONS:    Discharge Medication List as of 12/23/2024  5:19 PM        CONTINUE these medications which have NOT CHANGED    Details   Acetaminophen Extra Strength 500 MG TABS TAKE 2 TABLETS BY MOUTH EVERY 8 HOURS, Starting Tue 12/17/2024, Normal      ALPRAZolam (XANAX) 1 mg tablet Take 1 mg by mouth every evening, Starting Fri 7/28/2023, Historical Med      ascorbic acid (VITAMIN C) 500 MG tablet Take 1 tablet (500 mg total) by mouth 2 (two) times a day, Starting Tue 10/1/2024, Normal      aspirin (ECOTRIN LOW STRENGTH) 81 mg EC tablet Take 1 tablet (81 mg total) by mouth 2 (two) times a day, Starting Thu 12/5/2024, Normal      buPROPion (WELLBUTRIN  SR) 150 mg 12 hr tablet Take 150 mg by mouth every morning 1 tablet in the morning, Starting Mon 4/22/2024, Historical Med      Cholecalciferol (VITAMIN D3) 1,000 units tablet Take 2 tablets (2,000 Units total) by mouth daily, Starting Tue 10/1/2024, Normal      Diclofenac Sodium (VOLTAREN) 1 % Apply 2 g topically 4 (four) times a day, Starting Thu 5/25/2023, Normal      dicyclomine (BENTYL) 20 mg tablet TAKE 1 TABLET(20 MG) BY MOUTH EVERY 6 HOURS AS NEEDED FOR ABDOMINAL PAIN, Normal      doxepin (SINEquan) 100 mg capsule Take 100 mg by mouth daily at bedtime, Starting Fri 7/28/2023, Historical Med      DULoxetine (CYMBALTA) 30 mg delayed release capsule Take 30 mg by mouth daily, Starting Mon 12/11/2023, Historical Med      EPINEPHrine (EPIPEN) 0.3 mg/0.3 mL SOAJ Inject 0.3 mL (0.3 mg total) into a muscle once for 1 dose, Starting Sun 12/22/2024, Normal      famotidine (PEPCID) 20 mg tablet Take 1 tablet (20 mg total) by mouth 2 (two) times a day, Starting Sat 12/21/2024, Normal      ferrous sulfate 324 (65 Fe) mg TAKE 1 TABLET BY MOUTH EVERY OTHER DAY, Starting Fri 1/5/2024, Normal      folic acid (FOLVITE) 1 mg tablet Take 1 tablet (1 mg total) by mouth daily, Starting Tue 10/1/2024, Normal      linaCLOtide (Linzess) 72 MCG CAPS Take 72 mcg by mouth daily before breakfast, Starting Wed 8/9/2023, Normal      Multiple Vitamins-Minerals (multivitamin with minerals) tablet Take 1 tablet by mouth daily, Starting Tue 10/1/2024, Normal      omeprazole (PriLOSEC) 40 MG capsule TAKE 1 CAPSULE(40 MG) BY MOUTH 1 TIME AS NEEDED FOR EPIGASTRIC PAIN, Normal      ondansetron (ZOFRAN-ODT) 4 mg disintegrating tablet Take 1 tablet (4 mg total) by mouth every 6 (six) hours as needed for nausea or vomiting, Starting Thu 12/5/2024, Normal      oxyCODONE (Roxicodone) 5 immediate release tablet Take 1 tablet (5 mg total) by mouth every 4 (four) hours as needed for moderate pain or severe pain for up to 10 days Max Daily Amount: 30 mg,  Starting Mon 12/16/2024, Until Thu 12/26/2024 at 2359, Normal      predniSONE 20 mg tablet Take 2 tablets (40 mg total) by mouth daily for 4 days, Starting Sat 12/21/2024, Until Wed 12/25/2024, Normal      Restasis 0.05 % ophthalmic emulsion INSTILL 1 DROP IN BOTH EYES TWICE DAILY, Historical Med      senna-docusate sodium (SENOKOT S) 8.6-50 mg per tablet Take 1 tablet by mouth daily, Starting Thu 12/5/2024, Normal      sucralfate (CARAFATE) 1 g tablet Take 1 tablet (1 g total) by mouth 4 (four) times a day Crush and mix with water to take as suspension, Starting Tue 9/12/2023, Until Mon 11/18/2024, Normal      tiZANidine (ZANAFLEX) 4 mg tablet Take 1 tablet (4 mg total) by mouth every 8 (eight) hours as needed for muscle spasms, Starting Tue 10/15/2024, Normal      venlafaxine (EFFEXOR) 75 mg tablet Take 75 mg by mouth 2 (two) times a day with meals, Starting Wed 12/30/2020, Historical Med      vitamin B-12 (CYANOCOBALAMIN) 2000 MCG TABS Take 1 tablet (2,000 mcg total) by mouth daily, Starting Fri 10/27/2023, Normal             No discharge procedures on file.          CHIEF COMPLAINT    Chief Complaint   Patient presents with   • Allergic Reaction     Pt has been seen here the last three days for allergic reaction- states it was after eating soup that contained seaweed and msg. Informed if it got worse to come back and her lip swelling has gotten worse.          INDER Tao presents to the Emergency Department complaining of itching/ hives and lip swelling.  He has been here the last few days.  Today she had taken her benadryl as well as prednisone but did not use the epi-pen.          Allergic Reaction  Presenting symptoms: rash          Past Medical and Surgical History    Past Medical History:   Diagnosis Date   • Anxiety    • Arthritis    • Asthma     Albuterol prn   • Bariatric surgery status    • Chronic pain disorder     left knee   • COVID-19 11/19/2021   • Depression 2009    managed with Effexor    •  Generalized osteoarthritis    • GERD (gastroesophageal reflux disease)    • Low vitamin B12 level    • Migraine    • Postgastrectomy malabsorption    • Suicide attempt (HCC)    • Vitamin D deficiency    • Wears dentures    • Wears glasses        Past Surgical History:   Procedure Laterality Date   • CHOLECYSTECTOMY  2005   • COLONOSCOPY     • COSMETIC SURGERY  05/27/2020    Abdominoplasty   • EGD     • GASTRIC BYPASS  2015    elvira - en -y    • HYSTERECTOMY     • IR IMAGE GUIDED ASPIRATION / DRAINAGE W TUBE  07/14/2020   • KNEE ARTHROSCOPY      with Lysis of adhesions   • LAPAROSCOPIC SUPRACERVICAL HYSTERECTOMY  2005    due to endometriosis/AUB   • OOPHORECTOMY Left 2005   • KY EXCISION SKIN ABD INFRAUMBILICAL PANNICULECTOMY N/A 05/27/2020    Procedure: PANNICULECTOMY;  Surgeon: Bacilio Iraheta MD;  Location: [facility];  Service: Plastics   • TONSILLECTOMY AND ADENOIDECTOMY     • TOTAL KNEE ARTHROPLASTY Left 12/5/2024    Procedure: ARTHROPLASTY KNEE TOTAL;  Surgeon: Nash Aggarwal DO;  Location:  MAIN OR;  Service: Orthopedics   • TUBAL LIGATION  1986   • VAC DRESSING APPLICATION N/A 05/27/2020    Procedure: APPLICATION VAC DRESSING;  Surgeon: Bacilio Iraheta MD;  Location: [facility];  Service: Plastics       Family History   Problem Relation Age of Onset   • Hypertension Mother    • Kidney cancer Mother    • Diabetes Mother    • Breast cancer Mother    • Heart disease Father    • Stroke Father    • Hypertension Sister    • HIV Brother    • Breast cancer Cousin    • No Known Problems Daughter    • Stomach cancer Maternal Grandmother    • No Known Problems Maternal Grandfather    • No Known Problems Paternal Grandmother    • No Known Problems Paternal Grandfather    • No Known Problems Sister    • No Known Problems Sister    • No Known Problems Daughter    • Prostate cancer Maternal Uncle    • Stomach cancer Maternal Uncle    • Leukemia Grandchild        Social History     Tobacco Use   •  Smoking status: Former     Current packs/day: 0.00     Types: Cigarettes     Start date: 1981     Quit date: 2013     Years since quittin.6   • Smokeless tobacco: Never   Vaping Use   • Vaping status: Former   • Substances: Flavoring   Substance Use Topics   • Alcohol use: Not Currently   • Drug use: Never         .    Allergies    Allergies   Allergen Reactions   • Motrin [Ibuprofen]      Hx gastric bypass   • Cherry - Food Allergy Rash   • Gabapentin Rash       Home Medications    Prior to Admission medications    Medication Sig Start Date End Date Taking? Authorizing Provider   Acetaminophen Extra Strength 500 MG TABS TAKE 2 TABLETS BY MOUTH EVERY 8 HOURS 24   Kylah Goode PA-C   ALPRAZolam (XANAX) 1 mg tablet Take 1 mg by mouth every evening 23   Historical Provider, MD   ascorbic acid (VITAMIN C) 500 MG tablet Take 1 tablet (500 mg total) by mouth 2 (two) times a day  Patient taking differently: Take 500 mg by mouth daily 10/1/24   Kylah Goode PA-C   aspirin (ECOTRIN LOW STRENGTH) 81 mg EC tablet Take 1 tablet (81 mg total) by mouth 2 (two) times a day 24   Kylah Goode PA-C   buPROPion (WELLBUTRIN SR) 150 mg 12 hr tablet Take 150 mg by mouth every morning 1 tablet in the morning 24   Historical Provider, MD   Cholecalciferol (VITAMIN D3) 1,000 units tablet Take 2 tablets (2,000 Units total) by mouth daily 10/1/24   Kylah Goode PA-C   Diclofenac Sodium (VOLTAREN) 1 % Apply 2 g topically 4 (four) times a day  Patient taking differently: Apply 2 g topically 4 (four) times a day application L knee 23   Tameka Flores MD   dicyclomine (BENTYL) 20 mg tablet TAKE 1 TABLET(20 MG) BY MOUTH EVERY 6 HOURS AS NEEDED FOR ABDOMINAL PAIN 22   Diana M Jaiyeola, MD   doxepin (SINEquan) 100 mg capsule Take 100 mg by mouth daily at bedtime 23   Historical Provider, MD   DULoxetine (CYMBALTA) 30 mg delayed release capsule Take 30 mg by mouth daily 23    Historical Provider, MD   EPINEPHrine (EPIPEN) 0.3 mg/0.3 mL SOAJ Inject 0.3 mL (0.3 mg total) into a muscle once for 1 dose 12/22/24 12/22/24  Aislinn Ling PA-C   famotidine (PEPCID) 20 mg tablet Take 1 tablet (20 mg total) by mouth 2 (two) times a day 12/21/24   Aislinn Ling PA-C   ferrous sulfate 324 (65 Fe) mg TAKE 1 TABLET BY MOUTH EVERY OTHER DAY 1/5/24   April Reagan MD   folic acid (FOLVITE) 1 mg tablet Take 1 tablet (1 mg total) by mouth daily  Patient not taking: Reported on 12/17/2024 10/1/24   Kylah Goode PA-C   linaCLOtide (Linzess) 72 MCG CAPS Take 72 mcg by mouth daily before breakfast 8/9/23   Diana M Jaiyeola, MD   Multiple Vitamins-Minerals (multivitamin with minerals) tablet Take 1 tablet by mouth daily 10/1/24   Kylah Goode PA-C   omeprazole (PriLOSEC) 40 MG capsule TAKE 1 CAPSULE(40 MG) BY MOUTH 1 TIME AS NEEDED FOR EPIGASTRIC PAIN 12/23/24   Cynthia Alba PA-C   ondansetron (ZOFRAN-ODT) 4 mg disintegrating tablet Take 1 tablet (4 mg total) by mouth every 6 (six) hours as needed for nausea or vomiting 12/5/24   Kylah Goode PA-C   oxyCODONE (Roxicodone) 5 immediate release tablet Take 1 tablet (5 mg total) by mouth every 4 (four) hours as needed for moderate pain or severe pain for up to 10 days Max Daily Amount: 30 mg 12/16/24 12/26/24  Kylah Goode PA-C   predniSONE 20 mg tablet Take 2 tablets (40 mg total) by mouth daily for 4 days 12/21/24 12/25/24  Aislinn Ling PA-C   Restasis 0.05 % ophthalmic emulsion INSTILL 1 DROP IN BOTH EYES TWICE DAILY 11/10/23   Historical Provider, MD   senna-docusate sodium (SENOKOT S) 8.6-50 mg per tablet Take 1 tablet by mouth daily 12/5/24   Kylah Goode PA-C   sucralfate (CARAFATE) 1 g tablet Take 1 tablet (1 g total) by mouth 4 (four) times a day Crush and mix with water to take as suspension 9/12/23 11/18/24  Cynthia Alba PA-C   tiZANidine (ZANAFLEX) 4 mg tablet Take 1 tablet (4 mg total) by mouth every 8  (eight) hours as needed for muscle spasms 10/15/24   Cuca Carreno PA-C   venlafaxine (EFFEXOR) 75 mg tablet Take 75 mg by mouth 2 (two) times a day with meals 12/30/20   Historical Provider, MD   vitamin B-12 (CYANOCOBALAMIN) 2000 MCG TABS Take 1 tablet (2,000 mcg total) by mouth daily 10/27/23   April Reagan MD   omeprazole (PriLOSEC) 40 MG capsule Take 1 capsule (40 mg total) by mouth once as needed (epigastric pain) 9/16/24 12/23/24  Cynthia Alba PA-C           Review of Systems    Review of Systems   Constitutional:  Negative for chills and fever.   HENT:  Positive for facial swelling. Negative for ear pain and sore throat.    Eyes:  Negative for pain and visual disturbance.   Respiratory:  Negative for cough and shortness of breath.    Cardiovascular:  Negative for chest pain and palpitations.   Gastrointestinal:  Negative for abdominal pain and vomiting.   Genitourinary:  Negative for dysuria and hematuria.   Musculoskeletal:  Negative for arthralgias and back pain.   Skin:  Positive for rash. Negative for color change.   Neurological:  Negative for seizures and syncope.   All other systems reviewed and are negative.      Physical Exam      ED Triage Vitals   Temperature Pulse Respirations Blood Pressure SpO2   12/23/24 1622 12/23/24 1455 12/23/24 1455 12/23/24 1455 12/23/24 1455   98.1 °F (36.7 °C) 104 16 108/81 99 %      Temp Source Heart Rate Source Patient Position - Orthostatic VS BP Location FiO2 (%)   12/23/24 1622 12/23/24 1455 12/23/24 1455 12/23/24 1455 --   Oral Monitor Sitting Right arm       Pain Score       12/23/24 1455       No Pain               Physical Exam  Vitals and nursing note reviewed.   Constitutional:       General: She is not in acute distress.     Appearance: She is well-developed.   HENT:      Head: Normocephalic and atraumatic.      Mouth/Throat:      Lips: Pink.      Pharynx: No pharyngeal swelling or uvula swelling.   Eyes:      Conjunctiva/sclera:  Conjunctivae normal.   Cardiovascular:      Rate and Rhythm: Normal rate and regular rhythm.      Heart sounds: No murmur heard.  Pulmonary:      Effort: Pulmonary effort is normal. No respiratory distress.      Breath sounds: Normal breath sounds.   Abdominal:      Palpations: Abdomen is soft.      Tenderness: There is no abdominal tenderness.   Musculoskeletal:         General: No swelling.      Cervical back: Neck supple.   Skin:     General: Skin is warm and dry.      Capillary Refill: Capillary refill takes less than 2 seconds.      Findings: Rash present. Rash is urticarial.   Neurological:      Mental Status: She is alert.   Psychiatric:         Mood and Affect: Mood normal.       Assessment and Plan    Aislinn Rouse is a 64 y.o. female who presents with hives/ allergic reaction. Physical examination remarkable for urticarial rash. Differential diagnosis (not completely inclusive) includes allergic reaction. Plan will be to treat symptomatically and observe.      MDM      Diagnostic Results      Labs:    Results for orders placed or performed during the hospital encounter of 12/21/24   ECG 12 lead    Collection Time: 12/21/24  9:36 PM   Result Value Ref Range    Ventricular Rate 80 BPM    Atrial Rate 80 BPM    TX Interval 140 ms    QRSD Interval 74 ms    QT Interval 356 ms    QTC Interval 410 ms    P Axis 61 degrees    QRS Axis 53 degrees    T Wave Axis -8 degrees       All labs reviewed and utilized in the medical decision making process    Radiology:    No orders to display       All radiology studies independently viewed by me and interpreted by the radiologist.    Procedure    Procedures      ED Course of Care and Re-Assessments    Patient was treated and then observed/  re-evaluated.   Her rash was improved.  She had no evidence of angioedema or airway involvement.      Medications   EPINEPHrine PF (ADRENALIN) 1 mg/mL injection 0.3 mg (0.3 mg Intramuscular Given 12/23/24 5619)   diphenhydrAMINE  (BENADRYL) injection 25 mg (25 mg Intravenous Given 12/23/24 1517)   Famotidine (PF) (PEPCID) injection 20 mg (20 mg Intravenous Given 12/23/24 1518)   dexamethasone (PF) (DECADRON) injection 10 mg (10 mg Intravenous Given 12/23/24 1517)   hydrOXYzine HCL (ATARAX) tablet 25 mg (25 mg Oral Given 12/23/24 1515)                  Dian Trotter, DO Dian Trotter,   12/23/24 0314

## 2024-12-28 ENCOUNTER — TELEPHONE (OUTPATIENT)
Age: 64
End: 2024-12-28

## 2024-12-28 ENCOUNTER — NURSE TRIAGE (OUTPATIENT)
Dept: OTHER | Facility: OTHER | Age: 64
End: 2024-12-28

## 2024-12-28 DIAGNOSIS — Z96.652 STATUS POST TOTAL KNEE REPLACEMENT, LEFT: Primary | ICD-10-CM

## 2024-12-28 NOTE — TELEPHONE ENCOUNTER
"Reason for Disposition  • Caller requesting a CONTROLLED substance prescription refill (e.g., narcotics, ADHD medicines)    Answer Assessment - Initial Assessment Questions  1. DRUG NAME: \"What medicine do you need to have refilled?\"      Oxycodone 5mg tablets  2. REFILLS REMAINING: \"How many refills are remaining?\" (Note: The label on the medicine or pill bottle will show how many refills are remaining. If there are no refills remaining, then a renewal may be needed.)      0  4. PRESCRIBING HCP: \"Who prescribed it?\" Reason: If prescribed by specialist, call should be referred to that group.      ortho  5. SYMPTOMS: \"Do you have any symptoms?\"      Knee pain, rates pain 10/10. After taking the oxycodone and tylenol pain improves to 5/10    Protocols used: Medication Refill and Renewal Call-Adult-    "

## 2024-12-28 NOTE — TELEPHONE ENCOUNTER
Medication Refill Request     Name Oxycodone   Dose/Frequency 5mg every 4 hours  Quantity   Verified pharmacy   [x]  Verified ordering Provider   [x]  Does patient have enough for the next 3 days? Yes [x] No []     Reports having enough medication until Monday morning.

## 2024-12-28 NOTE — TELEPHONE ENCOUNTER
"Regarding: Left Knee pain  ----- Message from Sandi GOLDEN sent at 12/28/2024  1:31 PM EST -----  Pt stated, \" I am having pain in my left knee, I am doing everything they told me to do but it is not helping.\"    "

## 2024-12-28 NOTE — TELEPHONE ENCOUNTER
Medication Refill Request     Name oxyCODONE (Roxicodone) 5 immediate release tablet   Dose/Frequency  5 mg PRN   Quantity 42 tablet   Verified pharmacy   [x]  Verified ordering Provider   [x]  Does patient have enough for the next 3 days? Yes [x] No []

## 2024-12-30 NOTE — TELEPHONE ENCOUNTER
Reason for call:   [x] Refill   [] Prior Auth  [x] Other: medication not listed on active med list     Office:   [] PCP/Provider -   [x] Specialty/Provider - [facility]        Medication:  oxyCODONE (Roxicodone) 5 immediate release tablet       Pharmacy: [facility]     Does the patient have enough for 3 days?   [] Yes   [x] No - Send as HP to POD

## 2024-12-31 RX ORDER — OXYCODONE HYDROCHLORIDE 5 MG/1
5 TABLET ORAL EVERY 4 HOURS PRN
Qty: 42 TABLET | Refills: 0 | Status: SHIPPED | OUTPATIENT
Start: 2024-12-31 | End: 2025-01-10

## 2024-12-31 NOTE — TELEPHONE ENCOUNTER
Confirmed Oxycodone and Narcan sent to pharmacy.  E-scribe status was received 12/31/24 at 12:49PM.

## 2024-12-31 NOTE — TELEPHONE ENCOUNTER
Oxycodone sent to pharmacy as well as narcan.  Please call patient to make aware oxycodone was refilled.

## 2025-01-13 ENCOUNTER — TELEPHONE (OUTPATIENT)
Age: 65
End: 2025-01-13

## 2025-01-13 NOTE — TELEPHONE ENCOUNTER
Hello,    Please advise if a forced appointment can be accommodated for the patient:    Call back #: [phone]     Insurance: [employer]     Reason for appointment: Post op follow up     Requested doctor and/or location: ar[facility]/ [facility]      Thank you.

## 2025-01-17 ENCOUNTER — TELEMEDICINE (OUTPATIENT)
Dept: FAMILY MEDICINE CLINIC | Facility: CLINIC | Age: 65
End: 2025-01-17

## 2025-01-17 DIAGNOSIS — Z98.84 BARIATRIC SURGERY STATUS: ICD-10-CM

## 2025-01-17 DIAGNOSIS — M47.816 LUMBAR SPONDYLOSIS: ICD-10-CM

## 2025-01-17 DIAGNOSIS — Z79.899 ENCOUNTER FOR MEDICATION REVIEW: Primary | ICD-10-CM

## 2025-01-17 DIAGNOSIS — E53.8 VITAMIN B12 DEFICIENCY: ICD-10-CM

## 2025-01-17 DIAGNOSIS — E61.1 IRON DEFICIENCY: ICD-10-CM

## 2025-01-17 DIAGNOSIS — M17.12 PRIMARY OSTEOARTHRITIS OF LEFT KNEE: ICD-10-CM

## 2025-01-17 PROCEDURE — 99213 OFFICE O/P EST LOW 20 MIN: CPT | Performed by: FAMILY MEDICINE

## 2025-01-17 PROCEDURE — G2211 COMPLEX E/M VISIT ADD ON: HCPCS | Performed by: FAMILY MEDICINE

## 2025-01-17 RX ORDER — OMEPRAZOLE 40 MG/1
40 CAPSULE, DELAYED RELEASE ORAL DAILY
Qty: 90 CAPSULE | Refills: 1 | Status: SHIPPED | OUTPATIENT
Start: 2025-01-17

## 2025-01-17 RX ORDER — MULTIVIT-MIN/IRON FUM/FOLIC AC 7.5 MG-4
1 TABLET ORAL DAILY
Qty: 90 TABLET | Refills: 3 | Status: SHIPPED | OUTPATIENT
Start: 2025-01-17

## 2025-01-17 RX ORDER — CYANOCOBALAMIN (VITAMIN B-12) 2000 MCG
2000 TABLET ORAL DAILY
Qty: 90 TABLET | Refills: 3 | Status: SHIPPED | OUTPATIENT
Start: 2025-01-17

## 2025-01-17 RX ORDER — FERROUS SULFATE 324(65)MG
324 TABLET, DELAYED RELEASE (ENTERIC COATED) ORAL EVERY OTHER DAY
Qty: 90 TABLET | Refills: 3 | Status: SHIPPED | OUTPATIENT
Start: 2025-01-17

## 2025-01-17 RX ORDER — FOLIC ACID 1 MG/1
1 TABLET ORAL DAILY
Qty: 90 TABLET | Refills: 3 | Status: SHIPPED | OUTPATIENT
Start: 2025-01-17

## 2025-01-17 RX ORDER — ASCORBIC ACID 500 MG
500 TABLET ORAL DAILY
Qty: 90 TABLET | Refills: 3 | Status: SHIPPED | OUTPATIENT
Start: 2025-01-17

## 2025-01-17 NOTE — ASSESSMENT & PLAN NOTE
Orders:  •  vitamin B-12 (CYANOCOBALAMIN) 2000 MCG TABS; Take 1 tablet (2,000 mcg total) by mouth daily

## 2025-01-17 NOTE — PROGRESS NOTES
Virtual Regular Visit  Name: Aislinn Rouse      : 1960      MRN: [id number]  Encounter Provider: April Reagan MD  Encounter Date: 2025   Encounter department: [facility]      Verification of patient location:  Patient is located at Home in the following state in which I hold an active license [place] :  Assessment & Plan  Encounter for medication review  Reviewed all medication with patient and refilled as appropriate       Primary osteoarthritis of left knee    Orders:  •  Multiple Vitamins-Minerals (multivitamin with minerals) tablet; Take 1 tablet by mouth daily  •  ascorbic acid (VITAMIN C) 500 MG tablet; Take 1 tablet (500 mg total) by mouth daily  •  folic acid (FOLVITE) 1 mg tablet; Take 1 tablet (1 mg total) by mouth daily  •  Cholecalciferol (VITAMIN D3) 1,000 units tablet; Take 2 tablets (2,000 Units total) by mouth daily    Iron deficiency    Orders:  •  ferrous sulfate 324 (65 Fe) mg; Take 1 tablet (324 mg total) by mouth every other day    Vitamin B12 deficiency    Orders:  •  vitamin B-12 (CYANOCOBALAMIN) 2000 MCG TABS; Take 1 tablet (2,000 mcg total) by mouth daily    Lumbar spondylosis    Orders:  •  tiZANidine (ZANAFLEX) 4 mg tablet; Take 1 tablet (4 mg total) by mouth every 8 (eight) hours as needed for muscle spasms    Bariatric surgery status    Orders:  •  omeprazole (PriLOSEC) 40 MG capsule; Take 1 capsule (40 mg total) by mouth daily        Encounter provider April Reagan MD    The patient was identified by name and date of birth. Aislinn Rouse was informed that this is a telemedicine visit and that the visit is being conducted through the [employer] Teams platform. She agrees to proceed..  My office door was closed. The patient was notified the following individuals were present in the room Medical Student (Timur).  She acknowledged consent and understanding of privacy and security of the video platform. The patient has agreed  to participate and understands they can discontinue the visit at any time.    Patient is aware this is a billable service.     History of Present Illness     HPI  Aislinn Rouse is a 64 y.o. female  has a past medical history of Anxiety, Arthritis, Asthma, Bariatric surgery status, Chronic pain disorder, COVID-19, Depression, Generalized osteoarthritis, GERD (gastroesophageal reflux disease), Low vitamin B12 level, Migraine, Postgastrectomy malabsorption, Suicide attempt (HCC), Vitamin D deficiency, Wears dentures, and Wears glasses. who presented for a virtual visit today for medication reconciliation.  She states she is pain today after the knee replacement, and is attending physical therapy.  Reviewed pt's medications over the telephone.    Mental health meds: Duloxetine 30 mg, Bupropion 150 mg, Doxepin 100 mg QHS, and Velanfaxine (not taking)    Review of Systems   Constitutional:  Negative for chills and fever.   HENT:  Negative for congestion, rhinorrhea and sore throat.    Respiratory:  Negative for cough and shortness of breath.    Cardiovascular:  Negative for chest pain.   Gastrointestinal:  Negative for diarrhea, nausea and vomiting.   Genitourinary:  Negative for dysuria.   Musculoskeletal:  Positive for back pain and gait problem.   Skin:  Negative for rash.   Neurological:  Negative for dizziness and headaches.   Psychiatric/Behavioral:  Positive for sleep disturbance. The patient is nervous/anxious.        Objective   LMP  (LMP Unknown)     Physical Exam  Neurological:      Mental Status: She is alert and oriented to person, place, and time.   Psychiatric:         Mood and Affect: Mood normal.         Behavior: Behavior normal.         Visit Time  Total Visit Duration: 15 minutes

## 2025-01-17 NOTE — ASSESSMENT & PLAN NOTE
Orders:  •  Multiple Vitamins-Minerals (multivitamin with minerals) tablet; Take 1 tablet by mouth daily  •  ascorbic acid (VITAMIN C) 500 MG tablet; Take 1 tablet (500 mg total) by mouth daily  •  folic acid (FOLVITE) 1 mg tablet; Take 1 tablet (1 mg total) by mouth daily  •  Cholecalciferol (VITAMIN D3) 1,000 units tablet; Take 2 tablets (2,000 Units total) by mouth daily

## 2025-01-17 NOTE — ASSESSMENT & PLAN NOTE
Orders:  •  tiZANidine (ZANAFLEX) 4 mg tablet; Take 1 tablet (4 mg total) by mouth every 8 (eight) hours as needed for muscle spasms

## 2025-01-28 ENCOUNTER — TELEPHONE (OUTPATIENT)
Age: 65
End: 2025-01-28

## 2025-01-28 ENCOUNTER — OFFICE VISIT (OUTPATIENT)
Age: 65
End: 2025-01-28

## 2025-01-28 VITALS — HEIGHT: 65 IN | WEIGHT: 174 LBS | BODY MASS INDEX: 28.99 KG/M2

## 2025-01-28 DIAGNOSIS — Z96.652 STATUS POST TOTAL KNEE REPLACEMENT, LEFT: Primary | ICD-10-CM

## 2025-01-28 PROCEDURE — 99024 POSTOP FOLLOW-UP VISIT: CPT | Performed by: STUDENT IN AN ORGANIZED HEALTH CARE EDUCATION/TRAINING PROGRAM

## 2025-01-28 RX ORDER — OXYCODONE HYDROCHLORIDE 5 MG/1
5 TABLET ORAL EVERY 4 HOURS PRN
Qty: 42 TABLET | Refills: 0 | Status: SHIPPED | OUTPATIENT
Start: 2025-01-28 | End: 2025-02-07

## 2025-01-28 NOTE — TELEPHONE ENCOUNTER
Caller: Self    Doctor: Jasen    Reason for call: Patient calling in regards to oxycodone script sent today. Pharmacy says insurance is requesting a prior auth    Call back#: [phone]

## 2025-01-28 NOTE — PROGRESS NOTES
Subjective:  2 months status post left total knee arthroplasty from 12/4/2024.  She states that she continues to have some diffuse pain and discomfort and stiffness.  She mentions that she has been compliant with outpatient physical therapy, however she mentions that when they do stretch her she has noticed slight areas along her incision that have been slow to heal.  She mentions that she has been compliant with proper wound care.  She denies any new injury or trauma to her left knee.  She denies any numbness or tingling.  She does report continued pain and discomfort as well as clicking and popping in her contralateral right knee, however she wishes to hold off on a cortisone injection at this time.    Physical Exam:  Incision: Healed, two distal area of delayed healing  ROM: 0-90 with pain terminal flexion   5/5 IP/Q/HS/TA/GS, 2+ DP/PT, SILT DP/SP/S/S/TN    No new images at today's visit    Assessment/Plan:  Nearly 2 months s/p Left TKA from 12/4/2024    - continue multi-modal pain control   - Weight bearing status: WBAT  - DVT ppx: Completed  - Incision care: Steri-stips provided for wound separation  - PT/OT progress Range of motion  - F/U 4 weeks for re-evaluation of Left knee and possible injection of Right Knee  -Refill of oxycodone provided at today's visit    Scribe Attestation      I,:  Robson Rosenthal am acting as a scribe while in the presence of the attending physician.:       I,:  Nash Aggarwal DO personally performed the services described in this documentation    as scribed in my presence.:

## 2025-01-28 NOTE — TELEPHONE ENCOUNTER
PA for OXY 5 MG SUBMITTED to [employer]    via    [x]CMM-KEY: [id number]      [x]PA sent as URGENT    All office notes, labs and other pertaining documents and studies sent. Clinical questions answered. Awaiting determination from insurance company.     Turnaround time for your insurance to make a decision on your Prior Authorization can take 7-21 business days.

## 2025-01-31 NOTE — TELEPHONE ENCOUNTER
Patient called the RX Refill Line. Message is being forwarded to the Prior Auth Team    Patient is requesting a call back with an update. Patient stated that she spoke to her Insurance company [employer] and they told her they do not have the Prior Auth request for her oxycodone.     Please contact patient at [phone]

## 2025-01-31 NOTE — TELEPHONE ENCOUNTER
I spoke to [facility] who states the oxy 5 mg does not need a PA. The pharmacy is not using correct codes. Pharmacy notified and it was [facility].

## 2025-02-03 NOTE — TELEPHONE ENCOUNTER
Patient called the RX Refill Line. Message is being forwarded to the office.     Patient called regarding her oxy.  Theres been documentation that a PA is not needed for her oxy and the pharm was notified but the patient stated she just got off the phone with the pharm and they told her that they are still waiting for the PA.  She is in tears and has been without pain meds for a few days.  Can someone in the office, please call the pharm and find out why they aren't filling her meds for her.   Please contact patient at [phone]

## 2025-02-03 NOTE — TELEPHONE ENCOUNTER
PA for OXY 5 MG SUBMITTED to [employer]    via      [x]Faxed to plan at     [x]PA sent as URGENT    All office notes, labs and other pertaining documents and studies sent. Clinical questions answered. Awaiting determination from insurance company.     Turnaround time for your insurance to make a decision on your Prior Authorization can take 7-21 business days.

## 2025-02-03 NOTE — TELEPHONE ENCOUNTER
I spoke to [employer] again (Krista). Unfortunately we were given incorrect information on Friday. I have requested a form be faxed to me so I can submit for a PA via fax rather than [employer] whom is stating auth is not required. Krista did say she saw where it is not needed AND where it is needed. I will fill out form and fax mills to [employer]

## 2025-02-03 NOTE — TELEPHONE ENCOUNTER
Caller: Driss    Doctor: Dr. Aggarwal    Reason for call: Pharmacy called and stated that the OXY 5 DOES need a prior authorization. Pharmacy is asking for that to be sent over ASAP as the patient is in a lot of pain.     Call back#: [phone]

## 2025-02-03 NOTE — TELEPHONE ENCOUNTER
PA for oxycodone 5 APPROVED     Date(s) approved 2/3/2025-12/31/2025        Patient advised by          [x]MyChart Message  [x]Phone call   []LMOM  []L/M to call office as no active Communication consent on file  []Unable to leave detailed message as VM not approved on Communication consent       Pharmacy advised by    [x]Fax  []Phone call    Approval letter scanned into Media Yes

## 2025-02-05 ENCOUNTER — TELEPHONE (OUTPATIENT)
Age: 65
End: 2025-02-05

## 2025-02-05 NOTE — TELEPHONE ENCOUNTER
Caller: [facility]    Doctor: Dr. Aggarwal    Reason for call: [place] from [facility] called stated she has concerns regarding the patients wound on the left knee, stated the wound looks red and is purple the size of a quarter, she marked the top and bottom of the wound with her pen, I offered to contact Ortho Nurse Navigator and Brooklyn declined, She stated she advised the patient to send a picture to our office once PT appointment is completed. Please advise.    Call back#: [phone]

## 2025-02-05 NOTE — TELEPHONE ENCOUNTER
"Spoke to [facility] at [facility] ([facility]).     Patient in office on 1/28- steri strips mentioned in OV for wound opening. Confirmed with Brooklyn, steri strips are no longer on. She states she can't attest to every visit, as she hasn't seen patient consistently.     She reports \"red area\" that looks like an abrasion, with \"purple, dark\" area, quarter size that is tender on palpation.     She is going to have patient take a picture via MYCHART (ink terell around the area from PT for patient to gauge if spreading).   "

## 2025-02-07 NOTE — TELEPHONE ENCOUNTER
I can see her in the office on 2/10 either at the end of the morning or the beginning of the afternoon.  Dr. Aggarwal has a depo, but I can still see her.  Thanks!

## 2025-02-11 NOTE — TELEPHONE ENCOUNTER
Called pt, she states she has been putting neosporin on it, looking much better- no redness and is closing. Dr. Aggarwal states this is okay to do. She will send a pic to my email due to her [employer] not uploading the picture. PT will look at it again tomorrow as well.

## 2025-03-11 ENCOUNTER — OFFICE VISIT (OUTPATIENT)
Age: 65
End: 2025-03-11
Payer: MEDICARE

## 2025-03-11 VITALS — BODY MASS INDEX: 28.72 KG/M2 | HEIGHT: 65 IN | WEIGHT: 172.4 LBS

## 2025-03-11 DIAGNOSIS — Z96.652 STATUS POST TOTAL KNEE REPLACEMENT, LEFT: ICD-10-CM

## 2025-03-11 DIAGNOSIS — M17.11 PRIMARY OSTEOARTHRITIS OF RIGHT KNEE: Primary | ICD-10-CM

## 2025-03-11 PROCEDURE — 99213 OFFICE O/P EST LOW 20 MIN: CPT | Performed by: STUDENT IN AN ORGANIZED HEALTH CARE EDUCATION/TRAINING PROGRAM

## 2025-03-11 PROCEDURE — 20610 DRAIN/INJ JOINT/BURSA W/O US: CPT | Performed by: STUDENT IN AN ORGANIZED HEALTH CARE EDUCATION/TRAINING PROGRAM

## 2025-03-11 RX ORDER — OXYCODONE HYDROCHLORIDE 5 MG/1
5 TABLET ORAL EVERY 4 HOURS PRN
Qty: 42 TABLET | Refills: 0 | Status: SHIPPED | OUTPATIENT
Start: 2025-03-11

## 2025-03-11 RX ADMIN — BUPIVACAINE HYDROCHLORIDE 4 ML: 5 INJECTION, SOLUTION PERINEURAL at 14:45

## 2025-03-11 RX ADMIN — TRIAMCINOLONE ACETONIDE 40 MG: 40 INJECTION, SUSPENSION INTRA-ARTICULAR; INTRAMUSCULAR at 14:45

## 2025-03-11 NOTE — ASSESSMENT & PLAN NOTE
3 months s/p left total knee arthroplasty performed on 12/05/2024    - continue multi-modal pain control   - Weight bearing status: WBAT  - ROM 0-120  - DVT ppx: Completed  - Incision care: No restrictions  - PT/OT progress strengthening  - Last refill of oxycodone provided at today's visit    Orders:    oxyCODONE (Roxicodone) 5 immediate release tablet; Take 1 tablet (5 mg total) by mouth every 4 (four) hours as needed for moderate pain Max Daily Amount: 30 mg

## 2025-03-11 NOTE — ASSESSMENT & PLAN NOTE
Findings today are consistent with right knee osteoarthritis. Discussed treatment options including continued observation, low impact exercises, bracing, anti-inflammatories, physical therapy, cortisone injection, visco injection, versus surgical intervention. Cortisone steroid injection was administered today. Patient should avoid strenuous activities for the next 1-2 days. Patient should avoid vaccines for the next 2 weeks if possible. If patient is diabetic, should monitor glucose levels for the next 7 to 10 days. Can apply cold compress for soreness. If patient feels relief with the cortisone injections, procedure can be repeated every 3 months. Visco ordered for the right knee, pain scale 7/10. Follow up once visco is approved.    Orders:    Injection Procedure Prior Authorization; Future

## 2025-03-11 NOTE — PROGRESS NOTES
Knee New Office Note    Assessment:     1. Status post total knee replacement, left    2. Primary osteoarthritis of right knee        Plan:  Assessment & Plan  Status post total knee replacement, left  3 months s/p left total knee arthroplasty performed on 12/05/2024    - continue multi-modal pain control   - Weight bearing status: WBAT  - ROM 0-120  - DVT ppx: Completed  - Incision care: No restrictions  - PT/OT progress strengthening  - Last refill of oxycodone provided at today's visit    Orders:    oxyCODONE (Roxicodone) 5 immediate release tablet; Take 1 tablet (5 mg total) by mouth every 4 (four) hours as needed for moderate pain Max Daily Amount: 30 mg    Primary osteoarthritis of right knee  Findings today are consistent with right knee osteoarthritis. Discussed treatment options including continued observation, low impact exercises, bracing, anti-inflammatories, physical therapy, cortisone injection, visco injection, versus surgical intervention. Cortisone steroid injection was administered today. Patient should avoid strenuous activities for the next 1-2 days. Patient should avoid vaccines for the next 2 weeks if possible. If patient is diabetic, should monitor glucose levels for the next 7 to 10 days. Can apply cold compress for soreness. If patient feels relief with the cortisone injections, procedure can be repeated every 3 months. Visco ordered for the right knee, pain scale 7/10. Follow up once visco is approved.    Orders:    Injection Procedure Prior Authorization; Future    Subjective:     Patient ID: Aislinn Rouse is a 64 y.o. female.  Chief Complaint:  HPI:  64 y.o. female presents to the office 3 months s/p left total knee arthroplasty performed on 12/05/2024. She has been progressing her range of motion. She continues to experience left knee pain but notes that it has been gradually improving. Her motion is now full. She also notes that she feels more limited by her right medial knee pain.  Her pain worsens with activities. She is interested in discussing treatment options for her right knee today.    Allergy:  Allergies   Allergen Reactions    Motrin [Ibuprofen]      Hx gastric bypass    Cherry - Food Allergy Rash    Gabapentin Rash     Medications:  all current active meds have been reviewed  Past Medical History:  Past Medical History:   Diagnosis Date    Anxiety     Arthritis     Asthma     Albuterol prn    Bariatric surgery status     Chronic pain disorder     left knee    COVID-19 11/19/2021    Depression 2009    managed with Effexor     Generalized osteoarthritis     GERD (gastroesophageal reflux disease)     Low vitamin B12 level     Migraine     Postgastrectomy malabsorption     Suicide attempt (HCC)     Vitamin D deficiency     Wears dentures     Wears glasses      Past Surgical History:  Past Surgical History:   Procedure Laterality Date    CHOLECYSTECTOMY  2005    COLONOSCOPY      COSMETIC SURGERY  05/27/2020    Abdominoplasty    EGD      GASTRIC BYPASS  2015    elvira - en -y     HYSTERECTOMY      IR IMAGE GUIDED ASPIRATION / DRAINAGE W TUBE  07/14/2020    KNEE ARTHROSCOPY      with Lysis of adhesions    LAPAROSCOPIC SUPRACERVICAL HYSTERECTOMY  2005    due to endometriosis/AUB    OOPHORECTOMY Left 2005    DE EXC EXCSV SKN ABD INFRAUMBILICAL PANNICULECTOMY N/A 05/27/2020    Procedure: PANNICULECTOMY;  Surgeon: Bacilio Iraheta MD;  Location: [facility];  Service: Plastics    TONSILLECTOMY AND ADENOIDECTOMY      TOTAL KNEE ARTHROPLASTY Left 12/5/2024    Procedure: ARTHROPLASTY KNEE TOTAL;  Surgeon: Nash Aggarwal DO;  Location:  MAIN OR;  Service: Orthopedics    TUBAL LIGATION  1986    VAC DRESSING APPLICATION N/A 05/27/2020    Procedure: APPLICATION VAC DRESSING;  Surgeon: Bacilio Iraheta MD;  Location: [facility];  Service: Plastics     Family History:  Family History   Problem Relation Age of Onset    Hypertension Mother     Kidney cancer Mother     Diabetes Mother      Breast cancer Mother     Heart disease Father     Stroke Father     Hypertension Sister     HIV Brother     Breast cancer Cousin     No Known Problems Daughter     Stomach cancer Maternal Grandmother     No Known Problems Maternal Grandfather     No Known Problems Paternal Grandmother     No Known Problems Paternal Grandfather     No Known Problems Sister     No Known Problems Sister     No Known Problems Daughter     Prostate cancer Maternal Uncle     Stomach cancer Maternal Uncle     Leukemia Grandchild      Social History:  Social History     Substance and Sexual Activity   Alcohol Use Not Currently     Social History     Substance and Sexual Activity   Drug Use Never     Social History     Tobacco Use   Smoking Status Former    Current packs/day: 0.00    Types: Cigarettes    Start date: 1981    Quit date: 2013    Years since quittin.8   Smokeless Tobacco Never         ROS:  General: Per HPI  Skin: Negative, except if noted below  HEENT: Negative  Respiratory: Negative  Cardiovascular: Negative  Gastrointestinal: Negative  Urinary: Negative  Vascular: Negative  Musculoskeletal: Positive per HPI   Neurologic: Positive per HPI  Endocrine: Negative    Objective:  BP Readings from Last 1 Encounters:   24 110/56      Wt Readings from Last 1 Encounters:   25 78.2 kg (172 lb 6.4 oz)        Respiratory:   non-labored respirations    Lymphatics:  no palpable lymph nodes    Gait:   Steady    Neurologic:   Alert and oriented times 3  Patient with normal sensation except as noted below  Deep tendon reflexes 2+ except as noted in MSK exam    Bilateral Lower Extremity:  Left Knee:      Inspection:  well healed incision    Overall limb alignment neutral    Effusion: none    ROM 0-120 without pain    Extensor Lag: none    Palpation: no Joint line tenderness to palpation    AP Stability at 90 deg stable    M/L stability in full extension stable    M/L stability in midflexion stable    Motor: 5/5  "Q/HS/TA/GS/P    Pulses: 2+ DP / 2+ PT    SILT DP/SP/S/S/TN    Right knee:     Inspection:  skin intact    Overall limb alignment mild varus    Effusion: none    ROM 0-115 with pain    Extensor Lag: none    Palpation: medial Joint line tenderness to palpation    AP Stability at 90 deg stable    M/L stability in full extension stable    M/L stability in midflexion stable    Motor: 5/5 Q/HS/TA/GS/P    Pulses: 2+ DP / 2+ PT    SILT DP/SP/S/S/TN    Imaging:  No new imaging obtained today    BMI:   Estimated body mass index is 28.69 kg/m² as calculated from the following:    Height as of this encounter: 5' 5\" (1.651 m).    Weight as of this encounter: 78.2 kg (172 lb 6.4 oz).  BSA:   Estimated body surface area is 1.86 meters squared as calculated from the following:    Height as of this encounter: 5' 5\" (1.651 m).    Weight as of this encounter: 78.2 kg (172 lb 6.4 oz).         Large joint arthrocentesis: R knee  Universal Protocol:  Consent: Verbal consent obtained.  Risks and benefits: risks, benefits and alternatives were discussed  Consent given by: patient  Time out: Immediately prior to procedure a \"time out\" was called to verify the correct patient, procedure, equipment, support staff and site/side marked as required.  Timeout called at: 3/11/2025 3:07 PM.  Patient understanding: patient states understanding of the procedure being performed  Site marked: the operative site was marked  Required items: required blood products, implants, devices, and special equipment available  Patient identity confirmed: verbally with patient  Supporting Documentation  Indications: pain   Procedure Details  Location: knee - R knee  Preparation: Patient was prepped and draped in the usual sterile fashion  Needle size: 22 G  Ultrasound guidance: no  Approach: anterolateral  Medications administered: 4 mL bupivacaine 0.5 %; 40 mg triamcinolone acetonide 40 mg/mL    Patient tolerance: patient tolerated the procedure well with no " immediate complications  Dressing:  Sterile dressing applied        Scribe Attestation      I,:  Brianna Flood am acting as a scribe while in the presence of the attending physician.:       I,:  Nash Aggarwal DO personally performed the services described in this documentation    as scribed in my presence.:

## 2025-03-12 RX ORDER — BUPIVACAINE HYDROCHLORIDE 5 MG/ML
4 INJECTION, SOLUTION PERINEURAL
Status: COMPLETED | OUTPATIENT
Start: 2025-03-11 | End: 2025-03-11

## 2025-03-12 RX ORDER — TRIAMCINOLONE ACETONIDE 40 MG/ML
40 INJECTION, SUSPENSION INTRA-ARTICULAR; INTRAMUSCULAR
Status: COMPLETED | OUTPATIENT
Start: 2025-03-11 | End: 2025-03-11

## 2025-03-16 ENCOUNTER — TELEPHONE (OUTPATIENT)
Dept: OTHER | Facility: OTHER | Age: 65
End: 2025-03-16

## 2025-03-16 NOTE — TELEPHONE ENCOUNTER
Patient called and canceled her appointment on 3/17 at 11:00 am and will give the office a call back to reschedule the appointment.

## 2025-03-17 ENCOUNTER — TELEPHONE (OUTPATIENT)
Dept: BARIATRICS | Facility: CLINIC | Age: 65
End: 2025-03-17

## 2025-03-17 NOTE — TELEPHONE ENCOUNTER
Called patient and left a message to give the office a call back to reschedule the 3/17/25 appointment that was scheduled with Cynthia Alba PA-C that was cancelled through [facility].

## 2025-04-28 ENCOUNTER — HOSPITAL ENCOUNTER (EMERGENCY)
Facility: HOSPITAL | Age: 65
Discharge: HOME | End: 2025-04-28
Attending: EMERGENCY MEDICINE | Admitting: EMERGENCY MEDICINE
Payer: MEDICARE

## 2025-04-28 ENCOUNTER — APPOINTMENT (EMERGENCY)
Dept: CT IMAGING | Facility: HOSPITAL | Age: 65
End: 2025-04-28
Payer: MEDICARE

## 2025-04-28 VITALS
SYSTOLIC BLOOD PRESSURE: 131 MMHG | TEMPERATURE: 98 F | BODY MASS INDEX: 29.72 KG/M2 | DIASTOLIC BLOOD PRESSURE: 60 MMHG | OXYGEN SATURATION: 100 % | HEART RATE: 56 BPM | WEIGHT: 178.57 LBS | RESPIRATION RATE: 16 BRPM

## 2025-04-28 DIAGNOSIS — R10.84 GENERALIZED ABDOMINAL PAIN: Primary | ICD-10-CM

## 2025-04-28 LAB
ALBUMIN SERPL BCG-MCNC: 3.6 G/DL (ref 3.5–5)
ALP SERPL-CCNC: 71 U/L (ref 34–104)
ALT SERPL W P-5'-P-CCNC: 22 U/L (ref 7–52)
ANION GAP SERPL CALCULATED.3IONS-SCNC: 5 MMOL/L (ref 4–13)
AST SERPL W P-5'-P-CCNC: 52 U/L (ref 13–39)
BACTERIA UR QL AUTO: ABNORMAL /HPF
BASOPHILS # BLD AUTO: 0.02 THOUSANDS/ÂΜL (ref 0–0.1)
BASOPHILS NFR BLD AUTO: 0 % (ref 0–1)
BILIRUB SERPL-MCNC: 0.38 MG/DL (ref 0.2–1)
BILIRUB UR QL STRIP: ABNORMAL
BUN SERPL-MCNC: 21 MG/DL (ref 5–25)
CALCIUM SERPL-MCNC: 8.8 MG/DL (ref 8.4–10.2)
CAOX CRY URNS QL MICRO: ABNORMAL /HPF
CHLORIDE SERPL-SCNC: 105 MMOL/L (ref 96–108)
CLARITY UR: CLEAR
CO2 SERPL-SCNC: 30 MMOL/L (ref 21–32)
COLOR UR: YELLOW
CREAT SERPL-MCNC: 0.87 MG/DL (ref 0.6–1.3)
EOSINOPHIL # BLD AUTO: 0.12 THOUSAND/ÂΜL (ref 0–0.61)
EOSINOPHIL NFR BLD AUTO: 2 % (ref 0–6)
ERYTHROCYTE [DISTWIDTH] IN BLOOD BY AUTOMATED COUNT: 13.7 % (ref 11.6–15.1)
GFR SERPL CREATININE-BSD FRML MDRD: 71 ML/MIN/1.73SQ M
GLUCOSE SERPL-MCNC: 107 MG/DL (ref 65–140)
GLUCOSE UR STRIP-MCNC: NEGATIVE MG/DL
HCT VFR BLD AUTO: 41.7 % (ref 34.8–46.1)
HGB BLD-MCNC: 13.2 G/DL (ref 11.5–15.4)
HGB UR QL STRIP.AUTO: NEGATIVE
HYALINE CASTS #/AREA URNS LPF: ABNORMAL /LPF
IMM GRANULOCYTES # BLD AUTO: 0.03 THOUSAND/UL (ref 0–0.2)
IMM GRANULOCYTES NFR BLD AUTO: 0 % (ref 0–2)
KETONES UR STRIP-MCNC: ABNORMAL MG/DL
LEUKOCYTE ESTERASE UR QL STRIP: NEGATIVE
LIPASE SERPL-CCNC: 23 U/L (ref 11–82)
LYMPHOCYTES # BLD AUTO: 1.79 THOUSANDS/ÂΜL (ref 0.6–4.47)
LYMPHOCYTES NFR BLD AUTO: 23 % (ref 14–44)
MCH RBC QN AUTO: 27.8 PG (ref 26.8–34.3)
MCHC RBC AUTO-ENTMCNC: 31.7 G/DL (ref 31.4–37.4)
MCV RBC AUTO: 88 FL (ref 82–98)
MONOCYTES # BLD AUTO: 0.48 THOUSAND/ÂΜL (ref 0.17–1.22)
MONOCYTES NFR BLD AUTO: 6 % (ref 4–12)
MUCOUS THREADS UR QL AUTO: ABNORMAL
NEUTROPHILS # BLD AUTO: 5.49 THOUSANDS/ÂΜL (ref 1.85–7.62)
NEUTS SEG NFR BLD AUTO: 69 % (ref 43–75)
NITRITE UR QL STRIP: NEGATIVE
NON-SQ EPI CELLS URNS QL MICRO: ABNORMAL /HPF
NRBC BLD AUTO-RTO: 0 /100 WBCS
PH UR STRIP.AUTO: 6 [PH] (ref 4.5–8)
PLATELET # BLD AUTO: 250 THOUSANDS/UL (ref 149–390)
PMV BLD AUTO: 11.8 FL (ref 8.9–12.7)
POTASSIUM SERPL-SCNC: 4 MMOL/L (ref 3.5–5.3)
PROT SERPL-MCNC: 5.7 G/DL (ref 6.4–8.4)
PROT UR STRIP-MCNC: ABNORMAL MG/DL
RBC # BLD AUTO: 4.75 MILLION/UL (ref 3.81–5.12)
RBC #/AREA URNS AUTO: ABNORMAL /HPF
SODIUM SERPL-SCNC: 140 MMOL/L (ref 135–147)
SP GR UR STRIP.AUTO: >=1.03 (ref 1–1.03)
UROBILINOGEN UR QL STRIP.AUTO: 1 E.U./DL
WBC # BLD AUTO: 7.93 THOUSAND/UL (ref 4.31–10.16)
WBC #/AREA URNS AUTO: ABNORMAL /HPF

## 2025-04-28 PROCEDURE — 80053 COMPREHEN METABOLIC PANEL: CPT | Performed by: EMERGENCY MEDICINE

## 2025-04-28 PROCEDURE — 83690 ASSAY OF LIPASE: CPT | Performed by: EMERGENCY MEDICINE

## 2025-04-28 PROCEDURE — 96374 THER/PROPH/DIAG INJ IV PUSH: CPT

## 2025-04-28 PROCEDURE — 74177 CT ABD & PELVIS W/CONTRAST: CPT

## 2025-04-28 PROCEDURE — 99284 EMERGENCY DEPT VISIT MOD MDM: CPT

## 2025-04-28 PROCEDURE — 85025 COMPLETE CBC W/AUTO DIFF WBC: CPT | Performed by: EMERGENCY MEDICINE

## 2025-04-28 PROCEDURE — 81001 URINALYSIS AUTO W/SCOPE: CPT

## 2025-04-28 PROCEDURE — 96375 TX/PRO/DX INJ NEW DRUG ADDON: CPT

## 2025-04-28 PROCEDURE — 96361 HYDRATE IV INFUSION ADD-ON: CPT

## 2025-04-28 PROCEDURE — 36415 COLL VENOUS BLD VENIPUNCTURE: CPT | Performed by: EMERGENCY MEDICINE

## 2025-04-28 RX ORDER — ONDANSETRON 2 MG/ML
4 INJECTION INTRAMUSCULAR; INTRAVENOUS ONCE
Status: COMPLETED | OUTPATIENT
Start: 2025-04-28 | End: 2025-04-28

## 2025-04-28 RX ORDER — PANTOPRAZOLE SODIUM 40 MG/10ML
40 INJECTION, POWDER, LYOPHILIZED, FOR SOLUTION INTRAVENOUS ONCE
Status: COMPLETED | OUTPATIENT
Start: 2025-04-28 | End: 2025-04-28

## 2025-04-28 RX ORDER — FENTANYL CITRATE 50 UG/ML
50 INJECTION, SOLUTION INTRAMUSCULAR; INTRAVENOUS ONCE
Refills: 0 | Status: COMPLETED | OUTPATIENT
Start: 2025-04-28 | End: 2025-04-28

## 2025-04-28 RX ADMIN — IOHEXOL 50 ML: 240 INJECTION, SOLUTION INTRATHECAL; INTRAVASCULAR; INTRAVENOUS; ORAL at 21:02

## 2025-04-28 RX ADMIN — FENTANYL CITRATE 50 MCG: 50 INJECTION INTRAMUSCULAR; INTRAVENOUS at 19:31

## 2025-04-28 RX ADMIN — PANTOPRAZOLE SODIUM 40 MG: 40 INJECTION, POWDER, LYOPHILIZED, FOR SOLUTION INTRAVENOUS at 19:27

## 2025-04-28 RX ADMIN — SODIUM CHLORIDE 1000 ML: 0.9 INJECTION, SOLUTION INTRAVENOUS at 19:24

## 2025-04-28 RX ADMIN — ONDANSETRON 4 MG: 2 INJECTION INTRAMUSCULAR; INTRAVENOUS at 19:25

## 2025-04-28 RX ADMIN — IOHEXOL 100 ML: 350 INJECTION, SOLUTION INTRAVENOUS at 21:02

## 2025-04-28 NOTE — ED PROVIDER NOTES
Pt Name: Aislinn Rouse  MRN: [id number]  Birthdate 1960  Age/Sex: 64 y.o. female  Date of evaluation: 4/28/2025  PCP: April Reagan MD        FINAL IMPRESSION    Final diagnoses:   Generalized abdominal pain         DISPOSITION/PLAN      Time reflects when diagnosis was documented in both MDM as applicable and the Disposition within this note       Time User Action Codes Description Comment    4/28/2025 10:25 PM Dian Trotter Add [R10.84] Generalized abdominal pain           ED Disposition       ED Disposition   Discharge    Condition   Stable    Date/Time   Mon Apr 28, 2025 10:25 PM    Comment   Aislinn Rouse discharge to home/self care.                   Follow-up Information       Follow up With Specialties Details Why Contact Info Additional Information    April Reagan MD Family Medicine Schedule an appointment as soon as possible for a visit   [facility] [address] [address]  [phone]       [facility] Emergency Department Emergency Medicine Go to  As needed, If symptoms worsen [address] [place], [place] [id number] [phone] [facility] Emergency Department, [address] [place] [address], [address]              PATIENT REFERRED TO:    April Reagan MD  [facility] [address] [address]  [phone]    Schedule an appointment as soon as possible for a visit       [facility] Emergency Department  [address] [place], [place] [id number] [phone]  Go to   As needed, If symptoms worsen      DISCHARGE MEDICATIONS:    Discharge Medication List as of 4/28/2025 10:25 PM        CONTINUE these medications which have NOT CHANGED    Details   Acetaminophen Extra Strength 500 MG TABS TAKE 2 TABLETS BY MOUTH EVERY 8 HOURS, Starting Tue 12/17/2024, Normal      ALPRAZolam (XANAX) 1 mg tablet Take 1 mg by mouth every evening, Starting Fri 7/28/2023, Historical Med      ascorbic acid (VITAMIN C) 500 MG tablet  Take 1 tablet (500 mg total) by mouth daily, Starting Fri 1/17/2025, Normal      aspirin (ECOTRIN LOW STRENGTH) 81 mg EC tablet Take 1 tablet (81 mg total) by mouth 2 (two) times a day, Starting Thu 12/5/2024, Normal      buPROPion (WELLBUTRIN SR) 150 mg 12 hr tablet Take 150 mg by mouth every morning 1 tablet in the morning, Starting Mon 4/22/2024, Historical Med      Cholecalciferol (VITAMIN D3) 1,000 units tablet Take 2 tablets (2,000 Units total) by mouth daily, Starting Fri 1/17/2025, Normal      Diclofenac Sodium (VOLTAREN) 1 % Apply 2 g topically 4 (four) times a day, Starting u 5/25/2023, Normal      doxepin (SINEquan) 100 mg capsule Take 100 mg by mouth daily at bedtime, Starting Fri 7/28/2023, Historical Med      DULoxetine (CYMBALTA) 30 mg delayed release capsule Take 30 mg by mouth daily, Starting Mon 12/11/2023, Historical Med      EPINEPHrine (EPIPEN) 0.3 mg/0.3 mL SOAJ Inject 0.3 mL (0.3 mg total) into a muscle once for 1 dose, Starting Sun 12/22/2024, Normal      ferrous sulfate 324 (65 Fe) mg Take 1 tablet (324 mg total) by mouth every other day, Starting Fri 1/17/2025, Normal      folic acid (FOLVITE) 1 mg tablet Take 1 tablet (1 mg total) by mouth daily, Starting Fri 1/17/2025, Normal      Multiple Vitamins-Minerals (multivitamin with minerals) tablet Take 1 tablet by mouth daily, Starting Fri 1/17/2025, Normal      naloxone (NARCAN) 4 mg/0.1 mL nasal spray Administer 1 spray into a nostril. If no response after 2-3 minutes, give another dose in the other nostril using a new spray., Normal      omeprazole (PriLOSEC) 40 MG capsule Take 1 capsule (40 mg total) by mouth daily, Starting Fri 1/17/2025, Normal      ondansetron (ZOFRAN-ODT) 4 mg disintegrating tablet Take 1 tablet (4 mg total) by mouth every 6 (six) hours as needed for nausea or vomiting, Starting Thu 12/5/2024, Normal      oxyCODONE (Roxicodone) 5 immediate release tablet Take 1 tablet (5 mg total) by mouth every 4 (four) hours as  needed for moderate pain Max Daily Amount: 30 mg, Starting Tue 3/11/2025, Normal      Restasis 0.05 % ophthalmic emulsion INSTILL 1 DROP IN BOTH EYES TWICE DAILY, Historical Med      senna-docusate sodium (SENOKOT S) 8.6-50 mg per tablet Take 1 tablet by mouth daily, Starting Thu 12/5/2024, Normal      tiZANidine (ZANAFLEX) 4 mg tablet Take 1 tablet (4 mg total) by mouth every 8 (eight) hours as needed for muscle spasms, Starting Fri 1/17/2025, Normal      vitamin B-12 (CYANOCOBALAMIN) 2000 MCG TABS Take 1 tablet (2,000 mcg total) by mouth daily, Starting Fri 1/17/2025, Normal             No discharge procedures on file.          CHIEF COMPLAINT    Chief Complaint   Patient presents with    Abdominal Pain     Patient reports abdominal pain for last week, denies vomiting          HPI    Aislinn presents to the Emergency Department complaining of upper abdominal pain and nausea.  She has a hx of uncomplicated bariatric surgery 14 years ago.  No fever.  No significant diarrhea.  No other complaints.       HPI      Past Medical and Surgical History    Past Medical History:   Diagnosis Date    Anxiety     Arthritis     Asthma     Albuterol prn    Bariatric surgery status     Chronic pain disorder     left knee    COVID-19 11/19/2021    Depression 2009    managed with Effexor     Generalized osteoarthritis     GERD (gastroesophageal reflux disease)     Low vitamin B12 level     Migraine     Postgastrectomy malabsorption     Suicide attempt (HCC)     Vitamin D deficiency     Wears dentures     Wears glasses        Past Surgical History:   Procedure Laterality Date    CHOLECYSTECTOMY  2005    COLONOSCOPY      COSMETIC SURGERY  05/27/2020    Abdominoplasty    EGD      GASTRIC BYPASS  2015    elvira - en -y     HYSTERECTOMY      IR IMAGE GUIDED ASPIRATION / DRAINAGE W TUBE  07/14/2020    KNEE ARTHROSCOPY      with Lysis of adhesions    LAPAROSCOPIC SUPRACERVICAL HYSTERECTOMY  2005    due to endometriosis/AUB    OOPHORECTOMY  Left     CT EXC EXCSV SKN ABD INFRAUMBILICAL PANNICULECTOMY N/A 2020    Procedure: PANNICULECTOMY;  Surgeon: Bacilio Iraheta MD;  Location: [facility];  Service: Plastics    TONSILLECTOMY AND ADENOIDECTOMY      TOTAL KNEE ARTHROPLASTY Left 2024    Procedure: ARTHROPLASTY KNEE TOTAL;  Surgeon: aNsh Aggarwal DO;  Location:  MAIN OR;  Service: Orthopedics    TUBAL LIGATION  1986    VAC DRESSING APPLICATION N/A 2020    Procedure: APPLICATION VAC DRESSING;  Surgeon: Bacilio Iraheta MD;  Location: [facility];  Service: Plastics       Family History   Problem Relation Age of Onset    Hypertension Mother     Kidney cancer Mother     Diabetes Mother     Breast cancer Mother     Heart disease Father     Stroke Father     Hypertension Sister     HIV Brother     Breast cancer Cousin     No Known Problems Daughter     Stomach cancer Maternal Grandmother     No Known Problems Maternal Grandfather     No Known Problems Paternal Grandmother     No Known Problems Paternal Grandfather     No Known Problems Sister     No Known Problems Sister     No Known Problems Daughter     Prostate cancer Maternal Uncle     Stomach cancer Maternal Uncle     Leukemia Grandchild        Social History     Tobacco Use    Smoking status: Former     Current packs/day: 0.00     Types: Cigarettes     Start date: 1981     Quit date: 2013     Years since quittin.0    Smokeless tobacco: Never   Vaping Use    Vaping status: Former    Substances: Flavoring   Substance Use Topics    Alcohol use: Not Currently    Drug use: Never         .    Allergies    Allergies   Allergen Reactions    Motrin [Ibuprofen]      Hx gastric bypass    Cherry - Food Allergy Rash    Gabapentin Rash       Home Medications    Prior to Admission medications    Medication Sig Start Date End Date Taking? Authorizing Provider   Acetaminophen Extra Strength 500 MG TABS TAKE 2 TABLETS BY MOUTH EVERY 8 HOURS 24   Kylah Carrasco  ANEESH Goode   ALPRAZolam (XANAX) 1 mg tablet Take 1 mg by mouth every evening 7/28/23   Historical Provider, MD   ascorbic acid (VITAMIN C) 500 MG tablet Take 1 tablet (500 mg total) by mouth daily 1/17/25   April Reagan MD   aspirin (ECOTRIN LOW STRENGTH) 81 mg EC tablet Take 1 tablet (81 mg total) by mouth 2 (two) times a day 12/5/24   Kylah Goode PA-C   buPROPion (WELLBUTRIN SR) 150 mg 12 hr tablet Take 150 mg by mouth every morning 1 tablet in the morning 4/22/24   Historical Provider, MD   Cholecalciferol (VITAMIN D3) 1,000 units tablet Take 2 tablets (2,000 Units total) by mouth daily 1/17/25   April Reagan MD   Diclofenac Sodium (VOLTAREN) 1 % Apply 2 g topically 4 (four) times a day 5/25/23   Tameka Flores MD   doxepin (SINEquan) 100 mg capsule Take 100 mg by mouth daily at bedtime 7/28/23   Historical Provider, MD   DULoxetine (CYMBALTA) 30 mg delayed release capsule Take 30 mg by mouth daily 12/11/23   Historical Provider, MD   EPINEPHrine (EPIPEN) 0.3 mg/0.3 mL SOAJ Inject 0.3 mL (0.3 mg total) into a muscle once for 1 dose 12/22/24 12/22/24  Aislinn Ling PA-C   ferrous sulfate 324 (65 Fe) mg Take 1 tablet (324 mg total) by mouth every other day 1/17/25   April Reagan MD   folic acid (FOLVITE) 1 mg tablet Take 1 tablet (1 mg total) by mouth daily 1/17/25   April Reagan MD   Multiple Vitamins-Minerals (multivitamin with minerals) tablet Take 1 tablet by mouth daily 1/17/25   April Reagan MD   naloxone (NARCAN) 4 mg/0.1 mL nasal spray Administer 1 spray into a nostril. If no response after 2-3 minutes, give another dose in the other nostril using a new spray. 12/31/24 12/31/25  Kylah Goode PA-C   omeprazole (PriLOSEC) 40 MG capsule Take 1 capsule (40 mg total) by mouth daily 1/17/25   April Reagan MD   ondansetron (ZOFRAN-ODT) 4 mg disintegrating tablet Take 1 tablet (4 mg total) by mouth every 6 (six) hours as needed for nausea or vomiting 12/5/24    Kylah Goode PA-C   oxyCODONE (Roxicodone) 5 immediate release tablet Take 1 tablet (5 mg total) by mouth every 4 (four) hours as needed for moderate pain Max Daily Amount: 30 mg 3/11/25   Nash Aggarwal DO   Restasis 0.05 % ophthalmic emulsion INSTILL 1 DROP IN BOTH EYES TWICE DAILY 11/10/23   Historical Provider, MD   senna-docusate sodium (SENOKOT S) 8.6-50 mg per tablet Take 1 tablet by mouth daily 12/5/24   Kylah Goode PA-C   tiZANidine (ZANAFLEX) 4 mg tablet Take 1 tablet (4 mg total) by mouth every 8 (eight) hours as needed for muscle spasms 1/17/25   April Reagan MD   vitamin B-12 (CYANOCOBALAMIN) 2000 MCG TABS Take 1 tablet (2,000 mcg total) by mouth daily 1/17/25   April Reagan MD           Review of Systems    Review of Systems   Constitutional:  Negative for chills and fever.   HENT:  Negative for ear pain and sore throat.    Eyes:  Negative for pain and visual disturbance.   Respiratory:  Negative for cough and shortness of breath.    Cardiovascular:  Negative for chest pain and palpitations.   Gastrointestinal:  Positive for abdominal pain and nausea. Negative for vomiting.   Genitourinary:  Negative for dysuria and hematuria.   Musculoskeletal:  Negative for arthralgias and back pain.   Skin:  Negative for color change and rash.   Neurological:  Negative for seizures and syncope.   All other systems reviewed and are negative.        Physical Exam      ED Triage Vitals [04/28/25 1827]   Temperature Pulse Respirations Blood Pressure SpO2   98 °F (36.7 °C) 91 18 123/66 98 %      Temp src Heart Rate Source Patient Position - Orthostatic VS BP Location FiO2 (%)   -- Monitor Sitting Right arm --      Pain Score       9               Physical Exam  Vitals and nursing note reviewed.   Constitutional:       General: She is not in acute distress.     Appearance: She is well-developed.   HENT:      Head: Normocephalic and atraumatic.   Eyes:      Conjunctiva/sclera: Conjunctivae  normal.   Cardiovascular:      Rate and Rhythm: Normal rate and regular rhythm.      Heart sounds: No murmur heard.  Pulmonary:      Effort: Pulmonary effort is normal. No respiratory distress.      Breath sounds: Normal breath sounds.   Abdominal:      Palpations: Abdomen is soft.      Tenderness: There is abdominal tenderness in the epigastric area.   Musculoskeletal:         General: No swelling.      Cervical back: Neck supple.   Skin:     General: Skin is warm and dry.      Capillary Refill: Capillary refill takes less than 2 seconds.   Neurological:      Mental Status: She is alert.   Psychiatric:         Mood and Affect: Mood normal.         Assessment and Plan    Aislinn Rouse is a 64 y.o. female who presents with upper abdominal pain. Physical examination remarkable for upper abdominal tenderness without guarding or rebound. Differential diagnosis (not completely inclusive) includes intra-abdominal pathology. Plan will be to perform diagnostic testing and treat symptomatically.      MDM      Diagnostic Results    Labs:    Results for orders placed or performed during the hospital encounter of 04/28/25   CBC and differential   Result Value Ref Range    WBC 7.93 4.31 - 10.16 Thousand/uL    RBC 4.75 3.81 - 5.12 Million/uL    Hemoglobin 13.2 11.5 - 15.4 g/dL    Hematocrit 41.7 34.8 - 46.1 %    MCV 88 82 - 98 fL    MCH 27.8 26.8 - 34.3 pg    MCHC 31.7 31.4 - 37.4 g/dL    RDW 13.7 11.6 - 15.1 %    MPV 11.8 8.9 - 12.7 fL    Platelets 250 149 - 390 Thousands/uL    nRBC 0 /100 WBCs    Segmented % 69 43 - 75 %    Immature Grans % 0 0 - 2 %    Lymphocytes % 23 14 - 44 %    Monocytes % 6 4 - 12 %    Eosinophils Relative 2 0 - 6 %    Basophils Relative 0 0 - 1 %    Absolute Neutrophils 5.49 1.85 - 7.62 Thousands/µL    Absolute Immature Grans 0.03 0.00 - 0.20 Thousand/uL    Absolute Lymphocytes 1.79 0.60 - 4.47 Thousands/µL    Absolute Monocytes 0.48 0.17 - 1.22 Thousand/µL    Eosinophils Absolute 0.12 0.00 - 0.61  Thousand/µL    Basophils Absolute 0.02 0.00 - 0.10 Thousands/µL   Comprehensive metabolic panel   Result Value Ref Range    Sodium 140 135 - 147 mmol/L    Potassium 4.0 3.5 - 5.3 mmol/L    Chloride 105 96 - 108 mmol/L    CO2 30 21 - 32 mmol/L    ANION GAP 5 4 - 13 mmol/L    BUN 21 5 - 25 mg/dL    Creatinine 0.87 0.60 - 1.30 mg/dL    Glucose 107 65 - 140 mg/dL    Calcium 8.8 8.4 - 10.2 mg/dL    AST 52 (H) 13 - 39 U/L    ALT 22 7 - 52 U/L    Alkaline Phosphatase 71 34 - 104 U/L    Total Protein 5.7 (L) 6.4 - 8.4 g/dL    Albumin 3.6 3.5 - 5.0 g/dL    Total Bilirubin 0.38 0.20 - 1.00 mg/dL    eGFR 71 ml/min/1.73sq m   Lipase   Result Value Ref Range    Lipase 23 11 - 82 u/L   Urine Microscopic   Result Value Ref Range    RBC, UA 4-10 (A) None Seen, 1-2 /hpf    WBC, UA 4-10 (A) None Seen, 1-2 /hpf    Epithelial Cells Moderate (A) None Seen, Occasional /hpf    Bacteria, UA None Seen None Seen, Occasional /hpf    MUCUS THREADS Innumerable (A) None Seen    Hyaline Casts, UA 25-50 (A) None Seen /lpf    Ca Oxalate Sanjana, UA Moderate (A) None Seen /hpf   Urine Macroscopic, POC   Result Value Ref Range    Color, UA Yellow     Clarity, UA Clear     pH, UA 6.0 4.5 - 8.0    Leukocytes, UA Negative Negative    Nitrite, UA Negative Negative    Protein, UA 30 (1+) (A) Negative mg/dl    Glucose, UA Negative Negative mg/dl    Ketones, UA Trace (A) Negative mg/dl    Urobilinogen, UA 1.0 0.2, 1.0 E.U./dl E.U./dl    Bilirubin, UA Small (A) Negative    Occult Blood, UA Negative Negative    Specific Gravity, UA >=1.030 1.003 - 1.030     *Note: Due to a large number of results and/or encounters for the requested time period, some results have not been displayed. A complete set of results can be found in Results Review.       All labs reviewed and utilized in the medical decision making process    Radiology:    CT abdomen pelvis with contrast   Final Result      1.  No acute findings in the abdomen or pelvis.   2.  Redemonstrated 3 cm left  adrenal nodule. Although its imaging features on this study is indeterminate, because this lesion has been stable for > 1 year, it is considered benign. However, please note that for adrenal nodule > 1 cm, biochemical    evaluation is suggested to rule out functioning adenoma, if not already performed.      Adrenal recommendation based on institutional consensus and Journal of American College of Radiology 2017;14:0443-6460.      The study was marked in EPIC for immediate notification.      Workstation performed: [id number]             All radiology studies independently viewed by me and interpreted by the radiologist.    Procedure    Procedures      ED Course of Care and Re-Assessments    Patient was feeling better after meds.        Medications   sodium chloride 0.9 % bolus 1,000 mL (0 mL Intravenous Stopped 4/28/25 2036)   ondansetron (ZOFRAN) injection 4 mg (4 mg Intravenous Given 4/28/25 1925)   fentaNYL injection 50 mcg (50 mcg Intravenous Given 4/28/25 1931)   pantoprazole (PROTONIX) injection 40 mg (40 mg Intravenous Given 4/28/25 1927)   iohexol (OMNIPAQUE) 350 MG/ML injection (SINGLE-DOSE) 100 mL (100 mL Intravenous Given 4/28/25 2102)   iohexol (OMNIPAQUE) 240 MG/ML solution 50 mL (50 mL Oral Given 4/28/25 2102)                  Dian Trotter, DO Dian Trotter DO  04/29/25 0208

## 2025-04-29 NOTE — INCIDENTAL FINDINGS
The following findings require follow up:  Radiographic finding   Finding:  Redemonstrated 3 cm left adrenal nodule. Although its imaging features on this study is indeterminate, because this lesion has been stable for > 1 year, it is considered benign. However, please note that for adrenal nodule > 1 cm, biochemical   evaluation is suggested to rule out functioning adenoma, if not already performed.     Adrenal recommendation based on institutional consensus and Journal of American College of Radiology 2017;14:4240-7523.      Follow up required: Please call your PCP   Follow up should be done within 1 week(s)    Please notify the following clinician to assist with the follow up:   [facility].     Incidental finding results were discussed with the Patient by Dian Trotter DO on 04/28/25.   They expressed understanding and all questions answered.

## 2025-05-06 DIAGNOSIS — M17.12 PRIMARY OSTEOARTHRITIS OF LEFT KNEE: ICD-10-CM

## 2025-05-06 RX ORDER — ASPIRIN 81 MG/1
81 TABLET, COATED ORAL 2 TIMES DAILY
Qty: 60 TABLET | Refills: 0 | OUTPATIENT
Start: 2025-05-06

## 2025-05-12 ENCOUNTER — PROCEDURE VISIT (OUTPATIENT)
Age: 65
End: 2025-05-12
Payer: MEDICARE

## 2025-05-12 ENCOUNTER — PREP FOR PROCEDURE (OUTPATIENT)
Age: 65
End: 2025-05-12

## 2025-05-12 VITALS — BODY MASS INDEX: 29.66 KG/M2 | HEIGHT: 65 IN | WEIGHT: 178 LBS

## 2025-05-12 DIAGNOSIS — M17.11 PRIMARY OSTEOARTHRITIS OF RIGHT KNEE: Primary | ICD-10-CM

## 2025-05-12 DIAGNOSIS — M25.59 PAIN IN OTHER JOINT: ICD-10-CM

## 2025-05-12 PROCEDURE — 20610 DRAIN/INJ JOINT/BURSA W/O US: CPT | Performed by: PHYSICIAN ASSISTANT

## 2025-05-12 RX ORDER — MUPIROCIN 20 MG/G
OINTMENT TOPICAL
Qty: 15 G | Refills: 1 | Status: SHIPPED | OUTPATIENT
Start: 2025-05-12

## 2025-05-12 RX ORDER — ASCORBIC ACID 500 MG
500 TABLET ORAL 2 TIMES DAILY
Qty: 60 TABLET | Refills: 1 | Status: SHIPPED | OUTPATIENT
Start: 2025-05-12

## 2025-05-12 RX ORDER — MULTIVIT-MIN/IRON FUM/FOLIC AC 7.5 MG-4
1 TABLET ORAL DAILY
Qty: 30 TABLET | Refills: 1 | Status: SHIPPED | OUTPATIENT
Start: 2025-05-12

## 2025-05-12 RX ORDER — ACETAMINOPHEN 325 MG/1
975 TABLET ORAL ONCE
OUTPATIENT
Start: 2025-05-12 | End: 2025-05-12

## 2025-05-12 RX ORDER — SODIUM CHLORIDE, SODIUM LACTATE, POTASSIUM CHLORIDE, CALCIUM CHLORIDE 600; 310; 30; 20 MG/100ML; MG/100ML; MG/100ML; MG/100ML
20 INJECTION, SOLUTION INTRAVENOUS CONTINUOUS
OUTPATIENT
Start: 2025-05-12

## 2025-05-12 RX ORDER — CHLORHEXIDINE GLUCONATE ORAL RINSE 1.2 MG/ML
15 SOLUTION DENTAL ONCE
OUTPATIENT
Start: 2025-05-12 | End: 2025-05-12

## 2025-05-12 RX ORDER — VITAMIN B COMPLEX
2000 TABLET ORAL DAILY
Qty: 60 TABLET | Refills: 1 | Status: SHIPPED | OUTPATIENT
Start: 2025-05-12

## 2025-05-12 RX ORDER — SODIUM CHLORIDE, SODIUM LACTATE, POTASSIUM CHLORIDE, CALCIUM CHLORIDE 600; 310; 30; 20 MG/100ML; MG/100ML; MG/100ML; MG/100ML
125 INJECTION, SOLUTION INTRAVENOUS CONTINUOUS
OUTPATIENT
Start: 2025-05-12

## 2025-05-12 RX ORDER — FOLIC ACID 1 MG/1
1 TABLET ORAL DAILY
Qty: 30 TABLET | Refills: 1 | Status: SHIPPED | OUTPATIENT
Start: 2025-05-12

## 2025-05-12 RX ORDER — CHLORHEXIDINE GLUCONATE 40 MG/ML
SOLUTION TOPICAL DAILY PRN
OUTPATIENT
Start: 2025-05-12

## 2025-05-12 NOTE — PROGRESS NOTES
Knee Follow up Office Note    Assessment:     1. Primary osteoarthritis of right knee    2. Pain in other joint          Plan:  Assessment & Plan  Primary osteoarthritis of right knee  64 y.o. female with right knee pain secondary to severe OA.  She has been treating conservatively with intermittent injections, most recently a visco injection was performed into the right knee today.  She has tried and failed other conservative management options such as NSAIDs, tylenol, cortisone, and visco.  Her pain is affecting her ADLs as well as her activities she enjoys.  Synvisc one injection performed today.  The patient has elected to proceed with Right TKA. Risks and benefits of surgery to include but not limited to bleeding, infection, damage to surrounding structures, hardware failure, instability, fracture, dislocation, need for further surgery, continued pain, stiffness, blood clots, stroke, and heart attack was discussed with the patient. Informed consent was signed today in the office. The patient has met with our surgical schedulers and our preoperative joint replacement pathway has been initiated. All questions were answered. Patient will follow-up 2 weeks post operatively. BMI is acceptable at 29.62 and is a nonsmoker.     Orders:    Large joint arthrocentesis: R knee    Case request operating room: ARTHROPLASTY KNEE TOTAL; Standing    ascorbic acid (VITAMIN C) 500 MG tablet; Take 1 tablet (500 mg total) by mouth 2 (two) times a day    folic acid (FOLVITE) 1 mg tablet; Take 1 tablet (1 mg total) by mouth daily    Multiple Vitamins-Minerals (multivitamin with minerals) tablet; Take 1 tablet by mouth daily    cholecalciferol (VITAMIN D3) 25 mcg (1,000 units) tablet; Take 2 tablets (2,000 Units total) by mouth daily    mupirocin (BACTROBAN) 2 % ointment; Apply topically to the inside of the left and right nostrils twice daily for 5 days before surgery, including the morning of surgery.    Comprehensive metabolic  panel; Future    Hemoglobin A1C W/EAG Estimation; Future    CBC and differential; Future    MRSA culture; Future    Anemia Panel w/Reflex; Future    Protime-INR; Future    APTT; Future    Ambulatory Referral to [facility]; Future    Ambulatory referral to Physical Therapy; Future    EKG 12 lead; Future    Pain in other joint    Orders:    CBC and differential; Future    Protime-INR; Future    APTT; Future    Subjective:     Patient ID: Aislinn Rouse is a 64 y.o. female.  Chief Complaint:  HPI:  64 y.o. female presents to the office for a follow up evaluation of her RIGHT knee.  She has known OA of the right knee.  She is experiencing pain in her right knee which is limiting her from doing the activities she enjoys as well as her ADLs.  Her pain worsens with activities. She is here today for a Synvisc One injection in the right knee, but also to discuss possible TKA this summer.     Allergy:  Allergies   Allergen Reactions    Motrin [Ibuprofen]      Hx gastric bypass    Cherry - Food Allergy Rash    Gabapentin Rash     Medications:  all current active meds have been reviewed  Past Medical History:  Past Medical History:   Diagnosis Date    Anxiety     Arthritis     Asthma     Albuterol prn    Bariatric surgery status     Chronic pain disorder     left knee    COVID-19 11/19/2021    Depression 2009    managed with Effexor     Generalized osteoarthritis     GERD (gastroesophageal reflux disease)     Low vitamin B12 level     Migraine     Postgastrectomy malabsorption     Suicide attempt (HCC)     Vitamin D deficiency     Wears dentures     Wears glasses      Past Surgical History:  Past Surgical History:   Procedure Laterality Date    CHOLECYSTECTOMY  2005    COLONOSCOPY      COSMETIC SURGERY  05/27/2020    Abdominoplasty    EGD      GASTRIC BYPASS  2015    elvira - en -y     HYSTERECTOMY      IR IMAGE GUIDED ASPIRATION / DRAINAGE W TUBE  07/14/2020    KNEE ARTHROSCOPY      with Lysis of  adhesions    LAPAROSCOPIC SUPRACERVICAL HYSTERECTOMY  2005    due to endometriosis/AUB    OOPHORECTOMY Left     IN EXC EXCSV SKN ABD INFRAUMBILICAL PANNICULECTOMY N/A 2020    Procedure: PANNICULECTOMY;  Surgeon: Bacilio Iraheta MD;  Location: [facility];  Service: Plastics    TONSILLECTOMY AND ADENOIDECTOMY      TOTAL KNEE ARTHROPLASTY Left 2024    Procedure: ARTHROPLASTY KNEE TOTAL;  Surgeon: Nash Aggarwal DO;  Location: [facility];  Service: Orthopedics    TUBAL LIGATION      VAC DRESSING APPLICATION N/A 2020    Procedure: APPLICATION VAC DRESSING;  Surgeon: Bacilio Iraheta MD;  Location: [facility];  Service: Plastics     Family History:  Family History   Problem Relation Age of Onset    Hypertension Mother     Kidney cancer Mother     Diabetes Mother     Breast cancer Mother     Heart disease Father     Stroke Father     Hypertension Sister     HIV Brother     Breast cancer Cousin     No Known Problems Daughter     Stomach cancer Maternal Grandmother     No Known Problems Maternal Grandfather     No Known Problems Paternal Grandmother     No Known Problems Paternal Grandfather     No Known Problems Sister     No Known Problems Sister     No Known Problems Daughter     Prostate cancer Maternal Uncle     Stomach cancer Maternal Uncle     Leukemia Grandchild      Social History:  Social History     Substance and Sexual Activity   Alcohol Use Not Currently     Social History     Substance and Sexual Activity   Drug Use Never     Social History     Tobacco Use   Smoking Status Former    Current packs/day: 0.00    Types: Cigarettes    Start date: 1981    Quit date: 2013    Years since quittin.0   Smokeless Tobacco Never         ROS:  General: Per HPI  Skin: Negative, except if noted below  HEENT: Negative  Respiratory: Negative  Cardiovascular: Negative  Gastrointestinal: Negative  Urinary: Negative  Vascular: Negative  Musculoskeletal: Positive per HPI  "  Neurologic: Positive per HPI  Endocrine: Negative    Objective:  BP Readings from Last 1 Encounters:   04/28/25 131/60      Wt Readings from Last 1 Encounters:   05/12/25 80.7 kg (178 lb)        Respiratory:   non-labored respirations    Lymphatics:  no palpable lymph nodes    Gait:   Steady    Neurologic:   Alert and oriented times 3  Patient with normal sensation except as noted below  Deep tendon reflexes 2+ except as noted in MSK exam    Bilateral Lower Extremity:    Right knee:     Inspection:  skin intact    Overall limb alignment mild varus    Effusion: none    ROM 0-115 with pain    Extensor Lag: none    Palpation: medial Joint line tenderness to palpation    AP Stability at 90 deg stable    M/L stability in full extension stable    M/L stability in midflexion stable    Motor: 5/5 Q/HS/TA/GS/P    Pulses: 2+ DP / 2+ PT    SILT DP/SP/S/S/TN    Imaging:  No new imaging obtained today    BMI:   Estimated body mass index is 29.62 kg/m² as calculated from the following:    Height as of this encounter: 5' 5\" (1.651 m).    Weight as of this encounter: 80.7 kg (178 lb).  BSA:   Estimated body surface area is 1.88 meters squared as calculated from the following:    Height as of this encounter: 5' 5\" (1.651 m).    Weight as of this encounter: 80.7 kg (178 lb).         Large joint arthrocentesis: R knee    Performed by: Kylah Goode PA-C  Authorized by: Kylah Goode PA-C    Universal Protocol:  Consent: Verbal consent obtained.  Risks and benefits: risks, benefits and alternatives were discussed  Consent given by: patient  Time out: Immediately prior to procedure a \"time out\" was called to verify the correct patient, procedure, equipment, support staff and site/side marked as required.  Timeout called at: 5/12/2025 4:00 PM.  Patient understanding: patient states understanding of the procedure being performed  Site marked: the operative site was marked  Required items: required blood products, implants, " devices, and special equipment available  Patient identity confirmed: verbally with patient  Supporting Documentation  Indications: pain     Is this a Visco injection? Yes  Non-Pharmacologic Treatments Attempted: Home Exercise and Physical Therapy  Pharmacologic Treatments Attempted: Cortisone and visco  Pain Score: 8Procedure Details  Location: knee - R knee  Preparation: Patient was prepped and draped in the usual sterile fashion  Needle size: 20 G  Ultrasound guidance: no  Approach: anterolateral  Medications administered: 48 mg hylan 48 MG/6ML    Patient tolerance: patient tolerated the procedure well with no immediate complications  Dressing:  Sterile dressing applied        Scribe Attestation      I,:  Kylah Goode PA-C am acting as a scribe while in the presence of the attending physician.:       I,:  Nash Aggarwal, DO personally performed the services described in this documentation    as scribed in my presence.:

## 2025-05-12 NOTE — ASSESSMENT & PLAN NOTE
64 y.o. female with right knee pain secondary to severe OA.  She has been treating conservatively with intermittent injections, most recently a visco injection was performed into the right knee today.  She has tried and failed other conservative management options such as NSAIDs, tylenol, cortisone, and visco.  Her pain is affecting her ADLs as well as her activities she enjoys.  Synvisc one injection performed today.  The patient has elected to proceed with Right TKA. Risks and benefits of surgery to include but not limited to bleeding, infection, damage to surrounding structures, hardware failure, instability, fracture, dislocation, need for further surgery, continued pain, stiffness, blood clots, stroke, and heart attack was discussed with the patient. Informed consent was signed today in the office. The patient has met with our surgical schedulers and our preoperative joint replacement pathway has been initiated. All questions were answered. Patient will follow-up 2 weeks post operatively. BMI is acceptable at 29.62 and is a nonsmoker.     Orders:    Large joint arthrocentesis: R knee    Case request operating room: ARTHROPLASTY KNEE TOTAL; Standing    ascorbic acid (VITAMIN C) 500 MG tablet; Take 1 tablet (500 mg total) by mouth 2 (two) times a day    folic acid (FOLVITE) 1 mg tablet; Take 1 tablet (1 mg total) by mouth daily    Multiple Vitamins-Minerals (multivitamin with minerals) tablet; Take 1 tablet by mouth daily    cholecalciferol (VITAMIN D3) 25 mcg (1,000 units) tablet; Take 2 tablets (2,000 Units total) by mouth daily    mupirocin (BACTROBAN) 2 % ointment; Apply topically to the inside of the left and right nostrils twice daily for 5 days before surgery, including the morning of surgery.    Comprehensive metabolic panel; Future    Hemoglobin A1C W/EAG Estimation; Future    CBC and differential; Future    MRSA culture; Future    Anemia Panel w/Reflex; Future    Protime-INR; Future    APTT; Future     Ambulatory Referral to [facility]; Future    Ambulatory referral to Physical Therapy; Future    EKG 12 lead; Future

## 2025-07-02 ENCOUNTER — APPOINTMENT (OUTPATIENT)
Dept: LAB | Facility: HOSPITAL | Age: 65
End: 2025-07-02
Payer: MEDICARE

## 2025-07-02 DIAGNOSIS — M17.11 PRIMARY OSTEOARTHRITIS OF RIGHT KNEE: ICD-10-CM

## 2025-07-02 DIAGNOSIS — E53.8 VITAMIN B12 DEFICIENCY: ICD-10-CM

## 2025-07-02 DIAGNOSIS — M25.59 PAIN IN OTHER JOINT: ICD-10-CM

## 2025-07-02 LAB
ALBUMIN SERPL BCG-MCNC: 3.5 G/DL (ref 3.5–5)
ALP SERPL-CCNC: 67 U/L (ref 34–104)
ALT SERPL W P-5'-P-CCNC: 11 U/L (ref 7–52)
ANION GAP SERPL CALCULATED.3IONS-SCNC: 4 MMOL/L (ref 4–13)
APTT PPP: 24 SECONDS (ref 23–34)
AST SERPL W P-5'-P-CCNC: 14 U/L (ref 13–39)
ATRIAL RATE: 58 BPM
BASOPHILS # BLD AUTO: 0.03 THOUSANDS/ÂΜL (ref 0–0.1)
BASOPHILS NFR BLD AUTO: 1 % (ref 0–1)
BILIRUB SERPL-MCNC: 0.47 MG/DL (ref 0.2–1)
BUN SERPL-MCNC: 23 MG/DL (ref 5–25)
CALCIUM SERPL-MCNC: 8.9 MG/DL (ref 8.4–10.2)
CHLORIDE SERPL-SCNC: 106 MMOL/L (ref 96–108)
CO2 SERPL-SCNC: 30 MMOL/L (ref 21–32)
CREAT SERPL-MCNC: 0.75 MG/DL (ref 0.6–1.3)
EOSINOPHIL # BLD AUTO: 0.08 THOUSAND/ÂΜL (ref 0–0.61)
EOSINOPHIL NFR BLD AUTO: 1 % (ref 0–6)
ERYTHROCYTE [DISTWIDTH] IN BLOOD BY AUTOMATED COUNT: 13.3 % (ref 11.6–15.1)
EST. AVERAGE GLUCOSE BLD GHB EST-MCNC: 114 MG/DL
FERRITIN SERPL-MCNC: 8 NG/ML (ref 30–307)
GFR SERPL CREATININE-BSD FRML MDRD: 85 ML/MIN/1.73SQ M
GLUCOSE SERPL-MCNC: 103 MG/DL (ref 65–140)
HBA1C MFR BLD: 5.6 %
HCT VFR BLD AUTO: 36.9 % (ref 34.8–46.1)
HGB BLD-MCNC: 11.7 G/DL (ref 11.5–15.4)
IMM GRANULOCYTES # BLD AUTO: 0.02 THOUSAND/UL (ref 0–0.2)
IMM GRANULOCYTES NFR BLD AUTO: 0 % (ref 0–2)
INR PPP: 0.91 (ref 0.85–1.19)
IRON SATN MFR SERPL: 9 % (ref 15–50)
IRON SERPL-MCNC: 38 UG/DL (ref 50–212)
LYMPHOCYTES # BLD AUTO: 1.79 THOUSANDS/ÂΜL (ref 0.6–4.47)
LYMPHOCYTES NFR BLD AUTO: 32 % (ref 14–44)
MCH RBC QN AUTO: 27.9 PG (ref 26.8–34.3)
MCHC RBC AUTO-ENTMCNC: 31.7 G/DL (ref 31.4–37.4)
MCV RBC AUTO: 88 FL (ref 82–98)
MONOCYTES # BLD AUTO: 0.35 THOUSAND/ÂΜL (ref 0.17–1.22)
MONOCYTES NFR BLD AUTO: 6 % (ref 4–12)
NEUTROPHILS # BLD AUTO: 3.34 THOUSANDS/ÂΜL (ref 1.85–7.62)
NEUTS SEG NFR BLD AUTO: 60 % (ref 43–75)
NRBC BLD AUTO-RTO: 0 /100 WBCS
P AXIS: 34 DEGREES
PLATELET # BLD AUTO: 242 THOUSANDS/UL (ref 149–390)
PMV BLD AUTO: 12.1 FL (ref 8.9–12.7)
POTASSIUM SERPL-SCNC: 4.5 MMOL/L (ref 3.5–5.3)
PR INTERVAL: 154 MS
PROT SERPL-MCNC: 5.3 G/DL (ref 6.4–8.4)
PROTHROMBIN TIME: 12.5 SECONDS (ref 12.3–15)
QRS AXIS: 33 DEGREES
QRSD INTERVAL: 78 MS
QT INTERVAL: 406 MS
QTC INTERVAL: 399 MS
RBC # BLD AUTO: 4.2 MILLION/UL (ref 3.81–5.12)
RETICS # AUTO: NORMAL 10*3/UL (ref 14097–95744)
RETICS # CALC: 1.46 % (ref 0.37–1.87)
SODIUM SERPL-SCNC: 140 MMOL/L (ref 135–147)
T WAVE AXIS: 6 DEGREES
TIBC SERPL-MCNC: 410.2 UG/DL (ref 250–450)
TRANSFERRIN SERPL-MCNC: 293 MG/DL (ref 203–362)
UIBC SERPL-MCNC: 372 UG/DL (ref 155–355)
VENTRICULAR RATE: 58 BPM
WBC # BLD AUTO: 5.61 THOUSAND/UL (ref 4.31–10.16)

## 2025-07-02 PROCEDURE — 82728 ASSAY OF FERRITIN: CPT

## 2025-07-02 PROCEDURE — 80053 COMPREHEN METABOLIC PANEL: CPT

## 2025-07-02 PROCEDURE — 87081 CULTURE SCREEN ONLY: CPT

## 2025-07-02 PROCEDURE — 85730 THROMBOPLASTIN TIME PARTIAL: CPT

## 2025-07-02 PROCEDURE — 83550 IRON BINDING TEST: CPT

## 2025-07-02 PROCEDURE — 36415 COLL VENOUS BLD VENIPUNCTURE: CPT

## 2025-07-02 PROCEDURE — 93005 ELECTROCARDIOGRAM TRACING: CPT

## 2025-07-02 PROCEDURE — 85610 PROTHROMBIN TIME: CPT

## 2025-07-02 PROCEDURE — 83036 HEMOGLOBIN GLYCOSYLATED A1C: CPT

## 2025-07-02 PROCEDURE — 85045 AUTOMATED RETICULOCYTE COUNT: CPT

## 2025-07-02 PROCEDURE — 93010 ELECTROCARDIOGRAM REPORT: CPT | Performed by: INTERNAL MEDICINE

## 2025-07-02 PROCEDURE — 83540 ASSAY OF IRON: CPT

## 2025-07-02 PROCEDURE — 85025 COMPLETE CBC W/AUTO DIFF WBC: CPT

## 2025-07-03 LAB — MRSA NOSE QL CULT: NORMAL

## 2025-07-03 RX ORDER — CYANOCOBALAMIN (VITAMIN B-12) 2000 MCG
2000 TABLET ORAL DAILY
Qty: 90 TABLET | Refills: 0 | Status: SHIPPED | OUTPATIENT
Start: 2025-07-03

## 2025-07-10 ENCOUNTER — TELEPHONE (OUTPATIENT)
Dept: OBGYN CLINIC | Facility: HOSPITAL | Age: 65
End: 2025-07-10

## 2025-07-10 DIAGNOSIS — M17.11 PRIMARY OSTEOARTHRITIS OF RIGHT KNEE: Primary | ICD-10-CM

## 2025-07-10 NOTE — TELEPHONE ENCOUNTER
Preoperative Elective Admission Assessment     Living Situation:    Who does pt live with: grandson (16yr old)  What kind of home: multi-level  How do they enter the home: front  How many levels in home: 2   # of steps to enter home: 0  # of steps to second floor: 14  Are there handrails: Yes  Are there landings: Yes  Sleeping arrangement: second floor  Where is Bathroom: second floor   Where is the tub or shower: second floor, tub/shower   Dogs or ther pets: birds     First Floor Setup:   Is there a bathroom: C  Where would pt sleep: couch     DME: RW, transfer bench and commode. Instructed pt to bring RW on DOS, verbalizes understanding.       We discussed clearing pathways in the home and making sure there is accessibly to use the walker, for example, removing throw rugs.      Patient's Current Level of Function: Ambulates: Independently and ADLs: Independent     Post-op Caregiver: Pt stated her daughter also offered pt to stay at home house (bed/bath on first floor) postop.   Caregiver Name and phone number for Inpatient discharge needs: Daughter  Currently receive any HHC/aides/community supports: No     Post-op Transport: [facility]  To/from hospital: [facility]  To/from PT 2-3x/week: [employer] (needs SW order)  Uses community transport now: No     Outpatient Physical Therapy Site:  Site: [address]  pre and post-op appts scheduled? Yes     Medication Management: self  Preferred Pharmacy for Post-op Medications: [facility]  Blood Management Vitamin Regimen: Patient taking as prescribed.   Post-op anticoagulant: to be determined by surgical team postoperatively    Discussed Bactroban usage and bathing instructions      DC Plan: Pt plans to be discharged home     BMI: 29.62     Patient Education:  Pt educated on post-op pain, early mobilization (POD0), LOS goals, OP PT goals, and preoperative bathing. Patient educated that our goal is to appropriately discharge patient based off their post-op  function while striving to maintain maximal independence. The goal is to discharge patient to home and for them to attend outpatient physical therapy.     Assigned to care team? Yes      SW: order placed. Patient states she needs order for [employer] to be covered by insurance.

## 2025-07-15 ENCOUNTER — TELEPHONE (OUTPATIENT)
Age: 65
End: 2025-07-15

## 2025-07-17 ENCOUNTER — PATIENT OUTREACH (OUTPATIENT)
Dept: OBGYN CLINIC | Facility: HOSPITAL | Age: 65
End: 2025-07-17

## 2025-07-17 NOTE — PROGRESS NOTES
[facility] received a new referral in regard to pt scheduled for arthroplasty of right knee on 08/14/2025. [facility] reviewed [facility] notes prior to contacting pt. Pt lives with 16 year old grandson in a multi level home. Pt ambulates independently and is independent with ADLs. Pt daughter has offered pt to at her home after surgery so she can provide support. Pt daughter will take pt to and from surgery and to post op appts. Pt uses uber to get to and from OP PT. Pt had expressed to [facility] that she needs a script for uber rides from her insurance. ( [facility] unclear about this ) [facility] attempted to reach pt and was unable. A message was left to please return [facility] call. [facility] will remain available.

## 2025-07-23 ENCOUNTER — ANESTHESIA EVENT (OUTPATIENT)
Age: 65
End: 2025-07-23
Payer: MEDICARE

## 2025-07-24 ENCOUNTER — PATIENT OUTREACH (OUTPATIENT)
Dept: OBGYN CLINIC | Facility: HOSPITAL | Age: 65
End: 2025-07-24

## 2025-07-24 NOTE — PROGRESS NOTES
KAM contacted pt and she confirmed her daughters and grandson will provide caregiver support for pt after surgery. Pt daughters will take pt to and from surgery, pt also has transportation through [employer], [address] rides up to 60 miles per year. Pt needs to contact them 2 -3 days in advance. Pt did not want a script, that was a misunderstanding, pt needed the phone number for [employer], pt provided same. No other needs noted.

## 2025-07-30 ENCOUNTER — HOSPITAL ENCOUNTER (EMERGENCY)
Facility: HOSPITAL | Age: 65
Discharge: HOME | End: 2025-07-30
Attending: EMERGENCY MEDICINE | Admitting: EMERGENCY MEDICINE
Payer: COMMERCIAL

## 2025-07-30 ENCOUNTER — APPOINTMENT (EMERGENCY)
Dept: RADIOLOGY | Facility: HOSPITAL | Age: 65
End: 2025-07-30
Payer: COMMERCIAL

## 2025-07-30 VITALS
RESPIRATION RATE: 18 BRPM | OXYGEN SATURATION: 94 % | TEMPERATURE: 98.6 F | BODY MASS INDEX: 30.16 KG/M2 | WEIGHT: 181.22 LBS | DIASTOLIC BLOOD PRESSURE: 70 MMHG | HEART RATE: 76 BPM | SYSTOLIC BLOOD PRESSURE: 151 MMHG

## 2025-07-30 DIAGNOSIS — S90.32XA CONTUSION OF LEFT FOOT, INITIAL ENCOUNTER: ICD-10-CM

## 2025-07-30 DIAGNOSIS — S91.312A LACERATION OF LEFT FOOT, INITIAL ENCOUNTER: Primary | ICD-10-CM

## 2025-07-30 PROCEDURE — 90715 TDAP VACCINE 7 YRS/> IM: CPT | Performed by: PHYSICIAN ASSISTANT

## 2025-07-30 PROCEDURE — 73630 X-RAY EXAM OF FOOT: CPT

## 2025-07-30 PROCEDURE — 12001 RPR S/N/AX/GEN/TRNK 2.5CM/<: CPT | Performed by: PHYSICIAN ASSISTANT

## 2025-07-30 PROCEDURE — 99283 EMERGENCY DEPT VISIT LOW MDM: CPT

## 2025-07-30 PROCEDURE — 96372 THER/PROPH/DIAG INJ SC/IM: CPT

## 2025-07-30 PROCEDURE — 90471 IMMUNIZATION ADMIN: CPT

## 2025-07-30 PROCEDURE — 99284 EMERGENCY DEPT VISIT MOD MDM: CPT | Performed by: PHYSICIAN ASSISTANT

## 2025-07-30 RX ORDER — KETOROLAC TROMETHAMINE 30 MG/ML
15 INJECTION, SOLUTION INTRAMUSCULAR; INTRAVENOUS ONCE
Status: COMPLETED | OUTPATIENT
Start: 2025-07-30 | End: 2025-07-30

## 2025-07-30 RX ORDER — BACITRACIN, NEOMYCIN, POLYMYXIN B 400; 3.5; 5 [USP'U]/G; MG/G; [USP'U]/G
1 OINTMENT TOPICAL 2 TIMES DAILY
Status: DISCONTINUED | OUTPATIENT
Start: 2025-07-30 | End: 2025-07-30 | Stop reason: HOSPADM

## 2025-07-30 RX ORDER — SENNOSIDES 8.6 MG
650 CAPSULE ORAL EVERY 8 HOURS PRN
Qty: 30 TABLET | Refills: 0 | Status: SHIPPED | OUTPATIENT
Start: 2025-07-30 | End: 2025-08-15

## 2025-07-30 RX ORDER — CEPHALEXIN 500 MG/1
500 CAPSULE ORAL 4 TIMES DAILY
Qty: 40 CAPSULE | Refills: 0 | Status: SHIPPED | OUTPATIENT
Start: 2025-07-30 | End: 2025-08-09

## 2025-07-30 RX ADMIN — BACITRACIN ZINC, NEOMYCIN, POLYMYXIN B 1 SMALL APPLICATION: 400; 3.5; 5 OINTMENT TOPICAL at 19:18

## 2025-07-30 RX ADMIN — TETANUS TOXOID, REDUCED DIPHTHERIA TOXOID AND ACELLULAR PERTUSSIS VACCINE, ADSORBED 0.5 ML: 5; 2.5; 8; 8; 2.5 SUSPENSION INTRAMUSCULAR at 18:15

## 2025-07-30 RX ADMIN — KETOROLAC TROMETHAMINE 15 MG: 30 INJECTION, SOLUTION INTRAMUSCULAR at 18:12

## 2025-07-30 RX ADMIN — Medication 1 APPLICATION: at 18:16

## 2025-07-31 ENCOUNTER — ANESTHESIA (OUTPATIENT)
Age: 65
End: 2025-07-31

## 2025-07-31 ENCOUNTER — ANESTHESIA EVENT (OUTPATIENT)
Age: 65
End: 2025-07-31

## 2025-08-07 ENCOUNTER — OFFICE VISIT (OUTPATIENT)
Age: 65
End: 2025-08-07
Attending: PHYSICIAN ASSISTANT
Payer: MEDICARE

## 2025-08-07 VITALS
HEIGHT: 65 IN | BODY MASS INDEX: 30.86 KG/M2 | DIASTOLIC BLOOD PRESSURE: 82 MMHG | WEIGHT: 185.2 LBS | HEART RATE: 72 BPM | OXYGEN SATURATION: 98 % | SYSTOLIC BLOOD PRESSURE: 124 MMHG

## 2025-08-07 DIAGNOSIS — F33.2 MAJOR DEPRESSIVE DISORDER, RECURRENT SEVERE WITHOUT PSYCHOTIC FEATURES (HCC): Chronic | ICD-10-CM

## 2025-08-07 DIAGNOSIS — M17.11 PRIMARY OSTEOARTHRITIS OF RIGHT KNEE: ICD-10-CM

## 2025-08-07 DIAGNOSIS — J45.30 MILD PERSISTENT ASTHMA WITHOUT COMPLICATION: ICD-10-CM

## 2025-08-07 DIAGNOSIS — Z01.818 PRE-OP EXAMINATION: ICD-10-CM

## 2025-08-07 DIAGNOSIS — N25.81 SECONDARY HYPERPARATHYROIDISM OF RENAL ORIGIN (HCC): Primary | ICD-10-CM

## 2025-08-07 PROCEDURE — 99215 OFFICE O/P EST HI 40 MIN: CPT | Performed by: INTERNAL MEDICINE

## 2025-08-07 PROCEDURE — G2211 COMPLEX E/M VISIT ADD ON: HCPCS | Performed by: INTERNAL MEDICINE

## 2025-08-14 ENCOUNTER — ANESTHESIA (OUTPATIENT)
Age: 65
End: 2025-08-14
Payer: MEDICARE

## 2025-08-14 ENCOUNTER — HOSPITAL ENCOUNTER (OUTPATIENT)
Age: 65
Setting detail: OUTPATIENT SURGERY
Discharge: HOME | End: 2025-08-15
Attending: STUDENT IN AN ORGANIZED HEALTH CARE EDUCATION/TRAINING PROGRAM | Admitting: STUDENT IN AN ORGANIZED HEALTH CARE EDUCATION/TRAINING PROGRAM
Payer: MEDICARE

## 2025-08-14 ENCOUNTER — APPOINTMENT (OUTPATIENT)
Age: 65
End: 2025-08-14
Payer: MEDICARE

## 2025-08-18 ENCOUNTER — TELEPHONE (OUTPATIENT)
Age: 65
End: 2025-08-18

## 2025-08-19 ENCOUNTER — PATIENT OUTREACH (OUTPATIENT)
Dept: OBGYN CLINIC | Facility: HOSPITAL | Age: 65
End: 2025-08-19

## 2025-08-21 ENCOUNTER — PATIENT OUTREACH (OUTPATIENT)
Dept: OBGYN CLINIC | Facility: HOSPITAL | Age: 65
End: 2025-08-21

## (undated) DEVICE — 3M™ IOBAN™ 2 ANTIMICROBIAL INCISE DRAPE 6650EZ: Brand: IOBAN™ 2

## (undated) DEVICE — TUBING SUCTION 5MM X 12 FT

## (undated) DEVICE — GLOVE SRG BIOGEL 6.5

## (undated) DEVICE — PREP SURGICAL PURPREP 26ML

## (undated) DEVICE — DISSECTOR ACE BLADE 700 MEGADYNE 25IN STD

## (undated) DEVICE — LIGACLIP MCA MULTIPLE CLIP APPLIERS, 20 MEDIUM CLIPS: Brand: LIGACLIP

## (undated) DEVICE — 450 ML BOTTLE OF 0.05% CHLORHEXIDINE GLUCONATE IN 99.95% STERILE WATER FOR IRRIGATION, USP AND APPLICATOR.: Brand: IRRISEPT ANTIMICROBIAL WOUND LAVAGE

## (undated) DEVICE — INTENDED FOR TISSUE SEPARATION, AND OTHER PROCEDURES THAT REQUIRE A SHARP SURGICAL BLADE TO PUNCTURE OR CUT.: Brand: BARD-PARKER SAFETY BLADES SIZE 15, STERILE

## (undated) DEVICE — SURGICAL GOWN, XL SMARTSLEEVE: Brand: CONVERTORS

## (undated) DEVICE — NEEDLE SPINAL 22G X 3.5IN  QUINCKE

## (undated) DEVICE — INTENDED FOR TISSUE SEPARATION, AND OTHER PROCEDURES THAT REQUIRE A SHARP SURGICAL BLADE TO PUNCTURE OR CUT.: Brand: BARD-PARKER SAFETY BLADES SIZE 10, STERILE

## (undated) DEVICE — SUT STRATAFIX SPIRAL PLUS 3-0 PS-2 30 X 30 CM SXMP2B408

## (undated) DEVICE — SUT PDS II 2-0 CT-1 27 IN Z259H

## (undated) DEVICE — LIGACLIP MCA MULTIPLE CLIP APPLIERS, 20 SMALL CLIPS: Brand: LIGACLIP

## (undated) DEVICE — SUT VICRYL 1 CT-1 27 IN J261H

## (undated) DEVICE — SKIN MARKER DUAL TIP WITH RULER CAP, FLEXIBLE RULER AND LABELS: Brand: DEVON

## (undated) DEVICE — IMPERVIOUS STOCKINETTE: Brand: DEROYAL

## (undated) DEVICE — SUT STRATAFIX SPIRAL MONOCRYL PLUS 3-0 PS-2 45CM SXMP1B107

## (undated) DEVICE — DRESSING MEPILEX AG BORDER POST-OP 4 X 12 IN

## (undated) DEVICE — PAD GROUNDING ADULT

## (undated) DEVICE — EXOFIN PRECISION PEN HIGH VISCOSITY TOPICAL SKIN ADHESIVE: Brand: EXOFIN PRECISION PEN, 1G

## (undated) DEVICE — SUT STRATAFIX SPIRAL PDS PLUS 1 CTX 18 IN SXPP1A400

## (undated) DEVICE — CUFF TOURNIQUET 30 X 4 IN QUICK CONNECT DISP 1BLA

## (undated) DEVICE — PREVENA PLUS INCISION MANAGEMENT SYSTEM: Brand: PREVENA PLUS™

## (undated) DEVICE — STIRRUP STRAP ADULT DISP

## (undated) DEVICE — GLOVE SRG BIOGEL ORTHOPEDIC 8.5

## (undated) DEVICE — GLOVE INDICATOR PI UNDERGLOVE SZ 6.5 BLUE

## (undated) DEVICE — WEBRIL 6 IN UNSTERILE

## (undated) DEVICE — ACE WRAP 6 IN UNSTERILE

## (undated) DEVICE — 3M™ STERI-DRAPE™ U-DRAPE 1015: Brand: STERI-DRAPE™

## (undated) DEVICE — NEEDLE 18 G X 1 1/2

## (undated) DEVICE — SPONGE LAP 18 X 18 IN STRL RFD

## (undated) DEVICE — GLOVE INDICATOR PI UNDERGLOVE SZ 8.5 BLUE

## (undated) DEVICE — SYRINGE 20ML LL

## (undated) DEVICE — BINDER ABDOMINAL 46-62 IN

## (undated) DEVICE — CAPIT KNEE ATTUNE FB MEDIAL STAB AOX POR

## (undated) DEVICE — CHLORAPREP HI-LITE 26ML ORANGE

## (undated) DEVICE — PROXIMATE SKIN STAPLERS (35 WIDE) CONTAINS 35 STAINLESS STEEL STAPLES (FIXED HEAD): Brand: PROXIMATE

## (undated) DEVICE — SYRINGE 50ML LL

## (undated) DEVICE — DRESSING TELFA 2 X 3 IN STRL

## (undated) DEVICE — HANDPIECE SET WITH RETRACTABLE COAXIAL FAN SPRAY TIP AND SUCTION TUBE: Brand: INTERPULSE

## (undated) DEVICE — GLOVE SRG BIOGEL 8.5

## (undated) DEVICE — BETHLEHEM UNIV TOTAL KNEE, KIT: Brand: CARDINAL HEALTH

## (undated) DEVICE — HOOD: Brand: T7PLUS

## (undated) DEVICE — DRAPE EQUIPMENT RF WAND

## (undated) DEVICE — JP CHAN DRN SIL HUBLESS 15FR W/TRO: Brand: CARDINAL HEALTH

## (undated) DEVICE — JACKSON-PRATT 100CC BULB RESERVOIR: Brand: CARDINAL HEALTH

## (undated) DEVICE — 3M™ TEGADERM™ TRANSPARENT FILM DRESSING FRAME STYLE, 1626W, 4 IN X 4-3/4 IN (10 CM X 12 CM), 50/CT 4CT/CASE: Brand: 3M™ TEGADERM™

## (undated) DEVICE — PLUMEPEN PRO 10FT

## (undated) DEVICE — SPECIMEN CONTAINER STERILE PEEL PACK

## (undated) DEVICE — GLOVE SRG BIOGEL ECLIPSE 8

## (undated) DEVICE — DRESSING BIOPATCH ANTIMICROBIAL 1 IN DISC

## (undated) DEVICE — SPONGE SCRUB 4 PCT CHLORHEXIDINE

## (undated) DEVICE — STAPLER INSORB SUBCUTICULAR 30 SINGLE USE

## (undated) DEVICE — SAW BLADE OSCILLATING BRAZOL 167

## (undated) DEVICE — SUT VICRYL 2-0 CT-1 36 IN J945H

## (undated) DEVICE — STERILE MUSCLE FLAP PACK: Brand: CARDINAL HEALTH

## (undated) DEVICE — GLOVE SRG BIOGEL ECLIPSE 8.5